# Patient Record
Sex: FEMALE | Race: BLACK OR AFRICAN AMERICAN | NOT HISPANIC OR LATINO
[De-identification: names, ages, dates, MRNs, and addresses within clinical notes are randomized per-mention and may not be internally consistent; named-entity substitution may affect disease eponyms.]

---

## 2019-11-13 PROBLEM — Z00.00 ENCOUNTER FOR PREVENTIVE HEALTH EXAMINATION: Status: ACTIVE | Noted: 2019-11-13

## 2019-12-20 ENCOUNTER — APPOINTMENT (OUTPATIENT)
Dept: VASCULAR SURGERY | Facility: CLINIC | Age: 64
End: 2019-12-20
Payer: MEDICAID

## 2019-12-20 VITALS
WEIGHT: 162 LBS | DIASTOLIC BLOOD PRESSURE: 86 MMHG | BODY MASS INDEX: 29.81 KG/M2 | SYSTOLIC BLOOD PRESSURE: 146 MMHG | HEART RATE: 67 BPM | HEIGHT: 62 IN

## 2019-12-20 DIAGNOSIS — Z21 ASYMPTOMATIC HUMAN IMMUNODEFICIENCY VIRUS [HIV] INFECTION STATUS: ICD-10-CM

## 2019-12-20 DIAGNOSIS — Z86.39 PERSONAL HISTORY OF OTHER ENDOCRINE, NUTRITIONAL AND METABOLIC DISEASE: ICD-10-CM

## 2019-12-20 DIAGNOSIS — I10 ESSENTIAL (PRIMARY) HYPERTENSION: ICD-10-CM

## 2019-12-20 PROCEDURE — 99203 OFFICE O/P NEW LOW 30 MIN: CPT

## 2019-12-20 PROCEDURE — G0365: CPT

## 2019-12-20 RX ORDER — ZIDOVUDINE 10 MG/ML
INJECTION, SOLUTION INTRAVENOUS
Refills: 0 | Status: ACTIVE | COMMUNITY

## 2019-12-20 RX ORDER — ACYCLOVIR 800 MG/1
TABLET ORAL
Refills: 0 | Status: ACTIVE | COMMUNITY

## 2019-12-20 NOTE — PHYSICAL EXAM
[Normal Breath Sounds] : Normal breath sounds [Normal Heart Sounds] : normal heart sounds [2+] : left 2+ [Oriented to Person] : oriented to person [Alert] : alert [Oriented to Place] : oriented to place [JVD] : no jugular venous distention  [Ankle Swelling (On Exam)] : not present [Varicose Veins Of Lower Extremities] : not present [] : not present [Abdomen Masses] : No abdominal masses [Skin Ulcer] : no ulcer

## 2019-12-20 NOTE — ASSESSMENT
[FreeTextEntry1] : Renal failure. Plan for left basilic vein transposition AV fistula. Needs medical clearance. Needs ID clearance.

## 2019-12-20 NOTE — CONSULT LETTER
[Dear  ___] : Dear  [unfilled], [Consult Letter:] : I had the pleasure of evaluating your patient, [unfilled]. [Please see my note below.] : Please see my note below. [Consult Closing:] : Thank you very much for allowing me to participate in the care of this patient.  If you have any questions, please do not hesitate to contact me. [Sincerely,] : Sincerely, [FreeTextEntry3] : Mello Dickens M.D., F.CONRAD.S., R.P.V.I.\par  of Vascular Surgery\par Chief, Vascular Surgery at [facility]\par Chief, Endovascular Surgery at [facility]\par Medical Director of Endovascular Program\par [facility]\par

## 2019-12-20 NOTE — HISTORY OF PRESENT ILLNESS
[FreeTextEntry1] : Patient is a 64-year-old female with past medical history significant for hypertension, hypercholesterolemia, HIV positive presenting with renal insufficiency requiring hemodialysis in the near future. No significant cardiac disease. Patient is right-handed.

## 2020-02-17 ENCOUNTER — FORM ENCOUNTER (OUTPATIENT)
Age: 65
End: 2020-02-17

## 2020-02-18 ENCOUNTER — OUTPATIENT (OUTPATIENT)
Dept: OUTPATIENT SERVICES | Facility: HOSPITAL | Age: 65
LOS: 1 days | End: 2020-02-18
Payer: MEDICAID

## 2020-02-18 VITALS
DIASTOLIC BLOOD PRESSURE: 80 MMHG | SYSTOLIC BLOOD PRESSURE: 140 MMHG | HEART RATE: 72 BPM | HEIGHT: 62 IN | WEIGHT: 164.02 LBS | OXYGEN SATURATION: 100 % | TEMPERATURE: 98 F | RESPIRATION RATE: 18 BRPM

## 2020-02-18 DIAGNOSIS — C53.9 MALIGNANT NEOPLASM OF CERVIX UTERI, UNSPECIFIED: Chronic | ICD-10-CM

## 2020-02-18 DIAGNOSIS — N18.9 CHRONIC KIDNEY DISEASE, UNSPECIFIED: ICD-10-CM

## 2020-02-18 DIAGNOSIS — I10 ESSENTIAL (PRIMARY) HYPERTENSION: ICD-10-CM

## 2020-02-18 DIAGNOSIS — Z87.59 PERSONAL HISTORY OF OTHER COMPLICATIONS OF PREGNANCY, CHILDBIRTH AND THE PUERPERIUM: Chronic | ICD-10-CM

## 2020-02-18 DIAGNOSIS — Z01.818 ENCOUNTER FOR OTHER PREPROCEDURAL EXAMINATION: ICD-10-CM

## 2020-02-18 DIAGNOSIS — Z90.710 ACQUIRED ABSENCE OF BOTH CERVIX AND UTERUS: Chronic | ICD-10-CM

## 2020-02-18 DIAGNOSIS — E11.22 TYPE 2 DIABETES MELLITUS WITH DIABETIC CHRONIC KIDNEY DISEASE: ICD-10-CM

## 2020-02-18 PROCEDURE — 71046 X-RAY EXAM CHEST 2 VIEWS: CPT

## 2020-02-18 PROCEDURE — G0463: CPT

## 2020-02-18 PROCEDURE — 71046 X-RAY EXAM CHEST 2 VIEWS: CPT | Mod: 26

## 2020-02-18 RX ORDER — LABETALOL HCL 100 MG
1 TABLET ORAL
Qty: 0 | Refills: 0 | DISCHARGE

## 2020-02-18 RX ORDER — SODIUM CHLORIDE 9 MG/ML
3 INJECTION INTRAMUSCULAR; INTRAVENOUS; SUBCUTANEOUS EVERY 8 HOURS
Refills: 0 | Status: DISCONTINUED | OUTPATIENT
Start: 2020-03-05 | End: 2020-03-13

## 2020-02-18 NOTE — H&P PST ADULT - ASSESSMENT
64-year old female with history of hypertension, chronic kidney disease, gout, hyperlipidemia, HIV, depression comes to PST prior to scheduled surgery.  on examination, patient found to have a loose tooth, lower, back left.  Patient also has left cheek swelling without pain.   Patient instructed to see dentist as soon as possible.

## 2020-02-18 NOTE — H&P PST ADULT - RS GEN PE MLT RESP DETAILS PC
airway patent/good air movement/respirations non-labored/clear to auscultation bilaterally/breath sounds equal/normal

## 2020-02-18 NOTE — H&P PST ADULT - NSICDXPROBLEM_GEN_ALL_CORE_FT
PROBLEM DIAGNOSES  Problem: Chronic kidney disease  Assessment and Plan: Patient schedueld for Left Arm Basilic Vein Transposition Arteriovenous Fistula Creation on 2/20/20.    Problem: Hypertension  Assessment and Plan: Patient instructed to take amlodipine, hydralazine, Metoprolol with sip of water morning of surgery.chronic kidney disease

## 2020-02-18 NOTE — H&P PST ADULT - NSICDXPASTMEDICALHX_GEN_ALL_CORE_FT
PAST MEDICAL HISTORY:  Cervical cancer     Chronic kidney disease     Depression     Gout     History of gastroesophageal reflux (GERD)     HIV (human immunodeficiency virus infection)     HTN (hypertension)     Hyperlipidemia PAST MEDICAL HISTORY:  Cervical cancer     Chronic kidney disease     Depression     DVT, lower extremity Left leg after hysterectomy    Gout     History of gastroesophageal reflux (GERD)     HIV (human immunodeficiency virus infection)     HTN (hypertension)     Hyperlipidemia

## 2020-02-18 NOTE — H&P PST ADULT - HISTORY OF PRESENT ILLNESS
Patient has a history of hypertension and chronic kidney disease.  Patient reports she has not started dialysis, however will need to start hemodialysis in the near future.

## 2020-02-18 NOTE — H&P PST ADULT - NSANTHOSAYNRD_GEN_A_CORE
No. SERAFIN screening performed.  STOP BANG Legend: 0-2 = LOW Risk; 3-4 = INTERMEDIATE Risk; 5-8 = HIGH Risk

## 2020-02-18 NOTE — H&P PST ADULT - NSICDXPASTSURGICALHX_GEN_ALL_CORE_FT
PAST SURGICAL HISTORY:  H/O: hysterectomy Due to cervical cancer    History of ectopic pregnancy Two

## 2020-03-04 ENCOUNTER — TRANSCRIPTION ENCOUNTER (OUTPATIENT)
Age: 65
End: 2020-03-04

## 2020-03-05 ENCOUNTER — OUTPATIENT (OUTPATIENT)
Dept: OUTPATIENT SERVICES | Facility: HOSPITAL | Age: 65
LOS: 1 days | End: 2020-03-05
Payer: MEDICAID

## 2020-03-05 ENCOUNTER — APPOINTMENT (OUTPATIENT)
Dept: VASCULAR SURGERY | Facility: HOSPITAL | Age: 65
End: 2020-03-05
Payer: MEDICAID

## 2020-03-05 VITALS
RESPIRATION RATE: 16 BRPM | TEMPERATURE: 98 F | WEIGHT: 164.02 LBS | HEART RATE: 69 BPM | OXYGEN SATURATION: 100 % | HEIGHT: 62 IN | DIASTOLIC BLOOD PRESSURE: 71 MMHG | SYSTOLIC BLOOD PRESSURE: 138 MMHG

## 2020-03-05 VITALS
SYSTOLIC BLOOD PRESSURE: 109 MMHG | RESPIRATION RATE: 18 BRPM | OXYGEN SATURATION: 98 % | TEMPERATURE: 98 F | DIASTOLIC BLOOD PRESSURE: 57 MMHG | HEART RATE: 69 BPM

## 2020-03-05 DIAGNOSIS — Z90.710 ACQUIRED ABSENCE OF BOTH CERVIX AND UTERUS: Chronic | ICD-10-CM

## 2020-03-05 DIAGNOSIS — E11.22 TYPE 2 DIABETES MELLITUS WITH DIABETIC CHRONIC KIDNEY DISEASE: ICD-10-CM

## 2020-03-05 DIAGNOSIS — Z87.59 PERSONAL HISTORY OF OTHER COMPLICATIONS OF PREGNANCY, CHILDBIRTH AND THE PUERPERIUM: Chronic | ICD-10-CM

## 2020-03-05 LAB
ANION GAP SERPL CALC-SCNC: 7 MMOL/L — SIGNIFICANT CHANGE UP (ref 5–17)
BUN SERPL-MCNC: 54 MG/DL — HIGH (ref 7–18)
CALCIUM SERPL-MCNC: 10.5 MG/DL — SIGNIFICANT CHANGE UP (ref 8.4–10.5)
CHLORIDE SERPL-SCNC: 110 MMOL/L — HIGH (ref 96–108)
CO2 SERPL-SCNC: 23 MMOL/L — SIGNIFICANT CHANGE UP (ref 22–31)
CREAT SERPL-MCNC: 7.32 MG/DL — HIGH (ref 0.5–1.3)
GLUCOSE SERPL-MCNC: 104 MG/DL — HIGH (ref 70–99)
POTASSIUM SERPL-MCNC: 5 MMOL/L — SIGNIFICANT CHANGE UP (ref 3.5–5.3)
POTASSIUM SERPL-SCNC: 5 MMOL/L — SIGNIFICANT CHANGE UP (ref 3.5–5.3)
SODIUM SERPL-SCNC: 140 MMOL/L — SIGNIFICANT CHANGE UP (ref 135–145)

## 2020-03-05 PROCEDURE — 36819 AV FUSE UPPR ARM BASILIC: CPT

## 2020-03-05 PROCEDURE — 80048 BASIC METABOLIC PNL TOTAL CA: CPT

## 2020-03-05 PROCEDURE — C1889: CPT

## 2020-03-05 PROCEDURE — 36415 COLL VENOUS BLD VENIPUNCTURE: CPT

## 2020-03-05 PROCEDURE — L8699: CPT

## 2020-03-05 RX ORDER — ONDANSETRON 8 MG/1
4 TABLET, FILM COATED ORAL ONCE
Refills: 0 | Status: DISCONTINUED | OUTPATIENT
Start: 2020-03-05 | End: 2020-03-05

## 2020-03-05 RX ORDER — CHLORHEXIDINE GLUCONATE 213 G/1000ML
1 SOLUTION TOPICAL ONCE
Refills: 0 | Status: COMPLETED | OUTPATIENT
Start: 2020-03-05 | End: 2020-03-05

## 2020-03-05 RX ORDER — OXYCODONE AND ACETAMINOPHEN 5; 325 MG/1; MG/1
1 TABLET ORAL ONCE
Refills: 0 | Status: DISCONTINUED | OUTPATIENT
Start: 2020-03-05 | End: 2020-03-05

## 2020-03-05 RX ORDER — FENTANYL CITRATE 50 UG/ML
25 INJECTION INTRAVENOUS
Refills: 0 | Status: DISCONTINUED | OUTPATIENT
Start: 2020-03-05 | End: 2020-03-05

## 2020-03-05 RX ORDER — HYDROMORPHONE HYDROCHLORIDE 2 MG/ML
0.5 INJECTION INTRAMUSCULAR; INTRAVENOUS; SUBCUTANEOUS
Refills: 0 | Status: DISCONTINUED | OUTPATIENT
Start: 2020-03-05 | End: 2020-03-05

## 2020-03-05 RX ORDER — SODIUM CHLORIDE 9 MG/ML
1000 INJECTION INTRAMUSCULAR; INTRAVENOUS; SUBCUTANEOUS
Refills: 0 | Status: DISCONTINUED | OUTPATIENT
Start: 2020-03-05 | End: 2020-03-05

## 2020-03-05 RX ADMIN — CHLORHEXIDINE GLUCONATE 1 APPLICATION(S): 213 SOLUTION TOPICAL at 09:12

## 2020-03-05 RX ADMIN — SODIUM CHLORIDE 3 MILLILITER(S): 9 INJECTION INTRAMUSCULAR; INTRAVENOUS; SUBCUTANEOUS at 09:12

## 2020-03-05 RX ADMIN — HYDROMORPHONE HYDROCHLORIDE 0.5 MILLIGRAM(S): 2 INJECTION INTRAMUSCULAR; INTRAVENOUS; SUBCUTANEOUS at 14:24

## 2020-03-05 NOTE — BRIEF OPERATIVE NOTE - NSICDXBRIEFPROCEDURE_GEN_ALL_CORE_FT
PROCEDURES:  Creation, AV fistula, brachiobasilic, using transposition technique 05-Mar-2020 13:21:47  Steve Lawrence

## 2020-03-05 NOTE — BRIEF OPERATIVE NOTE - OPERATION/FINDINGS
preop dx: ESRD  postop dx: same  procedure: left brachiobasilic fistula creation via vein transposition    dopplerable left radial and ulnar pulses after anastomosis, palpable thrill

## 2020-03-05 NOTE — ASU DISCHARGE PLAN (ADULT/PEDIATRIC) - CARE PROVIDER_API CALL
Mello Dickens)  Vascular Surgery  [address] [facility] [facility], [address]  [place] [address]  Phone: [phone]  Fax: [phone]  Follow Up Time:

## 2020-03-06 PROBLEM — E78.5 HYPERLIPIDEMIA, UNSPECIFIED: Chronic | Status: ACTIVE | Noted: 2020-02-18

## 2020-03-06 PROBLEM — F32.9 MAJOR DEPRESSIVE DISORDER, SINGLE EPISODE, UNSPECIFIED: Chronic | Status: ACTIVE | Noted: 2020-02-18

## 2020-03-06 PROBLEM — I82.409 ACUTE EMBOLISM AND THROMBOSIS OF UNSPECIFIED DEEP VEINS OF UNSPECIFIED LOWER EXTREMITY: Chronic | Status: ACTIVE | Noted: 2020-02-18

## 2020-03-06 PROBLEM — Z87.19 PERSONAL HISTORY OF OTHER DISEASES OF THE DIGESTIVE SYSTEM: Chronic | Status: ACTIVE | Noted: 2020-02-18

## 2020-03-06 PROBLEM — M10.9 GOUT, UNSPECIFIED: Chronic | Status: ACTIVE | Noted: 2020-02-18

## 2020-03-09 ENCOUNTER — APPOINTMENT (OUTPATIENT)
Dept: VASCULAR SURGERY | Facility: CLINIC | Age: 65
End: 2020-03-09
Payer: MEDICAID

## 2020-03-09 PROCEDURE — 99024 POSTOP FOLLOW-UP VISIT: CPT

## 2020-03-13 NOTE — DISCUSSION/SUMMARY
[FreeTextEntry1] : 65 yo female with history of htn, hld, hiv, ckd not on hd presents for follow up s/p left upper extremity basilic transposition \par les drain removed \par pt to follow up in 2 weeks for suture removal and duplex

## 2020-03-13 NOTE — REASON FOR VISIT
[de-identified] : left upper extremity basilic transposition [de-identified] : 3/5/20 [de-identified] : pt without any complaints had les drain in place had drained about 30 cc / 24 hours initially now over the past 2 days 30 cc of serosangenous fluid

## 2020-03-13 NOTE — PHYSICAL EXAM
[Alert] : alert [Calm] : calm [JVD] : no jugular venous distention  [de-identified] : appears well  [de-identified] : plapable thrill, steri strips in place, mild healing ecchymosis of the left upper extremity, les drain removed

## 2020-03-20 ENCOUNTER — APPOINTMENT (OUTPATIENT)
Dept: VASCULAR SURGERY | Facility: CLINIC | Age: 65
End: 2020-03-20

## 2020-03-23 ENCOUNTER — APPOINTMENT (OUTPATIENT)
Dept: VASCULAR SURGERY | Facility: CLINIC | Age: 65
End: 2020-03-23
Payer: MEDICAID

## 2020-03-23 VITALS — TEMPERATURE: 98.4 F | HEIGHT: 62 IN | WEIGHT: 163 LBS | BODY MASS INDEX: 30 KG/M2

## 2020-03-23 PROCEDURE — 99024 POSTOP FOLLOW-UP VISIT: CPT

## 2020-03-23 RX ORDER — FEXOFENADINE HYDROCHLORIDE 180 MG/1
180 TABLET, FILM COATED ORAL DAILY
Qty: 15 | Refills: 0 | Status: ACTIVE | COMMUNITY
Start: 2020-03-23 | End: 1900-01-01

## 2020-03-23 RX ORDER — ERGOCALCIFEROL 1.25 MG/1
1.25 MG CAPSULE, LIQUID FILLED ORAL
Qty: 4 | Refills: 0 | Status: ACTIVE | COMMUNITY
Start: 2019-09-26

## 2020-03-23 RX ORDER — ALLOPURINOL 100 MG/1
100 TABLET ORAL
Qty: 28 | Refills: 0 | Status: ACTIVE | COMMUNITY
Start: 2019-09-26

## 2020-03-23 RX ORDER — AMLODIPINE BESYLATE 10 MG/1
10 TABLET ORAL
Qty: 28 | Refills: 0 | Status: ACTIVE | COMMUNITY
Start: 2019-09-26

## 2020-03-23 RX ORDER — OXYCODONE AND ACETAMINOPHEN 5; 325 MG/1; MG/1
5-325 TABLET ORAL
Qty: 12 | Refills: 0 | Status: DISCONTINUED | COMMUNITY
Start: 2020-03-05 | End: 2020-03-23

## 2020-03-23 RX ORDER — AMMONIUM LACTATE 12 %
12 CREAM (GRAM) TOPICAL
Qty: 385 | Refills: 0 | Status: ACTIVE | COMMUNITY
Start: 2019-09-26

## 2020-03-23 NOTE — REASON FOR VISIT
[de-identified] : Status post left upper extremity basilic vein transposition AV fistula.  Patient had a severe allergic reaction to oxycodone.  Recommend Medrol pack and Allegra.  Follow-up next week.  Patient was advised to go to the emergency room if symptoms do not improve with the medication in 24 hours.

## 2020-03-30 ENCOUNTER — APPOINTMENT (OUTPATIENT)
Dept: VASCULAR SURGERY | Facility: CLINIC | Age: 65
End: 2020-03-30

## 2020-07-10 ENCOUNTER — APPOINTMENT (OUTPATIENT)
Dept: VASCULAR SURGERY | Facility: CLINIC | Age: 65
End: 2020-07-10
Payer: MEDICAID

## 2020-07-10 VITALS
HEART RATE: 66 BPM | DIASTOLIC BLOOD PRESSURE: 90 MMHG | WEIGHT: 164 LBS | BODY MASS INDEX: 30.18 KG/M2 | SYSTOLIC BLOOD PRESSURE: 138 MMHG | HEIGHT: 62 IN

## 2020-07-10 VITALS — TEMPERATURE: 97.3 F

## 2020-07-10 PROCEDURE — 99213 OFFICE O/P EST LOW 20 MIN: CPT

## 2020-07-10 PROCEDURE — 93990 DOPPLER FLOW TESTING: CPT

## 2020-07-10 NOTE — PHYSICAL EXAM
[Hand well perfused] : hand well perfused [Thrill] : no thrill [Normal] : normoactive bowel sounds, soft and nontender, no hepatosplenomegaly or masses appreciated

## 2020-07-10 NOTE — HISTORY OF PRESENT ILLNESS
[FreeTextEntry1] : Patient with renal failure.  Patient not on hemodialysis.  Patient had a complicated postoperative course with severe allergic reaction to oxycodone. [] : left basilic fistula

## 2020-07-10 NOTE — ASSESSMENT
[FreeTextEntry1] : Patient with renal failure with thrombosed left basilic fistula.  Plan for fistulogram and thrombectomy.

## 2020-07-21 ENCOUNTER — FORM ENCOUNTER (OUTPATIENT)
Age: 65
End: 2020-07-21

## 2020-07-22 ENCOUNTER — APPOINTMENT (OUTPATIENT)
Dept: ENDOVASCULAR SURGERY | Facility: CLINIC | Age: 65
End: 2020-07-22
Payer: MEDICAID

## 2020-07-22 VITALS
WEIGHT: 164 LBS | TEMPERATURE: 97.6 F | DIASTOLIC BLOOD PRESSURE: 85 MMHG | RESPIRATION RATE: 16 BRPM | SYSTOLIC BLOOD PRESSURE: 191 MMHG | BODY MASS INDEX: 30.18 KG/M2 | HEART RATE: 68 BPM | HEIGHT: 62 IN | OXYGEN SATURATION: 98 %

## 2020-07-22 PROCEDURE — 36905Z: CUSTOM | Mod: 53

## 2020-07-22 PROCEDURE — 76937 US GUIDE VASCULAR ACCESS: CPT

## 2020-07-22 PROCEDURE — 36216Z: CUSTOM | Mod: 59

## 2020-07-23 NOTE — PAST MEDICAL HISTORY
[Increasing age ( >40 years old)] : Increasing age ( >40 years old) [No therapy indicated for cases scheduled for less than one hour] : No therapy indicated for cases scheduled for less than one hour. [FreeTextEntry1] : Malignant Hyperthermia Screening Tool and Risk of Bleeding Assessment\par \par Ms. VERONICA GOVEA denies family history of unexpected death following Anesthesia or Exercise.\par Denies Family history of Malignant Hyperthermia, Muscle or Neuromuscular disorder and High Temperature following exercise.\par \par Ms. VERONICA GOVEA denies history of Muscle Spasm, Dark or Chocolate - Colored urine and Unanticipated fever immediately following anesthesia or serious exercise. \par Ms. GOVEA also denies bleeding tendencies/ Risks of Bleeding.\par

## 2020-07-23 NOTE — HISTORY OF PRESENT ILLNESS
[] : left brachiocephalic fistula [FreeTextEntry1] : 3/5/2020 Dr. Dickens [FreeTextEntry4] : not on dialysis [FreeTextEntry6] : Dr. Mcmahan [FreeTextEntry5] : 10pm 7/21/2020

## 2020-07-23 NOTE — PROCEDURE
[D/C IV on discharge] : D/C IV on discharge [Resume diet] : resume diet [Site check for bleeding/hematoma/thrill/bruit] : Site check for bleeding/hematoma/thrill/bruit [Vital signs on admission the q 15 mins x2] : Vital signs on admission the q 15 mins x2 [Tylenol 1000 mg po x 1 dose] : Tylenol 1000 mg po x 1 dose [Ice pack to ___ arm x 15 minutes] : Ice pack to [unfilled] arm x 15 minutes [FreeTextEntry1] : attempted left arm fistula thrombectomy

## 2020-07-27 ENCOUNTER — APPOINTMENT (OUTPATIENT)
Dept: VASCULAR SURGERY | Facility: CLINIC | Age: 65
End: 2020-07-27
Payer: MEDICAID

## 2020-07-27 PROCEDURE — 99213 OFFICE O/P EST LOW 20 MIN: CPT

## 2020-07-27 PROCEDURE — 93971 EXTREMITY STUDY: CPT

## 2020-07-27 NOTE — ASSESSMENT
[FreeTextEntry1] : Patient with renal failure.  Plan for left upper arm AV graft.  We will schedule as soon as possible.

## 2020-08-03 ENCOUNTER — APPOINTMENT (OUTPATIENT)
Dept: DISASTER EMERGENCY | Facility: CLINIC | Age: 65
End: 2020-08-03

## 2020-08-04 LAB — SARS-COV-2 N GENE NPH QL NAA+PROBE: NOT DETECTED

## 2020-08-05 ENCOUNTER — TRANSCRIPTION ENCOUNTER (OUTPATIENT)
Age: 65
End: 2020-08-05

## 2020-08-05 RX ORDER — POVIDONE-IODINE 5 %
1 AEROSOL (ML) TOPICAL ONCE
Refills: 0 | Status: COMPLETED | OUTPATIENT
Start: 2020-08-06 | End: 2020-08-06

## 2020-08-06 ENCOUNTER — APPOINTMENT (OUTPATIENT)
Dept: VASCULAR SURGERY | Facility: HOSPITAL | Age: 65
End: 2020-08-06

## 2020-08-06 ENCOUNTER — OUTPATIENT (OUTPATIENT)
Dept: OUTPATIENT SERVICES | Facility: HOSPITAL | Age: 65
LOS: 1 days | End: 2020-08-06
Payer: MEDICAID

## 2020-08-06 VITALS
RESPIRATION RATE: 16 BRPM | SYSTOLIC BLOOD PRESSURE: 142 MMHG | OXYGEN SATURATION: 99 % | DIASTOLIC BLOOD PRESSURE: 70 MMHG | HEART RATE: 77 BPM | TEMPERATURE: 98 F

## 2020-08-06 VITALS
HEIGHT: 62 IN | OXYGEN SATURATION: 99 % | DIASTOLIC BLOOD PRESSURE: 83 MMHG | HEART RATE: 69 BPM | WEIGHT: 162.04 LBS | TEMPERATURE: 98 F | RESPIRATION RATE: 20 BRPM | SYSTOLIC BLOOD PRESSURE: 187 MMHG

## 2020-08-06 DIAGNOSIS — I10 ESSENTIAL (PRIMARY) HYPERTENSION: ICD-10-CM

## 2020-08-06 DIAGNOSIS — E11.22 TYPE 2 DIABETES MELLITUS WITH DIABETIC CHRONIC KIDNEY DISEASE: ICD-10-CM

## 2020-08-06 DIAGNOSIS — E08.22 DIABETES MELLITUS DUE TO UNDERLYING CONDITION WITH DIABETIC CHRONIC KIDNEY DISEASE: ICD-10-CM

## 2020-08-06 DIAGNOSIS — Z87.59 PERSONAL HISTORY OF OTHER COMPLICATIONS OF PREGNANCY, CHILDBIRTH AND THE PUERPERIUM: Chronic | ICD-10-CM

## 2020-08-06 DIAGNOSIS — Z90.710 ACQUIRED ABSENCE OF BOTH CERVIX AND UTERUS: Chronic | ICD-10-CM

## 2020-08-06 DIAGNOSIS — Z98.890 OTHER SPECIFIED POSTPROCEDURAL STATES: Chronic | ICD-10-CM

## 2020-08-06 LAB
ANION GAP SERPL CALC-SCNC: 9 MMOL/L — SIGNIFICANT CHANGE UP (ref 5–17)
BUN SERPL-MCNC: 54 MG/DL — HIGH (ref 7–18)
CALCIUM SERPL-MCNC: 10.2 MG/DL — SIGNIFICANT CHANGE UP (ref 8.4–10.5)
CHLORIDE SERPL-SCNC: 107 MMOL/L — SIGNIFICANT CHANGE UP (ref 96–108)
CO2 SERPL-SCNC: 23 MMOL/L — SIGNIFICANT CHANGE UP (ref 22–31)
CREAT SERPL-MCNC: 8.59 MG/DL — HIGH (ref 0.5–1.3)
GLUCOSE BLDC GLUCOMTR-MCNC: 81 MG/DL — SIGNIFICANT CHANGE UP (ref 70–99)
GLUCOSE SERPL-MCNC: 87 MG/DL — SIGNIFICANT CHANGE UP (ref 70–99)
POTASSIUM SERPL-MCNC: 6 MMOL/L — HIGH (ref 3.5–5.3)
POTASSIUM SERPL-SCNC: 6 MMOL/L — HIGH (ref 3.5–5.3)
SODIUM SERPL-SCNC: 139 MMOL/L — SIGNIFICANT CHANGE UP (ref 135–145)

## 2020-08-06 PROCEDURE — 36830 ARTERY-VEIN NONAUTOGRAFT: CPT

## 2020-08-06 PROCEDURE — C1889: CPT

## 2020-08-06 PROCEDURE — 82962 GLUCOSE BLOOD TEST: CPT

## 2020-08-06 PROCEDURE — 80048 BASIC METABOLIC PNL TOTAL CA: CPT

## 2020-08-06 PROCEDURE — C1768: CPT

## 2020-08-06 PROCEDURE — 35321 RECHANNELING OF ARTERY: CPT | Mod: LT

## 2020-08-06 PROCEDURE — 35321 RECHANNELING OF ARTERY: CPT

## 2020-08-06 PROCEDURE — 36415 COLL VENOUS BLD VENIPUNCTURE: CPT

## 2020-08-06 RX ORDER — SODIUM CHLORIDE 9 MG/ML
3 INJECTION INTRAMUSCULAR; INTRAVENOUS; SUBCUTANEOUS EVERY 8 HOURS
Refills: 0 | Status: DISCONTINUED | OUTPATIENT
Start: 2020-08-06 | End: 2020-08-06

## 2020-08-06 RX ORDER — TRAMADOL HYDROCHLORIDE 50 MG/1
1 TABLET ORAL
Qty: 12 | Refills: 0
Start: 2020-08-06 | End: 2020-08-08

## 2020-08-06 RX ORDER — CHLORHEXIDINE GLUCONATE 213 G/1000ML
1 SOLUTION TOPICAL ONCE
Refills: 0 | Status: COMPLETED | OUTPATIENT
Start: 2020-08-06 | End: 2020-08-06

## 2020-08-06 RX ORDER — HYDROMORPHONE HYDROCHLORIDE 2 MG/ML
0.5 INJECTION INTRAMUSCULAR; INTRAVENOUS; SUBCUTANEOUS
Refills: 0 | Status: DISCONTINUED | OUTPATIENT
Start: 2020-08-06 | End: 2020-08-06

## 2020-08-06 RX ORDER — SODIUM CHLORIDE 9 MG/ML
1000 INJECTION, SOLUTION INTRAVENOUS
Refills: 0 | Status: DISCONTINUED | OUTPATIENT
Start: 2020-08-06 | End: 2020-08-06

## 2020-08-06 RX ORDER — ACETAMINOPHEN 500 MG
1000 TABLET ORAL ONCE
Refills: 0 | Status: DISCONTINUED | OUTPATIENT
Start: 2020-08-06 | End: 2020-08-06

## 2020-08-06 RX ORDER — DESMOPRESSIN ACETATE 0.1 MG/1
20 TABLET ORAL ONCE
Refills: 0 | Status: COMPLETED | OUTPATIENT
Start: 2020-08-06 | End: 2020-08-06

## 2020-08-06 RX ORDER — TRAMADOL HYDROCHLORIDE 50 MG/1
50 TABLET ORAL EVERY 6 HOURS
Refills: 0 | Status: DISCONTINUED | OUTPATIENT
Start: 2020-08-06 | End: 2020-08-06

## 2020-08-06 RX ADMIN — CHLORHEXIDINE GLUCONATE 1 APPLICATION(S): 213 SOLUTION TOPICAL at 13:32

## 2020-08-06 RX ADMIN — DESMOPRESSIN ACETATE 110 MICROGRAM(S): 0.1 TABLET ORAL at 16:50

## 2020-08-06 RX ADMIN — SODIUM CHLORIDE 3 MILLILITER(S): 9 INJECTION INTRAMUSCULAR; INTRAVENOUS; SUBCUTANEOUS at 13:31

## 2020-08-06 RX ADMIN — Medication 1 APPLICATION(S): at 13:31

## 2020-08-06 NOTE — ASU DISCHARGE PLAN (ADULT/PEDIATRIC) - CARE PROVIDER_API CALL
Mello Dickens  VASCULAR SURGERY  [address] [facility] [facility] [address]  [place] [address]  Phone: [phone]  Fax: [phone]  Established Patient  Follow Up Time:

## 2020-08-06 NOTE — H&P PST ADULT - NSICDXPASTMEDICALHX_GEN_ALL_CORE_FT
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

## 2020-08-06 NOTE — ASU PREOP CHECKLIST - SELECT TESTS ORDERED
BMP/BMP sent stat to the LAB as ordered by NP/Results in MD note BMP/BMP sent stat to the LAB as ordered by NP./Results in MD note

## 2020-08-06 NOTE — H&P PST ADULT - NSICDXPROBLEM_GEN_ALL_CORE_FT
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

## 2020-08-06 NOTE — ASU DISCHARGE PLAN (ADULT/PEDIATRIC) - CALL YOUR DOCTOR IF YOU HAVE ANY OF THE FOLLOWING:
Swelling that gets worse/Nausea and vomiting that does not stop/Bleeding that does not stop/Wound/Surgical Site with redness, or foul smelling discharge or pus/Pain not relieved by Medications Swelling that gets worse/Nausea and vomiting that does not stop/Bleeding that does not stop/Wound/Surgical Site with redness, or foul smelling discharge or pus/Fever greater than (need to indicate Fahrenheit or Celsius)/Pain not relieved by Medications

## 2020-08-06 NOTE — H&P PST ADULT - RS GEN PE MLT RESP DETAILS PC
good air movement/normal/breath sounds equal/clear to auscultation bilaterally/respirations non-labored/airway patent

## 2020-08-06 NOTE — ASU PATIENT PROFILE, ADULT - PMH
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

## 2020-08-06 NOTE — H&P PST ADULT - HISTORY OF PRESENT ILLNESS
65 year old female with history of HTN, HIV Cervical cancer, Gout ESRD has not started dialysis since left upper arm av fistula was created in March 2020. The upper arm fistula was not evaluated after insertion due to COVID . Pt stated that in July when she was able to go back to the surgeon the fistula was not longer viable. Pt with increasing creatinine levels is here for left arm av graft placement today.

## 2020-08-06 NOTE — ASU PATIENT PROFILE, ADULT - PSH
H/O: hysterectomy  Due to cervical cancer  History of ectopic pregnancy  Two  S/P arteriovenous (AV) fistula creation  july 10 2020

## 2020-08-06 NOTE — BRIEF OPERATIVE NOTE - OPERATION/FINDINGS
left upper extremity arteriovenous fistula creation with graft, good thrill at end of case. Patient without pain of left upper extremity, moving all fingers at end of case.

## 2020-08-06 NOTE — H&P PST ADULT - NSICDXPASTSURGICALHX_GEN_ALL_CORE_FT
PAST SURGICAL HISTORY:  H/O: hysterectomy Due to cervical cancer    History of ectopic pregnancy Two    S/P arteriovenous (AV) fistula creation july 10 2020

## 2020-08-14 ENCOUNTER — APPOINTMENT (OUTPATIENT)
Dept: VASCULAR SURGERY | Facility: CLINIC | Age: 65
End: 2020-08-14
Payer: MEDICAID

## 2020-08-14 VITALS
BODY MASS INDEX: 29.44 KG/M2 | HEIGHT: 62 IN | OXYGEN SATURATION: 99 % | WEIGHT: 160 LBS | SYSTOLIC BLOOD PRESSURE: 166 MMHG | TEMPERATURE: 97.3 F | DIASTOLIC BLOOD PRESSURE: 90 MMHG | HEART RATE: 82 BPM

## 2020-08-14 VITALS — TEMPERATURE: 97.5 F

## 2020-08-14 PROBLEM — E08.22 DIABETES MELLITUS DUE TO UNDERLYING CONDITION WITH DIABETIC CHRONIC KIDNEY DISEASE: Chronic | Status: ACTIVE | Noted: 2020-08-06

## 2020-08-14 PROCEDURE — 99024 POSTOP FOLLOW-UP VISIT: CPT

## 2020-08-14 NOTE — DISCUSSION/SUMMARY
[FreeTextEntry1] : 64 yo female with history of ckd pending initiation of hd s/p left upper extremity avg 1 week ago presents for follow up with left upper extremity tenderness\par \par palpable thrill on exam and incisions healing nicely \par likely postoperative tenderness and swelling \par will have pt follow up in 1 week for suture removal and will initate hd via left upper extremity avg on 8/24/20

## 2020-08-14 NOTE — REASON FOR VISIT
[de-identified] : left upper extremity avg [de-identified] : pt s/p left upper extremity avg pending initiation of hd \par pt with pain and swelling of the left upper extremity \par on exam palpable thrill over the avg incisions healing nicely  [de-identified] : 8/6/20

## 2020-08-21 ENCOUNTER — APPOINTMENT (OUTPATIENT)
Dept: VASCULAR SURGERY | Facility: CLINIC | Age: 65
End: 2020-08-21
Payer: MEDICAID

## 2020-08-21 VITALS — TEMPERATURE: 96.9 F

## 2020-08-21 PROCEDURE — 99024 POSTOP FOLLOW-UP VISIT: CPT

## 2020-08-21 PROCEDURE — 93990 DOPPLER FLOW TESTING: CPT

## 2020-08-21 NOTE — REASON FOR VISIT
[de-identified] : Status post left upper extremity AV graft.  Incisions are clean.  Sutures were removed.  There is a good thrill.  Left arm AV graft can be accessed for hemodialysis.

## 2020-09-03 ENCOUNTER — FORM ENCOUNTER (OUTPATIENT)
Age: 65
End: 2020-09-03

## 2020-09-03 PROBLEM — E11.22 CHRONIC KIDNEY DISEASE WITH END STAGE RENAL DISEASE ON DIALYSIS DUE TO TYPE 2 DIABETES MELLITUS: Status: ACTIVE | Noted: 2019-12-20

## 2020-09-04 ENCOUNTER — APPOINTMENT (OUTPATIENT)
Dept: ENDOVASCULAR SURGERY | Facility: CLINIC | Age: 65
End: 2020-09-04
Payer: MEDICAID

## 2020-09-04 VITALS
OXYGEN SATURATION: 99 % | HEART RATE: 95 BPM | HEIGHT: 62 IN | BODY MASS INDEX: 29.44 KG/M2 | TEMPERATURE: 98 F | DIASTOLIC BLOOD PRESSURE: 79 MMHG | SYSTOLIC BLOOD PRESSURE: 144 MMHG | WEIGHT: 160 LBS | RESPIRATION RATE: 15 BRPM

## 2020-09-04 DIAGNOSIS — E11.22 TYPE 2 DIABETES MELLITUS WITH DIABETIC CHRONIC KIDNEY DISEASE: ICD-10-CM

## 2020-09-04 DIAGNOSIS — N18.6 TYPE 2 DIABETES MELLITUS WITH DIABETIC CHRONIC KIDNEY DISEASE: ICD-10-CM

## 2020-09-04 DIAGNOSIS — Z99.2 TYPE 2 DIABETES MELLITUS WITH DIABETIC CHRONIC KIDNEY DISEASE: ICD-10-CM

## 2020-09-04 PROCEDURE — 36902Z: CUSTOM | Mod: 78

## 2020-12-12 ENCOUNTER — EMERGENCY (EMERGENCY)
Facility: HOSPITAL | Age: 65
LOS: 1 days | Discharge: HOME | End: 2020-12-12
Attending: EMERGENCY MEDICINE
Payer: MEDICAID

## 2020-12-12 VITALS
HEART RATE: 81 BPM | SYSTOLIC BLOOD PRESSURE: 138 MMHG | TEMPERATURE: 98 F | OXYGEN SATURATION: 98 % | HEIGHT: 62 IN | RESPIRATION RATE: 18 BRPM | DIASTOLIC BLOOD PRESSURE: 85 MMHG | WEIGHT: 157.19 LBS

## 2020-12-12 DIAGNOSIS — Z90.710 ACQUIRED ABSENCE OF BOTH CERVIX AND UTERUS: Chronic | ICD-10-CM

## 2020-12-12 DIAGNOSIS — Z98.890 OTHER SPECIFIED POSTPROCEDURAL STATES: Chronic | ICD-10-CM

## 2020-12-12 DIAGNOSIS — Z87.59 PERSONAL HISTORY OF OTHER COMPLICATIONS OF PREGNANCY, CHILDBIRTH AND THE PUERPERIUM: Chronic | ICD-10-CM

## 2020-12-12 PROCEDURE — 93970 EXTREMITY STUDY: CPT

## 2020-12-12 PROCEDURE — 99284 EMERGENCY DEPT VISIT MOD MDM: CPT | Mod: 25

## 2020-12-12 PROCEDURE — 99284 EMERGENCY DEPT VISIT MOD MDM: CPT

## 2020-12-12 PROCEDURE — 93970 EXTREMITY STUDY: CPT | Mod: 26

## 2020-12-12 RX ORDER — ACETAMINOPHEN 500 MG
650 TABLET ORAL ONCE
Refills: 0 | Status: COMPLETED | OUTPATIENT
Start: 2020-12-12 | End: 2020-12-12

## 2020-12-12 RX ADMIN — Medication 650 MILLIGRAM(S): at 20:38

## 2020-12-12 NOTE — ED PROVIDER NOTE - PROGRESS NOTE DETAILS
No DVT noted from US.  Patient nontoxic and medically stable for discharge. Results discussed with patient. Return precautions provided and patient understands to return to the ED for concerning or worsening signs and symptoms. Instructed to follow up with primary care physician and agreeable. Patient's questions answered.

## 2020-12-12 NOTE — ED PROVIDER NOTE - ATTENDING CONTRIBUTION TO CARE
I was physically present for the E/M service provided. I agree with above history, physical, and plan which I have reviewed and edited where appropriate. I was physically present for the key portions of the service provided.    [place]: 64 y/o female h/o dvt, GERD, HTN, HIV (states viral load undetectable), ESRD on dialysis (last dialyzed yesterday), presents for b/l calf pain radiating to the feet. no trauma, feels like cramps. no new meds or changes to meds. still makes urine.  had similar pains but not this severe. came to make sure no dvt, able to ambulate with discomfort.    *GEN:   comfortable, in no apparent distress, AOx3  *CV:   regular rate and rhythm, normal S1/S2, no murmur 2 + Dp pulses at both extremity  *RESP:   clear to auscultation bilaterally, non-labored, speaking in full sentences  *MSK:    5/5 strength, moving all extremity, no calf tenderness with squeeze test, no redness.  *SKIN:   dry, intact, no rash  *NEURO:   AOx3, no focal weakness or loss of sensation, GCS 15    a/p: calf pain possibly muscle cramps due to dialysis vs dvt- dvt study, tylenol, if work up neg can dc and f/u with outpt vascular if persist.

## 2020-12-12 NOTE — ED PROVIDER NOTE - PATIENT PORTAL LINK FT
You can access the FollowMyHealth Patient Portal offered by [facility] by registering at the following website: [email or web address]. By joining [facility]’s FollowMyHealth portal, you will also be able to view your health information using other applications (apps) compatible with our system.

## 2020-12-12 NOTE — ED PROVIDER NOTE - CLINICAL SUMMARY MEDICAL DECISION MAKING FREE TEXT BOX
64 y/o female presents for b/l calf pain. Concern for DVT. Will do US. 66 y/o female presents for b/l calf pain. Concern for DVT. Will do US.  Tylenol for pain.  No evidence of infection / cellulitis and no trauma.

## 2020-12-12 NOTE — ED ADULT NURSE NOTE - NSFALLRSKUNASSIST_ED_ALL_ED
Patient Instructions by Teresa Middleton DO at 02/06/17 11:35 AM     Author:  Teresa Middleton DO Service:  (none) Author Type:  Physician     Filed:  02/06/17 11:35 AM Encounter Date:  2/6/2017 Status:  Signed     :  Teresa Middleton DO (Physician)            PATIENT INFORMATION   We recommend:   .    Please follow up:  Breastfeeding support is available to you in the OB/GYN department at the [facility]. Our certified lactation counselors are available Monday through Friday. Please call [phone] for any questions or to schedule an appointment.    If you are experiencing postpartum depression and are in need of additional support, please contact [facility] for counselors and doctors who will assist you. [phone].    If you have any of the concerns or complications after regular business hours or on the weekend, please contact our office at [phone] and the On-call doctor will assist you.    Thank you for allowing us to serve you today!    Additional Educational Resources:  For additional resources regarding your symptoms, diagnosis, or further health information, please visit the Health Resources section on [email or web address] or the Online Health Resources section in [facility].          Revision History        User Key Date/Time User Provider Type Action    > [N/A] 02/06/17 11:35 AM Teresa Middleton DO Physician Sign            
no

## 2020-12-12 NOTE — ED ADULT TRIAGE NOTE - CHIEF COMPLAINT QUOTE
pt compliant of pain to both feet and calf on both legs denies any injury, started yesterday. Pt negative 3 months ago for coronas virus.

## 2020-12-12 NOTE — ED PROVIDER NOTE - OBJECTIVE STATEMENT
64 y/o female h/o GERD, HTN, HIV (states viral load undetectable), ESRD on dialysis (last dialyzed yesterday), presents for b/l calf pain radiating to the feet. Pt states pain started approximately noon yesterday prior to dialysis. Pt denies any injuries. Pt denies any fevers, shaking chills, or any other acute complaints.

## 2020-12-12 NOTE — ED PROVIDER NOTE - MUSCULOSKELETAL, MLM
B/l calf tenderness, no redness, no warmth. Sensory intact. Pedal pulses 2+. B/l calf tenderness, no redness, no warmth. Sensory intact. Pedal pulses 2+. 5/5 strength in b/l lower extremities.  full range of motion No warmth, redness.

## 2021-01-21 ENCOUNTER — INPATIENT (INPATIENT)
Facility: HOSPITAL | Age: 66
LOS: 6 days | Discharge: HOME | DRG: 314 | End: 2021-01-28
Attending: HOSPITALIST | Admitting: HOSPITALIST
Payer: MEDICAID

## 2021-01-21 VITALS
HEIGHT: 62 IN | WEIGHT: 139.99 LBS | SYSTOLIC BLOOD PRESSURE: 109 MMHG | OXYGEN SATURATION: 94 % | HEART RATE: 100 BPM | DIASTOLIC BLOOD PRESSURE: 71 MMHG | RESPIRATION RATE: 18 BRPM

## 2021-01-21 DIAGNOSIS — Z87.59 PERSONAL HISTORY OF OTHER COMPLICATIONS OF PREGNANCY, CHILDBIRTH AND THE PUERPERIUM: Chronic | ICD-10-CM

## 2021-01-21 DIAGNOSIS — Z98.890 OTHER SPECIFIED POSTPROCEDURAL STATES: Chronic | ICD-10-CM

## 2021-01-21 DIAGNOSIS — Z90.710 ACQUIRED ABSENCE OF BOTH CERVIX AND UTERUS: Chronic | ICD-10-CM

## 2021-01-21 RX ORDER — SODIUM CHLORIDE 9 MG/ML
1000 INJECTION INTRAMUSCULAR; INTRAVENOUS; SUBCUTANEOUS ONCE
Refills: 0 | Status: COMPLETED | OUTPATIENT
Start: 2021-01-21 | End: 2021-01-21

## 2021-01-22 DIAGNOSIS — E87.5 HYPERKALEMIA: ICD-10-CM

## 2021-01-22 DIAGNOSIS — Z29.9 ENCOUNTER FOR PROPHYLACTIC MEASURES, UNSPECIFIED: ICD-10-CM

## 2021-01-22 DIAGNOSIS — B20 HUMAN IMMUNODEFICIENCY VIRUS [HIV] DISEASE: ICD-10-CM

## 2021-01-22 DIAGNOSIS — U07.1 COVID-19: ICD-10-CM

## 2021-01-22 DIAGNOSIS — E78.5 HYPERLIPIDEMIA, UNSPECIFIED: ICD-10-CM

## 2021-01-22 DIAGNOSIS — I10 ESSENTIAL (PRIMARY) HYPERTENSION: ICD-10-CM

## 2021-01-22 DIAGNOSIS — N18.6 END STAGE RENAL DISEASE: ICD-10-CM

## 2021-01-22 LAB
24R-OH-CALCIDIOL SERPL-MCNC: 54.3 NG/ML — SIGNIFICANT CHANGE UP (ref 30–80)
4/8 RATIO: 0.59 RATIO — LOW (ref 0.9–3.6)
A1C WITH ESTIMATED AVERAGE GLUCOSE RESULT: 5.5 % — SIGNIFICANT CHANGE UP (ref 4–5.6)
ABS CD8: 715 /UL — SIGNIFICANT CHANGE UP (ref 142–740)
ALBUMIN SERPL ELPH-MCNC: 2.2 G/DL — LOW (ref 3.5–5)
ALBUMIN SERPL ELPH-MCNC: 2.4 G/DL — LOW (ref 3.5–5)
ALP SERPL-CCNC: 66 U/L — SIGNIFICANT CHANGE UP (ref 40–120)
ALP SERPL-CCNC: 74 U/L — SIGNIFICANT CHANGE UP (ref 40–120)
ALT FLD-CCNC: 16 U/L DA — SIGNIFICANT CHANGE UP (ref 10–60)
ALT FLD-CCNC: 28 U/L DA — SIGNIFICANT CHANGE UP (ref 10–60)
ANION GAP SERPL CALC-SCNC: 8 MMOL/L — SIGNIFICANT CHANGE UP (ref 5–17)
ANION GAP SERPL CALC-SCNC: 9 MMOL/L — SIGNIFICANT CHANGE UP (ref 5–17)
AST SERPL-CCNC: 30 U/L — SIGNIFICANT CHANGE UP (ref 10–40)
AST SERPL-CCNC: 82 U/L — HIGH (ref 10–40)
BASOPHILS # BLD AUTO: 0 K/UL — SIGNIFICANT CHANGE UP (ref 0–0.2)
BASOPHILS # BLD AUTO: 0.01 K/UL — SIGNIFICANT CHANGE UP (ref 0–0.2)
BASOPHILS NFR BLD AUTO: 0 % — SIGNIFICANT CHANGE UP (ref 0–2)
BASOPHILS NFR BLD AUTO: 0.1 % — SIGNIFICANT CHANGE UP (ref 0–2)
BILIRUB SERPL-MCNC: 0.4 MG/DL — SIGNIFICANT CHANGE UP (ref 0.2–1.2)
BILIRUB SERPL-MCNC: 0.9 MG/DL — SIGNIFICANT CHANGE UP (ref 0.2–1.2)
BUN SERPL-MCNC: 12 MG/DL — SIGNIFICANT CHANGE UP (ref 7–18)
BUN SERPL-MCNC: 14 MG/DL — SIGNIFICANT CHANGE UP (ref 7–18)
CALCIUM SERPL-MCNC: 8.9 MG/DL — SIGNIFICANT CHANGE UP (ref 8.4–10.5)
CALCIUM SERPL-MCNC: 9 MG/DL — SIGNIFICANT CHANGE UP (ref 8.4–10.5)
CD3 BLASTS SPEC-ACNC: 1137 /UL — SIGNIFICANT CHANGE UP (ref 672–1870)
CD3 BLASTS SPEC-ACNC: 89 % — HIGH (ref 59–83)
CD4 %: 33 % — SIGNIFICANT CHANGE UP (ref 30–62)
CD8 %: 56 % — HIGH (ref 12–36)
CHLORIDE SERPL-SCNC: 93 MMOL/L — LOW (ref 96–108)
CHLORIDE SERPL-SCNC: 99 MMOL/L — SIGNIFICANT CHANGE UP (ref 96–108)
CHOLEST SERPL-MCNC: 196 MG/DL — SIGNIFICANT CHANGE UP
CO2 SERPL-SCNC: 31 MMOL/L — SIGNIFICANT CHANGE UP (ref 22–31)
CO2 SERPL-SCNC: 32 MMOL/L — HIGH (ref 22–31)
CREAT SERPL-MCNC: 4.3 MG/DL — HIGH (ref 0.5–1.3)
CREAT SERPL-MCNC: 5.17 MG/DL — HIGH (ref 0.5–1.3)
D DIMER BLD IA.RAPID-MCNC: 667 NG/ML DDU — HIGH
EOSINOPHIL # BLD AUTO: 0 K/UL — SIGNIFICANT CHANGE UP (ref 0–0.5)
EOSINOPHIL # BLD AUTO: 0 K/UL — SIGNIFICANT CHANGE UP (ref 0–0.5)
EOSINOPHIL NFR BLD AUTO: 0 % — SIGNIFICANT CHANGE UP (ref 0–6)
EOSINOPHIL NFR BLD AUTO: 0 % — SIGNIFICANT CHANGE UP (ref 0–6)
ESTIMATED AVERAGE GLUCOSE: 111 MG/DL — SIGNIFICANT CHANGE UP (ref 68–114)
FERRITIN SERPL-MCNC: 1105 NG/ML — HIGH (ref 15–150)
FOLATE SERPL-MCNC: >20 NG/ML — SIGNIFICANT CHANGE UP
GLUCOSE SERPL-MCNC: 72 MG/DL — SIGNIFICANT CHANGE UP (ref 70–99)
GLUCOSE SERPL-MCNC: 90 MG/DL — SIGNIFICANT CHANGE UP (ref 70–99)
HAV IGM SER-ACNC: SIGNIFICANT CHANGE UP
HBV CORE IGM SER-ACNC: SIGNIFICANT CHANGE UP
HBV SURFACE AG SER-ACNC: SIGNIFICANT CHANGE UP
HCT VFR BLD CALC: 39.2 % — SIGNIFICANT CHANGE UP (ref 34.5–45)
HCT VFR BLD CALC: 39.9 % — SIGNIFICANT CHANGE UP (ref 34.5–45)
HCV AB S/CO SERPL IA: 0.08 S/CO — SIGNIFICANT CHANGE UP (ref 0–0.99)
HCV AB SERPL-IMP: SIGNIFICANT CHANGE UP
HDLC SERPL-MCNC: 41 MG/DL — LOW
HGB BLD-MCNC: 13 G/DL — SIGNIFICANT CHANGE UP (ref 11.5–15.5)
HGB BLD-MCNC: 13.2 G/DL — SIGNIFICANT CHANGE UP (ref 11.5–15.5)
IMM GRANULOCYTES NFR BLD AUTO: 0.9 % — SIGNIFICANT CHANGE UP (ref 0–1.5)
IMM GRANULOCYTES NFR BLD AUTO: 1 % — SIGNIFICANT CHANGE UP (ref 0–1.5)
INR BLD: 1.24 RATIO — HIGH (ref 0.88–1.16)
LDH SERPL L TO P-CCNC: 270 U/L — HIGH (ref 120–225)
LIPID PNL WITH DIRECT LDL SERPL: 125 MG/DL — HIGH
LYMPHOCYTES # BLD AUTO: 1.44 K/UL — SIGNIFICANT CHANGE UP (ref 1–3.3)
LYMPHOCYTES # BLD AUTO: 1.58 K/UL — SIGNIFICANT CHANGE UP (ref 1–3.3)
LYMPHOCYTES # BLD AUTO: 23.2 % — SIGNIFICANT CHANGE UP (ref 13–44)
LYMPHOCYTES # BLD AUTO: 23.6 % — SIGNIFICANT CHANGE UP (ref 13–44)
MAGNESIUM SERPL-MCNC: 2.2 MG/DL — SIGNIFICANT CHANGE UP (ref 1.6–2.6)
MCHC RBC-ENTMCNC: 31.6 PG — SIGNIFICANT CHANGE UP (ref 27–34)
MCHC RBC-ENTMCNC: 32.4 PG — SIGNIFICANT CHANGE UP (ref 27–34)
MCHC RBC-ENTMCNC: 32.6 GM/DL — SIGNIFICANT CHANGE UP (ref 32–36)
MCHC RBC-ENTMCNC: 33.7 GM/DL — SIGNIFICANT CHANGE UP (ref 32–36)
MCV RBC AUTO: 96.3 FL — SIGNIFICANT CHANGE UP (ref 80–100)
MCV RBC AUTO: 97.1 FL — SIGNIFICANT CHANGE UP (ref 80–100)
MONOCYTES # BLD AUTO: 0.52 K/UL — SIGNIFICANT CHANGE UP (ref 0–0.9)
MONOCYTES # BLD AUTO: 0.59 K/UL — SIGNIFICANT CHANGE UP (ref 0–0.9)
MONOCYTES NFR BLD AUTO: 8.4 % — SIGNIFICANT CHANGE UP (ref 2–14)
MONOCYTES NFR BLD AUTO: 8.8 % — SIGNIFICANT CHANGE UP (ref 2–14)
NEUTROPHILS # BLD AUTO: 4.18 K/UL — SIGNIFICANT CHANGE UP (ref 1.8–7.4)
NEUTROPHILS # BLD AUTO: 4.45 K/UL — SIGNIFICANT CHANGE UP (ref 1.8–7.4)
NEUTROPHILS NFR BLD AUTO: 66.6 % — SIGNIFICANT CHANGE UP (ref 43–77)
NEUTROPHILS NFR BLD AUTO: 67.4 % — SIGNIFICANT CHANGE UP (ref 43–77)
NON HDL CHOLESTEROL: 155 MG/DL — HIGH
NRBC # BLD: 0 /100 WBCS — SIGNIFICANT CHANGE UP (ref 0–0)
NRBC # BLD: 0 /100 WBCS — SIGNIFICANT CHANGE UP (ref 0–0)
PHOSPHATE SERPL-MCNC: 1.8 MG/DL — LOW (ref 2.5–4.5)
PLATELET # BLD AUTO: 148 K/UL — LOW (ref 150–400)
PLATELET # BLD AUTO: 150 K/UL — SIGNIFICANT CHANGE UP (ref 150–400)
POTASSIUM SERPL-MCNC: 3.5 MMOL/L — SIGNIFICANT CHANGE UP (ref 3.5–5.3)
POTASSIUM SERPL-MCNC: 6 MMOL/L — HIGH (ref 3.5–5.3)
POTASSIUM SERPL-SCNC: 3.5 MMOL/L — SIGNIFICANT CHANGE UP (ref 3.5–5.3)
POTASSIUM SERPL-SCNC: 6 MMOL/L — HIGH (ref 3.5–5.3)
PROCALCITONIN SERPL-MCNC: 0.24 NG/ML — HIGH (ref 0.02–0.1)
PROT SERPL-MCNC: 7.5 G/DL — SIGNIFICANT CHANGE UP (ref 6–8.3)
PROT SERPL-MCNC: 8.2 G/DL — SIGNIFICANT CHANGE UP (ref 6–8.3)
PROTHROM AB SERPL-ACNC: 14.6 SEC — HIGH (ref 10.6–13.6)
RAPID RVP RESULT: DETECTED
RBC # BLD: 4.07 M/UL — SIGNIFICANT CHANGE UP (ref 3.8–5.2)
RBC # BLD: 4.11 M/UL — SIGNIFICANT CHANGE UP (ref 3.8–5.2)
RBC # FLD: 15 % — HIGH (ref 10.3–14.5)
RBC # FLD: 15.1 % — HIGH (ref 10.3–14.5)
SARS-COV-2 IGG SERPL QL IA: NEGATIVE — SIGNIFICANT CHANGE UP
SARS-COV-2 IGM SERPL IA-ACNC: 0.25 INDEX — SIGNIFICANT CHANGE UP
SARS-COV-2 RNA SPEC QL NAA+PROBE: DETECTED
SODIUM SERPL-SCNC: 133 MMOL/L — LOW (ref 135–145)
SODIUM SERPL-SCNC: 139 MMOL/L — SIGNIFICANT CHANGE UP (ref 135–145)
T-CELL CD4 SUBSET PNL BLD: 419 /UL — LOW (ref 489–1457)
TRIGL SERPL-MCNC: 151 MG/DL — HIGH
TSH SERPL-MCNC: 0.72 UU/ML — SIGNIFICANT CHANGE UP (ref 0.34–4.82)
VIT B12 SERPL-MCNC: >2000 PG/ML — HIGH (ref 232–1245)
WBC # BLD: 6.2 K/UL — SIGNIFICANT CHANGE UP (ref 3.8–10.5)
WBC # BLD: 6.69 K/UL — SIGNIFICANT CHANGE UP (ref 3.8–10.5)
WBC # FLD AUTO: 6.2 K/UL — SIGNIFICANT CHANGE UP (ref 3.8–10.5)
WBC # FLD AUTO: 6.69 K/UL — SIGNIFICANT CHANGE UP (ref 3.8–10.5)

## 2021-01-22 PROCEDURE — 71045 X-RAY EXAM CHEST 1 VIEW: CPT | Mod: 26

## 2021-01-22 PROCEDURE — 71250 CT THORAX DX C-: CPT | Mod: 26

## 2021-01-22 PROCEDURE — 99222 1ST HOSP IP/OBS MODERATE 55: CPT

## 2021-01-22 PROCEDURE — 99285 EMERGENCY DEPT VISIT HI MDM: CPT

## 2021-01-22 RX ORDER — HYDRALAZINE HCL 50 MG
50 TABLET ORAL THREE TIMES A DAY
Refills: 0 | Status: DISCONTINUED | OUTPATIENT
Start: 2021-01-22 | End: 2021-01-22

## 2021-01-22 RX ORDER — SENNA PLUS 8.6 MG/1
2 TABLET ORAL AT BEDTIME
Refills: 0 | Status: DISCONTINUED | OUTPATIENT
Start: 2021-01-22 | End: 2021-01-28

## 2021-01-22 RX ORDER — INFLUENZA VIRUS VACCINE 15; 15; 15; 15 UG/.5ML; UG/.5ML; UG/.5ML; UG/.5ML
0.5 SUSPENSION INTRAMUSCULAR ONCE
Refills: 0 | Status: DISCONTINUED | OUTPATIENT
Start: 2021-01-22 | End: 2021-01-28

## 2021-01-22 RX ORDER — GABAPENTIN 400 MG/1
100 CAPSULE ORAL DAILY
Refills: 0 | Status: DISCONTINUED | OUTPATIENT
Start: 2021-01-22 | End: 2021-01-28

## 2021-01-22 RX ORDER — LAMIVUDINE 150 MG
100 TABLET ORAL DAILY
Refills: 0 | Status: DISCONTINUED | OUTPATIENT
Start: 2021-01-22 | End: 2021-01-28

## 2021-01-22 RX ORDER — INSULIN HUMAN 100 [IU]/ML
10 INJECTION, SOLUTION SUBCUTANEOUS ONCE
Refills: 0 | Status: COMPLETED | OUTPATIENT
Start: 2021-01-22 | End: 2021-01-22

## 2021-01-22 RX ORDER — METOPROLOL TARTRATE 50 MG
100 TABLET ORAL
Refills: 0 | Status: DISCONTINUED | OUTPATIENT
Start: 2021-01-22 | End: 2021-01-28

## 2021-01-22 RX ORDER — SODIUM ZIRCONIUM CYCLOSILICATE 10 G/10G
10 POWDER, FOR SUSPENSION ORAL ONCE
Refills: 0 | Status: COMPLETED | OUTPATIENT
Start: 2021-01-22 | End: 2021-01-22

## 2021-01-22 RX ORDER — CALCIUM ACETATE 667 MG
1334 TABLET ORAL
Refills: 0 | Status: DISCONTINUED | OUTPATIENT
Start: 2021-01-22 | End: 2021-01-22

## 2021-01-22 RX ORDER — CALCITRIOL 0.5 UG/1
0.25 CAPSULE ORAL DAILY
Refills: 0 | Status: DISCONTINUED | OUTPATIENT
Start: 2021-01-22 | End: 2021-01-28

## 2021-01-22 RX ORDER — ALLOPURINOL 300 MG
100 TABLET ORAL DAILY
Refills: 0 | Status: DISCONTINUED | OUTPATIENT
Start: 2021-01-22 | End: 2021-01-28

## 2021-01-22 RX ORDER — ACETAMINOPHEN 500 MG
650 TABLET ORAL ONCE
Refills: 0 | Status: COMPLETED | OUTPATIENT
Start: 2021-01-22 | End: 2021-01-22

## 2021-01-22 RX ORDER — HEPARIN SODIUM 5000 [USP'U]/ML
5000 INJECTION INTRAVENOUS; SUBCUTANEOUS EVERY 12 HOURS
Refills: 0 | Status: DISCONTINUED | OUTPATIENT
Start: 2021-01-22 | End: 2021-01-24

## 2021-01-22 RX ORDER — METOPROLOL TARTRATE 50 MG
50 TABLET ORAL
Refills: 0 | Status: DISCONTINUED | OUTPATIENT
Start: 2021-01-22 | End: 2021-01-22

## 2021-01-22 RX ORDER — DEXTROSE 50 % IN WATER 50 %
25 SYRINGE (ML) INTRAVENOUS ONCE
Refills: 0 | Status: COMPLETED | OUTPATIENT
Start: 2021-01-22 | End: 2021-01-22

## 2021-01-22 RX ORDER — AMLODIPINE BESYLATE 2.5 MG/1
10 TABLET ORAL DAILY
Refills: 0 | Status: DISCONTINUED | OUTPATIENT
Start: 2021-01-22 | End: 2021-01-24

## 2021-01-22 RX ORDER — ESCITALOPRAM OXALATE 10 MG/1
1 TABLET, FILM COATED ORAL
Qty: 0 | Refills: 0 | DISCHARGE

## 2021-01-22 RX ORDER — ACETAMINOPHEN 500 MG
650 TABLET ORAL EVERY 6 HOURS
Refills: 0 | Status: DISCONTINUED | OUTPATIENT
Start: 2021-01-22 | End: 2021-01-28

## 2021-01-22 RX ORDER — ASPIRIN/CALCIUM CARB/MAGNESIUM 324 MG
81 TABLET ORAL DAILY
Refills: 0 | Status: DISCONTINUED | OUTPATIENT
Start: 2021-01-22 | End: 2021-01-28

## 2021-01-22 RX ORDER — DARUNAVIR ETHANOLATE AND COBICISTAT 800; 150 MG/1; MG/1
1 TABLET, FILM COATED ORAL DAILY
Refills: 0 | Status: DISCONTINUED | OUTPATIENT
Start: 2021-01-22 | End: 2021-01-28

## 2021-01-22 RX ORDER — LANOLIN ALCOHOL/MO/W.PET/CERES
5 CREAM (GRAM) TOPICAL AT BEDTIME
Refills: 0 | Status: DISCONTINUED | OUTPATIENT
Start: 2021-01-22 | End: 2021-01-22

## 2021-01-22 RX ORDER — CALCIUM ACETATE 667 MG
667 TABLET ORAL
Refills: 0 | Status: DISCONTINUED | OUTPATIENT
Start: 2021-01-22 | End: 2021-01-22

## 2021-01-22 RX ORDER — ATORVASTATIN CALCIUM 80 MG/1
20 TABLET, FILM COATED ORAL AT BEDTIME
Refills: 0 | Status: DISCONTINUED | OUTPATIENT
Start: 2021-01-22 | End: 2021-01-28

## 2021-01-22 RX ORDER — FOLIC ACID 0.8 MG
1 TABLET ORAL
Qty: 0 | Refills: 0 | DISCHARGE

## 2021-01-22 RX ORDER — FERROUS SULFATE 325(65) MG
1 TABLET ORAL
Qty: 0 | Refills: 0 | DISCHARGE

## 2021-01-22 RX ORDER — LANOLIN ALCOHOL/MO/W.PET/CERES
5 CREAM (GRAM) TOPICAL AT BEDTIME
Refills: 0 | Status: DISCONTINUED | OUTPATIENT
Start: 2021-01-22 | End: 2021-01-28

## 2021-01-22 RX ORDER — SODIUM BICARBONATE 1 MEQ/ML
650 SYRINGE (ML) INTRAVENOUS
Refills: 0 | Status: DISCONTINUED | OUTPATIENT
Start: 2021-01-22 | End: 2021-01-22

## 2021-01-22 RX ADMIN — Medication 5 MILLIGRAM(S): at 23:58

## 2021-01-22 RX ADMIN — HEPARIN SODIUM 5000 UNIT(S): 5000 INJECTION INTRAVENOUS; SUBCUTANEOUS at 17:16

## 2021-01-22 RX ADMIN — Medication 100 MILLIGRAM(S): at 19:09

## 2021-01-22 RX ADMIN — Medication 100 MILLIGRAM(S): at 16:15

## 2021-01-22 RX ADMIN — Medication 100 MILLIGRAM(S): at 11:38

## 2021-01-22 RX ADMIN — SODIUM CHLORIDE 1000 MILLILITER(S): 9 INJECTION INTRAMUSCULAR; INTRAVENOUS; SUBCUTANEOUS at 00:14

## 2021-01-22 RX ADMIN — Medication 81 MILLIGRAM(S): at 11:38

## 2021-01-22 RX ADMIN — Medication 650 MILLIGRAM(S): at 16:15

## 2021-01-22 RX ADMIN — INSULIN HUMAN 10 UNIT(S): 100 INJECTION, SOLUTION SUBCUTANEOUS at 04:03

## 2021-01-22 RX ADMIN — ATORVASTATIN CALCIUM 20 MILLIGRAM(S): 80 TABLET, FILM COATED ORAL at 21:12

## 2021-01-22 RX ADMIN — SODIUM ZIRCONIUM CYCLOSILICATE 10 GRAM(S): 10 POWDER, FOR SUSPENSION ORAL at 06:43

## 2021-01-22 RX ADMIN — Medication 650 MILLIGRAM(S): at 06:43

## 2021-01-22 RX ADMIN — CALCITRIOL 0.25 MICROGRAM(S): 0.5 CAPSULE ORAL at 11:38

## 2021-01-22 RX ADMIN — DARUNAVIR ETHANOLATE AND COBICISTAT 1 TABLET(S): 800; 150 TABLET, FILM COATED ORAL at 11:38

## 2021-01-22 RX ADMIN — Medication 650 MILLIGRAM(S): at 23:58

## 2021-01-22 RX ADMIN — Medication 100 MILLIGRAM(S): at 11:39

## 2021-01-22 RX ADMIN — Medication 650 MILLIGRAM(S): at 06:00

## 2021-01-22 RX ADMIN — Medication 25 GRAM(S): at 04:03

## 2021-01-22 RX ADMIN — SENNA PLUS 2 TABLET(S): 8.6 TABLET ORAL at 21:12

## 2021-01-22 RX ADMIN — AMLODIPINE BESYLATE 10 MILLIGRAM(S): 2.5 TABLET ORAL at 06:42

## 2021-01-22 NOTE — H&P ADULT - NSICDXPASTMEDICALHX_GEN_ALL_CORE_FT
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

## 2021-01-22 NOTE — H&P ADULT - PROBLEM SELECTOR PLAN 3
COVID PCR + complaining of cough  No requirement of O2 at this time. PT saturating >94% on RA w/o respiratory distress  Will not start decadron at this time  Tylenol for fever  F/U Blood cultures

## 2021-01-22 NOTE — H&P ADULT - PROBLEM SELECTOR PLAN 1
Last HD 1/21 but incomplete as per pt due to L AV fistula malfunction  Pt was supposed to have surgery 1/21 but was postponed as she tested positive for covid  Consulted Vascular surgery  Consulted Nephro Dr Rutherford  NPO for now if pt will need permacath placement or if for AV fistula repair, await vascular surgery recs Last HD 1/21 but incomplete as per pt due to L AV fistula malfunction  Pt was supposed to have surgery 1/21 but was postponed as she tested positive for covid  Pt unable to recall who her nephrologist is, only that she goes to dialysis in [place]  Consulted Vascular surgery  Consulted Nephro Dr Rutherford  NPO for now if pt will need permacath placement or if for AV fistula repair, await vascular surgery recs Last HD 1/21 but incomplete as per pt due to L AV fistula malfunction  Pt was supposed to have surgery 1/21 but was postponed as she tested positive for covid  Pt unable to recall who her nephrologist is, only that she goes to dialysis in [place]  Consulted Vascular surgery  Consulted Nephro Dr Catalan  NPO for now if pt will need permacath placement or if for AV fistula repair, await vascular surgery recs

## 2021-01-22 NOTE — H&P ADULT - HISTORY OF PRESENT ILLNESS
65 F PMH HTN, DM2, HIV on ART, ESRD MWF (recently switched to [facility] since COVID +) w/ L AV Fistula (Vascular Surgeon Dr Dickens) p/w AV fistula malfunction. Patient reports that she was supposed to have surgery for repair of AV Fistula 1/21 but tested positive for COVID on tuesday. Pt then reports that she had HD on Thursday when her AV fistula malfunctioned. Pt reports cough, chills. Pt denies, fever, chest pain, nausea, dizziness, fever, vomiting.      65 F PMH HTN, DM2, HIV on ART, ESRD MWF (recently switched to [facility] since COVID +) w/ L AV Fistula (Vascular Surgeon Dr Dickens) p/w AV fistula malfunction. Patient reports that she was supposed to have surgery for repair of AV Fistula 1/21 but tested positive for COVID on tuesday. Pt then reports that she had HD on Thursday when her AV fistula malfunctioned. Pt reports cough, chills. Pt denies, fever, chest pain, nausea, dizziness, fever, vomiting.     Pt has a visiting nurse who prepares her medicatiosn Sully Hoffman (?) but patient is unable to give her number at this time as her phone was not charged.

## 2021-01-22 NOTE — H&P ADULT - ASSESSMENT
65 F from home PMH HTN, DM2, HIV on ART, ESRD MWF (recently switched to [facility] since COVID +) w/ L AV Fistula (Vascular Surgeon Dr Dickens) DARCY from dialysis center for AV fistula malfunction admitted for hemodialysis.      ***PRIMARY TEAM PLEASE CONFIRM MEDICATIONS WITH PHARMACY IN AM*****     65 F from home PMH HTN, DM2, HIV on ART, ESRD MWF (recently switched to [facility] since COVID +) w/ L AV Fistula (Vascular Surgeon Dr Dickens) BIBZANDER from dialysis center for AV fistula malfunction admitted for hemodialysis.      ***PRIMARY TEAM PLEASE CONFIRM MEDICATIONS WITH PHARMACY IN AM AND HOME NURSE IF PATIENT IS ABLE TO PROVIDE HER NUMBER*****

## 2021-01-22 NOTE — PROGRESS NOTE ADULT - ASSESSMENT
ESRD, patient completed her dialysis yesterday  Hyperkalemia possible hemolysis as repeat K in am 3.5  Reduced PO4 to follow in am , patient lab were drawn after dialysis.    Hypertension with reduced BP since admission, follow BP and hold hydralazine if need    COVID 19 with bilateral lung infiltrates with out hypoxia. Follow CT chest for further evaluation.    Increased Venous pressure during dialysis, not a candidate for angioplasty at present due to COVID positive, we can continue using the access  this time.

## 2021-01-22 NOTE — H&P ADULT - NSHPPHYSICALEXAM_GEN_ALL_CORE
General - NAD, sitting up in bed, well groomed  Eyes - PERRLA, EOM intact  ENT - Nonicteric sclerae, PERRLA, EOMI. Oropharynx clear. Moist mucous membranes. Conjunctivae appear well perfused.   Neck - No noticeable or palpable swelling, redness or rash around throat or on face  Lymph Nodes - No lymphadenopathy  Cardiovascular - RRR no m/r/g, no JVD, no carotid bruits  Lungs - Clear to auscltation, no use of acessory muscles, no crackles or wheezes.  Skin - No rashes, skin warm and dry, no erythematous areas  Abdomen - Normal bowel sounds, abdomen soft and nontender  Genito Urinary – Genital exam not performed since complaints not related.  Rectal – Rectal exam not performed since no symptoms indicated blood loss.  Extremeties - No edema, cyanosis or clubbing. (+) L AV Fistulat  Musculo Skeletal - 5/5 strength, normal range of motion, no swollen or erythematous joints.  Neurological – Alert and oriented x 3, CN 2-12 grossly intact.

## 2021-01-22 NOTE — H&P ADULT - PROBLEM SELECTOR PLAN 7
IMPROVE VTE Individual Risk Assessment    RISK                                                                Points  [X] Previous VTE                                                  3  [  ] Thrombophilia                                               2  [  ] Lower limb paralysis                                      2        (unable to hold up >15 seconds)    [  ] Current Cancer                                              2         (within 6 months)  [X] Immobilization > 24 hrs                                1  [  ] ICU/CCU stay > 24 hours                              1  [X] Age > 60                                                      1    IMPROVE VTE Score 5  Start Heparin 5000u q8 if patient will not undergo procedure  SCD boots for now IMPROVE VTE Individual Risk Assessment  RISK                                                                Points  [X] Previous VTE                                                  3  [  ] Thrombophilia                                               2  [  ] Lower limb paralysis                                      2        (unable to hold up >15 seconds)    [  ] Current Cancer                                              2         (within 6 months)  [X] Immobilization > 24 hrs                                1  [  ] ICU/CCU stay > 24 hours                              1  [X] Age > 60                                                      1    IMPROVE VTE Score 5  Start Heparin 5000u q8 if patient will not undergo procedure  SCD boots for now

## 2021-01-22 NOTE — ED PROVIDER NOTE - CLINICAL SUMMARY MEDICAL DECISION MAKING FREE TEXT BOX
Pt w/ aforementioned presentation found to be hyperkalemic in the setting of missing dialysis. Pt to be admitted to medicine.

## 2021-01-22 NOTE — ED PROVIDER NOTE - OBJECTIVE STATEMENT
65 year old female with PMHx of ESRD on hemodialysis, with left AV fistula, seen by Dr Yousif, presenting here secondary to COVID-19. Patient reports that she was supposed to have surgery for her left AV fistula today but was found to be COVID-19 positive on screening so her surgery was postponed. Endorses feeling generalized fatigue over the last week. No reported chest pain, no shortness of breath, no abdominal pain, no nausea, no vomiting, or any other acute complaints. ellen starr  daughter  [phone]    65 year old female with PMHx of ESRD on hemodialysis, MWF, with left AV fistula, followed by Dr Dickens, presenting here secondary to COVID-19. Patient reports that she was supposed to have surgery for her left AV fistula today but was found to be COVID-19 positive on screening so her surgery was postponed. Endorses feeling generalized fatigue over the last week. No reported chest pain, no shortness of breath, no abdominal pain, no nausea, no vomiting, or any other acute complaints. Last known completed dialysis was Monday.

## 2021-01-22 NOTE — CONSULT NOTE ADULT - PROBLEM SELECTOR RECOMMENDATION 2
isolation precautions  oxygen supp  monitor oxygen sat  check LDH, LFTs, D-Dimer, Ferritin, CRP and procalcitonin  vit C, D and zinc supp

## 2021-01-22 NOTE — H&P ADULT - PROBLEM SELECTOR PLAN 5
Pt takes amlodipine, metoprolol, losartan  Restarted metoprolol 50mg BID for now with parameters to avoid BB withrawal  -120s in ED and on admission  Restart home meds as needed

## 2021-01-22 NOTE — CONSULT NOTE ADULT - SUBJECTIVE AND OBJECTIVE BOX
PULMONARY CONSULT NOTE      VERONICA GOVEA  MRN-[id number]    Patient is a 65y old  Female who presents with a chief complaint of ESRD  COVID 19  FEVER (22 Jan 2021 09:33)    History of Present Illness:  Reason for Admission: AV fistula malfunction  History of Present Illness:   65 F PMH HTN, DM2, HIV on ART, ESRD MWF (recently switched to [facility] since COVID +) w/ L AV Fistula (Vascular Surgeon Dr Dickens) p/w AV fistula malfunction. Patient reports that she was supposed to have surgery for repair of AV Fistula 1/21 but tested positive for COVID on tuesday. Pt then reports that she had HD on Thursday when her AV fistula malfunctioned. Pt reports cough, chills. Pt denies, fever, chest pain, nausea, dizziness, fever, vomiting.     Pt has a visiting nurse who prepares her medicatiosn Sully Mataa (?) but patient is unable to give her number at this time as her phone was not charged.      HISTORY OF PRESENT ILLNESS: As above. Awake, alert, comfortable in bed in NAD. Remains on RA    MEDICATIONS  (STANDING):  allopurinol 100 milliGRAM(s) Oral daily  amLODIPine   Tablet 10 milliGRAM(s) Oral daily  aspirin enteric coated 81 milliGRAM(s) Oral daily  atorvastatin 20 milliGRAM(s) Oral at bedtime  calcitriol   Capsule 0.25 MICROGram(s) Oral daily  darunavir 800 mG/cobicstat 150 mG 1 Tablet(s) Oral daily  influenza   Vaccine 0.5 milliLiter(s) IntraMuscular once  lamiVUDine- milliGRAM(s) Oral daily  metoprolol tartrate 100 milliGRAM(s) Oral two times a day  senna 2 Tablet(s) Oral at bedtime      MEDICATIONS  (PRN):  acetaminophen   Tablet .. 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    DVT, lower extremity  Left leg after hysterectomy    Cervical cancer  2010    Depression    History of gastroesophageal reflux (GERD)    Gout    Hyperlipidemia    Chronic kidney disease    HTN (hypertension)    HIV (human immunodeficiency virus infection)    S/P arteriovenous (AV) fistula creation  july 10 2020    H/O: hysterectomy  Due to cervical cancer    History of ectopic pregnancy  Two        FAMILY HISTORY:  Family history of renal failure    Family hx of hypertension        SOCIAL HISTORY  Smoking History:     REVIEW OF SYSTEMS:    CONSTITUTIONAL:  No fevers, chills, sweats    HEENT:  Eyes:  No diplopia or blurred vision. ENT:  No earache, sore throat or runny nose.    CARDIOVASCULAR:  No pressure, squeezing, tightness, or heaviness about the chest; no palpitations.    RESPIRATORY:  Per HPI    GASTROINTESTINAL:  No abdominal pain, nausea, vomiting or diarrhea.    GENITOURINARY:  No dysuria, frequency or urgency.    NEUROLOGIC:  No paresthesias, fasciculations, seizures or weakness.    PSYCHIATRIC:  No disorder of thought or mood.    Vital Signs Last 24 Hrs  T(C): 37.4 (22 Jan 2021 11:56), Max: 38.9 (22 Jan 2021 04:15)  T(F): 99.4 (22 Jan 2021 11:56), Max: 102.1 (22 Jan 2021 06:15)  HR: 80 (22 Jan 2021 11:56) (70 - 100)  BP: 105/65 (22 Jan 2021 11:56) (102/55 - 125/75)  BP(mean): --  RR: 18 (22 Jan 2021 11:56) (18 - 19)  SpO2: 98% (22 Jan 2021 11:56) (94% - 98%)  I&O's Detail      PHYSICAL EXAMINATION:    GENERAL: The patient is a well-developed, well-nourished _____in no apparent distress.     HEENT: Head is normocephalic and atraumatic. Extraocular muscles are intact. Mucous membranes are moist.     NECK: Supple.     LUNGS: Clear to auscultation without wheezing, rales, or rhonchi. Respirations unlabored    HEART: Regular rate and rhythm without murmur.    ABDOMEN: Soft, nontender, and nondistended.  No hepatosplenomegaly is noted.    EXTREMITIES: Without any cyanosis, clubbing, rash, lesions or edema.    NEUROLOGIC: Grossly intact.      LABS:                        13.0   6.20  )-----------( 148      ( 22 Jan 2021 06:00 )             39.9     01-22    139  |  99  |  14  ----------------------------<  72  3.5   |  32<H>  |  5.17<H>    Ca    8.9      22 Jan 2021 06:00  Phos  1.8     01-22  Mg     2.2     01-22    TPro  7.5  /  Alb  2.4<L>  /  TBili  0.4  /  DBili  x   /  AST  30  /  ALT  16  /  AlkPhos  74  01-22    PT/INR - ( 22 Jan 2021 06:00 )   PT: 14.6 sec;   INR: 1.24 ratio       SARS-CoV-2: Detected: This Respiratory Panel uses polymerase chain reaction (PCR) to detect for   adenovirus; coronavirus (HKU1, NL63, 229E, OC43); human metapneumovirus   (hMPV); human enterovirus/rhinovirus (Entero/RV); influenza A; influenza   A/H1; influenza A/H3; influenza A/H1-2009; influenza B; parainfluenza   viruses 1, 2, 3, 4; respiratory syncytial virus; Mycoplasma pneumoniae;   Chlamydophila pneumoniae; and SARS-CoV-2. (01.22.21 @ 02:44)             D-Dimer Assay, Quantitative: 667 ng/mL DDU (01-22-21 @ 06:00)        Procalcitonin, Serum: 0.24 ng/mL (01-22-21 @ 06:17)      MICROBIOLOGY:    RADIOLOGY & ADDITIONAL STUDIES:    CXR:  < from: Xray Chest 1 View-PORTABLE IMMEDIATE (Xray Chest 1 View-PORTABLE IMMEDIATE .) (01.22.21 @ 01:41) >  Impression: Bibasilar pneumonia.    < end of copied text >  < from: Xray Chest 1 View-PORTABLE IMMEDIATE (Xray Chest 1 View-PORTABLE IMMEDIATE .) (01.22.21 @ 01:41) >  Impression: Bibasilar pneumonia.    < end of copied text >    Ct scan chest:    ekg;    echo:

## 2021-01-22 NOTE — ED PROVIDER NOTE - PHYSICAL EXAMINATION
General: pt lying in stretcher, appears stated age and is not in distress  HEENT: AT/NC, pink conjunctiva, anicteric sclerae, EOMI, PERRLA, TMs smooth, grey, intact, with normal landmarks, nasal mucosa pink, no discharge, turbinates not enlarged; moist mucus membranes, tongue well-papillated, good dentition; posterior pharynx shows no erythema or exudates;   Neck: supple, full ROM, trachea midline, no JVD, no cervical LAD, no midline ttp or stepoffs  Lungs: symmetric excursion, b/l clear vesicular breath sounds with no wheezes, crackles, or rhonchi  Heart: rrr, S1, S2 normal; no S3 or S4; no murmurs or rubs  Abd: normal bowel sounds; soft, nontender; negative McBurney's point tenderness, negative Frias's sign, no rebound, no guarding, spleen non-palpable; no hepatomegaly, no masses  Back: no midline spinal tenderness or stepoffs, no costovertebral angle tenderness  Extremities: no clubbing, cyanosis, or edema; no palpable deformities or fractures  Skin: good turgor; no rashes, petechiae, ecchymoses, or jaundice  Pulses: radial, posterior tibialis (PT), dorsalis pedis (DP) all 2+ & symmetric  Neuro: awake, alert, responsive; oriented to person, place and time; cranial nerves intact, EOMI, intact jaw movement, intact facial sensation, no facial asymmetry, hearing intact; no nystagmus, tongue midline; Motor: Normal tone in upper and lower extremities bilaterally strength 5/5; Sensory: intact to pinprick and light touch; Cerebellar: finger-to-nose intact; normal steady gait; negative Romberg’s sign; DTR: biceps, triceps, patellar, 2+, no pronator drift General: pt lying in stretcher, appears stated age and is not in distress, speaking in full clear sentences  HEENT: AT/NC, pink conjunctiva, anicteric sclerae, EOMI, PERRLA, mmm  Neck: supple, full ROM, trachea midline, no JVD, no cervical LAD, no midline ttp or stepoffs  Lungs: symmetric excursion, b/l clear vesicular breath sounds with no wheezes, crackles, or rhonchi  Heart: rrr, S1, S2 normal; no S3 or S4; no murmurs or rubs  Abd: normal bowel sounds; soft, nontender; negative McBurney's point tenderness, negative Frias's sign, no rebound, no guarding, spleen non-palpable; no hepatomegaly, no masses  Back: no midline spinal tenderness or stepoffs, no costovertebral angle tenderness  Extremities: no clubbing, cyanosis, or edema; no palpable deformities or fractures; left AVF  Skin: good turgor; no rashes, petechiae, ecchymoses, or jaundice  Pulses: radial, posterior tibialis (PT), dorsalis pedis (DP) all 2+ & symmetric  Neuro: awake, alert, responsive; oriented to person, place and time; cranial nerves intact, EOMI, intact jaw movement, intact facial sensation, no facial asymmetry, hearing intact; Motor: Normal tone in upper and lower extremities bilaterally strength 5/5; Sensory: intact

## 2021-01-22 NOTE — H&P ADULT - PROBLEM SELECTOR PLAN 4
F/U T cell subset, HIV viral load  Pt unable to recall her HIV meds, please confirm with pharmacy and restart

## 2021-01-22 NOTE — ED ADULT NURSE NOTE - OBJECTIVE STATEMENT
the patient is 65 yr old female complaining of cough , fever . had HEMO DIALYSIS today . dialysis access left arm intact dressing  dry

## 2021-01-22 NOTE — H&P ADULT - ATTENDING COMMENTS
Pt seen and examined.  Case discussed with Admitting Resident    65 year old woman with PMH including HTN, DM2, HIV on ART, ESRD on HD with hx of recreation of a left upper extremity AV fistula in 08/2020 after the initial left arm basilic vein transposition AV fistula created 03/2020 failed before use.  She was scheduled for outpatient evaluation of her malfunctioning fistula today by vascular surgery but instead was sent here on account of +ve testing for COVID 19 in addition to flu like illness.  last HD was 1/18/2021.    Vital Signs Last 24 Hrs  T(C): --  T(F): --  HR: 100 (21 Jan 2021 22:46) (100 - 100)  BP: 109/71 (21 Jan 2021 22:46) (109/71 - 109/71)  BP(mean): --  RR: 18 (21 Jan 2021 22:46) (18 - 18)  SpO2: 94% (21 Jan 2021 22:46) (94% - 94%)    Exam    Labs                        13.2   6.69  )-----------( 150      ( 22 Jan 2021 00:23 )             39.2     01-22    133<L>  |  93<L>  |  12  ----------------------------<  90  6.0<H>   |  31  |  4.30<H>    Ca    9.0      22 Jan 2021 00:23  TPro  8.2  /  Alb  2.2<L>  /  TBili  0.9  /  DBili  x   /  AST  82<H>  /  ALT  28  /  AlkPhos  66  01-22    CXR - B/L lower lobes infiltrates      Impression  - Acute viral illness from COVID 19 infection  - Hyperkalemia  - ESRD on HD with failed AV fistula     Impression  - Admit to airborne isolation   - Supportive care - for fever/pain /cough  - Serial SaO2 evaluation with prn O2 supplements  -  Pt missed HD yesterday; serum potassium noted but of note, ESRD patients tolerate potassium level better. Will treat acutely.  - Vascular surgery consult   - Nephrology consult for HD in AM

## 2021-01-22 NOTE — H&P ADULT - NSHPSOCIALHISTORY_GEN_ALL_CORE
Denies smoking tobacco (last use 16yrs ago), alcohol intake, occasional marijuana use (1 mo prior last use) From Home  Denies smoking tobacco (last use 16yrs ago), alcohol intake, occasional marijuana use (1 mo prior last use)

## 2021-01-22 NOTE — PROGRESS NOTE ADULT - SUBJECTIVE AND OBJECTIVE BOX
Chief complain/HPI  ESRD  Patient was sent to the ER fir fever 100.0 associated with chills after dialysis. She was tested positive to COVID 19 on 01/18/2020. She was tested for COVID 19 as per pretesting for AV angiogram  She c/o of weakness and fatigue under exertion for one week  No sob  c/o loss of smell and taste.  Reduced appetite in the last week.  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    DVT, lower extremity  Left leg after hysterectomy  Cervical cancer  2010  Depression  History of gastroesophageal reflux (GERD)  Gout  Hyperlipidemia  Chronic kidney disease  HTN (hypertension)  HIV (human immunodeficiency virus infection)    S/P arteriovenous (AV) fistula creation  july 10 2020    H/O: hysterectomy  Due to cervical cancer    History of ectopic pregnancy  Two        Home Medications Reviewed    Hospital Medications:   MEDICATIONS  (STANDING):  allopurinol 100 milliGRAM(s) Oral daily  amLODIPine   Tablet 10 milliGRAM(s) Oral daily  aspirin enteric coated 81 milliGRAM(s) Oral daily  atorvastatin 20 milliGRAM(s) Oral at bedtime  calcitriol   Capsule 0.25 MICROGram(s) Oral daily  calcium acetate 1334 milliGRAM(s) Oral three times a day with meals  darunavir 800 mG/cobicstat 150 mG 1 Tablet(s) Oral daily  hydrALAZINE 50 milliGRAM(s) Oral three times a day  influenza   Vaccine 0.5 milliLiter(s) IntraMuscular once  lamiVUDine- milliGRAM(s) Oral daily  metoprolol tartrate 100 milliGRAM(s) Oral two times a day  senna 2 Tablet(s) Oral at bedtime  sodium bicarbonate 650 milliGRAM(s) Oral two times a day    MEDICATIONS  (PRN):  acetaminophen   Tablet .. 650 milliGRAM(s) Oral every 6 hours PRN Temp greater or equal to 38C (100.4F)  gabapentin 100 milliGRAM(s) Oral daily PRN pain  guaiFENesin   Syrup  (Sugar-Free) 100 milliGRAM(s) Oral every 6 hours PRN Cough      Allergies    oxycodone (Rash)  penicillins (Urticaria; Rash)    Intolerances    Negative smoker  Negative ETOH                          13.0   6.20  )-----------( 148      ( 22 Jan 2021 06:00 )             39.9     01-22    139  |  99  |  14  ----------------------------<  72  3.5   |  32<H>  |  5.17<H>    Ca    8.9      22 Jan 2021 06:00  Phos  1.8     01-22  Mg     2.2     01-22    TPro  7.5  /  Alb  2.4<L>  /  TBili  0.4  /  DBili  x   /  AST  30  /  ALT  16  /  AlkPhos  74  01-22    PT/INR - ( 22 Jan 2021 06:00 )   PT: 14.6 sec;   INR: 1.24 ratio                   RADIOLOGY & ADDITIONAL STUDIES:  xr< from: Xray Chest 1 View-PORTABLE IMMEDIATE (Xray Chest 1 View-PORTABLE IMMEDIATE .) (01.22.21 @ 01:41) >  AP chest. Prior dated 2/18/2020.    No change heart mediastinum.  patchy bibasilar airspace infiltrates consistentwith pneumonia possibly Covid 19 infection. No pleural effusion.    Impression: Bibasilar pneumonia.    < end of copied text >    SOCIAL HISTORY: Denies ETOh,Smoking,     FAMILY HISTORY:  Family history of renal failure    Family hx of hypertension        REVIEW OF SYSTEMS:  TAMMY ABOVE  VITALS:  Vital Signs Last 24 Hrs  T(C): 37.9 (22 Jan 2021 08:46), Max: 38.9 (22 Jan 2021 04:15)  T(F): 100.2 (22 Jan 2021 08:46), Max: 102.1 (22 Jan 2021 06:15)  HR: 70 (22 Jan 2021 08:46) (70 - 100)  BP: 107/50 (22 Jan 2021 08:46) (102/55 - 125/75)  BP(mean): --  RR: 18 (22 Jan 2021 08:46) (18 - 19)  SpO2: 97% (22 Jan 2021 08:46) (94% - 98%)    Height (cm): 157.5 (01-21 @ 22:46)  Weight (kg): 63.458 (01-21 @ 23:35)  BMI (kg/m2): 25.6 (01-21 @ 23:35)  BSA (m2): 1.64 (01-21 @ 23:35)    PHYSICAL EXAM:  Constitutional: NAD  HEENT: anicteric sclera, oropharynx clear, MMM  Neck: No JVD  Respiratory:  air entrance B/L, no wheezes, rales or rhonchi  Cardiovascular: S1, S2, RRR, no pericardial rub, no murmur  Gastrointestinal: BS+, soft, no tenderness, no distension, no bruit  Pelvis: bladder non-distended, no CVA tenderness  Extremities: No cyanosis or clubbing. No peripheral edema  Neurological: A/O x 3, no focal deficits  Access: LEFT UPPER ARM AV - bruit and thrill

## 2021-01-23 LAB
ANION GAP SERPL CALC-SCNC: 15 MMOL/L — SIGNIFICANT CHANGE UP (ref 5–17)
BASOPHILS # BLD AUTO: 0.01 K/UL — SIGNIFICANT CHANGE UP (ref 0–0.2)
BASOPHILS NFR BLD AUTO: 0.1 % — SIGNIFICANT CHANGE UP (ref 0–2)
BUN SERPL-MCNC: 35 MG/DL — HIGH (ref 7–18)
CALCIUM SERPL-MCNC: 9.1 MG/DL — SIGNIFICANT CHANGE UP (ref 8.4–10.5)
CHLORIDE SERPL-SCNC: 98 MMOL/L — SIGNIFICANT CHANGE UP (ref 96–108)
CO2 SERPL-SCNC: 25 MMOL/L — SIGNIFICANT CHANGE UP (ref 22–31)
CREAT SERPL-MCNC: 7.34 MG/DL — HIGH (ref 0.5–1.3)
EOSINOPHIL # BLD AUTO: 0 K/UL — SIGNIFICANT CHANGE UP (ref 0–0.5)
EOSINOPHIL NFR BLD AUTO: 0 % — SIGNIFICANT CHANGE UP (ref 0–6)
GLUCOSE SERPL-MCNC: 99 MG/DL — SIGNIFICANT CHANGE UP (ref 70–99)
HCT VFR BLD CALC: 37 % — SIGNIFICANT CHANGE UP (ref 34.5–45)
HCV AB S/CO SERPL IA: 0.07 S/CO — SIGNIFICANT CHANGE UP (ref 0–0.99)
HCV AB SERPL-IMP: SIGNIFICANT CHANGE UP
HGB BLD-MCNC: 12.4 G/DL — SIGNIFICANT CHANGE UP (ref 11.5–15.5)
HIV-1 VIRAL LOAD RESULT: ABNORMAL
HIV1 RNA # SERPL NAA+PROBE: SIGNIFICANT CHANGE UP
HIV1 RNA SER-IMP: SIGNIFICANT CHANGE UP
HIV1 RNA SERPL NAA+PROBE-ACNC: ABNORMAL
HIV1 RNA SERPL NAA+PROBE-LOG#: ABNORMAL LG COP/ML
IMM GRANULOCYTES NFR BLD AUTO: 1.2 % — SIGNIFICANT CHANGE UP (ref 0–1.5)
LYMPHOCYTES # BLD AUTO: 1.21 K/UL — SIGNIFICANT CHANGE UP (ref 1–3.3)
LYMPHOCYTES # BLD AUTO: 11.8 % — LOW (ref 13–44)
MAGNESIUM SERPL-MCNC: 2.2 MG/DL — SIGNIFICANT CHANGE UP (ref 1.6–2.6)
MCHC RBC-ENTMCNC: 31.8 PG — SIGNIFICANT CHANGE UP (ref 27–34)
MCHC RBC-ENTMCNC: 33.5 GM/DL — SIGNIFICANT CHANGE UP (ref 32–36)
MCV RBC AUTO: 94.9 FL — SIGNIFICANT CHANGE UP (ref 80–100)
MONOCYTES # BLD AUTO: 0.43 K/UL — SIGNIFICANT CHANGE UP (ref 0–0.9)
MONOCYTES NFR BLD AUTO: 4.2 % — SIGNIFICANT CHANGE UP (ref 2–14)
NEUTROPHILS # BLD AUTO: 8.46 K/UL — HIGH (ref 1.8–7.4)
NEUTROPHILS NFR BLD AUTO: 82.7 % — HIGH (ref 43–77)
NRBC # BLD: 0 /100 WBCS — SIGNIFICANT CHANGE UP (ref 0–0)
PHOSPHATE SERPL-MCNC: 4.2 MG/DL — SIGNIFICANT CHANGE UP (ref 2.5–4.5)
PLATELET # BLD AUTO: 165 K/UL — SIGNIFICANT CHANGE UP (ref 150–400)
POTASSIUM SERPL-MCNC: 3.1 MMOL/L — LOW (ref 3.5–5.3)
POTASSIUM SERPL-SCNC: 3.1 MMOL/L — LOW (ref 3.5–5.3)
RBC # BLD: 3.9 M/UL — SIGNIFICANT CHANGE UP (ref 3.8–5.2)
RBC # FLD: 15 % — HIGH (ref 10.3–14.5)
SODIUM SERPL-SCNC: 138 MMOL/L — SIGNIFICANT CHANGE UP (ref 135–145)
WBC # BLD: 10.23 K/UL — SIGNIFICANT CHANGE UP (ref 3.8–10.5)
WBC # FLD AUTO: 10.23 K/UL — SIGNIFICANT CHANGE UP (ref 3.8–10.5)

## 2021-01-23 PROCEDURE — 99233 SBSQ HOSP IP/OBS HIGH 50: CPT | Mod: GC

## 2021-01-23 RX ORDER — POTASSIUM CHLORIDE 20 MEQ
40 PACKET (EA) ORAL EVERY 4 HOURS
Refills: 0 | Status: DISCONTINUED | OUTPATIENT
Start: 2021-01-23 | End: 2021-01-23

## 2021-01-23 RX ORDER — ASCORBIC ACID 60 MG
500 TABLET,CHEWABLE ORAL DAILY
Refills: 0 | Status: DISCONTINUED | OUTPATIENT
Start: 2021-01-23 | End: 2021-01-28

## 2021-01-23 RX ORDER — ZINC SULFATE TAB 220 MG (50 MG ZINC EQUIVALENT) 220 (50 ZN) MG
220 TAB ORAL DAILY
Refills: 0 | Status: COMPLETED | OUTPATIENT
Start: 2021-01-23 | End: 2021-01-27

## 2021-01-23 RX ORDER — CHOLECALCIFEROL (VITAMIN D3) 125 MCG
2000 CAPSULE ORAL DAILY
Refills: 0 | Status: DISCONTINUED | OUTPATIENT
Start: 2021-01-23 | End: 2021-01-28

## 2021-01-23 RX ORDER — POTASSIUM CHLORIDE 20 MEQ
40 PACKET (EA) ORAL ONCE
Refills: 0 | Status: COMPLETED | OUTPATIENT
Start: 2021-01-23 | End: 2021-01-23

## 2021-01-23 RX ADMIN — Medication 40 MILLIEQUIVALENT(S): at 09:32

## 2021-01-23 RX ADMIN — CALCITRIOL 0.25 MICROGRAM(S): 0.5 CAPSULE ORAL at 14:31

## 2021-01-23 RX ADMIN — ZINC SULFATE TAB 220 MG (50 MG ZINC EQUIVALENT) 220 MILLIGRAM(S): 220 (50 ZN) TAB at 14:30

## 2021-01-23 RX ADMIN — Medication 81 MILLIGRAM(S): at 14:32

## 2021-01-23 RX ADMIN — HEPARIN SODIUM 5000 UNIT(S): 5000 INJECTION INTRAVENOUS; SUBCUTANEOUS at 18:36

## 2021-01-23 RX ADMIN — Medication 650 MILLIGRAM(S): at 16:16

## 2021-01-23 RX ADMIN — AMLODIPINE BESYLATE 10 MILLIGRAM(S): 2.5 TABLET ORAL at 05:54

## 2021-01-23 RX ADMIN — SENNA PLUS 2 TABLET(S): 8.6 TABLET ORAL at 21:52

## 2021-01-23 RX ADMIN — Medication 500 MILLIGRAM(S): at 14:30

## 2021-01-23 RX ADMIN — DARUNAVIR ETHANOLATE AND COBICISTAT 1 TABLET(S): 800; 150 TABLET, FILM COATED ORAL at 14:32

## 2021-01-23 RX ADMIN — Medication 100 MILLIGRAM(S): at 14:32

## 2021-01-23 RX ADMIN — Medication 2000 UNIT(S): at 14:30

## 2021-01-23 RX ADMIN — Medication 5 MILLIGRAM(S): at 21:53

## 2021-01-23 RX ADMIN — Medication 1 TABLET(S): at 14:30

## 2021-01-23 RX ADMIN — HEPARIN SODIUM 5000 UNIT(S): 5000 INJECTION INTRAVENOUS; SUBCUTANEOUS at 05:54

## 2021-01-23 RX ADMIN — Medication 100 MILLIGRAM(S): at 14:31

## 2021-01-23 RX ADMIN — ATORVASTATIN CALCIUM 20 MILLIGRAM(S): 80 TABLET, FILM COATED ORAL at 21:53

## 2021-01-23 NOTE — PHYSICAL THERAPY INITIAL EVALUATION ADULT - GENERAL OBSERVATIONS, REHAB EVAL
Pt seen supine in bed w/telemetry, denied any c/o pain however claims she feels weak and tired. Pt was cooperative during assessment

## 2021-01-23 NOTE — PROGRESS NOTE ADULT - PROBLEM SELECTOR PLAN 4
F/U T cell subset, HIV viral load  Pt unable to recall her HIV meds, please confirm with pharmacy and restart - Abs CD4 419, HIV viral load detectable but low (<30copies/ml)   - Restarted on home meds of darunavir-cobicistat, lamivudine-HBV  - No hx of opportunistic infections, PPx   - Recheck vaccination History, ensure flu/ PNA vaccine prior to D/c

## 2021-01-23 NOTE — PHYSICAL THERAPY INITIAL EVALUATION ADULT - PERTINENT HX OF CURRENT PROBLEM, REHAB EVAL
Pt admitted with c/o generalized weakness, supposed to have surgery for AV fistula however was tested positive for COVID 19

## 2021-01-23 NOTE — PROGRESS NOTE ADULT - PROBLEM SELECTOR PLAN 7
IMPROVE VTE Individual Risk Assessment  RISK                                                                Points  [X] Previous VTE                                                  3  [  ] Thrombophilia                                               2  [  ] Lower limb paralysis                                      2        (unable to hold up >15 seconds)    [  ] Current Cancer                                              2         (within 6 months)  [X] Immobilization > 24 hrs                                1  [  ] ICU/CCU stay > 24 hours                              1  [X] Age > 60                                                      1    IMPROVE VTE Score 5  Start Heparin 5000u q8 if patient will not undergo procedure  SCD boots for now

## 2021-01-23 NOTE — PROGRESS NOTE ADULT - PROBLEM SELECTOR PLAN 1
Last HD 1/21 but incomplete as per pt due to L AV fistula malfunction  Pt was supposed to have surgery 1/21 but was postponed as she tested positive for covid  Pt unable to recall who her nephrologist is, only that she goes to dialysis in [place]  Consulted Vascular surgery  Consulted Nephro Dr Catalan  NPO for now if pt will need permacath placement or if for AV fistula repair, await vascular surgery recs - HD 1/21 complete, and L AVF patent and functioning as per Dr. Catalan (Outpatient nephro) -> HD 1/23  - Will need to stop spiking fevers prior to outpt dialysis not possible   - AVF has palpable thrill, no signs of redness/ infection/ pain  - Pt was supposed to have AVF revision? 1/21 which was postponed as Cov +  - Vascular surgery opted for no intervention as HD access present   - Consulted Nephro Dr Catalan

## 2021-01-23 NOTE — PROGRESS NOTE ADULT - PROBLEM SELECTOR PLAN 6
Takes pravastatin at home, start atorvastatin 20mg qhs for now - Takes pravastatin at home, start atorvastatin 20mg qhs for now  - Lipid panel: , HDL 41, , total chol: 196

## 2021-01-23 NOTE — PROGRESS NOTE ADULT - PROBLEM SELECTOR PLAN 5
Pt takes amlodipine, metoprolol, losartan  Restarted metoprolol 50mg BID for now with parameters to avoid BB withrawal  -120s in ED and on admission  Restart home meds as needed - Pt takes amlodipine, metoprolol, losartan  - C/w metoprolol 50mg BID  - -120s in ED and on admission  - Restart home meds as needed

## 2021-01-23 NOTE — PROGRESS NOTE ADULT - ASSESSMENT
ESRD  Mild JHypokalemia  COVID 19, bilateral infiltrates, diarrhea and fever.    Dialysis today with 3 K bath, 1 liter UF  Add zinc vitamin D and Vitamin C .  Phosphorus 4.2 , no need for supplement, no binders at present.

## 2021-01-23 NOTE — PROGRESS NOTE ADULT - SUBJECTIVE AND OBJECTIVE BOX
Patient is a 65y old  Female who presents with a chief complaint of AV fistula malfunction (23 Jan 2021 10:08)  Awake, alert, comfortable in bed in NAD. For HD today. Denies cough or sob. Doing well on RA    INTERVAL HPI/OVERNIGHT EVENTS:      VITAL SIGNS:  T(F): 99.1 (01-23-21 @ 10:15)  HR: 61 (01-23-21 @ 10:15)  BP: 103/50 (01-23-21 @ 10:15)  RR: 18 (01-23-21 @ 10:15)  SpO2: 100% (01-23-21 @ 10:15)  Wt(kg): --  I&O's Detail          REVIEW OF SYSTEMS:    CONSTITUTIONAL:  No fevers, chills, sweats    HEENT:  Eyes:  No diplopia or blurred vision. ENT:  No earache, sore throat or runny nose.    CARDIOVASCULAR:  No pressure, squeezing, tightness, or heaviness about the chest; no palpitations.    RESPIRATORY:  Per HPI    GASTROINTESTINAL:  No abdominal pain, nausea, vomiting or diarrhea.    GENITOURINARY:  No dysuria, frequency or urgency.    NEUROLOGIC:  No paresthesias, fasciculations, seizures or weakness.    PSYCHIATRIC:  No disorder of thought or mood.      PHYSICAL EXAM:    Constitutional: Well developed and nourished  Eyes:Perrla  ENMT: normal  Neck:supple  Respiratory: good air entry  Cardiovascular: S1 S2 regular  Gastrointestinal: Soft, Non tender  Extremities: No edema  Vascular:normal  Neurological:Awake, alert,Ox3  Musculoskeletal:Normal      MEDICATIONS  (STANDING):  allopurinol 100 milliGRAM(s) Oral daily  amLODIPine   Tablet 10 milliGRAM(s) Oral daily  ascorbic acid 500 milliGRAM(s) Oral daily  aspirin enteric coated 81 milliGRAM(s) Oral daily  atorvastatin 20 milliGRAM(s) Oral at bedtime  calcitriol   Capsule 0.25 MICROGram(s) Oral daily  cholecalciferol 2000 Unit(s) Oral daily  darunavir 800 mG/cobicstat 150 mG 1 Tablet(s) Oral daily  heparin   Injectable 5000 Unit(s) SubCutaneous every 12 hours  influenza   Vaccine 0.5 milliLiter(s) IntraMuscular once  lamiVUDine- milliGRAM(s) Oral daily  melatonin 5 milliGRAM(s) Oral at bedtime  metoprolol tartrate 100 milliGRAM(s) Oral two times a day  Nephro-alex 1 Tablet(s) Oral daily  senna 2 Tablet(s) Oral at bedtime  zinc sulfate 220 milliGRAM(s) Oral daily    MEDICATIONS  (PRN):  acetaminophen   Tablet .. 650 milliGRAM(s) Oral every 6 hours PRN Temp greater or equal to 38C (100.4F)  gabapentin 100 milliGRAM(s) Oral daily PRN pain  guaiFENesin   Syrup  (Sugar-Free) 100 milliGRAM(s) Oral every 6 hours PRN Cough      Allergies    oxycodone (Rash)  penicillins (Urticaria; Rash)    Intolerances        LABS:                        12.4   10.23 )-----------( 165      ( 23 Jan 2021 06:55 )             37.0     01-23    138  |  98  |  35<H>  ----------------------------<  99  3.1<L>   |  25  |  7.34<H>    Ca    9.1      23 Jan 2021 06:55  Phos  4.2     01-23  Mg     2.2     01-23    TPro  7.5  /  Alb  2.4<L>  /  TBili  0.4  /  DBili  x   /  AST  30  /  ALT  16  /  AlkPhos  74  01-22    PT/INR - ( 22 Jan 2021 06:00 )   PT: 14.6 sec;   INR: 1.24 ratio         SARS-CoV-2: Detected: This Respiratory Panel uses polymerase chain reaction (PCR) to detect for   adenovirus; coronavirus (HKU1, NL63, 229E, OC43); human metapneumovirus   (hMPV); human enterovirus/rhinovirus (Entero/RV); influenza A; influenza   A/H1; influenza A/H3; influenza A/H1-2009; influenza B; parainfluenza   viruses 1, 2, 3, 4; respiratory syncytial virus; Mycoplasma pneumoniae;   Chlamydophila pneumoniae; and SARS-CoV-2. (01.22.21 @ 02:44)           CAPILLARY BLOOD GLUCOSE        pro-bnp -- 01-22 @ 06:00     d-dimer 667  01-22 @ 06:00      RADIOLOGY & ADDITIONAL TESTS:    CXR:  < from: Xray Chest 1 View-PORTABLE IMMEDIATE (Xray Chest 1 View-PORTABLE IMMEDIATE .) (01.22.21 @ 01:41) >  Impression: Bibasilar pneumonia.    < end of copied text >    Ct scan chest:    ekg;    echo:

## 2021-01-23 NOTE — PHYSICAL THERAPY INITIAL EVALUATION ADULT - GAIT DEVIATIONS NOTED, PT EVAL
decreased maribel/increased time in double stance/decreased velocity of limb motion/decreased step length

## 2021-01-23 NOTE — PROGRESS NOTE ADULT - SUBJECTIVE AND OBJECTIVE BOX
PGY-1 Progress Note discussed with attending    PAGER #: [[phone]] TILL 5:00 PM  PLEASE CONTACT ON CALL TEAM:  - On Call Team (Please refer to Danny) FROM 5:00 PM - 8:30PM  - Nightfloat Team FROM 8:30 -7:30 AM    CHIEF COMPLAINT & BRIEF HOSPITAL COURSE:    INTERVAL HPI/OVERNIGHT EVENTS:       REVIEW OF SYSTEMS:  CONSTITUTIONAL: No fever, weight loss, or fatigue  RESPIRATORY: No cough, wheezing, chills or hemoptysis; No shortness of breath  CARDIOVASCULAR: No chest pain, palpitations, dizziness, or leg swelling  GASTROINTESTINAL: No abdominal pain. No nausea, vomiting, or hematemesis; No diarrhea or constipation. No melena or hematochezia.  GENITOURINARY: No dysuria or hematuria, urinary frequency  NEUROLOGICAL: No headaches, memory loss, loss of strength, numbness, or tremors  SKIN: No itching, burning, rashes, or lesions     MEDICATIONS  (STANDING):  allopurinol 100 milliGRAM(s) Oral daily  amLODIPine   Tablet 10 milliGRAM(s) Oral daily  ascorbic acid 500 milliGRAM(s) Oral daily  aspirin enteric coated 81 milliGRAM(s) Oral daily  atorvastatin 20 milliGRAM(s) Oral at bedtime  calcitriol   Capsule 0.25 MICROGram(s) Oral daily  cholecalciferol 2000 Unit(s) Oral daily  darunavir 800 mG/cobicstat 150 mG 1 Tablet(s) Oral daily  heparin   Injectable 5000 Unit(s) SubCutaneous every 12 hours  influenza   Vaccine 0.5 milliLiter(s) IntraMuscular once  lamiVUDine- milliGRAM(s) Oral daily  melatonin 5 milliGRAM(s) Oral at bedtime  metoprolol tartrate 100 milliGRAM(s) Oral two times a day  Nephro-alex 1 Tablet(s) Oral daily  senna 2 Tablet(s) Oral at bedtime  zinc sulfate 220 milliGRAM(s) Oral daily    MEDICATIONS  (PRN):  acetaminophen   Tablet .. 650 milliGRAM(s) Oral every 6 hours PRN Temp greater or equal to 38C (100.4F)  gabapentin 100 milliGRAM(s) Oral daily PRN pain  guaiFENesin   Syrup  (Sugar-Free) 100 milliGRAM(s) Oral every 6 hours PRN Cough      Vital Signs Last 24 Hrs  T(C): 37.3 (23 Jan 2021 10:15), Max: 38.5 (23 Jan 2021 04:49)  T(F): 99.1 (23 Jan 2021 10:15), Max: 101.3 (23 Jan 2021 04:49)  HR: 61 (23 Jan 2021 10:15) (61 - 110)  BP: 103/50 (23 Jan 2021 10:15) (103/50 - 119/75)  BP(mean): --  RR: 18 (23 Jan 2021 10:15) (18 - 19)  SpO2: 100% (23 Jan 2021 10:15) (95% - 100%)    PHYSICAL EXAMINATION:  GENERAL: NAD, well built  HEAD:  Atraumatic, Normocephalic  EYES:  conjunctiva and sclera clear  NECK: Supple, No JVD, Normal thyroid  CHEST/LUNG: Clear to auscultation. Clear to percussion bilaterally; No rales, rhonchi, wheezing, or rubs  HEART: Regular rate and rhythm; No murmurs, rubs, or gallops  ABDOMEN: Soft, Nontender, Nondistended; Bowel sounds present  NERVOUS SYSTEM:  Alert & Oriented X3,    EXTREMITIES:  2+ Peripheral Pulses, No clubbing, cyanosis, or edema  SKIN: warm dry                          12.4   10.23 )-----------( 165      ( 23 Jan 2021 06:55 )             37.0     01-23    138  |  98  |  35<H>  ----------------------------<  99  3.1<L>   |  25  |  7.34<H>    Ca    9.1      23 Jan 2021 06:55  Phos  4.2     01-23  Mg     2.2     01-23    TPro  7.5  /  Alb  2.4<L>  /  TBili  0.4  /  DBili  x   /  AST  30  /  ALT  16  /  AlkPhos  74  01-22    LIVER FUNCTIONS - ( 22 Jan 2021 06:00 )  Alb: 2.4 g/dL / Pro: 7.5 g/dL / ALK PHOS: 74 U/L / ALT: 16 U/L DA / AST: 30 U/L / GGT: x               PT/INR - ( 22 Jan 2021 06:00 )   PT: 14.6 sec;   INR: 1.24 ratio             CAPILLARY BLOOD GLUCOSE      RADIOLOGY & ADDITIONAL TESTS:                   PGY-1 Progress Note discussed with attending    PAGER #: [[phone]] TILL 5:00 PM  PLEASE CONTACT ON CALL TEAM:  - On Call Team (Please refer to Danny) FROM 5:00 PM - 8:30PM  - Nightfloat Team FROM 8:30 -7:30 AM    CHIEF COMPLAINT & BRIEF HOSPITAL COURSE:  65F, lives home, with visiting nurse, PMHx of HTN, T2DM, HIV on ART, ESRD (recently switched to TTS since COVID +) w/ L AV Fistula (Vascular Surgeon Dr Dickens) p/w fevers/ chills and weakness during her dialysis session on 1/21/21. Patient reports that she was able to complete the duration of her dialysis, but spiked a high fever with chills, and was sent directly from the dialysis center to the ED. She tested positive for COVID on 1/19, is saturating well on room air, and has had associated cough, chills, diarrhea (improving). She denied chest pain, nausea, dizziness, or  vomiting. Of note has had HIV for 16 years, and has been on 4 medications since then, but reports that her viral levels had been detectable for years; she has not had any opportunistic infections that she can remember, and is not on PPx medications. She recieved dialysis on 1/23 AM, and supportive measures for COVID infection and diarrhea. Blood cultures negative till date. She was evaluated by nephrology inpatient, L AVF found to be patent and functioning; vascular consult opted for no intervention.     INTERVAL HPI/OVERNIGHT EVENTS:   Patient was examined while she was sitting at bedside, Aox3, responding appropriately to questions, in NAD. She reports improved diarrhea after food, and denies any other acute complaints. She continues to spike fevers, BCx NTD, and tolerated dialysis well today.      REVIEW OF SYSTEMS:  CONSTITUTIONAL: Fevers +, No weight loss, or fatigue  RESPIRATORY: Cough + No wheezing, chills or hemoptysis; No shortness of breath  CARDIOVASCULAR: No chest pain, palpitations, dizziness, or leg swelling  GASTROINTESTINAL: No abdominal pain. No nausea, vomiting, or hematemesis; No diarrhea or constipation. No melena or hematochezia.  GENITOURINARY: No dysuria or hematuria, urinary frequency  NEUROLOGICAL: No headaches, memory loss, loss of strength, numbness, or tremors  SKIN: No itching, burning, rashes, or lesions     MEDICATIONS  (STANDING):  allopurinol 100 milliGRAM(s) Oral daily  amLODIPine   Tablet 10 milliGRAM(s) Oral daily  ascorbic acid 500 milliGRAM(s) Oral daily  aspirin enteric coated 81 milliGRAM(s) Oral daily  atorvastatin 20 milliGRAM(s) Oral at bedtime  calcitriol   Capsule 0.25 MICROGram(s) Oral daily  cholecalciferol 2000 Unit(s) Oral daily  darunavir 800 mG/cobicstat 150 mG 1 Tablet(s) Oral daily  heparin   Injectable 5000 Unit(s) SubCutaneous every 12 hours  influenza   Vaccine 0.5 milliLiter(s) IntraMuscular once  lamiVUDine- milliGRAM(s) Oral daily  melatonin 5 milliGRAM(s) Oral at bedtime  metoprolol tartrate 100 milliGRAM(s) Oral two times a day  Nephro-alex 1 Tablet(s) Oral daily  senna 2 Tablet(s) Oral at bedtime  zinc sulfate 220 milliGRAM(s) Oral daily    MEDICATIONS  (PRN):  acetaminophen   Tablet .. 650 milliGRAM(s) Oral every 6 hours PRN Temp greater or equal to 38C (100.4F)  gabapentin 100 milliGRAM(s) Oral daily PRN pain  guaiFENesin   Syrup  (Sugar-Free) 100 milliGRAM(s) Oral every 6 hours PRN Cough      Vital Signs Last 24 Hrs  T(C): 37.3 (23 Jan 2021 10:15), Max: 38.5 (23 Jan 2021 04:49)  T(F): 99.1 (23 Jan 2021 10:15), Max: 101.3 (23 Jan 2021 04:49)  HR: 61 (23 Jan 2021 10:15) (61 - 110)  BP: 103/50 (23 Jan 2021 10:15) (103/50 - 119/75)  BP(mean): --  RR: 18 (23 Jan 2021 10:15) (18 - 19)  SpO2: 100% (23 Jan 2021 10:15) (95% - 100%)    PHYSICAL EXAMINATION:  GENERAL: Well built AAF, appears stated age, in NAD  HEAD:  Atraumatic, Normocephalic  EYES:  conjunctiva and sclera clear  NECK: Supple, No JVD, Normal thyroid  CHEST/LUNG: Clear to auscultation. Clear to percussion bilaterally; No rales, rhonchi, wheezing, or rubs  HEART: Regular rate and rhythm; No murmurs, rubs, or gallops  ABDOMEN: Soft, Nontender, Nondistended; Bowel sounds present  NERVOUS SYSTEM:  Alert & Oriented X3, No motor or sensory deficits   EXTREMITIES:  L Brachial AVF + with palpable thrill + 2+ Peripheral Pulses, No clubbing, cyanosis, or edema  SKIN: warm dry                          12.4   10.23 )-----------( 165      ( 23 Jan 2021 06:55 )             37.0     01-23    138  |  98  |  35<H>  ----------------------------<  99  3.1<L>   |  25  |  7.34<H>    Ca    9.1      23 Jan 2021 06:55  Phos  4.2     01-23  Mg     2.2     01-23    TPro  7.5  /  Alb  2.4<L>  /  TBili  0.4  /  DBili  x   /  AST  30  /  ALT  16  /  AlkPhos  74  01-22    LIVER FUNCTIONS - ( 22 Jan 2021 06:00 )  Alb: 2.4 g/dL / Pro: 7.5 g/dL / ALK PHOS: 74 U/L / ALT: 16 U/L DA / AST: 30 U/L / GGT: x               PT/INR - ( 22 Jan 2021 06:00 )   PT: 14.6 sec;   INR: 1.24 ratio             CAPILLARY BLOOD GLUCOSE      RADIOLOGY & ADDITIONAL TESTS:

## 2021-01-23 NOTE — PROGRESS NOTE ADULT - ASSESSMENT
65 F from home PMH HTN, DM2, HIV on ART, ESRD MWF (recently switched to T TH S since COVID +) w/ L AV Fistula (Vascular Surgeon Dr Dickens) BIBZANDER from dialysis center for AV fistula malfunction admitted for hemodialysis.      ***PRIMARY TEAM PLEASE CONFIRM MEDICATIONS WITH PHARMACY IN AM AND HOME NURSE IF PATIENT IS ABLE TO PROVIDE HER NUMBER*****     65 F from home PMH HTN, DM2, HIV on ART, ESRD MWF (recently switched to T [facility] S since COVID +) w/ L AV Fistula (Vascular Surgeon Dr Dickens) BIBZANDER from dialysis center for AV fistula malfunction admitted for hemodialysis.

## 2021-01-23 NOTE — PROGRESS NOTE ADULT - PROBLEM SELECTOR PLAN 2
K 6 in ED given insulin, dextrose, lokelma  Follow ZIYAD MALDONADO Resolved   - On admission: K+ was 6 -> given insulin, dextrose, lokelma   - Follow BMP

## 2021-01-23 NOTE — PROGRESS NOTE ADULT - SUBJECTIVE AND OBJECTIVE BOX
Problem List:  ESRD  COVID 19.  CT bilateral infiltrates, pulse 02 97 in RA, MAX TEMP 101.3  Diarrhea      PAST MEDICAL & SURGICAL HISTORY:  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    DVT, lower extremity  Left leg after hysterectomy    Cervical cancer  2010    Depression    History of gastroesophageal reflux (GERD)    Gout    Hyperlipidemia    Chronic kidney disease    HTN (hypertension)    HIV (human immunodeficiency virus infection)    S/P arteriovenous (AV) fistula creation  july 10 2020    H/O: hysterectomy  Due to cervical cancer    History of ectopic pregnancy  Two        oxycodone (Rash)  penicillins (Urticaria; Rash)      MEDICATIONS  (STANDING):  allopurinol 100 milliGRAM(s) Oral daily  amLODIPine   Tablet 10 milliGRAM(s) Oral daily  aspirin enteric coated 81 milliGRAM(s) Oral daily  atorvastatin 20 milliGRAM(s) Oral at bedtime  calcitriol   Capsule 0.25 MICROGram(s) Oral daily  darunavir 800 mG/cobicstat 150 mG 1 Tablet(s) Oral daily  heparin   Injectable 5000 Unit(s) SubCutaneous every 12 hours  influenza   Vaccine 0.5 milliLiter(s) IntraMuscular once  lamiVUDine- milliGRAM(s) Oral daily  melatonin 5 milliGRAM(s) Oral at bedtime  metoprolol tartrate 100 milliGRAM(s) Oral two times a day  senna 2 Tablet(s) Oral at bedtime    MEDICATIONS  (PRN):  acetaminophen   Tablet .. 650 milliGRAM(s) Oral every 6 hours PRN Temp greater or equal to 38C (100.4F)  gabapentin 100 milliGRAM(s) Oral daily PRN pain  guaiFENesin   Syrup  (Sugar-Free) 100 milliGRAM(s) Oral every 6 hours PRN Cough                            12.4   10.23 )-----------( 165      ( 23 Jan 2021 06:55 )             37.0     01-23    138  |  98  |  35<H>  ----------------------------<  99  3.1<L>   |  25  |  7.34<H>    Ca    9.1      23 Jan 2021 06:55  Phos  4.2     01-23  Mg     2.2     01-23    TPro  7.5  /  Alb  2.4<L>  /  TBili  0.4  /  DBili  x   /  AST  30  /  ALT  16  /  AlkPhos  74  01-22    PT/INR - ( 22 Jan 2021 06:00 )   PT: 14.6 sec;   INR: 1.24 ratio                 REVIEW OF SYSTEMS:  General: chills in early am, c/o fatigue and weakness    RESPIRATORY: No cough, wheezing, hemoptysis; No shortness of breath  CARDIOVASCULAR: No chest pain or palpitations. No Edema  GASTROINTESTINAL: reduced appetite, diarrhea after ,meals  GENITOURINARY: No dysuria, frequency, foamy urine, urinary urgency, incontinence or hematuria      VITALS:  T(F): 98.3 (01-23-21 @ 08:20), Max: 101.3 (01-23-21 @ 04:49)  HR: 65 (01-23-21 @ 08:20)  BP: 105/54 (01-23-21 @ 08:20)  RR: 19 (01-23-21 @ 08:20)  SpO2: 96% (01-23-21 @ 08:20)  Wt(kg): --      PHYSICAL EXAM:  Constitutional: well developed, no diaphoresis, no distress.  Neck: No JVD, no carotid bruit, supple, no adenopathy  Respiratory: rales at the base , ai entrance OK.  Cardiovascular: S1, S2, RRR, no pericardial rub, no murmur  Abdomen: BS+, soft, no tenderness, no bruit  Pelvis: bladder nondistended  LEFT AV WITH BRUIT AND THRILL

## 2021-01-23 NOTE — PROGRESS NOTE ADULT - PROBLEM SELECTOR PLAN 3
COVID PCR + complaining of cough  No requirement of O2 at this time. PT saturating >94% on RA w/o respiratory distress  Will not start decadron at this time  Tylenol for fever  F/U Blood cultures - COVID PCR + complaining of cough  - No supplemental O2 needed; Sp02 >94% on RA w/o respiratory distress  - No role for decadron/ remdesivir at this time   - Tylenol, robitussin, Albuterol PRN   - Blood cultures NTD

## 2021-01-24 ENCOUNTER — TRANSCRIPTION ENCOUNTER (OUTPATIENT)
Age: 66
End: 2021-01-24

## 2021-01-24 LAB
ANION GAP SERPL CALC-SCNC: 11 MMOL/L — SIGNIFICANT CHANGE UP (ref 5–17)
BASOPHILS # BLD AUTO: 0.01 K/UL — SIGNIFICANT CHANGE UP (ref 0–0.2)
BASOPHILS NFR BLD AUTO: 0.1 % — SIGNIFICANT CHANGE UP (ref 0–2)
BUN SERPL-MCNC: 26 MG/DL — HIGH (ref 7–18)
CALCIUM SERPL-MCNC: 9.4 MG/DL — SIGNIFICANT CHANGE UP (ref 8.4–10.5)
CHLORIDE SERPL-SCNC: 97 MMOL/L — SIGNIFICANT CHANGE UP (ref 96–108)
CO2 SERPL-SCNC: 25 MMOL/L — SIGNIFICANT CHANGE UP (ref 22–31)
CREAT SERPL-MCNC: 6.16 MG/DL — HIGH (ref 0.5–1.3)
EOSINOPHIL # BLD AUTO: 0 K/UL — SIGNIFICANT CHANGE UP (ref 0–0.5)
EOSINOPHIL NFR BLD AUTO: 0 % — SIGNIFICANT CHANGE UP (ref 0–6)
GLUCOSE SERPL-MCNC: 89 MG/DL — SIGNIFICANT CHANGE UP (ref 70–99)
HAV IGM SER-ACNC: SIGNIFICANT CHANGE UP
HBV CORE IGM SER-ACNC: SIGNIFICANT CHANGE UP
HBV SURFACE AG SER-ACNC: SIGNIFICANT CHANGE UP
HCT VFR BLD CALC: 36.8 % — SIGNIFICANT CHANGE UP (ref 34.5–45)
HCV AB S/CO SERPL IA: 0.07 S/CO — SIGNIFICANT CHANGE UP (ref 0–0.99)
HCV AB SERPL-IMP: SIGNIFICANT CHANGE UP
HGB BLD-MCNC: 12.5 G/DL — SIGNIFICANT CHANGE UP (ref 11.5–15.5)
IMM GRANULOCYTES NFR BLD AUTO: 1.1 % — SIGNIFICANT CHANGE UP (ref 0–1.5)
LYMPHOCYTES # BLD AUTO: 1.36 K/UL — SIGNIFICANT CHANGE UP (ref 1–3.3)
LYMPHOCYTES # BLD AUTO: 14.4 % — SIGNIFICANT CHANGE UP (ref 13–44)
MAGNESIUM SERPL-MCNC: 2.1 MG/DL — SIGNIFICANT CHANGE UP (ref 1.6–2.6)
MCHC RBC-ENTMCNC: 32 PG — SIGNIFICANT CHANGE UP (ref 27–34)
MCHC RBC-ENTMCNC: 34 GM/DL — SIGNIFICANT CHANGE UP (ref 32–36)
MCV RBC AUTO: 94.1 FL — SIGNIFICANT CHANGE UP (ref 80–100)
MONOCYTES # BLD AUTO: 0.31 K/UL — SIGNIFICANT CHANGE UP (ref 0–0.9)
MONOCYTES NFR BLD AUTO: 3.3 % — SIGNIFICANT CHANGE UP (ref 2–14)
NEUTROPHILS # BLD AUTO: 7.68 K/UL — HIGH (ref 1.8–7.4)
NEUTROPHILS NFR BLD AUTO: 81.1 % — HIGH (ref 43–77)
NRBC # BLD: 0 /100 WBCS — SIGNIFICANT CHANGE UP (ref 0–0)
PHOSPHATE SERPL-MCNC: 2.2 MG/DL — LOW (ref 2.5–4.5)
PLATELET # BLD AUTO: 181 K/UL — SIGNIFICANT CHANGE UP (ref 150–400)
POTASSIUM SERPL-MCNC: 3.7 MMOL/L — SIGNIFICANT CHANGE UP (ref 3.5–5.3)
POTASSIUM SERPL-SCNC: 3.7 MMOL/L — SIGNIFICANT CHANGE UP (ref 3.5–5.3)
RBC # BLD: 3.91 M/UL — SIGNIFICANT CHANGE UP (ref 3.8–5.2)
RBC # FLD: 15 % — HIGH (ref 10.3–14.5)
SARS-COV-2 RNA SPEC QL NAA+PROBE: DETECTED
SODIUM SERPL-SCNC: 133 MMOL/L — LOW (ref 135–145)
WBC # BLD: 9.46 K/UL — SIGNIFICANT CHANGE UP (ref 3.8–10.5)
WBC # FLD AUTO: 9.46 K/UL — SIGNIFICANT CHANGE UP (ref 3.8–10.5)

## 2021-01-24 PROCEDURE — 99233 SBSQ HOSP IP/OBS HIGH 50: CPT

## 2021-01-24 RX ORDER — AMLODIPINE BESYLATE 2.5 MG/1
10 TABLET ORAL DAILY
Refills: 0 | Status: DISCONTINUED | OUTPATIENT
Start: 2021-01-24 | End: 2021-01-28

## 2021-01-24 RX ORDER — HEPARIN SODIUM 5000 [USP'U]/ML
5000 INJECTION INTRAVENOUS; SUBCUTANEOUS EVERY 8 HOURS
Refills: 0 | Status: DISCONTINUED | OUTPATIENT
Start: 2021-01-24 | End: 2021-01-28

## 2021-01-24 RX ADMIN — Medication 500 MILLIGRAM(S): at 12:18

## 2021-01-24 RX ADMIN — Medication 2000 UNIT(S): at 12:17

## 2021-01-24 RX ADMIN — DARUNAVIR ETHANOLATE AND COBICISTAT 1 TABLET(S): 800; 150 TABLET, FILM COATED ORAL at 12:17

## 2021-01-24 RX ADMIN — Medication 100 MILLIGRAM(S): at 12:20

## 2021-01-24 RX ADMIN — ATORVASTATIN CALCIUM 20 MILLIGRAM(S): 80 TABLET, FILM COATED ORAL at 20:33

## 2021-01-24 RX ADMIN — HEPARIN SODIUM 5000 UNIT(S): 5000 INJECTION INTRAVENOUS; SUBCUTANEOUS at 20:35

## 2021-01-24 RX ADMIN — CALCITRIOL 0.25 MICROGRAM(S): 0.5 CAPSULE ORAL at 15:27

## 2021-01-24 RX ADMIN — HEPARIN SODIUM 5000 UNIT(S): 5000 INJECTION INTRAVENOUS; SUBCUTANEOUS at 15:27

## 2021-01-24 RX ADMIN — HEPARIN SODIUM 5000 UNIT(S): 5000 INJECTION INTRAVENOUS; SUBCUTANEOUS at 06:03

## 2021-01-24 RX ADMIN — ZINC SULFATE TAB 220 MG (50 MG ZINC EQUIVALENT) 220 MILLIGRAM(S): 220 (50 ZN) TAB at 12:20

## 2021-01-24 RX ADMIN — AMLODIPINE BESYLATE 10 MILLIGRAM(S): 2.5 TABLET ORAL at 12:26

## 2021-01-24 RX ADMIN — Medication 5 MILLIGRAM(S): at 20:49

## 2021-01-24 RX ADMIN — Medication 1 TABLET(S): at 12:20

## 2021-01-24 RX ADMIN — Medication 100 MILLIGRAM(S): at 12:17

## 2021-01-24 RX ADMIN — SENNA PLUS 2 TABLET(S): 8.6 TABLET ORAL at 20:32

## 2021-01-24 RX ADMIN — Medication 81 MILLIGRAM(S): at 12:17

## 2021-01-24 RX ADMIN — Medication 650 MILLIGRAM(S): at 12:24

## 2021-01-24 NOTE — DISCHARGE NOTE PROVIDER - NSDCCPCAREPLAN_GEN_ALL_CORE_FT
PRINCIPAL DISCHARGE DIAGNOSIS  Diagnosis: COVID-19  Assessment and Plan of Treatment: You came to the hospital with shortness of breath. Your COVID PCR was positive, meaning an active covid infection. You were treated with remdesivir, steroid and oxygen supplementation.   Based on your current clinical status and stability, it has been determined that you no longer need hospitalization and can recover while remaining in self-quarantine at home. You should follow the prevention steps below until a healthcare provider or local or state health department says you can return to your normal activities. Please take aspirin daily to reduce your risk of blood clots.   1. You should restrict activities outside your home, except for getting medical care.   2. Do not go to work, school, or public areas.   3. Avoid using public transportation, ride-sharing, or taxis.   4. Separate yourself from other people and animals in your home as much as possible.  When you are around other people (e.g., sharing a room or vehicle) you should wear a facemask.  5. Wash your hands often with soap and water for at least 20 seconds, especially after blowing your nose, coughing, or sneezing; going to the bathroom; and before eating or preparing food.  6. Cover your mouth and nose with a tissue when you cough or sneeze. Throw used tissues in a lined trash can. Immediately wash your hands with soap and water for at least 20 seconds  7. High touch surfaces include counters, tabletops, doorknobs, bathroom fixtures, toilets, phones, keyboards, tablets, and bedside tables.  8. Avoid sharing dishes, drinking glasses, cups, eating utensils, towels, or bedding with other people or pets in your home. After using these items, they should be washed thoroughly with soap and water. You are strongly advised to seek prompt medical attention if your illness worsens or you develop new symptoms like fever or difficulty breathing. PLEASE CHECK YOUR OXYGEN LEVEL WITH PULSE OXMETRY  3 times a day ot WHEN YOU FEEL SHORT OF BREATH, PLEASE COME TO ED IF IT'S LESS THAN 93%.        SECONDARY DISCHARGE DIAGNOSES  Diagnosis: ESRD (end stage renal disease) on dialysis  Assessment and Plan of Treatment: You have a history of end stage renal disease due to hypertension, and are on dialysis via your left Arm AVF on Tues Thurs Sat. You had fevers during your last dialysis treatment and were sent to the hospital for further workup. No signs of bacterial infection/ opportunistic infections were found, and the fevers are likely from COVID. You did not require supplemental o2. You were restarted on dialysis. We held nephrotoxic medications such as NSAID pain killers, and were cautious about the use of IV contrast if such scans were needed.       Diagnosis: HIV (human immunodeficiency virus infection)  Assessment and Plan of Treatment: You have a history of HIV controlled on anti retroviral therapy. Your medications were continued, and your T cell panel showed CD4 count in 400's, and your viral load were low but detectable. You were evaluated for possible opporunistic infections but did not have any. You were continued on your home medication reigmen unchanged.    Diagnosis: HTN (hypertension)  Assessment and Plan of Treatment: You have high blood pressure, for which you have been continued on medications. It is important to continue to take your medications on time,  monitor your blood pressure at home, keep a log of your home readings and follow up with your Primary Care Physician. As per AHA/ACC guidelines, it is important to adhere to a DASH Diet of fresh fruits, vegetables, lean meats such as poultry and fish, and whole wheat carbs. 30 minutes of aerobic exercise per day 3-4 times a week, reducing salt intake <1.5g/day, and cutting down on highly processed foods are also shown to reduce BP. Uncontrolled BP may result in organ damage to your eyes, brain, heart, and kidneys by causing strokes, heart attacks, kidney failure, and bleeds in your eye. CONTINUE METOPROLOL 100MG BD, and AMLODIPINE 10mg DAILY.       Diagnosis: Hyperlipidemia  Assessment and Plan of Treatment: You have a history of high blood fats and were on two medications at home including a STATIN. You were continued on the statin during this hospital admission. Your blood tests showed well controlled lipid levels in your body. You are advised to continue taking this medication daily and seek medical attention if you have intense muscle aches, or reddish discoloration of the urine.       PRINCIPAL DISCHARGE DIAGNOSIS  Diagnosis: COVID-19  Assessment and Plan of Treatment: You came to the hospital with shortness of breath. Your COVID PCR was positive, meaning an active covid infection. You were treated with remdesivir, steroid and oxygen supplementation.   Based on your current clinical status and stability, it has been determined that you no longer need hospitalization and can recover while remaining in self-quarantine at home. You should follow the prevention steps below until a healthcare provider or local or state health department says you can return to your normal activities. Please take aspirin daily to reduce your risk of blood clots.   1. You should restrict activities outside your home, except for getting medical care.   2. Do not go to work, school, or public areas.   3. Avoid using public transportation, ride-sharing, or taxis.   4. Separate yourself from other people and animals in your home as much as possible.  When you are around other people (e.g., sharing a room or vehicle) you should wear a facemask.  5. Wash your hands often with soap and water for at least 20 seconds, especially after blowing your nose, coughing, or sneezing; going to the bathroom; and before eating or preparing food.  6. Cover your mouth and nose with a tissue when you cough or sneeze. Throw used tissues in a lined trash can. Immediately wash your hands with soap and water for at least 20 seconds  7. High touch surfaces include counters, tabletops, doorknobs, bathroom fixtures, toilets, phones, keyboards, tablets, and bedside tables.  8. Avoid sharing dishes, drinking glasses, cups, eating utensils, towels, or bedding with other people or pets in your home. After using these items, they should be washed thoroughly with soap and water. You are strongly advised to seek prompt medical attention if your illness worsens or you develop new symptoms like fever or difficulty breathing. PLEASE CHECK YOUR OXYGEN LEVEL WITH OXYGEN SENSOR 3 times a day or WHEN YOU FEEL SHORT OF BREATH, PLEASE COME TO ED IF IT'S LESS THAN 93%.        SECONDARY DISCHARGE DIAGNOSES  Diagnosis: ESRD (end stage renal disease) on dialysis  Assessment and Plan of Treatment: You have a history of end stage renal disease due to hypertension, and are on dialysis via your left Arm AVF on Tues Thurs Sat. You had fevers during your last dialysis treatment and were sent to the hospital for further workup. No signs of bacterial infection/ opportunistic infections were found, and the fevers are likely from COVID. You did not require supplemental o2. You were restarted on dialysis. We held nephrotoxic medications such as NSAID pain killers, and were cautious about the use of IV contrast if such scans were needed.       Diagnosis: HIV (human immunodeficiency virus infection)  Assessment and Plan of Treatment: You have a history of HIV controlled on anti retroviral therapy. Your medications were continued, and your T cell panel showed CD4 count in 400's, and your viral load were low but detectable. You were evaluated for possible opporunistic infections but did not have any. You were continued on your home medication reigmen unchanged.    Diagnosis: HTN (hypertension)  Assessment and Plan of Treatment: You have high blood pressure, for which you have been continued on medications. It is important to continue to take your medications on time,  monitor your blood pressure at home, keep a log of your home readings and follow up with your Primary Care Physician. As per AHA/ACC guidelines, it is important to adhere to a DASH Diet of fresh fruits, vegetables, lean meats such as poultry and fish, and whole wheat carbs. 30 minutes of aerobic exercise per day 3-4 times a week, reducing salt intake <1.5g/day, and cutting down on highly processed foods are also shown to reduce BP. Uncontrolled BP may result in organ damage to your eyes, brain, heart, and kidneys by causing strokes, heart attacks, kidney failure, and bleeds in your eye. CONTINUE METOPROLOL 100MG BD, and AMLODIPINE 10mg DAILY.       Diagnosis: Hyperlipidemia  Assessment and Plan of Treatment: You have a history of high blood fats and were on two medications at home including a STATIN. You were continued on the statin during this hospital admission. Your blood tests showed well controlled lipid levels in your body. You are advised to continue taking this medication daily and seek medical attention if you have intense muscle aches, or reddish discoloration of the urine.       PRINCIPAL DISCHARGE DIAGNOSIS  Diagnosis: COVID-19  Assessment and Plan of Treatment: You were diagnosed with COVID19 infection. CT chest showed ground glass opacities compatible with COVID19 infection. You did not required Decadron, Remdesivir or Oxygen supplementation.   Based on your current clinical status and stability, it has been determined that you no longer need hospitalization and can recover while remaining in self-quarantine at home. You should follow the prevention steps below:  1. You should restrict activities outside your home, except for getting medical care.   2. Do not go to work, school, or public areas.   3. Avoid using public transportation, ride-sharing, or taxis.   4. Separate yourself from other people and animals in your home as much as possible.  When you are around other people (e.g., sharing a room or vehicle) you should wear a facemask.  5. Wash your hands often with soap and water for at least 20 seconds, especially after blowing your nose, coughing, or sneezing; going to the bathroom; and before eating or preparing food.  6. Cover your mouth and nose with a tissue when you cough or sneeze. Throw used tissues in a lined trash can. Immediately wash your hands with soap and water for at least 20 seconds  7. High touch surfaces include counters, tabletops, doorknobs, bathroom fixtures, toilets, phones, keyboards, tablets, and bedside tables.  8. Avoid sharing dishes, drinking glasses, cups, eating utensils, towels, or bedding with other people or pets in your home. After using these items, they should be washed thoroughly with soap and water. You are strongly advised to seek prompt medical attention if your illness worsens or you develop new symptoms like fever or difficulty breathing. PLEASE CHECK YOUR OXYGEN LEVEL WITH OXYGEN SENSOR 3 times a day or WHEN YOU FEEL SHORT OF BREATH, PLEASE COME TO ED IF IT'S LESS THAN 93%.        SECONDARY DISCHARGE DIAGNOSES  Diagnosis: ESRD (end stage renal disease) on dialysis  Assessment and Plan of Treatment: You have a history of end stage renal disease due to hypertension, and are on dialysis via your left Arm AVF on Tues Thurs Sat. You had fevers during your last dialysis treatment and were sent to the hospital for further workup. No signs of bacterial infection/ opportunistic infections were found, and the fevers are likely from COVID. You did not require supplemental o2. You were restarted on dialysis. We held nephrotoxic medications such as NSAID pain killers, and were cautious about the use of IV contrast if such scans were needed.       Diagnosis: HIV (human immunodeficiency virus infection)  Assessment and Plan of Treatment: You have a history of HIV controlled on anti retroviral therapy. Your medications were continued, and your T cell panel showed CD4 count in 400's, and your viral load were low but detectable. You were evaluated for possible opporunistic infections but did not have any. You were continued on your home medication reigmen unchanged.    Diagnosis: HTN (hypertension)  Assessment and Plan of Treatment: You have high blood pressure, for which you have been continued on medications. It is important to continue to take your medications on time,  monitor your blood pressure at home, keep a log of your home readings and follow up with your Primary Care Physician. As per AHA/ACC guidelines, it is important to adhere to a DASH Diet of fresh fruits, vegetables, lean meats such as poultry and fish, and whole wheat carbs. 30 minutes of aerobic exercise per day 3-4 times a week, reducing salt intake <1.5g/day, and cutting down on highly processed foods are also shown to reduce BP. Uncontrolled BP may result in organ damage to your eyes, brain, heart, and kidneys by causing strokes, heart attacks, kidney failure, and bleeds in your eye. CONTINUE METOPROLOL 100MG BD, and AMLODIPINE 10mg DAILY.       Diagnosis: Hyperlipidemia  Assessment and Plan of Treatment: You have a history of high blood fats and were on two medications at home including a STATIN. You were continued on the statin during this hospital admission. Your blood tests showed well controlled lipid levels in your body. You are advised to continue taking this medication daily and seek medical attention if you have intense muscle aches, or reddish discoloration of the urine.       PRINCIPAL DISCHARGE DIAGNOSIS  Diagnosis: COVID-19  Assessment and Plan of Treatment: You were diagnosed with COVID19 infection. CT chest showed ground glass opacities compatible with COVID19 infection. You did not required Decadron, Remdesivir or Oxygen supplementation.   Based on your current clinical status and stability, it has been determined that you no longer need hospitalization and can recover while remaining in self-quarantine at home. You should follow the prevention steps below:  1. You should restrict activities outside your home, except for getting medical care.   2. Do not go to work, school, or public areas.   3. Avoid using public transportation, ride-sharing, or taxis.   4. Separate yourself from other people and animals in your home as much as possible.  When you are around other people (e.g., sharing a room or vehicle) you should wear a facemask.  5. Wash your hands often with soap and water for at least 20 seconds, especially after blowing your nose, coughing, or sneezing; going to the bathroom; and before eating or preparing food.  6. Cover your mouth and nose with a tissue when you cough or sneeze. Throw used tissues in a lined trash can. Immediately wash your hands with soap and water for at least 20 seconds  7. High touch surfaces include counters, tabletops, doorknobs, bathroom fixtures, toilets, phones, keyboards, tablets, and bedside tables.  8. Avoid sharing dishes, drinking glasses, cups, eating utensils, towels, or bedding with other people or pets in your home. After using these items, they should be washed thoroughly with soap and water. You are strongly advised to seek prompt medical attention if your illness worsens or you develop new symptoms like fever or difficulty breathing. PLEASE CHECK YOUR OXYGEN LEVEL USING OXYGEN SENSOR 3 times a day or WHEN YOU FEEL SHORT OF BREATH. PLEASE COME TO ED IF OXYGEN IS LESS THAN 93%.        SECONDARY DISCHARGE DIAGNOSES  Diagnosis: ESRD (end stage renal disease) on dialysis  Assessment and Plan of Treatment: You have a history of end stage renal disease due to hypertension and are on dialysis via your left Arm AVF on Tues Thurs Sat. You had fever during your last dialysis treatment and were sent to the hospital for further workup. No signs of bacterial infection/ opportunistic infections were found, and the fevers are likely secondary to COVID19 infection. You did not require supplemental oxygen. You were restarted on dialysis. We held nephrotoxic medications such as NSAID pain killers, and were cautious about the use of IV contrast if such scans were needed. Please continue with your dialysis and follow up with your nephrologist in a week from discharge.       Diagnosis: HIV (human immunodeficiency virus infection)  Assessment and Plan of Treatment: You have a history of HIV controlled with anti retroviral therapy. Your medications were continued and your T cell panel showed CD4 count in 400's and your viral load were low but detectable. You were evaluated for possible opportunistic infections but did not have any. You were continued on your home medication reigmen unchanged. Please follow up with your PCP in a week from discharge.    Diagnosis: HTN (hypertension)  Assessment and Plan of Treatment: You have history of high blood pressure, for which you have been continued on medications. It is important to continue to take your medications on time,  monitor your blood pressure at home, keep a log of your home readings and follow up with your Primary Care Physician. As per AHA/ACC guidelines, it is important to adhere to a Recommend the DASH Diet. This diet emphasizes vegetables, fruits, and fat-free or low-fat dairy products.  Includes whole grains, fish, poultry, beans, seeds, nuts, and vegetable oils. Please limit sodium, sweets, sugary beverages, and red meat Diet of fresh fruits, vegetables, lean meats such as poultry and fish, and whole wheat carbs. 30 minutes of aerobic exercise per day 3-4 times a week, reducing salt intake <1.5g/day, and cutting down on highly processed foods are also shown to reduce BP. Uncontrolled BP may result in organ damage to your eyes, brain, heart, and kidneys by causing strokes, heart attacks, kidney failure, and bleeds in your eye. CONTINUE METOPROLOL and AMLODIPINE.    Diagnosis: Hyperlipidemia  Assessment and Plan of Treatment: You have a history of high blood fats and were on two medications at home including a STATIN. You were continued on the statin during this hospital admission. Your blood tests showed well controlled lipid levels in your body. You are advised to continue taking this medication daily and seek medical attention if you have intense muscle aches, or reddish discoloration of the urine. Please follow up with your PCP in a week from discharge.        PRINCIPAL DISCHARGE DIAGNOSIS  Diagnosis: COVID-19  Assessment and Plan of Treatment: You were diagnosed with COVID19 infection. CT chest showed ground glass opacities compatible with COVID19 infection. You did not required Decadron, Remdesivir or Oxygen supplementation.   Based on your current clinical status and stability, it has been determined that you no longer need hospitalization and can recover while remaining in self-quarantine at home. You should follow the prevention steps below:  1. You should restrict activities outside your home, except for getting medical care.   2. Do not go to work, school, or public areas.   3. Avoid using public transportation, ride-sharing, or taxis.   4. Separate yourself from other people and animals in your home as much as possible.  When you are around other people (e.g., sharing a room or vehicle) you should wear a facemask.  5. Wash your hands often with soap and water for at least 20 seconds, especially after blowing your nose, coughing, or sneezing; going to the bathroom; and before eating or preparing food.  6. Cover your mouth and nose with a tissue when you cough or sneeze. Throw used tissues in a lined trash can. Immediately wash your hands with soap and water for at least 20 seconds  7. High touch surfaces include counters, tabletops, doorknobs, bathroom fixtures, toilets, phones, keyboards, tablets, and bedside tables.  8. Avoid sharing dishes, drinking glasses, cups, eating utensils, towels, or bedding with other people or pets in your home. After using these items, they should be washed thoroughly with soap and water. You are strongly advised to seek prompt medical attention if your illness worsens or you develop new symptoms like fever or difficulty breathing. PLEASE TAKE ASPIRIN 81 MG DAILY AND CHECK YOUR OXYGEN LEVEL USING OXYGEN SENSOR 3 times a day or WHEN YOU FEEL SHORT OF BREATH. PLEASE COME TO EMERGENCY ROOM IF OXYGEN IS LESS THAN 93%.        SECONDARY DISCHARGE DIAGNOSES  Diagnosis: ESRD (end stage renal disease) on dialysis  Assessment and Plan of Treatment: You have a history of end stage renal disease due to hypertension and are on dialysis via your left Arm AVF on Tues Thurs Sat. You had fever during your last dialysis treatment and were sent to the hospital for further workup. No signs of bacterial infection/ opportunistic infections were found, and the fevers are likely secondary to COVID19 infection. You did not require supplemental oxygen. You were restarted on dialysis. We held nephrotoxic medications such as NSAID pain killers, and were cautious about the use of IV contrast if such scans were needed. Please continue with your dialysis and follow up with your Nephrologist in a week from discharge.       Diagnosis: HIV (human immunodeficiency virus infection)  Assessment and Plan of Treatment: You have a history of HIV controlled with anti retroviral therapy. Your medications were continued and your T cell panel showed CD4 count in 400's and your viral load were low but detectable. You were evaluated for possible opportunistic infections but did not have any. You were continued on your home medication reigmen UNCHANGED. Please follow up with your PCP in a week from discharge.    Diagnosis: HTN (hypertension)  Assessment and Plan of Treatment: You have history of high blood pressure, for which you have been continued on medications. It is important to continue to take your medications on time, monitor your blood pressure at home, keep a log of your home readings and follow up with your Primary Care Physician. As per AHA/ACC guidelines, it is important to adhere to a Recommend the DASH Diet. This diet emphasizes vegetables, fruits, and fat-free or low-fat dairy products.  Includes whole grains, fish, poultry, beans, seeds, nuts, and vegetable oils. Please limit sodium, sweets, sugary beverages, and red meat Diet of fresh fruits, vegetables, lean meats such as poultry and fish, and whole wheat carbs. 30 minutes of aerobic exercise per day 3-4 times a week, reducing salt intake <1.5g/day, and cutting down on highly processed foods are also shown to reduce BP. Uncontrolled BP may result in organ damage to your eyes, brain, heart, and kidneys by causing strokes, heart attacks, kidney failure, and bleeds in your eye. Please CONTINUE taking METOPROLOL and AMLODIPINE ONLY, as your blood pressure has been well controlled. Follow up with your PCP and Nephrologist in a week from discharge to adjust medications as needed.    Diagnosis: Hyperlipidemia  Assessment and Plan of Treatment: You have a history of high blood fats and were on two medications at home including a OMEGA 3 and STATIN. You were continued on the statin during this hospital admission. Your blood tests showed well controlled lipid levels in your body. You are advised to continue taking your home medications and seek medical attention if you have intense muscle aches, or reddish discoloration of the urine. Please follow up with your PCP in a week from discharge.

## 2021-01-24 NOTE — DISCHARGE NOTE PROVIDER - CARE PROVIDER_API CALL
Cathy Lowry)  [facility] Obstetrics and Gynecology  [address] [place] [address], [address]  [place] [address]  Phone: [phone]  Fax: [phone]  Follow Up Time: 1 week

## 2021-01-24 NOTE — PROGRESS NOTE ADULT - SUBJECTIVE AND OBJECTIVE BOX
65 F from home PMH HTN, DM2, HIV on ART, ESRD MWF (recently switched to T [facility] since COVID +) w/ L AV Fistula (Vascular Surgeon Dr Dickens) BIBZANDER from dialysis center for AV fistula malfunction admitted for hemodialysis.

## 2021-01-24 NOTE — DISCHARGE NOTE PROVIDER - HOSPITAL COURSE
65F, lives home, with visiting nurse, PMHx of HTN, T2DM, HIV on ART, ESRD (recently switched to TTS since COVID +) w/ L AV Fistula (Vascular Surgeon Dr Dickens) p/w fevers/ chills and weakness during her dialysis session on 1/21/21. Patient reports that she was able to complete the duration of her dialysis, but spiked a high fever with chills, and was sent directly from the dialysis center to the ED. She tested positive for COVID on 1/19, is saturating well on room air, and has had associated cough, chills, diarrhea (improving). She denied chest pain, nausea, dizziness, or  vomiting. Of note has had HIV for 16 years, and has been on 4 medications since then, but reports that her viral levels had been detectable for years; she has not had any opportunistic infections that she can remember, and is not on PPx medications. She received dialysis on 1/23 AM, and supportive measures for COVID infection and diarrhea. Blood cultures negative till date. She was evaluated by nephrology inpatient, L AVF found to be patent and functioning; vascular consult opted for no intervention.     She had good insight about her health and made her own health related decisions.     Given patient's improved clinical status and current hemodynamic stability, decision was made to discharge after discussing with attending physician.   Please refer to patient's complete medical chart with documents for a full hospital course, for this is only a brief summary. 66 yo F from home with PMH HTN, DM2, HIV on ART, ESRD MWF (recently switched to T TH S since COVID19 +) w/ L AV Fistula (Vascular Surgeon Dr Andressa TAVERAS from dialysis center for AV fistula malfunction admitted for hemodialysis and COVID19 infection.  Patient on HD, nephro on board, seen by vascular no intervention required at this time.  On admission, patient with hyperkalemia, EKG NSR, s/p Lokelma, dextrose and insulin, now hypokalemia being closely monitored replaced by mouth.  Patient with COVID19 Infection no Remdesivir or Decadron indicated at this time, not requiring O2 supplementation, febrile, CT chest showed GGO, doppler of LE negative for PE, diarrhea non bloody most likely 2/2 viral infection. Patient also with HIV on LEA meds, hepatitis panel negative, blood cx negative.   Diarrhea and fever resolved.   For hypertension, patient on Metoprolol, for hyperlipidemia on Statin, lipid profile elevated TG, cholesterol and LDL.     Given patient's improved clinical status and current hemodynamic stability, decision was made to discharge after discussing with attending physician.   Please refer to patient's complete medical chart with documents for a full hospital course, for this is only a brief summary. 66 yo F from home with PMH HTN, DM2, HIV on ART, ESRD MWF (recently switched to T TH S since COVID19 +) w/ L AV Fistula (Vascular Surgeon Dr Andressa TAVERAS from dialysis center for AV fistula malfunction admitted for hemodialysis and COVID19 infection.  Patient on HD, nephro on board, seen by vascular no intervention required at this time.  On admission, patient with hyperkalemia, EKG NSR, s/p Lokelma, dextrose and insulin, electrolytes closely monitored and replaced as needed.  Patient with COVID19 Infection, COVID19 markers trending down, no Remdesivir or Decadron indicated at this time, not requiring O2 supplementation, febrile, CT chest showed GGO, doppler of LE negative for PE, diarrhea non bloody most likely 2/2 viral infection. Patient also with HIV on ART meds, hepatitis panel negative, blood cx negative.  Diarrhea and fever resolved.   For hypertension, patient on Metoprolol, for hyperlipidemia on Statin, lipid profile elevated TG, cholesterol and LDL.     Given patient's improved clinical status and current hemodynamic stability, decision was made to discharge after discussing with attending physician.   Please refer to patient's complete medical chart with documents for a full hospital course, for this is only a brief summary. 66 yo F from home with PMH HTN, DM2, HIV on ART, ESRD MWF (recently switched to T TH S since COVID19 +) w/ L AV Fistula (Vascular Surgeon Dr Andressa TAVERAS from dialysis center for AV fistula malfunction admitted for hemodialysis and COVID19 infection.  Patient on HD, nephro on board, seen by vascular no intervention required at this time.  On admission, patient with hyperkalemia, EKG NSR, s/p Lokelma, dextrose and insulin, electrolytes closely monitored and replaced as needed.  Patient with COVID19 Infection, COVID19 markers trending down, no Remdesivir or Decadron indicated at this time, not requiring O2 supplementation, febrile, CT chest showed GGO, doppler of LE negative for PE, diarrhea non bloody most likely 2/2 viral infection. Patient also with HIV on ART meds, hepatitis panel negative, blood cx negative.  Diarrhea and fever resolved.   For hypertension, patient on Metoprolol and amlodipine ONLY, for hyperlipidemia, lipid profile elevated TG, cholesterol and LDL, on Omega 3 and Statin  For HIV continued on home meds.     Given patient's improved clinical status and current hemodynamic stability, decision was made to discharge after discussing with attending physician.   Please refer to patient's complete medical chart with documents for a full hospital course, for this is only a brief summary.

## 2021-01-24 NOTE — PROGRESS NOTE ADULT - SUBJECTIVE AND OBJECTIVE BOX
Problem List:  ESRD  COVID 19   HIV on HAART therapy      PAST MEDICAL & SURGICAL HISTORY:  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    DVT, lower extremity  Left leg after hysterectomy    Cervical cancer  2010    Depression    History of gastroesophageal reflux (GERD)    Gout    Hyperlipidemia    Chronic kidney disease    HTN (hypertension)    HIV (human immunodeficiency virus infection)    S/P arteriovenous (AV) fistula creation  july 10 2020    H/O: hysterectomy  Due to cervical cancer    History of ectopic pregnancy  Two        oxycodone (Rash)  penicillins (Urticaria; Rash)      MEDICATIONS  (STANDING):  allopurinol 100 milliGRAM(s) Oral daily  amLODIPine   Tablet 10 milliGRAM(s) Oral daily  ascorbic acid 500 milliGRAM(s) Oral daily  aspirin enteric coated 81 milliGRAM(s) Oral daily  atorvastatin 20 milliGRAM(s) Oral at bedtime  calcitriol   Capsule 0.25 MICROGram(s) Oral daily  cholecalciferol 2000 Unit(s) Oral daily  darunavir 800 mG/cobicstat 150 mG 1 Tablet(s) Oral daily  heparin   Injectable 5000 Unit(s) SubCutaneous every 12 hours  influenza   Vaccine 0.5 milliLiter(s) IntraMuscular once  lamiVUDine- milliGRAM(s) Oral daily  melatonin 5 milliGRAM(s) Oral at bedtime  metoprolol tartrate 100 milliGRAM(s) Oral two times a day  Nephro-alex 1 Tablet(s) Oral daily  senna 2 Tablet(s) Oral at bedtime  zinc sulfate 220 milliGRAM(s) Oral daily    MEDICATIONS  (PRN):  acetaminophen   Tablet .. 650 milliGRAM(s) Oral every 6 hours PRN Temp greater or equal to 38C (100.4F)  gabapentin 100 milliGRAM(s) Oral daily PRN pain  guaiFENesin   Syrup  (Sugar-Free) 100 milliGRAM(s) Oral every 6 hours PRN Cough                            12.5   9.46  )-----------( 181      ( 24 Jan 2021 07:18 )             36.8     01-24    133<L>  |  97  |  26<H>  ----------------------------<  89  3.7   |  25  |  6.16<H>    Ca    9.4      24 Jan 2021 07:18  Phos  2.2     01-24  Mg     2.1     01-24              REVIEW OF SYSTEMS:  General: no fever no chills, no weight loss.  EYES/ENT: No visual changes;  No vertigo, no headache.  NECK: No pain or stiffness  RESPIRATORY: No cough, wheezing, hemoptysis; No shortness of breath  CARDIOVASCULAR: No chest pain or palpitations. No Edema  GASTROINTESTINAL: No abdominal or epigastric pain. No nausea, vomiting. No diarrhea or constipation. No melena.  GENITOURINARY: No dysuria, frequency, foamy urine, urinary urgency, incontinence or hematuria  NEUROLOGICAL: No numbness or weakness, no tremor , no dizziness.   Muscle skeletal : no joint pain and no swelling of joints and limbs.  SKIN: No itching, burning, rashes.        VITALS:  T(F): 101.1 (01-24-21 @ 11:17), Max: 102.6 (01-23-21 @ 16:13)  HR: 80 (01-24-21 @ 11:17)  BP: 119/54 (01-24-21 @ 11:17)  RR: 19 (01-24-21 @ 11:17)  SpO2: 96% (01-24-21 @ 11:17)  Wt(kg): --    01-23 @ 07:01  -  01-24 @ 07:00  --------------------------------------------------------  IN: 820 mL / OUT: 1600 mL / NET: -780 mL        PHYSICAL EXAM:  Constitutional: well developed, no diaphoresis, no distress.  Neck: No JVD, no carotid bruit, supple, no adenopathy  Respiratory: Good air entrance B/L, no wheezes, rales or rhonchi  Cardiovascular: S1, S2, RRR, no pericardial rub, no murmur  Abdomen: BS+, soft, no tenderness, no bruit  Pelvis: bladder nondistended  Extremities: No cyanosis or clubbing. No peripheral edema.   Pulses: All present  Neurological: A/O x 3, no focal deficits  Psychiatric: Normal mood, normal affect  Skin: No rashes  Vascular Access:         Problem List:  ESRD  COVID 19   HIV on HAART therapy      PAST MEDICAL & SURGICAL HISTORY:  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    DVT, lower extremity  Left leg after hysterectomy    Cervical cancer  2010    Depression    History of gastroesophageal reflux (GERD)    Gout    Hyperlipidemia    Chronic kidney disease    HTN (hypertension)    HIV (human immunodeficiency virus infection)    S/P arteriovenous (AV) fistula creation  july 10 2020    H/O: hysterectomy  Due to cervical cancer    History of ectopic pregnancy  Two        oxycodone (Rash)  penicillins (Urticaria; Rash)      MEDICATIONS  (STANDING):  allopurinol 100 milliGRAM(s) Oral daily  amLODIPine   Tablet 10 milliGRAM(s) Oral daily  ascorbic acid 500 milliGRAM(s) Oral daily  aspirin enteric coated 81 milliGRAM(s) Oral daily  atorvastatin 20 milliGRAM(s) Oral at bedtime  calcitriol   Capsule 0.25 MICROGram(s) Oral daily  cholecalciferol 2000 Unit(s) Oral daily  darunavir 800 mG/cobicstat 150 mG 1 Tablet(s) Oral daily  heparin   Injectable 5000 Unit(s) SubCutaneous every 12 hours  influenza   Vaccine 0.5 milliLiter(s) IntraMuscular once  lamiVUDine- milliGRAM(s) Oral daily  melatonin 5 milliGRAM(s) Oral at bedtime  metoprolol tartrate 100 milliGRAM(s) Oral two times a day  Nephro-alex 1 Tablet(s) Oral daily  senna 2 Tablet(s) Oral at bedtime  zinc sulfate 220 milliGRAM(s) Oral daily    MEDICATIONS  (PRN):  acetaminophen   Tablet .. 650 milliGRAM(s) Oral every 6 hours PRN Temp greater or equal to 38C (100.4F)  gabapentin 100 milliGRAM(s) Oral daily PRN pain  guaiFENesin   Syrup  (Sugar-Free) 100 milliGRAM(s) Oral every 6 hours PRN Cough                            12.5   9.46  )-----------( 181      ( 24 Jan 2021 07:18 )             36.8     01-24    133<L>  |  97  |  26<H>  ----------------------------<  89  3.7   |  25  |  6.16<H>    Ca    9.4      24 Jan 2021 07:18  Phos  2.2     01-24  Mg     2.1     01-24          REVIEW OF SYSTEMS:  General: c/o fever, chiils and weakness  Diarrhea still there but reduced  reduced appetite  Cough with mucous flame.  GASTROINTESTINAL: No abdominal or epigastric pain. No nausea, vomiting. No diarrhea or constipation. No melena.  GENITOURINARY: No dysuria, frequency, foamy urine, urinary urgency, incontinence or hematuria          VITALS:  T(F): 101.1 (01-24-21 @ 11:17), Max: 102.6 (01-23-21 @ 16:13)  HR: 80 (01-24-21 @ 11:17)  BP: 119/54 (01-24-21 @ 11:17)  RR: 19 (01-24-21 @ 11:17)  SpO2: 96% (01-24-21 @ 11:17)  Wt(kg): --    01-23 @ 07:01  -  01-24 @ 07:00  --------------------------------------------------------  IN: 820 mL / OUT: 1600 mL / NET: -780 mL        PHYSICAL EXAM:  Constitutional: well developed, no diaphoresis, no distress.  Neck: No JVD, no carotid bruit, supple, no adenopathy  Respiratory: Good air entrance B/L, no wheezes, rales or rhonchi  Cardiovascular: S1, S2, RRR, no pericardial rub, no murmur  Abdomen: BS+, soft, no tenderness, no bruit  Pelvis: bladder nondistended  Extremities: No cyanosis or clubbing. No peripheral edema.   Pulses: All present  Neurological: A/O x 3, no focal deficits    Vascular Access: left upper arm aAV with brtui and thrill

## 2021-01-24 NOTE — PROGRESS NOTE ADULT - ASSESSMENT
ESRD  COVID 19 , diarrhea and fever  Bilateral pneumonia , Pulse o2 stable.    Last dialysis Yesterday  HTN stable.

## 2021-01-24 NOTE — PROGRESS NOTE ADULT - PROBLEM SELECTOR PLAN 2
isolation precautions  cont meds  oxygen supp prn  monitor oxygen sat  monitor labs  cont meds  Repeat Covid-19 PCR

## 2021-01-24 NOTE — DISCHARGE NOTE PROVIDER - NSDCMRMEDTOKEN_GEN_ALL_CORE_FT
allopurinol 100 mg oral tablet: 1 tab(s) orally once a day  amLODIPine 10 mg oral tablet: 1 tab(s) orally once a day  Aspirin Enteric Coated 81 mg oral delayed release tablet: 1 tab(s) orally once a day  calcitriol 0.25 mcg oral capsule: 1 cap(s) orally once a day  furosemide 20 mg oral tablet: 1 tab(s) orally once a day  gabapentin 100 mg oral capsule: 1 cap(s) orally , As Needed  hydrALAZINE 50 mg oral tablet: 1 tab(s) orally 3 times a day  lamiVUDine HBV: 100 milligram(s) orally once a day  losartan 50 mg oral tablet: 1 tab(s) orally once a day (at bedtime)  Metoprolol Tartrate 100 mg oral tablet: 1 tab(s) orally 2 times a day  Omega-3 1000 mg oral capsule: 4 cap(s) orally once a day  PhosLo 667 mg oral capsule: 3 cap(s) orally 3 times a day (with meals)  pravastatin 80 mg oral tablet: 1 tab(s) orally once a day  Prezcobix 800 mg-150 mg oral tablet: 1 tab(s) orally once a day  Renal Caps oral capsule: 1 cap(s) orally once a day  sodium bicarbonate 650 mg oral tablet: 1 tab(s) orally 2 times a day  Tivicay 50 mg oral tablet: 1 tab(s) orally once a day  traMADol 50 mg oral tablet: 1 tab(s) orally every 6 hours, As Needed for severe pain, alternate with tylenol MDD:4  traZODone 50 mg oral tablet: 1 tab(s) orally once a day (at bedtime)  Vitamin B-100 oral tablet: 1 tab(s) orally once a day  Vitamin D2 50,000 intl units (1.25 mg) oral capsule: 1 cap(s) orally once a week   allopurinol 100 mg oral tablet: 1 tab(s) orally once a day  amLODIPine 10 mg oral tablet: 1 tab(s) orally once a day  ascorbic acid 500 mg oral tablet: 1 tab(s) orally once a day  Aspirin Enteric Coated 81 mg oral delayed release tablet: 1 tab(s) orally once a day  calcitriol 0.25 mcg oral capsule: 1 cap(s) orally once a day  cholecalciferol oral tablet: 1 tab(s) orally once a day   citalopram 20 mg oral tablet: 1 tab(s) orally once a day  ferrous sulfate 200 mg/5 mL oral elixir:   gabapentin 100 mg oral capsule: 1 cap(s) orally , As Needed  lamiVUDine HBV: 100 milligram(s) orally once a day  Metoprolol Tartrate 100 mg oral tablet: 1 tab(s) orally 2 times a day  Omega-3 1000 mg oral capsule: 1 cap(s) orally once a day  PhosLo 667 mg oral capsule: 3 cap(s) orally 3 times a day (with meals)  pravastatin 80 mg oral tablet: 1 tab(s) orally once a day  Prezcobix 800 mg-150 mg oral tablet: 1 tab(s) orally once a day  Renal Caps oral capsule: 1 cap(s) orally once a day  senna oral tablet: 2 tab(s) orally once a day (at bedtime)  sodium bicarbonate 650 mg oral tablet: 1 tab(s) orally 2 times a day  Tivicay 50 mg oral tablet: 1 tab(s) orally once a day  traZODone 50 mg oral tablet: 1 tab(s) orally once a day (at bedtime)  Vitamin B-100 oral tablet: 1 tab(s) orally once a day

## 2021-01-24 NOTE — PROGRESS NOTE ADULT - SUBJECTIVE AND OBJECTIVE BOX
Patient is a 65y old  Female who presents with a chief complaint of AV fistula malfunction (23 Jan 2021 11:40)  Awake, alert, comfortable in bed in NAD. No cough or sob  INTERVAL HPI/OVERNIGHT EVENTS:      VITAL SIGNS:  T(F): 101.9 (01-24-21 @ 08:28)  HR: 78 (01-24-21 @ 08:28)  BP: 105/60 (01-24-21 @ 08:28)  RR: 18 (01-24-21 @ 08:28)  SpO2: 97% (01-24-21 @ 08:28)  Wt(kg): --  I&O's Detail    23 Jan 2021 07:01  -  24 Jan 2021 07:00  --------------------------------------------------------  IN:    Oral Fluid: 220 mL    Other (mL): 600 mL  Total IN: 820 mL    OUT:    Other (mL): 1600 mL  Total OUT: 1600 mL    Total NET: -780 mL      SARS-CoV-2: Detected: This Respiratory Panel uses polymerase chain reaction (PCR) to detect for   adenovirus; coronavirus (HKU1, NL63, 229E, OC43); human metapneumovirus   (hMPV); human enterovirus/rhinovirus (Entero/RV); influenza A; influenza   A/H1; influenza A/H3; influenza A/H1-2009; influenza B; parainfluenza   viruses 1, 2, 3, 4; respiratory syncytial virus; Mycoplasma pneumoniae;   Chlamydophila pneumoniae; and SARS-CoV-2. (01.22.21 @ 02:44) COVID-19 IgG Antibody Index: 0.25: Roche ECLIA Total AB ([facility])   NOTE: This result index represents a total antibody measurement, which   includes IgG, IgA, and IgM   Measures Nucleocapsid   Negative <= 0.99 Index   Positive >= 1.00 Index Index (01.22.21 @ 10:44)         REVIEW OF SYSTEMS:    CONSTITUTIONAL:  No fevers, chills, sweats    HEENT:  Eyes:  No diplopia or blurred vision. ENT:  No earache, sore throat or runny nose.    CARDIOVASCULAR:  No pressure, squeezing, tightness, or heaviness about the chest; no palpitations.    RESPIRATORY:  Per HPI    GASTROINTESTINAL:  No abdominal pain, nausea, vomiting or diarrhea.    GENITOURINARY:  No dysuria, frequency or urgency.    NEUROLOGIC:  No paresthesias, fasciculations, seizures or weakness.    PSYCHIATRIC:  No disorder of thought or mood.      PHYSICAL EXAM:    Constitutional: Well developed and nourished  Eyes:Perrla  ENMT: normal  Neck:supple  Respiratory: good air entry  Cardiovascular: S1 S2 regular  Gastrointestinal: Soft, Non tender  Extremities: No edema  Vascular:normal  Neurological:Awake, alert,Ox3  Musculoskeletal:Normal      MEDICATIONS  (STANDING):  allopurinol 100 milliGRAM(s) Oral daily  amLODIPine   Tablet 10 milliGRAM(s) Oral daily  ascorbic acid 500 milliGRAM(s) Oral daily  aspirin enteric coated 81 milliGRAM(s) Oral daily  atorvastatin 20 milliGRAM(s) Oral at bedtime  calcitriol   Capsule 0.25 MICROGram(s) Oral daily  cholecalciferol 2000 Unit(s) Oral daily  darunavir 800 mG/cobicstat 150 mG 1 Tablet(s) Oral daily  heparin   Injectable 5000 Unit(s) SubCutaneous every 12 hours  influenza   Vaccine 0.5 milliLiter(s) IntraMuscular once  lamiVUDine- milliGRAM(s) Oral daily  melatonin 5 milliGRAM(s) Oral at bedtime  metoprolol tartrate 100 milliGRAM(s) Oral two times a day  Nephro-alex 1 Tablet(s) Oral daily  senna 2 Tablet(s) Oral at bedtime  zinc sulfate 220 milliGRAM(s) Oral daily    MEDICATIONS  (PRN):  acetaminophen   Tablet .. 650 milliGRAM(s) Oral every 6 hours PRN Temp greater or equal to 38C (100.4F)  gabapentin 100 milliGRAM(s) Oral daily PRN pain  guaiFENesin   Syrup  (Sugar-Free) 100 milliGRAM(s) Oral every 6 hours PRN Cough      Allergies    oxycodone (Rash)  penicillins (Urticaria; Rash)    Intolerances        LABS:                        12.5   9.46  )-----------( 181      ( 24 Jan 2021 07:18 )             36.8     01-24    133<L>  |  97  |  26<H>  ----------------------------<  89  3.7   |  25  |  6.16<H>    Ca    9.4      24 Jan 2021 07:18  Phos  2.2     01-24  Mg     2.1     01-24                CAPILLARY BLOOD GLUCOSE        pro-bnp -- 01-22 @ 06:00     d-dimer 667  01-22 @ 06:00      RADIOLOGY & ADDITIONAL TESTS:    CXR:    Ct scan chest:    ekg;    echo:

## 2021-01-24 NOTE — PROGRESS NOTE ADULT - ASSESSMENT
Medicine Progress Note    Patient is a 65y old  Female who presents with a chief complaint of AV fistula malfunction (24 Jan 2021 12:52)      SUBJECTIVE / OVERNIGHT EVENTS: + diarrhea and fever    ADDITIONAL REVIEW OF SYSTEMS: all other systems are reviewed and 12 point ROS is negative except as noted      MEDICATIONS  (STANDING):  allopurinol 100 milliGRAM(s) Oral daily  amLODIPine   Tablet 10 milliGRAM(s) Oral daily  ascorbic acid 500 milliGRAM(s) Oral daily  aspirin enteric coated 81 milliGRAM(s) Oral daily  atorvastatin 20 milliGRAM(s) Oral at bedtime  calcitriol   Capsule 0.25 MICROGram(s) Oral daily  cholecalciferol 2000 Unit(s) Oral daily  darunavir 800 mG/cobicstat 150 mG 1 Tablet(s) Oral daily  heparin   Injectable 5000 Unit(s) SubCutaneous every 8 hours  influenza   Vaccine 0.5 milliLiter(s) IntraMuscular once  lamiVUDine- milliGRAM(s) Oral daily  melatonin 5 milliGRAM(s) Oral at bedtime  metoprolol tartrate 100 milliGRAM(s) Oral two times a day  Nephro-alex 1 Tablet(s) Oral daily  senna 2 Tablet(s) Oral at bedtime  zinc sulfate 220 milliGRAM(s) Oral daily    MEDICATIONS  (PRN):  acetaminophen   Tablet .. 650 milliGRAM(s) Oral every 6 hours PRN Temp greater or equal to 38C (100.4F)  gabapentin 100 milliGRAM(s) Oral daily PRN pain  guaiFENesin   Syrup  (Sugar-Free) 100 milliGRAM(s) Oral every 6 hours PRN Cough    CAPILLARY BLOOD GLUCOSE        I&O's Summary    23 Jan 2021 07:01  -  24 Jan 2021 07:00  --------------------------------------------------------  IN: 820 mL / OUT: 1600 mL / NET: -780 mL        PHYSICAL EXAM:  Vital Signs Last 24 Hrs  T(C): 38.4 (24 Jan 2021 11:17), Max: 39.2 (23 Jan 2021 16:13)  T(F): 101.1 (24 Jan 2021 11:17), Max: 102.6 (23 Jan 2021 16:13)  HR: 80 (24 Jan 2021 11:17) (78 - 89)  BP: 119/54 (24 Jan 2021 11:17) (98/53 - 123/90)  BP(mean): --  RR: 19 (24 Jan 2021 11:17) (18 - 19)  SpO2: 96% (24 Jan 2021 11:17) (93% - 98%)  CONSTITUTIONAL: NAD, well-developed, well-groomed  ENMT: Moist oral mucosa, no pharyngeal injection or exudates; normal dentition  RESPIRATORY: BLAE+, mild diffuse faint crackles  CARDIOVASCULAR: Regular rate and rhythm, normal S1 and S2, no murmur/rub/gallop; No lower extremity edema; Peripheral pulses are 2+ bilaterally  ABDOMEN: Nontender to palpation, normoactive bowel sounds, no rebound/guarding; No hepatosplenomegaly  PSYCH: A+O to person, place, and time; affect appropriate  NEUROLOGY: CN 2-12 are intact and symmetric; no gross sensory deficits   SKIN: No rashes; no palpable lesions    LABS:                        12.5   9.46  )-----------( 181      ( 24 Jan 2021 07:18 )             36.8     01-24    133<L>  |  97  |  26<H>  ----------------------------<  89  3.7   |  25  |  6.16<H>    Ca    9.4      24 Jan 2021 07:18  Phos  2.2     01-24  Mg     2.1     01-24                Culture - Blood (collected 22 Jan 2021 10:25)  Source: .Blood Blood-Peripheral  Preliminary Report (23 Jan 2021 11:01):    No growth to date.    Culture - Blood (collected 22 Jan 2021 10:25)  Source: .Blood Blood-Peripheral  Preliminary Report (23 Jan 2021 11:01):    No growth to date.      SARS-CoV-2: Detected (22 Jan 2021 02:44)

## 2021-01-25 DIAGNOSIS — E87.8 OTHER DISORDERS OF ELECTROLYTE AND FLUID BALANCE, NOT ELSEWHERE CLASSIFIED: ICD-10-CM

## 2021-01-25 LAB
ALBUMIN SERPL ELPH-MCNC: 1.8 G/DL — LOW (ref 3.5–5)
ALP SERPL-CCNC: 76 U/L — SIGNIFICANT CHANGE UP (ref 40–120)
ALT FLD-CCNC: 26 U/L DA — SIGNIFICANT CHANGE UP (ref 10–60)
ANION GAP SERPL CALC-SCNC: 12 MMOL/L — SIGNIFICANT CHANGE UP (ref 5–17)
AST SERPL-CCNC: 38 U/L — SIGNIFICANT CHANGE UP (ref 10–40)
BILIRUB SERPL-MCNC: 0.4 MG/DL — SIGNIFICANT CHANGE UP (ref 0.2–1.2)
BUN SERPL-MCNC: 44 MG/DL — HIGH (ref 7–18)
CALCIUM SERPL-MCNC: 9 MG/DL — SIGNIFICANT CHANGE UP (ref 8.4–10.5)
CHLORIDE SERPL-SCNC: 93 MMOL/L — LOW (ref 96–108)
CO2 SERPL-SCNC: 29 MMOL/L — SIGNIFICANT CHANGE UP (ref 22–31)
CREAT SERPL-MCNC: 9.04 MG/DL — HIGH (ref 0.5–1.3)
GLUCOSE SERPL-MCNC: 137 MG/DL — HIGH (ref 70–99)
HCT VFR BLD CALC: 35.9 % — SIGNIFICANT CHANGE UP (ref 34.5–45)
HGB BLD-MCNC: 12.3 G/DL — SIGNIFICANT CHANGE UP (ref 11.5–15.5)
MAGNESIUM SERPL-MCNC: 2.3 MG/DL — SIGNIFICANT CHANGE UP (ref 1.6–2.6)
MCHC RBC-ENTMCNC: 32.1 PG — SIGNIFICANT CHANGE UP (ref 27–34)
MCHC RBC-ENTMCNC: 34.3 GM/DL — SIGNIFICANT CHANGE UP (ref 32–36)
MCV RBC AUTO: 93.7 FL — SIGNIFICANT CHANGE UP (ref 80–100)
NRBC # BLD: 0 /100 WBCS — SIGNIFICANT CHANGE UP (ref 0–0)
PHOSPHATE SERPL-MCNC: 2.6 MG/DL — SIGNIFICANT CHANGE UP (ref 2.5–4.5)
PLATELET # BLD AUTO: 208 K/UL — SIGNIFICANT CHANGE UP (ref 150–400)
POTASSIUM SERPL-MCNC: 3.4 MMOL/L — LOW (ref 3.5–5.3)
POTASSIUM SERPL-SCNC: 3.4 MMOL/L — LOW (ref 3.5–5.3)
PROT SERPL-MCNC: 7.2 G/DL — SIGNIFICANT CHANGE UP (ref 6–8.3)
RBC # BLD: 3.83 M/UL — SIGNIFICANT CHANGE UP (ref 3.8–5.2)
RBC # FLD: 14.9 % — HIGH (ref 10.3–14.5)
SODIUM SERPL-SCNC: 134 MMOL/L — LOW (ref 135–145)
WBC # BLD: 8.53 K/UL — SIGNIFICANT CHANGE UP (ref 3.8–10.5)
WBC # FLD AUTO: 8.53 K/UL — SIGNIFICANT CHANGE UP (ref 3.8–10.5)

## 2021-01-25 PROCEDURE — 99233 SBSQ HOSP IP/OBS HIGH 50: CPT | Mod: GC

## 2021-01-25 RX ADMIN — Medication 2000 UNIT(S): at 11:47

## 2021-01-25 RX ADMIN — Medication 100 MILLIGRAM(S): at 11:48

## 2021-01-25 RX ADMIN — Medication 81 MILLIGRAM(S): at 11:47

## 2021-01-25 RX ADMIN — HEPARIN SODIUM 5000 UNIT(S): 5000 INJECTION INTRAVENOUS; SUBCUTANEOUS at 15:58

## 2021-01-25 RX ADMIN — HEPARIN SODIUM 5000 UNIT(S): 5000 INJECTION INTRAVENOUS; SUBCUTANEOUS at 05:38

## 2021-01-25 RX ADMIN — Medication 650 MILLIGRAM(S): at 23:25

## 2021-01-25 RX ADMIN — Medication 5 MILLIGRAM(S): at 20:48

## 2021-01-25 RX ADMIN — DARUNAVIR ETHANOLATE AND COBICISTAT 1 TABLET(S): 800; 150 TABLET, FILM COATED ORAL at 11:47

## 2021-01-25 RX ADMIN — Medication 1 TABLET(S): at 11:48

## 2021-01-25 RX ADMIN — ZINC SULFATE TAB 220 MG (50 MG ZINC EQUIVALENT) 220 MILLIGRAM(S): 220 (50 ZN) TAB at 11:48

## 2021-01-25 RX ADMIN — Medication 650 MILLIGRAM(S): at 05:47

## 2021-01-25 RX ADMIN — ATORVASTATIN CALCIUM 20 MILLIGRAM(S): 80 TABLET, FILM COATED ORAL at 20:48

## 2021-01-25 RX ADMIN — HEPARIN SODIUM 5000 UNIT(S): 5000 INJECTION INTRAVENOUS; SUBCUTANEOUS at 20:48

## 2021-01-25 RX ADMIN — Medication 100 MILLIGRAM(S): at 05:47

## 2021-01-25 RX ADMIN — Medication 100 MILLIGRAM(S): at 18:12

## 2021-01-25 RX ADMIN — CALCITRIOL 0.25 MICROGRAM(S): 0.5 CAPSULE ORAL at 11:47

## 2021-01-25 RX ADMIN — Medication 500 MILLIGRAM(S): at 11:48

## 2021-01-25 NOTE — PROGRESS NOTE ADULT - ASSESSMENT
65 F from home PMH HTN, DM2, HIV on ART, ESRD MWF (recently switched to T [facility] S since COVID +) w/ L AV Fistula (Vascular Surgeon Dr Dickens) BIBZANDER from dialysis center for AV fistula malfunction admitted for hemodialysis.           65 F from home PMH HTN, DM2, HIV on ART, ESRD MWF (recently switched to T TH S since COVID +) w/ L AV Fistula (Vascular Surgeon Dr Dickens) BIBZANDER from dialysis center for AV fistula malfunction admitted for hemodialysis and COVID19 infection            64 yo F from home PMH HTN, DM2, HIV on LEA, ESRD MWF (recently switched to T TH S since COVID19 +) w/ L AV Fistula (Vascular Surgeon Dr Dickens) DARCY from dialysis center for AV fistula malfunction admitted for hemodialysis and COVID19 infection            64 yo F from home PMH HTN, DM2, HIV on ART, ESRD MWF (recently switched to T TH S since COVID19 +) w/ L AV Fistula (Vascular Surgeon Dr Dickens) DARCY from dialysis center for AV fistula malfunction admitted for hemodialysis and COVID19 infection

## 2021-01-25 NOTE — PROGRESS NOTE ADULT - PROBLEM SELECTOR PLAN 2
Resolved   - On admission: K+ was 6 -> given insulin, dextrose, lokelma   - Follow BMP - On admission: K+ was 6 -> s/p insulin, dextrose, Lokelma   - Patient now with hypokalemia, not symptomatic, will give one dose K 20 mg po  - Follow BMP and replace as needed - Patient with ESRD on HD   - Patient is spiking fevers most likely 2/2 COVID19 infection    - AVF has palpable thrill, no signs of inflammation   - Pt was supposed to have AVF revision on 1/21 which was postponed as she tested COVID19 on 1/19  - Vascular surgery opted for no intervention as HD access is patent   - Next HD planned for 1/26  - Nephro Dr Catalan

## 2021-01-25 NOTE — PROGRESS NOTE ADULT - SUBJECTIVE AND OBJECTIVE BOX
Pt is awake, alert, lying in bed in NAD. C/O fatigue and weakness. HD tomorrow.     INTERVAL HPI/OVERNIGHT EVENTS:      VITAL SIGNS:  T(F): 97.9 (01-25-21 @ 07:55)  HR: 77 (01-25-21 @ 07:55)  BP: 109/61 (01-25-21 @ 07:55)  RR: 18 (01-25-21 @ 07:55)  SpO2: 95% (01-25-21 @ 07:55)  Wt(kg): --  I&O's Detail          REVIEW OF SYSTEMS:    CONSTITUTIONAL:  No fevers, chills, sweats    HEENT:  Eyes:  No diplopia or blurred vision. ENT:  No earache, sore throat or runny nose.    CARDIOVASCULAR:  No pressure, squeezing, tightness, or heaviness about the chest; no palpitations.    RESPIRATORY:  Per HPI    GASTROINTESTINAL:  No abdominal pain, nausea, vomiting or diarrhea.    GENITOURINARY:  No dysuria, frequency or urgency.    NEUROLOGIC:  No paresthesias, fasciculations, seizures or weakness.    PSYCHIATRIC:  No disorder of thought or mood.      PHYSICAL EXAM:    Constitutional: Well developed and nourished  Eyes:Perrla  ENMT: normal  Neck:supple  Respiratory: good air entry  Cardiovascular: S1 S2 regular  Gastrointestinal: Soft, Non tender  Extremities: No edema  Vascular:normal  Neurological:Awake, alert,Ox3  Musculoskeletal:Normal      MEDICATIONS  (STANDING):  allopurinol 100 milliGRAM(s) Oral daily  amLODIPine   Tablet 10 milliGRAM(s) Oral daily  ascorbic acid 500 milliGRAM(s) Oral daily  aspirin enteric coated 81 milliGRAM(s) Oral daily  atorvastatin 20 milliGRAM(s) Oral at bedtime  calcitriol   Capsule 0.25 MICROGram(s) Oral daily  cholecalciferol 2000 Unit(s) Oral daily  darunavir 800 mG/cobicstat 150 mG 1 Tablet(s) Oral daily  heparin   Injectable 5000 Unit(s) SubCutaneous every 8 hours  influenza   Vaccine 0.5 milliLiter(s) IntraMuscular once  lamiVUDine- milliGRAM(s) Oral daily  melatonin 5 milliGRAM(s) Oral at bedtime  metoprolol tartrate 100 milliGRAM(s) Oral two times a day  Nephro-alex 1 Tablet(s) Oral daily  senna 2 Tablet(s) Oral at bedtime  zinc sulfate 220 milliGRAM(s) Oral daily    MEDICATIONS  (PRN):  acetaminophen   Tablet .. 650 milliGRAM(s) Oral every 6 hours PRN Temp greater or equal to 38C (100.4F)  gabapentin 100 milliGRAM(s) Oral daily PRN pain  guaiFENesin   Syrup  (Sugar-Free) 100 milliGRAM(s) Oral every 6 hours PRN Cough      Allergies    oxycodone (Rash)  penicillins (Urticaria; Rash)    Intolerances        LABS:                        12.3   8.53  )-----------( 208      ( 25 Jan 2021 07:29 )             35.9     01-24    133<L>  |  97  |  26<H>  ----------------------------<  89  3.7   |  25  |  6.16<H>    Ca    9.4      24 Jan 2021 07:18  Phos  2.2     01-24  Mg     2.1     01-24                CAPILLARY BLOOD GLUCOSE        pro-bnp -- 01-22 @ 06:00     d-dimer 667  01-22 @ 06:00      RADIOLOGY & ADDITIONAL TESTS:    CXR:    Ct scan chest:    ekg;    echo:

## 2021-01-25 NOTE — PROGRESS NOTE ADULT - PROBLEM SELECTOR PLAN 1
- HD 1/21 complete, and L AVF patent and functioning as per Dr. Catalan (Outpatient nephro) -> HD 1/23  - Will need to stop spiking fevers prior to outpt dialysis not possible   - AVF has palpable thrill, no signs of redness/ infection/ pain  - Pt was supposed to have AVF revision? 1/21 which was postponed as Cov +  - Vascular surgery opted for no intervention as HD access present   - Consulted Nephro Dr Catalan - Patient with ESRD on HD   - Patient is spiking fevers most likely 2/2 COVID19 infection    - AVF has palpable thrill, no signs of inflammation   - Pt was supposed to have AVF revision on 1/21 which was postponed as she tested COVID19 on 1/19  - Vascular surgery opted for no intervention as HD access is patent   - Next HD planned for 1/26  - Nephro Dr Catalan - COVID19 PCR + complaining of cough, malaise   - No supplemental O2 requirement   - No Decadron/ Remdesivir indicated at this time   - Tylenol, Robitussin, Albuterol PRN   - 1/22 Blood cultures NTD

## 2021-01-25 NOTE — PROGRESS NOTE ADULT - ASSESSMENT
ESRD  COVID 19 , diarrhea and fever  Bilateral pneumonia ,  o2 saturation 96-97%stable.      Next dialysis tomorrow.  HTN stable.

## 2021-01-25 NOTE — PROGRESS NOTE ADULT - PROBLEM SELECTOR PLAN 6
- Takes pravastatin at home, start atorvastatin 20mg qhs for now  - Lipid panel: , HDL 41, , total chol: 196 - Takes pravastatin at home, c/w atorvastatin 20mg qhs for now  - Lipid panel: , HDL 41, , total chol: 196

## 2021-01-25 NOTE — PROGRESS NOTE ADULT - PROBLEM SELECTOR PLAN 4
- Abs CD4 419, HIV viral load detectable but low (<30copies/ml)   - Restarted on home meds of darunavir-cobicistat, lamivudine-HBV  - No hx of opportunistic infections, PPx   - Recheck vaccination History, ensure flu/ PNA vaccine prior to D/c - Abs CD4 419, HIV viral load detectable but low (<30copies/ml)   - C/w home meds of darunavir-cobicistat, lamivudine-HBV  - No hx of opportunistic infections  - Recheck vaccination History

## 2021-01-25 NOTE — PROGRESS NOTE ADULT - PROBLEM SELECTOR PLAN 2
isolation precautions  cont meds  oxygen supp prn - currently on RA   monitor oxygen sat  monitor labs  cont meds  Repeat Covid-19 PCR

## 2021-01-25 NOTE — PROGRESS NOTE ADULT - PROBLEM SELECTOR PLAN 7
IMPROVE VTE Individual Risk Assessment  RISK                                                                Points  [X] Previous VTE                                                  3  [  ] Thrombophilia                                               2  [  ] Lower limb paralysis                                      2        (unable to hold up >15 seconds)    [  ] Current Cancer                                              2         (within 6 months)  [X] Immobilization > 24 hrs                                1  [  ] ICU/CCU stay > 24 hours                              1  [X] Age > 60                                                      1    IMPROVE VTE Score 5  Start Heparin 5000u q8 if patient will not undergo procedure  SCD boots for now IMPROVE VTE Individual Risk Assessment  RISK                                                                Points  [X] Previous VTE                                                  3  [  ] Thrombophilia                                               2  [  ] Lower limb paralysis                                      2        (unable to hold up >15 seconds)    [  ] Current Cancer                                              2         (within 6 months)  [X] Immobilization > 24 hrs                                1  [  ] ICU/CCU stay > 24 hours                              1  [X] Age > 60                                                      1    IMPROVE VTE Score 5  C/w heparin q 8 hrs

## 2021-01-25 NOTE — PROGRESS NOTE ADULT - PROBLEM SELECTOR PLAN 5
- Pt takes amlodipine, metoprolol, losartan  - C/w metoprolol 50mg BID  - -120s in ED and on admission  - Restart home meds as needed - Pt takes amlodipine, metoprolol, losartan  - C/w metoprolol 50mg BID  - Monitor BP and restart home meds as needed

## 2021-01-25 NOTE — PROGRESS NOTE ADULT - SUBJECTIVE AND OBJECTIVE BOX
PGY-1 Progress Note discussed with attending    PAGER #: [[phone]] TILL 5:00 PM  PLEASE CONTACT ON CALL TEAM:  - On Call Team (Please refer to Danny) FROM 5:00 PM - 8:30PM  - Nightfloat Team FROM 8:30 -7:30 AM    CHIEF COMPLAINT & BRIEF HOSPITAL COURSE:      INTERVAL HPI/OVERNIGHT EVENTS:       MEDICATIONS  (STANDING):  allopurinol 100 milliGRAM(s) Oral daily  amLODIPine   Tablet 10 milliGRAM(s) Oral daily  ascorbic acid 500 milliGRAM(s) Oral daily  aspirin enteric coated 81 milliGRAM(s) Oral daily  atorvastatin 20 milliGRAM(s) Oral at bedtime  calcitriol   Capsule 0.25 MICROGram(s) Oral daily  cholecalciferol 2000 Unit(s) Oral daily  darunavir 800 mG/cobicstat 150 mG 1 Tablet(s) Oral daily  heparin   Injectable 5000 Unit(s) SubCutaneous every 8 hours  influenza   Vaccine 0.5 milliLiter(s) IntraMuscular once  lamiVUDine- milliGRAM(s) Oral daily  melatonin 5 milliGRAM(s) Oral at bedtime  metoprolol tartrate 100 milliGRAM(s) Oral two times a day  Nephro-alex 1 Tablet(s) Oral daily  senna 2 Tablet(s) Oral at bedtime  zinc sulfate 220 milliGRAM(s) Oral daily    MEDICATIONS  (PRN):  acetaminophen   Tablet .. 650 milliGRAM(s) Oral every 6 hours PRN Temp greater or equal to 38C (100.4F)  gabapentin 100 milliGRAM(s) Oral daily PRN pain  guaiFENesin   Syrup  (Sugar-Free) 100 milliGRAM(s) Oral every 6 hours PRN Cough      Vital Signs Last 24 Hrs  T(C): 36.6 (25 Jan 2021 07:55), Max: 38.4 (24 Jan 2021 11:17)  T(F): 97.9 (25 Jan 2021 07:55), Max: 101.1 (24 Jan 2021 11:17)  HR: 77 (25 Jan 2021 07:55) (73 - 87)  BP: 109/61 (25 Jan 2021 07:55) (92/51 - 119/54)  BP(mean): --  RR: 18 (25 Jan 2021 07:55) (18 - 19)  SpO2: 95% (25 Jan 2021 07:55) (95% - 100%)    PHYSICAL EXAMINATION:  GENERAL: NAD, well built  HEAD:  Atraumatic, Normocephalic  EYES:  conjunctiva and sclera clear  NECK: Supple, No JVD, Normal thyroid  CHEST/LUNG: Clear to auscultation. Clear to percussion bilaterally; No rales, rhonchi, wheezing, or rubs  HEART: Regular rate and rhythm; No murmurs, rubs, or gallops  ABDOMEN: Soft, Nontender, Nondistended; Bowel sounds present, no pain or masses on palpation  NERVOUS SYSTEM:  Alert & Oriented X3  : voiding well  EXTREMITIES:  2+ Peripheral Pulses, No clubbing, cyanosis, or edema  SKIN: warm dry                          12.3   8.53  )-----------( 208      ( 25 Jan 2021 07:29 )             35.9     01-24    133<L>  |  97  |  26<H>  ----------------------------<  89  3.7   |  25  |  6.16<H>    Ca    9.4      24 Jan 2021 07:18  Phos  2.2     01-24  Mg     2.1     01-24          I&O's Summary        Culture - Blood (collected 22 Jan 2021 10:25)  Source: .Blood Blood-Peripheral  Preliminary Report (23 Jan 2021 11:01):    No growth to date.    Culture - Blood (collected 22 Jan 2021 10:25)  Source: .Blood Blood-Peripheral  Preliminary Report (23 Jan 2021 11:01):    No growth to date.                     PGY-1 Progress Note discussed with attending    PAGER #: [[phone]] TILL 5:00 PM  PLEASE CONTACT ON CALL TEAM:  - On Call Team (Please refer to Danny) FROM 5:00 PM - 8:30PM  - Nightfloat Team FROM 8:30 -7:30 AM    CHIEF COMPLAINT & BRIEF HOSPITAL COURSE:      INTERVAL HPI/OVERNIGHT EVENTS:       MEDICATIONS  (STANDING):  allopurinol 100 milliGRAM(s) Oral daily  amLODIPine   Tablet 10 milliGRAM(s) Oral daily  ascorbic acid 500 milliGRAM(s) Oral daily  aspirin enteric coated 81 milliGRAM(s) Oral daily  atorvastatin 20 milliGRAM(s) Oral at bedtime  calcitriol   Capsule 0.25 MICROGram(s) Oral daily  cholecalciferol 2000 Unit(s) Oral daily  darunavir 800 mG/cobicstat 150 mG 1 Tablet(s) Oral daily  heparin   Injectable 5000 Unit(s) SubCutaneous every 8 hours  influenza   Vaccine 0.5 milliLiter(s) IntraMuscular once  lamiVUDine- milliGRAM(s) Oral daily  melatonin 5 milliGRAM(s) Oral at bedtime  metoprolol tartrate 100 milliGRAM(s) Oral two times a day  Nephro-alex 1 Tablet(s) Oral daily  senna 2 Tablet(s) Oral at bedtime  zinc sulfate 220 milliGRAM(s) Oral daily    MEDICATIONS  (PRN):  acetaminophen   Tablet .. 650 milliGRAM(s) Oral every 6 hours PRN Temp greater or equal to 38C (100.4F)  gabapentin 100 milliGRAM(s) Oral daily PRN pain  guaiFENesin   Syrup  (Sugar-Free) 100 milliGRAM(s) Oral every 6 hours PRN Cough      Vital Signs Last 24 Hrs  T(C): 36.6 (25 Jan 2021 07:55), Max: 38.4 (24 Jan 2021 11:17)  T(F): 97.9 (25 Jan 2021 07:55), Max: 101.1 (24 Jan 2021 11:17)  HR: 77 (25 Jan 2021 07:55) (73 - 87)  BP: 109/61 (25 Jan 2021 07:55) (92/51 - 119/54)  BP(mean): --  RR: 18 (25 Jan 2021 07:55) (18 - 19)  SpO2: 95% (25 Jan 2021 07:55) (95% - 100%)    PHYSICAL EXAMINATION:  GENERAL: NAD, well developed   HEAD:  Atraumatic, Normocephalic  EYES:  conjunctiva and sclera clear  NECK: Supple, No JVD, Normal thyroid  CHEST/LUNG: Clear to auscultation. Clear to percussion bilaterally; No rales, rhonchi, wheezing, or rubs  HEART: Regular rate and rhythm; No murmurs, rubs, or gallops  ABDOMEN: Soft, Nontender, Nondistended; Bowel sounds present, no pain or masses on palpation, diarrhea at times non bloody no mucous   NERVOUS SYSTEM:  Alert & Oriented X3  : voiding well  EXTREMITIES:  2+ Peripheral Pulses, No clubbing, cyanosis, or edema  SKIN: warm dry                          12.3   8.53  )-----------( 208      ( 25 Jan 2021 07:29 )             35.9     01-24    133<L>  |  97  |  26<H>  ----------------------------<  89  3.7   |  25  |  6.16<H>    Ca    9.4      24 Jan 2021 07:18  Phos  2.2     01-24  Mg     2.1     01-24      Culture - Blood (collected 22 Jan 2021 10:25)  Source: .Blood Blood-Peripheral  Preliminary Report (23 Jan 2021 11:01):    No growth to date.    Culture - Blood (collected 22 Jan 2021 10:25)  Source: .Blood Blood-Peripheral  Preliminary Report (23 Jan 2021 11:01):    No growth to date.                     PGY-1 Progress Note discussed with attending    PAGER #: [[phone]] TILL 5:00 PM  PLEASE CONTACT ON CALL TEAM:  - On Call Team (Please refer to Danny) FROM 5:00 PM - 8:30PM  - Nightfloat Team FROM 8:30 -7:30 AM    CHIEF COMPLAINT & BRIEF HOSPITAL COURSE:      INTERVAL HPI/OVERNIGHT EVENTS: patient refers she feels weak, continues to have diarrhea, non bloody, however when she wipes herself find some blood, denies any other complaints      MEDICATIONS  (STANDING):  allopurinol 100 milliGRAM(s) Oral daily  amLODIPine   Tablet 10 milliGRAM(s) Oral daily  ascorbic acid 500 milliGRAM(s) Oral daily  aspirin enteric coated 81 milliGRAM(s) Oral daily  atorvastatin 20 milliGRAM(s) Oral at bedtime  calcitriol   Capsule 0.25 MICROGram(s) Oral daily  cholecalciferol 2000 Unit(s) Oral daily  darunavir 800 mG/cobicstat 150 mG 1 Tablet(s) Oral daily  heparin   Injectable 5000 Unit(s) SubCutaneous every 8 hours  influenza   Vaccine 0.5 milliLiter(s) IntraMuscular once  lamiVUDine- milliGRAM(s) Oral daily  melatonin 5 milliGRAM(s) Oral at bedtime  metoprolol tartrate 100 milliGRAM(s) Oral two times a day  Nephro-alex 1 Tablet(s) Oral daily  senna 2 Tablet(s) Oral at bedtime  zinc sulfate 220 milliGRAM(s) Oral daily    MEDICATIONS  (PRN):  acetaminophen   Tablet .. 650 milliGRAM(s) Oral every 6 hours PRN Temp greater or equal to 38C (100.4F)  gabapentin 100 milliGRAM(s) Oral daily PRN pain  guaiFENesin   Syrup  (Sugar-Free) 100 milliGRAM(s) Oral every 6 hours PRN Cough      Vital Signs Last 24 Hrs  T(C): 36.6 (25 Jan 2021 07:55), Max: 38.4 (24 Jan 2021 11:17)  T(F): 97.9 (25 Jan 2021 07:55), Max: 101.1 (24 Jan 2021 11:17)  HR: 77 (25 Jan 2021 07:55) (73 - 87)  BP: 109/61 (25 Jan 2021 07:55) (92/51 - 119/54)  BP(mean): --  RR: 18 (25 Jan 2021 07:55) (18 - 19)  SpO2: 95% (25 Jan 2021 07:55) (95% - 100%)    PHYSICAL EXAMINATION:  GENERAL: NAD, well developed   HEAD:  Atraumatic, Normocephalic  EYES:  conjunctiva and sclera clear  NECK: Supple, No JVD, Normal thyroid  CHEST/LUNG: Clear to auscultation. Clear to percussion bilaterally; No rales, rhonchi, wheezing, or rubs  HEART: Regular rate and rhythm; No murmurs, rubs, or gallops  ABDOMEN: Soft, Nontender, Nondistended; Bowel sounds present, no pain or masses on palpation, diarrhea at times non bloody no mucous   NERVOUS SYSTEM:  Alert & Oriented X3  : voiding well  EXTREMITIES:  2+ Peripheral Pulses, No clubbing, cyanosis, or edema, left AV fistula no signs of inflammation   SKIN: warm dry                          12.3   8.53  )-----------( 208      ( 25 Jan 2021 07:29 )             35.9     01-24    133<L>  |  97  |  26<H>  ----------------------------<  89  3.7   |  25  |  6.16<H>    Ca    9.4      24 Jan 2021 07:18  Phos  2.2     01-24  Mg     2.1     01-24      Culture - Blood (collected 22 Jan 2021 10:25)  Source: .Blood Blood-Peripheral  Preliminary Report (23 Jan 2021 11:01):    No growth to date.    Culture - Blood (collected 22 Jan 2021 10:25)  Source: .Blood Blood-Peripheral  Preliminary Report (23 Jan 2021 11:01):    No growth to date.                     PGY-1 Progress Note discussed with attending    PAGER #: [[phone]] TILL 5:00 PM  PLEASE CONTACT ON CALL TEAM:  - On Call Team (Please refer to Danny) FROM 5:00 PM - 8:30PM  - Nightfloat Team FROM 8:30 -7:30 AM    CHIEF COMPLAINT & BRIEF HOSPITAL COURSE:  66 yo F from home with PMH HTN, DM2, HIV on LEA, ESRD MWF (recently switched to [facility] since COVID19 +) w/ L AV Fistula (Vascular Surgeon Dr Dickens) BIBEMS from dialysis center for AV fistula malfunction admitted for hemodialysis and COVID19 infection.  Patient on HD, next planned on 1/26, nephro on board, on admission, patient with hyperkalemia, EKG NSR, s/p Lokelma, dextrose and insulin, now hypokalemia being closely monitored replaced by mouth.  Patient with COVID19 Infection no Remdesivir or Decadron indicated at this time, not requiring O2 supplementation, febrile, CT chest showed GGO, doppler of LE negative for PE, diarrhea non bloody most likely 2/2 viral infection. Patient also with HIV on LEA meds, hepatitis panel negative, blood cx negative.     For hypertension, patient on Metoprolol, for hyperlipidemia on Statin, lipid profile elevated TG, cholesterol and LDL.       INTERVAL HPI/OVERNIGHT EVENTS: patient refers she feels weak, continues to have diarrhea, non bloody, however when she wipes herself find some blood, febrile overnight, denies any other complaints      MEDICATIONS  (STANDING):  allopurinol 100 milliGRAM(s) Oral daily  amLODIPine   Tablet 10 milliGRAM(s) Oral daily  ascorbic acid 500 milliGRAM(s) Oral daily  aspirin enteric coated 81 milliGRAM(s) Oral daily  atorvastatin 20 milliGRAM(s) Oral at bedtime  calcitriol   Capsule 0.25 MICROGram(s) Oral daily  cholecalciferol 2000 Unit(s) Oral daily  darunavir 800 mG/cobicstat 150 mG 1 Tablet(s) Oral daily  heparin   Injectable 5000 Unit(s) SubCutaneous every 8 hours  influenza   Vaccine 0.5 milliLiter(s) IntraMuscular once  lamiVUDine- milliGRAM(s) Oral daily  melatonin 5 milliGRAM(s) Oral at bedtime  metoprolol tartrate 100 milliGRAM(s) Oral two times a day  Nephro-alex 1 Tablet(s) Oral daily  senna 2 Tablet(s) Oral at bedtime  zinc sulfate 220 milliGRAM(s) Oral daily    MEDICATIONS  (PRN):  acetaminophen   Tablet .. 650 milliGRAM(s) Oral every 6 hours PRN Temp greater or equal to 38C (100.4F)  gabapentin 100 milliGRAM(s) Oral daily PRN pain  guaiFENesin   Syrup  (Sugar-Free) 100 milliGRAM(s) Oral every 6 hours PRN Cough      Vital Signs Last 24 Hrs  T(C): 36.6 (25 Jan 2021 07:55), Max: 38.4 (24 Jan 2021 11:17)  T(F): 97.9 (25 Jan 2021 07:55), Max: 101.1 (24 Jan 2021 11:17)  HR: 77 (25 Jan 2021 07:55) (73 - 87)  BP: 109/61 (25 Jan 2021 07:55) (92/51 - 119/54)  BP(mean): --  RR: 18 (25 Jan 2021 07:55) (18 - 19)  SpO2: 95% (25 Jan 2021 07:55) (95% - 100%)    PHYSICAL EXAMINATION:  GENERAL: NAD, well developed   HEAD:  Atraumatic, Normocephalic  EYES:  conjunctiva and sclera clear  NECK: Supple, No JVD, Normal thyroid  CHEST/LUNG: Clear to auscultation. Clear to percussion bilaterally; No rales, rhonchi, wheezing, or rubs  HEART: Regular rate and rhythm; No murmurs, rubs, or gallops  ABDOMEN: Soft, Nontender, Nondistended; Bowel sounds present, no pain or masses on palpation, diarrhea at times non bloody no mucous   NERVOUS SYSTEM:  Alert & Oriented X3  : voiding well  EXTREMITIES:  2+ Peripheral Pulses, No clubbing, cyanosis, or edema, left AV fistula no signs of inflammation   SKIN: warm dry                          12.3   8.53  )-----------( 208      ( 25 Jan 2021 07:29 )             35.9     01-24    133<L>  |  97  |  26<H>  ----------------------------<  89  3.7   |  25  |  6.16<H>    Ca    9.4      24 Jan 2021 07:18  Phos  2.2     01-24  Mg     2.1     01-24      Culture - Blood (collected 22 Jan 2021 10:25)  Source: .Blood Blood-Peripheral  Preliminary Report (23 Jan 2021 11:01):    No growth to date.    Culture - Blood (collected 22 Jan 2021 10:25)  Source: .Blood Blood-Peripheral  Preliminary Report (23 Jan 2021 11:01):    No growth to date.                     PGY-1 Progress Note discussed with attending    PAGER #: [[phone]] TILL 5:00 PM  PLEASE CONTACT ON CALL TEAM:  - On Call Team (Please refer to Danny) FROM 5:00 PM - 8:30PM  - Nightfloat Team FROM 8:30 -7:30 AM    CHIEF COMPLAINT & BRIEF HOSPITAL COURSE:  66 yo F from home with PMH HTN, DM2, HIV on ART, ESRD MWF (recently switched to [facility] since COVID19 +) w/ L AV Fistula (Vascular Surgeon Dr Dickens) BIBEMS from dialysis center for AV fistula malfunction admitted for hemodialysis and COVID19 infection.  Patient on HD, next planned on 1/26, nephro on board, on admission, patient with hyperkalemia, EKG NSR, s/p Lokelma, dextrose and insulin, now hypokalemia being closely monitored replaced by mouth.  Patient with COVID19 Infection no Remdesivir or Decadron indicated at this time, not requiring O2 supplementation, febrile, CT chest showed GGO, doppler of LE negative for PE, diarrhea non bloody most likely 2/2 viral infection. Patient also with HIV on LEA meds, hepatitis panel negative, blood cx negative.     For hypertension, patient on Metoprolol, for hyperlipidemia on Statin, lipid profile elevated TG, cholesterol and LDL.       INTERVAL HPI/OVERNIGHT EVENTS: patient refers she feels weak, continues to have diarrhea, non bloody, however when she wipes herself find some blood, febrile overnight, denies any other complaints      MEDICATIONS  (STANDING):  allopurinol 100 milliGRAM(s) Oral daily  amLODIPine   Tablet 10 milliGRAM(s) Oral daily  ascorbic acid 500 milliGRAM(s) Oral daily  aspirin enteric coated 81 milliGRAM(s) Oral daily  atorvastatin 20 milliGRAM(s) Oral at bedtime  calcitriol   Capsule 0.25 MICROGram(s) Oral daily  cholecalciferol 2000 Unit(s) Oral daily  darunavir 800 mG/cobicstat 150 mG 1 Tablet(s) Oral daily  heparin   Injectable 5000 Unit(s) SubCutaneous every 8 hours  influenza   Vaccine 0.5 milliLiter(s) IntraMuscular once  lamiVUDine- milliGRAM(s) Oral daily  melatonin 5 milliGRAM(s) Oral at bedtime  metoprolol tartrate 100 milliGRAM(s) Oral two times a day  Nephro-alex 1 Tablet(s) Oral daily  senna 2 Tablet(s) Oral at bedtime  zinc sulfate 220 milliGRAM(s) Oral daily    MEDICATIONS  (PRN):  acetaminophen   Tablet .. 650 milliGRAM(s) Oral every 6 hours PRN Temp greater or equal to 38C (100.4F)  gabapentin 100 milliGRAM(s) Oral daily PRN pain  guaiFENesin   Syrup  (Sugar-Free) 100 milliGRAM(s) Oral every 6 hours PRN Cough      Vital Signs Last 24 Hrs  T(C): 36.6 (25 Jan 2021 07:55), Max: 38.4 (24 Jan 2021 11:17)  T(F): 97.9 (25 Jan 2021 07:55), Max: 101.1 (24 Jan 2021 11:17)  HR: 77 (25 Jan 2021 07:55) (73 - 87)  BP: 109/61 (25 Jan 2021 07:55) (92/51 - 119/54)  BP(mean): --  RR: 18 (25 Jan 2021 07:55) (18 - 19)  SpO2: 95% (25 Jan 2021 07:55) (95% - 100%)    PHYSICAL EXAMINATION:  GENERAL: NAD, well developed   HEAD:  Atraumatic, Normocephalic  EYES:  conjunctiva and sclera clear  NECK: Supple, No JVD, Normal thyroid  CHEST/LUNG: Clear to auscultation. Clear to percussion bilaterally; No rales, rhonchi, wheezing, or rubs  HEART: Regular rate and rhythm; No murmurs, rubs, or gallops  ABDOMEN: Soft, Nontender, Nondistended; Bowel sounds present, no pain or masses on palpation, diarrhea at times non bloody no mucous   NERVOUS SYSTEM:  Alert & Oriented X3  : voiding well  EXTREMITIES:  2+ Peripheral Pulses, No clubbing, cyanosis, or edema, left AV fistula no signs of inflammation   SKIN: warm dry                          12.3   8.53  )-----------( 208      ( 25 Jan 2021 07:29 )             35.9     01-24    133<L>  |  97  |  26<H>  ----------------------------<  89  3.7   |  25  |  6.16<H>    Ca    9.4      24 Jan 2021 07:18  Phos  2.2     01-24  Mg     2.1     01-24      Culture - Blood (collected 22 Jan 2021 10:25)  Source: .Blood Blood-Peripheral  Preliminary Report (23 Jan 2021 11:01):    No growth to date.    Culture - Blood (collected 22 Jan 2021 10:25)  Source: .Blood Blood-Peripheral  Preliminary Report (23 Jan 2021 11:01):    No growth to date.

## 2021-01-26 DIAGNOSIS — R50.9 FEVER, UNSPECIFIED: ICD-10-CM

## 2021-01-26 PROCEDURE — 99233 SBSQ HOSP IP/OBS HIGH 50: CPT | Mod: GC

## 2021-01-26 RX ORDER — SODIUM CHLORIDE 9 MG/ML
250 INJECTION INTRAMUSCULAR; INTRAVENOUS; SUBCUTANEOUS ONCE
Refills: 0 | Status: COMPLETED | OUTPATIENT
Start: 2021-01-26 | End: 2021-01-26

## 2021-01-26 RX ORDER — SODIUM CHLORIDE 9 MG/ML
500 INJECTION INTRAMUSCULAR; INTRAVENOUS; SUBCUTANEOUS ONCE
Refills: 0 | Status: DISCONTINUED | OUTPATIENT
Start: 2021-01-26 | End: 2021-01-26

## 2021-01-26 RX ORDER — ALBUMIN HUMAN 25 %
100 VIAL (ML) INTRAVENOUS ONCE
Refills: 0 | Status: COMPLETED | OUTPATIENT
Start: 2021-01-26 | End: 2021-01-26

## 2021-01-26 RX ADMIN — HEPARIN SODIUM 5000 UNIT(S): 5000 INJECTION INTRAVENOUS; SUBCUTANEOUS at 21:07

## 2021-01-26 RX ADMIN — ATORVASTATIN CALCIUM 20 MILLIGRAM(S): 80 TABLET, FILM COATED ORAL at 21:07

## 2021-01-26 RX ADMIN — DARUNAVIR ETHANOLATE AND COBICISTAT 1 TABLET(S): 800; 150 TABLET, FILM COATED ORAL at 14:30

## 2021-01-26 RX ADMIN — HEPARIN SODIUM 5000 UNIT(S): 5000 INJECTION INTRAVENOUS; SUBCUTANEOUS at 05:47

## 2021-01-26 RX ADMIN — CALCITRIOL 0.25 MICROGRAM(S): 0.5 CAPSULE ORAL at 14:29

## 2021-01-26 RX ADMIN — Medication 81 MILLIGRAM(S): at 14:30

## 2021-01-26 RX ADMIN — HEPARIN SODIUM 5000 UNIT(S): 5000 INJECTION INTRAVENOUS; SUBCUTANEOUS at 14:30

## 2021-01-26 RX ADMIN — Medication 50 MILLILITER(S): at 14:43

## 2021-01-26 RX ADMIN — Medication 100 MILLIGRAM(S): at 14:30

## 2021-01-26 RX ADMIN — Medication 5 MILLIGRAM(S): at 21:07

## 2021-01-26 RX ADMIN — Medication 100 MILLIGRAM(S): at 17:46

## 2021-01-26 RX ADMIN — ZINC SULFATE TAB 220 MG (50 MG ZINC EQUIVALENT) 220 MILLIGRAM(S): 220 (50 ZN) TAB at 14:30

## 2021-01-26 RX ADMIN — Medication 500 MILLIGRAM(S): at 14:30

## 2021-01-26 RX ADMIN — Medication 2000 UNIT(S): at 14:30

## 2021-01-26 RX ADMIN — Medication 1 TABLET(S): at 14:29

## 2021-01-26 NOTE — PROGRESS NOTE ADULT - SUBJECTIVE AND OBJECTIVE BOX
PGY-1 Progress Note discussed with attending    PAGER #: [[phone]] TILL 5:00 PM  PLEASE CONTACT ON CALL TEAM:  - On Call Team (Please refer to Danny) FROM 5:00 PM - 8:30PM  - Nightfloat Team FROM 8:30 -7:30 AM    CHIEF COMPLAINT & BRIEF HOSPITAL COURSE:  64 yo F from home with PMH HTN, DM2, HIV on LEA, ESRD MWF (recently switched to [facility] since COVID19 +) w/ L AV Fistula (Vascular Surgeon Dr Dickens) BIBEMS from dialysis center for AV fistula malfunction admitted for hemodialysis and COVID19 infection.  Patient on HD, next planned on 1/26, nephro on board, on admission, patient with hyperkalemia, EKG NSR, s/p Lokelma, dextrose and insulin, now hypokalemia being closely monitored replaced by mouth.  Patient with COVID19 Infection no Remdesivir or Decadron indicated at this time, not requiring O2 supplementation, febrile, CT chest showed GGO, doppler of LE negative for PE, diarrhea non bloody most likely 2/2 viral infection. Patient also with HIV on LEA meds, hepatitis panel negative, blood cx negative.     For hypertension, patient on Metoprolol, for hyperlipidemia on Statin, lipid profile elevated TG, cholesterol and LDL.       INTERVAL HPI/OVERNIGHT EVENTS: patient refers she is feeling better, no sob, no diarrhea, no pain or discomfort       MEDICATIONS  (STANDING):  albumin human 25% IVPB 100 milliLiter(s) IV Intermittent once  allopurinol 100 milliGRAM(s) Oral daily  amLODIPine   Tablet 10 milliGRAM(s) Oral daily  ascorbic acid 500 milliGRAM(s) Oral daily  aspirin enteric coated 81 milliGRAM(s) Oral daily  atorvastatin 20 milliGRAM(s) Oral at bedtime  calcitriol   Capsule 0.25 MICROGram(s) Oral daily  cholecalciferol 2000 Unit(s) Oral daily  darunavir 800 mG/cobicstat 150 mG 1 Tablet(s) Oral daily  heparin   Injectable 5000 Unit(s) SubCutaneous every 8 hours  influenza   Vaccine 0.5 milliLiter(s) IntraMuscular once  lamiVUDine- milliGRAM(s) Oral daily  melatonin 5 milliGRAM(s) Oral at bedtime  metoprolol tartrate 100 milliGRAM(s) Oral two times a day  Nephro-alex 1 Tablet(s) Oral daily  senna 2 Tablet(s) Oral at bedtime  sodium chloride 0.9% Bolus 250 milliLiter(s) IV Bolus once  zinc sulfate 220 milliGRAM(s) Oral daily    MEDICATIONS  (PRN):  acetaminophen   Tablet .. 650 milliGRAM(s) Oral every 6 hours PRN Temp greater or equal to 38C (100.4F)  gabapentin 100 milliGRAM(s) Oral daily PRN pain  guaiFENesin   Syrup  (Sugar-Free) 100 milliGRAM(s) Oral every 6 hours PRN Cough      Vital Signs Last 24 Hrs  T(C): 36.2 (26 Jan 2021 09:35), Max: 38.4 (25 Jan 2021 23:25)  T(F): 97.1 (26 Jan 2021 09:35), Max: 101.1 (25 Jan 2021 23:25)  HR: 69 (26 Jan 2021 09:35) (65 - 91)  BP: 94/56 (26 Jan 2021 09:35) (89/45 - 121/69)  BP(mean): --  RR: 18 (26 Jan 2021 09:35) (17 - 18)  SpO2: 98% (26 Jan 2021 09:35) (94% - 98%)    PHYSICAL EXAMINATION:  GENERAL: NAD, well developed   HEAD:  Atraumatic, Normocephalic  EYES:  conjunctiva and sclera clear  NECK: Supple, No JVD, Normal thyroid  CHEST/LUNG: Clear to auscultation. Clear to percussion bilaterally; No rales, rhonchi, wheezing, or rubs  HEART: Regular rate and rhythm; No murmurs, rubs, or gallops  ABDOMEN: Soft, Nontender, Nondistended; Bowel sounds present, no pain or masses on palpation, diarrhea at times non bloody no mucous   NERVOUS SYSTEM:  Alert & Oriented X3  : voiding well  EXTREMITIES:  2+ Peripheral Pulses, No clubbing, or cyanosis. Mild bilateral lower edema, left AV fistula no signs of inflammation   SKIN: warm dry                          12.3   8.53  )-----------( 208      ( 25 Jan 2021 07:29 )             35.9     01-25    134<L>  |  93<L>  |  44<H>  ----------------------------<  137<H>  3.4<L>   |  29  |  9.04<H>    Ca    9.0      25 Jan 2021 10:36  Phos  2.6     01-25  Mg     2.3     01-25    TPro  7.2  /  Alb  1.8<L>  /  TBili  0.4  /  DBili  x   /  AST  38  /  ALT  26  /  AlkPhos  76  01-25    LIVER FUNCTIONS - ( 25 Jan 2021 10:36 )  Alb: 1.8 g/dL / Pro: 7.2 g/dL / ALK PHOS: 76 U/L / ALT: 26 U/L DA / AST: 38 U/L / GGT: x                   I&O's Summary    25 Jan 2021 07:01  -  26 Jan 2021 07:00  --------------------------------------------------------  IN: 300 mL / OUT: 0 mL / NET: 300 mL                       PGY-1 Progress Note discussed with attending    PAGER #: [[phone]] TILL 5:00 PM  PLEASE CONTACT ON CALL TEAM:  - On Call Team (Please refer to Danny) FROM 5:00 PM - 8:30PM  - Nightfloat Team FROM 8:30 -7:30 AM    CHIEF COMPLAINT & BRIEF HOSPITAL COURSE:  64 yo F from home with PMH HTN, DM2, HIV on LEA, ESRD MWF (recently switched to [facility] since COVID19 +) w/ L AV Fistula (Vascular Surgeon Dr Dickens) BIBEMS from dialysis center for AV fistula malfunction admitted for hemodialysis and COVID19 infection.  Patient on HD, next planned on 1/26, nephro on board, on admission, patient with hyperkalemia, EKG NSR, s/p Lokelma, dextrose and insulin, now hypokalemia being closely monitored replaced by mouth.  Patient with COVID19 Infection no Remdesivir or Decadron indicated at this time, not requiring O2 supplementation, febrile, CT chest showed GGO, doppler of LE negative for PE, diarrhea non bloody most likely 2/2 viral infection. Patient also with HIV on LEA meds, hepatitis panel negative, blood cx negative.     For hypertension, patient on Metoprolol, for hyperlipidemia on Statin, lipid profile elevated TG, cholesterol and LDL.   Patient continues to spike fever, sepsis work up sent      INTERVAL HPI/OVERNIGHT EVENTS: patient refers she is feeling better, no sob, no diarrhea, no pain or discomfort       MEDICATIONS  (STANDING):  albumin human 25% IVPB 100 milliLiter(s) IV Intermittent once  allopurinol 100 milliGRAM(s) Oral daily  amLODIPine   Tablet 10 milliGRAM(s) Oral daily  ascorbic acid 500 milliGRAM(s) Oral daily  aspirin enteric coated 81 milliGRAM(s) Oral daily  atorvastatin 20 milliGRAM(s) Oral at bedtime  calcitriol   Capsule 0.25 MICROGram(s) Oral daily  cholecalciferol 2000 Unit(s) Oral daily  darunavir 800 mG/cobicstat 150 mG 1 Tablet(s) Oral daily  heparin   Injectable 5000 Unit(s) SubCutaneous every 8 hours  influenza   Vaccine 0.5 milliLiter(s) IntraMuscular once  lamiVUDine- milliGRAM(s) Oral daily  melatonin 5 milliGRAM(s) Oral at bedtime  metoprolol tartrate 100 milliGRAM(s) Oral two times a day  Nephro-alex 1 Tablet(s) Oral daily  senna 2 Tablet(s) Oral at bedtime  sodium chloride 0.9% Bolus 250 milliLiter(s) IV Bolus once  zinc sulfate 220 milliGRAM(s) Oral daily    MEDICATIONS  (PRN):  acetaminophen   Tablet .. 650 milliGRAM(s) Oral every 6 hours PRN Temp greater or equal to 38C (100.4F)  gabapentin 100 milliGRAM(s) Oral daily PRN pain  guaiFENesin   Syrup  (Sugar-Free) 100 milliGRAM(s) Oral every 6 hours PRN Cough      Vital Signs Last 24 Hrs  T(C): 36.2 (26 Jan 2021 09:35), Max: 38.4 (25 Jan 2021 23:25)  T(F): 97.1 (26 Jan 2021 09:35), Max: 101.1 (25 Jan 2021 23:25)  HR: 69 (26 Jan 2021 09:35) (65 - 91)  BP: 94/56 (26 Jan 2021 09:35) (89/45 - 121/69)  BP(mean): --  RR: 18 (26 Jan 2021 09:35) (17 - 18)  SpO2: 98% (26 Jan 2021 09:35) (94% - 98%)    PHYSICAL EXAMINATION:  GENERAL: NAD, well developed   HEAD:  Atraumatic, Normocephalic  EYES:  conjunctiva and sclera clear  NECK: Supple, No JVD, Normal thyroid  CHEST/LUNG: Clear to auscultation. Clear to percussion bilaterally; No rales, rhonchi, wheezing, or rubs  HEART: Regular rate and rhythm; No murmurs, rubs, or gallops  ABDOMEN: Soft, Nontender, Nondistended; Bowel sounds present, no pain or masses on palpation, diarrhea at times non bloody no mucous   NERVOUS SYSTEM:  Alert & Oriented X3  : voiding well  EXTREMITIES:  2+ Peripheral Pulses, No clubbing, or cyanosis. Mild bilateral lower edema, left AV fistula no signs of inflammation   SKIN: warm dry                          12.3   8.53  )-----------( 208      ( 25 Jan 2021 07:29 )             35.9     01-25    134<L>  |  93<L>  |  44<H>  ----------------------------<  137<H>  3.4<L>   |  29  |  9.04<H>    Ca    9.0      25 Jan 2021 10:36  Phos  2.6     01-25  Mg     2.3     01-25    TPro  7.2  /  Alb  1.8<L>  /  TBili  0.4  /  DBili  x   /  AST  38  /  ALT  26  /  AlkPhos  76  01-25    LIVER FUNCTIONS - ( 25 Jan 2021 10:36 )  Alb: 1.8 g/dL / Pro: 7.2 g/dL / ALK PHOS: 76 U/L / ALT: 26 U/L DA / AST: 38 U/L / GGT: x                   I&O's Summary    25 Jan 2021 07:01  -  26 Jan 2021 07:00  --------------------------------------------------------  IN: 300 mL / OUT: 0 mL / NET: 300 mL                       PGY-1 Progress Note discussed with attending    PAGER #: [[phone]] TILL 5:00 PM  PLEASE CONTACT ON CALL TEAM:  - On Call Team (Please refer to Danny) FROM 5:00 PM - 8:30PM  - Nightfloat Team FROM 8:30 -7:30 AM    CHIEF COMPLAINT & BRIEF HOSPITAL COURSE:  66 yo F from home with PMH HTN, DM2, HIV on LEA, ESRD MWF (recently switched to [facility] since COVID19 +) w/ L AV Fistula (Vascular Surgeon Dr Dickens) BIBEMS from dialysis center for AV fistula malfunction admitted for hemodialysis and COVID19 infection.  Patient on HD, next planned on 1/26, nephro on board, on admission, patient with hyperkalemia, EKG NSR, s/p Lokelma, dextrose and insulin, now hypokalemia being closely monitored replaced by mouth.  Patient with COVID19 Infection no Remdesivir or Decadron indicated at this time, not requiring O2 supplementation, febrile, CT chest showed GGO, doppler of LE negative for PE, diarrhea non bloody most likely 2/2 viral infection. Patient also with HIV on LEA meds, hepatitis panel negative, blood cx negative.     For hypertension, patient on Metoprolol, for hyperlipidemia on Statin, lipid profile elevated TG, cholesterol and LDL.   Patient continues to spike fever, no other complaints, will continue to monitor       INTERVAL HPI/OVERNIGHT EVENTS: patient refers she is feeling better, no sob, no diarrhea, no pain or discomfort       MEDICATIONS  (STANDING):  albumin human 25% IVPB 100 milliLiter(s) IV Intermittent once  allopurinol 100 milliGRAM(s) Oral daily  amLODIPine   Tablet 10 milliGRAM(s) Oral daily  ascorbic acid 500 milliGRAM(s) Oral daily  aspirin enteric coated 81 milliGRAM(s) Oral daily  atorvastatin 20 milliGRAM(s) Oral at bedtime  calcitriol   Capsule 0.25 MICROGram(s) Oral daily  cholecalciferol 2000 Unit(s) Oral daily  darunavir 800 mG/cobicstat 150 mG 1 Tablet(s) Oral daily  heparin   Injectable 5000 Unit(s) SubCutaneous every 8 hours  influenza   Vaccine 0.5 milliLiter(s) IntraMuscular once  lamiVUDine- milliGRAM(s) Oral daily  melatonin 5 milliGRAM(s) Oral at bedtime  metoprolol tartrate 100 milliGRAM(s) Oral two times a day  Nephro-alex 1 Tablet(s) Oral daily  senna 2 Tablet(s) Oral at bedtime  sodium chloride 0.9% Bolus 250 milliLiter(s) IV Bolus once  zinc sulfate 220 milliGRAM(s) Oral daily    MEDICATIONS  (PRN):  acetaminophen   Tablet .. 650 milliGRAM(s) Oral every 6 hours PRN Temp greater or equal to 38C (100.4F)  gabapentin 100 milliGRAM(s) Oral daily PRN pain  guaiFENesin   Syrup  (Sugar-Free) 100 milliGRAM(s) Oral every 6 hours PRN Cough      Vital Signs Last 24 Hrs  T(C): 36.2 (26 Jan 2021 09:35), Max: 38.4 (25 Jan 2021 23:25)  T(F): 97.1 (26 Jan 2021 09:35), Max: 101.1 (25 Jan 2021 23:25)  HR: 69 (26 Jan 2021 09:35) (65 - 91)  BP: 94/56 (26 Jan 2021 09:35) (89/45 - 121/69)  BP(mean): --  RR: 18 (26 Jan 2021 09:35) (17 - 18)  SpO2: 98% (26 Jan 2021 09:35) (94% - 98%)    PHYSICAL EXAMINATION:  GENERAL: NAD, well developed   HEAD:  Atraumatic, Normocephalic  EYES:  conjunctiva and sclera clear  NECK: Supple, No JVD, Normal thyroid  CHEST/LUNG: Clear to auscultation. Clear to percussion bilaterally; No rales, rhonchi, wheezing, or rubs  HEART: Regular rate and rhythm; No murmurs, rubs, or gallops  ABDOMEN: Soft, Nontender, Nondistended; Bowel sounds present, no pain or masses on palpation, diarrhea at times non bloody no mucous   NERVOUS SYSTEM:  Alert & Oriented X3  : voiding well  EXTREMITIES:  2+ Peripheral Pulses, No clubbing, or cyanosis. Mild bilateral lower edema, left AV fistula no signs of inflammation   SKIN: warm dry                          12.3   8.53  )-----------( 208      ( 25 Jan 2021 07:29 )             35.9     01-25    134<L>  |  93<L>  |  44<H>  ----------------------------<  137<H>  3.4<L>   |  29  |  9.04<H>    Ca    9.0      25 Jan 2021 10:36  Phos  2.6     01-25  Mg     2.3     01-25    TPro  7.2  /  Alb  1.8<L>  /  TBili  0.4  /  DBili  x   /  AST  38  /  ALT  26  /  AlkPhos  76  01-25    LIVER FUNCTIONS - ( 25 Jan 2021 10:36 )  Alb: 1.8 g/dL / Pro: 7.2 g/dL / ALK PHOS: 76 U/L / ALT: 26 U/L DA / AST: 38 U/L / GGT: x                   I&O's Summary    25 Jan 2021 07:01  -  26 Jan 2021 07:00  --------------------------------------------------------  IN: 300 mL / OUT: 0 mL / NET: 300 mL                       PGY-1 Progress Note discussed with attending    PAGER #: [[phone]] TILL 5:00 PM  PLEASE CONTACT ON CALL TEAM:  - On Call Team (Please refer to Danny) FROM 5:00 PM - 8:30PM  - Nightfloat Team FROM 8:30 -7:30 AM    CHIEF COMPLAINT & BRIEF HOSPITAL COURSE:  64 yo F from home with PMH HTN, DM2, HIV on LEA, ESRD MWF (recently switched to [facility] since COVID19 +) w/ L AV Fistula (Vascular Surgeon Dr Dickens) BIBEMS from dialysis center for AV fistula malfunction admitted for hemodialysis and COVID19 infection.  Patient on HD, next planned on 1/26, nephro on board, on admission, patient with hyperkalemia, EKG NSR, s/p Lokelma, dextrose and insulin, now hypokalemia being closely monitored replaced by mouth.  Patient with COVID19 Infection no Remdesivir or Decadron indicated at this time, not requiring O2 supplementation, febrile, CT chest showed GGO, doppler of LE negative for PE, diarrhea non bloody most likely 2/2 viral infection. Patient also with HIV on LEA meds, hepatitis panel negative, blood cx negative.     For hypertension, patient on Metoprolol, for hyperlipidemia on Statin, lipid profile elevated TG, cholesterol and LDL.   Patient continues to spike fever, most likely COVID19 related, no other complaints, will continue to monitor and consider sepsis work up      INTERVAL HPI/OVERNIGHT EVENTS: patient refers she is feeling better, no sob, no diarrhea, no pain or discomfort       MEDICATIONS  (STANDING):  albumin human 25% IVPB 100 milliLiter(s) IV Intermittent once  allopurinol 100 milliGRAM(s) Oral daily  amLODIPine   Tablet 10 milliGRAM(s) Oral daily  ascorbic acid 500 milliGRAM(s) Oral daily  aspirin enteric coated 81 milliGRAM(s) Oral daily  atorvastatin 20 milliGRAM(s) Oral at bedtime  calcitriol   Capsule 0.25 MICROGram(s) Oral daily  cholecalciferol 2000 Unit(s) Oral daily  darunavir 800 mG/cobicstat 150 mG 1 Tablet(s) Oral daily  heparin   Injectable 5000 Unit(s) SubCutaneous every 8 hours  influenza   Vaccine 0.5 milliLiter(s) IntraMuscular once  lamiVUDine- milliGRAM(s) Oral daily  melatonin 5 milliGRAM(s) Oral at bedtime  metoprolol tartrate 100 milliGRAM(s) Oral two times a day  Nephro-alex 1 Tablet(s) Oral daily  senna 2 Tablet(s) Oral at bedtime  sodium chloride 0.9% Bolus 250 milliLiter(s) IV Bolus once  zinc sulfate 220 milliGRAM(s) Oral daily    MEDICATIONS  (PRN):  acetaminophen   Tablet .. 650 milliGRAM(s) Oral every 6 hours PRN Temp greater or equal to 38C (100.4F)  gabapentin 100 milliGRAM(s) Oral daily PRN pain  guaiFENesin   Syrup  (Sugar-Free) 100 milliGRAM(s) Oral every 6 hours PRN Cough      Vital Signs Last 24 Hrs  T(C): 36.2 (26 Jan 2021 09:35), Max: 38.4 (25 Jan 2021 23:25)  T(F): 97.1 (26 Jan 2021 09:35), Max: 101.1 (25 Jan 2021 23:25)  HR: 69 (26 Jan 2021 09:35) (65 - 91)  BP: 94/56 (26 Jan 2021 09:35) (89/45 - 121/69)  BP(mean): --  RR: 18 (26 Jan 2021 09:35) (17 - 18)  SpO2: 98% (26 Jan 2021 09:35) (94% - 98%)    PHYSICAL EXAMINATION:  GENERAL: NAD, well developed   HEAD:  Atraumatic, Normocephalic  EYES:  conjunctiva and sclera clear  NECK: Supple, No JVD, Normal thyroid  CHEST/LUNG: Clear to auscultation. Clear to percussion bilaterally; No rales, rhonchi, wheezing, or rubs  HEART: Regular rate and rhythm; No murmurs, rubs, or gallops  ABDOMEN: Soft, Nontender, Nondistended; Bowel sounds present, no pain or masses on palpation, diarrhea at times non bloody no mucous   NERVOUS SYSTEM:  Alert & Oriented X3  : voiding well  EXTREMITIES:  2+ Peripheral Pulses, No clubbing, or cyanosis. Mild bilateral lower edema, left AV fistula no signs of inflammation   SKIN: warm dry                          12.3   8.53  )-----------( 208      ( 25 Jan 2021 07:29 )             35.9     01-25    134<L>  |  93<L>  |  44<H>  ----------------------------<  137<H>  3.4<L>   |  29  |  9.04<H>    Ca    9.0      25 Jan 2021 10:36  Phos  2.6     01-25  Mg     2.3     01-25    TPro  7.2  /  Alb  1.8<L>  /  TBili  0.4  /  DBili  x   /  AST  38  /  ALT  26  /  AlkPhos  76  01-25    LIVER FUNCTIONS - ( 25 Jan 2021 10:36 )  Alb: 1.8 g/dL / Pro: 7.2 g/dL / ALK PHOS: 76 U/L / ALT: 26 U/L DA / AST: 38 U/L / GGT: x                   I&O's Summary    25 Jan 2021 07:01  -  26 Jan 2021 07:00  --------------------------------------------------------  IN: 300 mL / OUT: 0 mL / NET: 300 mL                       PGY-1 Progress Note discussed with attending    PAGER #: [[phone]] TILL 5:00 PM  PLEASE CONTACT ON CALL TEAM:  - On Call Team (Please refer to Danny) FROM 5:00 PM - 8:30PM  - Nightfloat Team FROM 8:30 -7:30 AM    CHIEF COMPLAINT & BRIEF HOSPITAL COURSE:  66 yo F from home with PMH HTN, DM2, HIV on ART, ESRD MWF (recently switched to [facility] since COVID19 +) w/ L AV Fistula (Vascular Surgeon Dr Dickens) BIBEMS from dialysis center for AV fistula malfunction admitted for hemodialysis and COVID19 infection.  Patient on HD, next planned on 1/26, nephro on board, on admission, patient with hyperkalemia, EKG NSR, s/p Lokelma, dextrose and insulin, now hypokalemia being closely monitored replaced by mouth.  Patient with COVID19 Infection no Remdesivir or Decadron indicated at this time, not requiring O2 supplementation, febrile, CT chest showed GGO, doppler of LE negative for PE, diarrhea non bloody most likely 2/2 viral infection. Patient also with HIV on LEA meds, hepatitis panel negative, blood cx negative.     For hypertension, patient on Metoprolol, for hyperlipidemia on Statin, lipid profile elevated TG, cholesterol and LDL.   Patient continues to spike fever, most likely COVID19 related, no other complaints, will continue to monitor and consider sepsis work up      INTERVAL HPI/OVERNIGHT EVENTS: patient refers she is feeling better, no sob, no diarrhea, no pain or discomfort       MEDICATIONS  (STANDING):  albumin human 25% IVPB 100 milliLiter(s) IV Intermittent once  allopurinol 100 milliGRAM(s) Oral daily  amLODIPine   Tablet 10 milliGRAM(s) Oral daily  ascorbic acid 500 milliGRAM(s) Oral daily  aspirin enteric coated 81 milliGRAM(s) Oral daily  atorvastatin 20 milliGRAM(s) Oral at bedtime  calcitriol   Capsule 0.25 MICROGram(s) Oral daily  cholecalciferol 2000 Unit(s) Oral daily  darunavir 800 mG/cobicstat 150 mG 1 Tablet(s) Oral daily  heparin   Injectable 5000 Unit(s) SubCutaneous every 8 hours  influenza   Vaccine 0.5 milliLiter(s) IntraMuscular once  lamiVUDine- milliGRAM(s) Oral daily  melatonin 5 milliGRAM(s) Oral at bedtime  metoprolol tartrate 100 milliGRAM(s) Oral two times a day  Nephro-alex 1 Tablet(s) Oral daily  senna 2 Tablet(s) Oral at bedtime  sodium chloride 0.9% Bolus 250 milliLiter(s) IV Bolus once  zinc sulfate 220 milliGRAM(s) Oral daily    MEDICATIONS  (PRN):  acetaminophen   Tablet .. 650 milliGRAM(s) Oral every 6 hours PRN Temp greater or equal to 38C (100.4F)  gabapentin 100 milliGRAM(s) Oral daily PRN pain  guaiFENesin   Syrup  (Sugar-Free) 100 milliGRAM(s) Oral every 6 hours PRN Cough      Vital Signs Last 24 Hrs  T(C): 36.2 (26 Jan 2021 09:35), Max: 38.4 (25 Jan 2021 23:25)  T(F): 97.1 (26 Jan 2021 09:35), Max: 101.1 (25 Jan 2021 23:25)  HR: 69 (26 Jan 2021 09:35) (65 - 91)  BP: 94/56 (26 Jan 2021 09:35) (89/45 - 121/69)  BP(mean): --  RR: 18 (26 Jan 2021 09:35) (17 - 18)  SpO2: 98% (26 Jan 2021 09:35) (94% - 98%)    PHYSICAL EXAMINATION:  GENERAL: NAD, well developed   HEAD:  Atraumatic, Normocephalic  EYES:  conjunctiva and sclera clear  NECK: Supple, No JVD, Normal thyroid  CHEST/LUNG: Clear to auscultation. Clear to percussion bilaterally; No rales, rhonchi, wheezing, or rubs  HEART: Regular rate and rhythm; No murmurs, rubs, or gallops  ABDOMEN: Soft, Nontender, Nondistended; Bowel sounds present, no pain or masses on palpation, diarrhea at times non bloody no mucous   NERVOUS SYSTEM:  Alert & Oriented X3  : voiding well  EXTREMITIES:  2+ Peripheral Pulses, No clubbing, or cyanosis. Mild bilateral lower edema, left AV fistula no signs of inflammation   SKIN: warm dry                          12.3   8.53  )-----------( 208      ( 25 Jan 2021 07:29 )             35.9     01-25    134<L>  |  93<L>  |  44<H>  ----------------------------<  137<H>  3.4<L>   |  29  |  9.04<H>    Ca    9.0      25 Jan 2021 10:36  Phos  2.6     01-25  Mg     2.3     01-25    TPro  7.2  /  Alb  1.8<L>  /  TBili  0.4  /  DBili  x   /  AST  38  /  ALT  26  /  AlkPhos  76  01-25    LIVER FUNCTIONS - ( 25 Jan 2021 10:36 )  Alb: 1.8 g/dL / Pro: 7.2 g/dL / ALK PHOS: 76 U/L / ALT: 26 U/L DA / AST: 38 U/L / GGT: x                   I&O's Summary    25 Jan 2021 07:01  -  26 Jan 2021 07:00  --------------------------------------------------------  IN: 300 mL / OUT: 0 mL / NET: 300 mL

## 2021-01-26 NOTE — PROGRESS NOTE ADULT - PROBLEM SELECTOR PLAN 6
- Pt takes amlodipine, metoprolol, losartan  - C/w metoprolol 50mg BID  - Monitor BP and restart home meds as needed

## 2021-01-26 NOTE — PROGRESS NOTE ADULT - PROBLEM SELECTOR PLAN 4
- On admission: K+ was 6 -> s/p insulin, dextrose, Lokelma   - Patient now with hypokalemia, not symptomatic, will give one dose K 20 mg po  - Follow BMP and replace as needed

## 2021-01-26 NOTE — PROGRESS NOTE ADULT - PROBLEM SELECTOR PLAN 1
- Patient with multiple episodes of fever, hypotension   - COVID19 infection vs bacterial   - Sepsis work up   - F/u blood cx, procalcitonin, lactate - Patient with multiple episodes of fever, hypotension   - COVID19 infection vs bacterial   - IVF NS bolus 250 cc  - Sepsis work up   - F/u blood cx, procalcitonin, lactate - Patient with multiple episodes of fever, hypotension   - COVID19 infection  - IVF NS bolus 250 cc  - Monitor for now and consider sepsis work up - Patient with multiple episodes of fever, hypotension   - COVID19 infection  - IVF NS bolus 250 cc  - 1/22 B cx negative   - Monitor for now and consider sepsis work up

## 2021-01-26 NOTE — PROGRESS NOTE ADULT - SUBJECTIVE AND OBJECTIVE BOX
Pt is awake, alert, lying in bed in NAD. HD today.     INTERVAL HPI/OVERNIGHT EVENTS:      VITAL SIGNS:  T(F): 97.1 (01-26-21 @ 09:35)  HR: 69 (01-26-21 @ 09:35)  BP: 94/56 (01-26-21 @ 09:35)  RR: 18 (01-26-21 @ 09:35)  SpO2: 98% (01-26-21 @ 09:35)  Wt(kg): --  I&O's Detail    25 Jan 2021 07:01  -  26 Jan 2021 07:00  --------------------------------------------------------  IN:    Oral Fluid: 300 mL  Total IN: 300 mL    OUT:  Total OUT: 0 mL    Total NET: 300 mL      REVIEW OF SYSTEMS:    CONSTITUTIONAL:  No fevers, chills, sweats    HEENT:  Eyes:  No diplopia or blurred vision. ENT:  No earache, sore throat or runny nose.    CARDIOVASCULAR:  No pressure, squeezing, tightness, or heaviness about the chest; no palpitations.    RESPIRATORY:  Per HPI    GASTROINTESTINAL:  No abdominal pain, nausea, vomiting or diarrhea.    GENITOURINARY:  No dysuria, frequency or urgency.    NEUROLOGIC:  No paresthesias, fasciculations, seizures or weakness.    PSYCHIATRIC:  No disorder of thought or mood.      PHYSICAL EXAM:    Constitutional: Well developed and nourished  Eyes: Perrla  ENMT: normal  Neck: supple  Respiratory: good air entry  Cardiovascular: S1 S2 regular  Gastrointestinal: Soft, Non tender  Extremities: No edema  Vascular: normal  Neurological: Awake, alert,Ox3  Musculoskeletal: Normal      MEDICATIONS  (STANDING):  albumin human 25% IVPB 100 milliLiter(s) IV Intermittent once  allopurinol 100 milliGRAM(s) Oral daily  amLODIPine   Tablet 10 milliGRAM(s) Oral daily  ascorbic acid 500 milliGRAM(s) Oral daily  aspirin enteric coated 81 milliGRAM(s) Oral daily  atorvastatin 20 milliGRAM(s) Oral at bedtime  calcitriol   Capsule 0.25 MICROGram(s) Oral daily  cholecalciferol 2000 Unit(s) Oral daily  darunavir 800 mG/cobicstat 150 mG 1 Tablet(s) Oral daily  heparin   Injectable 5000 Unit(s) SubCutaneous every 8 hours  influenza   Vaccine 0.5 milliLiter(s) IntraMuscular once  lamiVUDine- milliGRAM(s) Oral daily  melatonin 5 milliGRAM(s) Oral at bedtime  metoprolol tartrate 100 milliGRAM(s) Oral two times a day  Nephro-alex 1 Tablet(s) Oral daily  senna 2 Tablet(s) Oral at bedtime  sodium chloride 0.9% Bolus 250 milliLiter(s) IV Bolus once  zinc sulfate 220 milliGRAM(s) Oral daily    MEDICATIONS  (PRN):  acetaminophen   Tablet .. 650 milliGRAM(s) Oral every 6 hours PRN Temp greater or equal to 38C (100.4F)  gabapentin 100 milliGRAM(s) Oral daily PRN pain  guaiFENesin   Syrup  (Sugar-Free) 100 milliGRAM(s) Oral every 6 hours PRN Cough      Allergies    oxycodone (Rash)  penicillins (Urticaria; Rash)    Intolerances        LABS:                        12.3   8.53  )-----------( 208      ( 25 Jan 2021 07:29 )             35.9     01-25    134<L>  |  93<L>  |  44<H>  ----------------------------<  137<H>  3.4<L>   |  29  |  9.04<H>    Ca    9.0      25 Jan 2021 10:36  Phos  2.6     01-25  Mg     2.3     01-25    TPro  7.2  /  Alb  1.8<L>  /  TBili  0.4  /  DBili  x   /  AST  38  /  ALT  26  /  AlkPhos  76  01-25      CAPILLARY BLOOD GLUCOSE      pro-bnp -- 01-22 @ 06:00     d-dimer 667  01-22 @ 06:00        HIV-1 RNA Quantitative, Viral Load (01.22.21 @ 12:20)   HIV-1 RNA Quantitative, Viral Load:   DET. <30   HIV-1 RNA Quantitative, Vir Load Interp: See Comment Viral loads lower than 30 copies/mL can be detected, but cannot be   measured reliably. METHOD: Transcription mediated amplification (TMA) –   [employer]. Results from HIV-1 RNA tests that use other methods   might differ.   Abbreviations:   DET. = Detected,   {not det.} = Not Detected   {n/a} = not available   DET. <30 = HIV-1 RNA detected at less than <30 copies/mL.   DET. <1.47 = HIV-1 RNA detected at less than 1.47 log(copies/mL).   .   HIV-1 RNA Quantitative, Viral Load Log: DET. <1.47 lg /mL   HIV-1 Viral Load Result: DET.     RADIOLOGY & ADDITIONAL TESTS:    CXR:    Ct scan chest:    ekg;    echo:

## 2021-01-26 NOTE — PROGRESS NOTE ADULT - PROBLEM SELECTOR PLAN 2
- COVID19 PCR + complaining of cough, malaise   - No supplemental O2 requirement   - No Decadron/ Remdesivir indicated at this time   - Tylenol, Robitussin, Albuterol PRN   - 1/22 Blood cultures NTD

## 2021-01-26 NOTE — PROGRESS NOTE ADULT - PROBLEM SELECTOR PLAN 7
- Takes pravastatin at home, c/w atorvastatin 20mg qhs for now  - Lipid panel: , HDL 41, , total chol: 196

## 2021-01-26 NOTE — PROGRESS NOTE ADULT - PROBLEM SELECTOR PLAN 5
- Abs CD4 419, HIV viral load detectable but low (<30copies/ml)   - C/w home meds of darunavir-cobicistat, lamivudine-HBV  - No hx of opportunistic infections  - Recheck vaccination History

## 2021-01-26 NOTE — PROGRESS NOTE ADULT - SUBJECTIVE AND OBJECTIVE BOX
Problem List:  ESRD  COVID 19 , still c/o diarrhea .  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    DVT, lower extremity  Left leg after hysterectomy    Cervical cancer  2010    Depression    History of gastroesophageal reflux (GERD)    Gout    Hyperlipidemia    Chronic kidney disease    HTN (hypertension)    HIV (human immunodeficiency virus infection)    S/P arteriovenous (AV) fistula creation  july 10 2020    H/O: hysterectomy  Due to cervical cancer    History of ectopic pregnancy  Two        oxycodone (Rash)  penicillins (Urticaria; Rash)      MEDICATIONS  (STANDING):  allopurinol 100 milliGRAM(s) Oral daily  amLODIPine   Tablet 10 milliGRAM(s) Oral daily  ascorbic acid 500 milliGRAM(s) Oral daily  aspirin enteric coated 81 milliGRAM(s) Oral daily  atorvastatin 20 milliGRAM(s) Oral at bedtime  calcitriol   Capsule 0.25 MICROGram(s) Oral daily  cholecalciferol 2000 Unit(s) Oral daily  darunavir 800 mG/cobicstat 150 mG 1 Tablet(s) Oral daily  heparin   Injectable 5000 Unit(s) SubCutaneous every 8 hours  influenza   Vaccine 0.5 milliLiter(s) IntraMuscular once  lamiVUDine- milliGRAM(s) Oral daily  melatonin 5 milliGRAM(s) Oral at bedtime  metoprolol tartrate 100 milliGRAM(s) Oral two times a day  Nephro-alex 1 Tablet(s) Oral daily  senna 2 Tablet(s) Oral at bedtime  zinc sulfate 220 milliGRAM(s) Oral daily    MEDICATIONS  (PRN):  acetaminophen   Tablet .. 650 milliGRAM(s) Oral every 6 hours PRN Temp greater or equal to 38C (100.4F)  gabapentin 100 milliGRAM(s) Oral daily PRN pain  guaiFENesin   Syrup  (Sugar-Free) 100 milliGRAM(s) Oral every 6 hours PRN Cough                            12.3   8.53  )-----------( 208      ( 25 Jan 2021 07:29 )             35.9     01-25    134<L>  |  93<L>  |  44<H>  ----------------------------<  137<H>  3.4<L>   |  29  |  9.04<H>    Ca    9.0      25 Jan 2021 10:36  Phos  2.6     01-25  Mg     2.3     01-25    TPro  7.2  /  Alb  1.8<L>  /  TBili  0.4  /  DBili  x   /  AST  38  /  ALT  26  /  AlkPhos  76  01-25            REVIEW OF SYSTEMS:  General: no fever no chills, no weight loss.  EYES/ENT: No visual changes;  No vertigo, no headache.  NECK: No pain or stiffness  RESPIRATORY: No cough, wheezing, hemoptysis; No shortness of breath  CARDIOVASCULAR: No chest pain or palpitations. No Edema  GASTROINTESTINAL: No abdominal or epigastric pain. No nausea, vomiting. No diarrhea or constipation. No melena.  GENITOURINARY: No dysuria, frequency, foamy urine, urinary urgency, incontinence or hematuria  NEUROLOGICAL: No numbness or weakness, no tremor , no dizziness.   Muscle skeletal : no joint pain and no swelling of joints and limbs.          VITALS:  T(F): 97.3 (01-26-21 @ 13:05), Max: 101.1 (01-25-21 @ 23:25)  HR: 67 (01-26-21 @ 13:05)  BP: 122/64 (01-26-21 @ 13:05)  RR: 18 (01-26-21 @ 13:05)  SpO2: 99% (01-26-21 @ 13:05)  Wt(kg): --    01-25 @ 07:01 - 01-26 @ 07:00  --------------------------------------------------------  IN: 300 mL / OUT: 0 mL / NET: 300 mL    01-26 @ 07:01  -  01-26 @ 17:19  --------------------------------------------------------  IN: 600 mL / OUT: 600 mL / NET: 0 mL        PHYSICAL EXAM:  Constitutional: well developed, no diaphoresis, no distress.  Neck: No JVD, no carotid bruit, supple, no adenopathy  Respiratory: Good air entrance B/L, no wheezes, rales or rhonchi  Cardiovascular: S1, S2, RRR, no pericardial rub, no murmur  Abdomen: BS+, soft, no tenderness, no bruit  Pelvis: bladder nondistended  Extremities: No cyanosis or clubbing. No peripheral edema.   Pulses: All present  Neurological: A/O x 3, no focal deficits  Psychiatric: Normal mood, normal affect  Skin: No rashes  Vascular Access:     Problem List:  ESRD  COVID 19 , still c/o diarrhea .  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    DVT, lower extremity  Left leg after hysterectomy    Cervical cancer  2010    Depression    History of gastroesophageal reflux (GERD)    Gout    Hyperlipidemia    Chronic kidney disease    HTN (hypertension)    HIV (human immunodeficiency virus infection)    S/P arteriovenous (AV) fistula creation  july 10 2020    H/O: hysterectomy  Due to cervical cancer    History of ectopic pregnancy  Two        oxycodone (Rash)  penicillins (Urticaria; Rash)      MEDICATIONS  (STANDING):  allopurinol 100 milliGRAM(s) Oral daily  amLODIPine   Tablet 10 milliGRAM(s) Oral daily  ascorbic acid 500 milliGRAM(s) Oral daily  aspirin enteric coated 81 milliGRAM(s) Oral daily  atorvastatin 20 milliGRAM(s) Oral at bedtime  calcitriol   Capsule 0.25 MICROGram(s) Oral daily  cholecalciferol 2000 Unit(s) Oral daily  darunavir 800 mG/cobicstat 150 mG 1 Tablet(s) Oral daily  heparin   Injectable 5000 Unit(s) SubCutaneous every 8 hours  influenza   Vaccine 0.5 milliLiter(s) IntraMuscular once  lamiVUDine- milliGRAM(s) Oral daily  melatonin 5 milliGRAM(s) Oral at bedtime  metoprolol tartrate 100 milliGRAM(s) Oral two times a day  Nephro-alex 1 Tablet(s) Oral daily  senna 2 Tablet(s) Oral at bedtime  zinc sulfate 220 milliGRAM(s) Oral daily    MEDICATIONS  (PRN):  acetaminophen   Tablet .. 650 milliGRAM(s) Oral every 6 hours PRN Temp greater or equal to 38C (100.4F)  gabapentin 100 milliGRAM(s) Oral daily PRN pain  guaiFENesin   Syrup  (Sugar-Free) 100 milliGRAM(s) Oral every 6 hours PRN Cough                            12.3   8.53  )-----------( 208      ( 25 Jan 2021 07:29 )             35.9     01-25    134<L>  |  93<L>  |  44<H>  ----------------------------<  137<H>  3.4<L>   |  29  |  9.04<H>    Ca    9.0      25 Jan 2021 10:36  Phos  2.6     01-25  Mg     2.3     01-25    TPro  7.2  /  Alb  1.8<L>  /  TBili  0.4  /  DBili  x   /  AST  38  /  ALT  26  /  AlkPhos  76  01-25            REVIEW OF SYSTEMS:  General: no fever no chills, no weight loss.    RESPIRATORY: No cough, wheezing, hemoptysis; No shortness of breath  CARDIOVASCULAR: No chest pain or palpitations. No Edema  GASTROINTESTINAL: No abdominal or epigastric pain. No nausea, vomiting. No diarrhea or constipation. No melena.  GENITOURINARY: No dysuria, frequency, foamy urine, urinary urgency, incontinence or hematuria  NEUROLOGICAL: No numbness or weakness, no tremor , no dizziness.   Muscle skeletal : no joint pain and no swelling of joints and limbs.          VITALS:  T(F): 97.3 (01-26-21 @ 13:05), Max: 101.1 (01-25-21 @ 23:25)  HR: 67 (01-26-21 @ 13:05)  BP: 122/64 (01-26-21 @ 13:05)  RR: 18 (01-26-21 @ 13:05)  SpO2: 99% (01-26-21 @ 13:05)  Wt(kg): --    01-25 @ 07:01 - 01-26 @ 07:00  --------------------------------------------------------  IN: 300 mL / OUT: 0 mL / NET: 300 mL    01-26 @ 07:01  -  01-26 @ 17:19  --------------------------------------------------------  IN: 600 mL / OUT: 600 mL / NET: 0 mL        PHYSICAL EXAM:  Constitutional: well developed, no diaphoresis, no distress.  Neck: No JVD, no carotid bruit, supple, no adenopathy  Respiratory: Good air entrance B/L, no wheezes, rales or rhonchi  Cardiovascular: S1, S2, RRR, no pericardial rub, no murmur  Abdomen: BS+, soft, no tenderness, no bruit  Pelvis: bladder nondistended  Extremities: No cyanosis or clubbing. No peripheral edema.     Vascular Access: left upper arm AVG

## 2021-01-26 NOTE — PROGRESS NOTE ADULT - PROBLEM SELECTOR PLAN 3
- Patient with ESRD on HD   - Patient is spiking fevers most likely 2/2 COVID19 infection    - AVF has palpable thrill, no signs of inflammation   - Pt was supposed to have AVF revision on 1/21 which was postponed as she tested COVID19 on 1/19  - Vascular surgery opted for no intervention as HD access is patent   - Next HD planned for 1/26  - Nephro Dr Catalan - Patient with ESRD on HD   - Patient is spiking fevers most likely 2/2 COVID19 infection    - AVF has palpable thrill, no signs of inflammation   - Pt was supposed to have AVF revision on 1/21 which was postponed as she tested COVID19 on 1/19  - Vascular surgery opted for no intervention as HD access is patent   - Next HD planned for 1/26, albumin 25% In 100 cc x1 intra dialysis   - Started on Nepro 1 can BID   - Nephro Dr Catalan

## 2021-01-26 NOTE — PROGRESS NOTE ADULT - ASSESSMENT
ESRD  COVID 19 , diarrhea and fever, as per the patient improved today  Appetite still poor.  Bilateral pneumonia ,  o2 saturation 96-97%stable. was 94 % earlier    Continue follow up symptoms and fever.  Hypotension improved, post iv fluids bolus and albumin 25% 100 ml one time in dialysis.

## 2021-01-26 NOTE — PROGRESS NOTE ADULT - ASSESSMENT
64 yo F from home PMH HTN, DM2, HIV on LEA, ESRD MWF (recently switched to [facility] since COVID19 +) w/ L AV Fistula (Vascular Surgeon Dr Dickens) DARCY from dialysis center for AV fistula malfunction admitted for hemodialysis and COVID19 infection            64 yo F from home PMH HTN, DM2, HIV on ART, ESRD MWF (recently switched to [facility] since COVID19 +) w/ L AV Fistula (Vascular Surgeon Dr Dickens) DARCY from dialysis center for AV fistula malfunction admitted for hemodialysis and COVID19 infection

## 2021-01-27 LAB
ALBUMIN SERPL ELPH-MCNC: 2.2 G/DL — LOW (ref 3.5–5)
ALP SERPL-CCNC: 113 U/L — SIGNIFICANT CHANGE UP (ref 40–120)
ALT FLD-CCNC: 73 U/L DA — HIGH (ref 10–60)
ANION GAP SERPL CALC-SCNC: 9 MMOL/L — SIGNIFICANT CHANGE UP (ref 5–17)
AST SERPL-CCNC: 106 U/L — HIGH (ref 10–40)
BILIRUB SERPL-MCNC: 0.5 MG/DL — SIGNIFICANT CHANGE UP (ref 0.2–1.2)
BUN SERPL-MCNC: 30 MG/DL — HIGH (ref 7–18)
CALCIUM SERPL-MCNC: 9.2 MG/DL — SIGNIFICANT CHANGE UP (ref 8.4–10.5)
CHLORIDE SERPL-SCNC: 98 MMOL/L — SIGNIFICANT CHANGE UP (ref 96–108)
CO2 SERPL-SCNC: 30 MMOL/L — SIGNIFICANT CHANGE UP (ref 22–31)
CREAT SERPL-MCNC: 7.25 MG/DL — HIGH (ref 0.5–1.3)
CRP SERPL-MCNC: 13.57 MG/DL — HIGH (ref 0–0.4)
CULTURE RESULTS: SIGNIFICANT CHANGE UP
CULTURE RESULTS: SIGNIFICANT CHANGE UP
D DIMER BLD IA.RAPID-MCNC: 452 NG/ML DDU — HIGH
FERRITIN SERPL-MCNC: 2366 NG/ML — HIGH (ref 15–150)
GLUCOSE SERPL-MCNC: 79 MG/DL — SIGNIFICANT CHANGE UP (ref 70–99)
HCT VFR BLD CALC: 36.8 % — SIGNIFICANT CHANGE UP (ref 34.5–45)
HGB BLD-MCNC: 12.4 G/DL — SIGNIFICANT CHANGE UP (ref 11.5–15.5)
LDH SERPL L TO P-CCNC: 399 U/L — HIGH (ref 120–225)
MAGNESIUM SERPL-MCNC: 2.3 MG/DL — SIGNIFICANT CHANGE UP (ref 1.6–2.6)
MCHC RBC-ENTMCNC: 31.8 PG — SIGNIFICANT CHANGE UP (ref 27–34)
MCHC RBC-ENTMCNC: 33.7 GM/DL — SIGNIFICANT CHANGE UP (ref 32–36)
MCV RBC AUTO: 94.4 FL — SIGNIFICANT CHANGE UP (ref 80–100)
NRBC # BLD: 0 /100 WBCS — SIGNIFICANT CHANGE UP (ref 0–0)
PHOSPHATE SERPL-MCNC: 2.1 MG/DL — LOW (ref 2.5–4.5)
PLATELET # BLD AUTO: 246 K/UL — SIGNIFICANT CHANGE UP (ref 150–400)
POTASSIUM SERPL-MCNC: 3.8 MMOL/L — SIGNIFICANT CHANGE UP (ref 3.5–5.3)
POTASSIUM SERPL-SCNC: 3.8 MMOL/L — SIGNIFICANT CHANGE UP (ref 3.5–5.3)
PROT SERPL-MCNC: 7.5 G/DL — SIGNIFICANT CHANGE UP (ref 6–8.3)
RBC # BLD: 3.9 M/UL — SIGNIFICANT CHANGE UP (ref 3.8–5.2)
RBC # FLD: 15 % — HIGH (ref 10.3–14.5)
SARS-COV-2 RNA SPEC QL NAA+PROBE: DETECTED
SODIUM SERPL-SCNC: 137 MMOL/L — SIGNIFICANT CHANGE UP (ref 135–145)
SPECIMEN SOURCE: SIGNIFICANT CHANGE UP
SPECIMEN SOURCE: SIGNIFICANT CHANGE UP
WBC # BLD: 7.75 K/UL — SIGNIFICANT CHANGE UP (ref 3.8–10.5)
WBC # FLD AUTO: 7.75 K/UL — SIGNIFICANT CHANGE UP (ref 3.8–10.5)

## 2021-01-27 PROCEDURE — 99233 SBSQ HOSP IP/OBS HIGH 50: CPT | Mod: GC

## 2021-01-27 RX ADMIN — ZINC SULFATE TAB 220 MG (50 MG ZINC EQUIVALENT) 220 MILLIGRAM(S): 220 (50 ZN) TAB at 11:31

## 2021-01-27 RX ADMIN — Medication 81 MILLIGRAM(S): at 11:31

## 2021-01-27 RX ADMIN — Medication 1 TABLET(S): at 11:31

## 2021-01-27 RX ADMIN — Medication 100 MILLIGRAM(S): at 11:31

## 2021-01-27 RX ADMIN — ATORVASTATIN CALCIUM 20 MILLIGRAM(S): 80 TABLET, FILM COATED ORAL at 20:33

## 2021-01-27 RX ADMIN — AMLODIPINE BESYLATE 10 MILLIGRAM(S): 2.5 TABLET ORAL at 05:56

## 2021-01-27 RX ADMIN — Medication 500 MILLIGRAM(S): at 11:31

## 2021-01-27 RX ADMIN — HEPARIN SODIUM 5000 UNIT(S): 5000 INJECTION INTRAVENOUS; SUBCUTANEOUS at 13:55

## 2021-01-27 RX ADMIN — Medication 100 MILLIGRAM(S): at 05:56

## 2021-01-27 RX ADMIN — HEPARIN SODIUM 5000 UNIT(S): 5000 INJECTION INTRAVENOUS; SUBCUTANEOUS at 20:33

## 2021-01-27 RX ADMIN — Medication 2000 UNIT(S): at 11:31

## 2021-01-27 RX ADMIN — Medication 5 MILLIGRAM(S): at 20:32

## 2021-01-27 RX ADMIN — HEPARIN SODIUM 5000 UNIT(S): 5000 INJECTION INTRAVENOUS; SUBCUTANEOUS at 05:56

## 2021-01-27 RX ADMIN — CALCITRIOL 0.25 MICROGRAM(S): 0.5 CAPSULE ORAL at 11:31

## 2021-01-27 RX ADMIN — DARUNAVIR ETHANOLATE AND COBICISTAT 1 TABLET(S): 800; 150 TABLET, FILM COATED ORAL at 11:31

## 2021-01-27 NOTE — PROGRESS NOTE ADULT - SUBJECTIVE AND OBJECTIVE BOX
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    DVT, lower extremity  Left leg after hysterectomy    Cervical cancer  2010    Depression    History of gastroesophageal reflux (GERD)    Gout    Hyperlipidemia    Chronic kidney disease    HTN (hypertension)    HIV (human immunodeficiency virus infection)    S/P arteriovenous (AV) fistula creation  july 10 2020    H/O: hysterectomy  Due to cervical cancer    History of ectopic pregnancy  Two        oxycodone (Rash)  penicillins (Urticaria; Rash)      MEDICATIONS  (STANDING):  allopurinol 100 milliGRAM(s) Oral daily  amLODIPine   Tablet 10 milliGRAM(s) Oral daily  ascorbic acid 500 milliGRAM(s) Oral daily  aspirin enteric coated 81 milliGRAM(s) Oral daily  atorvastatin 20 milliGRAM(s) Oral at bedtime  calcitriol   Capsule 0.25 MICROGram(s) Oral daily  cholecalciferol 2000 Unit(s) Oral daily  darunavir 800 mG/cobicstat 150 mG 1 Tablet(s) Oral daily  heparin   Injectable 5000 Unit(s) SubCutaneous every 8 hours  influenza   Vaccine 0.5 milliLiter(s) IntraMuscular once  lamiVUDine- milliGRAM(s) Oral daily  melatonin 5 milliGRAM(s) Oral at bedtime  metoprolol tartrate 100 milliGRAM(s) Oral two times a day  Nephro-alex 1 Tablet(s) Oral daily  senna 2 Tablet(s) Oral at bedtime    MEDICATIONS  (PRN):  acetaminophen   Tablet .. 650 milliGRAM(s) Oral every 6 hours PRN Temp greater or equal to 38C (100.4F)  gabapentin 100 milliGRAM(s) Oral daily PRN pain  guaiFENesin   Syrup  (Sugar-Free) 100 milliGRAM(s) Oral every 6 hours PRN Cough                            12.4   7.75  )-----------( 246      ( 27 Jan 2021 12:49 )             36.8     01-27    137  |  98  |  30<H>  ----------------------------<  79  3.8   |  30  |  7.25<H>    Ca    9.2      27 Jan 2021 12:49  Phos  2.1     01-27  Mg     2.3     01-27    TPro  7.5  /  Alb  2.2<L>  /  TBili  0.5  /  DBili  x   /  AST  106<H>  /  ALT  73<H>  /  AlkPhos  113  01-27            REVIEW OF SYSTEMS:  General: no fever no chills, no weight loss.    RESPIRATORY: No cough, wheezing, hemoptysis; No shortness of breath  CARDIOVASCULAR: No chest pain or palpitations. No Edema  GASTROINTESTINAL: No abdominal or epigastric pain. No nausea, vomiting. No diARRHEA. aPPETITE IMPROVED  GENITOURINARY: No dysuria, frequency, foamy urine, urinary urgency, incontinence or hematuria  NEUROLOGICAL: No numbness or weakness, no tremor , no dizziness.   Muscle skeletal : no joint pain and no swelling of joints and limbs.  SKIN: No itching, burning, rashes.        VITALS:  T(F): 97.5 (01-27-21 @ 20:31), Max: 98.7 (01-27-21 @ 08:28)  HR: 74 (01-27-21 @ 20:31)  BP: 117/74 (01-27-21 @ 20:31)  RR: 18 (01-27-21 @ 20:31)  SpO2: 97% (01-27-21 @ 20:31)  Wt(kg): --    01-26 @ 07:01  -  01-27 @ 07:00  --------------------------------------------------------  IN: 600 mL / OUT: 600 mL / NET: 0 mL        PHYSICAL EXAM:  Constitutional: well developed, no diaphoresis, no distress.  Neck: No JVD, no carotid bruit, supple, no adenopathy  Respiratory: Good air entrance B/L, no wheezes, rales or rhonchi  Cardiovascular: S1, S2, RRR, no pericardial rub, no murmur  Abdomen: BS+, soft, no tenderness, no bruit  Pelvis: bladder nondistended  Extremities: No cyanosis or clubbing. No peripheral edema.     Vascular Access: LEFT UPPER ARM avg

## 2021-01-27 NOTE — PROGRESS NOTE ADULT - PROBLEM SELECTOR PLAN 1
- Patient with multiple episodes of fever, hypotension   - Most likely due to COVID19 infection   - S/p IVF NS bolus 250 cc  - No fever over the last 24 hrs

## 2021-01-27 NOTE — PROGRESS NOTE ADULT - PROBLEM SELECTOR PLAN 3
- Patient with ESRD on HD   - Patient is spiking fevers most likely 2/2 COVID19 infection    - AVF has palpable thrill, no signs of inflammation   - Pt was supposed to have AVF revision on 1/21 which was postponed as she tested COVID19 on 1/19  - Vascular surgery opted for no intervention as HD access is patent   - S/p albumin 25% In 100 cc x1 intra dialysis on 1/26  - C/w Nepro 1 can BID   - Last HD 1/26  - Nephro Dr Catalan

## 2021-01-27 NOTE — PROGRESS NOTE ADULT - ASSESSMENT
ESRD dialysis days TTS, follow with dialysis  COVID 19, SYMPTOMS IMPROVED, AFEBRILE.  BP improved. Sodium increased.

## 2021-01-27 NOTE — PROGRESS NOTE ADULT - PROBLEM SELECTOR PLAN 4
- On admission: K+ was 6 -> s/p insulin, dextrose, Lokelma   - Patient with hypokalemia, not symptomatic, s/p po potassium   - Follow BMP and replace as needed

## 2021-01-27 NOTE — PROGRESS NOTE ADULT - SUBJECTIVE AND OBJECTIVE BOX
PGY-1 Progress Note discussed with attending    PAGER #: [[phone]] TILL 5:00 PM  PLEASE CONTACT ON CALL TEAM:  - On Call Team (Please refer to Danny) FROM 5:00 PM - 8:30PM  - Nightfloat Team FROM 8:30 -7:30 AM    CHIEF COMPLAINT & BRIEF HOSPITAL COURSE:  64 yo F from home with PMH HTN, DM2, HIV on LEA, ESRD MWF (recently switched to T [facility] S since COVID19 +) w/ L AV Fistula (Vascular Surgeon Dr Dickens) BIBEMS from dialysis center for AV fistula malfunction admitted for hemodialysis and COVID19 infection.  Patient on HD, next planned on 1/26, nephro on board, on admission, patient with hyperkalemia, EKG NSR, s/p Lokelma, dextrose and insulin, now hypokalemia being closely monitored replaced by mouth.  Patient with COVID19 Infection no Remdesivir or Decadron indicated at this time, not requiring O2 supplementation, febrile, CT chest showed GGO, doppler of LE negative for PE, diarrhea non bloody most likely 2/2 viral infection. Patient also with HIV on LEA meds, hepatitis panel negative, blood cx negative.     For hypertension, patient on Metoprolol, for hyperlipidemia on Statin, lipid profile elevated TG, cholesterol and LDL.   Patient continues to spike fever most likely related to COVID19 infection     INTERVAL HPI/OVERNIGHT EVENTS: patient refers she is feeling better, no diarrhea, no pain, no sob      MEDICATIONS  (STANDING):  allopurinol 100 milliGRAM(s) Oral daily  amLODIPine   Tablet 10 milliGRAM(s) Oral daily  ascorbic acid 500 milliGRAM(s) Oral daily  aspirin enteric coated 81 milliGRAM(s) Oral daily  atorvastatin 20 milliGRAM(s) Oral at bedtime  calcitriol   Capsule 0.25 MICROGram(s) Oral daily  cholecalciferol 2000 Unit(s) Oral daily  darunavir 800 mG/cobicstat 150 mG 1 Tablet(s) Oral daily  heparin   Injectable 5000 Unit(s) SubCutaneous every 8 hours  influenza   Vaccine 0.5 milliLiter(s) IntraMuscular once  lamiVUDine- milliGRAM(s) Oral daily  melatonin 5 milliGRAM(s) Oral at bedtime  metoprolol tartrate 100 milliGRAM(s) Oral two times a day  Nephro-alex 1 Tablet(s) Oral daily  senna 2 Tablet(s) Oral at bedtime  zinc sulfate 220 milliGRAM(s) Oral daily    MEDICATIONS  (PRN):  acetaminophen   Tablet .. 650 milliGRAM(s) Oral every 6 hours PRN Temp greater or equal to 38C (100.4F)  gabapentin 100 milliGRAM(s) Oral daily PRN pain  guaiFENesin   Syrup  (Sugar-Free) 100 milliGRAM(s) Oral every 6 hours PRN Cough      Vital Signs Last 24 Hrs  T(C): 37.1 (27 Jan 2021 08:28), Max: 37.1 (27 Jan 2021 08:28)  T(F): 98.7 (27 Jan 2021 08:28), Max: 98.7 (27 Jan 2021 08:28)  HR: 74 (27 Jan 2021 08:28) (67 - 99)  BP: 102/60 (27 Jan 2021 08:28) (102/60 - 131/80)  BP(mean): --  RR: 18 (27 Jan 2021 08:28) (18 - 18)  SpO2: 97% (27 Jan 2021 08:28) (95% - 99%)    PHYSICAL EXAMINATION:  GENERAL: NAD, well developed   HEAD:  Atraumatic, Normocephalic  EYES:  conjunctiva and sclera clear  NECK: Supple, No JVD, Normal thyroid  CHEST/LUNG: Clear to auscultation. Clear to percussion bilaterally; No rales, rhonchi, wheezing, or rubs  HEART: Regular rate and rhythm; No murmurs, rubs, or gallops  ABDOMEN: Soft, Nontender, Nondistended; Bowel sounds present, no pain or masses on palpation, diarrhea at times non bloody no mucous   NERVOUS SYSTEM:  Alert & Oriented X3  : voiding well  EXTREMITIES:  2+ Peripheral Pulses, No clubbing, or cyanosis. Mild bilateral lower edema, left AV fistula no signs of inflammation   SKIN: warm dry        I&O's Summary    26 Jan 2021 07:01  -  27 Jan 2021 07:00  --------------------------------------------------------  IN: 600 mL / OUT: 600 mL / NET: 0 mL                         PGY-1 Progress Note discussed with attending    PAGER #: [[phone]] TILL 5:00 PM  PLEASE CONTACT ON CALL TEAM:  - On Call Team (Please refer to Danny) FROM 5:00 PM - 8:30PM  - Nightfloat Team FROM 8:30 -7:30 AM    CHIEF COMPLAINT & BRIEF HOSPITAL COURSE:  64 yo F from home with PMH HTN, DM2, HIV on ART, ESRD MWF (recently switched to T [facility] since COVID19 +) w/ L AV Fistula (Vascular Surgeon Dr Dickens) BIBEMS from dialysis center for AV fistula malfunction admitted for hemodialysis and COVID19 infection.  Patient on HD, next planned on 1/26, nephro on board, on admission, patient with hyperkalemia, EKG NSR, s/p Lokelma, dextrose and insulin, now hypokalemia being closely monitored replaced by mouth.  Patient with COVID19 Infection no Remdesivir or Decadron indicated at this time, not requiring O2 supplementation, febrile, CT chest showed GGO, doppler of LE negative for PE, diarrhea non bloody most likely 2/2 viral infection. Patient also with HIV on LEA meds, hepatitis panel negative, blood cx negative.     For hypertension, patient on Metoprolol, for hyperlipidemia on Statin, lipid profile elevated TG, cholesterol and LDL.   Patient continues to spike fever most likely related to COVID19 infection     INTERVAL HPI/OVERNIGHT EVENTS: patient refers she is feeling better, no diarrhea, no pain, no sob      MEDICATIONS  (STANDING):  allopurinol 100 milliGRAM(s) Oral daily  amLODIPine   Tablet 10 milliGRAM(s) Oral daily  ascorbic acid 500 milliGRAM(s) Oral daily  aspirin enteric coated 81 milliGRAM(s) Oral daily  atorvastatin 20 milliGRAM(s) Oral at bedtime  calcitriol   Capsule 0.25 MICROGram(s) Oral daily  cholecalciferol 2000 Unit(s) Oral daily  darunavir 800 mG/cobicstat 150 mG 1 Tablet(s) Oral daily  heparin   Injectable 5000 Unit(s) SubCutaneous every 8 hours  influenza   Vaccine 0.5 milliLiter(s) IntraMuscular once  lamiVUDine- milliGRAM(s) Oral daily  melatonin 5 milliGRAM(s) Oral at bedtime  metoprolol tartrate 100 milliGRAM(s) Oral two times a day  Nephro-alex 1 Tablet(s) Oral daily  senna 2 Tablet(s) Oral at bedtime  zinc sulfate 220 milliGRAM(s) Oral daily    MEDICATIONS  (PRN):  acetaminophen   Tablet .. 650 milliGRAM(s) Oral every 6 hours PRN Temp greater or equal to 38C (100.4F)  gabapentin 100 milliGRAM(s) Oral daily PRN pain  guaiFENesin   Syrup  (Sugar-Free) 100 milliGRAM(s) Oral every 6 hours PRN Cough      Vital Signs Last 24 Hrs  T(C): 37.1 (27 Jan 2021 08:28), Max: 37.1 (27 Jan 2021 08:28)  T(F): 98.7 (27 Jan 2021 08:28), Max: 98.7 (27 Jan 2021 08:28)  HR: 74 (27 Jan 2021 08:28) (67 - 99)  BP: 102/60 (27 Jan 2021 08:28) (102/60 - 131/80)  BP(mean): --  RR: 18 (27 Jan 2021 08:28) (18 - 18)  SpO2: 97% (27 Jan 2021 08:28) (95% - 99%)    PHYSICAL EXAMINATION:  GENERAL: NAD, well developed   HEAD:  Atraumatic, Normocephalic  EYES:  conjunctiva and sclera clear  NECK: Supple, No JVD, Normal thyroid  CHEST/LUNG: Clear to auscultation. Clear to percussion bilaterally; No rales, rhonchi, wheezing, or rubs  HEART: Regular rate and rhythm; No murmurs, rubs, or gallops  ABDOMEN: Soft, Nontender, Nondistended; Bowel sounds present, no pain or masses on palpation, diarrhea at times non bloody no mucous   NERVOUS SYSTEM:  Alert & Oriented X3  : voiding well  EXTREMITIES:  2+ Peripheral Pulses, No clubbing, or cyanosis. Mild bilateral lower edema, left AV fistula no signs of inflammation   SKIN: warm dry        I&O's Summary    26 Jan 2021 07:01  -  27 Jan 2021 07:00  --------------------------------------------------------  IN: 600 mL / OUT: 600 mL / NET: 0 mL

## 2021-01-27 NOTE — PROGRESS NOTE ADULT - SUBJECTIVE AND OBJECTIVE BOX
Pt is awake, alert, lying in bed in NAD.     INTERVAL HPI/OVERNIGHT EVENTS:      VITAL SIGNS:  T(F): 98.5 (01-27-21 @ 11:29)  HR: 65 (01-27-21 @ 11:29)  BP: 99/58 (01-27-21 @ 11:29)  RR: 17 (01-27-21 @ 11:29)  SpO2: 97% (01-27-21 @ 11:29)  Wt(kg): --  I&O's Detail    26 Jan 2021 07:01  -  27 Jan 2021 07:00  --------------------------------------------------------  IN:    Other (mL): 600 mL  Total IN: 600 mL    OUT:    Other (mL): 600 mL  Total OUT: 600 mL    Total NET: 0 mL              REVIEW OF SYSTEMS:    CONSTITUTIONAL:  No fevers, chills, sweats    HEENT:  Eyes:  No diplopia or blurred vision. ENT:  No earache, sore throat or runny nose.    CARDIOVASCULAR:  No pressure, squeezing, tightness, or heaviness about the chest; no palpitations.    RESPIRATORY:  Per HPI    GASTROINTESTINAL:  No abdominal pain, nausea, vomiting or diarrhea.    GENITOURINARY:  No dysuria, frequency or urgency.    NEUROLOGIC:  No paresthesias, fasciculations, seizures or weakness.    PSYCHIATRIC:  No disorder of thought or mood.      PHYSICAL EXAM:    Constitutional: Well developed and nourished  Eyes:Perrla  ENMT: normal  Neck:supple  Respiratory: good air entry  Cardiovascular: S1 S2 regular  Gastrointestinal: Soft, Non tender  Extremities: No edema  Vascular:normal  Neurological:Awake, alert,Ox3  Musculoskeletal:Normal      MEDICATIONS  (STANDING):  allopurinol 100 milliGRAM(s) Oral daily  amLODIPine   Tablet 10 milliGRAM(s) Oral daily  ascorbic acid 500 milliGRAM(s) Oral daily  aspirin enteric coated 81 milliGRAM(s) Oral daily  atorvastatin 20 milliGRAM(s) Oral at bedtime  calcitriol   Capsule 0.25 MICROGram(s) Oral daily  cholecalciferol 2000 Unit(s) Oral daily  darunavir 800 mG/cobicstat 150 mG 1 Tablet(s) Oral daily  heparin   Injectable 5000 Unit(s) SubCutaneous every 8 hours  influenza   Vaccine 0.5 milliLiter(s) IntraMuscular once  lamiVUDine- milliGRAM(s) Oral daily  melatonin 5 milliGRAM(s) Oral at bedtime  metoprolol tartrate 100 milliGRAM(s) Oral two times a day  Nephro-alex 1 Tablet(s) Oral daily  senna 2 Tablet(s) Oral at bedtime    MEDICATIONS  (PRN):  acetaminophen   Tablet .. 650 milliGRAM(s) Oral every 6 hours PRN Temp greater or equal to 38C (100.4F)  gabapentin 100 milliGRAM(s) Oral daily PRN pain  guaiFENesin   Syrup  (Sugar-Free) 100 milliGRAM(s) Oral every 6 hours PRN Cough      Allergies    oxycodone (Rash)  penicillins (Urticaria; Rash)    Intolerances    LABS:    CAPILLARY BLOOD GLUCOSE    pro-bnp -- 01-22 @ 06:00     d-dimer 667  01-22 @ 06:00      RADIOLOGY & ADDITIONAL TESTS:    CXR:    Ct scan chest:    ekg;    echo:

## 2021-01-27 NOTE — PROGRESS NOTE ADULT - PROBLEM SELECTOR PLAN 8
IMPROVE VTE Individual Risk Assessment  RISK                                                                Points  [X] Previous VTE                                                  3  [  ] Thrombophilia                                               2  [  ] Lower limb paralysis                                      2        (unable to hold up >15 seconds)    [  ] Current Cancer                                              2         (within 6 months)  [X] Immobilization > 24 hrs                                1  [  ] ICU/CCU stay > 24 hours                              1  [X] Age > 60                                                      1    IMPROVE VTE Score 5  C/w heparin q 8 hrs
IMPROVE VTE Individual Risk Assessment  RISK                                                                Points  [X] Previous VTE                                                  3  [  ] Thrombophilia                                               2  [  ] Lower limb paralysis                                      2        (unable to hold up >15 seconds)    [  ] Current Cancer                                              2         (within 6 months)  [X] Immobilization > 24 hrs                                1  [  ] ICU/CCU stay > 24 hours                              1  [X] Age > 60                                                      1    IMPROVE VTE Score 5  C/w heparin q 8 hrs

## 2021-01-27 NOTE — PROGRESS NOTE ADULT - PROBLEM SELECTOR PLAN 2
- COVID19 PCR +, no sob, no malaise   - No supplemental O2 requirement   - No Decadron/ Remdesivir indicated at this time   - Tylenol, Robitussin, Albuterol PRN   - 1/22 Blood cultures NTD

## 2021-01-27 NOTE — PROGRESS NOTE ADULT - ATTENDING COMMENTS
65 F from home PMH HTN, DM2, HIV on ART, ESRD MWF (recently switched to T [facility] since COVID +) w/ L AV Fistula (Vascular Surgeon Dr Dickens) BIBEMS from dialysis center for AV fistula malfunction admitted for hemodialysis.               Problem/Plan - 1:  ·  Problem: ESRD (end stage renal disease) on dialysis.  Plan: - HD 1/21 complete, and L AVF patent and functioning as per Dr. Catalan (Outpatient nephro) -> HD 1/23  - Will need to stop spiking fevers prior to outpt dialysis not possible   - AVF has palpable thrill, no signs of redness/ infection/ pain  - Pt was supposed to have AVF revision? 1/21 which was postponed as Cov +  - Vascular surgery opted for no intervention as HD access present   - Consulted Nephro Dr Catalan.      Problem/Plan - 2:  ·  Problem: Hyperkalemia.  Plan: Resolved   - Follow BMP.      Problem/Plan - 3:  ·  Problem: COVID-19.  Plan: - COVID PCR + complaining of cough  - Required O2 overnight but now comfortable on room air  - No role for decadron/ remdesivir at this time   - Tylenol, robitussin, Albuterol PRN   - Blood cultures NTD.      Problem/Plan - 4:  ·  Problem: HIV (human immunodeficiency virus infection).  Plan: - Abs CD4 419, HIV viral load detectable but low (<30copies/ml)   - Restarted on home meds of darunavir-cobicistat, lamivudine-HBV  - No hx of opportunistic infections, PPx   - Recheck vaccination History, ensure flu/ PNA vaccine prior to D/c.      Problem/Plan - 5:  ·  Problem: HTN (hypertension).  Plan: - Pt takes amlodipine, metoprolol, losartan  - well controlled on norvasc 10mg   - Restart home meds as needed.      Problem/Plan - 6:  Problem: Hyperlipidemia. Plan: - Takes pravastatin at home, start atorvastatin 20mg qhs for now  - Lipid panel: , HDL 41, , total chol: 196.  - will need statin because of HIV as well     Problem/Plan - 7:  ·  Problem: Prophylactic measure.    DVT prophylaxis heparin 5000units subcut q8hrs      Dispo: Patient's diarrhea is improving but still present, her BC have been negative but she is spiking fevers, likely due to Covid19. She will need to be afebrile before discharge for outpatient dialysis setup.
Afebrile for 24hrs  HD tomorrow  if remain afebrile till tomorrow than can dc home post HD
HD today  check blood cx  still febrile  will r.o infection
HD today  had fever yesterday  fu renal recs  diarrhea resolving
still have diarrhea  does not require home oxygen  hd tomorrow  still febrile overnight

## 2021-01-27 NOTE — PROGRESS NOTE ADULT - ASSESSMENT
64 yo F from home PMH HTN, DM2, HIV on LEA, ESRD MWF (recently switched to [facility] since COVID19 +) w/ L AV Fistula (Vascular Surgeon Dr Dickens) DARCY from dialysis center for AV fistula malfunction admitted for hemodialysis and COVID19 infection            66 yo F from home PMH HTN, DM2, HIV on ART, ESRD MWF (recently switched to [facility] since COVID19 +) w/ L AV Fistula (Vascular Surgeon Dr Dickens) DARCY from dialysis center for AV fistula malfunction admitted for hemodialysis and COVID19 infection

## 2021-01-28 ENCOUNTER — TRANSCRIPTION ENCOUNTER (OUTPATIENT)
Age: 66
End: 2021-01-28

## 2021-01-28 VITALS
DIASTOLIC BLOOD PRESSURE: 52 MMHG | HEART RATE: 92 BPM | RESPIRATION RATE: 17 BRPM | SYSTOLIC BLOOD PRESSURE: 102 MMHG | OXYGEN SATURATION: 98 % | TEMPERATURE: 98 F

## 2021-01-28 PROCEDURE — 86359 T CELLS TOTAL COUNT: CPT

## 2021-01-28 PROCEDURE — 97162 PT EVAL MOD COMPLEX 30 MIN: CPT

## 2021-01-28 PROCEDURE — 84443 ASSAY THYROID STIM HORMONE: CPT

## 2021-01-28 PROCEDURE — 86360 T CELL ABSOLUTE COUNT/RATIO: CPT

## 2021-01-28 PROCEDURE — 80053 COMPREHEN METABOLIC PANEL: CPT

## 2021-01-28 PROCEDURE — 86803 HEPATITIS C AB TEST: CPT

## 2021-01-28 PROCEDURE — 82607 VITAMIN B-12: CPT

## 2021-01-28 PROCEDURE — 86140 C-REACTIVE PROTEIN: CPT

## 2021-01-28 PROCEDURE — 82746 ASSAY OF FOLIC ACID SERUM: CPT

## 2021-01-28 PROCEDURE — 85025 COMPLETE CBC W/AUTO DIFF WBC: CPT

## 2021-01-28 PROCEDURE — 87536 HIV-1 QUANT&REVRSE TRNSCRPJ: CPT

## 2021-01-28 PROCEDURE — 85027 COMPLETE CBC AUTOMATED: CPT

## 2021-01-28 PROCEDURE — 99053 MED SERV 10PM-8AM 24 HR FAC: CPT

## 2021-01-28 PROCEDURE — 82306 VITAMIN D 25 HYDROXY: CPT

## 2021-01-28 PROCEDURE — U0005: CPT

## 2021-01-28 PROCEDURE — 80061 LIPID PANEL: CPT

## 2021-01-28 PROCEDURE — 80074 ACUTE HEPATITIS PANEL: CPT

## 2021-01-28 PROCEDURE — 99261: CPT

## 2021-01-28 PROCEDURE — 93005 ELECTROCARDIOGRAM TRACING: CPT

## 2021-01-28 PROCEDURE — 0225U NFCT DS DNA&RNA 21 SARSCOV2: CPT

## 2021-01-28 PROCEDURE — P9047: CPT

## 2021-01-28 PROCEDURE — 83735 ASSAY OF MAGNESIUM: CPT

## 2021-01-28 PROCEDURE — 80048 BASIC METABOLIC PNL TOTAL CA: CPT

## 2021-01-28 PROCEDURE — 99285 EMERGENCY DEPT VISIT HI MDM: CPT

## 2021-01-28 PROCEDURE — 99239 HOSP IP/OBS DSCHRG MGMT >30: CPT | Mod: GC

## 2021-01-28 PROCEDURE — 84100 ASSAY OF PHOSPHORUS: CPT

## 2021-01-28 PROCEDURE — 85610 PROTHROMBIN TIME: CPT

## 2021-01-28 PROCEDURE — 83615 LACTATE (LD) (LDH) ENZYME: CPT

## 2021-01-28 PROCEDURE — 82728 ASSAY OF FERRITIN: CPT

## 2021-01-28 PROCEDURE — 71045 X-RAY EXAM CHEST 1 VIEW: CPT

## 2021-01-28 PROCEDURE — 71250 CT THORAX DX C-: CPT

## 2021-01-28 PROCEDURE — 83036 HEMOGLOBIN GLYCOSYLATED A1C: CPT

## 2021-01-28 PROCEDURE — 36415 COLL VENOUS BLD VENIPUNCTURE: CPT

## 2021-01-28 PROCEDURE — 85379 FIBRIN DEGRADATION QUANT: CPT

## 2021-01-28 PROCEDURE — 84145 PROCALCITONIN (PCT): CPT

## 2021-01-28 PROCEDURE — 87635 SARS-COV-2 COVID-19 AMP PRB: CPT

## 2021-01-28 PROCEDURE — 86769 SARS-COV-2 COVID-19 ANTIBODY: CPT

## 2021-01-28 PROCEDURE — 87040 BLOOD CULTURE FOR BACTERIA: CPT

## 2021-01-28 PROCEDURE — 82962 GLUCOSE BLOOD TEST: CPT

## 2021-01-28 RX ORDER — CHOLECALCIFEROL (VITAMIN D3) 125 MCG
1 CAPSULE ORAL
Qty: 30 | Refills: 0
Start: 2021-01-28 | End: 2021-02-26

## 2021-01-28 RX ORDER — TRAZODONE HCL 50 MG
1 TABLET ORAL
Qty: 0 | Refills: 0 | DISCHARGE

## 2021-01-28 RX ORDER — ERGOCALCIFEROL 1.25 MG/1
1 CAPSULE ORAL
Qty: 0 | Refills: 0 | DISCHARGE

## 2021-01-28 RX ORDER — SENNA PLUS 8.6 MG/1
2 TABLET ORAL
Qty: 20 | Refills: 0
Start: 2021-01-28 | End: 2023-02-09

## 2021-01-28 RX ORDER — SENNA PLUS 8.6 MG/1
2 TABLET ORAL
Qty: 20 | Refills: 0
Start: 2021-01-28 | End: 2021-02-06

## 2021-01-28 RX ORDER — LABETALOL HCL 100 MG
1 TABLET ORAL
Qty: 0 | Refills: 0 | DISCHARGE

## 2021-01-28 RX ORDER — LACTULOSE 10 G/15ML
15 SOLUTION ORAL
Qty: 0 | Refills: 0 | DISCHARGE

## 2021-01-28 RX ORDER — ASCORBIC ACID 60 MG
1 TABLET,CHEWABLE ORAL
Qty: 0 | Refills: 0 | DISCHARGE
Start: 2021-01-28

## 2021-01-28 RX ORDER — FUROSEMIDE 40 MG
1 TABLET ORAL
Qty: 0 | Refills: 0 | DISCHARGE

## 2021-01-28 RX ORDER — LOSARTAN POTASSIUM 100 MG/1
1 TABLET, FILM COATED ORAL
Qty: 0 | Refills: 0 | DISCHARGE

## 2021-01-28 RX ORDER — HYDRALAZINE HCL 50 MG
1 TABLET ORAL
Qty: 0 | Refills: 0 | DISCHARGE

## 2021-01-28 RX ORDER — OMEGA-3 ACID ETHYL ESTERS 1 G
4 CAPSULE ORAL
Qty: 0 | Refills: 0 | DISCHARGE

## 2021-01-28 RX ADMIN — Medication 1 TABLET(S): at 16:00

## 2021-01-28 RX ADMIN — Medication 81 MILLIGRAM(S): at 15:58

## 2021-01-28 RX ADMIN — Medication 100 MILLIGRAM(S): at 05:46

## 2021-01-28 RX ADMIN — CALCITRIOL 0.25 MICROGRAM(S): 0.5 CAPSULE ORAL at 15:58

## 2021-01-28 RX ADMIN — Medication 100 MILLIGRAM(S): at 16:01

## 2021-01-28 RX ADMIN — HEPARIN SODIUM 5000 UNIT(S): 5000 INJECTION INTRAVENOUS; SUBCUTANEOUS at 16:02

## 2021-01-28 RX ADMIN — Medication 2000 UNIT(S): at 16:00

## 2021-01-28 RX ADMIN — HEPARIN SODIUM 5000 UNIT(S): 5000 INJECTION INTRAVENOUS; SUBCUTANEOUS at 05:47

## 2021-01-28 RX ADMIN — DARUNAVIR ETHANOLATE AND COBICISTAT 1 TABLET(S): 800; 150 TABLET, FILM COATED ORAL at 15:59

## 2021-01-28 RX ADMIN — Medication 100 MILLIGRAM(S): at 15:57

## 2021-01-28 RX ADMIN — Medication 500 MILLIGRAM(S): at 15:58

## 2021-01-28 NOTE — PROGRESS NOTE ADULT - PROBLEM SELECTOR PROBLEM 5
Hyperkalemia
Hyperkalemia
HIV (human immunodeficiency virus infection)
Hyperkalemia
HTN (hypertension)
HTN (hypertension)
Hyperkalemia
HIV (human immunodeficiency virus infection)

## 2021-01-28 NOTE — PROGRESS NOTE ADULT - ASSESSMENT
ESRD  COVID 19 , afebrile for 3 days, diarrhea improved.  Dialysis today , possible discharge after dialysis.

## 2021-01-28 NOTE — PROGRESS NOTE ADULT - SUBJECTIVE AND OBJECTIVE BOX
Pt is awake, alert, lying in bed in NAD. For HD today, TTS.     INTERVAL HPI/OVERNIGHT EVENTS:      VITAL SIGNS:  T(F): 99.1 (01-28-21 @ 07:40)  HR: 77 (01-28-21 @ 07:40)  BP: 101/54 (01-28-21 @ 07:40)  RR: 18 (01-28-21 @ 07:40)  SpO2: 96% (01-28-21 @ 07:40)  Wt(kg): --  I&O's Detail      REVIEW OF SYSTEMS:    CONSTITUTIONAL:  No fevers, chills, sweats    HEENT:  Eyes:  No diplopia or blurred vision. ENT:  No earache, sore throat or runny nose.    CARDIOVASCULAR:  No pressure, squeezing, tightness, or heaviness about the chest; no palpitations.    RESPIRATORY:  Per HPI    GASTROINTESTINAL:  No abdominal pain, nausea, vomiting or diarrhea.    GENITOURINARY:  No dysuria, frequency or urgency.    NEUROLOGIC:  No paresthesias, fasciculations, seizures or weakness.    PSYCHIATRIC:  No disorder of thought or mood.      PHYSICAL EXAM:    Constitutional: Well developed and nourished  Eyes: Perrla  ENMT: normal  Neck: supple  Respiratory: good air entry  Cardiovascular: S1 S2 regular  Gastrointestinal: Soft, Non tender  Extremities: No edema  Vascular: normal  Neurological: Awake, alert,Ox3  Musculoskeletal: Normal      MEDICATIONS  (STANDING):  allopurinol 100 milliGRAM(s) Oral daily  amLODIPine   Tablet 10 milliGRAM(s) Oral daily  ascorbic acid 500 milliGRAM(s) Oral daily  aspirin enteric coated 81 milliGRAM(s) Oral daily  atorvastatin 20 milliGRAM(s) Oral at bedtime  calcitriol   Capsule 0.25 MICROGram(s) Oral daily  cholecalciferol 2000 Unit(s) Oral daily  darunavir 800 mG/cobicstat 150 mG 1 Tablet(s) Oral daily  heparin   Injectable 5000 Unit(s) SubCutaneous every 8 hours  influenza   Vaccine 0.5 milliLiter(s) IntraMuscular once  lamiVUDine- milliGRAM(s) Oral daily  melatonin 5 milliGRAM(s) Oral at bedtime  metoprolol tartrate 100 milliGRAM(s) Oral two times a day  Nephro-alex 1 Tablet(s) Oral daily  senna 2 Tablet(s) Oral at bedtime    MEDICATIONS  (PRN):  acetaminophen   Tablet .. 650 milliGRAM(s) Oral every 6 hours PRN Temp greater or equal to 38C (100.4F)  gabapentin 100 milliGRAM(s) Oral daily PRN pain  guaiFENesin   Syrup  (Sugar-Free) 100 milliGRAM(s) Oral every 6 hours PRN Cough      Allergies    oxycodone (Rash)  penicillins (Urticaria; Rash)    Intolerances        LABS:                        12.4   7.75  )-----------( 246      ( 27 Jan 2021 12:49 )             36.8     01-27    137  |  98  |  30<H>  ----------------------------<  79  3.8   |  30  |  7.25<H>    Ca    9.2      27 Jan 2021 12:49  Phos  2.1     01-27  Mg     2.3     01-27    TPro  7.5  /  Alb  2.2<L>  /  TBili  0.5  /  DBili  x   /  AST  106<H>  /  ALT  73<H>  /  AlkPhos  113  01-27        COVID-19 PCR . (01.27.21 @ 15:39)   COVID-19 PCR: Detected:       CAPILLARY BLOOD GLUCOSE        pro-bnp -- 01-27 @ 12:49     d-dimer 452  01-27 @ 12:49  pro-bnp -- 01-22 @ 06:00     d-dimer 667  01-22 @ 06:00    COVID-19 PCR . (01.27.21 @ 15:39)   COVID-19 PCR: Detected    RADIOLOGY & ADDITIONAL TESTS:    CXR:    Ct scan chest:    ekg;    echo:

## 2021-01-28 NOTE — DISCHARGE NOTE NURSING/CASE MANAGEMENT/SOCIAL WORK - PATIENT PORTAL LINK FT
You can access the FollowMyHealth Patient Portal offered by [facility] by registering at the following website: [email or web address]. By joining [employer]’s FollowMyHealth portal, you will also be able to view your health information using other applications (apps) compatible with our system.

## 2021-01-28 NOTE — PROGRESS NOTE ADULT - PROVIDER SPECIALTY LIST ADULT
Nephrology
Hospitalist
Nephrology
Pulmonology
Pulmonology
Internal Medicine
Pulmonology
Internal Medicine

## 2021-01-28 NOTE — PROGRESS NOTE ADULT - PROBLEM SELECTOR PROBLEM 4
HIV (human immunodeficiency virus infection)
Electrolyte abnormality
Electrolyte abnormality

## 2021-01-28 NOTE — PROGRESS NOTE ADULT - PROBLEM SELECTOR PLAN 2
isolation precautions  cont meds  oxygen supp prn - currently on RA   monitor oxygen sat  monitor labs  cont meds  Repeat Covid-19 PCR 1/27 positive.

## 2021-01-28 NOTE — PROGRESS NOTE ADULT - REASON FOR ADMISSION
AV fistula malfunction
ESRD  COVID 19  FEVER
AV fistula malfunction

## 2021-01-28 NOTE — PROGRESS NOTE ADULT - SUBJECTIVE AND OBJECTIVE BOX
Problem List:  ESRD  COVID 19 , no fever for the last 3 days  Appetite improved with no diarrhea    PAST MEDICAL & SURGICAL HISTORY:  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    DVT, lower extremity  Left leg after hysterectomy    Cervical cancer  2010    Depression    History of gastroesophageal reflux (GERD)    Gout    Hyperlipidemia    Chronic kidney disease    HTN (hypertension)    HIV (human immunodeficiency virus infection)    S/P arteriovenous (AV) fistula creation  july 10 2020    H/O: hysterectomy  Due to cervical cancer    History of ectopic pregnancy  Two        oxycodone (Rash)  penicillins (Urticaria; Rash)      MEDICATIONS  (STANDING):  allopurinol 100 milliGRAM(s) Oral daily  amLODIPine   Tablet 10 milliGRAM(s) Oral daily  ascorbic acid 500 milliGRAM(s) Oral daily  aspirin enteric coated 81 milliGRAM(s) Oral daily  atorvastatin 20 milliGRAM(s) Oral at bedtime  calcitriol   Capsule 0.25 MICROGram(s) Oral daily  cholecalciferol 2000 Unit(s) Oral daily  darunavir 800 mG/cobicstat 150 mG 1 Tablet(s) Oral daily  heparin   Injectable 5000 Unit(s) SubCutaneous every 8 hours  influenza   Vaccine 0.5 milliLiter(s) IntraMuscular once  lamiVUDine- milliGRAM(s) Oral daily  melatonin 5 milliGRAM(s) Oral at bedtime  metoprolol tartrate 100 milliGRAM(s) Oral two times a day  Nephro-alex 1 Tablet(s) Oral daily  senna 2 Tablet(s) Oral at bedtime    MEDICATIONS  (PRN):  acetaminophen   Tablet .. 650 milliGRAM(s) Oral every 6 hours PRN Temp greater or equal to 38C (100.4F)  gabapentin 100 milliGRAM(s) Oral daily PRN pain  guaiFENesin   Syrup  (Sugar-Free) 100 milliGRAM(s) Oral every 6 hours PRN Cough                            12.4   7.75  )-----------( 246      ( 27 Jan 2021 12:49 )             36.8     01-27    137  |  98  |  30<H>  ----------------------------<  79  3.8   |  30  |  7.25<H>    Ca    9.2      27 Jan 2021 12:49  Phos  2.1     01-27  Mg     2.3     01-27    TPro  7.5  /  Alb  2.2<L>  /  TBili  0.5  /  DBili  x   /  AST  106<H>  /  ALT  73<H>  /  AlkPhos  113  01-27            REVIEW OF SYSTEMS:  General: no fever no chills, no weight loss.    RESPIRATORY: No cough, wheezing, hemoptysis; No shortness of breath  CARDIOVASCULAR: No chest pain or palpitations. No Edema  GASTROINTESTINAL: No abdominal or epigastric pain. No nausea, vomiting. No diarrhea or constipation. No melena.  GENITOURINARY: No dysuria, frequency, foamy urine, urinary urgency, incontinence or hematuria  NEUROLOGICAL: No numbness or weakness, no tremor , no dizziness.   Muscle skeletal : no joint pain and no swelling of joints and limbs.  SKIN: No itching, burning, rashes.        VITALS:  T(F): 99.1 (01-28-21 @ 07:40), Max: 99.4 (01-28-21 @ 05:31)  HR: 77 (01-28-21 @ 07:40)  BP: 101/54 (01-28-21 @ 07:40)  RR: 18 (01-28-21 @ 07:40)  SpO2: 96% (01-28-21 @ 07:40)  Wt(kg): --      PHYSICAL EXAM:  Constitutional: well developed, no diaphoresis, no distress.  Neck: No JVD, no carotid bruit, supple, no adenopathy  Respiratory: Good air entrance B/L, no wheezes, rales or rhonchi  Cardiovascular: S1, S2, RRR, no pericardial rub, no murmur  Abdomen: BS+, soft, no tenderness, no bruit  Pelvis: bladder nondistended  No edema

## 2021-01-28 NOTE — PROGRESS NOTE ADULT - NSHPATTENDINGPLANDISCUSS_GEN_ALL_CORE
THE PATIENT AND MEDICAL TEAM
medical team
the patient and medical team
Dr. Lopez
Dr. Lopez
Dr Paniagua
Dr. Lopez

## 2021-01-30 LAB
GLUCOSE BLDC GLUCOMTR-MCNC: 142 MG/DL — HIGH (ref 70–99)
GLUCOSE BLDC GLUCOMTR-MCNC: 145 MG/DL — HIGH (ref 70–99)

## 2021-02-12 PROBLEM — C53.9 MALIGNANT NEOPLASM OF CERVIX UTERI, UNSPECIFIED: Chronic | Status: ACTIVE | Noted: 2020-02-18

## 2021-02-19 DIAGNOSIS — Z01.818 ENCOUNTER FOR OTHER PREPROCEDURAL EXAMINATION: ICD-10-CM

## 2021-02-20 ENCOUNTER — APPOINTMENT (OUTPATIENT)
Dept: DISASTER EMERGENCY | Facility: CLINIC | Age: 66
End: 2021-02-20

## 2021-02-21 LAB — SARS-COV-2 N GENE NPH QL NAA+PROBE: NOT DETECTED

## 2021-02-24 ENCOUNTER — RESULT REVIEW (OUTPATIENT)
Age: 66
End: 2021-02-24

## 2021-02-24 ENCOUNTER — APPOINTMENT (OUTPATIENT)
Dept: ENDOVASCULAR SURGERY | Facility: CLINIC | Age: 66
End: 2021-02-24
Payer: MEDICAID

## 2021-02-24 VITALS
HEIGHT: 62 IN | WEIGHT: 160 LBS | RESPIRATION RATE: 15 BRPM | OXYGEN SATURATION: 98 % | TEMPERATURE: 97.6 F | DIASTOLIC BLOOD PRESSURE: 78 MMHG | BODY MASS INDEX: 29.44 KG/M2 | SYSTOLIC BLOOD PRESSURE: 155 MMHG | HEART RATE: 77 BPM

## 2021-02-24 PROCEDURE — 99212 OFFICE O/P EST SF 10 MIN: CPT | Mod: 25

## 2021-02-24 PROCEDURE — 99072 ADDL SUPL MATRL&STAF TM PHE: CPT

## 2021-02-24 PROCEDURE — 36902Z: CUSTOM

## 2021-02-24 NOTE — HISTORY OF PRESENT ILLNESS
[] : in left upper arm [FreeTextEntry3] : 8/6/2020 Dr. Dickens [FreeTextEntry1] : 3/5/2020 Dr. Dickens (thrombosed) [FreeTextEntry4] : Monday  [FreeTextEntry5] : yesterday at 10 pm [FreeTextEntry6] : Dr. Mcmahan

## 2021-02-24 NOTE — PROCEDURE
[FreeTextEntry1] : left arm AVG fistulogram/angioplasty [Tylenol 1000 mg po x 1 dose] : Tylenol 1000 mg po x 1 dose [Resume diet] : resume diet [Site check for bleeding/hematoma/thrill/bruit] : Site check for bleeding/hematoma/thrill/bruit [Vital signs on admission the q 15 mins x2] : Vital signs on admission the q 15 mins x2

## 2021-03-01 NOTE — HISTORY OF PRESENT ILLNESS
[FreeTextEntry1] : 3/5/2020 Dr. Dickens (thrombosed) [FreeTextEntry3] : 8/6/2020 Dr. Dickens [FreeTextEntry4] : not on dialysis [FreeTextEntry5] : 10pm 7/21/2020 [FreeTextEntry6] : Dr. Mcmahan

## 2021-03-01 NOTE — PAST MEDICAL HISTORY
[FreeTextEntry1] : Malignant Hyperthermia Screening Tool and Risk of Bleeding Assessment\par \par Ms. VERONICA GOVEA denies family history of unexpected death following Anesthesia or Exercise.\par Denies Family history of Malignant Hyperthermia, Muscle or Neuromuscular disorder and High Temperature following exercise.\par \par Ms. VERONICA GOVEA denies history of Muscle Spasm, Dark or Chocolate - Colored urine and Unanticipated fever immediately following anesthesia or serious exercise. \par Ms. GOVEA also denies bleeding tendencies/ Risks of Bleeding.\par

## 2021-05-07 ENCOUNTER — APPOINTMENT (OUTPATIENT)
Dept: VASCULAR SURGERY | Facility: CLINIC | Age: 66
End: 2021-05-07

## 2021-06-24 ENCOUNTER — APPOINTMENT (OUTPATIENT)
Dept: VASCULAR SURGERY | Facility: CLINIC | Age: 66
End: 2021-06-24
Payer: MEDICAID

## 2021-06-24 PROCEDURE — 99212 OFFICE O/P EST SF 10 MIN: CPT

## 2021-06-24 PROCEDURE — 93990 DOPPLER FLOW TESTING: CPT

## 2021-06-24 NOTE — PHYSICAL EXAM
[Thrill] : thrill [Pulsatile Thrill] : no pulsatile thrill [Hand well perfused] : hand well perfused [Aneurysm] : no aneurysm [Ulcer] : no ulcer [2+] : left 2+ [Normal] : coordination grossly intact, no focal deficits [de-identified] : intact

## 2021-06-24 NOTE — DISCUSSION/SUMMARY
[FreeTextEntry1] : 65 yo female with history of esrd on hd presents for follow up of left upper extremity avf \par \par duplex shows patent avf with 50-75% stenosis of the venous anastomosis with flow rate of 1502 \par \par pt c/o b/l lower extremity pain with ambulation pt with palpable pulses (fem/dp/pt) bilaterally, pain is likely not secondary to vascular etiology\par \par will continue to monitor given no issues with hd \par pt to follow up in 3-4 months with repeat duplex\par

## 2021-06-24 NOTE — HISTORY OF PRESENT ILLNESS
[FreeTextEntry1] : 67 yo female with history of esrd on hd presents for follow up of left upper extremity avf \par pt without any difficulty while on hd \par pt reports b/l lower extremity pain that occurs when she walks.  pt states that she walks about 2 blocks then has to stop and rest \par pt denies any lower extremity wounds or ulcers

## 2021-08-19 NOTE — REASON FOR VISIT
How Severe Is Your Rash?: severe Is This A New Presentation, Or A Follow-Up?: Rash Additional History: States she is under a great deal of stress. [Consultation] : a consultation visit

## 2021-09-23 ENCOUNTER — APPOINTMENT (OUTPATIENT)
Dept: VASCULAR SURGERY | Facility: CLINIC | Age: 66
End: 2021-09-23

## 2021-10-10 NOTE — ASU PATIENT PROFILE, ADULT - TEACHING/LEARNING CULTURAL CONSIDERATIONS
Patient /95 bp 180/81 elevated bp straight catheterization attempt unsuccessful Patient /84 low bp Patient /82 bp 180/81 Patient /100 none

## 2021-11-05 ENCOUNTER — APPOINTMENT (OUTPATIENT)
Dept: ORTHOPEDIC SURGERY | Facility: CLINIC | Age: 66
End: 2021-11-05

## 2022-08-24 ENCOUNTER — APPOINTMENT (OUTPATIENT)
Dept: ENDOVASCULAR SURGERY | Facility: CLINIC | Age: 67
End: 2022-08-24

## 2022-08-24 ENCOUNTER — RESULT REVIEW (OUTPATIENT)
Age: 67
End: 2022-08-24

## 2022-08-24 VITALS
DIASTOLIC BLOOD PRESSURE: 86 MMHG | BODY MASS INDEX: 29.44 KG/M2 | RESPIRATION RATE: 16 BRPM | HEIGHT: 62 IN | TEMPERATURE: 97.2 F | OXYGEN SATURATION: 99 % | HEART RATE: 61 BPM | SYSTOLIC BLOOD PRESSURE: 206 MMHG | WEIGHT: 160 LBS

## 2022-08-24 PROCEDURE — 36902Z: CUSTOM

## 2022-08-24 RX ORDER — SODIUM BICARBONATE 650 MG/1
650 TABLET ORAL
Qty: 56 | Refills: 0 | Status: DISCONTINUED | COMMUNITY
Start: 2019-09-26 | End: 2022-08-24

## 2022-08-24 NOTE — ASSESSMENT
[Other: _____] : [unfilled] [FreeTextEntry1] : 66 yo female with history of esrd on hd via left upper arm AVG. Referred from dialysis for difficult cannulation. plan for left fistulogram and intervention

## 2022-08-24 NOTE — REASON FOR VISIT
[Other ___] : a [unfilled] visit for [Difficult Cannulation] : difficult cannulation [FreeTextEntry2] : referral from dialysis

## 2022-08-24 NOTE — HISTORY OF PRESENT ILLNESS
[] : in left upper arm [FreeTextEntry1] : 3/5/2020 Dr. Dickens (thrombosed) [FreeTextEntry3] : 8/6/2020 Dr. Dickens [FreeTextEntry4] : Monday 8/21/2022 [FreeTextEntry5] : yesterday at midnight [FreeTextEntry6] : Dr. Mcmahan

## 2022-09-26 ENCOUNTER — INPATIENT (INPATIENT)
Facility: HOSPITAL | Age: 67
LOS: 22 days | Discharge: NURSING, REHAB OR LONG-TERM CARE | DRG: 329 | End: 2022-10-19
Attending: STUDENT IN AN ORGANIZED HEALTH CARE EDUCATION/TRAINING PROGRAM | Admitting: STUDENT IN AN ORGANIZED HEALTH CARE EDUCATION/TRAINING PROGRAM
Payer: MEDICAID

## 2022-09-26 VITALS
DIASTOLIC BLOOD PRESSURE: 82 MMHG | HEIGHT: 62 IN | HEART RATE: 75 BPM | OXYGEN SATURATION: 98 % | TEMPERATURE: 98 F | RESPIRATION RATE: 18 BRPM | WEIGHT: 134.92 LBS | SYSTOLIC BLOOD PRESSURE: 179 MMHG

## 2022-09-26 DIAGNOSIS — Z87.59 PERSONAL HISTORY OF OTHER COMPLICATIONS OF PREGNANCY, CHILDBIRTH AND THE PUERPERIUM: Chronic | ICD-10-CM

## 2022-09-26 DIAGNOSIS — Z98.890 OTHER SPECIFIED POSTPROCEDURAL STATES: Chronic | ICD-10-CM

## 2022-09-26 DIAGNOSIS — Z90.710 ACQUIRED ABSENCE OF BOTH CERVIX AND UTERUS: Chronic | ICD-10-CM

## 2022-09-26 PROCEDURE — 71045 X-RAY EXAM CHEST 1 VIEW: CPT | Mod: 26

## 2022-09-26 PROCEDURE — 99285 EMERGENCY DEPT VISIT HI MDM: CPT

## 2022-09-26 NOTE — ED ADULT TRIAGE NOTE - CHIEF COMPLAINT QUOTE
Presents to ED for generalized abdominal pain, nausea, constipation for 3 days. States she has been unable to tolerate PO and has not eaten for 3 days. Patient is dialysis MWF, with left arm fistula. Last dialysis today.

## 2022-09-27 DIAGNOSIS — N18.9 CHRONIC KIDNEY DISEASE, UNSPECIFIED: ICD-10-CM

## 2022-09-27 DIAGNOSIS — I10 ESSENTIAL (PRIMARY) HYPERTENSION: ICD-10-CM

## 2022-09-27 DIAGNOSIS — L02.211 CUTANEOUS ABSCESS OF ABDOMINAL WALL: ICD-10-CM

## 2022-09-27 DIAGNOSIS — Z29.9 ENCOUNTER FOR PROPHYLACTIC MEASURES, UNSPECIFIED: ICD-10-CM

## 2022-09-27 DIAGNOSIS — E78.5 HYPERLIPIDEMIA, UNSPECIFIED: ICD-10-CM

## 2022-09-27 DIAGNOSIS — M10.9 GOUT, UNSPECIFIED: ICD-10-CM

## 2022-09-27 DIAGNOSIS — K65.1 PERITONEAL ABSCESS: ICD-10-CM

## 2022-09-27 DIAGNOSIS — Z21 ASYMPTOMATIC HUMAN IMMUNODEFICIENCY VIRUS [HIV] INFECTION STATUS: ICD-10-CM

## 2022-09-27 LAB
ALBUMIN SERPL ELPH-MCNC: 2.7 G/DL — LOW (ref 3.5–5)
ALP SERPL-CCNC: 88 U/L — SIGNIFICANT CHANGE UP (ref 40–120)
ALT FLD-CCNC: 17 U/L DA — SIGNIFICANT CHANGE UP (ref 10–60)
AMYLASE P1 CFR SERPL: 85 U/L — SIGNIFICANT CHANGE UP (ref 25–115)
ANION GAP SERPL CALC-SCNC: 11 MMOL/L — SIGNIFICANT CHANGE UP (ref 5–17)
ANION GAP SERPL CALC-SCNC: 8 MMOL/L — SIGNIFICANT CHANGE UP (ref 5–17)
APTT BLD: 25.8 SEC — LOW (ref 27.5–35.5)
AST SERPL-CCNC: 22 U/L — SIGNIFICANT CHANGE UP (ref 10–40)
BASOPHILS # BLD AUTO: 0.02 K/UL — SIGNIFICANT CHANGE UP (ref 0–0.2)
BASOPHILS # BLD AUTO: 0.02 K/UL — SIGNIFICANT CHANGE UP (ref 0–0.2)
BASOPHILS NFR BLD AUTO: 0.2 % — SIGNIFICANT CHANGE UP (ref 0–2)
BASOPHILS NFR BLD AUTO: 0.2 % — SIGNIFICANT CHANGE UP (ref 0–2)
BILIRUB SERPL-MCNC: 0.5 MG/DL — SIGNIFICANT CHANGE UP (ref 0.2–1.2)
BLD GP AB SCN SERPL QL: SIGNIFICANT CHANGE UP
BUN SERPL-MCNC: 19 MG/DL — HIGH (ref 7–18)
BUN SERPL-MCNC: 22 MG/DL — HIGH (ref 7–18)
CALCIUM SERPL-MCNC: 9.5 MG/DL — SIGNIFICANT CHANGE UP (ref 8.4–10.5)
CALCIUM SERPL-MCNC: 9.8 MG/DL — SIGNIFICANT CHANGE UP (ref 8.4–10.5)
CANCER AG125 SERPL-ACNC: 10 U/ML — SIGNIFICANT CHANGE UP
CANCER AG19-9 SERPL-ACNC: <2 U/ML — SIGNIFICANT CHANGE UP
CEA SERPL-MCNC: 3.6 NG/ML — SIGNIFICANT CHANGE UP (ref 0–3.8)
CHLORIDE SERPL-SCNC: 95 MMOL/L — LOW (ref 96–108)
CHLORIDE SERPL-SCNC: 96 MMOL/L — SIGNIFICANT CHANGE UP (ref 96–108)
CO2 SERPL-SCNC: 29 MMOL/L — SIGNIFICANT CHANGE UP (ref 22–31)
CO2 SERPL-SCNC: 30 MMOL/L — SIGNIFICANT CHANGE UP (ref 22–31)
CREAT SERPL-MCNC: 6.07 MG/DL — HIGH (ref 0.5–1.3)
CREAT SERPL-MCNC: 6.67 MG/DL — HIGH (ref 0.5–1.3)
EGFR: 6 ML/MIN/1.73M2 — LOW
EGFR: 7 ML/MIN/1.73M2 — LOW
EOSINOPHIL # BLD AUTO: 0 K/UL — SIGNIFICANT CHANGE UP (ref 0–0.5)
EOSINOPHIL # BLD AUTO: 0.01 K/UL — SIGNIFICANT CHANGE UP (ref 0–0.5)
EOSINOPHIL NFR BLD AUTO: 0 % — SIGNIFICANT CHANGE UP (ref 0–6)
EOSINOPHIL NFR BLD AUTO: 0.1 % — SIGNIFICANT CHANGE UP (ref 0–6)
GLUCOSE SERPL-MCNC: 108 MG/DL — HIGH (ref 70–99)
GLUCOSE SERPL-MCNC: 116 MG/DL — HIGH (ref 70–99)
HCT VFR BLD CALC: 28.9 % — LOW (ref 34.5–45)
HCT VFR BLD CALC: 31.9 % — LOW (ref 34.5–45)
HGB BLD-MCNC: 10 G/DL — LOW (ref 11.5–15.5)
HGB BLD-MCNC: 10.8 G/DL — LOW (ref 11.5–15.5)
IMM GRANULOCYTES NFR BLD AUTO: 0.5 % — SIGNIFICANT CHANGE UP (ref 0–0.9)
IMM GRANULOCYTES NFR BLD AUTO: 0.6 % — SIGNIFICANT CHANGE UP (ref 0–0.9)
INR BLD: 1.26 RATIO — HIGH (ref 0.88–1.16)
LIDOCAIN IGE QN: 133 U/L — SIGNIFICANT CHANGE UP (ref 73–393)
LYMPHOCYTES # BLD AUTO: 1.36 K/UL — SIGNIFICANT CHANGE UP (ref 1–3.3)
LYMPHOCYTES # BLD AUTO: 1.37 K/UL — SIGNIFICANT CHANGE UP (ref 1–3.3)
LYMPHOCYTES # BLD AUTO: 12.2 % — LOW (ref 13–44)
LYMPHOCYTES # BLD AUTO: 12.6 % — LOW (ref 13–44)
MAGNESIUM SERPL-MCNC: 2.2 MG/DL — SIGNIFICANT CHANGE UP (ref 1.6–2.6)
MCHC RBC-ENTMCNC: 32.2 PG — SIGNIFICANT CHANGE UP (ref 27–34)
MCHC RBC-ENTMCNC: 32.6 PG — SIGNIFICANT CHANGE UP (ref 27–34)
MCHC RBC-ENTMCNC: 33.9 GM/DL — SIGNIFICANT CHANGE UP (ref 32–36)
MCHC RBC-ENTMCNC: 34.6 GM/DL — SIGNIFICANT CHANGE UP (ref 32–36)
MCV RBC AUTO: 94.1 FL — SIGNIFICANT CHANGE UP (ref 80–100)
MCV RBC AUTO: 95.2 FL — SIGNIFICANT CHANGE UP (ref 80–100)
MONOCYTES # BLD AUTO: 0.77 K/UL — SIGNIFICANT CHANGE UP (ref 0–0.9)
MONOCYTES # BLD AUTO: 0.78 K/UL — SIGNIFICANT CHANGE UP (ref 0–0.9)
MONOCYTES NFR BLD AUTO: 6.9 % — SIGNIFICANT CHANGE UP (ref 2–14)
MONOCYTES NFR BLD AUTO: 7.2 % — SIGNIFICANT CHANGE UP (ref 2–14)
NEUTROPHILS # BLD AUTO: 8.61 K/UL — HIGH (ref 1.8–7.4)
NEUTROPHILS # BLD AUTO: 8.97 K/UL — HIGH (ref 1.8–7.4)
NEUTROPHILS NFR BLD AUTO: 79.3 % — HIGH (ref 43–77)
NEUTROPHILS NFR BLD AUTO: 80.2 % — HIGH (ref 43–77)
NRBC # BLD: 0 /100 WBCS — SIGNIFICANT CHANGE UP (ref 0–0)
NRBC # BLD: 0 /100 WBCS — SIGNIFICANT CHANGE UP (ref 0–0)
PHOSPHATE SERPL-MCNC: 4.8 MG/DL — HIGH (ref 2.5–4.5)
PLATELET # BLD AUTO: 264 K/UL — SIGNIFICANT CHANGE UP (ref 150–400)
PLATELET # BLD AUTO: 313 K/UL — SIGNIFICANT CHANGE UP (ref 150–400)
POTASSIUM SERPL-MCNC: 4 MMOL/L — SIGNIFICANT CHANGE UP (ref 3.5–5.3)
POTASSIUM SERPL-MCNC: 4 MMOL/L — SIGNIFICANT CHANGE UP (ref 3.5–5.3)
POTASSIUM SERPL-SCNC: 4 MMOL/L — SIGNIFICANT CHANGE UP (ref 3.5–5.3)
POTASSIUM SERPL-SCNC: 4 MMOL/L — SIGNIFICANT CHANGE UP (ref 3.5–5.3)
PROT SERPL-MCNC: 8.4 G/DL — HIGH (ref 6–8.3)
PROTHROM AB SERPL-ACNC: 15 SEC — HIGH (ref 10.5–13.4)
RBC # BLD: 3.07 M/UL — LOW (ref 3.8–5.2)
RBC # BLD: 3.35 M/UL — LOW (ref 3.8–5.2)
RBC # FLD: 15.2 % — HIGH (ref 10.3–14.5)
RBC # FLD: 15.4 % — HIGH (ref 10.3–14.5)
SARS-COV-2 RNA SPEC QL NAA+PROBE: SIGNIFICANT CHANGE UP
SODIUM SERPL-SCNC: 133 MMOL/L — LOW (ref 135–145)
SODIUM SERPL-SCNC: 136 MMOL/L — SIGNIFICANT CHANGE UP (ref 135–145)
WBC # BLD: 10.85 K/UL — HIGH (ref 3.8–10.5)
WBC # BLD: 11.18 K/UL — HIGH (ref 3.8–10.5)
WBC # FLD AUTO: 10.85 K/UL — HIGH (ref 3.8–10.5)
WBC # FLD AUTO: 11.18 K/UL — HIGH (ref 3.8–10.5)

## 2022-09-27 PROCEDURE — 99223 1ST HOSP IP/OBS HIGH 75: CPT

## 2022-09-27 PROCEDURE — 99222 1ST HOSP IP/OBS MODERATE 55: CPT

## 2022-09-27 PROCEDURE — 74178 CT ABD&PLV WO CNTR FLWD CNTR: CPT | Mod: 26

## 2022-09-27 PROCEDURE — 74177 CT ABD & PELVIS W/CONTRAST: CPT | Mod: 26,MA

## 2022-09-27 PROCEDURE — 74176 CT ABD & PELVIS W/O CONTRAST: CPT | Mod: 26,XU,MA,59

## 2022-09-27 RX ORDER — CALCITRIOL 0.5 UG/1
0.25 CAPSULE ORAL DAILY
Refills: 0 | Status: DISCONTINUED | OUTPATIENT
Start: 2022-09-27 | End: 2022-10-10

## 2022-09-27 RX ORDER — METRONIDAZOLE 500 MG
500 TABLET ORAL ONCE
Refills: 0 | Status: COMPLETED | OUTPATIENT
Start: 2022-09-27 | End: 2022-09-27

## 2022-09-27 RX ORDER — SENNA PLUS 8.6 MG/1
2 TABLET ORAL AT BEDTIME
Refills: 0 | Status: DISCONTINUED | OUTPATIENT
Start: 2022-09-27 | End: 2022-10-10

## 2022-09-27 RX ORDER — OMEGA-3 ACID ETHYL ESTERS 1 G
4 CAPSULE ORAL DAILY
Refills: 0 | Status: DISCONTINUED | OUTPATIENT
Start: 2022-09-27 | End: 2022-10-10

## 2022-09-27 RX ORDER — ISOSORBIDE MONONITRATE 60 MG/1
60 TABLET, EXTENDED RELEASE ORAL DAILY
Refills: 0 | Status: DISCONTINUED | OUTPATIENT
Start: 2022-09-27 | End: 2022-10-10

## 2022-09-27 RX ORDER — ALLOPURINOL 300 MG
100 TABLET ORAL DAILY
Refills: 0 | Status: DISCONTINUED | OUTPATIENT
Start: 2022-09-27 | End: 2022-10-10

## 2022-09-27 RX ORDER — ONDANSETRON 8 MG/1
4 TABLET, FILM COATED ORAL ONCE
Refills: 0 | Status: COMPLETED | OUTPATIENT
Start: 2022-09-27 | End: 2022-09-27

## 2022-09-27 RX ORDER — ZINC SULFATE TAB 220 MG (50 MG ZINC EQUIVALENT) 220 (50 ZN) MG
220 TAB ORAL DAILY
Refills: 0 | Status: DISCONTINUED | OUTPATIENT
Start: 2022-09-27 | End: 2022-10-10

## 2022-09-27 RX ORDER — METOPROLOL TARTRATE 50 MG
25 TABLET ORAL ONCE
Refills: 0 | Status: COMPLETED | OUTPATIENT
Start: 2022-09-27 | End: 2022-09-27

## 2022-09-27 RX ORDER — DOLUTEGRAVIR SODIUM 25 MG/1
50 TABLET, FILM COATED ORAL DAILY
Refills: 0 | Status: DISCONTINUED | OUTPATIENT
Start: 2022-09-27 | End: 2022-10-06

## 2022-09-27 RX ORDER — DIATRIZOATE MEGLUMINE 180 MG/ML
30 INJECTION, SOLUTION INTRAVESICAL ONCE
Refills: 0 | Status: COMPLETED | OUTPATIENT
Start: 2022-09-27 | End: 2022-09-27

## 2022-09-27 RX ORDER — AMLODIPINE BESYLATE 2.5 MG/1
10 TABLET ORAL DAILY
Refills: 0 | Status: DISCONTINUED | OUTPATIENT
Start: 2022-09-27 | End: 2022-10-10

## 2022-09-27 RX ORDER — SODIUM CHLORIDE 9 MG/ML
1000 INJECTION, SOLUTION INTRAVENOUS
Refills: 0 | Status: DISCONTINUED | OUTPATIENT
Start: 2022-09-27 | End: 2022-09-27

## 2022-09-27 RX ORDER — GABAPENTIN 400 MG/1
1 CAPSULE ORAL
Qty: 0 | Refills: 0 | DISCHARGE

## 2022-09-27 RX ORDER — PIPERACILLIN AND TAZOBACTAM 4; .5 G/20ML; G/20ML
3.38 INJECTION, POWDER, LYOPHILIZED, FOR SOLUTION INTRAVENOUS EVERY 12 HOURS
Refills: 0 | Status: DISCONTINUED | OUTPATIENT
Start: 2022-09-27 | End: 2022-09-27

## 2022-09-27 RX ORDER — LAMIVUDINE 150 MG
100 TABLET ORAL DAILY
Refills: 0 | Status: DISCONTINUED | OUTPATIENT
Start: 2022-09-27 | End: 2022-10-06

## 2022-09-27 RX ORDER — HYDRALAZINE HCL 50 MG
10 TABLET ORAL ONCE
Refills: 0 | Status: COMPLETED | OUTPATIENT
Start: 2022-09-27 | End: 2022-09-27

## 2022-09-27 RX ORDER — METOPROLOL TARTRATE 50 MG
100 TABLET ORAL
Refills: 0 | Status: DISCONTINUED | OUTPATIENT
Start: 2022-09-27 | End: 2022-10-10

## 2022-09-27 RX ORDER — ACETAMINOPHEN 500 MG
1000 TABLET ORAL ONCE
Refills: 0 | Status: COMPLETED | OUTPATIENT
Start: 2022-09-27 | End: 2022-09-27

## 2022-09-27 RX ORDER — CEFTRIAXONE 500 MG/1
2000 INJECTION, POWDER, FOR SOLUTION INTRAMUSCULAR; INTRAVENOUS EVERY 24 HOURS
Refills: 0 | Status: DISCONTINUED | OUTPATIENT
Start: 2022-09-27 | End: 2022-10-06

## 2022-09-27 RX ORDER — FERROUS SULFATE 325(65) MG
0 TABLET ORAL
Qty: 0 | Refills: 0 | DISCHARGE

## 2022-09-27 RX ORDER — ONDANSETRON 8 MG/1
4 TABLET, FILM COATED ORAL EVERY 8 HOURS
Refills: 0 | Status: DISCONTINUED | OUTPATIENT
Start: 2022-09-27 | End: 2022-10-10

## 2022-09-27 RX ORDER — TRAZODONE HCL 50 MG
1 TABLET ORAL
Qty: 0 | Refills: 0 | DISCHARGE

## 2022-09-27 RX ORDER — CHOLECALCIFEROL (VITAMIN D3) 125 MCG
1000 CAPSULE ORAL DAILY
Refills: 0 | Status: DISCONTINUED | OUTPATIENT
Start: 2022-09-27 | End: 2022-09-30

## 2022-09-27 RX ORDER — DARUNAVIR ETHANOLATE AND COBICISTAT 800; 150 MG/1; MG/1
1 TABLET, FILM COATED ORAL DAILY
Refills: 0 | Status: DISCONTINUED | OUTPATIENT
Start: 2022-09-27 | End: 2022-10-06

## 2022-09-27 RX ORDER — LANOLIN ALCOHOL/MO/W.PET/CERES
3 CREAM (GRAM) TOPICAL AT BEDTIME
Refills: 0 | Status: DISCONTINUED | OUTPATIENT
Start: 2022-09-27 | End: 2022-10-04

## 2022-09-27 RX ORDER — ACETAMINOPHEN 500 MG
650 TABLET ORAL EVERY 6 HOURS
Refills: 0 | Status: DISCONTINUED | OUTPATIENT
Start: 2022-09-27 | End: 2022-10-10

## 2022-09-27 RX ORDER — CALCIUM ACETATE 667 MG
667 TABLET ORAL
Refills: 0 | Status: DISCONTINUED | OUTPATIENT
Start: 2022-09-27 | End: 2022-09-30

## 2022-09-27 RX ORDER — VANCOMYCIN HCL 1 G
1000 VIAL (EA) INTRAVENOUS ONCE
Refills: 0 | Status: COMPLETED | OUTPATIENT
Start: 2022-09-27 | End: 2022-09-27

## 2022-09-27 RX ORDER — ASPIRIN/CALCIUM CARB/MAGNESIUM 324 MG
81 TABLET ORAL DAILY
Refills: 0 | Status: DISCONTINUED | OUTPATIENT
Start: 2022-09-27 | End: 2022-10-10

## 2022-09-27 RX ORDER — ATORVASTATIN CALCIUM 80 MG/1
80 TABLET, FILM COATED ORAL AT BEDTIME
Refills: 0 | Status: DISCONTINUED | OUTPATIENT
Start: 2022-09-27 | End: 2022-10-10

## 2022-09-27 RX ORDER — METRONIDAZOLE 500 MG
500 TABLET ORAL EVERY 8 HOURS
Refills: 0 | Status: DISCONTINUED | OUTPATIENT
Start: 2022-09-27 | End: 2022-10-06

## 2022-09-27 RX ORDER — CITALOPRAM 10 MG/1
20 TABLET, FILM COATED ORAL DAILY
Refills: 0 | Status: DISCONTINUED | OUTPATIENT
Start: 2022-09-27 | End: 2022-10-10

## 2022-09-27 RX ORDER — PANTOPRAZOLE SODIUM 20 MG/1
40 TABLET, DELAYED RELEASE ORAL ONCE
Refills: 0 | Status: COMPLETED | OUTPATIENT
Start: 2022-09-27 | End: 2022-09-27

## 2022-09-27 RX ORDER — SODIUM BICARBONATE 1 MEQ/ML
650 SYRINGE (ML) INTRAVENOUS
Refills: 0 | Status: DISCONTINUED | OUTPATIENT
Start: 2022-09-27 | End: 2022-10-06

## 2022-09-27 RX ORDER — HYDRALAZINE HCL 50 MG
50 TABLET ORAL THREE TIMES A DAY
Refills: 0 | Status: DISCONTINUED | OUTPATIENT
Start: 2022-09-27 | End: 2022-10-10

## 2022-09-27 RX ORDER — PIPERACILLIN AND TAZOBACTAM 4; .5 G/20ML; G/20ML
2.25 INJECTION, POWDER, LYOPHILIZED, FOR SOLUTION INTRAVENOUS EVERY 8 HOURS
Refills: 0 | Status: DISCONTINUED | OUTPATIENT
Start: 2022-09-27 | End: 2022-09-27

## 2022-09-27 RX ADMIN — SENNA PLUS 2 TABLET(S): 8.6 TABLET ORAL at 21:26

## 2022-09-27 RX ADMIN — DARUNAVIR ETHANOLATE AND COBICISTAT 1 TABLET(S): 800; 150 TABLET, FILM COATED ORAL at 12:43

## 2022-09-27 RX ADMIN — CEFTRIAXONE 100 MILLIGRAM(S): 500 INJECTION, POWDER, FOR SOLUTION INTRAMUSCULAR; INTRAVENOUS at 17:33

## 2022-09-27 RX ADMIN — DIATRIZOATE MEGLUMINE 30 MILLILITER(S): 180 INJECTION, SOLUTION INTRAVESICAL at 02:13

## 2022-09-27 RX ADMIN — Medication 50 MILLIGRAM(S): at 13:57

## 2022-09-27 RX ADMIN — Medication 400 MILLIGRAM(S): at 09:23

## 2022-09-27 RX ADMIN — Medication 1000 MILLIGRAM(S): at 10:09

## 2022-09-27 RX ADMIN — AMLODIPINE BESYLATE 10 MILLIGRAM(S): 2.5 TABLET ORAL at 13:56

## 2022-09-27 RX ADMIN — ONDANSETRON 4 MILLIGRAM(S): 8 TABLET, FILM COATED ORAL at 02:13

## 2022-09-27 RX ADMIN — Medication 250 MILLIGRAM(S): at 07:00

## 2022-09-27 RX ADMIN — ATORVASTATIN CALCIUM 80 MILLIGRAM(S): 80 TABLET, FILM COATED ORAL at 21:53

## 2022-09-27 RX ADMIN — Medication 1000 MILLIGRAM(S): at 08:54

## 2022-09-27 RX ADMIN — Medication 100 MILLIGRAM(S): at 21:27

## 2022-09-27 RX ADMIN — ISOSORBIDE MONONITRATE 60 MILLIGRAM(S): 60 TABLET, EXTENDED RELEASE ORAL at 12:43

## 2022-09-27 RX ADMIN — PANTOPRAZOLE SODIUM 40 MILLIGRAM(S): 20 TABLET, DELAYED RELEASE ORAL at 02:13

## 2022-09-27 RX ADMIN — CALCITRIOL 0.25 MICROGRAM(S): 0.5 CAPSULE ORAL at 12:44

## 2022-09-27 RX ADMIN — Medication 650 MILLIGRAM(S): at 13:48

## 2022-09-27 RX ADMIN — Medication 25 MILLIGRAM(S): at 07:41

## 2022-09-27 RX ADMIN — Medication 100 MILLIGRAM(S): at 08:53

## 2022-09-27 RX ADMIN — Medication 50 MILLIGRAM(S): at 21:26

## 2022-09-27 RX ADMIN — Medication 400 MILLIGRAM(S): at 05:03

## 2022-09-27 RX ADMIN — DOLUTEGRAVIR SODIUM 50 MILLIGRAM(S): 25 TABLET, FILM COATED ORAL at 12:44

## 2022-09-27 RX ADMIN — CITALOPRAM 20 MILLIGRAM(S): 10 TABLET, FILM COATED ORAL at 12:44

## 2022-09-27 RX ADMIN — Medication 30 MILLILITER(S): at 13:57

## 2022-09-27 RX ADMIN — SODIUM CHLORIDE 60 MILLILITER(S): 9 INJECTION, SOLUTION INTRAVENOUS at 07:41

## 2022-09-27 RX ADMIN — ONDANSETRON 4 MILLIGRAM(S): 8 TABLET, FILM COATED ORAL at 13:48

## 2022-09-27 RX ADMIN — Medication 81 MILLIGRAM(S): at 12:44

## 2022-09-27 RX ADMIN — Medication 1000 MILLIGRAM(S): at 05:20

## 2022-09-27 RX ADMIN — Medication 650 MILLIGRAM(S): at 14:18

## 2022-09-27 RX ADMIN — Medication 1000 MILLIGRAM(S): at 05:19

## 2022-09-27 RX ADMIN — Medication 10 MILLIGRAM(S): at 07:52

## 2022-09-27 NOTE — H&P ADULT - NSICDXPASTMEDICALHX_GEN_ALL_CORE_FT
PAST MEDICAL HISTORY:  Cervical cancer 2010    Chronic kidney disease     Depression     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     DVT, lower extremity Left leg after hysterectomy    ESRD on hemodialysis     Gout     History of gastroesophageal reflux (GERD)     HIV (human immunodeficiency virus infection)     HTN (hypertension)     Hyperlipidemia

## 2022-09-27 NOTE — H&P ADULT - PROBLEM SELECTOR PLAN 6
- Pt has a history of HLD.   - Will continue pts home medication X. - Pt has a history of HLD.   - Will continue pts home medication Pravastatin (Atorvastatin).

## 2022-09-27 NOTE — ED ADULT NURSE NOTE - NSICDXPASTMEDICALHX_GEN_ALL_CORE_FT
Presented to ED to be evaluated for reported dog bite to right side of face. Avulsion to right side of face with tissue exposure. Child is A&Ox4. Noted to be resting to position of comfort. Child reports pain as documented. Mother reports child was attempting to hug in-home pet(Kelsey Insera) and dog bit child in face. Mother also reports that pet has \"social aggressive behavior issues\". Dressing noted to wound and bleeding is controlled. 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

## 2022-09-27 NOTE — ED PROVIDER NOTE - PHYSICAL EXAMINATION
No respiratory distress   Left arm AV fistula, +bruits and +thrills  Diffuse abdominal discomfort to palpations.

## 2022-09-27 NOTE — H&P ADULT - PROBLEM SELECTOR PLAN 4
ESRD on HD on [facility].   HD as per nephro  c/w sevelamer  Renal diet when not NPO  Nephro consulted: Dr. Wallace

## 2022-09-27 NOTE — ED PROVIDER NOTE - PROGRESS NOTE DETAILS
CT reported The sigmoid colon is thickened and there are numerous diverticula. At the   proximal sigmoid colon there are inflammatory changes which extend to the   adjacent small bowel where there is a 5.4 x 5.4 cm mass containing air   and feculent material. Evaluation is limited without intravenous contrast   and oral contrast has not reached this level. This may represent a   contained perforation secondary to fistulization from sigmoid   diverticular disease to the small bowel. Surgical HO endorsed and will consult.

## 2022-09-27 NOTE — H&P ADULT - PROBLEM SELECTOR PLAN 3
MRI screening sheet completed and placed on chart. - Pt has a history of HIV.  - CD4 and viral load unkown.  - f/u CD4 and Viral load.    - Will continue pts home medication X. - Pt has a history of HIV.  - CD4 and viral load unkown.  - f/u CD4 and Viral load.    - Will continue pts home medication Tivicay, Prezcobix, lamivudine. - Pt has a history of HIV.  - CD4 and viral load unknown.  - f/u CD4 and Viral load.    - Will continue pts home medication Tivicay, Prezcobix, lamivudine.

## 2022-09-27 NOTE — CONSULT NOTE ADULT - SUBJECTIVE AND OBJECTIVE BOX
GENERAL SURGERY CONSULT NOTE    chief complaint of Abd pain x 1 day     HPI:  68y/o f  with PMHx of HIV, (unknown CD4, VL), HTN, left arm AV fistula and ESRD on hemodialysis (M,W,F at [facility] and her last hemodialysis was yesterday 3 hours long]) is presents to the ED with abd pain x 1 day . Pain is mostly in the LLQ  considered sharp , 8/10 on pain scale with associated nausea, >2 NBNB vomiting and decreased bowel movements. Denies fever, chills, chest pain, shortness of breath, or SOB  Patient is seen by Dr. Reese. No other complaints at this time . Pt is still able to make urine.        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  History of ectopic pregnancy  Two  H/O: hysterectomy  Due to cervical cancer  S/P arteriovenous (AV) fistula creation  july 10 2020      MEDICATIONS  (STANDING):  acetaminophen   IVPB .. 1000 milliGRAM(s) IV Intermittent Once  metroNIDAZOLE  IVPB 500 milliGRAM(s) IV Intermittent once    MEDICATIONS  (PRN):      Allergies    oxycodone (Rash)  penicillins (Urticaria; Rash)    Intolerances        Social History:      FAMILY HISTORY:  Family hx of hypertension    Family history of renal failure        Physical Exam:  Vital Signs Last 24 Hrs  T(C): 36.6 (27 Sep 2022 06:47), Max: 36.8 (26 Sep 2022 23:06)  T(F): 97.9 (27 Sep 2022 06:47), Max: 98.3 (26 Sep 2022 23:06)  HR: 74 (27 Sep 2022 06:47) (74 - 75)  BP: 208/77 (27 Sep 2022 06:47) (179/82 - 208/77)  BP(mean): --  RR: 18 (27 Sep 2022 06:47) (18 - 18)  SpO2: 98% (27 Sep 2022 06:47) (98% - 98%)    Parameters below as of 27 Sep 2022 06:47  Patient On (Oxygen Delivery Method): room air        General:  A&Ox3,Appears stated age, No acute distress,  Head: NC/AT  EENT: PERRLA. EOMI. Conjunctiva and sclera clear. Pharynx clear.  Neck: Supple. No JVD  Lungs: CTA B/l. Nonlabored Respirations  CV: +S1S2, RRR  Abdomen: Soft, Nondistended, +LLQ tendernesss, no guarding, no rebound  Extremities: Warm and well perfused. 2+ peripheral pulses b/l. Calf soft, nontender b/l. No pedal edema.      LABS:                        10.0   11.18 )-----------( 264      ( 27 Sep 2022 05:00 )             28.9     09-27    133<L>  |  95<L>  |  22<H>  ----------------------------<  108<H>  4.0   |  30  |  6.67<H>    Ca    9.5      27 Sep 2022 05:00  Phos  4.8     09-27  Mg     2.2     09-27    TPro  8.4<H>  /  Alb  2.7<L>  /  TBili  0.5  /  DBili  x   /  AST  22  /  ALT  17  /  AlkPhos  88  09-27    LIVER FUNCTIONS - ( 27 Sep 2022 00:01 )  Alb: 2.7 g/dL / Pro: 8.4 g/dL / ALK PHOS: 88 U/L / ALT: 17 U/L DA / AST: 22 U/L / GGT: x           PT/INR - ( 27 Sep 2022 05:00 )   PT: 15.0 sec;   INR: 1.26 ratio         PTT - ( 27 Sep 2022 05:00 )  PTT:25.8 sec      RADIOLOGY & ADDITIONAL STUDIES:  < from: CT Abdomen and Pelvis w/ Oral Cont (09.27.22 @ 04:29) >  FINDINGS:  LOWER CHEST: Left lower lobe linear calcification possibly postsurgical,   unchanged    LIVER: Within normal limits.  BILE DUCTS: Normal caliber.  GALLBLADDER: Within normal limits.  SPLEEN: Within normal limits.  PANCREAS: Within normal limits.  ADRENALS: Within normal limits.  KIDNEYS/URETERS: Numerous bilateral renal cysts left greater than right.   No hydronephrosis. These are not well evaluated on this noncontrast   examination.  There are subcentimeter left renal calcification. There are subcentimeter   dense cyst possibly hemorrhagic.    BLADDER: Within normal limits.  REPRODUCTIVE ORGANS: Hysterectomy.    BOWEL: Moderate sized hiatal hernia. Appendix is not visualized. No   evidence of inflammation in the pericecal region.    Oral contrast has advanced to the mid smallbowel which is mildly   prominent measuring up to 2.7 cm in diameter.  The sigmoid colon is thickened and there are numerous diverticula. At the   proximal sigmoid colon there are inflammatory changes which extend to the   adjacent small bowel where there is a 5.4 x 5.4 cm mass containing air   and feculent material. Evaluation is limited without intravenous contrast   and oral contrast has not reached this level. This may represent a   contained perforation secondary to fistulization from sigmoid   diverticular disease to the small bowel.      PERITONEUM: No ascites.  VESSELS: Atherosclerotic changes.  RETROPERITONEUM/LYMPH NODES: No lymphadenopathy.  ABDOMINAL WALL: Within normal limits.  BONES: Within normal limits.    IMPRESSION:    Oral contrast has advanced to the mid small bowel which is mildly   prominent measuring up to 2.7 cm in diameter.    The sigmoid colon is thickened and there are numerous diverticula. At the   proximal sigmoid colon there are inflammatory changes which extend to the   adjacent small bowel where there is a 5.4 x 5.4 cm mass containing air   and feculent material. Evaluation is limited without intravenous contrast   and oral contrast has not reached this level. This may represent a   contained perforation secondary to fistulization from sigmoid   diverticular disease to the small bowel.    Suspicion of polycystic kidney disease. Correlate clinically.      < end of copied text >      < from: CT Abdomen and Pelvis w/ IV Cont (09.27.22 @ 06:30) >  FINDINGS:  LOWER CHEST: Probable postsurgical changes in the left lower lung. 1.6 cm   left lower lobe thin-walled cyst.    LIVER: 1.3 cm right lobe hypodensity with suggestion of peripheral early   nodular enhancement (4:61). This may represent a hemangioma. This could   be confirmed with MRI.  BILE DUCTS: Normal caliber.  GALLBLADDER: Within normal limits.  SPLEEN: Within normal limits.  PANCREAS: 1.4 cm cyst in uncinate process or a focally dilated duct. This   would be better evaluated with dedicated MRI.  ADRENALS: Within normal limits.  KIDNEYS/URETERS: Bilateral polycystic kidneys    BLADDER: Within normal limits.  REPRODUCTIVE ORGANS: Hysterectomy.    BOWEL: Moderate sized hiatal hernia. Normal appendix.    Oral contrast is again seen within mildly prominent loops of proximal   small bowel measuring up to 2.7 cm in diameter to a mass in the left   lower quadrant. This is unchanged. The mass measures 5.8 x 5.1 cm and   contains fluid and air. This appears to be within thewall of the small   bowel. A thin column of oral contrast passes within the lumen of the   small bowel distally into collapsed loops of small bowel. There is an   apparent fistula between this mass in the adjacent markedly thickened   sigmoid colon which contains numerous diverticula. There are inflammatory   changes in the adjacent fat. This likely represents sigmoid   diverticulitis with small bowel fistulization to the raw within adjacent   loop of small bowel in a contained abscess.      PERITONEUM: No ascites.  VESSELS: Atherosclerotic changes.  RETROPERITONEUM/LYMPH NODES: No lymphadenopathy.  ABDOMINAL WALL: Small fat-containing umbilical hernia.  BONES: Within normal limits.    IMPRESSION:    Abscess within the left lower quadrant small bowel wall secondary to   fistulization from the sigmoid colon likely on the basis of chronic   diverticular disease.    1.3 cm right lobe hypodensity with suggestion of peripheral early nodular   enhancement. This may represent a hemangioma. This could be confirmed   with MRI.    1.4 cm cyst in uncinate process or a focally dilated duct. This would be   better evaluated with dedicated MRI.    Probable probably cystic kidneys. Correlate clinically for kidney disease.    < end of copied text >

## 2022-09-27 NOTE — H&P ADULT - PROBLEM SELECTOR PLAN 5
- Pt has a history of Gout.   - Will continue pts home medication X. - Pt has a history of Gout.   - Will continue pts home medication Allopurionol.

## 2022-09-27 NOTE — H&P ADULT - NSHPREVIEWOFSYSTEMS_GEN_ALL_CORE
CONSTITUTIONAL: No fever, weight loss, or fatigue  RESPIRATORY: No cough, wheezing, chills or hemoptysis; No shortness of breath  CARDIOVASCULAR: No chest pain, palpitations, dizziness, or leg swelling  GASTROINTESTINAL: Abdominal pain with constipation.   GENITOURINARY: No dysuria or hematuria, urinary frequency  NEUROLOGICAL: No headaches, memory loss, loss of strength, numbness, or tremors  ENDOCRINE: No polyuria, polydipsia, or heat/cold intolerance  MUSCULOSKELETAL: No muscle aches, joint pains  HEME: no easy bruisability, no tender or enlarged lymph nodes  SKIN: No itching, burning, rashes, or lesions .

## 2022-09-27 NOTE — H&P ADULT - ATTENDING COMMENTS
Hospitalist attending  I have personally seen and examined the patient.  I fully participated in the care of this patient.  I have made amendments to the documentation where necessary, and agree with the history, physical exam, and plan as documented by the ACP/resident.    This is a 67 year old female, coming from home, with medical history of ESRD(M-W-F), HTN, HIV coming in with abdominal pain. Pt states she has been having abdominal pain for the last 3 days. She went for dialysis yesterday and afterwards the pain became worse. The abdominal pain comes and goes, is a dull achy pain, rated 8/10. It does not radiate and is located in left lower quadrant and suprapubic region. Pt also has been constipated with last bowl movement being about 4 days prior. She also has been experiencing nausea and has had about 5 episodes of vomiting in the last 3 days, they were all watery. She denies any headaches, visual disturbances, dizziness, falls, chest pain, palpitations, lower extremity swelling, fevers, skin rash, recent travel, or sick contacts.      Vital Signs Last 24 Hrs  T(C): 37.2 (27 Sep 2022 12:22), Max: 37.2 (27 Sep 2022 12:22)  T(F): 98.9 (27 Sep 2022 12:22), Max: 98.9 (27 Sep 2022 12:22)  HR: 83 (27 Sep 2022 12:22) (74 - 83)  BP: 144/71 (27 Sep 2022 12:22) (144/71 - 208/77)  BP(mean): --  RR: 18 (27 Sep 2022 12:22) (18 - 18)  SpO2: 97% (27 Sep 2022 12:22) (97% - 99%)    Parameters below as of 27 Sep 2022 12:22  Patient On (Oxygen Delivery Method): room air      Physical exam:    CONSTITUTIONAL: NAD, well-groomed  ENMT: Moist oral mucosa, no pharyngeal injection or exudates; normal dentition  RESPIRATORY: Normal respiratory effort; lungs are clear to auscultation bilaterally  CARDIOVASCULAR: Regular rate and rhythm, normal S1 and S2, no murmur/rub/gallop; No lower extremity edema  ABDOMEN: Diffuse TTP on the left side, normoactive bowel sounds, +guarding  PSYCH: A+O x3; affect appropriate  NEUROLOGY: CN 2-12 are intact and symmetric; no gross sensory deficits;   SKIN: No rashes; no palpable lesions    This is a 67 year old female, coming from home, with medical history of ESRD(M-W-F), HTN, HIV coming in with abdominal pain, admitted with concern for bowel abscess with fistula    # Bowel abscess  -CT result noted  -c/w Zosyn  -ID following, yareli recs  -surgery called, yareli recs  -cancer markers neg  -serial abd exam  -keep NPO for now, advance diet as tolerated  -Pain regimen    # ESRD on HD  -HD per nephro  -renally dose med    # HTN - uncontrolled - component of medication non compliance   -resume home meds  -HD per nephro    # HIV  -f/u CD4 count and viral load  -c/w home meds  -ID following, yareli recs    # Anemia - likely 2/2 ESRD  -trend CBC for now    #Gout  #HLD  -c/w home meds    DVT: SCD for now, encourage ambulation Hospitalist attending  I have personally seen and examined the patient.  I fully participated in the care of this patient.  I have made amendments to the documentation where necessary, and agree with the history, physical exam, and plan as documented by the ACP/resident.    This is a 67 year old female, coming from home, with medical history of ESRD(M-W-F), HTN, HIV coming in with abdominal pain. Pt states she has been having abdominal pain for the last 3 days. She went for dialysis yesterday and afterwards the pain became worse. The abdominal pain comes and goes, is a dull achy pain, rated 8/10. It does not radiate and is located in left lower quadrant and suprapubic region. Pt also has been constipated with last bowl movement being about 4 days prior. She also has been experiencing nausea and has had about 5 episodes of vomiting in the last 3 days, they were all watery. She denies any headaches, visual disturbances, dizziness, falls, chest pain, palpitations, lower extremity swelling, fevers, skin rash, recent travel, or sick contacts.      Vital Signs Last 24 Hrs  T(C): 37.2 (27 Sep 2022 12:22), Max: 37.2 (27 Sep 2022 12:22)  T(F): 98.9 (27 Sep 2022 12:22), Max: 98.9 (27 Sep 2022 12:22)  HR: 83 (27 Sep 2022 12:22) (74 - 83)  BP: 144/71 (27 Sep 2022 12:22) (144/71 - 208/77)  BP(mean): --  RR: 18 (27 Sep 2022 12:22) (18 - 18)  SpO2: 97% (27 Sep 2022 12:22) (97% - 99%)    Parameters below as of 27 Sep 2022 12:22  Patient On (Oxygen Delivery Method): room air      Physical exam:    CONSTITUTIONAL: NAD, well-groomed  ENMT: Moist oral mucosa, no pharyngeal injection or exudates; normal dentition  RESPIRATORY: Normal respiratory effort; lungs are clear to auscultation bilaterally  CARDIOVASCULAR: Regular rate and rhythm, normal S1 and S2, no murmur/rub/gallop; No lower extremity edema  ABDOMEN: Diffuse TTP on the left side, normoactive bowel sounds, +guarding  PSYCH: A+O x3; affect appropriate  NEUROLOGY: CN 2-12 are intact and symmetric; no gross sensory deficits;   SKIN: No rashes; no palpable lesions    This is a 67 year old female, coming from home, with medical history of ESRD(M-W-F), HTN, HIV coming in with abdominal pain, admitted with concern for bowel abscess with fistula    # Bowel abscess  -CT result noted  -c/w Zosyn  -ID following, yareli recs  -surgery called, yareli recs  -cancer markers neg  -serial abd exam  -keep NPO for now, advance diet as tolerated  -Pain regimen    # ESRD on HD  -HD per nephro  -renally dose med    # HTN - uncontrolled - component of medication non compliance   -resume home meds  -HD per nephro    # HIV  -f/u CD4 count and viral load  -c/w home meds  -ID following, yareli recs    # Anemia - likely 2/2 ESRD  -trend CBC for now    #Gout  #HLD  -c/w home meds    #Liver hypodensity on CT  #Pancreas cyst  -tumor markers negative  -outpatient MRI w/ PCP    DVT: SCD for now, encourage ambulation

## 2022-09-27 NOTE — PATIENT PROFILE ADULT - FALL HARM RISK - RISK INTERVENTIONS

## 2022-09-27 NOTE — ED ADULT NURSE NOTE - OBJECTIVE STATEMENT
Pt presents to the ED with c/o generalized abdominal pain, with nausea, vomiting, and constipation x 3 days. AV fistula noted in left upper arm.

## 2022-09-27 NOTE — ED ADULT NURSE NOTE - NS ED NURSE RECORD ANOTHER HT AND WT
PATIENT INTERESTED IN SIMPLY GO MINI FOR PORTABLE O2 USE DURING THE DAY TO MAKE TRAVEL EASIER AND HAS MULTIPLE BACK ISSUES SO HAS A HARD TIME CARRYING OR WHEELING TANKS AROUND FREQUENTLY. PULSE DOSE TEST PERFORMED ON 18 IN THE PATIENT'S HOME BECAUSE PATIENT DID NOT HAVE TRANSPORTATION AVAILABLE. 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    RESULTS:  RA RESTING 89% SPO2, 91 HR  RA POST 1 MIN. WALK: 86% SPO2, 92 HR  5 LPM O2 PULSE W/ ACTIVITY:  92% SPO2, 93 HR  5 LPM O2 PULSE RESTIN% SPO2, 88 HR  3 LPM O2 PULSE W/ ACTIVITY:  91% SPO2, 94 HR  3 LPM O2 PULSE RESTING: SPO2 94% SPO2, 86 HR    PATIENT TOLERATED PULSE DOSE THERAPY VERY WELL.  RECOMMENDATION: 3-5 LPM PULSE DOSE THERAPY.    PROVIDER CLINIC: [facility]  PROVIDER NAME: SOFY ELIZALDE  FAX:  
Yes

## 2022-09-27 NOTE — H&P ADULT - ASSESSMENT
1/month(s) This is a 67 year old female, coming from home, with medical history of ESRD(M-W-F), HTN, HIV coming in with abdominal pain. week(s)/1

## 2022-09-27 NOTE — ED PROVIDER NOTE - CLINICAL SUMMARY MEDICAL DECISION MAKING FREE TEXT BOX
CT reported: Abscess within the left lower quadrant small bowel wall secondary to fistulization from the sigmoid colon likely on the basis of chronic diverticular disease.  Surgical HO consulted, pt to start on IV abx and informed to admit to medical service due to hx of ESRD.  MAR and Dr. Boone endorsed. Pt agrees with admission. I had a detailed discussion with the patient and/or guardian regarding the historical points, exam findings, and any diagnostic results supporting the admit diagnosis.

## 2022-09-27 NOTE — H&P ADULT - PROBLEM SELECTOR PLAN 2
- Pt has a history of HTN.   - Will start pts home medication of X with parameters. - Pt has a history of HTN.   - Will start pts home medication of Metoprolol, Hydralazine, Amlodipine with parameters.

## 2022-09-27 NOTE — PATIENT PROFILE ADULT - MEDICATIONS/VISITS
Assessment and Plan:     Problem List Items Addressed This Visit     None      Visit Diagnoses     Encounter for immunization    -  Primary           Preventive health issues were discussed with patient, and age appropriate screening tests were ordered as noted in patient's After Visit Summary  Personalized health advice and appropriate referrals for health education or preventive services given if needed, as noted in patient's After Visit Summary  History of Present Illness:     Patient presents for Medicare Annual Wellness visit    Patient Care Team:  Sukhwinder House MD as PCP - Olivia Mckeon MD PhD (Hematology)  Alan Mendez DO (Gastroenterology)  Tami Gaytan MD (Neurology)  Willie Espinoza MD (Cardiology)  Vasile Carrizales RN as RN Care Manager (Care Coordination)  Bridgette Reyes as  Care Manager  [facility] as Physician Assistant (Physician Assistant)     Problem List:     Patient Active Problem List   Diagnosis    Anemia    Cigarette nicotine dependence without complication    Hyperglycemia    Paroxysmal SVT (supraventricular tachycardia) ([address] )    Sinus tachycardia    Urinary incontinence    Memory difficulties    Gait disturbance    Obesity (BMI 30 0-34  9)    Recurrent major depressive disorder, in partial remission ([address] )    Pancytopenia ([address] )    History of alcohol abuse    Metabolic encephalopathy    Liver cirrhosis (HCC)    Acute on chronic diastolic CHF    Dementia     Urinary tract infection without hematuria    CESAR (acute kidney injury) (HCC)    Dysphagia    Paroxysmal atrial fibrillation (HCC)/Tachycardia    Troponin level elevated    Neutropenia (HCC)    Hyperphosphatemia    Hyperkalemia    Hyponatremia    Multiple myeloma (HCC)    Epistaxis    Azotemia    Thrombocytopenia (HCC)    Pulmonary hypertension (HCC)    Hyperuricemia    Acute renal failure with acute tubular necrosis superimposed on chronic kidney disease ([address] )  Ascites    Chronic kidney disease (CKD), active medical management without dialysis, stage 4 (severe) (HCC)    Volume overload    Diarrhea    Dementia (HCC)    Acute kidney injury superimposed on chronic kidney disease (HCC)    Hypotension    Rib fracture      Past Medical and Surgical History:     Past Medical History:   Diagnosis Date    Benign essential hypertension     last assessed - [id number]    Chest pain 11/4/2017    CHF (congestive heart failure) (HCC)     Chronic kidney disease     Cirrhosis (HCC)     Combined systolic and diastolic heart failure ([facility] ) 1/18/2021    Elevated troponin 1/31/2020    Gastroesophageal reflux disease without esophagitis 11/16/2017    Hyperbilirubinemia 5/14/2020    Hypertension     Liver disease     Multiple myeloma ([facility] )     Pneumonia 5/6/2020    Renal failure 08/29/2020     Past Surgical History:   Procedure Laterality Date    APPENDECTOMY      BONE MARROW BIOPSY      IR PARACENTESIS  10/2/2020    IR PARACENTESIS  12/28/2020    IR THORACENTESIS  12/21/2020      Family History:     Family History   Problem Relation Age of Onset    Heart attack Father         myocardial infarction    Heart disease Father       Social History:     E-Cigarette/Vaping    E-Cigarette Use Never User     Start Date 1/1/15     Quit Date 1/24/15      E-Cigarette/Vaping Substances    Nicotine No     THC No     CBD No     Flavoring No     Other No     Unknown No      Social History     Socioeconomic History    Marital status:       Spouse name: None    Number of children: None    Years of education: None    Highest education level: None   Occupational History    None   Social Needs    Financial resource strain: None    Food insecurity     Worry: None     Inability: None    Transportation needs     Medical: None     Non-medical: None   Tobacco Use    Smoking status: Light Tobacco Smoker     Packs/day: 0 25     Types: Cigarettes    Smokeless tobacco: Never Used    Tobacco comment: 2-3 a day   Substance and Sexual Activity    Alcohol use: Not Currently     Frequency: 4 or more times a week     Drinks per session: 1 or 2     Binge frequency: Never     Comment: History of significant daily alcohol intake   Sister joy no alcohol intake in 6 months    Drug use: No    Sexual activity: Not Currently   Lifestyle    Physical activity     Days per week: 0 days     Minutes per session: 0 min    Stress: Not at all   Relationships    Social connections     Talks on phone: None     Gets together: None     Attends Yarsanism service: None     Active member of club or organization: None     Attends meetings of clubs or organizations: None     Relationship status: None    Intimate partner violence     Fear of current or ex partner: None     Emotionally abused: None     Physically abused: None     Forced sexual activity: None   Other Topics Concern    None   Social History Narrative    No advance directives      Medications and Allergies:     Current Outpatient Medications   Medication Sig Dispense Refill    diltiazem (CARDIZEM CD) 120 mg 24 hr capsule Take 1 capsule (120 mg total) by mouth daily 90 capsule 0    folic acid (FOLVITE) 1 mg tablet TAKE ONE TABLET BY MOUTH EVERY DAY 90 tablet 1    furosemide (LASIX) 40 mg tablet Take 1 5 tablets (60 mg total) by mouth daily 30 tablet 0    lactulose 20 g/30 mL Take 15 mL (10 g total) by mouth 2 (two) times a day 1892 mL 0    magnesium oxide (MAG-OX) 400 mg tablet TAKE ONE TABLET BY MOUTH TWICE A  tablet 1    metoprolol succinate (TOPROL-XL) 100 mg 24 hr tablet Take 0 5 tablets (50 mg total) by mouth daily 30 tablet 0    mirtazapine (REMERON) 15 mg tablet Take 1 tablet (15 mg total) by mouth daily at bedtime 90 tablet 1    nicotine (NICODERM CQ) 14 mg/24hr TD 24 hr patch Place 1 patch on the skin daily 28 patch 0    nystatin (MYCOSTATIN) powder Apply topically 2 (two) times a day 15 g 0    pantoprazole (PROTONIX) 40 mg tablet TAKE ONE TABLET BY MOUTH EVERY DAY IN THE MORNING 30 tablet 2    sodium chloride (OCEAN) 0 65 % nasal spray 1 spray into each nostril every hour as needed for congestion 30 mL 0    Thiamine HCl (vitamin B-1) 100 MG TABS TAKE ONE TABLET BY MOUTH EVERY DAY 90 each 1     No current facility-administered medications for this visit  No Known Allergies   Immunizations:     Immunization History   Administered Date(s) Administered    Influenza Quadrivalent Preservative Free 3 years and older IM 11/06/2017    Influenza Split High Dose Preservative Free IM 1944    Influenza, high dose seasonal 0 7 mL 10/17/2020    Pneumococcal Conjugate 13-Valent 11/06/2017    Pneumococcal Polysaccharide PPV23 1944, 09/17/2020      Health Maintenance:         Topic Date Due    DXA SCAN  Never done    Colorectal Cancer Screening  05/18/2030    Hepatitis C Screening  Completed         Topic Date Due    Hepatitis A Vaccine (1 of 2 - Risk 2-dose series) Never done    COVID-19 Vaccine (1) Never done    Hepatitis B Vaccine (1 of 3 - Risk 3-dose series) Never done    DTaP,Tdap,and Td Vaccines (1 - Tdap) Never done      Medicare Health Risk Assessment:     Temp (!) 96 5 °F (35 8 °C) (Tympanic) Comment: fdfdf  Ht 5' 2" (1 575 m)   BMI 26 69 kg/m²      Jovana Willis is here for her Subsequent Wellness visit  Health Risk Assessment:   Patient rates overall health as fair  Patient feels that their physical health rating is same  Patient is satisfied with their life  Eyesight was rated as same  Hearing was rated as same  Patient feels that their emotional and mental health rating is same  Patients states they are never, rarely angry  Patient states they are sometimes unusually tired/fatigued  Pain experienced in the last 7 days has been none  Fall Risk Screening:    In the past year, patient has experienced: no history of falling in past year      Home Safety:  Patient has trouble with stairs inside or outside of their home  Patient has working smoke alarms and has working carbon monoxide detector  Nutrition:   Current diet is Regular  Medications:   Patient is currently taking over-the-counter supplements  OTC medications include: see medication list  Patient is not able to manage medications  Activities of Daily Living (ADLs)/Instrumental Activities of Daily Living (IADLs):   Walk and transfer into and out of bed and chair?: No  Dress and groom yourself?: No    Bathe or shower yourself?: No    Feed yourself? No  Do your laundry/housekeeping?: No  Manage your money, pay your bills and track your expenses?: No  Make your own meals?: No    Do your own shopping?: No    Previous Hospitalizations:   Any hospitalizations or ED visits within the last 12 months?: Yes    How many hospitalizations have you had in the last year?: more than 4    Advance Care Planning:   Living will: Yes    Advanced directive: Yes    Advanced directive counseling given: Yes    End of Life Decisions reviewed with patient: Yes      PREVENTIVE SCREENINGS      Cardiovascular Screening:    General: Screening Current      Diabetes Screening:     General: Screening Current      Colorectal Cancer Screening:     General: Screening Current      Cervical Cancer Screening:    General: Screening Not Indicated      Lung Cancer Screening:     General: Screening Not Indicated      Hepatitis C Screening:    General: Screening Current    Screening, Brief Intervention, and Referral to Treatment (SBIRT)    Screening  Typical number of drinks in a day: 0    AUDIT-C Screenin) How often did you have a drink containing alcohol in the past year? 4 or more times a week  2) How many drinks did you have on a typical day when you were drinking in the past year?  1 to 2  3) How often did you have 6 or more drinks on one occasion in the past year? never    AUDIT-C Score: 4  Interpretation: Score 3-12 (female): POSITIVE screen for alcohol misuse    AUDIT Screenin) How often during the last year have you found that you were not able to stop drinking once you had started? 0 - never  5) How often during the last year have you failed to do what was normally expected from you because of drinking? 0 - never  6) How often during the last year have you needed a first drink in the morning to get yourself going after a heavy drinking session?  0 - never  7) How often during the last year have you had a feeling of guilt or remorse after drinking? 0 - never  8) How often during the last year have you been unable to remember what happened the night before because you had been drinking? 0 - never  9) Have you or someone else been injured as a result of your drinking? 0 - no  10) Has a relative or friend or a doctor or another health worker been concerned about your drinking or suggested you cut down? 0 - no    AUDIT Score: 4  Interpretation: Low risk alcohol consumption      MEREDITH Villarreal no

## 2022-09-27 NOTE — ED PROVIDER NOTE - NSICDXPASTMEDICALHX_GEN_ALL_CORE_FT
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

## 2022-09-27 NOTE — ED PROVIDER NOTE - OBJECTIVE STATEMENT
67 year old female with PMHx of left arm AV fistula and ESRD on hemodialysis (M,W,F at [facility] [place] and her last hemodialysis was yesterday [3 hours long]) is presenting to the ED with chief complaint of diffused abdominal tenderness for the past 3 days associated with nausea, vomiting, decreased bowel movements. No chest pain, shortness of breath, or fevers.  Patient's doctor is Dr. Finney. 67 year old female with PMHx of left arm AV fistula and ESRD on hemodialysis (M,W,F at [facility] and her last hemodialysis was yesterday [3 hours long]) is presenting to the ED with chief complaint of diffused abdominal tenderness for the past 3 days associated with nausea, vomiting, decreased bowel movements. No chest pain, shortness of breath, or fevers.  Patient is seen by Dr. Reese. 67 year old female with PMHx of left arm AV fistula and ESRD on hemodialysis (M,W,F at [facility] and her last hemodialysis was yesterday 3 hours long]) is presenting to the ED with chief complaint of diffused abdominal tenderness for the past 3 days associated with nausea, vomiting, decreased bowel movements. No chest pain, shortness of breath, or fevers.  Patient is seen by Dr. Reese.

## 2022-09-27 NOTE — CONSULT NOTE ADULT - ATTENDING COMMENTS
66 yo female from [facility] with H/O ESRD on HD(MWF) via L arm AVF, HIV on ART diagnosed 16 yrs ago (Tivicay + Lamivudine + Prezcobix) as per pt she is undetectable and compliant with meds. She follows with Dr. Cathy Lowry([phone]) last CD4 on July/2022 was 794, VL <20 copies(has been undetectable)  Labs on 7/2022  HBsAB non reactive; HBsAG non reactive  RPR non reactive  Hep C non reactive  Quantiferon negative  GC/Chlamydia negative    -Left small bowel abscess/fistula from sigmoid colon 2/2 diverticular disease  -HIV on ART CD4 794, undetectable   -PCN allergy(when she was a child, she was told she had hives, she does not remember)  -ESRD on HD    Admitted for LLQ pain, N/V for the past 2-3 days, worsening, with guarding, afebrile, no diarrgea, no recent travel, no recent hospitalization, no recent admission, no recent abx use.   Clinically stable.  Last HD yesterday, L AVF with good thrill.  Last colonoscopy many yrs ago and pt is not sure about results.  No recent episode of acute diverticulitis  WBC WBC Count: 11.18 K/uL (09.27.22 @ 05:00)  CT Abdomen and Pelvis w/ IV Cont (09.27.22 @ 06:30) > CT A/P w/IV contrast showing abscess within the left lower quadrant small bowel wall secondary to fistulization from the sigmoid colon likely on the basis of chronic diverticular disease.    Please resume ART home regimen(Tivicay +Prezcovix + Lamivudine) daily  Start Ceftriaxone 2g IV q/24 hrs + Flagyl 500 mg q/8 hrs po if able to tolerate otherwise IV.  Please obtain ESR and CRP.  F/up surgery recs, apparently there are concerns for intraabdominal malignancy.  Please reach IR for evaluation regarding abscess drainage in order to obtain source control, bacterial, fungal and AFB cultures of abscess.   Discussed with medicine team.  Will follow up.     Please reach ID with any questions or concerns  Dr. Mine Garnett  Tel. [phone]  Available in Teams 68 yo female from [facility] with H/O ESRD on HD(MWF) via L arm AVF, HIV on ART diagnosed 16 yrs ago (Tivicay + Lamivudine + Prezcobix) as per pt she is undetectable and compliant with meds. She follows with Dr. Cathy Lowry([phone]) last CD4 on July/2022 was 794, VL <20 copies(has been undetectable)  Labs on 7/2022  HBsAB non reactive; HBsAG non reactive  RPR non reactive  Hep C non reactive  Quantiferon negative  GC/Chlamydia negative    -Left small bowel abscess/fistula from sigmoid colon 2/2 diverticular disease  -HIV on ART CD4 794, undetectable   -PCN allergy(when she was a child, she was told she had hives, she does not remember)  -ESRD on HD    Admitted for LLQ pain, N/V for the past 2-3 days, worsening, with guarding, afebrile, no diarrgea, no recent travel, no recent hospitalization, no recent admission, no recent abx use.   Clinically stable.  Last HD yesterday, L AVF with good thrill.  Last colonoscopy many yrs ago and pt is not sure about results.  No recent episode of acute diverticulitis  WBC WBC Count: 11.18 K/uL (09.27.22 @ 05:00)  CT Abdomen and Pelvis w/ IV Cont (09.27.22 @ 06:30) > CT A/P w/IV contrast showing abscess within the left lower quadrant small bowel wall secondary to fistulization from the sigmoid colon likely on the basis of chronic diverticular disease.    Please resume ART home regimen(Tivicay(50 mg) +Prezcovix(800/150 mg) + Lamivudine (100 mg) daily  Start Ceftriaxone 2g IV q/24 hrs + Flagyl 500 mg q/8 hrs po if able to tolerate otherwise IV.  Please obtain ESR and CRP.  F/up surgery recs, apparently there are concerns for intraabdominal malignancy.  Please reach IR for evaluation regarding abscess drainage in order to obtain source control, bacterial, fungal and AFB cultures of abscess.   Discussed with medicine team.  Will follow up.     Please reach ID with any questions or concerns  Dr. Mine Garnett  Tel. [phone]  Available in Teams

## 2022-09-27 NOTE — ED ADULT NURSE NOTE - NSIMPLEMENTINTERV_GEN_ALL_ED
Implemented All Universal Safety Interventions:  [place] to call system. Call bell, personal items and telephone within reach. Instruct patient to call for assistance. Room bathroom lighting operational. Non-slip footwear when patient is off stretcher. Physically safe environment: no spills, clutter or unnecessary equipment. Stretcher in lowest position, wheels locked, appropriate side rails in place.

## 2022-09-27 NOTE — H&P ADULT - HISTORY OF PRESENT ILLNESS
This is a 67 year old female, coming from home, with medical history of ESRD(M-W-F), HTN, HIV coming in with abdominal pain. Pt states she has been having abdominal pain for the last 3 days. She went for dialsysis yesterday and afterwards the pain became worse. The abdominal pain comes and goes, is a dull achy pain, rated 8/10. It does not radiate and is located in left lower quadrant and suprapubic region. Pt also has been constipated with last bowl movement being about 4 days prior. She also has been expereincing nause and has had about 5 episodes of vomiting in the last 3 days, they were all watery. She denies any headaches, visual disturbances, dizziness, falls, chest pain, palpitations, lower extremity swelling, fevers, skin rash, recent travel, or sick contacts.

## 2022-09-27 NOTE — CONSULT NOTE ADULT - SUBJECTIVE AND OBJECTIVE BOX
HPI:  67 year old female, from home, with medical history of ESRD (dialysis M-W-F), HTN, HIV (diagnosed 16 yrs ago, on Lamivudine, Prezcobix and Tivicay) coming in with abdominal pain. Pt states she has been having abdominal pain for the last 3 days. She went for dialysis yesterday and when she got home the pain became worse. Abdominal pain is located on the left side, is intermittent, dull achy pain, rated 8/10 and does not radiate. Pt also has been constipated, last BM 4 days ago. She has nausea and about 5 episodes of vomiting in the last 3 days, they were all watery. Last episode of vomiting was last night and has associated pain in her esophagus. She also states she produces urine and has burning sensation during urination for the past 3 weeks. She states that since starting dialysis, she has weakness in both legs and has been going to physical therapy. She denies any headaches, visual disturbances, dizziness, falls, chest pain, palpitations, lower extremity swelling, fevers, skin rash, recent travel, or sick contacts.      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 She sticks to a renal diet and states she generally takes all her medications regularly. She denies alcohol or IVDU. She states she occasionally smokes marijuana a few times a month. She states she smoked cigarettes socially as a teenager but denies current tobacco use. Her last travel was to [place], [place] in May, otherwise denies recent travel. She denies any sick contacts.     REVIEW OF SYSTEMS:  CONSTITUTIONAL:  No weakness, fevers or chills  EYES/ENT:  No visual changes;  No vertigo or throat pain   NECK:  No pain or stiffness  RESPIRATORY:  No cough, wheezing, hemoptysis; No shortness of breath  CARDIOVASCULAR:  No chest pain or palpitations  GASTROINTESTINAL: + abdominal pain, constipation, nausea and vomiting. No hematemesis; No diarrhea. No melena or hematochezia.  GENITOURINARY: + dysuria. No frequency or hematuria  NEUROLOGICAL:  + lower extremity weakness. No numbness  MSK: no back pain, no joint pain  SKIN: No itching, rashes    PHYSICAL EXAM:  Vital Signs Last 24 Hrs  T(C): 37.2 (27 Sep 2022 12:22), Max: 37.2 (27 Sep 2022 12:22)  T(F): 98.9 (27 Sep 2022 12:22), Max: 98.9 (27 Sep 2022 12:22)  HR: 83 (27 Sep 2022 12:22) (74 - 83)  BP: 144/71 (27 Sep 2022 12:22) (144/71 - 208/77)  RR: 18 (27 Sep 2022 12:22) (18 - 18)  SpO2: 97% (27 Sep 2022 12:22) (97% - 99%)    Parameters below as of 27 Sep 2022 12:22  Patient On (Oxygen Delivery Method): room air    GENERAL: well-groomed female, laying on her side appearing uncomfortable  HEAD:  Atraumatic, Normocephalic  EYES: EOMI, PERRLA, melanocytic macular lesions in both eyes  ENT: Moist mucous membranes  NECK: Supple, No JVD  CHEST/LUNG: Clear to auscultation bilaterally; No rales, rhonchi, wheezing, or rubs. Unlabored respirations  HEART: Regular rate and rhythm; No murmurs, rubs, or gallops  ABDOMEN: BSx4; Soft, mildly distended, + diffuse tenderness in LLQ and suprapubic area, + guarding  : no terry  EXTREMITIES:  + left upper arm AV fistula site, 2+ Peripheral Pulses, brisk capillary refill. No clubbing, cyanosis, or edema  NERVOUS SYSTEM:  A&Ox3. No sensory deficits, no motor deficits  SKIN: + xerosis in B/L lower extremities. No rashes or lesions, dry and warm skin  MSK: no back pain, no joint swelling    LABS/DIAGNOSTIC TESTS:                        10.0   11.18 )-----------( 264      ( 27 Sep 2022 05:00 )             28.9     WBC Count: 11.18 K/uL (09-27 @ 05:00)  WBC Count: 10.85 K/uL (09-27 @ 00:01)    09-27    133<L>  |  95<L>  |  22<H>  ----------------------------<  108<H>  4.0   |  30  |  6.67<H>    Ca    9.5      27 Sep 2022 05:00  Phos  4.8     09-27  Mg     2.2     09-27    TPro  8.4<H>  /  Alb  2.7<L>  /  TBili  0.5  /  DBili  x   /  AST  22  /  ALT  17  /  AlkPhos  88  09-27      RADIOLOGY:   < from: CT Abdomen and Pelvis w/ IV Cont (09.27.22 @ 06:30) >  ACC: [id number] EXAM:  CT ABDOMEN AND PELVIS IC                        PROCEDURE DATE:  09/27/2022    INTERPRETATION:  CLINICAL INFORMATION: Abdominal pain question abscess  COMPARISON: CT abdomen pelvis 9/27/2022 earlier the same day.    IMPRESSION:  Abscess within the left lower quadrant small bowel wall secondary to   fistulization from the sigmoid colon likely on the basis of chronic   diverticular disease.    1.3 cm right lobe hypodensity with suggestion of peripheral early nodular   enhancement. This may represent a hemangioma. This could be confirmed   with MRI.    1.4 cm cyst in uncinate process or a focally dilated duct. This would be   better evaluated with dedicated MRI.    Probable probably cystic kidneys. Correlate clinically for kidney disease.    < from: CT Abdomen and Pelvis w/ Oral Cont (09.27.22 @ 04:29) >  ACC: [id number] EXAM:  CT ABDOMEN AND PELVIS OC                        PROCEDURE DATE:  09/27/2022    INTERPRETATION:  CLINICAL INFORMATION: Pain. Evaluate for obstruction  COMPARISON: CT chest 1/22/2021.    IMPRESSION:  Oral contrast has advanced to the mid small bowel which is mildly   prominent measuring up to 2.7 cm in diameter.    The sigmoid colon is thickened and there are numerous diverticula. At the   proximal sigmoid colon there are inflammatory changes which extend to the   adjacent small bowel where there is a 5.4 x 5.4 cm mass containing air   and feculent material. Evaluation is limited without intravenous contrast   and oral contrast has not reached this level. This may represent a   contained perforation secondary to fistulization from sigmoid   diverticular disease to the small bowel.    Suspicion of polycystic kidney disease. Correlate clinically.  --- End of Report ---    ANTIBIOTICS:  darunavir 800 mG/cobicstat 150 mG 1 daily  dolutegravir 50 daily  lamiVUDine- daily  piperacillin/tazobactam IVPB.. 3.375 every 12 hours      IV LINES: + Right Peripheral IV, c/d/i    IMPRESSION:  67 year old female with medical history of ESRD (dialysis M-W-F), HTN, HIV (diagnosed 16 yrs ago, on Lamivudine, Prezcobix and Tivicay) coming in with abdominal pain. CT Abdomen with IV Contrast shows abscess within the left lower quadrant small bowel wall secondary to fistulization from the sigmoid colon likely on the basis of chronic diverticular disease.    - Abdominal Wall Abscess secondary to fistulization from sigmoid colon, likely from chronic diverticular disease.  - HIV (unknown CD4 and viral load; on Lamivudine, Prezcobix and Tivicay)      PLAN:  - Start Zosyn 3.375g q12 hours (renally dosed) for 7 days  - Continue pt's home HARRT therapy (Lamivudine, Prezcobix and Tivicay)  - F/u CD4 count and viral load               HPI:  67 year old female, born in [place] from home, with medical history of ESRD (dialysis M-W-F), HTN, HIV (diagnosed 16 yrs ago, on Lamivudine, Prezcobix and Tivicay) coming in with abdominal pain. Pt states she has been having abdominal pain for the last 3 days. She went for dialysis yesterday and when she got home the pain became worse. Abdominal pain is located on the left side, is intermittent, dull achy pain, rated 8/10 and does not radiate. Pt also has been constipated, last BM 4 days ago. She has nausea and about 5 episodes of vomiting in the last 3 days, they were all watery. Last episode of vomiting was last night and has associated pain in her esophagus. She also states she produces urine and has burning sensation during urination for the past 3 weeks. She states that since starting dialysis, she has weakness in both legs and has been going to physical therapy. She denies any headaches, visual disturbances, dizziness, falls, chest pain, palpitations, lower extremity swelling, fevers, skin rash, recent travel, or sick contacts.    She follows up with Dr. Cathy Lowry, who manages her HIV medications. Spoke with clinic ([facility] #[phone]). Her last follow up was 9/15/22. She takes Tivicay, Prezcobix and Epivir (Lamivudine). Lab work from 7/2022 showed CD4 794, CD4 percentage 41.1%, viral load 43.     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 Born in [place] and immigrated to the  about 40 years ago. She sticks to a renal diet and states she generally takes all her medications regularly. She denies alcohol or IVDU. She states she occasionally smokes marijuana a few times a month. She states she smoked cigarettes socially as a teenager but denies current tobacco use. Her last travel was to [place], [place] in May, otherwise denies recent travel. She denies any sick contacts.     REVIEW OF SYSTEMS:  CONSTITUTIONAL:  No weakness, fevers or chills  EYES/ENT:  No visual changes;  No vertigo or throat pain   NECK:  No pain or stiffness  RESPIRATORY:  No cough, wheezing, hemoptysis; No shortness of breath  CARDIOVASCULAR:  No chest pain or palpitations  GASTROINTESTINAL: + abdominal pain, constipation, nausea and vomiting. No hematemesis; No diarrhea. No melena or hematochezia.  GENITOURINARY: + dysuria. No frequency or hematuria  NEUROLOGICAL:  + lower extremity weakness. No numbness  MSK: no back pain, no joint pain  SKIN: No itching, rashes    PHYSICAL EXAM:  Vital Signs Last 24 Hrs  T(C): 37.2 (27 Sep 2022 12:22), Max: 37.2 (27 Sep 2022 12:22)  T(F): 98.9 (27 Sep 2022 12:22), Max: 98.9 (27 Sep 2022 12:22)  HR: 83 (27 Sep 2022 12:22) (74 - 83)  BP: 144/71 (27 Sep 2022 12:22) (144/71 - 208/77)  RR: 18 (27 Sep 2022 12:22) (18 - 18)  SpO2: 97% (27 Sep 2022 12:22) (97% - 99%)    Parameters below as of 27 Sep 2022 12:22  Patient On (Oxygen Delivery Method): room air    GENERAL: well-groomed female, laying on her side appearing uncomfortable  HEAD:  Atraumatic, Normocephalic  EYES: EOMI, PERRLA, melanocytic macular lesions in both eyes  ENT: Moist mucous membranes  NECK: Supple, No JVD  CHEST/LUNG: Clear to auscultation bilaterally; No rales, rhonchi, wheezing, or rubs. Unlabored respirations  HEART: Regular rate and rhythm; No murmurs, rubs, or gallops  ABDOMEN: BSx4; Soft, mildly distended, + diffuse tenderness in LLQ and suprapubic area, + guarding  : no terry  EXTREMITIES:  + left upper arm AV fistula site with strong palpable thrill, 2+ Peripheral Pulses, brisk capillary refill. No clubbing, cyanosis, or edema  NERVOUS SYSTEM:  A&Ox3. No sensory deficits, no motor deficits  SKIN: + xerosis in B/L lower extremities. No rashes or lesions, dry and warm skin  MSK: no back pain, no joint swelling    LABS/DIAGNOSTIC TESTS:                        10.0   11.18 )-----------( 264      ( 27 Sep 2022 05:00 )             28.9     WBC Count: 11.18 K/uL (09-27 @ 05:00)  WBC Count: 10.85 K/uL (09-27 @ 00:01)    09-27    133<L>  |  95<L>  |  22<H>  ----------------------------<  108<H>  4.0   |  30  |  6.67<H>    Ca    9.5      27 Sep 2022 05:00  Phos  4.8     09-27  Mg     2.2     09-27    TPro  8.4<H>  /  Alb  2.7<L>  /  TBili  0.5  /  DBili  x   /  AST  22  /  ALT  17  /  AlkPhos  88  09-27      RADIOLOGY:   < from: CT Abdomen and Pelvis w/ IV Cont (09.27.22 @ 06:30) >  ACC: [id number] EXAM:  CT ABDOMEN AND PELVIS IC                        PROCEDURE DATE:  09/27/2022    INTERPRETATION:  CLINICAL INFORMATION: Abdominal pain question abscess  COMPARISON: CT abdomen pelvis 9/27/2022 earlier the same day.    IMPRESSION:  Abscess within the left lower quadrant small bowel wall secondary to   fistulization from the sigmoid colon likely on the basis of chronic   diverticular disease.    1.3 cm right lobe hypodensity with suggestion of peripheral early nodular   enhancement. This may represent a hemangioma. This could be confirmed   with MRI.    1.4 cm cyst in uncinate process or a focally dilated duct. This would be   better evaluated with dedicated MRI.    Probable probably cystic kidneys. Correlate clinically for kidney disease.    < from: CT Abdomen and Pelvis w/ Oral Cont (09.27.22 @ 04:29) >  ACC: [id number] EXAM:  CT ABDOMEN AND PELVIS OC                        PROCEDURE DATE:  09/27/2022    INTERPRETATION:  CLINICAL INFORMATION: Pain. Evaluate for obstruction  COMPARISON: CT chest 1/22/2021.    IMPRESSION:  Oral contrast has advanced to the mid small bowel which is mildly   prominent measuring up to 2.7 cm in diameter.    The sigmoid colon is thickened and there are numerous diverticula. At the   proximal sigmoid colon there are inflammatory changes which extend to the   adjacent small bowel where there is a 5.4 x 5.4 cm mass containing air   and feculent material. Evaluation is limited without intravenous contrast   and oral contrast has not reached this level. This may represent a   contained perforation secondary to fistulization from sigmoid   diverticular disease to the small bowel.    Suspicion of polycystic kidney disease. Correlate clinically.  --- End of Report ---    ANTIBIOTICS:  darunavir 800 mG/cobicstat 150 mG 1 daily  dolutegravir 50 daily  lamiVUDine- daily  piperacillin/tazobactam IVPB.. 3.375 every 12 hours      IV LINES: + Right Peripheral IV, c/d/i    IMPRESSION:  67 year old female with medical history of ESRD (dialysis M-W-F), HTN, HIV (diagnosed 16 yrs ago, on Lamivudine, Prezcobix and Tivicay) coming in with abdominal pain, nausea and vomiting. CT Abdomen with IV Contrast shows abscess within the left lower quadrant small bowel wall secondary to fistulization from the sigmoid colon likely on the basis of chronic diverticular disease.    - Abdominal Wall Abscess secondary to fistulization from sigmoid colon, likely from chronic diverticular disease  - HIV controlled with HAART Therapy (last CD4 794, viral load 43 in 7/2022 ; on Lamivudine, Prezcobix and Tivicay)      PLAN:  - Start IV Ceftriaxone 2g daily, and IV Flagyl 500mg q 8hours  - Continue pt's home HARRT therapy (Lamivudine, Prezcobix and Tivicay)  - F/u CD4 count and viral load  - Rest of management per primary team    Assessment and plan discussed with ID Attending, Dr. Mine Thomas.  HPI:  67 year old female, born in [place], from home, with medical history of ESRD (dialysis M-W-F), HTN, HIV (diagnosed 16 yrs ago, on Lamivudine, Prezcobix and Tivicay) coming in with abdominal pain. Pt states she has been having abdominal pain for the last 3 days. She went for dialysis yesterday and when she got home the pain became worse. Abdominal pain is located on the left side, is intermittent, dull achy pain, rated 8/10 and does not radiate. Pt also has been constipated, last BM 4 days ago. She has nausea and about 5 episodes of vomiting in the last 3 days, they were all watery. Last episode of vomiting was last night and has associated pain in her esophagus. She also states she produces urine and has burning sensation during urination for the past 3 weeks. She states that since starting dialysis, she has weakness in both legs and has been going to physical therapy. She denies any headaches, visual disturbances, dizziness, falls, chest pain, palpitations, lower extremity swelling, fevers, skin rash, recent travel, or sick contacts.    She follows up with PCP Dr. Cathy Lowry, who manages her HIV therapy. Spoke with clinic ([facility] #[phone]). Her last follow up was 9/15/22. She takes Tivicay, Prezcobix and Epivir (Lamivudine). Lab work from 7/2022 showed CD4 794, CD4 percentage 41.1%, viral load 43.     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 Born in [place] and immigrated to the  about 40 years ago. She sticks to a renal diet and states she generally takes all her medications regularly. She denies alcohol or IVDU. She states she occasionally smokes marijuana a few times a month. She states she smoked cigarettes socially as a teenager but denies current tobacco use. Her last travel was to [place], [place] in May, otherwise denies recent travel. She denies any sick contacts.     REVIEW OF SYSTEMS:  CONSTITUTIONAL:  No weakness, fevers or chills  EYES/ENT:  No visual changes;  No vertigo or throat pain   NECK:  No pain or stiffness  RESPIRATORY:  No cough, wheezing, hemoptysis; No shortness of breath  CARDIOVASCULAR:  No chest pain or palpitations  GASTROINTESTINAL: + abdominal pain, constipation, nausea and vomiting. No hematemesis; No diarrhea. No melena or hematochezia.  GENITOURINARY: + dysuria. No frequency or hematuria  NEUROLOGICAL:  + lower extremity weakness. No numbness  MSK: no back pain, no joint pain  SKIN: No itching, rashes    PHYSICAL EXAM:  Vital Signs Last 24 Hrs  T(C): 37.2 (27 Sep 2022 12:22), Max: 37.2 (27 Sep 2022 12:22)  T(F): 98.9 (27 Sep 2022 12:22), Max: 98.9 (27 Sep 2022 12:22)  HR: 83 (27 Sep 2022 12:22) (74 - 83)  BP: 144/71 (27 Sep 2022 12:22) (144/71 - 208/77)  RR: 18 (27 Sep 2022 12:22) (18 - 18)  SpO2: 97% (27 Sep 2022 12:22) (97% - 99%)    Parameters below as of 27 Sep 2022 12:22  Patient On (Oxygen Delivery Method): room air    GENERAL: well-groomed female, laying on her side appearing uncomfortable  HEAD:  Atraumatic, Normocephalic  EYES: EOMI, PERRLA, melanocytic macular lesions in both eyes  ENT: Moist mucous membranes  NECK: Supple, No JVD  CHEST/LUNG: Clear to auscultation bilaterally; No rales, rhonchi, wheezing, or rubs. Unlabored respirations  HEART: Regular rate and rhythm; No murmurs, rubs, or gallops  ABDOMEN: BSx4; Soft, mildly distended, + diffuse tenderness in LLQ and suprapubic area, + guarding  : no terry  EXTREMITIES:  + left upper arm AV fistula site with strong palpable thrill, 2+ Peripheral Pulses, brisk capillary refill. No clubbing, cyanosis, or edema  NERVOUS SYSTEM:  A&Ox3. No sensory deficits, no motor deficits  SKIN: + xerosis in B/L lower extremities. No rashes or lesions, dry and warm skin  MSK: no back pain, no joint swelling    LABS/DIAGNOSTIC TESTS:                        10.0   11.18 )-----------( 264      ( 27 Sep 2022 05:00 )             28.9     WBC Count: 11.18 K/uL (09-27 @ 05:00)  WBC Count: 10.85 K/uL (09-27 @ 00:01)    09-27    133<L>  |  95<L>  |  22<H>  ----------------------------<  108<H>  4.0   |  30  |  6.67<H>    Ca    9.5      27 Sep 2022 05:00  Phos  4.8     09-27  Mg     2.2     09-27    TPro  8.4<H>  /  Alb  2.7<L>  /  TBili  0.5  /  DBili  x   /  AST  22  /  ALT  17  /  AlkPhos  88  09-27      RADIOLOGY:   < from: CT Abdomen and Pelvis w/ IV Cont (09.27.22 @ 06:30) >  ACC: [id number] EXAM:  CT ABDOMEN AND PELVIS IC                        PROCEDURE DATE:  09/27/2022    INTERPRETATION:  CLINICAL INFORMATION: Abdominal pain question abscess  COMPARISON: CT abdomen pelvis 9/27/2022 earlier the same day.    IMPRESSION:  Abscess within the left lower quadrant small bowel wall secondary to   fistulization from the sigmoid colon likely on the basis of chronic   diverticular disease.    1.3 cm right lobe hypodensity with suggestion of peripheral early nodular   enhancement. This may represent a hemangioma. This could be confirmed   with MRI.    1.4 cm cyst in uncinate process or a focally dilated duct. This would be   better evaluated with dedicated MRI.    Probable probably cystic kidneys. Correlate clinically for kidney disease.    < from: CT Abdomen and Pelvis w/ Oral Cont (09.27.22 @ 04:29) >  ACC: [id number] EXAM:  CT ABDOMEN AND PELVIS OC                        PROCEDURE DATE:  09/27/2022    INTERPRETATION:  CLINICAL INFORMATION: Pain. Evaluate for obstruction  COMPARISON: CT chest 1/22/2021.    IMPRESSION:  Oral contrast has advanced to the mid small bowel which is mildly   prominent measuring up to 2.7 cm in diameter.    The sigmoid colon is thickened and there are numerous diverticula. At the   proximal sigmoid colon there are inflammatory changes which extend to the   adjacent small bowel where there is a 5.4 x 5.4 cm mass containing air   and feculent material. Evaluation is limited without intravenous contrast   and oral contrast has not reached this level. This may represent a   contained perforation secondary to fistulization from sigmoid   diverticular disease to the small bowel.    Suspicion of polycystic kidney disease. Correlate clinically.  --- End of Report ---    ANTIBIOTICS:  darunavir 800 mG/cobicstat 150 mG 1 daily  dolutegravir 50 daily  lamiVUDine- daily  piperacillin/tazobactam IVPB.. 3.375 every 12 hours      IV LINES: + Right Peripheral IV, c/d/i    IMPRESSION:  67 year old female with medical history of ESRD (dialysis M-W-F), HTN, HIV (diagnosed 16 yrs ago, on Lamivudine, Prezcobix and Tivicay) coming in with abdominal pain, nausea and vomiting. CT Abdomen with IV Contrast shows abscess within the left lower quadrant small bowel wall secondary to fistulization from the sigmoid colon likely on the basis of chronic diverticular disease.    - Abdominal Wall Abscess secondary to fistulization from sigmoid colon, likely from chronic diverticular disease  - HIV controlled with HAART Therapy (last CD4 794, viral load 43 in 7/2022 ; on Lamivudine, Prezcobix and Tivicay)      PLAN:  - Start IV Ceftriaxone 2g daily, and IV Flagyl 500mg q 8hours  - Continue pt's home HARRT therapy (Lamivudine, Prezcobix and Tivicay)  - F/u CD4 count and viral load  - Rest of management per primary team    Assessment and plan discussed with ID Attending, Dr. Mine Thomas.  HPI:  67 year old female, born in [place], from home, with medical history of ESRD (dialysis M-W-F), HTN, HIV (diagnosed 16 yrs ago, on Lamivudine, Prezcobix and Tivicay) coming in with abdominal pain. Pt states she has been having abdominal pain for the last 3 days. She went for dialysis yesterday and when she got home the pain became worse. Abdominal pain is located on the left side, is intermittent, dull achy pain, rated 8/10 and does not radiate. Pt also has been constipated, last BM 4 days ago. She has nausea and about 5 episodes of vomiting in the last 3 days, they were all watery. Last episode of vomiting was last night and has associated pain in her esophagus. She also states she produces urine and has burning sensation during urination for the past 3 weeks. She states that since starting dialysis, she has weakness in both legs and has been going to physical therapy. She denies any headaches, visual disturbances, dizziness, falls, chest pain, palpitations, lower extremity swelling, fevers, skin rash, recent travel, or sick contacts.    She follows up with PCP Dr. Cathy Lowry, who manages her HIV therapy. Spoke with clinic ([facility] #[phone]). Her last follow up was 9/15/22. She takes Tivicay, Prezcobix and Epivir (Lamivudine). Lab work from 7/2022 showed CD4 794, CD4 percentage 41.1%, viral load 43.     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 Born in [place] and immigrated to the  about 40 years ago. She sticks to a renal diet and states she generally takes all her medications regularly. She denies alcohol or IVDU. She states she occasionally smokes marijuana a few times a month. She states she smoked cigarettes socially as a teenager but denies current tobacco use. Her last travel was to [place], [place] in May, otherwise denies recent travel. She denies any sick contacts.     REVIEW OF SYSTEMS:  CONSTITUTIONAL:  No weakness, fevers or chills  EYES/ENT:  No visual changes;  No vertigo or throat pain   NECK:  No pain or stiffness  RESPIRATORY:  No cough, wheezing, hemoptysis; No shortness of breath  CARDIOVASCULAR:  No chest pain or palpitations  GASTROINTESTINAL: + abdominal pain, constipation, nausea and vomiting. No hematemesis; No diarrhea. No melena or hematochezia.  GENITOURINARY: + dysuria. No frequency or hematuria  NEUROLOGICAL:  + lower extremity weakness. No numbness  MSK: no back pain, no joint pain  SKIN: No itching, rashes    PHYSICAL EXAM:  Vital Signs Last 24 Hrs  T(C): 37.2 (27 Sep 2022 12:22), Max: 37.2 (27 Sep 2022 12:22)  T(F): 98.9 (27 Sep 2022 12:22), Max: 98.9 (27 Sep 2022 12:22)  HR: 83 (27 Sep 2022 12:22) (74 - 83)  BP: 144/71 (27 Sep 2022 12:22) (144/71 - 208/77)  RR: 18 (27 Sep 2022 12:22) (18 - 18)  SpO2: 97% (27 Sep 2022 12:22) (97% - 99%)    Parameters below as of 27 Sep 2022 12:22 Patient On (Oxygen Delivery Method): room air    GENERAL: well-groomed female, laying on her side appearing uncomfortable  HEAD:  Atraumatic, Normocephalic  EYES: EOMI, PERRLA, melanocytic macular lesions in both eyes  ENT: Moist mucous membranes  NECK: Supple, No JVD  CHEST/LUNG: Clear to auscultation bilaterally; No rales, rhonchi, wheezing, or rubs. Unlabored respirations  HEART: Regular rate and rhythm; No murmurs, rubs, or gallops  ABDOMEN: BSx4; Soft, mildly distended, + diffuse tenderness in LLQ and suprapubic area, + guarding  : no terry  EXTREMITIES:  + left upper arm AV fistula site with strong palpable thrill, 2+ Peripheral Pulses, brisk capillary refill. No clubbing, cyanosis, or edema  NERVOUS SYSTEM:  A&Ox3. No sensory deficits, no motor deficits  SKIN: + xerosis in B/L lower extremities. No rashes or lesions, dry and warm skin  MSK: no back pain, no joint swelling    LABS/DIAGNOSTIC TESTS:                        10.0   11.18 )-----------( 264      ( 27 Sep 2022 05:00 )             28.9     WBC Count: 11.18 K/uL (09-27 @ 05:00)  WBC Count: 10.85 K/uL (09-27 @ 00:01)    09-27    133<L>  |  95<L>  |  22<H>  ----------------------------<  108<H>  4.0   |  30  |  6.67<H>    Ca    9.5      27 Sep 2022 05:00  Phos  4.8     09-27  Mg     2.2     09-27    TPro  8.4<H>  /  Alb  2.7<L>  /  TBili  0.5  /  DBili  x   /  AST  22  /  ALT  17  /  AlkPhos  88  09-27      RADIOLOGY:   < from: CT Abdomen and Pelvis w/ IV Cont (09.27.22 @ 06:30) >  ACC: [id number] EXAM:  CT ABDOMEN AND PELVIS IC                        PROCEDURE DATE:  09/27/2022    INTERPRETATION:  CLINICAL INFORMATION: Abdominal pain question abscess  COMPARISON: CT abdomen pelvis 9/27/2022 earlier the same day.    IMPRESSION:  Abscess within the left lower quadrant small bowel wall secondary to fistulization from the sigmoid colon likely on the basis of chronic diverticular disease.    1.3 cm right lobe hypodensity with suggestion of peripheral early nodular enhancement. This may represent a hemangioma. This could be confirmed with MRI.    1.4 cm cyst in uncinate process or a focally dilated duct. This would be better evaluated with dedicated MRI.    Probable probably cystic kidneys. Correlate clinically for kidney disease.    < from: CT Abdomen and Pelvis w/ Oral Cont (09.27.22 @ 04:29) >  ACC: [id number] EXAM:  CT ABDOMEN AND PELVIS OC                        PROCEDURE DATE:  09/27/2022    INTERPRETATION:  CLINICAL INFORMATION: Pain. Evaluate for obstruction  COMPARISON: CT chest 1/22/2021.    IMPRESSION:  Oral contrast has advanced to the mid small bowel which is mildly prominent measuring up to 2.7 cm in diameter.    The sigmoid colon is thickened and there are numerous diverticula. At the proximal sigmoid colon there are inflammatory changes which extend to the   adjacent small bowel where there is a 5.4 x 5.4 cm mass containing air and feculent material. Evaluation is limited without intravenous contrast   and oral contrast has not reached this level. This may represent a contained perforation secondary to fistulization from sigmoid diverticular disease to the small bowel.  Suspicion of polycystic kidney disease. Correlate clinically.    ANTIBIOTICS:  darunavir 800 mG/cobicstat 150 mG 1 daily  dolutegravir 50 daily  lamiVUDine- daily  piperacillin/tazobactam IVPB.. 3.375 every 12 hours    IV LINES: + Right Peripheral IV, c/d/i    IMPRESSION:  67 year old female with medical history of ESRD (dialysis M-W-F), HTN, HIV (diagnosed 16 yrs ago, on Lamivudine, Prezcobix and Tivicay) coming in with abdominal pain, nausea and vomiting. CT Abdomen with IV Contrast shows abscess within the left lower quadrant small bowel wall secondary to fistulization from the sigmoid colon likely on the basis of chronic diverticular disease.    - Abdominal Wall Abscess secondary to fistulization from sigmoid colon, likely from chronic diverticular disease  - HIV controlled with HAART Therapy (last CD4 794, viral load 43 in 7/2022 ; on Lamivudine, Prezcobix and Tivicay)      PLAN:  - Start IV Ceftriaxone 2g daily, and IV Flagyl 500mg q 8hours  - Continue pt's home HARRT therapy (Lamivudine, Prezcobix and Tivicay)  - F/u CD4 count and viral load  - Rest of management per primary team    Assessment and plan discussed with ID Attending, Dr. Mine Thomas.

## 2022-09-27 NOTE — PROGRESS NOTE ADULT - SUBJECTIVE AND OBJECTIVE BOX
HPI:   67 year old female, from home, with medical history of ESRD (dialysis M-W-F), HTN, HIV (diagnosed 16 yrs ago, on Lamivudine, Prezcobix and Tivicay) coming in with abdominal pain. Pt states she has been having abdominal pain for the last 3 days. She went for dialysis yesterday and when she got home the pain became worse. The abdominal pain comes and goes, is a dull achy pain, rated 8/10. It does not radiate and is located in left lower quadrant and suprapubic region. Pt also has been constipated with last bowl movement being about 4 days prior. She also has been experiencing nausea and has had about 5 episodes of vomiting in the last 3 days, they were all watery. Last episode of vomiting was last night and has associated pain in her esophagus. She states she feels hungry but has nausea. She also states she produces urine and has burning sensation during urination for the past 3 weeks. She also states that since starting dialysis, she has weakness in both legs and has been going to physical therapy. She denies any headaches, visual disturbances, dizziness, falls, chest pain, palpitations, lower extremity swelling, fevers, skin rash, recent travel, or sick contacts.      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 She sticks to a renal diet and states she generally takes all her medications regularly. She denies alcohol or IVDU. She states she occasionally smokes marijuana a few times a month. She states she smoked cigarettes socially as a teenager but denies current tobacco use. Her last travel was to [place], [place] in May, otherwise denies recent travel. She denies any sick contacts.     REVIEW OF SYSTEMS:  CONSTITUTIONAL:  No weakness, fevers or chills  EYES/ENT:  No visual changes;  No vertigo or throat pain   NECK:  No pain or stiffness  RESPIRATORY:  No cough, wheezing, hemoptysis; No shortness of breath  CARDIOVASCULAR:  No chest pain or palpitations  GASTROINTESTINAL: + abdominal pain, constipation, nausea and vomiting. No hematemesis; No diarrhea. No melena or hematochezia.  GENITOURINARY: + dysuria. No frequency or hematuria  NEUROLOGICAL:  + lower extremity weakness. No numbness  MSK: no back pain, no joint pain  SKIN: No itching, rashes    PHYSICAL EXAM:  Vital Signs Last 24 Hrs  T(C): 37.2 (27 Sep 2022 12:22), Max: 37.2 (27 Sep 2022 12:22)  T(F): 98.9 (27 Sep 2022 12:22), Max: 98.9 (27 Sep 2022 12:22)  HR: 83 (27 Sep 2022 12:22) (74 - 83)  BP: 144/71 (27 Sep 2022 12:22) (144/71 - 208/77)  RR: 18 (27 Sep 2022 12:22) (18 - 18)  SpO2: 97% (27 Sep 2022 12:22) (97% - 99%)    Parameters below as of 27 Sep 2022 12:22  Patient On (Oxygen Delivery Method): room air      GENERAL: well-groomed female, laying on her side appearing uncomfortable  HEAD:  Atraumatic, Normocephalic  EYES: EOMI, PERRLA, melanocytic macular lesions in both eyes  ENT: Moist mucous membranes  NECK: Supple, No JVD  CHEST/LUNG: Clear to auscultation bilaterally; No rales, rhonchi, wheezing, or rubs. Unlabored respirations  HEART: Regular rate and rhythm; No murmurs, rubs, or gallops  ABDOMEN: BSx4; Soft, mildly distended, + diffuse tenderness in LLQ and suprapubic area, + guarding  : no terry  EXTREMITIES:  + left upper arm AV fistula site, 2+ Peripheral Pulses, brisk capillary refill. No clubbing, cyanosis, or edema  NERVOUS SYSTEM:  A&Ox3. No sensory deficits, no motor deficits  SKIN: + xerosis in B/L lower extremities. No rashes or lesions, dry and warm skin  MSK: no back pain, no joint swelling    LABS/DIAGNOSTIC TESTS:                        10.0   11.18 )-----------( 264      ( 27 Sep 2022 05:00 )             28.9     WBC Count: 11.18 K/uL (09-27 @ 05:00)  WBC Count: 10.85 K/uL (09-27 @ 00:01)    09-27    133<L>  |  95<L>  |  22<H>  ----------------------------<  108<H>  4.0   |  30  |  6.67<H>    Ca    9.5      27 Sep 2022 05:00  Phos  4.8     09-27  Mg     2.2     09-27    TPro  8.4<H>  /  Alb  2.7<L>  /  TBili  0.5  /  DBili  x   /  AST  22  /  ALT  17  /  AlkPhos  88  09-27      RADIOLOGY:   < from: CT Abdomen and Pelvis w/ IV Cont (09.27.22 @ 06:30) >  ACC: [id number] EXAM:  CT ABDOMEN AND PELVIS IC                        PROCEDURE DATE:  09/27/2022    INTERPRETATION:  CLINICAL INFORMATION: Abdominal pain question abscess  COMPARISON: CT abdomen pelvis 9/27/2022 earlier the same day.    CONTRAST/COMPLICATIONS:  IV Contrast: Omnipaque 350 90 cc administered  90 cc administered   10 cc   discarded  Oral Contrast: None  Complications: None    PROCEDURE:  CT of the Abdomen and Pelvis was performed.  Sagittal and coronal reformats were performed.    FINDINGS:  LOWER CHEST: Probable postsurgical changes in the left lower lung. 1.6 cm   left lower lobe thin-walled cyst.    LIVER: 1.3 cm right lobe hypodensity with suggestion of peripheral early   nodular enhancement (4:61). This may represent a hemangioma. This could   be confirmed with MRI.  BILE DUCTS: Normal caliber.  GALLBLADDER: Within normal limits.  SPLEEN: Within normal limits.  PANCREAS: 1.4 cm cyst in uncinate process or a focally dilated duct. This   would be better evaluated with dedicated MRI.  ADRENALS: Within normal limits.  KIDNEYS/URETERS: Bilateral polycystic kidneys  BLADDER: Within normal limits.  REPRODUCTIVE ORGANS: Hysterectomy.  BOWEL: Moderate sized hiatal hernia. Normal appendix.    Oral contrast is again seen within mildly prominent loops of proximal   small bowel measuring up to 2.7 cm in diameter to a mass in the left   lower quadrant. This is unchanged. The mass measures 5.8 x 5.1 cm and   contains fluid and air. This appears to be within thewall of the small   bowel. A thin column of oral contrast passes within the lumen of the   small bowel distally into collapsed loops of small bowel. There is an   apparent fistula between this mass in the adjacent markedly thickened   sigmoid colon which contains numerous diverticula. There are inflammatory   changes in the adjacent fat. This likely represents sigmoid   diverticulitis with small bowel fistulization to the raw within adjacent   loop of small bowel in a contained abscess.    PERITONEUM: No ascites.  VESSELS: Atherosclerotic changes.  RETROPERITONEUM/LYMPH NODES: No lymphadenopathy.  ABDOMINAL WALL: Small fat-containing umbilical hernia.  BONES: Within normal limits.    IMPRESSION:  Abscess within the left lower quadrant small bowel wall secondary to   fistulization from the sigmoid colon likely on the basis of chronic   diverticular disease.    1.3 cm right lobe hypodensity with suggestion of peripheral early nodular   enhancement. This may represent a hemangioma. This could be confirmed   with MRI.    1.4 cm cyst in uncinate process or a focally dilated duct. This would be   better evaluated with dedicated MRI.    Probable probably cystic kidneys. Correlate clinically for kidney disease.  --- End of Report ---  ISAI HERNANDEZ MD; Attending Radiologist  This document has been electronically signed. Sep 27 2022  6:35AM    < from: CT Abdomen and Pelvis w/ Oral Cont (09.27.22 @ 04:29) >  ACC: [id number] EXAM:  CT ABDOMEN AND PELVIS OC                        PROCEDURE DATE:  09/27/2022    INTERPRETATION:  CLINICAL INFORMATION: Pain. Evaluate for obstruction  COMPARISON: CT chest 1/22/2021.    CONTRAST/COMPLICATIONS:  IV Contrast: NONE  Oral Contrast: Gastroview  Complications: None reported at time of study completion    PROCEDURE:  CT of the Abdomen and Pelvis was performed.  Sagittal and coronal reformats were performed.    FINDINGS:  LOWER CHEST: Left lower lobe linear calcification possibly postsurgical,   unchanged    LIVER: Within normal limits.  BILE DUCTS: Normal caliber.  GALLBLADDER: Within normal limits.  SPLEEN: Within normal limits.  PANCREAS: Within normal limits.  ADRENALS: Within normal limits.  KIDNEYS/URETERS: Numerous bilateral renal cysts left greater than right.   No hydronephrosis. These are not well evaluated on this noncontrast   examination.  There are subcentimeter left renal calcification. There are subcentimeter   dense cyst possibly hemorrhagic.    BLADDER: Within normal limits.  REPRODUCTIVE ORGANS: Hysterectomy.    BOWEL: Moderate sized hiatal hernia. Appendix is not visualized. No   evidence of inflammation in the pericecal region.    Oral contrast has advanced to the mid smallbowel which is mildly   prominent measuring up to 2.7 cm in diameter.  The sigmoid colon is thickened and there are numerous diverticula. At the   proximal sigmoid colon there are inflammatory changes which extend to the   adjacent small bowel where there is a 5.4 x 5.4 cm mass containing air   and feculent material. Evaluation is limited without intravenous contrast   and oral contrast has not reached this level. This may represent a   contained perforation secondary to fistulization from sigmoid   diverticular disease to the small bowel.      PERITONEUM: No ascites.  VESSELS: Atherosclerotic changes.  RETROPERITONEUM/LYMPH NODES: No lymphadenopathy.  ABDOMINAL WALL: Within normal limits.  BONES: Within normal limits.    IMPRESSION:  Oral contrast has advanced to the mid small bowel which is mildly   prominent measuring up to 2.7 cm in diameter.    The sigmoid colon is thickened and there are numerous diverticula. At the   proximal sigmoid colon there are inflammatory changes which extend to the   adjacent small bowel where there is a 5.4 x 5.4 cm mass containing air   and feculent material. Evaluation is limited without intravenous contrast   and oral contrast has not reached this level. This may represent a   contained perforation secondary to fistulization from sigmoid   diverticular disease to the small bowel.    Suspicion of polycystic kidney disease. Correlate clinically.  --- End of Report ---    ISAI HERNANDEZ MD; Attending Radiologist  This document has been electronically signed. Sep 27 2022  4:51AM      ANTIBIOTICS:  darunavir 800 mG/cobicstat 150 mG 1 daily  dolutegravir 50 daily  lamiVUDine- daily  piperacillin/tazobactam IVPB.. 3.375 every 12 hours      IV LINES: + Right Peripheral IV, c/d/i    IMPRESSION:  67 year old female with medical history of ESRD (dialysis M-W-F), HTN, HIV (diagnosed 16 yrs ago, on Lamivudine, Prezcobix and Tivicay) coming in with abdominal pain. Found to have LLQ abdominal abscess secondary to fistulization from sigmoid colon, likely from chronic diverticular disease.       AND PLAN:                   HPI:   67 year old female, from home, with medical history of ESRD (dialysis M-W-F), HTN, HIV (diagnosed 16 yrs ago, on Lamivudine, Prezcobix and Tivicay) coming in with abdominal pain. Pt states she has been having abdominal pain for the last 3 days. She went for dialysis yesterday and when she got home the pain became worse. Abdominal pain is located on the left side, is intermittent, dull achy pain, rated 8/10 and does not radiate. Pt also has been constipated with last bowl movement being about 4 days prior. She has nausea and about 5 episodes of vomiting in the last 3 days, they were all watery. Last episode of vomiting was last night and has associated pain in her esophagus. She states she feels hungry but has nausea. She also states she produces urine and has burning sensation during urination for the past 3 weeks. She also states that since starting dialysis, she has weakness in both legs and has been going to physical therapy. She denies any headaches, visual disturbances, dizziness, falls, chest pain, palpitations, lower extremity swelling, fevers, skin rash, recent travel, or sick contacts.      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 She sticks to a renal diet and states she generally takes all her medications regularly. She denies alcohol or IVDU. She states she occasionally smokes marijuana a few times a month. She states she smoked cigarettes socially as a teenager but denies current tobacco use. Her last travel was to [place], [place] in May, otherwise denies recent travel. She denies any sick contacts.     REVIEW OF SYSTEMS:  CONSTITUTIONAL:  No weakness, fevers or chills  EYES/ENT:  No visual changes;  No vertigo or throat pain   NECK:  No pain or stiffness  RESPIRATORY:  No cough, wheezing, hemoptysis; No shortness of breath  CARDIOVASCULAR:  No chest pain or palpitations  GASTROINTESTINAL: + abdominal pain, constipation, nausea and vomiting. No hematemesis; No diarrhea. No melena or hematochezia.  GENITOURINARY: + dysuria. No frequency or hematuria  NEUROLOGICAL:  + lower extremity weakness. No numbness  MSK: no back pain, no joint pain  SKIN: No itching, rashes    PHYSICAL EXAM:  Vital Signs Last 24 Hrs  T(C): 37.2 (27 Sep 2022 12:22), Max: 37.2 (27 Sep 2022 12:22)  T(F): 98.9 (27 Sep 2022 12:22), Max: 98.9 (27 Sep 2022 12:22)  HR: 83 (27 Sep 2022 12:22) (74 - 83)  BP: 144/71 (27 Sep 2022 12:22) (144/71 - 208/77)  RR: 18 (27 Sep 2022 12:22) (18 - 18)  SpO2: 97% (27 Sep 2022 12:22) (97% - 99%)    Parameters below as of 27 Sep 2022 12:22  Patient On (Oxygen Delivery Method): room air      GENERAL: well-groomed female, laying on her side appearing uncomfortable  HEAD:  Atraumatic, Normocephalic  EYES: EOMI, PERRLA, melanocytic macular lesions in both eyes  ENT: Moist mucous membranes  NECK: Supple, No JVD  CHEST/LUNG: Clear to auscultation bilaterally; No rales, rhonchi, wheezing, or rubs. Unlabored respirations  HEART: Regular rate and rhythm; No murmurs, rubs, or gallops  ABDOMEN: BSx4; Soft, mildly distended, + diffuse tenderness in LLQ and suprapubic area, + guarding  : no terry  EXTREMITIES:  + left upper arm AV fistula site, 2+ Peripheral Pulses, brisk capillary refill. No clubbing, cyanosis, or edema  NERVOUS SYSTEM:  A&Ox3. No sensory deficits, no motor deficits  SKIN: + xerosis in B/L lower extremities. No rashes or lesions, dry and warm skin  MSK: no back pain, no joint swelling    LABS/DIAGNOSTIC TESTS:                        10.0   11.18 )-----------( 264      ( 27 Sep 2022 05:00 )             28.9     WBC Count: 11.18 K/uL (09-27 @ 05:00)  WBC Count: 10.85 K/uL (09-27 @ 00:01)    09-27    133<L>  |  95<L>  |  22<H>  ----------------------------<  108<H>  4.0   |  30  |  6.67<H>    Ca    9.5      27 Sep 2022 05:00  Phos  4.8     09-27  Mg     2.2     09-27    TPro  8.4<H>  /  Alb  2.7<L>  /  TBili  0.5  /  DBili  x   /  AST  22  /  ALT  17  /  AlkPhos  88  09-27      RADIOLOGY:   < from: CT Abdomen and Pelvis w/ IV Cont (09.27.22 @ 06:30) >  ACC: [id number] EXAM:  CT ABDOMEN AND PELVIS IC                        PROCEDURE DATE:  09/27/2022    INTERPRETATION:  CLINICAL INFORMATION: Abdominal pain question abscess  COMPARISON: CT abdomen pelvis 9/27/2022 earlier the same day.    CONTRAST/COMPLICATIONS:  IV Contrast: Omnipaque 350 90 cc administered  90 cc administered   10 cc   discarded  Oral Contrast: None  Complications: None    PROCEDURE:  CT of the Abdomen and Pelvis was performed.  Sagittal and coronal reformats were performed.    FINDINGS:  LOWER CHEST: Probable postsurgical changes in the left lower lung. 1.6 cm   left lower lobe thin-walled cyst.    LIVER: 1.3 cm right lobe hypodensity with suggestion of peripheral early   nodular enhancement (4:61). This may represent a hemangioma. This could   be confirmed with MRI.  BILE DUCTS: Normal caliber.  GALLBLADDER: Within normal limits.  SPLEEN: Within normal limits.  PANCREAS: 1.4 cm cyst in uncinate process or a focally dilated duct. This   would be better evaluated with dedicated MRI.  ADRENALS: Within normal limits.  KIDNEYS/URETERS: Bilateral polycystic kidneys  BLADDER: Within normal limits.  REPRODUCTIVE ORGANS: Hysterectomy.  BOWEL: Moderate sized hiatal hernia. Normal appendix.    Oral contrast is again seen within mildly prominent loops of proximal   small bowel measuring up to 2.7 cm in diameter to a mass in the left   lower quadrant. This is unchanged. The mass measures 5.8 x 5.1 cm and   contains fluid and air. This appears to be within thewall of the small   bowel. A thin column of oral contrast passes within the lumen of the   small bowel distally into collapsed loops of small bowel. There is an   apparent fistula between this mass in the adjacent markedly thickened   sigmoid colon which contains numerous diverticula. There are inflammatory   changes in the adjacent fat. This likely represents sigmoid   diverticulitis with small bowel fistulization to the raw within adjacent   loop of small bowel in a contained abscess.    PERITONEUM: No ascites.  VESSELS: Atherosclerotic changes.  RETROPERITONEUM/LYMPH NODES: No lymphadenopathy.  ABDOMINAL WALL: Small fat-containing umbilical hernia.  BONES: Within normal limits.    IMPRESSION:  Abscess within the left lower quadrant small bowel wall secondary to   fistulization from the sigmoid colon likely on the basis of chronic   diverticular disease.    1.3 cm right lobe hypodensity with suggestion of peripheral early nodular   enhancement. This may represent a hemangioma. This could be confirmed   with MRI.    1.4 cm cyst in uncinate process or a focally dilated duct. This would be   better evaluated with dedicated MRI.    Probable probably cystic kidneys. Correlate clinically for kidney disease.  --- End of Report ---  ISAI HERNANDEZ MD; Attending Radiologist  This document has been electronically signed. Sep 27 2022  6:35AM    < from: CT Abdomen and Pelvis w/ Oral Cont (09.27.22 @ 04:29) >  ACC: [id number] EXAM:  CT ABDOMEN AND PELVIS OC                        PROCEDURE DATE:  09/27/2022    INTERPRETATION:  CLINICAL INFORMATION: Pain. Evaluate for obstruction  COMPARISON: CT chest 1/22/2021.    CONTRAST/COMPLICATIONS:  IV Contrast: NONE  Oral Contrast: Gastroview  Complications: None reported at time of study completion    PROCEDURE:  CT of the Abdomen and Pelvis was performed.  Sagittal and coronal reformats were performed.    FINDINGS:  LOWER CHEST: Left lower lobe linear calcification possibly postsurgical,   unchanged    LIVER: Within normal limits.  BILE DUCTS: Normal caliber.  GALLBLADDER: Within normal limits.  SPLEEN: Within normal limits.  PANCREAS: Within normal limits.  ADRENALS: Within normal limits.  KIDNEYS/URETERS: Numerous bilateral renal cysts left greater than right.   No hydronephrosis. These are not well evaluated on this noncontrast   examination.  There are subcentimeter left renal calcification. There are subcentimeter   dense cyst possibly hemorrhagic.    BLADDER: Within normal limits.  REPRODUCTIVE ORGANS: Hysterectomy.    BOWEL: Moderate sized hiatal hernia. Appendix is not visualized. No   evidence of inflammation in the pericecal region.    Oral contrast has advanced to the mid smallbowel which is mildly   prominent measuring up to 2.7 cm in diameter.  The sigmoid colon is thickened and there are numerous diverticula. At the   proximal sigmoid colon there are inflammatory changes which extend to the   adjacent small bowel where there is a 5.4 x 5.4 cm mass containing air   and feculent material. Evaluation is limited without intravenous contrast   and oral contrast has not reached this level. This may represent a   contained perforation secondary to fistulization from sigmoid   diverticular disease to the small bowel.      PERITONEUM: No ascites.  VESSELS: Atherosclerotic changes.  RETROPERITONEUM/LYMPH NODES: No lymphadenopathy.  ABDOMINAL WALL: Within normal limits.  BONES: Within normal limits.    IMPRESSION:  Oral contrast has advanced to the mid small bowel which is mildly   prominent measuring up to 2.7 cm in diameter.    The sigmoid colon is thickened and there are numerous diverticula. At the   proximal sigmoid colon there are inflammatory changes which extend to the   adjacent small bowel where there is a 5.4 x 5.4 cm mass containing air   and feculent material. Evaluation is limited without intravenous contrast   and oral contrast has not reached this level. This may represent a   contained perforation secondary to fistulization from sigmoid   diverticular disease to the small bowel.    Suspicion of polycystic kidney disease. Correlate clinically.  --- End of Report ---    ISAI HERNANDEZ MD; Attending Radiologist  This document has been electronically signed. Sep 27 2022  4:51AM      ANTIBIOTICS:  darunavir 800 mG/cobicstat 150 mG 1 daily  dolutegravir 50 daily  lamiVUDine- daily  piperacillin/tazobactam IVPB.. 3.375 every 12 hours      IV LINES: + Right Peripheral IV, c/d/i    IMPRESSION:  67 year old female with medical history of ESRD (dialysis M-W-F), HTN, HIV (diagnosed 16 yrs ago, on Lamivudine, Prezcobix and Tivicay) coming in with abdominal pain. Found to have LLQ abdominal abscess secondary to fistulization from sigmoid colon, likely from chronic diverticular disease.    - Abdominal Wall Abscess   - HIV (on Lamivudine, Prezcobix and Tivicay)        PLAN:  - Continue pt's home HARRT therapy (Lamivudine, Prezcobix and Tivicay)  - F/u CD4 count and viral load HPI:   67 year old female, from home, with medical history of ESRD (dialysis M-W-F), HTN, HIV (diagnosed 16 yrs ago, on Lamivudine, Prezcobix and Tivicay) coming in with abdominal pain. Pt states she has been having abdominal pain for the last 3 days. She went for dialysis yesterday and when she got home the pain became worse. Abdominal pain is located on the left side, is intermittent, dull achy pain, rated 8/10 and does not radiate. Pt also has been constipated, last BM 4 days ago. She has nausea and about 5 episodes of vomiting in the last 3 days, they were all watery. Last episode of vomiting was last night and has associated pain in her esophagus. She also states she produces urine and has burning sensation during urination for the past 3 weeks. She states that since starting dialysis, she has weakness in both legs and has been going to physical therapy. She denies any headaches, visual disturbances, dizziness, falls, chest pain, palpitations, lower extremity swelling, fevers, skin rash, recent travel, or sick contacts.      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 She sticks to a renal diet and states she generally takes all her medications regularly. She denies alcohol or IVDU. She states she occasionally smokes marijuana a few times a month. She states she smoked cigarettes socially as a teenager but denies current tobacco use. Her last travel was to [place], [place] in May, otherwise denies recent travel. She denies any sick contacts.     REVIEW OF SYSTEMS:  CONSTITUTIONAL:  No weakness, fevers or chills  EYES/ENT:  No visual changes;  No vertigo or throat pain   NECK:  No pain or stiffness  RESPIRATORY:  No cough, wheezing, hemoptysis; No shortness of breath  CARDIOVASCULAR:  No chest pain or palpitations  GASTROINTESTINAL: + abdominal pain, constipation, nausea and vomiting. No hematemesis; No diarrhea. No melena or hematochezia.  GENITOURINARY: + dysuria. No frequency or hematuria  NEUROLOGICAL:  + lower extremity weakness. No numbness  MSK: no back pain, no joint pain  SKIN: No itching, rashes    PHYSICAL EXAM:  Vital Signs Last 24 Hrs  T(C): 37.2 (27 Sep 2022 12:22), Max: 37.2 (27 Sep 2022 12:22)  T(F): 98.9 (27 Sep 2022 12:22), Max: 98.9 (27 Sep 2022 12:22)  HR: 83 (27 Sep 2022 12:22) (74 - 83)  BP: 144/71 (27 Sep 2022 12:22) (144/71 - 208/77)  RR: 18 (27 Sep 2022 12:22) (18 - 18)  SpO2: 97% (27 Sep 2022 12:22) (97% - 99%)    Parameters below as of 27 Sep 2022 12:22  Patient On (Oxygen Delivery Method): room air      GENERAL: well-groomed female, laying on her side appearing uncomfortable  HEAD:  Atraumatic, Normocephalic  EYES: EOMI, PERRLA, melanocytic macular lesions in both eyes  ENT: Moist mucous membranes  NECK: Supple, No JVD  CHEST/LUNG: Clear to auscultation bilaterally; No rales, rhonchi, wheezing, or rubs. Unlabored respirations  HEART: Regular rate and rhythm; No murmurs, rubs, or gallops  ABDOMEN: BSx4; Soft, mildly distended, + diffuse tenderness in LLQ and suprapubic area, + guarding  : no terry  EXTREMITIES:  + left upper arm AV fistula site, 2+ Peripheral Pulses, brisk capillary refill. No clubbing, cyanosis, or edema  NERVOUS SYSTEM:  A&Ox3. No sensory deficits, no motor deficits  SKIN: + xerosis in B/L lower extremities. No rashes or lesions, dry and warm skin  MSK: no back pain, no joint swelling    LABS/DIAGNOSTIC TESTS:                        10.0   11.18 )-----------( 264      ( 27 Sep 2022 05:00 )             28.9     WBC Count: 11.18 K/uL (09-27 @ 05:00)  WBC Count: 10.85 K/uL (09-27 @ 00:01)    09-27    133<L>  |  95<L>  |  22<H>  ----------------------------<  108<H>  4.0   |  30  |  6.67<H>    Ca    9.5      27 Sep 2022 05:00  Phos  4.8     09-27  Mg     2.2     09-27    TPro  8.4<H>  /  Alb  2.7<L>  /  TBili  0.5  /  DBili  x   /  AST  22  /  ALT  17  /  AlkPhos  88  09-27      RADIOLOGY:   < from: CT Abdomen and Pelvis w/ IV Cont (09.27.22 @ 06:30) >  ACC: [id number] EXAM:  CT ABDOMEN AND PELVIS IC                        PROCEDURE DATE:  09/27/2022    INTERPRETATION:  CLINICAL INFORMATION: Abdominal pain question abscess  COMPARISON: CT abdomen pelvis 9/27/2022 earlier the same day.    CONTRAST/COMPLICATIONS:  IV Contrast: Omnipaque 350 90 cc administered  90 cc administered   10 cc   discarded  Oral Contrast: None  Complications: None    PROCEDURE:  CT of the Abdomen and Pelvis was performed.  Sagittal and coronal reformats were performed.    FINDINGS:  LOWER CHEST: Probable postsurgical changes in the left lower lung. 1.6 cm   left lower lobe thin-walled cyst.    LIVER: 1.3 cm right lobe hypodensity with suggestion of peripheral early   nodular enhancement (4:61). This may represent a hemangioma. This could   be confirmed with MRI.  BILE DUCTS: Normal caliber.  GALLBLADDER: Within normal limits.  SPLEEN: Within normal limits.  PANCREAS: 1.4 cm cyst in uncinate process or a focally dilated duct. This   would be better evaluated with dedicated MRI.  ADRENALS: Within normal limits.  KIDNEYS/URETERS: Bilateral polycystic kidneys  BLADDER: Within normal limits.  REPRODUCTIVE ORGANS: Hysterectomy.  BOWEL: Moderate sized hiatal hernia. Normal appendix.    Oral contrast is again seen within mildly prominent loops of proximal   small bowel measuring up to 2.7 cm in diameter to a mass in the left   lower quadrant. This is unchanged. The mass measures 5.8 x 5.1 cm and   contains fluid and air. This appears to be within thewall of the small   bowel. A thin column of oral contrast passes within the lumen of the   small bowel distally into collapsed loops of small bowel. There is an   apparent fistula between this mass in the adjacent markedly thickened   sigmoid colon which contains numerous diverticula. There are inflammatory   changes in the adjacent fat. This likely represents sigmoid   diverticulitis with small bowel fistulization to the raw within adjacent   loop of small bowel in a contained abscess.    PERITONEUM: No ascites.  VESSELS: Atherosclerotic changes.  RETROPERITONEUM/LYMPH NODES: No lymphadenopathy.  ABDOMINAL WALL: Small fat-containing umbilical hernia.  BONES: Within normal limits.    IMPRESSION:  Abscess within the left lower quadrant small bowel wall secondary to   fistulization from the sigmoid colon likely on the basis of chronic   diverticular disease.    1.3 cm right lobe hypodensity with suggestion of peripheral early nodular   enhancement. This may represent a hemangioma. This could be confirmed   with MRI.    1.4 cm cyst in uncinate process or a focally dilated duct. This would be   better evaluated with dedicated MRI.    Probable probably cystic kidneys. Correlate clinically for kidney disease.  --- End of Report ---  ISAI HERNANDEZ MD; Attending Radiologist  This document has been electronically signed. Sep 27 2022  6:35AM    < from: CT Abdomen and Pelvis w/ Oral Cont (09.27.22 @ 04:29) >  ACC: [id number] EXAM:  CT ABDOMEN AND PELVIS OC                        PROCEDURE DATE:  09/27/2022    INTERPRETATION:  CLINICAL INFORMATION: Pain. Evaluate for obstruction  COMPARISON: CT chest 1/22/2021.    CONTRAST/COMPLICATIONS:  IV Contrast: NONE  Oral Contrast: Gastroview  Complications: None reported at time of study completion    PROCEDURE:  CT of the Abdomen and Pelvis was performed.  Sagittal and coronal reformats were performed.    FINDINGS:  LOWER CHEST: Left lower lobe linear calcification possibly postsurgical,   unchanged    LIVER: Within normal limits.  BILE DUCTS: Normal caliber.  GALLBLADDER: Within normal limits.  SPLEEN: Within normal limits.  PANCREAS: Within normal limits.  ADRENALS: Within normal limits.  KIDNEYS/URETERS: Numerous bilateral renal cysts left greater than right.   No hydronephrosis. These are not well evaluated on this noncontrast   examination.  There are subcentimeter left renal calcification. There are subcentimeter   dense cyst possibly hemorrhagic.    BLADDER: Within normal limits.  REPRODUCTIVE ORGANS: Hysterectomy.    BOWEL: Moderate sized hiatal hernia. Appendix is not visualized. No   evidence of inflammation in the pericecal region.    Oral contrast has advanced to the mid smallbowel which is mildly   prominent measuring up to 2.7 cm in diameter.  The sigmoid colon is thickened and there are numerous diverticula. At the   proximal sigmoid colon there are inflammatory changes which extend to the   adjacent small bowel where there is a 5.4 x 5.4 cm mass containing air   and feculent material. Evaluation is limited without intravenous contrast   and oral contrast has not reached this level. This may represent a   contained perforation secondary to fistulization from sigmoid   diverticular disease to the small bowel.      PERITONEUM: No ascites.  VESSELS: Atherosclerotic changes.  RETROPERITONEUM/LYMPH NODES: No lymphadenopathy.  ABDOMINAL WALL: Within normal limits.  BONES: Within normal limits.    IMPRESSION:  Oral contrast has advanced to the mid small bowel which is mildly   prominent measuring up to 2.7 cm in diameter.    The sigmoid colon is thickened and there are numerous diverticula. At the   proximal sigmoid colon there are inflammatory changes which extend to the   adjacent small bowel where there is a 5.4 x 5.4 cm mass containing air   and feculent material. Evaluation is limited without intravenous contrast   and oral contrast has not reached this level. This may represent a   contained perforation secondary to fistulization from sigmoid   diverticular disease to the small bowel.    Suspicion of polycystic kidney disease. Correlate clinically.  --- End of Report ---    ISAI HERNANDEZ MD; Attending Radiologist  This document has been electronically signed. Sep 27 2022  4:51AM      ANTIBIOTICS:  darunavir 800 mG/cobicstat 150 mG 1 daily  dolutegravir 50 daily  lamiVUDine- daily  piperacillin/tazobactam IVPB.. 3.375 every 12 hours      IV LINES: + Right Peripheral IV, c/d/i    IMPRESSION:  67 year old female with medical history of ESRD (dialysis M-W-F), HTN, HIV (diagnosed 16 yrs ago, on Lamivudine, Prezcobix and Tivicay) coming in with abdominal pain. CT Abdomen with IV Contrast shows abscess within the left lower quadrant small bowel wall secondary to fistulization from the sigmoid colon likely on the basis of chronic diverticular disease.      - Abdominal Wall Abscess secondary to fistulization from sigmoid colon, likely from chronic diverticular disease.  - HIV (unknown CD4 and viral load; on Lamivudine, Prezcobix and Tivicay)        PLAN:  - Start Zosyn 3.375g q12 hours (renally dosed) for 7 days  - Continue pt's home HARRT therapy (Lamivudine, Prezcobix and Tivicay)  - F/u CD4 count and viral load

## 2022-09-27 NOTE — H&P ADULT - NSHPPHYSICALEXAM_GEN_ALL_CORE
Vital Signs Last 24 Hrs  T(C): 36.9 (27 Sep 2022 08:47), Max: 36.9 (27 Sep 2022 08:47)  T(F): 98.4 (27 Sep 2022 08:47), Max: 98.4 (27 Sep 2022 08:47)  HR: 80 (27 Sep 2022 10:36) (74 - 82)  BP: 148/74 (27 Sep 2022 10:36) (148/74 - 208/77)  BP(mean): --  RR: 18 (27 Sep 2022 08:47) (18 - 18)  SpO2: 99% (27 Sep 2022 08:47) (98% - 99%)    Parameters below as of 27 Sep 2022 08:47  Patient On (Oxygen Delivery Method): room air    PHYSICAL EXAM:  GENERAL: NAD, speaks in full sentences, no signs of respiratory distress  HEAD:  Atraumatic, Normocephalic  EYES: EOMI, PERRLA, conjunctiva and sclera clear  NECK: Supple, No JVD  CHEST/LUNG: Clear to auscultation bilaterally; No wheeze; No crackles; No accessory muscles used  HEART: Regular rate and rhythm; No murmurs;   ABDOMEN: Abdominal tenderness to palpation.   EXTREMITIES:  2+ Peripheral Pulses, No cyanosis or edema, Left Arm AVF.   PSYCH: AAOx3  NEUROLOGY: non-focal  SKIN: No rashes or lesions

## 2022-09-27 NOTE — H&P ADULT - PROBLEM SELECTOR PLAN 1
- Pt presented with Abdominal pain.   - on physical exam - tenderness to palpation.   - CT: Abscess within the left lower quadrant small bowel wall secondary to fistulization from the sigmoid colon likely on the basis of chronic diverticular disease.  - f/u CEA, , Ca 125  - Started pt on zosyn vs cefepime and metro?? - renally dosed.   - ID following  - Surgery following. - Pt presented with Abdominal pain.   - on physical exam - tenderness to palpation.   - CT: Abscess within the left lower quadrant small bowel wall secondary to fistulization from the sigmoid colon likely on the basis of chronic diverticular disease.  - f/u CEA, , Ca 125  - Started pt on zosyn - renally dosed.   - ID following  - Surgery following.

## 2022-09-27 NOTE — CONSULT NOTE ADULT - NS ATTEND AMEND GEN_ALL_CORE FT
Pt seen and examined in ED. Agree with note as outlined above by CARMEN Belcher. Pt states pain, nausea and vomiting started last night. Reports she just had multiple bowel movements and an "accident" prior to me seeing her.  States she is passing flatus.   Abd- soft, non distended. + LLQ tenderness, mild guarding no rebound.  CT images reviewed, shows diverticulitis with contained perforation/abscess with possible fistula to small bowel, possible mass.  Will have IR eval CT if amenable to drainage.  Recommend NPO, IV abx with cefepime flagyl, renal dosing and ID consult.

## 2022-09-28 LAB
ALBUMIN SERPL ELPH-MCNC: 2.4 G/DL — LOW (ref 3.5–5)
ALP SERPL-CCNC: 94 U/L — SIGNIFICANT CHANGE UP (ref 40–120)
ALT FLD-CCNC: 14 U/L DA — SIGNIFICANT CHANGE UP (ref 10–60)
ANION GAP SERPL CALC-SCNC: 13 MMOL/L — SIGNIFICANT CHANGE UP (ref 5–17)
AST SERPL-CCNC: 16 U/L — SIGNIFICANT CHANGE UP (ref 10–40)
BILIRUB SERPL-MCNC: 0.4 MG/DL — SIGNIFICANT CHANGE UP (ref 0.2–1.2)
BUN SERPL-MCNC: 41 MG/DL — HIGH (ref 7–18)
CALCIUM SERPL-MCNC: 9.3 MG/DL — SIGNIFICANT CHANGE UP (ref 8.4–10.5)
CHLORIDE SERPL-SCNC: 95 MMOL/L — LOW (ref 96–108)
CO2 SERPL-SCNC: 26 MMOL/L — SIGNIFICANT CHANGE UP (ref 22–31)
CREAT SERPL-MCNC: 9.26 MG/DL — HIGH (ref 0.5–1.3)
EGFR: 4 ML/MIN/1.73M2 — LOW
GLUCOSE BLDC GLUCOMTR-MCNC: 102 MG/DL — HIGH (ref 70–99)
GLUCOSE BLDC GLUCOMTR-MCNC: 96 MG/DL — SIGNIFICANT CHANGE UP (ref 70–99)
GLUCOSE SERPL-MCNC: 96 MG/DL — SIGNIFICANT CHANGE UP (ref 70–99)
GRAM STN FLD: SIGNIFICANT CHANGE UP
HCT VFR BLD CALC: 26.8 % — LOW (ref 34.5–45)
HGB BLD-MCNC: 9 G/DL — LOW (ref 11.5–15.5)
MCHC RBC-ENTMCNC: 32 PG — SIGNIFICANT CHANGE UP (ref 27–34)
MCHC RBC-ENTMCNC: 33.6 GM/DL — SIGNIFICANT CHANGE UP (ref 32–36)
MCV RBC AUTO: 95.4 FL — SIGNIFICANT CHANGE UP (ref 80–100)
NRBC # BLD: 0 /100 WBCS — SIGNIFICANT CHANGE UP (ref 0–0)
PLATELET # BLD AUTO: 216 K/UL — SIGNIFICANT CHANGE UP (ref 150–400)
POTASSIUM SERPL-MCNC: 4.2 MMOL/L — SIGNIFICANT CHANGE UP (ref 3.5–5.3)
POTASSIUM SERPL-SCNC: 4.2 MMOL/L — SIGNIFICANT CHANGE UP (ref 3.5–5.3)
PROT SERPL-MCNC: 6.8 G/DL — SIGNIFICANT CHANGE UP (ref 6–8.3)
RBC # BLD: 2.81 M/UL — LOW (ref 3.8–5.2)
RBC # FLD: 16 % — HIGH (ref 10.3–14.5)
SODIUM SERPL-SCNC: 134 MMOL/L — LOW (ref 135–145)
SPECIMEN SOURCE: SIGNIFICANT CHANGE UP
WBC # BLD: 11.62 K/UL — HIGH (ref 3.8–10.5)
WBC # FLD AUTO: 11.62 K/UL — HIGH (ref 3.8–10.5)

## 2022-09-28 PROCEDURE — 10160 PNXR ASPIR ABSC HMTMA BULLA: CPT

## 2022-09-28 PROCEDURE — 99232 SBSQ HOSP IP/OBS MODERATE 35: CPT

## 2022-09-28 PROCEDURE — 77012 CT SCAN FOR NEEDLE BIOPSY: CPT | Mod: 26

## 2022-09-28 PROCEDURE — 99231 SBSQ HOSP IP/OBS SF/LOW 25: CPT

## 2022-09-28 PROCEDURE — 99233 SBSQ HOSP IP/OBS HIGH 50: CPT

## 2022-09-28 RX ORDER — ACETAMINOPHEN 500 MG
1000 TABLET ORAL ONCE
Refills: 0 | Status: COMPLETED | OUTPATIENT
Start: 2022-09-28 | End: 2022-09-29

## 2022-09-28 RX ORDER — CHLORHEXIDINE GLUCONATE 213 G/1000ML
1 SOLUTION TOPICAL DAILY
Refills: 0 | Status: DISCONTINUED | OUTPATIENT
Start: 2022-09-28 | End: 2022-10-06

## 2022-09-28 RX ORDER — FENTANYL CITRATE 50 UG/ML
25 INJECTION INTRAVENOUS
Refills: 0 | Status: DISCONTINUED | OUTPATIENT
Start: 2022-09-28 | End: 2022-09-28

## 2022-09-28 RX ORDER — HYDROMORPHONE HYDROCHLORIDE 2 MG/ML
1 INJECTION INTRAMUSCULAR; INTRAVENOUS; SUBCUTANEOUS
Refills: 0 | Status: DISCONTINUED | OUTPATIENT
Start: 2022-09-28 | End: 2022-09-28

## 2022-09-28 RX ORDER — SODIUM CHLORIDE 9 MG/ML
1000 INJECTION, SOLUTION INTRAVENOUS
Refills: 0 | Status: DISCONTINUED | OUTPATIENT
Start: 2022-09-28 | End: 2022-10-03

## 2022-09-28 RX ADMIN — Medication 4 GRAM(S): at 17:06

## 2022-09-28 RX ADMIN — Medication 3 MILLIGRAM(S): at 23:40

## 2022-09-28 RX ADMIN — DOLUTEGRAVIR SODIUM 50 MILLIGRAM(S): 25 TABLET, FILM COATED ORAL at 17:06

## 2022-09-28 RX ADMIN — Medication 667 MILLIGRAM(S): at 17:09

## 2022-09-28 RX ADMIN — Medication 100 MILLIGRAM(S): at 05:27

## 2022-09-28 RX ADMIN — Medication 100 MILLIGRAM(S): at 23:42

## 2022-09-28 RX ADMIN — Medication 50 MILLIGRAM(S): at 05:24

## 2022-09-28 RX ADMIN — Medication 100 MILLIGRAM(S): at 05:29

## 2022-09-28 RX ADMIN — Medication 50 MILLIGRAM(S): at 23:41

## 2022-09-28 RX ADMIN — CALCITRIOL 0.25 MICROGRAM(S): 0.5 CAPSULE ORAL at 17:08

## 2022-09-28 RX ADMIN — Medication 650 MILLIGRAM(S): at 05:29

## 2022-09-28 RX ADMIN — Medication 100 MILLIGRAM(S): at 13:06

## 2022-09-28 RX ADMIN — DARUNAVIR ETHANOLATE AND COBICISTAT 1 TABLET(S): 800; 150 TABLET, FILM COATED ORAL at 17:07

## 2022-09-28 RX ADMIN — ATORVASTATIN CALCIUM 80 MILLIGRAM(S): 80 TABLET, FILM COATED ORAL at 23:40

## 2022-09-28 RX ADMIN — Medication 1000 UNIT(S): at 17:08

## 2022-09-28 RX ADMIN — AMLODIPINE BESYLATE 10 MILLIGRAM(S): 2.5 TABLET ORAL at 05:24

## 2022-09-28 RX ADMIN — Medication 100 MILLIGRAM(S): at 17:08

## 2022-09-28 NOTE — PROGRESS NOTE ADULT - SUBJECTIVE AND OBJECTIVE BOX
NP Note discussed with Primary Attending    Patient is a 67y old  Female who presents with a chief complaint of Abdominal pain (27 Sep 2022 14:54)      INTERVAL HPI/OVERNIGHT EVENTS: no new complaints    MEDICATIONS  (STANDING):  allopurinol 100 milliGRAM(s) Oral daily  amLODIPine   Tablet 10 milliGRAM(s) Oral daily  aspirin enteric coated 81 milliGRAM(s) Oral daily  atorvastatin 80 milliGRAM(s) Oral at bedtime  calcitriol   Capsule 0.25 MICROGram(s) Oral daily  calcium acetate 667 milliGRAM(s) Oral three times a day with meals  cefTRIAXone   IVPB 2000 milliGRAM(s) IV Intermittent every 24 hours  cholecalciferol 1000 Unit(s) Oral daily  citalopram 20 milliGRAM(s) Oral daily  darunavir 800 mG/cobicstat 150 mG 1 Tablet(s) Oral daily  dolutegravir 50 milliGRAM(s) Oral daily  hydrALAZINE 50 milliGRAM(s) Oral three times a day  isosorbide   mononitrate ER Tablet (IMDUR) 60 milliGRAM(s) Oral daily  lamiVUDine- milliGRAM(s) Oral daily  metoprolol tartrate 100 milliGRAM(s) Oral two times a day  metroNIDAZOLE  IVPB 500 milliGRAM(s) IV Intermittent every 8 hours  omega-3-Acid Ethyl Esters 4 Gram(s) Oral daily  senna 2 Tablet(s) Oral at bedtime  sodium bicarbonate 650 milliGRAM(s) Oral two times a day  zinc sulfate 220 milliGRAM(s) Oral daily    MEDICATIONS  (PRN):  acetaminophen     Tablet .. 650 milliGRAM(s) Oral every 6 hours PRN Temp greater or equal to 38C (100.4F), Mild Pain (1 - 3)  aluminum hydroxide/magnesium hydroxide/simethicone Suspension 30 milliLiter(s) Oral every 4 hours PRN Dyspepsia  melatonin 3 milliGRAM(s) Oral at bedtime PRN Insomnia  ondansetron Injectable 4 milliGRAM(s) IV Push every 8 hours PRN Nausea and/or Vomiting      __________________________________________________  REVIEW OF SYSTEMS:    CONSTITUTIONAL: No fever,   EYES: no acute visual disturbances  NECK: No pain or stiffness  RESPIRATORY: No cough; No shortness of breath  CARDIOVASCULAR: No chest pain, no palpitations  GASTROINTESTINAL: LLQ abdominal  pain. No nausea or vomiting; No diarrhea   NEUROLOGICAL: No headache or numbness, no tremors  MUSCULOSKELETAL: No joint pain, no muscle pain  GENITOURINARY:ESRD on HD, dysuria, no frequency, no hesitancy  PSYCHIATRY: no depression , no anxiety  ALL OTHER  ROS negative        Vital Signs Last 24 Hrs  T(C): 37.1 (28 Sep 2022 07:57), Max: 37.2 (27 Sep 2022 12:22)  T(F): 98.8 (28 Sep 2022 07:57), Max: 98.9 (27 Sep 2022 12:22)  HR: 86 (28 Sep 2022 07:57) (78 - 99)  BP: 132/65 (28 Sep 2022 07:57) (118/70 - 158/68)  BP(mean): 86 (27 Sep 2022 16:45) (86 - 86)  RR: 17 (28 Sep 2022 07:57) (17 - 18)  SpO2: 98% (28 Sep 2022 07:57) (97% - 98%)    Parameters below as of 28 Sep 2022 07:57  Patient On (Oxygen Delivery Method): room air        ________________________________________________  PHYSICAL EXAM:  GENERAL: NAD  HEENT: Normocephalic;  conjunctivae and sclerae clear; moist mucous membranes;   NECK : supple  CHEST/LUNG: Clear to auscultation bilaterally with good air entry   HEART: S1 S2  regular; no murmurs, gallops or rubs  ABDOMEN: Soft, Nontender, Nondistended; Bowel sounds present  EXTREMITIES: no cyanosis; no edema; no calf tenderness  SKIN: warm and dry; no rash  NERVOUS SYSTEM:  Awake and alert; Oriented  to place, person and time ; no new deficits    _________________________________________________  LABS:                        10.0   11.18 )-----------( 264      ( 27 Sep 2022 05:00 )             28.9     09-27    133<L>  |  95<L>  |  22<H>  ----------------------------<  108<H>  4.0   |  30  |  6.67<H>    Ca    9.5      27 Sep 2022 05:00  Phos  4.8     09-27  Mg     2.2     09-27    TPro  8.4<H>  /  Alb  2.7<L>  /  TBili  0.5  /  DBili  x   /  AST  22  /  ALT  17  /  AlkPhos  88  09-27    PT/INR - ( 27 Sep 2022 05:00 )   PT: 15.0 sec;   INR: 1.26 ratio         PTT - ( 27 Sep 2022 05:00 )  PTT:25.8 sec    CAPILLARY BLOOD GLUCOSE      POCT Blood Glucose.: 96 mg/dL (28 Sep 2022 08:00)        RADIOLOGY & ADDITIONAL TESTS:    ACC: [id number] EXAM:  CT ABDOMEN AND PELVIS IC                          PROCEDURE DATE:  09/27/2022          INTERPRETATION:  CLINICAL INFORMATION: Abdominal pain question abscess    COMPARISON: CT abdomen pelvis 9/27/2022 earlier the same day.    CONTRAST/COMPLICATIONS:  IV Contrast: Omnipaque 350 90 cc administered  90 cc administered   10 cc   discarded  Oral Contrast: None  Complications: None    PROCEDURE:  CT of the Abdomen and Pelvis was performed.  Sagittal and coronal reformats were performed.    FINDINGS:  LOWER CHEST: Probable postsurgical changes in the left lower lung. 1.6 cm   left lower lobe thin-walled cyst.    LIVER: 1.3 cm right lobe hypodensity with suggestion of peripheral early   nodular enhancement (4:61). This may represent a hemangioma. This could   be confirmed with MRI.  BILE DUCTS: Normal caliber.  GALLBLADDER: Within normal limits.  SPLEEN: Within normal limits.  PANCREAS: 1.4 cm cyst in uncinate process or a focally dilated duct. This   would be better evaluated with dedicated MRI.  ADRENALS: Within normal limits.  KIDNEYS/URETERS: Bilateral polycystic kidneys    BLADDER: Within normal limits.  REPRODUCTIVE ORGANS: Hysterectomy.    BOWEL: Moderate sized hiatal hernia. Normal appendix.    Oral contrast is again seen within mildly prominent loops of proximal   small bowel measuring up to 2.7 cm in diameter to a mass in the left   lower quadrant. This is unchanged. The mass measures 5.8 x 5.1 cm and   contains fluid and air. This appears to be within thewall of the small   bowel. A thin column of oral contrast passes within the lumen of the   small bowel distally into collapsed loops of small bowel. There is an   apparent fistula between this mass in the adjacent markedly thickened   sigmoid colon which contains numerous diverticula. There are inflammatory   changes in the adjacent fat. This likely represents sigmoid   diverticulitis with small bowel fistulization to the raw within adjacent   loop of small bowel in a contained abscess.      PERITONEUM: No ascites.  VESSELS: Atherosclerotic changes.  RETROPERITONEUM/LYMPH NODES: No lymphadenopathy.  ABDOMINAL WALL: Small fat-containing umbilical hernia.  BONES: Within normal limits.    IMPRESSION:    Abscess within the left lower quadrant small bowel wall secondary to   fistulization from the sigmoid colon likely on the basis of chronic   diverticular disease.    1.3 cm right lobe hypodensity with suggestion of peripheral early nodular   enhancement. This may represent a hemangioma. This could be confirmed   with MRI.    1.4 cm cyst in uncinate process or a focally dilated duct. This would be   better evaluated with dedicated MRI.    Probable probably cystic kidneys. Correlate clinically for kidney disease.      --- End of Report ---            ISAI HERNANDEZ MD; Attending Radiologist  This document has been electronically signed. Sep 27 2022  6:35AM    ACC: [id number] EXAM:  XR CHEST PORTABLE URGENT 1V                          PROCEDURE DATE:  09/26/2022          INTERPRETATION:  AP erect chest on September 26, 2022 at 11:35 PM.   Patient has abdominal pain. Patient has renal failure on dialysis.    Heart magnified by technique.    Lungs are clear.    There are bilateral infiltrates on January 22, 2021 greater on the right.    Old left rib fractures are noted.    No free intraperitoneal air.    IMPRESSION: No acute finding at this time.    --- End of Report ---            NATHANIEL EDWARDS MD; Attending Radiologist  This document has been electronically signed. Sep 27 2022  1:57PM    Imaging  Reviewed:  YES/NO    Consultant(s) Notes Reviewed:   YES/ No      Plan of care was discussed with patient and /or primary care giver; all questions and concerns were addressed

## 2022-09-28 NOTE — PROCEDURE NOTE - PROCEDURE FINDINGS AND DETAILS
LLQ abdominal abscess again identified. 10 Fr pigtail drainage catheter placed. 45 cc of thick, purulent foul-smelling fluid aspirated. Sample of fluid sent for culture. Catheter connected to gravity drainage.

## 2022-09-28 NOTE — PROGRESS NOTE ADULT - PROBLEM SELECTOR PLAN 3
Pt has a history of HIV.  - f/u CD4 and viral load.   - c/w home medication Tivicay, Prezcobix, lamivudine.  - ID dr. Young is following

## 2022-09-28 NOTE — PRE PROCEDURE NOTE - PRE PROCEDURE EVALUATION
Interventional Radiology Pre-Procedure Note    Diagnosis/Indication: Patient is a 67y old Female who presents with a chief complaint of Abdominal pain, who was found to have an abdominal collection concerning for abscess. Percutaneous drainage of the collection was requested. Case was d/w Dr. Francisco.     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  ESRD on hemodialysis  History of ectopic pregnancy  Two  H/O: hysterectomy  Due to cervical cancer  S/P arteriovenous (AV) fistula creation  july 10 2020       Allergies: oxycodone (Rash)  penicillins (Urticaria; Rash)      LABS:  CBC Full  -  ( 27 Sep 2022 05:00 )  WBC Count : 11.18 K/uL  RBC Count : 3.07 M/uL  Hemoglobin : 10.0 g/dL  Hematocrit : 28.9 %  Platelet Count - Automated : 264 K/uL  Mean Cell Volume : 94.1 fl  Mean Cell Hemoglobin : 32.6 pg  Mean Cell Hemoglobin Concentration : 34.6 gm/dL  Auto Neutrophil # : 8.97 K/uL  Auto Lymphocyte # : 1.36 K/uL  Auto Monocyte # : 0.77 K/uL  Auto Eosinophil # : 0.00 K/uL  Auto Basophil # : 0.02 K/uL  Auto Neutrophil % : 80.2 %  Auto Lymphocyte % : 12.2 %  Auto Monocyte % : 6.9 %  Auto Eosinophil % : 0.0 %  Auto Basophil % : 0.2 %    09-27    133<L>  |  95<L>  |  22<H>  ----------------------------<  108<H>  4.0   |  30  |  6.67<H>    Ca    9.5      27 Sep 2022 05:00  Phos  4.8     09-27  Mg     2.2     09-27    TPro  8.4<H>  /  Alb  2.7<L>  /  TBili  0.5  /  DBili  x   /  AST  22  /  ALT  17  /  AlkPhos  88  09-27    PT/INR - ( 27 Sep 2022 05:00 )   PT: 15.0 sec;   INR: 1.26 ratio       PTT - ( 27 Sep 2022 05:00 )  PTT:25.8 sec    Procedure/ risks/ benefits/ alternatives were discussed with the patient, who verbalizes understanding, and witnessed informed consent was obtained.

## 2022-09-28 NOTE — PROGRESS NOTE ADULT - PROBLEM SELECTOR PLAN 4
Pt with hx of ESRD on HD on [facility].   HD as per nephro  c/w sevelamer  Renal diet when not NPO  Nephro consulted: Dr. Wallace.

## 2022-09-28 NOTE — PROGRESS NOTE ADULT - ATTENDING COMMENTS
66 yo female from [facility] with H/O ESRD on HD(MWF) via L arm AVF, HIV on ART diagnosed 16 yrs ago (Tivicay + Lamivudine + Prezcobix) undetectable and compliant with meds. She follows with Dr. Cathy Lowry([phone]) last CD4 on July/2022 was 794, VL <20 copies(has been undetectable)    -Left small bowel abscess/fistula from sigmoid colon 2/2 diverticular disease  -HIV on ART CD4 794, undetectable   -PCN allergy(when she was a child, she was told she had hives, she does not remember)  -ESRD on HD    CT Abdomen and Pelvis w/ IV Cont (09.27.22 @ 06:30) > CT A/P w/IV + abscess within the left lower quadrant small bowel wall secondary to fistulization from the sigmoid colon likely on the basis of chronic diverticular disease.    S/P IR drainage of the abscess 9/28 with 10 ml of foul smelling purulent discharge  Continue CXT 2g IV daily + Flagyl 500 mg q/8hrs  Follow up cultures  Continue ART (Tivicay(50 mg) +Prezcovix(800/150 mg) + Lamivudine (100 mg) daily  Obtain ESR and CRP  F/up surgery  Discussed with medicine attending    Please reach ID with any questions or concerns  Dr. Mine Garnett  Tel. [phone]  Available in Teams

## 2022-09-28 NOTE — PROGRESS NOTE ADULT - SUBJECTIVE AND OBJECTIVE BOX
Subjective:  Pt seen at bedside this morning. States she had multiple episodes of vomiting last night, no vomiting this morning. She states she feels weak, has persistent lower abdominal pain and feels hungry. Otherwise denies fevers, chills, SOB, chest pain, diarrhea, constipation or dysuria.     ALLERGIES oxycodone (Rash)  penicillins (Urticaria; Rash)      REVIEW OF SYSTEMS:  CONSTITUTIONAL:  + weakness. No fevers or chills  EYES/ENT:  No visual changes;  No vertigo or throat pain   NECK:  No pain or stiffness  RESPIRATORY:  No cough, wheezing, hemoptysis; No shortness of breath  CARDIOVASCULAR:  No chest pain or palpitations  GASTROINTESTINAL:  + abdominal pain, N/V. No hematemesis; No diarrhea or constipation. No melena or hematochezia.  GENITOURINARY:  No dysuria, frequency or hematuria  NEUROLOGICAL:  No numbness or tingling  MSK: no back pain, no joint pain  SKIN:  No itching, rashes    PHYSICAL EXAM:   Vital Signs Last 24 Hrs  T(C): 36.6 (28 Sep 2022 15:18), Max: 37.1 (27 Sep 2022 16:45)  T(F): 97.9 (28 Sep 2022 15:18), Max: 98.8 (28 Sep 2022 07:57)  HR: 72 (28 Sep 2022 15:18) (72 - 99)  BP: 141/57 (28 Sep 2022 15:18) (97/47 - 163/57)  BP(mean): 79 (28 Sep 2022 15:18) (52 - 89)  RR: 16 (28 Sep 2022 15:18) (15 - 22)  SpO2: 98% (28 Sep 2022 15:18) (96% - 98%)    Parameters below as of 28 Sep 2022 15:18  Patient On (Oxygen Delivery Method): room air      GENERAL: NAD, female laying comfortably   HEAD:  Atraumatic, Normocephalic  EYES: EOMI, PERRLA, melanocytic macular lesions in both eyes  ENT: Moist mucous membranes  NECK: Supple, No JVD  CHEST/LUNG: Clear to auscultation bilaterally; No rales, rhonchi, wheezing, or rubs. Unlabored respirations  HEART: Regular rate and rhythm; No murmurs, rubs, or gallops  ABDOMEN: BSx4; Soft, mildly distended, + diffuse tenderness in LLQ and suprapubic area, + guarding  : no terry  EXTREMITIES:  + left upper arm AV fistula site with strong palpable thrill, 2+ Peripheral Pulses, brisk capillary refill. No clubbing, cyanosis, or edema  NERVOUS SYSTEM:  A&Ox3. No sensory deficits, no motor deficits  SKIN: + xerosis in B/L lower extremities. No rashes or lesions, dry and warm skin  MSK: no back pain, no joint swelling    LABS/DIAGNOSTIC TESTS:                        10.0   11.18 )-----------( 264      ( 27 Sep 2022 05:00 )             28.9     WBC Count: 11.18 K/uL (09-27 @ 05:00)  WBC Count: 10.85 K/uL (09-27 @ 00:01)    09-27    133<L>  |  95<L>  |  22<H>  ----------------------------<  108<H>  4.0   |  30  |  6.67<H>    Ca    9.5      27 Sep 2022 05:00  Phos  4.8     09-27  Mg     2.2     09-27    TPro  8.4<H>  /  Alb  2.7<L>  /  TBili  0.5  /  DBili  x   /  AST  22  /  ALT  17  /  AlkPhos  88  09-27      RADIOLOGY:   < from: CT Aspiration Abscess Hematoma, Cyst (09.28.22 @ 14:00) >  ACC: [id number] EXAM:  CT ASP ABSC HEMATOMA CYST#                        PROCEDURE DATE:  09/28/2022    INTERPRETATION:  PROCEDURE: CT-guided drainage of abdominal abscess.  : VERÓNICA Payne MD    CLINICAL INDICATION: 67-year-old female with multiple comorbidities, who   presented with abdominal pain and was found to have an abdominal   collection concerning for abscess. Percutaneous drainage of the abscess   was requested.    ANESTHESIA: Intravenous sedation was provided by the attending   anesthesiologist. A total of 7 mL of 1% lidocaine was used for local   anesthesia.    ANTIBIOTICS: Patient's scheduled dose of metronidazole was administered   by the anesthesiologist prior to procedure.    TECHNIQUE: After discussing the risks, benefits and alternatives to this   procedure with the patient, informed consent was obtained. A timeout was   performed.    The patient was placed supine on the CT examination table and connected   to physiologic monitoring. Transaxial images ofthe abdomen were obtained   without the use of oral or intravenous contrast materials. The left lower   quadrant abdominal collection which had been seen on a recent CT scan was   again identified, and the overlying skin was prepped and draped in the  usual sterile fashion.    Using CT guidance, an 18-gauge trocar needle was advanced into the   collection using a left paramedian approach. Access into the collection   was confirmed with CT images and aspiration of 5 mL of purulent fluid   which was sent for microbiological analysis. The needle was then removed   over a wire and the tract was dilated. A 10 [place] pigtail drainage   catheter was then advanced into the collection over the wire. An   additional 40 mL of thick, purulent foul-smelling fluid was manually   aspirated. Repeat images demonstrated good positioning of the catheter   tip within the abscess cavity with virtual complete collapse of the   cavity around the catheter tip. The catheter was secured to the patient's   skin with a silk suture, flushed with normal saline, and then connected   to a drainage bag. A dry sterile dressing was then applied.    The patient tolerated the procedure well and was transferred to the   recovery area in stable condition. There were no immediate complications.    IMPRESSION: SUCCESSFUL CT-GUIDED PERCUTANEOUS DRAINAGE OF ABDOMINAL   ABSCESS, AS DESCRIBED ABOVE.    < end of copied text >      ANTIBIOTICS:  cefTRIAXone   IVPB 2000 every 24 hours  darunavir 800 mG/cobicstat 150 mG 1 daily  dolutegravir 50 daily  lamiVUDine- daily  metroNIDAZOLE  IVPB 500 every 8 hours      IV LINES: + Right Peripheral IV, c/d/i    IMPRESSION:  67 year old female with medical history of ESRD (dialysis M-W-F), HTN, HIV (diagnosed 16 yrs ago, on Lamivudine, Prezcobix and Tivicay) coming in with abdominal pain, nausea and vomiting. CT Abdomen with IV Contrast shows abscess within the left lower quadrant small bowel wall secondary to fistulization from the sigmoid colon likely on the basis of chronic diverticular disease. Cancer tumor markers CEA, Ca 19-9,  negative. S/p CT guided drainage, 45 cc of purulent fluid aspirated by IR, Pigtail catheter in place.     - Abdominal Wall Abscess secondary to fistulization from sigmoid colon, likely from chronic diverticular disease (s/p CT guided drainage of abdominal abscess)  - HIV controlled with HAART Therapy (last CD4 794, viral load 43 in 7/2022 ; on Lamivudine, Prezcobix and Tivicay)      PLAN:  - c/w IV Ceftriaxone 2g daily, and IV Flagyl 500mg q8 hours  - f/u abscess culture results  - Continue pt's home HARRT therapy (Lamivudine, Prezcobix and Tivicay)  - F/u CD4 count and viral load  - Rest of management per primary team    Assessment and plan discussed with ID Attending, Dr. Mine Thomas.  Subjective:  Pt seen at bedside this morning. States she had multiple episodes of vomiting last night, no vomiting this morning. She states she feels weak, has persistent lower abdominal pain and feels hungry. Otherwise denies fevers, chills, SOB, chest pain, diarrhea, constipation or dysuria.     ALLERGIES oxycodone (Rash)  penicillins (Urticaria; Rash)      REVIEW OF SYSTEMS:  CONSTITUTIONAL:  + weakness. No fevers or chills  EYES/ENT:  No visual changes;  No vertigo or throat pain   NECK:  No pain or stiffness  RESPIRATORY:  No cough, wheezing, hemoptysis; No shortness of breath  CARDIOVASCULAR:  No chest pain or palpitations  GASTROINTESTINAL:  + abdominal pain, N/V. No hematemesis; No diarrhea or constipation. No melena or hematochezia.  GENITOURINARY:  No dysuria, frequency or hematuria  NEUROLOGICAL:  No numbness or tingling  MSK: no back pain, no joint pain  SKIN:  No itching, rashes    PHYSICAL EXAM:   Vital Signs Last 24 Hrs  T(C): 36.6 (28 Sep 2022 15:18), Max: 37.1 (27 Sep 2022 16:45)  T(F): 97.9 (28 Sep 2022 15:18), Max: 98.8 (28 Sep 2022 07:57)  HR: 72 (28 Sep 2022 15:18) (72 - 99)  BP: 141/57 (28 Sep 2022 15:18) (97/47 - 163/57)  BP(mean): 79 (28 Sep 2022 15:18) (52 - 89)  RR: 16 (28 Sep 2022 15:18) (15 - 22)  SpO2: 98% (28 Sep 2022 15:18) (96% - 98%)    Parameters below as of 28 Sep 2022 15:18  Patient On (Oxygen Delivery Method): room air      GENERAL: NAD, female laying comfortably   HEAD:  Atraumatic, Normocephalic  EYES: EOMI, PERRLA, melanocytic macular lesions in both eyes  ENT: Moist mucous membranes  NECK: Supple, No JVD  CHEST/LUNG: Clear to auscultation bilaterally; No rales, rhonchi, wheezing, or rubs. Unlabored respirations  HEART: Regular rate and rhythm; No murmurs, rubs, or gallops  ABDOMEN: BSx4; Soft, mildly distended, + diffuse tenderness in LLQ and suprapubic area, + guarding  : no terry  EXTREMITIES:  + left upper arm AV fistula site with strong palpable thrill, 2+ Peripheral Pulses, brisk capillary refill. No clubbing, cyanosis, or edema  NERVOUS SYSTEM:  A&Ox3. No sensory deficits, no motor deficits  SKIN: + xerosis in B/L lower extremities. No rashes or lesions, dry and warm skin  MSK: no back pain, no joint swelling    LABS/DIAGNOSTIC TESTS:                        10.0   11.18 )-----------( 264      ( 27 Sep 2022 05:00 )             28.9     WBC Count: 11.18 K/uL (09-27 @ 05:00)  WBC Count: 10.85 K/uL (09-27 @ 00:01)    09-27    133<L>  |  95<L>  |  22<H>  ----------------------------<  108<H>  4.0   |  30  |  6.67<H>    Ca    9.5      27 Sep 2022 05:00  Phos  4.8     09-27  Mg     2.2     09-27    TPro  8.4<H>  /  Alb  2.7<L>  /  TBili  0.5  /  DBili  x   /  AST  22  /  ALT  17  /  AlkPhos  88  09-27      RADIOLOGY:   < from: CT Aspiration Abscess Hematoma, Cyst (09.28.22 @ 14:00) >  ACC: [id number] EXAM:  CT ASP ABSC HEMATOMA CYST#                        PROCEDURE DATE:  09/28/2022    INTERPRETATION:  PROCEDURE: CT-guided drainage of abdominal abscess.  : VERÓNICA Payne MD    CLINICAL INDICATION: 67-year-old female with multiple comorbidities, who   presented with abdominal pain and was found to have an abdominal   collection concerning for abscess. Percutaneous drainage of the abscess   was requested.    ANESTHESIA: Intravenous sedation was provided by the attending   anesthesiologist. A total of 7 mL of 1% lidocaine was used for local   anesthesia.    ANTIBIOTICS: Patient's scheduled dose of metronidazole was administered   by the anesthesiologist prior to procedure.    TECHNIQUE: After discussing the risks, benefits and alternatives to this   procedure with the patient, informed consent was obtained. A timeout was   performed.    The patient was placed supine on the CT examination table and connected   to physiologic monitoring. Transaxial images ofthe abdomen were obtained   without the use of oral or intravenous contrast materials. The left lower   quadrant abdominal collection which had been seen on a recent CT scan was   again identified, and the overlying skin was prepped and draped in the  usual sterile fashion.    Using CT guidance, an 18-gauge trocar needle was advanced into the   collection using a left paramedian approach. Access into the collection   was confirmed with CT images and aspiration of 5 mL of purulent fluid   which was sent for microbiological analysis. The needle was then removed   over a wire and the tract was dilated. A 10 [place] pigtail drainage   catheter was then advanced into the collection over the wire. An   additional 40 mL of thick, purulent foul-smelling fluid was manually   aspirated. Repeat images demonstrated good positioning of the catheter   tip within the abscess cavity with virtual complete collapse of the   cavity around the catheter tip. The catheter was secured to the patient's   skin with a silk suture, flushed with normal saline, and then connected   to a drainage bag. A dry sterile dressing was then applied.    The patient tolerated the procedure well and was transferred to the   recovery area in stable condition. There were no immediate complications.    IMPRESSION: SUCCESSFUL CT-GUIDED PERCUTANEOUS DRAINAGE OF ABDOMINAL   ABSCESS, AS DESCRIBED ABOVE.    ANTIBIOTICS:  cefTRIAXone   IVPB 2000 every 24 hours  darunavir 800 mG/cobicstat 150 mG 1 daily  dolutegravir 50 daily  lamiVUDine- daily  metroNIDAZOLE  IVPB 500 every 8 hours      IV LINES: + Right Peripheral IV, c/d/i    IMPRESSION:  67 year old female with medical history of ESRD (dialysis M-W-F), HTN, HIV (diagnosed 16 yrs ago, on Lamivudine, Prezcobix and Tivicay) coming in with abdominal pain, nausea and vomiting. CT Abdomen with IV Contrast shows abscess within the left lower quadrant small bowel wall secondary to fistulization from the sigmoid colon likely on the basis of chronic diverticular disease. Cancer tumor markers CEA, Ca 19-9,  negative. S/p CT guided drainage, 45 cc of purulent fluid aspirated by IR, Pigtail catheter in place.     - Abdominal Wall Abscess secondary to fistulization from sigmoid colon, likely from chronic diverticular disease (s/p CT guided drainage of abdominal abscess)  - HIV controlled with HAART Therapy (last CD4 794, viral load 43 in 7/2022 ; on Lamivudine, Prezcobix and Tivicay)      PLAN:  - c/w IV Ceftriaxone 2g daily, and IV Flagyl 500mg q8 hours  - f/u abscess culture results  - Continue pt's home HARRT therapy (Lamivudine, Prezcobix and Tivicay)  - F/u CD4 count and viral load  - Rest of management per primary team    Assessment and plan discussed with ID Attending, Dr. Mine Thomas.

## 2022-09-28 NOTE — PROGRESS NOTE ADULT - ASSESSMENT
This is a 67 year old female, coming from home, with medical history of ESRD(M-W-F), HTN, HIV coming in with abdominal pain. Patient admitted to medicine CT a/p show LLQ abscess and chronic diverticular disease. Patient was started on Ceftriaxone and Flagyl and ID was consulted for recommendations. Patient has hx of ESRD on dialysis M-W-F, nephrology consulted.  Surgery and IR consulted for abdominal abscess seen on CT, secondary to fistulization from the sigmoid colon plan for IR drainage today.

## 2022-09-28 NOTE — PROGRESS NOTE ADULT - ATTENDING COMMENTS
67 year old female, coming from home, with medical history of ESRD(M-W-F), HTN, HIV coming in with abdominal pain, admitted with concern for bowel abscess with fistula    # Bowel abscess  # ESRD on HD, MWF  # HIV  # Anemia - likely 2/2 ESRD  # HTN  #Gout  #HLD  #Liver hypodensity on CT  #Pancreas cyst  CT Abdomen with IV Contrast shows abscess within the left lower quadrant small bowel wall secondary to fistulization from the sigmoid colon likely on the basis of chronic diverticular disease. Cancer tumor markers CEA, Ca 19-9,  negative.   - IR - Abscess drainiage today  - Ceftriaxone/flagyl IV  -ID following, yareli recs  - f/u surgery recs  -Pain regimen  - HD per nephro  - f/u CD4 count and viral load  - c/w home meds  - ID following, yareli recs  - tumor markers negative  - outpatient MRI w/ PCP  - DVT ppx

## 2022-09-28 NOTE — PROCEDURE NOTE - PLAN
- PACU x 1.5 hours.   - F/u culture results.   - Monitor drainage catheter output.   - Flush catheter with 5cc NS q12 hours.   - Call IR with questions/concerns regarding procedure.     Official report to follow.

## 2022-09-28 NOTE — PROGRESS NOTE ADULT - SUBJECTIVE AND OBJECTIVE BOX
Pt seen and examined. Underwent IR drainage of diverticular abscess. Pt reports pain improved. In HD  Stable, afebrile. WBC stable at 11  Afebrile, hemodynamically stable  Abd- soft, ND + LLQ tenderness, no guarding mild rebound. IR drain with purulent thick drainage.  Continue IV abx. Would still keep NPO for now. Serial abdominal exam.

## 2022-09-28 NOTE — PROGRESS NOTE ADULT - PROBLEM SELECTOR PLAN 1
Pt admitted for abdominal pain  CT a/p show Abdominal Wall Abscess secondary to fistulization from sigmoid colon, likely from chronic diverticular disease.  Start IV Ceftriaxone 2g daily, and IV Flagyl 500mg q 8hours  Surgery dr. Francisco  ID dr. Young  IR consulted   plan for IR abscess drainage today  NPO

## 2022-09-28 NOTE — PROGRESS NOTE ADULT - PROBLEM SELECTOR PLAN 2
Pt has a history of HTN.   - c/w home medication of Metoprolol, Hydralazine, Amlodipine with parameters.  - monitor BP

## 2022-09-28 NOTE — CONSULT NOTE ADULT - SUBJECTIVE AND OBJECTIVE BOX
Chief complain/HPI  ESRD, dialysis days are MWF, for 3 years  Patient came to the ER for LLQ pain , loss of appetite and constipation with out fever and chills.    CT abdomen showed; < from: CT Abdomen and Pelvis w/ IV Cont (09.27.22 @ 06:30) >  Abscess within the left lower quadrant small bowel wall secondary to   fistulization from the sigmoid colon likely on the basis of chronic   diverticular disease.    1.3 cm right lobe hypodensity with suggestion of peripheral early nodular   enhancement. This may represent a hemangioma. This could be confirmed   with MRI.    1.4 cm cyst in uncinate process or a focally dilated duct. This would be   better evaluated with dedicated MRI.    Probable probably cystic kidneys. Correlate clinically for kidney disease.    < end of copied text >      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  ESRD on hemodialysis  History of ectopic pregnancy  Two  H/O: hysterectomy  Due to cervical cancer      S/P arteriovenous (AV) fistula creation  july 10 2020          Home Medications Reviewed    Hospital Medications:   MEDICATIONS  (STANDING):  allopurinol 100 milliGRAM(s) Oral daily  amLODIPine   Tablet 10 milliGRAM(s) Oral daily  aspirin enteric coated 81 milliGRAM(s) Oral daily  atorvastatin 80 milliGRAM(s) Oral at bedtime  calcitriol   Capsule 0.25 MICROGram(s) Oral daily  calcium acetate 667 milliGRAM(s) Oral three times a day with meals  cefTRIAXone   IVPB 2000 milliGRAM(s) IV Intermittent every 24 hours  cholecalciferol 1000 Unit(s) Oral daily  citalopram 20 milliGRAM(s) Oral daily  darunavir 800 mG/cobicstat 150 mG 1 Tablet(s) Oral daily  dolutegravir 50 milliGRAM(s) Oral daily  hydrALAZINE 50 milliGRAM(s) Oral three times a day  isosorbide   mononitrate ER Tablet (IMDUR) 60 milliGRAM(s) Oral daily  lamiVUDine- milliGRAM(s) Oral daily  metoprolol tartrate 100 milliGRAM(s) Oral two times a day  metroNIDAZOLE  IVPB 500 milliGRAM(s) IV Intermittent every 8 hours  omega-3-Acid Ethyl Esters 4 Gram(s) Oral daily  senna 2 Tablet(s) Oral at bedtime  sodium bicarbonate 650 milliGRAM(s) Oral two times a day  zinc sulfate 220 milliGRAM(s) Oral daily    MEDICATIONS  (PRN):  acetaminophen     Tablet .. 650 milliGRAM(s) Oral every 6 hours PRN Temp greater or equal to 38C (100.4F), Mild Pain (1 - 3)  aluminum hydroxide/magnesium hydroxide/simethicone Suspension 30 milliLiter(s) Oral every 4 hours PRN Dyspepsia  melatonin 3 milliGRAM(s) Oral at bedtime PRN Insomnia  ondansetron Injectable 4 milliGRAM(s) IV Push every 8 hours PRN Nausea and/or Vomiting      Allergies    oxycodone (Rash)  penicillins (Urticaria; Rash)    Intolerances                              10.0   11.18 )-----------( 264      ( 27 Sep 2022 05:00 )             28.9     09-27    133<L>  |  95<L>  |  22<H>  ----------------------------<  108<H>  4.0   |  30  |  6.67<H>    Ca    9.5      27 Sep 2022 05:00  Phos  4.8     09-27  Mg     2.2     09-27    TPro  8.4<H>  /  Alb  2.7<L>  /  TBili  0.5  /  DBili  x   /  AST  22  /  ALT  17  /  AlkPhos  88  09-27    PT/INR - ( 27 Sep 2022 05:00 )   PT: 15.0 sec;   INR: 1.26 ratio         PTT - ( 27 Sep 2022 05:00 )  PTT:25.8 sec          RADIOLOGY & ADDITIONAL STUDIES:  < from: Xray Chest 1 View- PORTABLE-Urgent (09.26.22 @ 23:48) >  INTERPRETATION:  AP erect chest on September 26, 2022 at 11:35 PM.   Patient has abdominal pain. Patient has renal failure on dialysis.    Heart magnified by technique.    Lungs are clear.    There are bilateral infiltrates on January 22, 2021 greater on the right.    Old left rib fractures are noted.    No free intraperitoneal air.    IMPRESSION: No acute finding at this time.    --- End of Report ---    < end of copied text >    SOCIAL HISTORY: Denies ETOh,Smoking,     FAMILY HISTORY:  Family hx of hypertension    Family history of renal failure    REVIEW OF SYSTEMS:  CONSTITUTIONAL: No malaise, c/o fatigue she thinks its from no food intake  since Monday  EYES/ENT: No visual changes;  No vertigo or throat pain   NECK: No pain or stiffness  RESPIRATORY: No cough, wheezing, hemoptysis; No shortness of breath  CARDIOVASCULAR: No chest pain or palpitations. No edema  GASTROINTESTINAL: at present pain on palpation in LLQ , constipation since Monday, reduced appetite.  GENITOURINARY: No dysuria, frequency, foamy urine, urinary urgency, incontinence or hematuria      VITALS:  Vital Signs Last 24 Hrs  T(C): 36.9 (28 Sep 2022 11:00), Max: 37.1 (27 Sep 2022 16:45)  T(F): 98.4 (28 Sep 2022 11:00), Max: 98.8 (28 Sep 2022 07:57)  HR: 89 (28 Sep 2022 11:00) (78 - 99)  BP: 133/66 (28 Sep 2022 11:00) (118/70 - 158/68)  BP(mean): 86 (27 Sep 2022 16:45) (86 - 86)  RR: 17 (28 Sep 2022 11:00) (17 - 18)  SpO2: 97% (28 Sep 2022 11:00) (97% - 98%)    Parameters below as of 28 Sep 2022 11:00  Patient On (Oxygen Delivery Method): room air            PHYSICAL EXAM:  Constitutional: NAD  HEENT: anicteric sclera, oropharynx clear, MMM  Neck: No JVD  Respiratory: good air entrance B/L, no wheezes, rales or rhonchi  Cardiovascular: S1, S2, RRR, no pericardial rub, no murmur  Gastrointestinal: BS+, soft, LLQ  tenderness to deep palpation, no distension, no bruit  Pelvis: bladder non-distended, no CVA tenderness  Extremities: No cyanosis or clubbing. No peripheral edema  Neurological: A/O x 3, no focal deficits  Psychiatric: Normal mood, normal affect    Access: left upper arm AVF with bruit and thrill

## 2022-09-29 DIAGNOSIS — Z02.9 ENCOUNTER FOR ADMINISTRATIVE EXAMINATIONS, UNSPECIFIED: ICD-10-CM

## 2022-09-29 LAB
4/8 RATIO: 1.98 RATIO — SIGNIFICANT CHANGE UP (ref 0.9–3.6)
A1C WITH ESTIMATED AVERAGE GLUCOSE RESULT: SIGNIFICANT CHANGE UP (ref 4–5.6)
ABS CD8: 190 /UL — SIGNIFICANT CHANGE UP (ref 142–740)
ALBUMIN SERPL ELPH-MCNC: 2.4 G/DL — LOW (ref 3.5–5)
ALP SERPL-CCNC: 91 U/L — SIGNIFICANT CHANGE UP (ref 40–120)
ALT FLD-CCNC: 17 U/L DA — SIGNIFICANT CHANGE UP (ref 10–60)
ANION GAP SERPL CALC-SCNC: 9 MMOL/L — SIGNIFICANT CHANGE UP (ref 5–17)
AST SERPL-CCNC: 20 U/L — SIGNIFICANT CHANGE UP (ref 10–40)
BILIRUB SERPL-MCNC: 0.4 MG/DL — SIGNIFICANT CHANGE UP (ref 0.2–1.2)
BUN SERPL-MCNC: 21 MG/DL — HIGH (ref 7–18)
CALCIUM SERPL-MCNC: 9.4 MG/DL — SIGNIFICANT CHANGE UP (ref 8.4–10.5)
CD3 BLASTS SPEC-ACNC: 569 /UL — LOW (ref 672–1870)
CD3 BLASTS SPEC-ACNC: 89 % — HIGH (ref 59–83)
CD4 %: 59 % — SIGNIFICANT CHANGE UP (ref 30–62)
CD8 %: 30 % — SIGNIFICANT CHANGE UP (ref 12–36)
CHLORIDE SERPL-SCNC: 96 MMOL/L — SIGNIFICANT CHANGE UP (ref 96–108)
CO2 SERPL-SCNC: 28 MMOL/L — SIGNIFICANT CHANGE UP (ref 22–31)
CREAT SERPL-MCNC: 5.34 MG/DL — HIGH (ref 0.5–1.3)
CRP SERPL-MCNC: 236 MG/L — HIGH
EGFR: 8 ML/MIN/1.73M2 — LOW
ERYTHROCYTE [SEDIMENTATION RATE] IN BLOOD: 106 MM/HR — HIGH (ref 0–20)
GLUCOSE BLDC GLUCOMTR-MCNC: 97 MG/DL — SIGNIFICANT CHANGE UP (ref 70–99)
GLUCOSE SERPL-MCNC: 89 MG/DL — SIGNIFICANT CHANGE UP (ref 70–99)
HAV IGM SER-ACNC: SIGNIFICANT CHANGE UP
HBV CORE IGM SER-ACNC: SIGNIFICANT CHANGE UP
HBV SURFACE AG SER-ACNC: SIGNIFICANT CHANGE UP
HBV SURFACE AG SER-ACNC: SIGNIFICANT CHANGE UP
HCT VFR BLD CALC: 26.8 % — LOW (ref 34.5–45)
HCV AB S/CO SERPL IA: 0.06 S/CO — SIGNIFICANT CHANGE UP (ref 0–0.99)
HCV AB SERPL-IMP: SIGNIFICANT CHANGE UP
HGB BLD-MCNC: 9.2 G/DL — LOW (ref 11.5–15.5)
MAGNESIUM SERPL-MCNC: 2 MG/DL — SIGNIFICANT CHANGE UP (ref 1.6–2.6)
MCHC RBC-ENTMCNC: 32.5 PG — SIGNIFICANT CHANGE UP (ref 27–34)
MCHC RBC-ENTMCNC: 34.3 GM/DL — SIGNIFICANT CHANGE UP (ref 32–36)
MCV RBC AUTO: 94.7 FL — SIGNIFICANT CHANGE UP (ref 80–100)
NRBC # BLD: 0 /100 WBCS — SIGNIFICANT CHANGE UP (ref 0–0)
PHOSPHATE SERPL-MCNC: 4 MG/DL — SIGNIFICANT CHANGE UP (ref 2.5–4.5)
PLATELET # BLD AUTO: 204 K/UL — SIGNIFICANT CHANGE UP (ref 150–400)
POTASSIUM SERPL-MCNC: 3.7 MMOL/L — SIGNIFICANT CHANGE UP (ref 3.5–5.3)
POTASSIUM SERPL-SCNC: 3.7 MMOL/L — SIGNIFICANT CHANGE UP (ref 3.5–5.3)
PROT SERPL-MCNC: 6.9 G/DL — SIGNIFICANT CHANGE UP (ref 6–8.3)
RBC # BLD: 2.83 M/UL — LOW (ref 3.8–5.2)
RBC # FLD: 15.5 % — HIGH (ref 10.3–14.5)
SODIUM SERPL-SCNC: 133 MMOL/L — LOW (ref 135–145)
T-CELL CD4 SUBSET PNL BLD: 377 /UL — LOW (ref 489–1457)
WBC # BLD: 8.31 K/UL — SIGNIFICANT CHANGE UP (ref 3.8–10.5)
WBC # FLD AUTO: 8.31 K/UL — SIGNIFICANT CHANGE UP (ref 3.8–10.5)

## 2022-09-29 PROCEDURE — 99232 SBSQ HOSP IP/OBS MODERATE 35: CPT

## 2022-09-29 PROCEDURE — 99233 SBSQ HOSP IP/OBS HIGH 50: CPT

## 2022-09-29 RX ADMIN — DOLUTEGRAVIR SODIUM 50 MILLIGRAM(S): 25 TABLET, FILM COATED ORAL at 12:33

## 2022-09-29 RX ADMIN — Medication 100 MILLIGRAM(S): at 17:15

## 2022-09-29 RX ADMIN — AMLODIPINE BESYLATE 10 MILLIGRAM(S): 2.5 TABLET ORAL at 06:59

## 2022-09-29 RX ADMIN — ONDANSETRON 4 MILLIGRAM(S): 8 TABLET, FILM COATED ORAL at 14:33

## 2022-09-29 RX ADMIN — Medication 650 MILLIGRAM(S): at 17:15

## 2022-09-29 RX ADMIN — Medication 81 MILLIGRAM(S): at 12:31

## 2022-09-29 RX ADMIN — Medication 50 MILLIGRAM(S): at 14:33

## 2022-09-29 RX ADMIN — ATORVASTATIN CALCIUM 80 MILLIGRAM(S): 80 TABLET, FILM COATED ORAL at 21:22

## 2022-09-29 RX ADMIN — Medication 50 MILLIGRAM(S): at 21:22

## 2022-09-29 RX ADMIN — DARUNAVIR ETHANOLATE AND COBICISTAT 1 TABLET(S): 800; 150 TABLET, FILM COATED ORAL at 12:31

## 2022-09-29 RX ADMIN — Medication 100 MILLIGRAM(S): at 21:22

## 2022-09-29 RX ADMIN — Medication 100 MILLIGRAM(S): at 12:33

## 2022-09-29 RX ADMIN — Medication 4 GRAM(S): at 12:30

## 2022-09-29 RX ADMIN — ISOSORBIDE MONONITRATE 60 MILLIGRAM(S): 60 TABLET, EXTENDED RELEASE ORAL at 12:30

## 2022-09-29 RX ADMIN — Medication 650 MILLIGRAM(S): at 06:50

## 2022-09-29 RX ADMIN — SENNA PLUS 2 TABLET(S): 8.6 TABLET ORAL at 21:22

## 2022-09-29 RX ADMIN — CITALOPRAM 20 MILLIGRAM(S): 10 TABLET, FILM COATED ORAL at 12:31

## 2022-09-29 RX ADMIN — Medication 1000 UNIT(S): at 12:33

## 2022-09-29 RX ADMIN — CALCITRIOL 0.25 MICROGRAM(S): 0.5 CAPSULE ORAL at 12:31

## 2022-09-29 RX ADMIN — ZINC SULFATE TAB 220 MG (50 MG ZINC EQUIVALENT) 220 MILLIGRAM(S): 220 (50 ZN) TAB at 12:31

## 2022-09-29 RX ADMIN — Medication 1000 MILLIGRAM(S): at 01:21

## 2022-09-29 RX ADMIN — Medication 400 MILLIGRAM(S): at 00:21

## 2022-09-29 RX ADMIN — Medication 100 MILLIGRAM(S): at 06:49

## 2022-09-29 RX ADMIN — CHLORHEXIDINE GLUCONATE 1 APPLICATION(S): 213 SOLUTION TOPICAL at 17:15

## 2022-09-29 RX ADMIN — CEFTRIAXONE 100 MILLIGRAM(S): 500 INJECTION, POWDER, FOR SOLUTION INTRAMUSCULAR; INTRAVENOUS at 17:15

## 2022-09-29 NOTE — PROGRESS NOTE ADULT - ASSESSMENT
67y.o. Female with CT findings of acute perforated diverticulitis with contained abscess possible fistulization mass   s/p IR drain 9/28  afebrile, wbc wnl 8 (11)    - NPO, IVF   - serial abdominal exams  - IV abx  - ID consulted, f/u recs   - remainder of care as per primary team   - Discussed and agreed with Dr. Francisco

## 2022-09-29 NOTE — PROGRESS NOTE ADULT - ASSESSMENT
This is a 67 year old female, coming from home, with medical history of ESRD(M-W-F), HTN, HIV coming in with abdominal pain. Patient admitted to medicine CT a/p show LLQ abscess and chronic diverticular disease. Patient was started on Ceftriaxone and Flagyl and ID was consulted for recommendations. Patient has hx of ESRD on dialysis M-W-F, nephrology consulted.  Surgery and IR consulted for abdominal abscess seen on CT, secondary to fistulization from the sigmoid colon, underwent IR CT guided drainage of abscess with 45 cc purulent drng drained.   .    This is a 67 year old female, coming from home, with medical history of ESRD(M-W-F), HTN, HIV coming in with abdominal pain. Patient admitted to medicine CT a/p show LLQ abscess and chronic diverticular disease. Patient was started on Ceftriaxone and Flagyl and ID was consulted for recommendations. Patient has hx of ESRD on dialysis M-W-F, nephrology consulted.  Surgery and IR consulted for abdominal abscess seen on CT, secondary to fistulization from the sigmoid colon, underwent IR CT guided drainage of abscess with 45 cc purulent drng drained. Pigtail to drainage bag draining brownish drng.  Pt. is followed by surgery, kept NPO for now.

## 2022-09-29 NOTE — PROGRESS NOTE ADULT - SUBJECTIVE AND OBJECTIVE BOX
Subjective: Pt is s/p CT guided drainage of abdominal abscess, yielded about 45 cc of purulent foul smelling fluid. S/p hemodialysis yesterday. Pt states that she is feeling a bit better, no nausea or vomiting today. States she still has abdominal pain, worse on the left. Pt is kept NPO for now, per surgery. She denies fever, chills, N/V, chest pain, dysuria.     ALLERGIES oxycodone (Rash)  penicillins (Urticaria; Rash)      REVIEW OF SYSTEMS:  CONSTITUTIONAL:  + weakness. No fevers or chills  EYES/ENT:  No visual changes;  No vertigo or throat pain   NECK:  No pain or stiffness  RESPIRATORY:  No cough, wheezing, hemoptysis; No shortness of breath  CARDIOVASCULAR:  No chest pain or palpitations  GASTROINTESTINAL:  + abdominal pain. No N/V, hematemesis; No diarrhea or constipation. No melena or hematochezia.  GENITOURINARY:  No dysuria, frequency or hematuria  NEUROLOGICAL:  No numbness or tingling  MSK: no back pain, no joint pain  SKIN:  No itching, rashes    PHYSICAL EXAM:   Vital Signs Last 24 Hrs  T(C): 36.9 (29 Sep 2022 13:51), Max: 37 (28 Sep 2022 17:35)  T(F): 98.5 (29 Sep 2022 13:51), Max: 98.6 (28 Sep 2022 17:35)  HR: 82 (29 Sep 2022 13:51) (71 - 95)  BP: 130/69 (29 Sep 2022 13:51) (118/58 - 164/86)  BP(mean): 82 (28 Sep 2022 22:27) (79 - 89)  RR: 17 (29 Sep 2022 13:51) (15 - 19)  SpO2: 98% (29 Sep 2022 13:51) (96% - 100%)    Parameters below as of 29 Sep 2022 13:51  Patient On (Oxygen Delivery Method): room air      GENERAL: NAD, female laying comfortably   HEAD:  Atraumatic, Normocephalic  EYES: EOMI, PERRLA, melanocytic macular lesions in both eyes  ENT: Moist mucous membranes  NECK: Supple, No JVD  CHEST/LUNG: Clear to auscultation bilaterally; No rales, rhonchi, wheezing, or rubs. Unlabored respirations  HEART: Regular rate and rhythm; No murmurs, rubs, or gallops  ABDOMEN: BSx4; Soft, nondistended, + tenderness in LLQ and suprapubic area, + guarding, + LLQ IR drain in place  : no terry  EXTREMITIES:  + left upper arm AV fistula site with strong palpable thrill, 2+ Peripheral Pulses, brisk capillary refill. No clubbing, cyanosis, or edema  NERVOUS SYSTEM:  A&Ox3. No sensory deficits, no motor deficits  SKIN: + xerosis in B/L lower extremities. No rashes or lesions, dry and warm skin  MSK: no back pain, no joint swelling    LABS/DIAGNOSTIC TESTS:                        9.2    8.31  )-----------( 204      ( 29 Sep 2022 06:30 )             26.8     WBC Count: 8.31 K/uL (09-29 @ 06:30)  WBC Count: 11.62 K/uL (09-28 @ 17:45)  WBC Count: 11.18 K/uL (09-27 @ 05:00)  WBC Count: 10.85 K/uL (09-27 @ 00:01)    09-29    133<L>  |  96  |  21<H>  ----------------------------<  89  3.7   |  28  |  5.34<H>    Ca    9.4      29 Sep 2022 06:30  Phos  4.0     09-29  Mg     2.0     09-29    TPro  6.9  /  Alb  2.4<L>  /  TBili  0.4  /  DBili  x   /  AST  20  /  ALT  17  /  AlkPhos  91  09-29      CULTURES:   Culture - Abscess with Gram Stain (collected 09-28-22 @ 14:18)  Source: .Abscess abdominal abscess drainage  Gram Stain (09-28-22 @ 21:12):    Few polymorphonuclear leukocytes per low power field    Moderate Gram positive cocci in pairs per oil power field    Moderate Gram Positive Rods per oil power field    Few Gram Negative Rods per oil power field    RADIOLOGY:   < from: CT Aspiration Abscess Hematoma, Cyst (09.28.22 @ 14:00) >  ACC: [id number] EXAM:  CT ASP ABSC HEMATOMA CYST#                        PROCEDURE DATE:  09/28/2022    INTERPRETATION:  PROCEDURE: CT-guided drainage of abdominal abscess.  : VERÓNICA Payne MD    CLINICAL INDICATION: 67-year-old female with multiple comorbidities, who   presented with abdominal pain and was found to have an abdominal   collection concerning for abscess. Percutaneous drainage of the abscess   was requested.    ANESTHESIA: Intravenous sedation was provided by the attending   anesthesiologist. A total of 7 mL of 1% lidocaine was used for local   anesthesia.    ANTIBIOTICS: Patient's scheduled dose of metronidazole was administered   by the anesthesiologist prior to procedure.    TECHNIQUE: After discussing the risks, benefits and alternatives to this   procedure with the patient, informed consent was obtained. A timeout was   performed.    The patient was placed supine on the CT examination table and connected   to physiologic monitoring. Transaxial images ofthe abdomen were obtained   without the use of oral or intravenous contrast materials. The left lower   quadrant abdominal collection which had been seen on a recent CT scan was   again identified, and the overlying skin was prepped and draped in the  usual sterile fashion.    Using CT guidance, an 18-gauge trocar needle was advanced into the   collection using a left paramedian approach. Access into the collection   was confirmed with CT images and aspiration of 5 mL of purulent fluid   which was sent for microbiological analysis. The needle was then removed   over a wire and the tract was dilated. A 10 Swedish pigtail drainage   catheter was then advanced into the collection over the wire. An   additional 40 mL of thick, purulent foul-smelling fluid was manually   aspirated. Repeat images demonstrated good positioning of the catheter   tip within the abscess cavity with virtual complete collapse of the   cavity around the catheter tip. The catheter was secured to the patient's   skin with a silk suture, flushed with normal saline, and then connected   to a drainage bag. A dry sterile dressing was then applied.    The patient tolerated the procedure well and was transferred to the   recovery area in stable condition. There were no immediate complications.    IMPRESSION: SUCCESSFUL CT-GUIDED PERCUTANEOUS DRAINAGE OF ABDOMINAL   ABSCESS, AS DESCRIBED ABOVE.    ANTIBIOTICS:  cefTRIAXone   IVPB 2000 every 24 hours  darunavir 800 mG/cobicstat 150 mG 1 daily  dolutegravir 50 daily  lamiVUDine- daily  metroNIDAZOLE  IVPB 500 every 8 hours      IV LINES: + Right Peripheral IV, c/d/i    IMPRESSION:  67 year old female with medical history of ESRD (dialysis M-W-F), HTN, HIV (diagnosed 16 yrs ago, on Lamivudine, Prezcobix and Tivicay) coming in with abdominal pain, nausea and vomiting. CT Abdomen with IV Contrast shows abscess within the left lower quadrant small bowel wall secondary to fistulization from the sigmoid colon likely on the basis of chronic diverticular disease. Cancer tumor markers CEA, Ca 19-9,  negative. Hep B and Hep C panel negative. Elevated  and . S/p CT guided drainage, 45 cc of purulent fluid aspirated by IR, Pigtail catheter in place. Abscess culture grew few PMN leukocytes, moderate Gram positive cocci in pairs, moderate Gram Positive Rods, few Gram Negative Rods.    - Abdominal Wall Abscess secondary to fistulization from sigmoid colon, likely from chronic diverticular disease  - HIV controlled with HAART Therapy (last CD4 794, viral load 43 in 7/2022 ; on Lamivudine, Prezcobix and Tivicay)  - ESRD on dialysis      PLAN:  - s/p CT guided drainage of abdominal abscess on 9/28 with 45cc of purulent discharge, LLQ IR drain in place  - c/w IV Ceftriaxone 2g daily, and IV Flagyl 500mg q8 hours  - Continue pt's home HARRT therapy (Lamivudine, Prezcobix and Tivicay)  - F/u CD4 count and viral load  - Rest of management per primary team      Assessment and plan discussed with ID Attending, Dr. Mine Thomas. Attending attestation to follow below.  Subjective: Pt is s/p CT guided drainage of abdominal abscess, yielded about 45 cc of purulent foul smelling fluid. S/p hemodialysis yesterday. Pt states that she is feeling a bit better, no nausea or vomiting today. States she still has abdominal pain, worse on the left. Pt is kept NPO for now, per surgery. She denies fever, chills, N/V, chest pain, dysuria.     ALLERGIES oxycodone (Rash)  penicillins (Urticaria; Rash)      REVIEW OF SYSTEMS:  CONSTITUTIONAL:  + weakness. No fevers or chills  EYES/ENT:  No visual changes;  No vertigo or throat pain   NECK:  No pain or stiffness  RESPIRATORY:  No cough, wheezing, hemoptysis; No shortness of breath  CARDIOVASCULAR:  No chest pain or palpitations  GASTROINTESTINAL:  + abdominal pain. No N/V, hematemesis; No diarrhea or constipation. No melena or hematochezia.  GENITOURINARY:  No dysuria, frequency or hematuria  NEUROLOGICAL:  No numbness or tingling  MSK: no back pain, no joint pain  SKIN:  No itching, rashes    PHYSICAL EXAM:   Vital Signs Last 24 Hrs  T(C): 36.9 (29 Sep 2022 13:51), Max: 37 (28 Sep 2022 17:35)  T(F): 98.5 (29 Sep 2022 13:51), Max: 98.6 (28 Sep 2022 17:35)  HR: 82 (29 Sep 2022 13:51) (71 - 95)  BP: 130/69 (29 Sep 2022 13:51) (118/58 - 164/86)  BP(mean): 82 (28 Sep 2022 22:27) (79 - 89)  RR: 17 (29 Sep 2022 13:51) (15 - 19)  SpO2: 98% (29 Sep 2022 13:51) (96% - 100%)    Parameters below as of 29 Sep 2022 13:51  Patient On (Oxygen Delivery Method): room air      GENERAL: NAD, female laying comfortably   HEAD:  Atraumatic, Normocephalic  EYES: EOMI, PERRLA, melanocytic macular lesions in both eyes  ENT: Moist mucous membranes  NECK: Supple, No JVD  CHEST/LUNG: Clear to auscultation bilaterally; No rales, rhonchi, wheezing, or rubs. Unlabored respirations  HEART: Regular rate and rhythm; No murmurs, rubs, or gallops  ABDOMEN: BSx4; Soft, nondistended, + tenderness in LLQ and suprapubic area, + guarding, + LLQ IR drain in place  : no terry  EXTREMITIES:  + left upper arm AV fistula site with strong palpable thrill, 2+ Peripheral Pulses, brisk capillary refill. No clubbing, cyanosis, or edema  NERVOUS SYSTEM:  A&Ox3. No sensory deficits, no motor deficits  SKIN: + xerosis in B/L lower extremities. No rashes or lesions, dry and warm skin  MSK: no back pain, no joint swelling    LABS/DIAGNOSTIC TESTS:                        9.2    8.31  )-----------( 204      ( 29 Sep 2022 06:30 )             26.8     WBC Count: 8.31 K/uL (09-29 @ 06:30)  WBC Count: 11.62 K/uL (09-28 @ 17:45)  WBC Count: 11.18 K/uL (09-27 @ 05:00)  WBC Count: 10.85 K/uL (09-27 @ 00:01)    09-29    133<L>  |  96  |  21<H>  ----------------------------<  89  3.7   |  28  |  5.34<H>    Ca    9.4      29 Sep 2022 06:30  Phos  4.0     09-29  Mg     2.0     09-29    TPro  6.9  /  Alb  2.4<L>  /  TBili  0.4  /  DBili  x   /  AST  20  /  ALT  17  /  AlkPhos  91  09-29      CULTURES:   Culture - Abscess with Gram Stain (collected 09-28-22 @ 14:18)  Source: .Abscess abdominal abscess drainage  Gram Stain (09-28-22 @ 21:12):    Few polymorphonuclear leukocytes per low power field    Moderate Gram positive cocci in pairs per oil power field    Moderate Gram Positive Rods per oil power field    Few Gram Negative Rods per oil power field    RADIOLOGY:   < from: CT Aspiration Abscess Hematoma, Cyst (09.28.22 @ 14:00) >  ACC: [id number] EXAM:  CT ASP ABSC HEMATOMA CYST#                        PROCEDURE DATE:  09/28/2022    INTERPRETATION:  PROCEDURE: CT-guided drainage of abdominal abscess.  : VERÓNICA Payne MD    CLINICAL INDICATION: 67-year-old female with multiple comorbidities, who   presented with abdominal pain and was found to have an abdominal   collection concerning for abscess. Percutaneous drainage of the abscess   was requested.    ANESTHESIA: Intravenous sedation was provided by the attending   anesthesiologist. A total of 7 mL of 1% lidocaine was used for local   anesthesia.    ANTIBIOTICS: Patient's scheduled dose of metronidazole was administered   by the anesthesiologist prior to procedure.    TECHNIQUE: After discussing the risks, benefits and alternatives to this   procedure with the patient, informed consent was obtained. A timeout was   performed.    The patient was placed supine on the CT examination table and connected   to physiologic monitoring. Transaxial images ofthe abdomen were obtained   without the use of oral or intravenous contrast materials. The left lower   quadrant abdominal collection which had been seen on a recent CT scan was   again identified, and the overlying skin was prepped and draped in the  usual sterile fashion.    Using CT guidance, an 18-gauge trocar needle was advanced into the   collection using a left paramedian approach. Access into the collection   was confirmed with CT images and aspiration of 5 mL of purulent fluid   which was sent for microbiological analysis. The needle was then removed   over a wire and the tract was dilated. A 10 Sudanese pigtail drainage   catheter was then advanced into the collection over the wire. An   additional 40 mL of thick, purulent foul-smelling fluid was manually   aspirated. Repeat images demonstrated good positioning of the catheter   tip within the abscess cavity with virtual complete collapse of the   cavity around the catheter tip. The catheter was secured to the patient's   skin with a silk suture, flushed with normal saline, and then connected   to a drainage bag. A dry sterile dressing was then applied.    The patient tolerated the procedure well and was transferred to the   recovery area in stable condition. There were no immediate complications.    IMPRESSION: SUCCESSFUL CT-GUIDED PERCUTANEOUS DRAINAGE OF ABDOMINAL   ABSCESS, AS DESCRIBED ABOVE.    ANTIBIOTICS:  cefTRIAXone   IVPB 2000 every 24 hours  darunavir 800 mG/cobicstat 150 mG 1 daily  dolutegravir 50 daily  lamiVUDine- daily  metroNIDAZOLE  IVPB 500 every 8 hours      IV LINES: + Right Peripheral IV, c/d/i    IMPRESSION:  67 year old female with medical history of ESRD (dialysis M-W-F), HTN, HIV (diagnosed 16 yrs ago, on Lamivudine, Prezcobix and Tivicay) coming in with abdominal pain, nausea and vomiting. CT Abdomen with IV Contrast shows abscess within the left lower quadrant small bowel wall secondary to fistulization from the sigmoid colon likely on the basis of chronic diverticular disease. Cancer tumor markers CEA, Ca 19-9,  negative. Hep B and Hep C panel negative. Elevated  and . S/p CT guided drainage, 45 cc of purulent fluid aspirated by IR, Pigtail catheter in place. Abscess culture grew few PMN leukocytes, moderate Gram positive cocci in pairs, moderate Gram Positive Rods, few Gram Negative Rods.    - Abdominal Wall Abscess secondary to fistulization from sigmoid colon, likely from chronic diverticular disease  - HIV controlled with HAART Therapy (last CD4 794, viral load 43 in 7/2022 ; on Lamivudine, Prezcobix and Tivicay)  - ESRD on dialysis      PLAN:  - s/p CT guided drainage of abdominal abscess on 9/28 with 45cc of purulent discharge, LLQ IR drain in place  - Abscess culture grew few PMN leukocytes, moderate Gram positive cocci in pairs, moderate Gram Positive Rods, few Gram Negative Rods  - f/u final culture results and sensitivities  - c/w IV Ceftriaxone 2g daily, and IV Flagyl 500mg q8 hours  - Continue pt's home HARRT therapy (Lamivudine, Prezcobix and Tivicay)  - F/u CD4 count and viral load  - Rest of management per primary team      Assessment and plan discussed with ID Attending, Dr. Mine Thomas. Attending attestation to follow below.

## 2022-09-29 NOTE — PROGRESS NOTE ADULT - PROBLEM SELECTOR PLAN 8
S/P AVR (aortic valve replacement) NPO to be advanced to regular diet per surg reccs.  OP HD to be reinstated by SW  Abx per ID  Discharge likely back to home when medically stable.

## 2022-09-29 NOTE — PROGRESS NOTE ADULT - ASSESSMENT
ESRD , dialysis days are MWF. Post CT with angiogram on 09/27, required dialysis in 24 hours.  Underwent dialysis on 09/28    Diverticulitis with abscess placed on Ceftriaxone and Metronidazole. Post IR drain , await final culture results. ID continue to follow.    Hypertension stable.    If remain NPO will restart IV fluids 50 ml per hour.  dc sodium bicarbonate.    Follow calcium po4  Follow iron studies  Follow BP

## 2022-09-29 NOTE — PROGRESS NOTE ADULT - PROBLEM SELECTOR PLAN 1
p/w abdominal pain, admitted for mngt of Abdominal Wall Abscess   -Likely from chronic diverticular disease.  -s/p IR CT guided drainage 9/28  -Cont  IV Ceftriaxone 2g daily, and IV Flagyl 500mg q 8hours  -Surgery dr. Francisco  -ID dr. Young  -Cont NPO for now per surgery p/w abdominal pain, admitted for mngt of Abdominal Wall Abscess   -Likely from chronic diverticular disease.  -s/p IR CT guided drainage 9/28  -Cont  IV Ceftriaxone 2g daily, and IV Flagyl 500mg q 8hours  -Surgery dr. Francisco  -ID dr. Young  -Cont NPO for now per surgery  -Abscess Cx grew mod GPC in pairs, mod GPR, few GNR

## 2022-09-29 NOTE — PROGRESS NOTE ADULT - NS ATTEND AMEND GEN_ALL_CORE FT
67 year old female, coming from home, with medical history of ESRD(M-W-F), HTN, HIV coming in with abdominal pain, admitted with concern for bowel abscess with fistula    # Bowel abscess  # ESRD on HD, MWF  # HIV  # Anemia - likely 2/2 ESRD  # HTN  #Gout  #HLD  #Liver hypodensity on CT  #Pancreas cyst  CT Abdomen with IV Contrast shows abscess within the left lower quadrant small bowel wall secondary to fistulization from the sigmoid colon likely on the basis of chronic diverticular disease. Cancer tumor markers CEA, Ca 19-9,  negative.   - IR - Abscess drainage (9/28) -10 ml of foul smelling purulent discharge  - Ceftriaxone/flagyl IV, f/u cultures  -ID following, yareli recs  - f/u surgery recs - NPO, serial abdominal exams  -Pain regimen  - HD per nephro  - f/u CD4 count and viral load, Continue ART  - f/u ESR, CRP  - c/w home meds  - ID following, yareli recs  - tumor markers negative  - DVT ppx

## 2022-09-29 NOTE — PROGRESS NOTE ADULT - SUBJECTIVE AND OBJECTIVE BOX
Problem List:  ESRD  Diverticulosis, admitted for abscess in LLQ within the wall of small bowel with fistula betwwen the mass and thickened sigmoid colon with diverticula  Post HD  yesterday    Culture :  Post IR drainage of the mass that drained purulent foul material  Few polymorphonuclear leukocytes per low power field   Moderate Gram positive cocci in pairs per oil power field   Moderate Gram Positive Rods per oil power field   Few Gram Negative Rods per oil power field (09.28.22 @ 14:18)       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      ESRD on hemodialysis      History of ectopic pregnancy  Two      H/O: hysterectomy  Due to cervical cancer      S/P arteriovenous (AV) fistula creation  july 10 2020          oxycodone (Rash)  penicillins (Urticaria; Rash)      MEDICATIONS  (STANDING):  allopurinol 100 milliGRAM(s) Oral daily  amLODIPine   Tablet 10 milliGRAM(s) Oral daily  aspirin enteric coated 81 milliGRAM(s) Oral daily  atorvastatin 80 milliGRAM(s) Oral at bedtime  calcitriol   Capsule 0.25 MICROGram(s) Oral daily  calcium acetate 667 milliGRAM(s) Oral three times a day with meals  cefTRIAXone   IVPB 2000 milliGRAM(s) IV Intermittent every 24 hours  chlorhexidine 2% Cloths 1 Application(s) Topical daily  cholecalciferol 1000 Unit(s) Oral daily  citalopram 20 milliGRAM(s) Oral daily  darunavir 800 mG/cobicstat 150 mG 1 Tablet(s) Oral daily  dextrose 5% + sodium chloride 0.45%. 1000 milliLiter(s) (60 mL/Hr) IV Continuous <Continuous>  dolutegravir 50 milliGRAM(s) Oral daily  hydrALAZINE 50 milliGRAM(s) Oral three times a day  isosorbide   mononitrate ER Tablet (IMDUR) 60 milliGRAM(s) Oral daily  lamiVUDine- milliGRAM(s) Oral daily  metoprolol tartrate 100 milliGRAM(s) Oral two times a day  metroNIDAZOLE  IVPB 500 milliGRAM(s) IV Intermittent every 8 hours  omega-3-Acid Ethyl Esters 4 Gram(s) Oral daily  senna 2 Tablet(s) Oral at bedtime  sodium bicarbonate 650 milliGRAM(s) Oral two times a day  zinc sulfate 220 milliGRAM(s) Oral daily    MEDICATIONS  (PRN):  acetaminophen     Tablet .. 650 milliGRAM(s) Oral every 6 hours PRN Temp greater or equal to 38C (100.4F), Mild Pain (1 - 3)  aluminum hydroxide/magnesium hydroxide/simethicone Suspension 30 milliLiter(s) Oral every 4 hours PRN Dyspepsia  melatonin 3 milliGRAM(s) Oral at bedtime PRN Insomnia  ondansetron Injectable 4 milliGRAM(s) IV Push every 8 hours PRN Nausea and/or Vomiting                            9.2    8.31  )-----------( 204      ( 29 Sep 2022 06:30 )             26.8     09-29    133<L>  |  96  |  21<H>  ----------------------------<  89  3.7   |  28  |  5.34<H>    Ca    9.4      29 Sep 2022 06:30  Phos  4.0     09-29  Mg     2.0     09-29    TPro  6.9  /  Alb  2.4<L>  /  TBili  0.4  /  DBili  x   /  AST  20  /  ALT  17  /  AlkPhos  91  09-29            REVIEW OF SYSTEMS:  General: no fever no chills, no weight loss.    RESPIRATORY: No cough, wheezing, hemoptysis; No shortness of breath  CARDIOVASCULAR: No chest pain or palpitations. No Edema  GASTROINTESTINAL pain in left lower abdomen and middle  Constipation. NPO for the last 5 days  GENITOURINARY: No dysuria, frequency, foamy urine, urinary urgency, incontinence or hematuria  NEUROLOGICAL: No numbness or weakness, no tremor , no dizziness.   Muscle skeletal : no joint pain and no swelling of joints and limbs.  SKIN: No itching, burning, rashes.        VITALS:  T(F): 98.5 (09-29-22 @ 13:51), Max: 98.6 (09-28-22 @ 17:35)  HR: 82 (09-29-22 @ 13:51)  BP: 130/69 (09-29-22 @ 13:51)  RR: 17 (09-29-22 @ 13:51)  SpO2: 98% (09-29-22 @ 13:51)  Wt(kg): --    09-28 @ 07:01 - 09-29 @ 07:00  --------------------------------------------------------  IN: 700 mL / OUT: 2020 mL / NET: -1320 mL        PHYSICAL EXAM:  Constitutional: well developed, no diaphoresis, no distress.  Neck: No JVD, no carotid bruit, supple, no adenopathy  Respiratory:  air entrance B/L, no wheezes, rales or rhonchi  Cardiovascular: S1, S2, RRR, no pericardial rub, no murmur  Abdomen: BS+, soft, no tenderness, no bruit, pig tail drain with green fluid, small amount.  Pelvis: bladder nondistended  Extremities: No cyanosis or clubbing. No peripheral edema.     Vascular Access: left upper arm AVF with bruit and thrill.

## 2022-09-29 NOTE — PROGRESS NOTE ADULT - SUBJECTIVE AND OBJECTIVE BOX
NP Note discussed with  Primary Attending    Patient is a 67y old  Female who presents with a chief complaint of Abdominal pain (28 Sep 2022 19:47)      INTERVAL HPI/OVERNIGHT EVENTS: no new complaints    MEDICATIONS  (STANDING):  allopurinol 100 milliGRAM(s) Oral daily  amLODIPine   Tablet 10 milliGRAM(s) Oral daily  aspirin enteric coated 81 milliGRAM(s) Oral daily  atorvastatin 80 milliGRAM(s) Oral at bedtime  calcitriol   Capsule 0.25 MICROGram(s) Oral daily  calcium acetate 667 milliGRAM(s) Oral three times a day with meals  cefTRIAXone   IVPB 2000 milliGRAM(s) IV Intermittent every 24 hours  chlorhexidine 2% Cloths 1 Application(s) Topical daily  cholecalciferol 1000 Unit(s) Oral daily  citalopram 20 milliGRAM(s) Oral daily  darunavir 800 mG/cobicstat 150 mG 1 Tablet(s) Oral daily  dextrose 5% + sodium chloride 0.45%. 1000 milliLiter(s) (60 mL/Hr) IV Continuous <Continuous>  dolutegravir 50 milliGRAM(s) Oral daily  hydrALAZINE 50 milliGRAM(s) Oral three times a day  isosorbide   mononitrate ER Tablet (IMDUR) 60 milliGRAM(s) Oral daily  lamiVUDine- milliGRAM(s) Oral daily  metoprolol tartrate 100 milliGRAM(s) Oral two times a day  metroNIDAZOLE  IVPB 500 milliGRAM(s) IV Intermittent every 8 hours  omega-3-Acid Ethyl Esters 4 Gram(s) Oral daily  senna 2 Tablet(s) Oral at bedtime  sodium bicarbonate 650 milliGRAM(s) Oral two times a day  zinc sulfate 220 milliGRAM(s) Oral daily    MEDICATIONS  (PRN):  acetaminophen     Tablet .. 650 milliGRAM(s) Oral every 6 hours PRN Temp greater or equal to 38C (100.4F), Mild Pain (1 - 3)  aluminum hydroxide/magnesium hydroxide/simethicone Suspension 30 milliLiter(s) Oral every 4 hours PRN Dyspepsia  melatonin 3 milliGRAM(s) Oral at bedtime PRN Insomnia  ondansetron Injectable 4 milliGRAM(s) IV Push every 8 hours PRN Nausea and/or Vomiting      __________________________________________________  REVIEW OF SYSTEMS:    CONSTITUTIONAL: No fever,   EYES: no acute visual disturbances  NECK: No pain or stiffness  RESPIRATORY: No cough; No shortness of breath  CARDIOVASCULAR: No chest pain, no palpitations  GASTROINTESTINAL: No pain. No nausea or vomiting; No diarrhea   NEUROLOGICAL: No headache or numbness, no tremors  MUSCULOSKELETAL: No joint pain, no muscle pain  GENITOURINARY: no dysuria, no frequency, no hesitancy  PSYCHIATRY: no depression , no anxiety  ALL OTHER  ROS negative        Vital Signs Last 24 Hrs  T(C): 36.9 (29 Sep 2022 05:12), Max: 37 (28 Sep 2022 17:35)  T(F): 98.5 (29 Sep 2022 05:12), Max: 98.6 (28 Sep 2022 17:35)  HR: 86 (29 Sep 2022 06:47) (72 - 95)  BP: 143/64 (29 Sep 2022 06:47) (97/47 - 164/86)  BP(mean): 82 (28 Sep 2022 22:27) (52 - 89)  RR: 17 (29 Sep 2022 05:12) (15 - 22)  SpO2: 98% (29 Sep 2022 05:12) (96% - 100%)    Parameters below as of 29 Sep 2022 05:12  Patient On (Oxygen Delivery Method): room air        ________________________________________________  PHYSICAL EXAM:  GENERAL: NAD  HEENT: Normocephalic;  conjunctivae and sclerae clear; moist mucous membranes;   NECK : supple  CHEST/LUNG: Clear to auscultation bilaterally with good air entry   HEART: S1 S2  regular; no murmurs, gallops or rubs  ABDOMEN: Soft, Nontender, Nondistended; Bowel sounds present  EXTREMITIES: no cyanosis; no edema; no calf tenderness  SKIN: warm and dry; no rash  NERVOUS SYSTEM:  Awake and alert; Oriented  to place, person and time ; no new deficits    _________________________________________________  LABS:                        9.2    8.31  )-----------( 204      ( 29 Sep 2022 06:30 )             26.8     09-29    133<L>  |  96  |  21<H>  ----------------------------<  89  3.7   |  28  |  5.34<H>    Ca    9.4      29 Sep 2022 06:30  Phos  4.0     09-29  Mg     2.0     09-29    TPro  6.9  /  Alb  2.4<L>  /  TBili  0.4  /  DBili  x   /  AST  20  /  ALT  17  /  AlkPhos  91  09-29        CAPILLARY BLOOD GLUCOSE      POCT Blood Glucose.: 97 mg/dL (29 Sep 2022 06:32)  POCT Blood Glucose.: 102 mg/dL (28 Sep 2022 14:35)    RADIOLOGY & ADDITIONAL TESTS:    < from: CT Abdomen and Pelvis w/ IV Cont (09.27.22 @ 06:30) >    ACC: [id number] EXAM:  CT ABDOMEN AND PELVIS IC                          PROCEDURE DATE:  09/27/2022          INTERPRETATION:  CLINICAL INFORMATION: Abdominal pain question abscess    COMPARISON: CT abdomen pelvis 9/27/2022 earlier the same day.    CONTRAST/COMPLICATIONS:  IV Contrast: Omnipaque 350 90 cc administered  90 cc administered   10 cc   discarded  Oral Contrast: None  Complications: None    PROCEDURE:  CT of the Abdomen and Pelvis was performed.  Sagittal and coronal reformats were performed.    FINDINGS:  LOWER CHEST: Probable postsurgical changes in the left lower lung. 1.6 cm   left lower lobe thin-walled cyst.    LIVER: 1.3 cm right lobe hypodensity with suggestion of peripheral early   nodular enhancement (4:61). This may represent a hemangioma. This could   be confirmed with MRI.  BILE DUCTS: Normal caliber.  GALLBLADDER: Within normal limits.  SPLEEN: Within normal limits.  PANCREAS: 1.4 cm cyst in uncinate process or a focally dilated duct. This   would be better evaluated with dedicated MRI.  ADRENALS: Within normal limits.  KIDNEYS/URETERS: Bilateral polycystic kidneys    BLADDER: Within normal limits.  REPRODUCTIVE ORGANS: Hysterectomy.    BOWEL: Moderate sized hiatal hernia. Normal appendix.    Oral contrast is again seen within mildly prominent loops of proximal   small bowel measuring up to 2.7 cm in diameter to a mass in the left   lower quadrant. This is unchanged. The mass measures 5.8 x 5.1 cm and   contains fluid and air. This appears to be within thewall of the small   bowel. A thin column of oral contrast passes within the lumen of the   small bowel distally into collapsed loops of small bowel. There is an   apparent fistula between this mass in the adjacent markedly thickened   sigmoid colon which contains numerous diverticula. There are inflammatory   changes in the adjacent fat. This likely represents sigmoid   diverticulitis with small bowel fistulization to the raw within adjacent   loop of small bowel in a contained abscess.      PERITONEUM: No ascites.  VESSELS: Atherosclerotic changes.  RETROPERITONEUM/LYMPH NODES: No lymphadenopathy.  ABDOMINAL WALL: Small fat-containing umbilical hernia.  BONES: Within normal limits.    IMPRESSION:    Abscess within the left lower quadrant small bowel wall secondary to   fistulization from the sigmoid colon likely on the basis of chronic   diverticular disease.    1.3 cm right lobe hypodensity with suggestion of peripheral early nodular   enhancement. This may represent a hemangioma. This could be confirmed   with MRI.    1.4 cm cyst in uncinate process or a focally dilated duct. This would be   better evaluated with dedicated MRI.    Probable probably cystic kidneys. Correlate clinically for kidney disease.      --- End of Report ---    < end of copied text >      < from: CT Aspiration Abscess Hematoma, Cyst (09.28.22 @ 14:00) >    ACC: [id number] EXAM:  CT ASP ABSC HEMATOMA CYST#                          PROCEDURE DATE:  09/28/2022          INTERPRETATION:  PROCEDURE: CT-guided drainage of abdominal abscess.    : VERÓNICA Payne MD    CLINICAL INDICATION: 67-year-old female with multiple comorbidities, who   presented with abdominal pain and was found to have an abdominal   collection concerning for abscess. Percutaneous drainage of the abscess   was requested.    ANESTHESIA: Intravenous sedation was provided by the attending   anesthesiologist. A total of 7 mL of 1% lidocaine was used for local   anesthesia.    ANTIBIOTICS: Patient's scheduled dose of metronidazole was administered   by the anesthesiologist prior to procedure.    TECHNIQUE: After discussing the risks, benefits and alternatives to this   procedure with the patient, informed consent was obtained. A timeout was   performed.    The patient was placed supine on the CT examination table and connected   to physiologic monitoring. Transaxial images ofthe abdomen were obtained   without the use of oral or intravenous contrast materials. The left lower   quadrant abdominal collection which had been seen on a recent CT scan was   again identified, and the overlying skin was prepped and draped in the  usual sterile fashion.    Using CT guidance, an 18-gauge trocar needle was advanced into the   collection using a left paramedian approach. Access into the collection   was confirmed with CT images and aspiration of 5 mL of purulent fluid   which was sent for microbiological analysis. The needle was then removed   over a wire and the tract was dilated. A 10 [place] pigtail drainage   catheter was then advanced into the collection over the wire. An   additional 40 mL of thick, purulent foul-smelling fluid was manually   aspirated. Repeat images demonstrated good positioning of the catheter   tip within the abscess cavity with virtual complete collapse of the   cavity around the catheter tip. The catheter was secured to the patient's   skin with a silk suture, flushed with normal saline, and then connected   to a drainage bag. A dry sterile dressing was then applied.    The patient tolerated the procedure well and was transferred to the   recovery area in stable condition. There were no immediate complications.    IMPRESSION: SUCCESSFUL CT-GUIDED PERCUTANEOUS DRAINAGE OF ABDOMINAL   ABSCESS, AS DESCRIBED ABOVE.    < end of copied text >      Imaging Personally Reviewed:  YES/NO    Consultant(s) Notes Reviewed:   YES/ No    Care Discussed with Consultants :     Plan of care was discussed with patient and /or primary care giver; all questions and concerns were addressed and care was aligned with patient's wishes.     NP Note discussed with  Primary Attending    Patient is a 67y old  Female who presents with a chief complaint of Abdominal pain (28 Sep 2022 19:47)      INTERVAL HPI/OVERNIGHT EVENTS: no new complaints    MEDICATIONS  (STANDING):  allopurinol 100 milliGRAM(s) Oral daily  amLODIPine   Tablet 10 milliGRAM(s) Oral daily  aspirin enteric coated 81 milliGRAM(s) Oral daily  atorvastatin 80 milliGRAM(s) Oral at bedtime  calcitriol   Capsule 0.25 MICROGram(s) Oral daily  calcium acetate 667 milliGRAM(s) Oral three times a day with meals  cefTRIAXone   IVPB 2000 milliGRAM(s) IV Intermittent every 24 hours  chlorhexidine 2% Cloths 1 Application(s) Topical daily  cholecalciferol 1000 Unit(s) Oral daily  citalopram 20 milliGRAM(s) Oral daily  darunavir 800 mG/cobicstat 150 mG 1 Tablet(s) Oral daily  dextrose 5% + sodium chloride 0.45%. 1000 milliLiter(s) (60 mL/Hr) IV Continuous <Continuous>  dolutegravir 50 milliGRAM(s) Oral daily  hydrALAZINE 50 milliGRAM(s) Oral three times a day  isosorbide   mononitrate ER Tablet (IMDUR) 60 milliGRAM(s) Oral daily  lamiVUDine- milliGRAM(s) Oral daily  metoprolol tartrate 100 milliGRAM(s) Oral two times a day  metroNIDAZOLE  IVPB 500 milliGRAM(s) IV Intermittent every 8 hours  omega-3-Acid Ethyl Esters 4 Gram(s) Oral daily  senna 2 Tablet(s) Oral at bedtime  sodium bicarbonate 650 milliGRAM(s) Oral two times a day  zinc sulfate 220 milliGRAM(s) Oral daily    MEDICATIONS  (PRN):  acetaminophen     Tablet .. 650 milliGRAM(s) Oral every 6 hours PRN Temp greater or equal to 38C (100.4F), Mild Pain (1 - 3)  aluminum hydroxide/magnesium hydroxide/simethicone Suspension 30 milliLiter(s) Oral every 4 hours PRN Dyspepsia  melatonin 3 milliGRAM(s) Oral at bedtime PRN Insomnia  ondansetron Injectable 4 milliGRAM(s) IV Push every 8 hours PRN Nausea and/or Vomiting      __________________________________________________  REVIEW OF SYSTEMS:      CONSTITUTIONAL: No fever,   EYES: no acute visual disturbances  NECK: No pain or stiffness  RESPIRATORY: No cough; No shortness of breath  CARDIOVASCULAR: No chest pain, no palpitations  GASTROINTESTINAL: No pain. No nausea or vomiting; No diarrhea   NEUROLOGICAL: No headache or numbness, no tremors  MUSCULOSKELETAL: No joint pain, no muscle pain  GENITOURINARY: no dysuria, no frequency, no hesitancy  PSYCHIATRY: no depression , no anxiety  ALL OTHER  ROS negative        Vital Signs Last 24 Hrs  T(C): 36.9 (29 Sep 2022 05:12), Max: 37 (28 Sep 2022 17:35)  T(F): 98.5 (29 Sep 2022 05:12), Max: 98.6 (28 Sep 2022 17:35)  HR: 86 (29 Sep 2022 06:47) (72 - 95)  BP: 143/64 (29 Sep 2022 06:47) (97/47 - 164/86)  BP(mean): 82 (28 Sep 2022 22:27) (52 - 89)  RR: 17 (29 Sep 2022 05:12) (15 - 22)  SpO2: 98% (29 Sep 2022 05:12) (96% - 100%)    Parameters below as of 29 Sep 2022 05:12  Patient On (Oxygen Delivery Method): room air        ________________________________________________  PHYSICAL EXAM:  comfortable, pleasant  GENERAL: NAD  HEENT: Normocephalic;  conjunctivae and sclerae clear; moist mucous membranes;   NECK : supple  CHEST/LUNG: Clear to auscultation bilaterally with good air entry   HEART: S1 S2  regular; no murmurs, gallops or rubs  ABDOMEN: Pigtail in place draining brownish drng, Soft, Nontender, Nondistended; Bowel sounds present  EXTREMITIES: no cyanosis; no edema; no calf tenderness  SKIN: warm and dry; no rash  NERVOUS SYSTEM:  Awake and alert; Oriented  to place, person and time ; no new deficits    _________________________________________________  LABS:                        9.2    8.31  )-----------( 204      ( 29 Sep 2022 06:30 )             26.8     09-29    133<L>  |  96  |  21<H>  ----------------------------<  89  3.7   |  28  |  5.34<H>    Ca    9.4      29 Sep 2022 06:30  Phos  4.0     09-29  Mg     2.0     09-29    TPro  6.9  /  Alb  2.4<L>  /  TBili  0.4  /  DBili  x   /  AST  20  /  ALT  17  /  AlkPhos  91  09-29        CAPILLARY BLOOD GLUCOSE      POCT Blood Glucose.: 97 mg/dL (29 Sep 2022 06:32)  POCT Blood Glucose.: 102 mg/dL (28 Sep 2022 14:35)    RADIOLOGY & ADDITIONAL TESTS:    < from: CT Abdomen and Pelvis w/ IV Cont (09.27.22 @ 06:30) >    ACC: [id number] EXAM:  CT ABDOMEN AND PELVIS IC                          PROCEDURE DATE:  09/27/2022          INTERPRETATION:  CLINICAL INFORMATION: Abdominal pain question abscess    COMPARISON: CT abdomen pelvis 9/27/2022 earlier the same day.    CONTRAST/COMPLICATIONS:  IV Contrast: Omnipaque 350 90 cc administered  90 cc administered   10 cc   discarded  Oral Contrast: None  Complications: None    PROCEDURE:  CT of the Abdomen and Pelvis was performed.  Sagittal and coronal reformats were performed.    FINDINGS:  LOWER CHEST: Probable postsurgical changes in the left lower lung. 1.6 cm   left lower lobe thin-walled cyst.    LIVER: 1.3 cm right lobe hypodensity with suggestion of peripheral early   nodular enhancement (4:61). This may represent a hemangioma. This could   be confirmed with MRI.  BILE DUCTS: Normal caliber.  GALLBLADDER: Within normal limits.  SPLEEN: Within normal limits.  PANCREAS: 1.4 cm cyst in uncinate process or a focally dilated duct. This   would be better evaluated with dedicated MRI.  ADRENALS: Within normal limits.  KIDNEYS/URETERS: Bilateral polycystic kidneys    BLADDER: Within normal limits.  REPRODUCTIVE ORGANS: Hysterectomy.    BOWEL: Moderate sized hiatal hernia. Normal appendix.    Oral contrast is again seen within mildly prominent loops of proximal   small bowel measuring up to 2.7 cm in diameter to a mass in the left   lower quadrant. This is unchanged. The mass measures 5.8 x 5.1 cm and   contains fluid and air. This appears to be within thewall of the small   bowel. A thin column of oral contrast passes within the lumen of the   small bowel distally into collapsed loops of small bowel. There is an   apparent fistula between this mass in the adjacent markedly thickened   sigmoid colon which contains numerous diverticula. There are inflammatory   changes in the adjacent fat. This likely represents sigmoid   diverticulitis with small bowel fistulization to the raw within adjacent   loop of small bowel in a contained abscess.      PERITONEUM: No ascites.  VESSELS: Atherosclerotic changes.  RETROPERITONEUM/LYMPH NODES: No lymphadenopathy.  ABDOMINAL WALL: Small fat-containing umbilical hernia.  BONES: Within normal limits.    IMPRESSION:    Abscess within the left lower quadrant small bowel wall secondary to   fistulization from the sigmoid colon likely on the basis of chronic   diverticular disease.    1.3 cm right lobe hypodensity with suggestion of peripheral early nodular   enhancement. This may represent a hemangioma. This could be confirmed   with MRI.    1.4 cm cyst in uncinate process or a focally dilated duct. This would be   better evaluated with dedicated MRI.    Probable probably cystic kidneys. Correlate clinically for kidney disease.      --- End of Report ---    < end of copied text >      < from: CT Aspiration Abscess Hematoma, Cyst (09.28.22 @ 14:00) >    ACC: [id number] EXAM:  CT ASP ABSC HEMATOMA CYST#                          PROCEDURE DATE:  09/28/2022          INTERPRETATION:  PROCEDURE: CT-guided drainage of abdominal abscess.    : VERÓNICA Payne MD    CLINICAL INDICATION: 67-year-old female with multiple comorbidities, who   presented with abdominal pain and was found to have an abdominal   collection concerning for abscess. Percutaneous drainage of the abscess   was requested.    ANESTHESIA: Intravenous sedation was provided by the attending   anesthesiologist. A total of 7 mL of 1% lidocaine was used for local   anesthesia.    ANTIBIOTICS: Patient's scheduled dose of metronidazole was administered   by the anesthesiologist prior to procedure.    TECHNIQUE: After discussing the risks, benefits and alternatives to this   procedure with the patient, informed consent was obtained. A timeout was   performed.    The patient was placed supine on the CT examination table and connected   to physiologic monitoring. Transaxial images ofthe abdomen were obtained   without the use of oral or intravenous contrast materials. The left lower   quadrant abdominal collection which had been seen on a recent CT scan was   again identified, and the overlying skin was prepped and draped in the  usual sterile fashion.    Using CT guidance, an 18-gauge trocar needle was advanced into the   collection using a left paramedian approach. Access into the collection   was confirmed with CT images and aspiration of 5 mL of purulent fluid   which was sent for microbiological analysis. The needle was then removed   over a wire and the tract was dilated. A 10 [place] pigtail drainage   catheter was then advanced into the collection over the wire. An   additional 40 mL of thick, purulent foul-smelling fluid was manually   aspirated. Repeat images demonstrated good positioning of the catheter   tip within the abscess cavity with virtual complete collapse of the   cavity around the catheter tip. The catheter was secured to the patient's   skin with a silk suture, flushed with normal saline, and then connected   to a drainage bag. A dry sterile dressing was then applied.    The patient tolerated the procedure well and was transferred to the   recovery area in stable condition. There were no immediate complications.    IMPRESSION: SUCCESSFUL CT-GUIDED PERCUTANEOUS DRAINAGE OF ABDOMINAL   ABSCESS, AS DESCRIBED ABOVE.    < end of copied text >      Imaging Personally Reviewed:  YES/NO    Consultant(s) Notes Reviewed:   YES/ No    Care Discussed with Consultants :     Plan of care was discussed with patient and /or primary care giver; all questions and concerns were addressed and care was aligned with patient's wishes.

## 2022-09-29 NOTE — PROGRESS NOTE ADULT - SUBJECTIVE AND OBJECTIVE BOX
INTERVAL HPI/OVERNIGHT EVENTS:  Patient seen and examined at bedside.    Pt resting comfortably. No acute complaints.  Admits to having abdominal pain in the LLQ that has improved since the IR drain was placed yesterday.   Patient is currently NPO, denies N/V.   States that she had a small liquid BM this morning.   Patient denies chest pain, shortness of breath, fever, chills or any other constitutional symptoms.     She states that she had a colonoscopy over 10 years ago which was wnl.   She denies any family history of colon pathology.       MEDICATIONS  (STANDING):  allopurinol 100 milliGRAM(s) Oral daily  amLODIPine   Tablet 10 milliGRAM(s) Oral daily  aspirin enteric coated 81 milliGRAM(s) Oral daily  atorvastatin 80 milliGRAM(s) Oral at bedtime  calcitriol   Capsule 0.25 MICROGram(s) Oral daily  calcium acetate 667 milliGRAM(s) Oral three times a day with meals  cefTRIAXone   IVPB 2000 milliGRAM(s) IV Intermittent every 24 hours  chlorhexidine 2% Cloths 1 Application(s) Topical daily  cholecalciferol 1000 Unit(s) Oral daily  citalopram 20 milliGRAM(s) Oral daily  darunavir 800 mG/cobicstat 150 mG 1 Tablet(s) Oral daily  dextrose 5% + sodium chloride 0.45%. 1000 milliLiter(s) (60 mL/Hr) IV Continuous <Continuous>  dolutegravir 50 milliGRAM(s) Oral daily  hydrALAZINE 50 milliGRAM(s) Oral three times a day  isosorbide   mononitrate ER Tablet (IMDUR) 60 milliGRAM(s) Oral daily  lamiVUDine- milliGRAM(s) Oral daily  metoprolol tartrate 100 milliGRAM(s) Oral two times a day  metroNIDAZOLE  IVPB 500 milliGRAM(s) IV Intermittent every 8 hours  omega-3-Acid Ethyl Esters 4 Gram(s) Oral daily  senna 2 Tablet(s) Oral at bedtime  sodium bicarbonate 650 milliGRAM(s) Oral two times a day  zinc sulfate 220 milliGRAM(s) Oral daily    MEDICATIONS  (PRN):  acetaminophen     Tablet .. 650 milliGRAM(s) Oral every 6 hours PRN Temp greater or equal to 38C (100.4F), Mild Pain (1 - 3)  aluminum hydroxide/magnesium hydroxide/simethicone Suspension 30 milliLiter(s) Oral every 4 hours PRN Dyspepsia  melatonin 3 milliGRAM(s) Oral at bedtime PRN Insomnia  ondansetron Injectable 4 milliGRAM(s) IV Push every 8 hours PRN Nausea and/or Vomiting      Vital Signs Last 24 Hrs  T(C): 36.9 (29 Sep 2022 05:12), Max: 37 (28 Sep 2022 17:35)  T(F): 98.5 (29 Sep 2022 05:12), Max: 98.6 (28 Sep 2022 17:35)  HR: 86 (29 Sep 2022 06:47) (72 - 95)  BP: 143/64 (29 Sep 2022 06:47) (97/47 - 164/86)  BP(mean): 82 (28 Sep 2022 22:27) (52 - 89)  RR: 17 (29 Sep 2022 05:12) (15 - 22)  SpO2: 98% (29 Sep 2022 05:12) (96% - 100%)    Parameters below as of 29 Sep 2022 05:12  Patient On (Oxygen Delivery Method): room air        Physical:  General: A&Ox3. NAD.  Respiratory: non labored  Skin: warm and dry  Abdomen: Soft, nondistended, tender to palpation in the LLQ and LUQ, no rebound tenderness, LLQ IR drain in place   I&O's Detail    28 Sep 2022 07:01  -  29 Sep 2022 07:00  --------------------------------------------------------  IN:    Other (mL): 600 mL    Sodium Chloride 0.9% Bolus: 100 mL  Total IN: 700 mL    OUT:    Drain (mL): 20 mL    Other (mL): 2000 mL  Total OUT: 2020 mL    Total NET: -1320 mL          LABS:                        9.2    8.31  )-----------( 204      ( 29 Sep 2022 06:30 )             26.8             09-29    133<L>  |  96  |  21<H>  ----------------------------<  89  3.7   |  28  |  5.34<H>    Ca    9.4      29 Sep 2022 06:30  Phos  4.0     09-29  Mg     2.0     09-29    TPro  6.9  /  Alb  2.4<L>  /  TBili  0.4  /  DBili  x   /  AST  20  /  ALT  17  /  AlkPhos  91  09-29             INTERVAL HPI/OVERNIGHT EVENTS:  Patient seen and examined at bedside.    Pt resting comfortably. No acute complaints.  Admits to having abdominal pain in the LLQ that has improved since the IR drain was placed yesterday.   Patient is currently NPO, denies N/V.   States that she had a small liquid BM this morning.   Patient denies chest pain, shortness of breath, fever, chills or any other constitutional symptoms.     She states that she had a colonoscopy over 10 years ago which was wnl.   She denies any family history of colon pathology.       MEDICATIONS  (STANDING):  allopurinol 100 milliGRAM(s) Oral daily  amLODIPine   Tablet 10 milliGRAM(s) Oral daily  aspirin enteric coated 81 milliGRAM(s) Oral daily  atorvastatin 80 milliGRAM(s) Oral at bedtime  calcitriol   Capsule 0.25 MICROGram(s) Oral daily  calcium acetate 667 milliGRAM(s) Oral three times a day with meals  cefTRIAXone   IVPB 2000 milliGRAM(s) IV Intermittent every 24 hours  chlorhexidine 2% Cloths 1 Application(s) Topical daily  cholecalciferol 1000 Unit(s) Oral daily  citalopram 20 milliGRAM(s) Oral daily  darunavir 800 mG/cobicstat 150 mG 1 Tablet(s) Oral daily  dextrose 5% + sodium chloride 0.45%. 1000 milliLiter(s) (60 mL/Hr) IV Continuous <Continuous>  dolutegravir 50 milliGRAM(s) Oral daily  hydrALAZINE 50 milliGRAM(s) Oral three times a day  isosorbide   mononitrate ER Tablet (IMDUR) 60 milliGRAM(s) Oral daily  lamiVUDine- milliGRAM(s) Oral daily  metoprolol tartrate 100 milliGRAM(s) Oral two times a day  metroNIDAZOLE  IVPB 500 milliGRAM(s) IV Intermittent every 8 hours  omega-3-Acid Ethyl Esters 4 Gram(s) Oral daily  senna 2 Tablet(s) Oral at bedtime  sodium bicarbonate 650 milliGRAM(s) Oral two times a day  zinc sulfate 220 milliGRAM(s) Oral daily    MEDICATIONS  (PRN):  acetaminophen     Tablet .. 650 milliGRAM(s) Oral every 6 hours PRN Temp greater or equal to 38C (100.4F), Mild Pain (1 - 3)  aluminum hydroxide/magnesium hydroxide/simethicone Suspension 30 milliLiter(s) Oral every 4 hours PRN Dyspepsia  melatonin 3 milliGRAM(s) Oral at bedtime PRN Insomnia  ondansetron Injectable 4 milliGRAM(s) IV Push every 8 hours PRN Nausea and/or Vomiting      Vital Signs Last 24 Hrs  T(C): 36.9 (29 Sep 2022 05:12), Max: 37 (28 Sep 2022 17:35)  T(F): 98.5 (29 Sep 2022 05:12), Max: 98.6 (28 Sep 2022 17:35)  HR: 86 (29 Sep 2022 06:47) (72 - 95)  BP: 143/64 (29 Sep 2022 06:47) (97/47 - 164/86)  BP(mean): 82 (28 Sep 2022 22:27) (52 - 89)  RR: 17 (29 Sep 2022 05:12) (15 - 22)  SpO2: 98% (29 Sep 2022 05:12) (96% - 100%)    Parameters below as of 29 Sep 2022 05:12  Patient On (Oxygen Delivery Method): room air        Physical:  General: A&Ox3. NAD.  Respiratory: non labored  Skin: warm and dry  Abdomen: Soft, nondistended, tender to palpation in the LLQ and LUQ, no rebound tenderness, LLQ IR drain in place   I&O's Detail    28 Sep 2022 07:01  -  29 Sep 2022 07:00  --------------------------------------------------------  IN:    Other (mL): 600 mL    Sodium Chloride 0.9% Bolus: 100 mL  Total IN: 700 mL    OUT:    Drain (mL): 20 mL    Other (mL): 2000 mL  Total OUT: 2020 mL    Total NET: -1320 mL          LABS:                        9.2    8.31  )-----------( 204      ( 29 Sep 2022 06:30 )             26.8             09-29    133<L>  |  96  |  21<H>  ----------------------------<  89  3.7   |  28  |  5.34<H>    Ca    9.4      29 Sep 2022 06:30  Phos  4.0     09-29  Mg     2.0     09-29    TPro  6.9  /  Alb  2.4<L>  /  TBili  0.4  /  DBili  x   /  AST  20  /  ALT  17  /  AlkPhos  91  09-29      Vital Signs Last 24 Hrs  T(C): 36.9 (29 Sep 2022 05:12), Max: 37 (28 Sep 2022 17:35)  T(F): 98.5 (29 Sep 2022 05:12), Max: 98.6 (28 Sep 2022 17:35)  HR: 86 (29 Sep 2022 06:47) (72 - 95)  BP: 143/64 (29 Sep 2022 06:47) (97/47 - 164/86)  BP(mean): 82 (28 Sep 2022 22:27) (52 - 89)  RR: 17 (29 Sep 2022 05:12) (15 - 22)  SpO2: 98% (29 Sep 2022 05:12) (96% - 100%)    Parameters below as of 29 Sep 2022 05:12  Patient On (Oxygen Delivery Method): room air          I&O's Detail    28 Sep 2022 07:01  -  29 Sep 2022 07:00  --------------------------------------------------------  IN:    Other (mL): 600 mL    Sodium Chloride 0.9% Bolus: 100 mL  Total IN: 700 mL    OUT:    Drain (mL): 20 mL    Other (mL): 2000 mL  Total OUT: 2020 mL    Total NET: -1320 mL    Cancer Antigen, GI Ca 19-9 (09.27.22 @ 05:00)    Cancer Antigen, GI Ca 19-9: <2: Test Repeated  Method: Roche Electrochemiluminescence Immuno Assay  Values obtained with different assay methods or kits cannot be  used interchangeably.  Results cannot be interpreted as absolute evidence of the presence  or absence of malignant disease. U/mL    Carcinoembryonic Antigen (09.27.22 @ 05:00)    Carcinoembryonic Antigen: 3.6: Method: Roche Electrochemiluminescence Immuno Assay  Values obtained with different assay methods or kits cannot be  used interchangeably.  Results cannot be interpreted as absolute evidence of the presence  or absence of malignant disease.   CEA Normal Ranges   _________________   Non-smoker: less than 3.9 ng/mL       Smoker: less than 5.5 ng/mL ng/mL            Cancer Antigen, 125 (09.27.22 @ 05:00)    Cancer Antigen, 125: 10: Method: Roche Electrochemiluminescence Immuno Assay  Values obtained with different assay methods or kits cannot be  used interchangeably.  Results cannot be interpreted as absolute evidence of the presence  or absence of malignant disease. U/mL

## 2022-09-29 NOTE — PROGRESS NOTE ADULT - ATTENDING COMMENTS
66 yo female from [facility] with H/O ESRD on HD(MWF) via L arm AVF, HIV on ART diagnosed 16 yrs ago (Tivicay + Lamivudine + Prezcobix) admitted for LLQ abscess.   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  S/P IR drainage of the abscess 9/28 with 10 ml of foul smelling purulent discharge, has drain in with dark, purulent drainage    Continue CXT 2g IV daily + Flagyl 500 mg q/8hrs  Continue ART (Tivicay(50 mg) +Prezcovix(800/150 mg) + Lamivudine (100 mg) daily  F/up cultures  Trend ESR and CRP  F/up surgery  Discussed with medicine attending    Please reach ID with any questions or concerns  Dr. Mine Garnett  Tel. [phone]  Available in Teams

## 2022-09-30 LAB
-  AMIKACIN: SIGNIFICANT CHANGE UP
-  AMOXICILLIN/CLAVULANIC ACID: SIGNIFICANT CHANGE UP
-  AMPICILLIN/SULBACTAM: SIGNIFICANT CHANGE UP
-  AMPICILLIN: SIGNIFICANT CHANGE UP
-  AZTREONAM: SIGNIFICANT CHANGE UP
-  CEFAZOLIN: SIGNIFICANT CHANGE UP
-  CEFEPIME: SIGNIFICANT CHANGE UP
-  CEFOXITIN: SIGNIFICANT CHANGE UP
-  CEFTRIAXONE: SIGNIFICANT CHANGE UP
-  CIPROFLOXACIN: SIGNIFICANT CHANGE UP
-  ERTAPENEM: SIGNIFICANT CHANGE UP
-  GENTAMICIN: SIGNIFICANT CHANGE UP
-  IMIPENEM: SIGNIFICANT CHANGE UP
-  LEVOFLOXACIN: SIGNIFICANT CHANGE UP
-  MEROPENEM: SIGNIFICANT CHANGE UP
-  PIPERACILLIN/TAZOBACTAM: SIGNIFICANT CHANGE UP
-  TOBRAMYCIN: SIGNIFICANT CHANGE UP
-  TRIMETHOPRIM/SULFAMETHOXAZOLE: SIGNIFICANT CHANGE UP
ALBUMIN SERPL ELPH-MCNC: 2.3 G/DL — LOW (ref 3.5–5)
ALP SERPL-CCNC: 90 U/L — SIGNIFICANT CHANGE UP (ref 40–120)
ALT FLD-CCNC: 14 U/L DA — SIGNIFICANT CHANGE UP (ref 10–60)
ANION GAP SERPL CALC-SCNC: 10 MMOL/L — SIGNIFICANT CHANGE UP (ref 5–17)
AST SERPL-CCNC: 19 U/L — SIGNIFICANT CHANGE UP (ref 10–40)
BILIRUB SERPL-MCNC: 0.3 MG/DL — SIGNIFICANT CHANGE UP (ref 0.2–1.2)
BUN SERPL-MCNC: 29 MG/DL — HIGH (ref 7–18)
CALCIUM SERPL-MCNC: 9.4 MG/DL — SIGNIFICANT CHANGE UP (ref 8.4–10.5)
CHLORIDE SERPL-SCNC: 96 MMOL/L — SIGNIFICANT CHANGE UP (ref 96–108)
CO2 SERPL-SCNC: 28 MMOL/L — SIGNIFICANT CHANGE UP (ref 22–31)
CREAT SERPL-MCNC: 7.25 MG/DL — HIGH (ref 0.5–1.3)
CRP SERPL-MCNC: 140 MG/L — HIGH
CULTURE RESULTS: SIGNIFICANT CHANGE UP
EGFR: 6 ML/MIN/1.73M2 — LOW
ERYTHROCYTE [SEDIMENTATION RATE] IN BLOOD: 85 MM/HR — HIGH (ref 0–20)
GLUCOSE SERPL-MCNC: 111 MG/DL — HIGH (ref 70–99)
HBV SURFACE AB SER-ACNC: SIGNIFICANT CHANGE UP
HBV SURFACE AB SER-ACNC: SIGNIFICANT CHANGE UP
HCT VFR BLD CALC: 25.5 % — LOW (ref 34.5–45)
HGB BLD-MCNC: 8.5 G/DL — LOW (ref 11.5–15.5)
HIV-1 VIRAL LOAD RESULT: ABNORMAL
HIV1 RNA # SERPL NAA+PROBE: ABNORMAL COPIES/ML
HIV1 RNA SER-IMP: SIGNIFICANT CHANGE UP
HIV1 RNA SERPL NAA+PROBE-ACNC: ABNORMAL
HIV1 RNA SERPL NAA+PROBE-LOG#: ABNORMAL LG COP/ML
MCHC RBC-ENTMCNC: 32 PG — SIGNIFICANT CHANGE UP (ref 27–34)
MCHC RBC-ENTMCNC: 33.3 GM/DL — SIGNIFICANT CHANGE UP (ref 32–36)
MCV RBC AUTO: 95.9 FL — SIGNIFICANT CHANGE UP (ref 80–100)
METHOD TYPE: SIGNIFICANT CHANGE UP
NRBC # BLD: 0 /100 WBCS — SIGNIFICANT CHANGE UP (ref 0–0)
ORGANISM # SPEC MICROSCOPIC CNT: SIGNIFICANT CHANGE UP
PLATELET # BLD AUTO: 196 K/UL — SIGNIFICANT CHANGE UP (ref 150–400)
POTASSIUM SERPL-MCNC: 3.7 MMOL/L — SIGNIFICANT CHANGE UP (ref 3.5–5.3)
POTASSIUM SERPL-SCNC: 3.7 MMOL/L — SIGNIFICANT CHANGE UP (ref 3.5–5.3)
PROT SERPL-MCNC: 6.5 G/DL — SIGNIFICANT CHANGE UP (ref 6–8.3)
RBC # BLD: 2.66 M/UL — LOW (ref 3.8–5.2)
RBC # FLD: 15.8 % — HIGH (ref 10.3–14.5)
SODIUM SERPL-SCNC: 134 MMOL/L — LOW (ref 135–145)
SPECIMEN SOURCE: SIGNIFICANT CHANGE UP
WBC # BLD: 7.67 K/UL — SIGNIFICANT CHANGE UP (ref 3.8–10.5)
WBC # FLD AUTO: 7.67 K/UL — SIGNIFICANT CHANGE UP (ref 3.8–10.5)

## 2022-09-30 PROCEDURE — 99233 SBSQ HOSP IP/OBS HIGH 50: CPT

## 2022-09-30 RX ORDER — ERYTHROPOIETIN 10000 [IU]/ML
4000 INJECTION, SOLUTION INTRAVENOUS; SUBCUTANEOUS
Refills: 0 | Status: DISCONTINUED | OUTPATIENT
Start: 2022-09-30 | End: 2022-10-10

## 2022-09-30 RX ADMIN — Medication 100 MILLIGRAM(S): at 11:49

## 2022-09-30 RX ADMIN — Medication 1000 UNIT(S): at 11:49

## 2022-09-30 RX ADMIN — CEFTRIAXONE 100 MILLIGRAM(S): 500 INJECTION, POWDER, FOR SOLUTION INTRAMUSCULAR; INTRAVENOUS at 18:16

## 2022-09-30 RX ADMIN — Medication 100 MILLIGRAM(S): at 05:07

## 2022-09-30 RX ADMIN — Medication 100 MILLIGRAM(S): at 21:37

## 2022-09-30 RX ADMIN — ATORVASTATIN CALCIUM 80 MILLIGRAM(S): 80 TABLET, FILM COATED ORAL at 21:36

## 2022-09-30 RX ADMIN — DARUNAVIR ETHANOLATE AND COBICISTAT 1 TABLET(S): 800; 150 TABLET, FILM COATED ORAL at 11:48

## 2022-09-30 RX ADMIN — ZINC SULFATE TAB 220 MG (50 MG ZINC EQUIVALENT) 220 MILLIGRAM(S): 220 (50 ZN) TAB at 11:48

## 2022-09-30 RX ADMIN — Medication 100 MILLIGRAM(S): at 14:19

## 2022-09-30 RX ADMIN — Medication 650 MILLIGRAM(S): at 18:16

## 2022-09-30 RX ADMIN — CALCITRIOL 0.25 MICROGRAM(S): 0.5 CAPSULE ORAL at 11:48

## 2022-09-30 RX ADMIN — Medication 50 MILLIGRAM(S): at 14:19

## 2022-09-30 RX ADMIN — DOLUTEGRAVIR SODIUM 50 MILLIGRAM(S): 25 TABLET, FILM COATED ORAL at 11:48

## 2022-09-30 RX ADMIN — ISOSORBIDE MONONITRATE 60 MILLIGRAM(S): 60 TABLET, EXTENDED RELEASE ORAL at 14:19

## 2022-09-30 RX ADMIN — AMLODIPINE BESYLATE 10 MILLIGRAM(S): 2.5 TABLET ORAL at 05:07

## 2022-09-30 RX ADMIN — Medication 50 MILLIGRAM(S): at 05:07

## 2022-09-30 RX ADMIN — Medication 100 MILLIGRAM(S): at 11:48

## 2022-09-30 RX ADMIN — Medication 650 MILLIGRAM(S): at 05:07

## 2022-09-30 RX ADMIN — Medication 4 GRAM(S): at 11:49

## 2022-09-30 RX ADMIN — Medication 81 MILLIGRAM(S): at 11:48

## 2022-09-30 RX ADMIN — ONDANSETRON 4 MILLIGRAM(S): 8 TABLET, FILM COATED ORAL at 11:53

## 2022-09-30 RX ADMIN — CITALOPRAM 20 MILLIGRAM(S): 10 TABLET, FILM COATED ORAL at 11:49

## 2022-09-30 RX ADMIN — CHLORHEXIDINE GLUCONATE 1 APPLICATION(S): 213 SOLUTION TOPICAL at 11:55

## 2022-09-30 RX ADMIN — Medication 100 MILLIGRAM(S): at 18:15

## 2022-09-30 NOTE — PROGRESS NOTE ADULT - PROBLEM SELECTOR PLAN 8
NPO to be advanced to regular diet per surg reccs.  OP HD to be reinstated by RICHARD  Abx per ID  Discharge likely back to home when medically stable.  Pt. has one  daughter who is an RN and lives in [place].  Pt. lives alone

## 2022-09-30 NOTE — PROGRESS NOTE ADULT - SUBJECTIVE AND OBJECTIVE BOX
Problem List:  ESRD  Diverticulosis, admitted for abscess in LLQ within the wall of small bowel with fistula betwwen the mass and thickened sigmoid colon with diverticula  Post HD  yesterday  Abdominal pain improved , tolerate clear liquid feeding  Positive for BM.        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      ESRD on hemodialysis      History of ectopic pregnancy  Two      H/O: hysterectomy  Due to cervical cancer      S/P arteriovenous (AV) fistula creation  july 10 2020          oxycodone (Rash)  penicillins (Urticaria; Rash)      MEDICATIONS  (STANDING):  allopurinol 100 milliGRAM(s) Oral daily  amLODIPine   Tablet 10 milliGRAM(s) Oral daily  aspirin enteric coated 81 milliGRAM(s) Oral daily  atorvastatin 80 milliGRAM(s) Oral at bedtime  calcitriol   Capsule 0.25 MICROGram(s) Oral daily  calcium acetate 667 milliGRAM(s) Oral three times a day with meals  cefTRIAXone   IVPB 2000 milliGRAM(s) IV Intermittent every 24 hours  chlorhexidine 2% Cloths 1 Application(s) Topical daily  cholecalciferol 1000 Unit(s) Oral daily  citalopram 20 milliGRAM(s) Oral daily  darunavir 800 mG/cobicstat 150 mG 1 Tablet(s) Oral daily  dextrose 5% + sodium chloride 0.45%. 1000 milliLiter(s) (60 mL/Hr) IV Continuous <Continuous>  dolutegravir 50 milliGRAM(s) Oral daily  hydrALAZINE 50 milliGRAM(s) Oral three times a day  isosorbide   mononitrate ER Tablet (IMDUR) 60 milliGRAM(s) Oral daily  lamiVUDine- milliGRAM(s) Oral daily  metoprolol tartrate 100 milliGRAM(s) Oral two times a day  metroNIDAZOLE  IVPB 500 milliGRAM(s) IV Intermittent every 8 hours  omega-3-Acid Ethyl Esters 4 Gram(s) Oral daily  senna 2 Tablet(s) Oral at bedtime  sodium bicarbonate 650 milliGRAM(s) Oral two times a day  zinc sulfate 220 milliGRAM(s) Oral daily    MEDICATIONS  (PRN):  acetaminophen     Tablet .. 650 milliGRAM(s) Oral every 6 hours PRN Temp greater or equal to 38C (100.4F), Mild Pain (1 - 3)  aluminum hydroxide/magnesium hydroxide/simethicone Suspension 30 milliLiter(s) Oral every 4 hours PRN Dyspepsia  melatonin 3 milliGRAM(s) Oral at bedtime PRN Insomnia  ondansetron Injectable 4 milliGRAM(s) IV Push every 8 hours PRN Nausea and/or Vomiting                            8.5    7.67  )-----------( 196      ( 30 Sep 2022 05:25 )             25.5     09-30    134<L>  |  96  |  29<H>  ----------------------------<  111<H>  3.7   |  28  |  7.25<H>    Ca    9.4      30 Sep 2022 05:25  Phos  4.0     09-29  Mg     2.0     09-29    TPro  6.5  /  Alb  2.3<L>  /  TBili  0.3  /  DBili  x   /  AST  19  /  ALT  14  /  AlkPhos  90  09-30            REVIEW OF SYSTEMS:  General: no fever no chills, no weight loss.    RESPIRATORY: No cough, wheezing, hemoptysis; No shortness of breath  CARDIOVASCULAR: No chest pain or palpitations. No Edema  GASTROINTESTINAL: No abdominal or epigastric pain. No nausea, vomiting. No diarrhea or constipation. No melena.  GENITOURINARY: No dysuria, frequency, foamy urine, urinary urgency, incontinence or hematuria  NEUROLOGICAL: No numbness or weakness, no tremor , no dizziness.           VITALS:  T(F): 97.8 (09-30-22 @ 13:20), Max: 99.9 (09-29-22 @ 20:22)  HR: 77 (09-30-22 @ 13:20)  BP: 121/61 (09-30-22 @ 13:20)  RR: 18 (09-30-22 @ 13:20)  SpO2: 100% (09-30-22 @ 13:20)  Wt(kg): --    09-29 @ 07:01  -  09-30 @ 07:00  --------------------------------------------------------  IN: 0 mL / OUT: 65 mL / NET: -65 mL        PHYSICAL EXAM:  Constitutional: well developed, no diaphoresis, no distress.  Neck: No JVD, no carotid bruit, supple, no adenopathy  Respiratory: Good air entrance B/L, no wheezes, rales or rhonchi  Cardiovascular: S1, S2, RRR, no pericardial rub, no murmur  Abdomen: BS+, soft, tenderness in catheter area with no leak  Pelvis: bladder nondistended  Extremities: No cyanosis or clubbing. No peripheral edema.     Vascular Access: Left AVF with bruit and thrill

## 2022-09-30 NOTE — PROGRESS NOTE ADULT - ATTENDING COMMENTS
68 yo female from [facility] with H/O ESRD on HD(MWF) via L arm AVF, HIV on ART diagnosed 16 yrs ago (Tivicay + Lamivudine + Prezcobix) admitted for LLQ abscess.    FROM 236  ESR 85  WBC 7  HBsAB, HBsAG, HBcAB IgM non reactive  Hep C negative  CD4 377  VL Det <30  BF culture w/E coli, Klebsiella, Proteus mirabilis  CEA, Cancer Ag , CA-125 negative.    -Left small bowel abscess/fistula from sigmoid colon 2/2 diverticular disease  -HIV on ART CD4 794, undetectable   -PCN allergy(when she was a child, she was told she had hives, she does not remember)  -ESRD on HD    CT Abdomen and Pelvis w/ IV Cont (09.27.22 @ 06:30) > CT A/P w/IV + abscess within the left lower quadrant small bowel wall secondary to fistulization from the sigmoid colon likely on the basis of chronic diverticular disease. S/P IR drainage of the abscess 9/28 with 10 ml of foul smelling purulent discharge, has drain in with dark, purulent drainage.    The pt is doing better, there is significant amount of drainage in the drainage bag, cultures growing E coli, Klebsiella, Proteus mirabilis, GPR.  Continue CXT 2g IV daily + Flagyl 500 mg q/8hrs.  Continue ART (Tivicay(50 mg) +Prezcovix (800/150 mg) + Lamivudine (100 mg) daily.  F/up cultures until finalized.  Trend ESR and CRP q/2-3 days.  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    Please reach ID with any questions or concerns  Dr. Mine Garnett  Tel. [phone]  Available in Teams 66 yo female from [facility] with H/O ESRD on HD(MWF) via L arm AVF, HIV on ART diagnosed 16 yrs ago (Tivicay + Lamivudine + Prezcobix) admitted for LLQ abscess.    FROM 236  ESR 85  WBC 7  HBsAB, HBsAG, HBcAB IgM non reactive  Hep C negative  CD4 377  VL Det <30  BF culture w/E coli, Klebsiella, Proteus mirabilis  CEA, Cancer Ag , CA-125 negative.    -Left small bowel abscess/fistula from sigmoid colon 2/2 diverticular disease  -HIV on ART  -PCN allergy(when she was a child, she was told she had hives, she does not remember)  -ESRD on HD    CT Abdomen and Pelvis w/ IV Cont (09.27.22 @ 06:30) > CT A/P w/IV + abscess within the left lower quadrant small bowel wall secondary to fistulization from the sigmoid colon likely on the basis of chronic diverticular disease. S/P IR drainage of the abscess 9/28 with 10 ml of foul smelling purulent discharge, has drain in with dark, purulent drainage.    The pt is doing better, there is significant amount of drainage in the drainage bag, cultures growing E coli, Klebsiella, Proteus mirabilis, GPR.  Continue CXT 2g IV daily + Flagyl 500 mg q/8hrs.  Continue ART (Tivicay(50 mg) +Prezcovix (800/150 mg) + Lamivudine (100 mg) daily. CD4 377 with VL detectable <30 copies which could be blip in the setting of acute infection.   Hep B non immune, consider Hepatitis B vaccination series (OP)  F/up cultures until finalized.  Trend ESR and CRP q/2-3 days.  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    Please reach ID with any questions or concerns  Dr. Mine Garnett  Tel. [phone]  Available in Teams 68 yo female from [place] [facility] with H/O ESRD on HD(MWF) via L arm AVF, HIV on ART diagnosed 16 yrs ago (Tivicay + Lamivudine + Prezcobix) admitted for LLQ abscess.    FROM 236  ESR 85  WBC 7  HBsAB, HBsAG, HBcAB IgM non reactive  Hep C negative  CD4 377  VL Det <30  BF culture w/E coli, Klebsiella, Proteus mirabilis  CEA, Cancer Ag , CA-125 negative.    -Left small bowel abscess/fistula from sigmoid colon 2/2 diverticular disease  -HIV on ART  -PCN allergy(when she was a child, she was told she had hives, she does not remember)  -ESRD on HD    CT Abdomen and Pelvis w/ IV Cont (09.27.22 @ 06:30) > CT A/P w/IV + abscess within the left lower quadrant small bowel wall secondary to fistulization from the sigmoid colon likely on the basis of chronic diverticular disease. S/P IR drainage of the abscess 9/28 with 10 ml of foul smelling purulent discharge, has drain in with dark, purulent drainage.    The pt is doing better, there is significant amount of drainage output in bag, cultures growing E coli, Klebsiella, Proteus mirabilis, GPR.  Continue CXT 2g IV daily + Flagyl 500 mg q/8hrs.  Continue ART (Tivicay(50 mg) +Prezcovix (800/150 mg) + Lamivudine (100 mg) daily. CD4 377 with VL detectable <30 copies which could be blip in the setting of acute infection.   Hep B non immune, consider Hepatitis B vaccination series (OP)  F/up cultures until finalized.  Trend ESR and CRP q/2-3 days.  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    Please reach ID with any questions or concerns  Dr. Mine Garnett  Tel. [phone]  Available in Teams 68 yo female from [place] [facility] with H/O ESRD on HD(MWF) via L arm AVF, HIV on ART diagnosed 16 yrs ago (Tivicay + Lamivudine + Prezcobix) admitted for LLQ abscess.    FROM 236  ESR 85  WBC 7  HBsAB, HBsAG, HBcAB IgM non reactive  Hep C negative  CD4 377  VL Det <30  BF culture w/E coli, Klebsiella, Proteus mirabilis  CEA, Cancer Ag , CA-125 negative.    -Left small bowel abscess/fistula from sigmoid colon 2/2 diverticular disease  -HIV on ART  -PCN allergy(when she was a child, she was told she had hives, she does not remember)  -ESRD on HD    CT Abdomen and Pelvis w/ IV Cont (09.27.22 @ 06:30) > CT A/P w/IV + abscess within the left lower quadrant small bowel wall secondary to fistulization from the sigmoid colon likely on the basis of chronic diverticular disease. S/P IR drainage of the abscess 9/28 with 10 ml of foul smelling purulent discharge, has drain in with dark, purulent drainage.    The pt is doing better, there is significant amount of drainage output in bag, cultures growing E coli, Klebsiella, Proteus mirabilis, GPR.  Continue CXT 2g IV daily + Flagyl 500 mg q/8hrs.  Continue ART (Tivicay(50 mg) +Prezcovix (800/150 mg) + Lamivudine (100 mg) daily. CD4 377 with VL detectable <30 copies which could be blip in the setting of acute infection.   Hep B non immune, consider Hepatitis B vaccination series (OP)  F/up cultures until finalized.  Trend ESR and CRP q/2-3 days.  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  Discussed with medicine and surgery attendings.    Please reach ID with any questions or concerns  Dr. Mine Garnett  Tel. [phone]  Available in Teams

## 2022-09-30 NOTE — PROGRESS NOTE ADULT - SUBJECTIVE AND OBJECTIVE BOX
Subjective:  Pt states she feels better today. Had 1 episode of watery vomit last night after taking medication. Otherwise states her nausea and abdominal pain is better. Pigtail catheter in place, draining brown-greenish fluid. Denies fever, chills, headache, nausea, chest pain, diarrhea or constipation.     ALLERGIES oxycodone (Rash)  penicillins (Urticaria; Rash)      REVIEW OF SYSTEMS:  CONSTITUTIONAL:  No weakness, fevers or chills  EYES/ENT:  No visual changes;  No vertigo or throat pain   NECK:  No pain or stiffness  RESPIRATORY:  No cough, wheezing, hemoptysis; No shortness of breath  CARDIOVASCULAR:  No chest pain or palpitations  GASTROINTESTINAL:  + abdominal/epigastric pain, 1 episode of vomiting. No nausea or hematemesis; No diarrhea or constipation. No melena or hematochezia.  GENITOURINARY:  No dysuria, frequency or hematuria  NEUROLOGICAL:  No numbness or weakness  MSK: no back pain, no joint pain  SKIN:  No itching, rashes    PHYSICAL EXAM:   Vital Signs Last 24 Hrs  T(C): 37.4 (30 Sep 2022 05:12), Max: 37.7 (29 Sep 2022 20:22)  T(F): 99.3 (30 Sep 2022 05:12), Max: 99.9 (29 Sep 2022 20:22)  HR: 82 (30 Sep 2022 05:12) (71 - 83)  BP: 144/67 (30 Sep 2022 05:12) (121/57 - 144/67)  RR: 18 (30 Sep 2022 05:12) (17 - 18)  SpO2: 98% (30 Sep 2022 05:12) (98% - 100%)    Parameters below as of 30 Sep 2022 05:12  Patient On (Oxygen Delivery Method): room air      GENERAL: NAD, female laying comfortably   HEAD:  Atraumatic, Normocephalic  EYES: EOMI, PERRLA, melanocytic macular lesions in both eyes  ENT: Moist mucous membranes  NECK: Supple, No JVD  CHEST/LUNG: Clear to auscultation bilaterally; No rales, rhonchi, wheezing, or rubs. Unlabored respirations  HEART: Regular rate and rhythm; No murmurs, rubs, or gallops  ABDOMEN: BSx4; Soft, nondistended, + tenderness to deep palpation in LLQ and suprapubic area, + LLQ pigtail catheter in place, draining brownish-green fluid, dressing c/d/i  : no terry  EXTREMITIES:  + left upper arm AV fistula site with strong palpable thrill, 2+ Peripheral Pulses, brisk capillary refill. No clubbing, cyanosis, or edema  NERVOUS SYSTEM:  A&Ox3. No sensory deficits, no motor deficits  SKIN: No rashes or lesions, dry and warm skin  MSK: no back pain, no joint swelling    LABS/DIAGNOSTIC TESTS:                        8.5    7.67  )-----------( 196      ( 30 Sep 2022 05:25 )             25.5     WBC Count: 7.67 K/uL (09-30 @ 05:25)  WBC Count: 8.31 K/uL (09-29 @ 06:30)  WBC Count: 11.62 K/uL (09-28 @ 17:45)    09-30    134<L>  |  96  |  29<H>  ----------------------------<  111<H>  3.7   |  28  |  7.25<H>    Ca    9.4      30 Sep 2022 05:25  Phos  4.0     09-29  Mg     2.0     09-29    TPro  6.5  /  Alb  2.3<L>  /  TBili  0.3  /  DBili  x   /  AST  19  /  ALT  14  /  AlkPhos  90  09-30        CULTURES:   Culture - Abscess with Gram Stain (collected 09-28-22 @ 14:18)  Source: .Abscess abdominal abscess drainage  Gram Stain (09-28-22 @ 21:12):    Few polymorphonuclear leukocytes per low power field    Moderate Gram positive cocci in pairs per oil power field    Moderate Gram Positive Rods per oil power field    Few Gram Negative Rods per oil power field  Preliminary Report (09-29-22 @ 20:53):    Few Escherichia coli    Rare Klebsiella pneumoniae    Rare Proteus mirabilis      HIV-1 RNA Quantitative, Viral Load Log: DET.  <1.47 lg /mL (09-29-22 @ 01:18)      ANTIBIOTICS:  cefTRIAXone   IVPB 2000 every 24 hours  darunavir 800 mG/cobicstat 150 mG 1 daily  dolutegravir 50 daily  lamiVUDine- daily  metroNIDAZOLE  IVPB 500 every 8 hours    IMPRESSION:  67 year old female with medical history of ESRD (dialysis M-W-F), HTN, HIV (diagnosed 16 yrs ago, on Lamivudine, Prezcobix and Tivicay) coming in with abdominal pain, nausea and vomiting. CT Abdomen with IV Contrast shows abscess within the left lower quadrant small bowel wall secondary to fistulization from the sigmoid colon likely on the basis of chronic diverticular disease. Cancer tumor markers CEA, Ca 19-9,  negative. Hep B and Hep C panel negative. Elevated ->140 and ->85. S/p CT guided drainage, 45 cc of purulent fluid aspirated by IR, Pigtail catheter in place. Abscess culture grew few PMN leukocytes, moderate Gram positive cocci in pairs, moderate Gram Positive Rods, few Gram Negative Rods.    - Abdominal Wall Abscess secondary to fistulization from sigmoid colon, likely from chronic diverticular disease  - HIV controlled with HAART Therapy (last CD4 794, viral load 43 in 7/2022 ; on Lamivudine, Prezcobix and Tivicay)  - ESRD on dialysis      PLAN:  - s/p CT guided drainage of abdominal abscess on 9/28 with 45cc of purulent discharge, LLQ IR drain in place  - Abscess culture grew few PMN leukocytes, mod Gram positive cocci in pairs, mod Gram Positive Rods, few Gram Negative Rods (Escherichia coli, Klebsiella pneumoniae, Proteus mirabilis)  - f/u final culture results and sensitivities  - c/w IV Ceftriaxone 2g daily, and IV Flagyl 500mg q8 hours  - Continue pt's home HARRT therapy (Lamivudine, Prezcobix and Tivicay)  - CD4 377, CD4% 59, HIV viral load <30  - HD today  - Rest of management per primary team      Assessment and plan discussed with ID Attending, Dr. Mine Thomas. Attending attestation to follow below.  Subjective:  Pt states she feels better today. Had 1 episode of watery vomit last night after taking medication. Otherwise states her nausea and abdominal pain is better. Pigtail catheter in place, draining brown-greenish fluid. Denies fever, chills, headache, nausea, chest pain, diarrhea or constipation.     ALLERGIES oxycodone (Rash)  penicillins (Urticaria; Rash)      REVIEW OF SYSTEMS:  CONSTITUTIONAL:  No weakness, fevers or chills  EYES/ENT:  No visual changes;  No vertigo or throat pain   NECK:  No pain or stiffness  RESPIRATORY:  No cough, wheezing, hemoptysis; No shortness of breath  CARDIOVASCULAR:  No chest pain or palpitations  GASTROINTESTINAL:  + abdominal/epigastric pain, 1 episode of vomiting. No nausea or hematemesis; No diarrhea or constipation. No melena or hematochezia.  GENITOURINARY:  No dysuria, frequency or hematuria  NEUROLOGICAL:  No numbness or weakness  MSK: no back pain, no joint pain  SKIN:  No itching, rashes    PHYSICAL EXAM:   Vital Signs Last 24 Hrs  T(C): 37.4 (30 Sep 2022 05:12), Max: 37.7 (29 Sep 2022 20:22)  T(F): 99.3 (30 Sep 2022 05:12), Max: 99.9 (29 Sep 2022 20:22)  HR: 82 (30 Sep 2022 05:12) (71 - 83)  BP: 144/67 (30 Sep 2022 05:12) (121/57 - 144/67)  RR: 18 (30 Sep 2022 05:12) (17 - 18)  SpO2: 98% (30 Sep 2022 05:12) (98% - 100%)    Parameters below as of 30 Sep 2022 05:12  Patient On (Oxygen Delivery Method): room air      GENERAL: NAD, female laying comfortably   HEAD:  Atraumatic, Normocephalic  EYES: EOMI, PERRLA, melanocytic macular lesions in both eyes  ENT: Moist mucous membranes  NECK: Supple, No JVD  CHEST/LUNG: Clear to auscultation bilaterally; No rales, rhonchi, wheezing, or rubs. Unlabored respirations  HEART: Regular rate and rhythm; No murmurs, rubs, or gallops  ABDOMEN: BSx4; Soft, nondistended, + tenderness to deep palpation in LLQ and suprapubic area, + LLQ pigtail catheter in place, draining brownish-green fluid, dressing c/d/i  : no terry  EXTREMITIES:  + left upper arm AV fistula site with strong palpable thrill, 2+ Peripheral Pulses, brisk capillary refill. No clubbing, cyanosis, or edema  NERVOUS SYSTEM:  A&Ox3. No sensory deficits, no motor deficits  SKIN: No rashes or lesions, dry and warm skin  MSK: no back pain, no joint swelling    LABS/DIAGNOSTIC TESTS:                        8.5    7.67  )-----------( 196      ( 30 Sep 2022 05:25 )             25.5     WBC Count: 7.67 K/uL (09-30 @ 05:25)  WBC Count: 8.31 K/uL (09-29 @ 06:30)  WBC Count: 11.62 K/uL (09-28 @ 17:45)    09-30    134<L>  |  96  |  29<H>  ----------------------------<  111<H>  3.7   |  28  |  7.25<H>    Ca    9.4      30 Sep 2022 05:25  Phos  4.0     09-29  Mg     2.0     09-29    TPro  6.5  /  Alb  2.3<L>  /  TBili  0.3  /  DBili  x   /  AST  19  /  ALT  14  /  AlkPhos  90  09-30    CULTURES:   Culture - Abscess with Gram Stain (collected 09-28-22 @ 14:18)  Source: .Abscess abdominal abscess drainage  Gram Stain (09-28-22 @ 21:12):    Few polymorphonuclear leukocytes per low power field    Moderate Gram positive cocci in pairs per oil power field    Moderate Gram Positive Rods per oil power field    Few Gram Negative Rods per oil power field  Preliminary Report (09-29-22 @ 20:53):    Few Escherichia coli    Rare Klebsiella pneumoniae    Rare Proteus mirabilis    HIV-1 RNA Quantitative, Viral Load Log: DET.  <1.47 lg /mL (09-29-22 @ 01:18)    ANTIBIOTICS:  cefTRIAXone   IVPB 2000 every 24 hours  darunavir 800 mG/cobicstat 150 mG 1 daily  dolutegravir 50 daily  lamiVUDine- daily  metroNIDAZOLE  IVPB 500 every 8 hours    IMPRESSION:  67 year old female with medical history of ESRD (dialysis M-W-F), HTN, HIV (diagnosed 16 yrs ago, on Lamivudine, Prezcobix and Tivicay) coming in with abdominal pain, nausea and vomiting. CT Abdomen with IV Contrast shows abscess within the left lower quadrant small bowel wall secondary to fistulization from the sigmoid colon likely on the basis of chronic diverticular disease. Cancer tumor markers CEA, Ca 19-9,  negative. Hep B and Hep C panel negative. Elevated ->140 and ->85. S/p CT guided drainage, 45 cc of purulent fluid aspirated by IR, Pigtail catheter in place. Abscess culture grew few PMN leukocytes, moderate Gram positive cocci in pairs, moderate Gram Positive Rods, few Gram Negative Rods.    - Abdominal Wall Abscess secondary to fistulization from sigmoid colon, likely from chronic diverticular disease  - HIV controlled with HAART Therapy (last CD4 794, viral load 43 in 7/2022 ; on Lamivudine, Prezcobix and Tivicay)  - ESRD on dialysis      PLAN:  - s/p CT guided drainage of abdominal abscess on 9/28 with 45cc of purulent discharge, LLQ IR drain in place  - Abscess culture grew few PMN leukocytes, mod Gram positive cocci in pairs, mod Gram Positive Rods, few Gram Negative Rods (Escherichia coli, Klebsiella pneumoniae, Proteus mirabilis)  - f/u final culture results and sensitivities  - c/w IV Ceftriaxone 2g daily, and IV Flagyl 500mg q8 hours  - Continue pt's home HARRT therapy (Lamivudine, Prezcobix and Tivicay)  - CD4 377, CD4% 59, HIV viral load <30  - HD today  - Rest of management per primary team      Assessment and plan discussed with ID Attending, Dr. Mine Thomas. Attending attestation to follow below.

## 2022-09-30 NOTE — PROGRESS NOTE ADULT - SUBJECTIVE AND OBJECTIVE BOX
NP Note discussed with  Primary Attending    Patient is a 67y old  Female who presents with a chief complaint of Abdominal pain (29 Sep 2022 15:21)      INTERVAL HPI/OVERNIGHT EVENTS: no new complaints    MEDICATIONS  (STANDING):  allopurinol 100 milliGRAM(s) Oral daily  amLODIPine   Tablet 10 milliGRAM(s) Oral daily  aspirin enteric coated 81 milliGRAM(s) Oral daily  atorvastatin 80 milliGRAM(s) Oral at bedtime  calcitriol   Capsule 0.25 MICROGram(s) Oral daily  calcium acetate 667 milliGRAM(s) Oral three times a day with meals  cefTRIAXone   IVPB 2000 milliGRAM(s) IV Intermittent every 24 hours  chlorhexidine 2% Cloths 1 Application(s) Topical daily  cholecalciferol 1000 Unit(s) Oral daily  citalopram 20 milliGRAM(s) Oral daily  darunavir 800 mG/cobicstat 150 mG 1 Tablet(s) Oral daily  dextrose 5% + sodium chloride 0.45%. 1000 milliLiter(s) (60 mL/Hr) IV Continuous <Continuous>  dolutegravir 50 milliGRAM(s) Oral daily  hydrALAZINE 50 milliGRAM(s) Oral three times a day  isosorbide   mononitrate ER Tablet (IMDUR) 60 milliGRAM(s) Oral daily  lamiVUDine- milliGRAM(s) Oral daily  metoprolol tartrate 100 milliGRAM(s) Oral two times a day  metroNIDAZOLE  IVPB 500 milliGRAM(s) IV Intermittent every 8 hours  omega-3-Acid Ethyl Esters 4 Gram(s) Oral daily  senna 2 Tablet(s) Oral at bedtime  sodium bicarbonate 650 milliGRAM(s) Oral two times a day  zinc sulfate 220 milliGRAM(s) Oral daily    MEDICATIONS  (PRN):  acetaminophen     Tablet .. 650 milliGRAM(s) Oral every 6 hours PRN Temp greater or equal to 38C (100.4F), Mild Pain (1 - 3)  aluminum hydroxide/magnesium hydroxide/simethicone Suspension 30 milliLiter(s) Oral every 4 hours PRN Dyspepsia  melatonin 3 milliGRAM(s) Oral at bedtime PRN Insomnia  ondansetron Injectable 4 milliGRAM(s) IV Push every 8 hours PRN Nausea and/or Vomiting      __________________________________________________  REVIEW OF SYSTEMS:    CONSTITUTIONAL: No fever,   EYES: no acute visual disturbances  NECK: No pain or stiffness  RESPIRATORY: No cough; No shortness of breath  CARDIOVASCULAR: No chest pain, no palpitations  GASTROINTESTINAL: No pain. No nausea or vomiting; No diarrhea   NEUROLOGICAL: No headache or numbness, no tremors  MUSCULOSKELETAL: No joint pain, no muscle pain  GENITOURINARY: no dysuria, no frequency, no hesitancy  PSYCHIATRY: no depression , no anxiety  ALL OTHER  ROS negative        Vital Signs Last 24 Hrs  T(C): 37.4 (30 Sep 2022 05:12), Max: 37.7 (29 Sep 2022 20:22)  T(F): 99.3 (30 Sep 2022 05:12), Max: 99.9 (29 Sep 2022 20:22)  HR: 82 (30 Sep 2022 05:12) (71 - 83)  BP: 144/67 (30 Sep 2022 05:12) (121/57 - 144/67)  BP(mean): --  RR: 18 (30 Sep 2022 05:12) (17 - 18)  SpO2: 98% (30 Sep 2022 05:12) (98% - 100%)    Parameters below as of 30 Sep 2022 05:12  Patient On (Oxygen Delivery Method): room air        ________________________________________________  PHYSICAL EXAM:  well developed, well developed  GENERAL: NAD  HEENT: Normocephalic;  conjunctivae and sclerae clear; moist mucous membranes;   NECK : supple  CHEST/LUNG: Clear to auscultation bilaterally with good air entry   HEART: S1 S2  regular; no murmurs, gallops or rubs  ABDOMEN: LLQ mild tenderness on palpation, pigtail in place draining greenish liquidy drng, Soft, Nontender, Nondistended; Bowel sounds present  EXTREMITIES: no cyanosis; no edema; no calf tenderness  SKIN: warm and dry; no rash  NERVOUS SYSTEM:  Awake and alert; Oriented  to place, person and time ; no new deficits    _________________________________________________  LABS:                        8.5    7.67  )-----------( 196      ( 30 Sep 2022 05:25 )             25.5     09-30    134<L>  |  96  |  29<H>  ----------------------------<  111<H>  3.7   |  28  |  7.25<H>    Ca    9.4      30 Sep 2022 05:25  Phos  4.0     09-29  Mg     2.0     09-29    TPro  6.5  /  Alb  2.3<L>  /  TBili  0.3  /  DBili  x   /  AST  19  /  ALT  14  /  AlkPhos  90  09-30        CAPILLARY BLOOD GLUCOSE            RADIOLOGY & ADDITIONAL TESTS:    Imaging Personally Reviewed:  YES/NO    Consultant(s) Notes Reviewed:   YES/ No    Care Discussed with Consultants :     Plan of care was discussed with patient and /or primary care giver; all questions and concerns were addressed and care was aligned with patient's wishes.     NP Note discussed with  Primary Attending    Patient is a 67y old  Female who presents with a chief complaint of Abdominal pain (29 Sep 2022 15:21)      INTERVAL HPI/OVERNIGHT EVENTS: no new complaints    MEDICATIONS  (STANDING):  allopurinol 100 milliGRAM(s) Oral daily  amLODIPine   Tablet 10 milliGRAM(s) Oral daily  aspirin enteric coated 81 milliGRAM(s) Oral daily  atorvastatin 80 milliGRAM(s) Oral at bedtime  calcitriol   Capsule 0.25 MICROGram(s) Oral daily  calcium acetate 667 milliGRAM(s) Oral three times a day with meals  cefTRIAXone   IVPB 2000 milliGRAM(s) IV Intermittent every 24 hours  chlorhexidine 2% Cloths 1 Application(s) Topical daily  cholecalciferol 1000 Unit(s) Oral daily  citalopram 20 milliGRAM(s) Oral daily  darunavir 800 mG/cobicstat 150 mG 1 Tablet(s) Oral daily  dextrose 5% + sodium chloride 0.45%. 1000 milliLiter(s) (60 mL/Hr) IV Continuous <Continuous>  dolutegravir 50 milliGRAM(s) Oral daily  hydrALAZINE 50 milliGRAM(s) Oral three times a day  isosorbide   mononitrate ER Tablet (IMDUR) 60 milliGRAM(s) Oral daily  lamiVUDine- milliGRAM(s) Oral daily  metoprolol tartrate 100 milliGRAM(s) Oral two times a day  metroNIDAZOLE  IVPB 500 milliGRAM(s) IV Intermittent every 8 hours  omega-3-Acid Ethyl Esters 4 Gram(s) Oral daily  senna 2 Tablet(s) Oral at bedtime  sodium bicarbonate 650 milliGRAM(s) Oral two times a day  zinc sulfate 220 milliGRAM(s) Oral daily    MEDICATIONS  (PRN):  acetaminophen     Tablet .. 650 milliGRAM(s) Oral every 6 hours PRN Temp greater or equal to 38C (100.4F), Mild Pain (1 - 3)  aluminum hydroxide/magnesium hydroxide/simethicone Suspension 30 milliLiter(s) Oral every 4 hours PRN Dyspepsia  melatonin 3 milliGRAM(s) Oral at bedtime PRN Insomnia  ondansetron Injectable 4 milliGRAM(s) IV Push every 8 hours PRN Nausea and/or Vomiting      __________________________________________________  REVIEW OF SYSTEMS:    CONSTITUTIONAL: No fever,   EYES: no acute visual disturbances  NECK: No pain or stiffness  RESPIRATORY: No cough; No shortness of breath  CARDIOVASCULAR: No chest pain, no palpitations  GASTROINTESTINAL: No pain. No nausea or vomiting; No diarrhea   NEUROLOGICAL: No headache or numbness, no tremors  MUSCULOSKELETAL: No joint pain, no muscle pain  GENITOURINARY: no dysuria, no frequency, no hesitancy  PSYCHIATRY: no depression , no anxiety  ALL OTHER  ROS negative        Vital Signs Last 24 Hrs  T(C): 37.4 (30 Sep 2022 05:12), Max: 37.7 (29 Sep 2022 20:22)  T(F): 99.3 (30 Sep 2022 05:12), Max: 99.9 (29 Sep 2022 20:22)  HR: 82 (30 Sep 2022 05:12) (71 - 83)  BP: 144/67 (30 Sep 2022 05:12) (121/57 - 144/67)  BP(mean): --  RR: 18 (30 Sep 2022 05:12) (17 - 18)  SpO2: 98% (30 Sep 2022 05:12) (98% - 100%)    Parameters below as of 30 Sep 2022 05:12  Patient On (Oxygen Delivery Method): room air        ________________________________________________  PHYSICAL EXAM:  well developed, well developed  GENERAL: NAD  HEENT: Normocephalic;  conjunctivae and sclerae clear; moist mucous membranes;   NECK : supple  CHEST/LUNG: Clear to auscultation bilaterally with good air entry   HEART: S1 S2  regular; no murmurs, gallops or rubs  ABDOMEN: LLQ mild tenderness on palpation, pigtail in place draining greenish liquidy drng, Soft, Nontender, Nondistended; Bowel sounds present  EXTREMITIES: no cyanosis; no edema; no calf tenderness  SKIN: warm and dry; no rash  NERVOUS SYSTEM:  Awake and alert; Oriented  to place, person and time ; no new deficits    _________________________________________________  LABS:                        8.5    7.67  )-----------( 196      ( 30 Sep 2022 05:25 )             25.5     09-30    134<L>  |  96  |  29<H>  ----------------------------<  111<H>  3.7   |  28  |  7.25<H>    Ca    9.4      30 Sep 2022 05:25  Phos  4.0     09-29  Mg     2.0     09-29    TPro  6.5  /  Alb  2.3<L>  /  TBili  0.3  /  DBili  x   /  AST  19  /  ALT  14  /  AlkPhos  90  09-30        CAPILLARY BLOOD GLUCOSE      RADIOLOGY & ADDITIONAL TESTS:  < from: CT Abdomen and Pelvis w/ IV Cont (09.27.22 @ 06:30) >    ACC: [id number] EXAM:  CT ABDOMEN AND PELVIS IC                          PROCEDURE DATE:  09/27/2022          INTERPRETATION:  CLINICAL INFORMATION: Abdominal pain question abscess    COMPARISON: CT abdomen pelvis 9/27/2022 earlier the same day.    CONTRAST/COMPLICATIONS:  IV Contrast: Omnipaque 350 90 cc administered  90 cc administered   10 cc   discarded  Oral Contrast: None  Complications: None    PROCEDURE:  CT of the Abdomen and Pelvis was performed.  Sagittal and coronal reformats were performed.    FINDINGS:  LOWER CHEST: Probable postsurgical changes in the left lower lung. 1.6 cm   left lower lobe thin-walled cyst.    LIVER: 1.3 cm right lobe hypodensity with suggestion of peripheral early   nodular enhancement (4:61). This may represent a hemangioma. This could   be confirmed with MRI.  BILE DUCTS: Normal caliber.  GALLBLADDER: Within normal limits.  SPLEEN: Within normal limits.  PANCREAS: 1.4 cm cyst in uncinate process or a focally dilated duct. This   would be better evaluated with dedicated MRI.  ADRENALS: Within normal limits.  KIDNEYS/URETERS: Bilateral polycystic kidneys    BLADDER: Within normal limits.  REPRODUCTIVE ORGANS: Hysterectomy.    BOWEL: Moderate sized hiatal hernia. Normal appendix.    Oral contrast is again seen within mildly prominent loops of proximal   small bowel measuring up to 2.7 cm in diameter to a mass in the left   lower quadrant. This is unchanged. The mass measures 5.8 x 5.1 cm and   contains fluid and air. This appears to be within thewall of the small   bowel. A thin column of oral contrast passes within the lumen of the   small bowel distally into collapsed loops of small bowel. There is an   apparent fistula between this mass in the adjacent markedly thickened   sigmoid colon which contains numerous diverticula. There are inflammatory   changes in the adjacent fat. This likely represents sigmoid   diverticulitis with small bowel fistulization to the raw within adjacent   loop of small bowel in a contained abscess.      PERITONEUM: No ascites.  VESSELS: Atherosclerotic changes.  RETROPERITONEUM/LYMPH NODES: No lymphadenopathy.  ABDOMINAL WALL: Small fat-containing umbilical hernia.  BONES: Within normal limits.    IMPRESSION:    Abscess within the left lower quadrant small bowel wall secondary to   fistulization from the sigmoid colon likely on the basis of chronic   diverticular disease.    1.3 cm right lobe hypodensity with suggestion of peripheral early nodular   enhancement. This may represent a hemangioma. This could be confirmed   with MRI.    1.4 cm cyst in uncinate process or a focally dilated duct. This would be   better evaluated with dedicated MRI.    Probable probably cystic kidneys. Correlate clinically for kidney disease.    < end of copied text >      < from: CT Aspiration Abscess Hematoma, Cyst (09.28.22 @ 14:00) >  ACC: [id number] EXAM:  CT ASP ABSC HEMATOMA CYST#                          PROCEDURE DATE:  09/28/2022          INTERPRETATION:  PROCEDURE: CT-guided drainage of abdominal abscess.    : VERÓNICA Payne MD    CLINICAL INDICATION: 67-year-old female with multiple comorbidities, who   presented with abdominal pain and was found to have an abdominal   collection concerning for abscess. Percutaneous drainage of the abscess   was requested.    ANESTHESIA: Intravenous sedation was provided by the attending   anesthesiologist. A total of 7 mL of 1% lidocaine was used for local   anesthesia.    ANTIBIOTICS: Patient's scheduled dose of metronidazole was administered   by the anesthesiologist prior to procedure.    TECHNIQUE: After discussing the risks, benefits and alternatives to this   procedure with the patient, informed consent was obtained. A timeout was   performed.    The patient was placed supine on the CT examination table and connected   to physiologic monitoring. Transaxial images ofthe abdomen were obtained   without the use of oral or intravenous contrast materials. The left lower   quadrant abdominal collection which had been seen on a recent CT scan was   again identified, and the overlying skin was prepped and draped in the  usual sterile fashion.    Using CT guidance, an 18-gauge trocar needle was advanced into the   collection using a left paramedian approach. Access into the collection   was confirmed with CT images and aspiration of 5 mL of purulent fluid   which was sent for microbiological analysis. The needle was then removed   over a wire and the tract was dilated. A 10 Italian pigtail drainage   catheter was then advanced into the collection over the wire. An   additional 40 mL of thick, purulent foul-smelling fluid was manually   aspirated. Repeat images demonstrated good positioning of the catheter   tip within the abscess cavity with virtual complete collapse of the   cavity around the catheter tip. The catheter was secured to the patient's   skin with a silk suture, flushed with normal saline, and then connected   to a drainage bag. A dry sterile dressing was then applied.    The patient tolerated the procedure well and was transferred to the   recovery area in stable condition. There were no immediate complications.    IMPRESSION: SUCCESSFUL CT-GUIDED PERCUTANEOUS DRAINAGE OF ABDOMINAL   ABSCESS, AS DESCRIBED ABOVE.    < end of copied text >        INTERPRETATION:  CLINICAL INFORMATION: Abdominal pain question abscess    COMPARISON: CT abdomen pelvis 9/27/2022 earlier the same day.                Imaging Personally Reviewed:  YES/NO    Consultant(s) Notes Reviewed:   YES/ No    Care Discussed with Consultants :     Plan of care was discussed with patient and /or primary care giver; all questions and concerns were addressed and care was aligned with patient's wishes.

## 2022-09-30 NOTE — PROGRESS NOTE ADULT - ASSESSMENT
This is a 67 year old female, coming from home, with medical history of ESRD(M-W-F), HTN, HIV coming in with abdominal pain. Patient admitted to medicine CT a/p show LLQ abscess and chronic diverticular disease. Patient was started on Ceftriaxone and Flagyl and ID was consulted for recommendations. Patient has hx of ESRD on dialysis M-W-F, nephrology consulted.  Surgery and IR consulted for abdominal abscess seen on CT, secondary to fistulization from the sigmoid colon, underwent IR CT guided drainage of abscess with 45 cc purulent drng drained. Pigtail to drainage bag draining brownish drng.  Pt. is followed by surgery, kept NPO for now.  9/30-Mild LLQ abd tenderness on palpation persists.  Pigtail continues to drain liquidy greenish drng.  Pt. remains NPO for now, will start clears when cleared by surgery.

## 2022-09-30 NOTE — PROGRESS NOTE ADULT - NS PANP COMMENT GEN_ALL_CORE FT
Pt seen and examined. Agree with note as outlined by PA above.  Pt reports pain significantly improved. Reports had a bowel movement. Is hungry.  WBC decreased. S/p IR drain placement yesterday.  HD last night.  Abd- soft, non distended, Minimal LLQ tenderness, no guarding no rebound, improved from yesterday's exam. IR drain with watery feculent output compared to yesterday.  Clinically improved. Concern for fistulization of abscess based on CT.  Drain with ~ 20 ml output today, continue to monitor. If remains stable can start trial clears tomorrow. Cont Abx per ID recommendations
Pt seen and examined. Reports overall has much less abdominal pain. Trialed some clears today. Reports IR drain with more output, thinner than yesterday.  Abd- soft, nontender, non distended, no guarding no rebound. + thin watery output from IR drain suspicious for enteric contents due to fistula.   + flatus+ BM. Reports had loose BM when started clears.  Concern for increased IR drain output with trial po intake. Overall pt clinically improved.  No increase in abdominal pain with po. Regardless recommend to keep NPO for now.

## 2022-09-30 NOTE — PROGRESS NOTE ADULT - PROBLEM SELECTOR PLAN 3
Pt has a history of HIV.  - CD4 377, viral load not detect  - c/w home medication Tivicay, Prezcobix, lamivudine.  - ID dr. Young is following

## 2022-09-30 NOTE — PROGRESS NOTE ADULT - ATTENDING COMMENTS
67 year old female, coming from home, with medical history of ESRD(M-W-F), HTN, HIV coming in with abdominal pain, admitted with concern for bowel abscess with fistula    # Bowel abscess  # ESRD on HD, MWF  # HIV  # Anemia - likely 2/2 ESRD  # HTN  #Gout  #HLD  #Liver hypodensity on CT  #Pancreas cyst  CT Abdomen with IV Contrast shows abscess within the left lower quadrant small bowel wall secondary to fistulization from the sigmoid colon likely on the basis of chronic diverticular disease. Cancer tumor markers CEA, Ca 19-9,  negative.   - IR - Abscess drainage (9/28) -10 ml of foul smelling purulent discharge, IR drain placed  - Ceftriaxone/flagyl IV, f/u cultures  -ID following, yareli recs  - f/u surgery recs - CLD, serial abdominal exams  -Pain regimen  - HD per nephro  - Continue ART  - f/u ESR, CRP  - c/w home meds  - tumor markers negative  - DVT ppx

## 2022-09-30 NOTE — PROGRESS NOTE ADULT - ASSESSMENT
67y.o. Female with CT findings of acute perforated diverticulitis with contained abscess possible fistulization mass   s/p IR drain 9/28  afebrile, wbc wnl     - clears, advance as tolerated   - serial abdominal exams  - IV abx  - ID consulted, f/u recs   - remainder of care as per primary team   - Discussed and agreed with Dr. Francisco

## 2022-09-30 NOTE — PROGRESS NOTE ADULT - ASSESSMENT
ESRD , dialysis days are MWF. Post CT with angiogram on 09/27, required dialysis in 24 hours.  Underwent dialysis on 09/28, next dialysis romorrow  Anemia restart BECKY and follow iron sat     Diverticulitis with abscess placed on Ceftriaxone and Metronidazole. Post IR drain , await final culture results. ID continue to follow.    Renal bone disease hold binders and follow daily PO4 .

## 2022-09-30 NOTE — PROGRESS NOTE ADULT - PROBLEM SELECTOR PLAN 1
p/w abdominal pain, admitted for mngt of Abdominal Wall Abscess   -Likely from chronic diverticular disease.  -s/p IR CT guided drainage 9/28  -Abscess Cx grew mod GPC in pairs, mod GPR, few GNR  -Cont  IV Ceftriaxone 2g daily, and IV Flagyl 500mg q 8hours  - f/u final culture results and sensitivities  -Surgery dr. Francisco  -ID dr. Young  -Cont NPO for now per surgery

## 2022-09-30 NOTE — PROGRESS NOTE ADULT - SUBJECTIVE AND OBJECTIVE BOX
INTERVAL HPI/OVERNIGHT EVENTS:  Patient seen and examined at bedside.    Pt resting comfortably. No acute complaints.  Admits to having abdominal pain in the LLQ that is about the same as yesterday.   Patient is currently NPO, denies N/V.   States that she had a 2 BM yesterday.    Patient denies chest pain, shortness of breath, fever, chills or any other constitutional symptoms.       MEDICATIONS  (STANDING):  allopurinol 100 milliGRAM(s) Oral daily  amLODIPine   Tablet 10 milliGRAM(s) Oral daily  aspirin enteric coated 81 milliGRAM(s) Oral daily  atorvastatin 80 milliGRAM(s) Oral at bedtime  calcitriol   Capsule 0.25 MICROGram(s) Oral daily  calcium acetate 667 milliGRAM(s) Oral three times a day with meals  cefTRIAXone   IVPB 2000 milliGRAM(s) IV Intermittent every 24 hours  chlorhexidine 2% Cloths 1 Application(s) Topical daily  cholecalciferol 1000 Unit(s) Oral daily  citalopram 20 milliGRAM(s) Oral daily  darunavir 800 mG/cobicstat 150 mG 1 Tablet(s) Oral daily  dextrose 5% + sodium chloride 0.45%. 1000 milliLiter(s) (60 mL/Hr) IV Continuous <Continuous>  dolutegravir 50 milliGRAM(s) Oral daily  hydrALAZINE 50 milliGRAM(s) Oral three times a day  isosorbide   mononitrate ER Tablet (IMDUR) 60 milliGRAM(s) Oral daily  lamiVUDine- milliGRAM(s) Oral daily  metoprolol tartrate 100 milliGRAM(s) Oral two times a day  metroNIDAZOLE  IVPB 500 milliGRAM(s) IV Intermittent every 8 hours  omega-3-Acid Ethyl Esters 4 Gram(s) Oral daily  senna 2 Tablet(s) Oral at bedtime  sodium bicarbonate 650 milliGRAM(s) Oral two times a day  zinc sulfate 220 milliGRAM(s) Oral daily    MEDICATIONS  (PRN):  acetaminophen     Tablet .. 650 milliGRAM(s) Oral every 6 hours PRN Temp greater or equal to 38C (100.4F), Mild Pain (1 - 3)  aluminum hydroxide/magnesium hydroxide/simethicone Suspension 30 milliLiter(s) Oral every 4 hours PRN Dyspepsia  melatonin 3 milliGRAM(s) Oral at bedtime PRN Insomnia  ondansetron Injectable 4 milliGRAM(s) IV Push every 8 hours PRN Nausea and/or Vomiting      Vital Signs Last 24 Hrs  T(C): 37.4 (30 Sep 2022 05:12), Max: 37.7 (29 Sep 2022 20:22)  T(F): 99.3 (30 Sep 2022 05:12), Max: 99.9 (29 Sep 2022 20:22)  HR: 82 (30 Sep 2022 05:12) (71 - 83)  BP: 144/67 (30 Sep 2022 05:12) (121/57 - 144/67)  BP(mean): --  RR: 18 (30 Sep 2022 05:12) (17 - 18)  SpO2: 98% (30 Sep 2022 05:12) (98% - 100%)    Parameters below as of 30 Sep 2022 05:12  Patient On (Oxygen Delivery Method): room air        Physical:  Physical:  General: A&Ox3. NAD.  Respiratory: non labored  Skin: warm and dry  Abdomen: Soft, nondistended, tender to palpation in the LLQ and LUQ, no rebound tenderness, LLQ IR drain in place       I&O's Detail    29 Sep 2022 07:01  -  30 Sep 2022 07:00  --------------------------------------------------------  IN:  Total IN: 0 mL    OUT:    Drain (mL): 65 mL  Total OUT: 65 mL    Total NET: -65 mL          LABS:                        8.5    7.67  )-----------( 196      ( 30 Sep 2022 05:25 )             25.5             09-30    134<L>  |  96  |  29<H>  ----------------------------<  111<H>  3.7   |  28  |  7.25<H>    Ca    9.4      30 Sep 2022 05:25  Phos  4.0     09-29  Mg     2.0     09-29    TPro  6.5  /  Alb  2.3<L>  /  TBili  0.3  /  DBili  x   /  AST  19  /  ALT  14  /  AlkPhos  90  09-30

## 2022-10-01 LAB
ANION GAP SERPL CALC-SCNC: 12 MMOL/L — SIGNIFICANT CHANGE UP (ref 5–17)
BUN SERPL-MCNC: 34 MG/DL — HIGH (ref 7–18)
CALCIUM SERPL-MCNC: 8.5 MG/DL — SIGNIFICANT CHANGE UP (ref 8.4–10.5)
CHLORIDE SERPL-SCNC: 95 MMOL/L — LOW (ref 96–108)
CO2 SERPL-SCNC: 22 MMOL/L — SIGNIFICANT CHANGE UP (ref 22–31)
CREAT SERPL-MCNC: 9.37 MG/DL — HIGH (ref 0.5–1.3)
EGFR: 4 ML/MIN/1.73M2 — LOW
GLUCOSE BLDC GLUCOMTR-MCNC: 100 MG/DL — HIGH (ref 70–99)
GLUCOSE SERPL-MCNC: 111 MG/DL — HIGH (ref 70–99)
HCT VFR BLD CALC: 25.8 % — LOW (ref 34.5–45)
HGB BLD-MCNC: 8.9 G/DL — LOW (ref 11.5–15.5)
IRON SATN MFR SERPL: 48 % — SIGNIFICANT CHANGE UP (ref 15–50)
IRON SATN MFR SERPL: 48 UG/DL — SIGNIFICANT CHANGE UP (ref 40–150)
MCHC RBC-ENTMCNC: 32.2 PG — SIGNIFICANT CHANGE UP (ref 27–34)
MCHC RBC-ENTMCNC: 34.5 GM/DL — SIGNIFICANT CHANGE UP (ref 32–36)
MCV RBC AUTO: 93.5 FL — SIGNIFICANT CHANGE UP (ref 80–100)
NRBC # BLD: 0 /100 WBCS — SIGNIFICANT CHANGE UP (ref 0–0)
PLATELET # BLD AUTO: 227 K/UL — SIGNIFICANT CHANGE UP (ref 150–400)
POTASSIUM SERPL-MCNC: 4.1 MMOL/L — SIGNIFICANT CHANGE UP (ref 3.5–5.3)
POTASSIUM SERPL-SCNC: 4.1 MMOL/L — SIGNIFICANT CHANGE UP (ref 3.5–5.3)
RBC # BLD: 2.76 M/UL — LOW (ref 3.8–5.2)
RBC # FLD: 15.5 % — HIGH (ref 10.3–14.5)
SODIUM SERPL-SCNC: 129 MMOL/L — LOW (ref 135–145)
TIBC SERPL-MCNC: 101 UG/DL — LOW (ref 250–450)
UIBC SERPL-MCNC: 53 UG/DL — LOW (ref 110–370)
WBC # BLD: 5.29 K/UL — SIGNIFICANT CHANGE UP (ref 3.8–10.5)
WBC # FLD AUTO: 5.29 K/UL — SIGNIFICANT CHANGE UP (ref 3.8–10.5)

## 2022-10-01 PROCEDURE — 99233 SBSQ HOSP IP/OBS HIGH 50: CPT

## 2022-10-01 RX ADMIN — Medication 100 MILLIGRAM(S): at 12:20

## 2022-10-01 RX ADMIN — CALCITRIOL 0.25 MICROGRAM(S): 0.5 CAPSULE ORAL at 12:20

## 2022-10-01 RX ADMIN — Medication 100 MILLIGRAM(S): at 22:19

## 2022-10-01 RX ADMIN — Medication 100 MILLIGRAM(S): at 05:06

## 2022-10-01 RX ADMIN — DOLUTEGRAVIR SODIUM 50 MILLIGRAM(S): 25 TABLET, FILM COATED ORAL at 12:21

## 2022-10-01 RX ADMIN — CITALOPRAM 20 MILLIGRAM(S): 10 TABLET, FILM COATED ORAL at 12:20

## 2022-10-01 RX ADMIN — ERYTHROPOIETIN 4000 UNIT(S): 10000 INJECTION, SOLUTION INTRAVENOUS; SUBCUTANEOUS at 19:30

## 2022-10-01 RX ADMIN — Medication 100 MILLIGRAM(S): at 13:19

## 2022-10-01 RX ADMIN — Medication 50 MILLIGRAM(S): at 22:19

## 2022-10-01 RX ADMIN — Medication 50 MILLIGRAM(S): at 13:18

## 2022-10-01 RX ADMIN — ATORVASTATIN CALCIUM 80 MILLIGRAM(S): 80 TABLET, FILM COATED ORAL at 22:19

## 2022-10-01 RX ADMIN — Medication 3 MILLIGRAM(S): at 00:33

## 2022-10-01 RX ADMIN — DARUNAVIR ETHANOLATE AND COBICISTAT 1 TABLET(S): 800; 150 TABLET, FILM COATED ORAL at 12:20

## 2022-10-01 RX ADMIN — Medication 650 MILLIGRAM(S): at 05:06

## 2022-10-01 RX ADMIN — Medication 650 MILLIGRAM(S): at 17:01

## 2022-10-01 RX ADMIN — ISOSORBIDE MONONITRATE 60 MILLIGRAM(S): 60 TABLET, EXTENDED RELEASE ORAL at 13:18

## 2022-10-01 RX ADMIN — Medication 50 MILLIGRAM(S): at 05:06

## 2022-10-01 RX ADMIN — CHLORHEXIDINE GLUCONATE 1 APPLICATION(S): 213 SOLUTION TOPICAL at 13:14

## 2022-10-01 RX ADMIN — CEFTRIAXONE 100 MILLIGRAM(S): 500 INJECTION, POWDER, FOR SOLUTION INTRAMUSCULAR; INTRAVENOUS at 22:37

## 2022-10-01 RX ADMIN — Medication 100 MILLIGRAM(S): at 12:21

## 2022-10-01 RX ADMIN — AMLODIPINE BESYLATE 10 MILLIGRAM(S): 2.5 TABLET ORAL at 05:08

## 2022-10-01 RX ADMIN — ZINC SULFATE TAB 220 MG (50 MG ZINC EQUIVALENT) 220 MILLIGRAM(S): 220 (50 ZN) TAB at 12:20

## 2022-10-01 RX ADMIN — Medication 81 MILLIGRAM(S): at 12:20

## 2022-10-01 NOTE — PROGRESS NOTE ADULT - ASSESSMENT
ESRD , dialysis days are MWF. Post CT with angiogram on 09/27, required dialysis in 24 hours.  Underwent dialysis on 09/28, next dialysis. Follow up with dialysis 10/01/22.    Anemia restart BECKY and follow iron sat .      Diverticulitis with abscess placed on Ceftriaxone and Metronidazole. Post IR drain , 4 bacteria in abscess fluid,  follow up with ID.  Follow up with surgery.

## 2022-10-01 NOTE — PROGRESS NOTE ADULT - ASSESSMENT
67 year old female, coming from home, with medical history of ESRD(M-W-F), HTN, HIV coming in with abdominal pain, admitted with concern for bowel abscess with fistula    # Bowel abscess  # ESRD on HD, MWF  # HIV  # Anemia - likely 2/2 ESRD  # HTN  #Gout  #HLD  #Liver hypodensity on CT  #Pancreas cyst  CT Abdomen with IV Contrast shows abscess within the left lower quadrant small bowel wall secondary to fistulization from the sigmoid colon likely on the basis of chronic diverticular disease. Cancer tumor markers CEA, Ca 19-9,  negative.   - IR - Abscess drainage (9/28) -10 ml of foul smelling purulent discharge, IR drain placed - Draining fluid concerning for bowel fistulization - Will likely require surgery  - Ceftriaxone/flagyl IV, f/u cultures  -ID following, yareli recs  - f/u surgery recs - CLD, serial abdominal exams  -Pain regimen  - HD per nephro  - Continue ART  - f/u ESR, CRP  - c/w home meds  - tumor markers negative  - DVT ppx .    **PATIENT DOES NOT WANT HIV STATUS KNOWN TO FAMILY**

## 2022-10-01 NOTE — PROGRESS NOTE ADULT - SUBJECTIVE AND OBJECTIVE BOX
Pt s- new complaints  ICU Vital Signs Last 24 Hrs  T(C): 36.8 (01 Oct 2022 05:06), Max: 36.8 (01 Oct 2022 05:06)  T(F): 98.2 (01 Oct 2022 05:06), Max: 98.2 (01 Oct 2022 05:06)  HR: 71 (01 Oct 2022 05:06) (71 - 77)  BP: 117/64 (01 Oct 2022 05:06) (109/52 - 129/64)  BP(mean): 71 (30 Sep 2022 20:54) (71 - 73)  ABP: --  ABP(mean): --  RR: 18 (01 Oct 2022 05:06) (18 - 18)  SpO2: 99% (01 Oct 2022 05:06) (99% - 100%)    O2 Parameters below as of 01 Oct 2022 05:06  Patient On (Oxygen Delivery Method): room air        Alert nad  ABd: soft mild tenderness lower abd, no rebound tenderness    I&O's Detail    30 Sep 2022 07:01  -  01 Oct 2022 07:00  --------------------------------------------------------  IN:  Total IN: 0 mL    OUT:    Drain (mL): 85 mL  Total OUT: 85 mL    Total NET: -85 mL                              8.5    7.67  )-----------( 196      ( 30 Sep 2022 05:25 )             25.5   09-30    134<L>  |  96  |  29<H>  ----------------------------<  111<H>  3.7   |  28  |  7.25<H>    Ca    9.4      30 Sep 2022 05:25    TPro  6.5  /  Alb  2.3<L>  /  TBili  0.3  /  DBili  x   /  AST  19  /  ALT  14  /  AlkPhos  90  09-30

## 2022-10-01 NOTE — PROGRESS NOTE ADULT - ASSESSMENT
perf diverticulitis  s/p IR drain of abcess  clinically improved. tolerating clear liquids  iv abx  observation

## 2022-10-01 NOTE — PROGRESS NOTE ADULT - SUBJECTIVE AND OBJECTIVE BOX
**PATIENT DOES NOT WANT HIV STATUS KNOWN TO FAMILY**    [facility]  Patient is a 67y old  Female who presents with a chief complaint of Abdominal pain (01 Oct 2022 11:04)      SUBJECTIVE / OVERNIGHT EVENTS:  No acute events over night. Reports improvement of abdominal pain but continues to have fluid in IR drain that was placed. Tolerating LCLDDenies any fevers/chills, headache, CP, SOB, N/V/D, constipation, or leg swelling.       MEDICATIONS  (STANDING):  allopurinol 100 milliGRAM(s) Oral daily  amLODIPine   Tablet 10 milliGRAM(s) Oral daily  aspirin enteric coated 81 milliGRAM(s) Oral daily  atorvastatin 80 milliGRAM(s) Oral at bedtime  calcitriol   Capsule 0.25 MICROGram(s) Oral daily  cefTRIAXone   IVPB 2000 milliGRAM(s) IV Intermittent every 24 hours  chlorhexidine 2% Cloths 1 Application(s) Topical daily  citalopram 20 milliGRAM(s) Oral daily  darunavir 800 mG/cobicstat 150 mG 1 Tablet(s) Oral daily  dextrose 5% + sodium chloride 0.45%. 1000 milliLiter(s) (60 mL/Hr) IV Continuous <Continuous>  dolutegravir 50 milliGRAM(s) Oral daily  epoetin neyda-epbx (RETACRIT) Injectable 4000 Unit(s) IV Push <User Schedule>  hydrALAZINE 50 milliGRAM(s) Oral three times a day  isosorbide   mononitrate ER Tablet (IMDUR) 60 milliGRAM(s) Oral daily  lamiVUDine- milliGRAM(s) Oral daily  metoprolol tartrate 100 milliGRAM(s) Oral two times a day  metroNIDAZOLE  IVPB 500 milliGRAM(s) IV Intermittent every 8 hours  omega-3-Acid Ethyl Esters 4 Gram(s) Oral daily  senna 2 Tablet(s) Oral at bedtime  sodium bicarbonate 650 milliGRAM(s) Oral two times a day  zinc sulfate 220 milliGRAM(s) Oral daily    MEDICATIONS  (PRN):  acetaminophen     Tablet .. 650 milliGRAM(s) Oral every 6 hours PRN Temp greater or equal to 38C (100.4F), Mild Pain (1 - 3)  aluminum hydroxide/magnesium hydroxide/simethicone Suspension 30 milliLiter(s) Oral every 4 hours PRN Dyspepsia  melatonin 3 milliGRAM(s) Oral at bedtime PRN Insomnia  ondansetron Injectable 4 milliGRAM(s) IV Push every 8 hours PRN Nausea and/or Vomiting          OBJECTIVE:  Vital Signs Last 24 Hrs  T(C): 36.8 (01 Oct 2022 13:16), Max: 36.8 (01 Oct 2022 05:06)  T(F): 98.3 (01 Oct 2022 13:16), Max: 98.3 (01 Oct 2022 13:16)  HR: 69 (01 Oct 2022 13:16) (69 - 76)  BP: 130/62 (01 Oct 2022 13:16) (109/52 - 130/62)  BP(mean): 71 (30 Sep 2022 20:54) (71 - 71)  RR: 19 (01 Oct 2022 13:16) (18 - 19)  SpO2: 100% (01 Oct 2022 13:16) (99% - 100%)    Parameters below as of 01 Oct 2022 13:16  Patient On (Oxygen Delivery Method): room air      PHYSICAL EXAM:  well developed, well developed  GENERAL: NAD  HEENT: Normocephalic;  conjunctivae and sclerae clear; moist mucous membranes;   NECK : supple  CHEST/LUNG: Clear to auscultation bilaterally with good air entry   HEART: S1 S2  regular; no murmurs, gallops or rubs  ABDOMEN: LLQ mild tenderness on palpation, pigtail in place draining greenish liquidy drng, Soft, Nontender, Nondistended; Bowel sounds present  EXTREMITIES: no cyanosis; no edema; no calf tenderness  SKIN: warm and dry; no rash  NERVOUS SYSTEM:  Awake and alert; Oriented  to place, person and time ; no new deficits      CAPILLARY BLOOD GLUCOSE        I&O's Summary    30 Sep 2022 07:01  -  01 Oct 2022 07:00  --------------------------------------------------------  IN: 0 mL / OUT: 85 mL / NET: -85 mL              LABS:                        8.5    7.67  )-----------( 196      ( 30 Sep 2022 05:25 )             25.5     09-30    134<L>  |  96  |  29<H>  ----------------------------<  111<H>  3.7   |  28  |  7.25<H>    Ca    9.4      30 Sep 2022 05:25    TPro  6.5  /  Alb  2.3<L>  /  TBili  0.3  /  DBili  x   /  AST  19  /  ALT  14  /  AlkPhos  90  09-30                  RADIOLOGY & ADDITIONAL TESTS:

## 2022-10-01 NOTE — PROGRESS NOTE ADULT - SUBJECTIVE AND OBJECTIVE BOX
Problem List:  ESRD  Diverticulosis, admitted for abscess in LLQ within the wall of small bowel with fistula betwwen the mass and thickened sigmoid colon with diverticula. Post IR drainage tube in abscess area.  There is a continous grees drainage in the bag.  Post HD  yesterday  Abdominal pain improved , tolerate clear liquid feeding  Positive for BM.    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      ESRD on hemodialysis      History of ectopic pregnancy  Two      H/O: hysterectomy  Due to cervical cancer      S/P arteriovenous (AV) fistula creation  july 10 2020          oxycodone (Rash)  penicillins (Urticaria; Rash)      MEDICATIONS  (STANDING):  allopurinol 100 milliGRAM(s) Oral daily  amLODIPine   Tablet 10 milliGRAM(s) Oral daily  aspirin enteric coated 81 milliGRAM(s) Oral daily  atorvastatin 80 milliGRAM(s) Oral at bedtime  calcitriol   Capsule 0.25 MICROGram(s) Oral daily  cefTRIAXone   IVPB 2000 milliGRAM(s) IV Intermittent every 24 hours  chlorhexidine 2% Cloths 1 Application(s) Topical daily  citalopram 20 milliGRAM(s) Oral daily  darunavir 800 mG/cobicstat 150 mG 1 Tablet(s) Oral daily  dextrose 5% + sodium chloride 0.45%. 1000 milliLiter(s) (60 mL/Hr) IV Continuous <Continuous>  dolutegravir 50 milliGRAM(s) Oral daily  epoetin neyda-epbx (RETACRIT) Injectable 4000 Unit(s) IV Push <User Schedule>  hydrALAZINE 50 milliGRAM(s) Oral three times a day  isosorbide   mononitrate ER Tablet (IMDUR) 60 milliGRAM(s) Oral daily  lamiVUDine- milliGRAM(s) Oral daily  metoprolol tartrate 100 milliGRAM(s) Oral two times a day  metroNIDAZOLE  IVPB 500 milliGRAM(s) IV Intermittent every 8 hours  omega-3-Acid Ethyl Esters 4 Gram(s) Oral daily  senna 2 Tablet(s) Oral at bedtime  sodium bicarbonate 650 milliGRAM(s) Oral two times a day  zinc sulfate 220 milliGRAM(s) Oral daily    MEDICATIONS  (PRN):  acetaminophen     Tablet .. 650 milliGRAM(s) Oral every 6 hours PRN Temp greater or equal to 38C (100.4F), Mild Pain (1 - 3)  aluminum hydroxide/magnesium hydroxide/simethicone Suspension 30 milliLiter(s) Oral every 4 hours PRN Dyspepsia  melatonin 3 milliGRAM(s) Oral at bedtime PRN Insomnia  ondansetron Injectable 4 milliGRAM(s) IV Push every 8 hours PRN Nausea and/or Vomiting                            8.5    7.67  )-----------( 196      ( 30 Sep 2022 05:25 )             25.5     09-30    134<L>  |  96  |  29<H>  ----------------------------<  111<H>  3.7   |  28  |  7.25<H>    Ca    9.4      30 Sep 2022 05:25    TPro  6.5  /  Alb  2.3<L>  /  TBili  0.3  /  DBili  x   /  AST  19  /  ALT  14  /  AlkPhos  90  09-30    Gram Stain:   Few polymorphonuclear leukocytes per low power field   Moderate Gram positive cocci in pairs per oil power field   Moderate Gram Positive Rods per oil power field   Few Gram Negative Rods per oil power field   Culture yields >4 types of aerobic and/or anaerobic bacteria   Call client services within 7 days if further workup is clinically   indicated. Culture includes   Few Escherichia coli   Rare Klebsiella pneumoniae   Rare Proteus mirabilis           REVIEW OF SYSTEMS:  General: no fever no chills, no weight loss.  EYES/ENT: No visual changes;  No vertigo, no headache.  NECK: No pain or stiffness  RESPIRATORY: No cough, wheezing, hemoptysis; No shortness of breath  CARDIOVASCULAR: No chest pain or palpitations. No Edema  GASTROINTESTINAL: No abdominal or epigastric pain. No nausea, vomiting. No diarrhea,   GENITOURINARY: No dysuria, frequency, foamy urine, urinary urgency, incontinence or hematuria  NEUROLOGICAL: No numbness or weakness, no tremor , no dizziness.   Muscle skeletal : no joint pain and no swelling of joints and limbs.  SKIN: No itching, burning, rashes.        VITALS:  T(F): 98.3 (10-01-22 @ 13:16), Max: 98.3 (10-01-22 @ 13:16)  HR: 69 (10-01-22 @ 13:16)  BP: 130/62 (10-01-22 @ 13:16)  RR: 19 (10-01-22 @ 13:16)  SpO2: 100% (10-01-22 @ 13:16)  Wt(kg): --    09-30 @ 07:01  -  10-01 @ 07:00  --------------------------------------------------------  IN: 0 mL / OUT: 85 mL / NET: -85 mL    10-01 @ 07:01  -  10-01 @ 17:11  --------------------------------------------------------  IN: 0 mL / OUT: 50 mL / NET: -50 mL        PHYSICAL EXAM:  Constitutional: well developed, no diaphoresis, no distress.  Neck: No JVD, no carotid bruit, supple, no adenopathy  Respiratory: Good air entrance B/L, no wheezes, rales or rhonchi  Cardiovascular: S1, S2, RRR, no pericardial rub, no murmur  Abdomen: BS+, soft, tenderness on palpation in drainage exut site, no bruit  Pelvis: bladder nondistended  Extremities: No cyanosis or clubbing. No peripheral edema.   Left AVF

## 2022-10-02 LAB
ANION GAP SERPL CALC-SCNC: 8 MMOL/L — SIGNIFICANT CHANGE UP (ref 5–17)
BUN SERPL-MCNC: 11 MG/DL — SIGNIFICANT CHANGE UP (ref 7–18)
CALCIUM SERPL-MCNC: 8.6 MG/DL — SIGNIFICANT CHANGE UP (ref 8.4–10.5)
CHLORIDE SERPL-SCNC: 99 MMOL/L — SIGNIFICANT CHANGE UP (ref 96–108)
CO2 SERPL-SCNC: 27 MMOL/L — SIGNIFICANT CHANGE UP (ref 22–31)
CREAT SERPL-MCNC: 4.99 MG/DL — HIGH (ref 0.5–1.3)
EGFR: 9 ML/MIN/1.73M2 — LOW
GLUCOSE SERPL-MCNC: 90 MG/DL — SIGNIFICANT CHANGE UP (ref 70–99)
HCT VFR BLD CALC: 26.7 % — LOW (ref 34.5–45)
HGB BLD-MCNC: 8.9 G/DL — LOW (ref 11.5–15.5)
MCHC RBC-ENTMCNC: 31.1 PG — SIGNIFICANT CHANGE UP (ref 27–34)
MCHC RBC-ENTMCNC: 33.3 GM/DL — SIGNIFICANT CHANGE UP (ref 32–36)
MCV RBC AUTO: 93.4 FL — SIGNIFICANT CHANGE UP (ref 80–100)
NRBC # BLD: 0 /100 WBCS — SIGNIFICANT CHANGE UP (ref 0–0)
PLATELET # BLD AUTO: 218 K/UL — SIGNIFICANT CHANGE UP (ref 150–400)
POTASSIUM SERPL-MCNC: 3.7 MMOL/L — SIGNIFICANT CHANGE UP (ref 3.5–5.3)
POTASSIUM SERPL-SCNC: 3.7 MMOL/L — SIGNIFICANT CHANGE UP (ref 3.5–5.3)
RBC # BLD: 2.86 M/UL — LOW (ref 3.8–5.2)
RBC # FLD: 15.4 % — HIGH (ref 10.3–14.5)
SODIUM SERPL-SCNC: 134 MMOL/L — LOW (ref 135–145)
WBC # BLD: 4.28 K/UL — SIGNIFICANT CHANGE UP (ref 3.8–10.5)
WBC # FLD AUTO: 4.28 K/UL — SIGNIFICANT CHANGE UP (ref 3.8–10.5)

## 2022-10-02 PROCEDURE — 99233 SBSQ HOSP IP/OBS HIGH 50: CPT

## 2022-10-02 RX ADMIN — Medication 650 MILLIGRAM(S): at 17:33

## 2022-10-02 RX ADMIN — Medication 100 MILLIGRAM(S): at 13:29

## 2022-10-02 RX ADMIN — AMLODIPINE BESYLATE 10 MILLIGRAM(S): 2.5 TABLET ORAL at 10:07

## 2022-10-02 RX ADMIN — Medication 100 MILLIGRAM(S): at 10:07

## 2022-10-02 RX ADMIN — Medication 100 MILLIGRAM(S): at 17:33

## 2022-10-02 RX ADMIN — Medication 3 MILLIGRAM(S): at 00:13

## 2022-10-02 RX ADMIN — CEFTRIAXONE 100 MILLIGRAM(S): 500 INJECTION, POWDER, FOR SOLUTION INTRAMUSCULAR; INTRAVENOUS at 17:34

## 2022-10-02 RX ADMIN — SODIUM CHLORIDE 60 MILLILITER(S): 9 INJECTION, SOLUTION INTRAVENOUS at 06:32

## 2022-10-02 RX ADMIN — ISOSORBIDE MONONITRATE 60 MILLIGRAM(S): 60 TABLET, EXTENDED RELEASE ORAL at 13:29

## 2022-10-02 RX ADMIN — ATORVASTATIN CALCIUM 80 MILLIGRAM(S): 80 TABLET, FILM COATED ORAL at 21:24

## 2022-10-02 RX ADMIN — CHLORHEXIDINE GLUCONATE 1 APPLICATION(S): 213 SOLUTION TOPICAL at 11:37

## 2022-10-02 RX ADMIN — Medication 50 MILLIGRAM(S): at 13:28

## 2022-10-02 RX ADMIN — Medication 100 MILLIGRAM(S): at 05:52

## 2022-10-02 RX ADMIN — Medication 650 MILLIGRAM(S): at 05:51

## 2022-10-02 RX ADMIN — Medication 100 MILLIGRAM(S): at 21:25

## 2022-10-02 NOTE — PROGRESS NOTE ADULT - SUBJECTIVE AND OBJECTIVE BOX
Problem List:  ESRD  Diverticulosis, admitted for abscess in LLQ within the wall of small bowel with fistula betwwen the mass and thickened sigmoid colon with diverticula. Post IR drainage tube in abscess area.  There is a continous grees drainage in the bag.  Post HD  yesterday  Abdominal pain improved , tolerate clear liquid feeding  Positive for BM, c/o of diarrhea reduced appetite and one time vomit today.    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      ESRD on hemodialysis      History of ectopic pregnancy  Two      H/O: hysterectomy  Due to cervical cancer      S/P arteriovenous (AV) fistula creation  july 10 2020          oxycodone (Rash)  penicillins (Urticaria; Rash)      MEDICATIONS  (STANDING):  allopurinol 100 milliGRAM(s) Oral daily  amLODIPine   Tablet 10 milliGRAM(s) Oral daily  aspirin enteric coated 81 milliGRAM(s) Oral daily  atorvastatin 80 milliGRAM(s) Oral at bedtime  calcitriol   Capsule 0.25 MICROGram(s) Oral daily  cefTRIAXone   IVPB 2000 milliGRAM(s) IV Intermittent every 24 hours  chlorhexidine 2% Cloths 1 Application(s) Topical daily  citalopram 20 milliGRAM(s) Oral daily  darunavir 800 mG/cobicstat 150 mG 1 Tablet(s) Oral daily  dextrose 5% + sodium chloride 0.45%. 1000 milliLiter(s) (60 mL/Hr) IV Continuous <Continuous>  dolutegravir 50 milliGRAM(s) Oral daily  epoetin neyda-epbx (RETACRIT) Injectable 4000 Unit(s) IV Push <User Schedule>  hydrALAZINE 50 milliGRAM(s) Oral three times a day  isosorbide   mononitrate ER Tablet (IMDUR) 60 milliGRAM(s) Oral daily  lamiVUDine- milliGRAM(s) Oral daily  metoprolol tartrate 100 milliGRAM(s) Oral two times a day  metroNIDAZOLE  IVPB 500 milliGRAM(s) IV Intermittent every 8 hours  omega-3-Acid Ethyl Esters 4 Gram(s) Oral daily  senna 2 Tablet(s) Oral at bedtime  sodium bicarbonate 650 milliGRAM(s) Oral two times a day  zinc sulfate 220 milliGRAM(s) Oral daily    MEDICATIONS  (PRN):  acetaminophen     Tablet .. 650 milliGRAM(s) Oral every 6 hours PRN Temp greater or equal to 38C (100.4F), Mild Pain (1 - 3)  aluminum hydroxide/magnesium hydroxide/simethicone Suspension 30 milliLiter(s) Oral every 4 hours PRN Dyspepsia  melatonin 3 milliGRAM(s) Oral at bedtime PRN Insomnia  ondansetron Injectable 4 milliGRAM(s) IV Push every 8 hours PRN Nausea and/or Vomiting                            8.9    4.28  )-----------( 218      ( 02 Oct 2022 05:45 )             26.7     10-02    134<L>  |  99  |  11  ----------------------------<  90  3.7   |  27  |  4.99<H>    Ca    8.6      02 Oct 2022 05:45              REVIEW OF SYSTEMS:  General: no fever no chills, no weight loss.  EYES/ENT: No visual changes;  No vertigo, no headache.  NECK: No pain or stiffness  RESPIRATORY: No cough, wheezing, hemoptysis; No shortness of breath  CARDIOVASCULAR: No chest pain or palpitations. No Edema  GASTROINTESTINAL: No abdominal or epigastric pain. No nausea, vomiting. No diarrhea or constipation. No melena.  GENITOURINARY: as above          VITALS:  T(F): 98.1 (10-02-22 @ 13:12), Max: 99.4 (10-02-22 @ 05:06)  HR: 76 (10-02-22 @ 13:12)  BP: 130/77 (10-02-22 @ 13:12)  RR: 18 (10-02-22 @ 13:12)  SpO2: 99% (10-02-22 @ 13:12)  Wt(kg): --    10-01 @ 07:01  -  10-02 @ 07:00  --------------------------------------------------------  IN: 600 mL / OUT: 2250 mL / NET: -1650 mL        PHYSICAL EXAM:  Constitutional: well developed, no diaphoresis, no distress.  Neck: No JVD, no carotid bruit, supple, no adenopathy  Respiratory: Good air entrance B/L, no wheezes, rales or rhonchi  Cardiovascular: S1, S2, RRR, no pericardial rub, no murmur  Abdomen: BS+, soft, no tenderness, no bruit, fluid drainage color green.   Pelvis: bladder nondistended  Extremities: No cyanosis or clubbing. No peripheral edema.   s  Vascular Access: left AVF with bruit and thrill

## 2022-10-02 NOTE — PROGRESS NOTE ADULT - ASSESSMENT
67 year old female, coming from home, with medical history of ESRD(M-W-F), HTN, HIV coming in with abdominal pain, admitted with concern for bowel abscess with fistula    # Bowel abscess  # ESRD on HD, MWF  # HIV  # Anemia - likely 2/2 ESRD  # HTN  #Gout  #HLD  #Liver hypodensity on CT  #Pancreas cyst  CT Abdomen with IV Contrast shows abscess within the left lower quadrant small bowel wall secondary to fistulization from the sigmoid colon likely on the basis of chronic diverticular disease. Cancer tumor markers CEA, Ca 19-9,  negative.   - IR - Abscess drainage (9/28) -10 ml of foul smelling purulent discharge, IR drain placed - Draining fluid concerning for bowel fistulization - Will likely require surgery  - Ceftriaxone/flagyl IV, f/u cultures  -ID following, yareli recs  - f/u surgery recs - CLD, serial abdominal exams  -Pain regimen  - HD per nephro  - Continue ART  - f/u ESR, CRP  - c/w home meds  - tumor markers negative  - DVT ppx    **PATIENT DOES NOT WANT HIV STATUS KNOWN TO FAMILY** 67 year old female, coming from home, with medical history of ESRD(M-W-F), HTN, HIV coming in with abdominal pain, admitted with concern for bowel abscess with fistula    # Bowel abscess  # ESRD on HD, MWF  # HIV  # Anemia - likely 2/2 ESRD  # HTN  #Gout  #HLD  #Liver hypodensity on CT  #Pancreas cyst  CT Abdomen with IV Contrast shows abscess within the left lower quadrant small bowel wall secondary to fistulization from the sigmoid colon likely on the basis of chronic diverticular disease. Cancer tumor markers CEA, Ca 19-9,  negative.   - IR - Abscess drainage (9/28) -10 ml of foul smelling purulent discharge, IR drain placed - Draining fluid concerning for bowel fistulization - Will likely require surgery  - Ceftriaxone/flagyl IV, f/u cultures  -ID following, yareli recs  - f/u surgery recs - CLD, serial abdominal exams  -Pain regimen  - HD per nephro  - Continue ART  - f/u ESR, CRP  - c/w home meds  - tumor markers negative  - DVT ppx    - Called daughter [phone] regarding patient updates  **PATIENT DOES NOT WANT HIV STATUS KNOWN TO FAMILY**

## 2022-10-02 NOTE — PROGRESS NOTE ADULT - SUBJECTIVE AND OBJECTIVE BOX
**PATIENT DOES NOT WANT HIV STATUS KNOWN TO FAMILY**    [facility]  Patient is a 67y old  Female who presents with a chief complaint of Abdominal pain (01 Oct 2022 11:04)      SUBJECTIVE / OVERNIGHT EVENTS:  No acute events over night. Reports improvement of abdominal pain but continues to have fluid in IR drain that was placed. Tolerating LCLDDenies any fevers/chills, headache, CP, SOB, N/V/D, constipation, or leg swelling.       MEDICATIONS  (STANDING):  allopurinol 100 milliGRAM(s) Oral daily  amLODIPine   Tablet 10 milliGRAM(s) Oral daily  aspirin enteric coated 81 milliGRAM(s) Oral daily  atorvastatin 80 milliGRAM(s) Oral at bedtime  calcitriol   Capsule 0.25 MICROGram(s) Oral daily  cefTRIAXone   IVPB 2000 milliGRAM(s) IV Intermittent every 24 hours  chlorhexidine 2% Cloths 1 Application(s) Topical daily  citalopram 20 milliGRAM(s) Oral daily  darunavir 800 mG/cobicstat 150 mG 1 Tablet(s) Oral daily  dextrose 5% + sodium chloride 0.45%. 1000 milliLiter(s) (60 mL/Hr) IV Continuous <Continuous>  dolutegravir 50 milliGRAM(s) Oral daily  epoetin neyda-epbx (RETACRIT) Injectable 4000 Unit(s) IV Push <User Schedule>  hydrALAZINE 50 milliGRAM(s) Oral three times a day  isosorbide   mononitrate ER Tablet (IMDUR) 60 milliGRAM(s) Oral daily  lamiVUDine- milliGRAM(s) Oral daily  metoprolol tartrate 100 milliGRAM(s) Oral two times a day  metroNIDAZOLE  IVPB 500 milliGRAM(s) IV Intermittent every 8 hours  omega-3-Acid Ethyl Esters 4 Gram(s) Oral daily  senna 2 Tablet(s) Oral at bedtime  sodium bicarbonate 650 milliGRAM(s) Oral two times a day  zinc sulfate 220 milliGRAM(s) Oral daily    MEDICATIONS  (PRN):  acetaminophen     Tablet .. 650 milliGRAM(s) Oral every 6 hours PRN Temp greater or equal to 38C (100.4F), Mild Pain (1 - 3)  aluminum hydroxide/magnesium hydroxide/simethicone Suspension 30 milliLiter(s) Oral every 4 hours PRN Dyspepsia  melatonin 3 milliGRAM(s) Oral at bedtime PRN Insomnia  ondansetron Injectable 4 milliGRAM(s) IV Push every 8 hours PRN Nausea and/or Vomiting      Vital Signs Last 24 Hrs  T(C): 36.7 (02 Oct 2022 13:12), Max: 37.4 (02 Oct 2022 05:06)  T(F): 98.1 (02 Oct 2022 13:12), Max: 99.4 (02 Oct 2022 05:06)  HR: 76 (02 Oct 2022 13:12) (69 - 94)  BP: 130/77 (02 Oct 2022 13:12) (126/61 - 167/68)  BP(mean): --  RR: 18 (02 Oct 2022 13:12) (18 - 18)  SpO2: 99% (02 Oct 2022 13:12) (98% - 100%)    Parameters below as of 02 Oct 2022 13:12  Patient On (Oxygen Delivery Method): room air      PHYSICAL EXAM:  well developed, well developed  GENERAL: NAD  HEENT: Normocephalic;  conjunctivae and sclerae clear; moist mucous membranes;   NECK : supple  CHEST/LUNG: Clear to auscultation bilaterally with good air entry   HEART: S1 S2  regular; no murmurs, gallops or rubs  ABDOMEN: LLQ mild tenderness on palpation, pigtail in place draining greenish liquidy drng, Soft, Nontender, Nondistended; Bowel sounds present  EXTREMITIES: no cyanosis; no edema; no calf tenderness  SKIN: warm and dry; no rash  NERVOUS SYSTEM:  Awake and alert; Oriented  to place, person and time ; no new deficits      CAPILLARY BLOOD GLUCOSE        I&O's Summary    30 Sep 2022 07:01  -  01 Oct 2022 07:00  --------------------------------------------------------  IN: 0 mL / OUT: 85 mL / NET: -85 mL              LABS:                        8.5    7.67  )-----------( 196      ( 30 Sep 2022 05:25 )             25.5     09-30    134<L>  |  96  |  29<H>  ----------------------------<  111<H>  3.7   |  28  |  7.25<H>    Ca    9.4      30 Sep 2022 05:25    TPro  6.5  /  Alb  2.3<L>  /  TBili  0.3  /  DBili  x   /  AST  19  /  ALT  14  /  AlkPhos  90  09-30                  RADIOLOGY & ADDITIONAL TESTS:

## 2022-10-02 NOTE — PROGRESS NOTE ADULT - ASSESSMENT
ESRD , dialysis days are MWF. Post CT with angiogram on 09/27, required dialysis in 24 hours.  Underwent dialysis on 09/28, next dialysis. Follow up with dialysis 10/01/22.    Anemia restart BECKY and follow iron sat .      Diverticulitis, fistula /  abscess placed on Ceftriaxone and Metronidazole. Post IR drain , 4 bacteria in abscess fluid,  follow up with ID.  Follow up with surgery.

## 2022-10-03 LAB
ANION GAP SERPL CALC-SCNC: 11 MMOL/L — SIGNIFICANT CHANGE UP (ref 5–17)
BUN SERPL-MCNC: 18 MG/DL — SIGNIFICANT CHANGE UP (ref 7–18)
CALCIUM SERPL-MCNC: 9.2 MG/DL — SIGNIFICANT CHANGE UP (ref 8.4–10.5)
CHLORIDE SERPL-SCNC: 98 MMOL/L — SIGNIFICANT CHANGE UP (ref 96–108)
CO2 SERPL-SCNC: 24 MMOL/L — SIGNIFICANT CHANGE UP (ref 22–31)
CREAT SERPL-MCNC: 6.97 MG/DL — HIGH (ref 0.5–1.3)
EGFR: 6 ML/MIN/1.73M2 — LOW
GLUCOSE SERPL-MCNC: 90 MG/DL — SIGNIFICANT CHANGE UP (ref 70–99)
HCT VFR BLD CALC: 28.5 % — LOW (ref 34.5–45)
HGB BLD-MCNC: 9.5 G/DL — LOW (ref 11.5–15.5)
MCHC RBC-ENTMCNC: 31.4 PG — SIGNIFICANT CHANGE UP (ref 27–34)
MCHC RBC-ENTMCNC: 33.3 GM/DL — SIGNIFICANT CHANGE UP (ref 32–36)
MCV RBC AUTO: 94.1 FL — SIGNIFICANT CHANGE UP (ref 80–100)
NRBC # BLD: 0 /100 WBCS — SIGNIFICANT CHANGE UP (ref 0–0)
PLATELET # BLD AUTO: 281 K/UL — SIGNIFICANT CHANGE UP (ref 150–400)
POTASSIUM SERPL-MCNC: 3.9 MMOL/L — SIGNIFICANT CHANGE UP (ref 3.5–5.3)
POTASSIUM SERPL-SCNC: 3.9 MMOL/L — SIGNIFICANT CHANGE UP (ref 3.5–5.3)
RBC # BLD: 3.03 M/UL — LOW (ref 3.8–5.2)
RBC # FLD: 15.8 % — HIGH (ref 10.3–14.5)
SARS-COV-2 RNA SPEC QL NAA+PROBE: SIGNIFICANT CHANGE UP
SODIUM SERPL-SCNC: 133 MMOL/L — LOW (ref 135–145)
WBC # BLD: 5.51 K/UL — SIGNIFICANT CHANGE UP (ref 3.8–10.5)
WBC # FLD AUTO: 5.51 K/UL — SIGNIFICANT CHANGE UP (ref 3.8–10.5)

## 2022-10-03 PROCEDURE — 99232 SBSQ HOSP IP/OBS MODERATE 35: CPT

## 2022-10-03 PROCEDURE — 99233 SBSQ HOSP IP/OBS HIGH 50: CPT

## 2022-10-03 RX ADMIN — SODIUM CHLORIDE 60 MILLILITER(S): 9 INJECTION, SOLUTION INTRAVENOUS at 18:17

## 2022-10-03 RX ADMIN — ISOSORBIDE MONONITRATE 60 MILLIGRAM(S): 60 TABLET, EXTENDED RELEASE ORAL at 11:41

## 2022-10-03 RX ADMIN — Medication 100 MILLIGRAM(S): at 11:44

## 2022-10-03 RX ADMIN — Medication 100 MILLIGRAM(S): at 06:02

## 2022-10-03 RX ADMIN — Medication 50 MILLIGRAM(S): at 13:59

## 2022-10-03 RX ADMIN — ZINC SULFATE TAB 220 MG (50 MG ZINC EQUIVALENT) 220 MILLIGRAM(S): 220 (50 ZN) TAB at 11:37

## 2022-10-03 RX ADMIN — CEFTRIAXONE 100 MILLIGRAM(S): 500 INJECTION, POWDER, FOR SOLUTION INTRAMUSCULAR; INTRAVENOUS at 18:03

## 2022-10-03 RX ADMIN — Medication 4 GRAM(S): at 11:36

## 2022-10-03 RX ADMIN — Medication 81 MILLIGRAM(S): at 11:35

## 2022-10-03 RX ADMIN — CITALOPRAM 20 MILLIGRAM(S): 10 TABLET, FILM COATED ORAL at 11:53

## 2022-10-03 RX ADMIN — ATORVASTATIN CALCIUM 80 MILLIGRAM(S): 80 TABLET, FILM COATED ORAL at 21:24

## 2022-10-03 RX ADMIN — Medication 3 MILLIGRAM(S): at 00:11

## 2022-10-03 RX ADMIN — CHLORHEXIDINE GLUCONATE 1 APPLICATION(S): 213 SOLUTION TOPICAL at 14:25

## 2022-10-03 RX ADMIN — CALCITRIOL 0.25 MICROGRAM(S): 0.5 CAPSULE ORAL at 11:43

## 2022-10-03 RX ADMIN — Medication 100 MILLIGRAM(S): at 18:16

## 2022-10-03 RX ADMIN — Medication 650 MILLIGRAM(S): at 18:10

## 2022-10-03 RX ADMIN — DOLUTEGRAVIR SODIUM 50 MILLIGRAM(S): 25 TABLET, FILM COATED ORAL at 11:44

## 2022-10-03 RX ADMIN — Medication 100 MILLIGRAM(S): at 11:53

## 2022-10-03 RX ADMIN — Medication 100 MILLIGRAM(S): at 18:08

## 2022-10-03 RX ADMIN — DARUNAVIR ETHANOLATE AND COBICISTAT 1 TABLET(S): 800; 150 TABLET, FILM COATED ORAL at 11:40

## 2022-10-03 NOTE — PROGRESS NOTE ADULT - NS ATTEND AMEND GEN_ALL_CORE FT
67 year old female, coming from home, with medical history of ESRD(M-W-F), HTN, HIV coming in with abdominal pain, admitted with concern for bowel abscess with fistula    # Bowel abscess  # ESRD on HD, MWF  # HIV  # Anemia - likely 2/2 ESRD  # HTN  #Gout  #HLD  #Liver hypodensity on CT  #Pancreas cyst  CT Abdomen with IV Contrast shows abscess within the left lower quadrant small bowel wall secondary to fistulization from the sigmoid colon likely on the basis of chronic diverticular disease. Cancer tumor markers CEA, Ca 19-9,  negative.   - IR - Abscess drainage (9/28) -10 ml of foul smelling purulent discharge, IR drain placed - Draining fluid concerning for bowel fistulization - Will likely require surgery - f/u Surg recs  - Ceftriaxone/flagyl IV, f/u cultures  -ID following, yareli recs  - f/u surgery recs - CLD, serial abdominal exams  -Pain regimen  - HD per nephro  - Continue ART  - f/u ESR, CRP  - c/w home meds  - tumor markers negative  - DVT ppx    - Called daughter [phone] regarding patient updates  **PATIENT DOES NOT WANT HIV STATUS KNOWN TO FAMILY**

## 2022-10-03 NOTE — PROGRESS NOTE ADULT - NS ATTEND AMEND GEN_ALL_CORE FT
Pt seen and examined. Denies abdominal pain. Reports is hungry. States has difficulty with po meds as is only taking clears  WBC normal. Abd soft NT/ND, no guarding no rebound. IR drain rita bilious output, 100 cc over last 24 hours.   Pt with small bowel fistula secondary to perforated diverticulitis. Currently with controlled low output fistula. Currently with benign exam. Advance diet, monitor drain output. If has significant increase in output, worsening signs infection, increased abdominal pain, may require surgical management this admission.

## 2022-10-03 NOTE — PROGRESS NOTE ADULT - ASSESSMENT
Subjective: NAD, afebrile, still NPO, improved abd pain, no N/V, has LLQ drain with billary appearing fluid with significant output.     REVIEW OF SYSTEMS:  CONSTITUTIONAL:  No weakness, fevers or chills  EYES/ENT:  No visual changes;  No vertigo or throat pain   NECK:  No pain or stiffness  RESPIRATORY:  No cough, wheezing, hemoptysis; No shortness of breath  CARDIOVASCULAR:  No chest pain or palpitations  GASTROINTESTINAL:  mild LLQ discomfort. No nausea, vomiting, or hematemesis; No diarrhea or constipation. No melena or hematochezia.  GENITOURINARY:  No dysuria, frequency or hematuria  NEUROLOGICAL:  No numbness or weakness  MSK: no back pain, no joint pain  SKIN:  No itching, rashes    PE:  Vital Signs Last 24 Hrs  T(C): 36.3 (03 Oct 2022 04:59), Max: 37.3 (02 Oct 2022 20:07)  T(F): 97.3 (03 Oct 2022 04:59), Max: 99.1 (02 Oct 2022 20:07)  HR: 77 (03 Oct 2022 11:48) (75 - 77)  BP: 143/69 (03 Oct 2022 11:48) (126/58 - 143/69)  RR: 18 (03 Oct 2022 04:59) (18 - 18)  SpO2: 99% (03 Oct 2022 04:59) (99% - 99%)    Parameters below as of 03 Oct 2022 04:59 Patient On (Oxygen Delivery Method): room air    Gen: AOx3, NAD, non-toxic, pleasant  HEAD:  Atraumatic, Normocephalic  EYES: EOMI, PERRLA, conjunctiva and sclera clear  ENT: Moist mucous membranes  NECK: Supple, No JVD  CV: S1+S2 normal, non tachycardic  Resp: Clear bilat, no resp distress, no crackles/wheezes  Abd: Soft, nontender on superficial palpation, mild discomfort on deep palpation on LLQ, +BS. LLQ drain with significant amount of greenish billary looking fluid.   Ext: No LE edema, no wounds  : No Farias  IV/Skin: No thrombophlebitis  Msk: No low back pain, no arthralgias, no joint swelling  Neuro: AAOx3. No sensory deficits, no motor deficits    LABS:                      9.5    5.51  )-----------( 281      ( 03 Oct 2022 05:20 )             28.5     10-03    133<L>  |  98  |  18  ----------------------------<  90  3.9   |  24  |  6.97<H>    Ca    9.2      03 Oct 2022 05:20    MICROBIOLOGY:  v  .Abscess abdominal abscess drainage  09-28-22   Culture yields >4 types of aerobic and/or anaerobic bacteria  Call client services within 7 days if further workup is clinically  indicated. Culture includes  Few Escherichia coli  Rare Klebsiella pneumoniae  Rare Proteus mirabilis  Few Escherichiacoli #2  --  Escherichia coli  Klebsiella pneumoniae  Escherichia coli  Proteus mirabilis    HIV-1 RNA Quantitative, Viral Load Log: DET.  <1.47 lg /mL (09-29-22 @ 01:18)    IMPRESSION:  66 yo female from [facility] with H/O ESRD on HD(MWF) via L arm AVF, HIV on ART diagnosed 16 yrs ago (Tivicay + Lamivudine + Prezcobix) admitted for LLQ abscess.   HBsAB, HBsAG, HBcAB IgM non reactive.  Hep C negative.  CD4 377.  VL Det <30.  BF culture w/E coli, Klebsiella, Proteus mirabilis.  CEA, Cancer Ag , CA-125 negative.    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   -PCN allergy(when she was a child, she was told she had hives, she does not remember)  -ESRD on HD    CT Abdomen and Pelvis w/ IV Cont (09.27.22 @ 06:30) > CT A/P w/IV + abscess within the left lower quadrant small bowel wall secondary to fistulization from the sigmoid colon likely on the basis of chronic diverticular disease. S/P IR drainage of the abscess 9/28 with 10 ml of foul smelling purulent discharge, has drain in with dark, purulent drainage.    PLAN:  -Continue CXT 2g IV daily + Flagyl 500 mg q/8hrs.  -Continue ART (Tivicay(50 mg) +Prezcovix (800/150 mg) + Lamivudine (100 mg) daily.   -Hep B non immune, consider Hepatitis B vaccination series (OP)  -F/up cultures until finalized.  -Trend ESR and CRP q/2-3 days.  -Surgery following for intervention since there is small bowel-sigmoid colon fistula with high output on drain bag.  -Discussed with medicine.     Please reach ID with any questions or concerns  Dr. Mine Garnett  Tel. [phone]  Available in Teams

## 2022-10-03 NOTE — PROGRESS NOTE ADULT - ASSESSMENT
ESRD , dialysis days are MWF.   Continue with dialysis today    Anemia restart BECKY , no need for Iron IV.    Diverticulitis, fistula /  abscess placed on Ceftriaxone and Metronidazole. Post IR drain , 4 bacteria in abscess fluid,  follow up with ID.  Follow up with surgery.

## 2022-10-03 NOTE — PROGRESS NOTE ADULT - ASSESSMENT
This is a 67 year old female, coming from home, with medical history of ESRD(M-W-F), HTN, HIV coming in with abdominal pain. Found to have  Abscess within the left lower quadrant small bowel wall secondary to fistulization from the sigmoid colon likely on the basis of chronic diverticular disease. started on Ceftriaxone and Flagyl. ID following. S/p IR CT guided drainage of abscess and Pigtail to drainage bag. Pt followed by surgery. Pt also with ESRD with dialysis M/W/F, followed by Nephro.

## 2022-10-03 NOTE — PROGRESS NOTE ADULT - PROBLEM SELECTOR PLAN 1
-2/2 to fistulization from the sigmoid colon likely on the basis of chronic diverticular disease.  -s/p IR CT guided drainage 9/28  -Abscess CC + for proteus mirabilis, ecoli, klebsiella pneumoniae  -Cont  IV Ceftriaxone 2g and IV Flagyl 500mg q 8hours  - tolerating clear,- per surgery to keep NPO for now - f/u further recs  -Surgery dr. Francisco  -ID dr. Young

## 2022-10-03 NOTE — PROGRESS NOTE ADULT - PROBLEM SELECTOR PLAN 8
F/u further recs from surgery          Discharge likely back to home when medically stable.  Pt. has one  daughter who is an RN and lives in [place].  Pt. lives alone

## 2022-10-03 NOTE — PROGRESS NOTE ADULT - SUBJECTIVE AND OBJECTIVE BOX
Patient is a 67y old  Female who presents with a chief complaint of Abdominal pain (03 Oct 2022 07:54)    INTERVAL HPI/OVERNIGHT EVENTS: no acute events ovnernght     REVIEW OF SYSTEMS:  CONSTITUTIONAL: No fever, chills  ENMT:  No difficulty hearing, no change in vision  NECK: No pain or stiffness  RESPIRATORY: No cough, SOB  CARDIOVASCULAR: No chest pain, palpitations  GASTROINTESTINAL: No abdominal pain. No nausea, vomiting, or diarrhea  GENITOURINARY: No dysuria  NEUROLOGICAL: No HA  SKIN: No itching, burning, rashes, or lesions   LYMPH NODES: No enlarged glands  ENDOCRINE: No heat or cold intolerance; No hair loss  MUSCULOSKELETAL: No joint pain or swelling; No muscle, back, or extremity pain  PSYCHIATRIC: No depression, anxiety  HEME/LYMPH: No easy bruising, or bleeding gums    T(C): 36.3 (10-03-22 @ 04:59), Max: 37.3 (10-02-22 @ 20:07)  HR: 75 (10-03-22 @ 04:59) (75 - 76)  BP: 141/66 (10-03-22 @ 04:59) (126/58 - 141/66)  RR: 18 (10-03-22 @ 04:59) (18 - 18)  SpO2: 99% (10-03-22 @ 04:59) (99% - 99%)  Wt(kg): --Vital Signs Last 24 Hrs  T(C): 36.3 (03 Oct 2022 04:59), Max: 37.3 (02 Oct 2022 20:07)  T(F): 97.3 (03 Oct 2022 04:59), Max: 99.1 (02 Oct 2022 20:07)  HR: 75 (03 Oct 2022 04:59) (75 - 76)  BP: 141/66 (03 Oct 2022 04:59) (126/58 - 141/66)  BP(mean): --  RR: 18 (03 Oct 2022 04:59) (18 - 18)  SpO2: 99% (03 Oct 2022 04:59) (99% - 99%)    Parameters below as of 03 Oct 2022 04:59  Patient On (Oxygen Delivery Method): room air        MEDICATIONS  (STANDING):  allopurinol 100 milliGRAM(s) Oral daily  amLODIPine   Tablet 10 milliGRAM(s) Oral daily  aspirin enteric coated 81 milliGRAM(s) Oral daily  atorvastatin 80 milliGRAM(s) Oral at bedtime  calcitriol   Capsule 0.25 MICROGram(s) Oral daily  cefTRIAXone   IVPB 2000 milliGRAM(s) IV Intermittent every 24 hours  chlorhexidine 2% Cloths 1 Application(s) Topical daily  citalopram 20 milliGRAM(s) Oral daily  darunavir 800 mG/cobicstat 150 mG 1 Tablet(s) Oral daily  dextrose 5% + sodium chloride 0.45%. 1000 milliLiter(s) (60 mL/Hr) IV Continuous <Continuous>  dolutegravir 50 milliGRAM(s) Oral daily  epoetin neyda-epbx (RETACRIT) Injectable 4000 Unit(s) IV Push <User Schedule>  hydrALAZINE 50 milliGRAM(s) Oral three times a day  isosorbide   mononitrate ER Tablet (IMDUR) 60 milliGRAM(s) Oral daily  lamiVUDine- milliGRAM(s) Oral daily  metoprolol tartrate 100 milliGRAM(s) Oral two times a day  metroNIDAZOLE  IVPB 500 milliGRAM(s) IV Intermittent every 8 hours  omega-3-Acid Ethyl Esters 4 Gram(s) Oral daily  senna 2 Tablet(s) Oral at bedtime  sodium bicarbonate 650 milliGRAM(s) Oral two times a day  zinc sulfate 220 milliGRAM(s) Oral daily    MEDICATIONS  (PRN):  acetaminophen     Tablet .. 650 milliGRAM(s) Oral every 6 hours PRN Temp greater or equal to 38C (100.4F), Mild Pain (1 - 3)  aluminum hydroxide/magnesium hydroxide/simethicone Suspension 30 milliLiter(s) Oral every 4 hours PRN Dyspepsia  melatonin 3 milliGRAM(s) Oral at bedtime PRN Insomnia  ondansetron Injectable 4 milliGRAM(s) IV Push every 8 hours PRN Nausea and/or Vomiting    PHYSICAL EXAM:  GENERAL: NAD  EYES: clear conjunctiva; EOMI  ENMT: Moist mucous membranes  NECK: Supple, No JVD, Normal thyroid  CHEST/LUNG: Clear to auscultation bilaterally; No rales, rhonchi, wheezing, or rubs  HEART: S1, S2, Regular rate and rhythm  ABDOMEN: Soft, Nontender, Nondistended; Bowel sounds present, LL abd percutaneous tube with garnish drainage  NEURO: Alert & Oriented X3  EXTREMITIES: No LE edema, no calf tenderness, LUE + thrill and + bruit  LYMPH: No lymphadenopathy noted  SKIN: No rashes or lesions    Consultant(s) Notes Reviewed:  [x ] YES  [ ] NO  Care Discussed with Consultants/Other Providers [ x] YES  [ ] NO    LABS:                        9.5    5.51  )-----------( 281      ( 03 Oct 2022 05:20 )             28.5     10-03    133<L>  |  98  |  18  ----------------------------<  90  3.9   |  24  |  6.97<H>    Ca    9.2      03 Oct 2022 05:20    CAPILLARY BLOOD GLUCOSE      RADIOLOGY & ADDITIONAL TESTS:    Imaging Personally Reviewed:  [x ] YES  [ ] NO    < from: CT Abdomen and Pelvis w/ IV Cont (09.27.22 @ 06:30) >    ACC: [id number] EXAM:  CT ABDOMEN AND PELVIS IC                          PROCEDURE DATE:  09/27/2022          INTERPRETATION:  CLINICAL INFORMATION: Abdominal pain question abscess    COMPARISON: CT abdomen pelvis 9/27/2022 earlier the same day.    CONTRAST/COMPLICATIONS:  IV Contrast: Omnipaque 350 90 cc administered  90 cc administered   10 cc   discarded  Oral Contrast: None  Complications: None    PROCEDURE:  CT of the Abdomen and Pelvis was performed.  Sagittal and coronal reformats were performed.    FINDINGS:  LOWER CHEST: Probable postsurgical changes in the left lower lung. 1.6 cm   left lower lobe thin-walled cyst.    LIVER: 1.3 cm right lobe hypodensity with suggestion of peripheral early   nodular enhancement (4:61). This may represent a hemangioma. This could   be confirmed with MRI.  BILE DUCTS: Normal caliber.  GALLBLADDER: Within normal limits.  SPLEEN: Within normal limits.  PANCREAS: 1.4 cm cyst in uncinate process or a focally dilated duct. This   would be better evaluated with dedicated MRI.  ADRENALS: Within normal limits.  KIDNEYS/URETERS: Bilateral polycystic kidneys    BLADDER: Within normal limits.  REPRODUCTIVE ORGANS: Hysterectomy.    BOWEL: Moderate sized hiatal hernia. Normal appendix.    Oral contrast is again seen within mildly prominent loops of proximal   small bowel measuring up to 2.7 cm in diameter to a mass in the left   lower quadrant. This is unchanged. The mass measures 5.8 x 5.1 cm and   contains fluid and air. This appears to be within thewall of the small   bowel. A thin column of oral contrast passes within the lumen of the   small bowel distally into collapsed loops of small bowel. There is an   apparent fistula between this mass in the adjacent markedly thickened   sigmoid colon which contains numerous diverticula. There are inflammatory   changes in the adjacent fat. This likely represents sigmoid   diverticulitis with small bowel fistulization to the raw within adjacent   loop of small bowel in a contained abscess.      PERITONEUM: No ascites.  VESSELS: Atherosclerotic changes.  RETROPERITONEUM/LYMPH NODES: No lymphadenopathy.  ABDOMINAL WALL: Small fat-containing umbilical hernia.  BONES: Within normal limits.    IMPRESSION:    Abscess within the left lower quadrant small bowel wall secondary to   fistulization from the sigmoid colon likely on the basis of chronic   diverticular disease.    1.3 cm right lobe hypodensity with suggestion of peripheral early nodular   enhancement. This may represent a hemangioma. This could be confirmed   with MRI.    1.4 cm cyst in uncinate process or a focally dilated duct. This would be   better evaluated with dedicated MRI.    Probable probably cystic kidneys. Correlate clinically for kidney disease.      --- End of Report ---            ISAI HERNANDEZ MD; Attending Radiologist  This document has been electronically signed. Sep 27 2022  6:35AM    < end of copied text >

## 2022-10-03 NOTE — PROGRESS NOTE ADULT - SUBJECTIVE AND OBJECTIVE BOX
INTERVAL HPI/OVERNIGHT EVENTS:    Pt seen and examined at bedside. Offers no acute complaints at this time.   No fever, chills, SOB or CP.     Vital Signs Last 24 Hrs  T(C): 36.3 (03 Oct 2022 04:59), Max: 37.3 (02 Oct 2022 20:07)  T(F): 97.3 (03 Oct 2022 04:59), Max: 99.1 (02 Oct 2022 20:07)  HR: 75 (03 Oct 2022 04:59) (75 - 94)  BP: 141/66 (03 Oct 2022 04:59) (126/58 - 143/67)  BP(mean): --  RR: 18 (03 Oct 2022 04:59) (18 - 18)  SpO2: 99% (03 Oct 2022 04:59) (98% - 99%)    Parameters below as of 03 Oct 2022 04:59  Patient On (Oxygen Delivery Method): room air      I&O's Detail    aluminum hydroxide/magnesium hydroxide/simethicone Suspension 30 milliLiter(s) Oral every 4 hours PRN  cefTRIAXone   IVPB 2000 milliGRAM(s) IV Intermittent every 24 hours  darunavir 800 mG/cobicstat 150 mG 1 Tablet(s) Oral daily  dolutegravir 50 milliGRAM(s) Oral daily  lamiVUDine- milliGRAM(s) Oral daily  metroNIDAZOLE  IVPB 500 milliGRAM(s) IV Intermittent every 8 hours  omega-3-Acid Ethyl Esters 4 Gram(s) Oral daily  senna 2 Tablet(s) Oral at bedtime      Physical Exam  General: AAOx3, No acute distress  Skin: No jaundice, no icterus  Abdomen: soft, nondistended, min LLQ TTP, no rebound tenderness, no guarding, no palpable masses, IR drain in place, sero purulent output  Extremities: non edematous, no calf pain bilaterally        Labs:                        9.5    5.51  )-----------( 281      ( 03 Oct 2022 05:20 )             28.5     10-03    133<L>  |  98  |  18  ----------------------------<  90  3.9   |  24  |  6.97<H>    Ca    9.2      03 Oct 2022 05:20

## 2022-10-03 NOTE — PROGRESS NOTE ADULT - SUBJECTIVE AND OBJECTIVE BOX
Problem List:  ESRD  Diverticulosis, admitted for abscess in LLQ within the wall of small bowel with fistula betwwen the mass and thickened sigmoid colon with diverticula. Post IR drainage tube in abscess area.  There is a continous grees drainage in the bag.  Post HD  yesterday  Abdominal pain improved , tolerate clear liquid feeding  Positive for BM, c/o of diarrhea reduced appetite and one time vomit today.      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      ESRD on hemodialysis      History of ectopic pregnancy  Two      H/O: hysterectomy  Due to cervical cancer      S/P arteriovenous (AV) fistula creation  july 10 2020          oxycodone (Rash)  penicillins (Urticaria; Rash)      MEDICATIONS  (STANDING):  allopurinol 100 milliGRAM(s) Oral daily  amLODIPine   Tablet 10 milliGRAM(s) Oral daily  aspirin enteric coated 81 milliGRAM(s) Oral daily  atorvastatin 80 milliGRAM(s) Oral at bedtime  calcitriol   Capsule 0.25 MICROGram(s) Oral daily  cefTRIAXone   IVPB 2000 milliGRAM(s) IV Intermittent every 24 hours  chlorhexidine 2% Cloths 1 Application(s) Topical daily  citalopram 20 milliGRAM(s) Oral daily  darunavir 800 mG/cobicstat 150 mG 1 Tablet(s) Oral daily  dextrose 5% + sodium chloride 0.45%. 1000 milliLiter(s) (60 mL/Hr) IV Continuous <Continuous>  dolutegravir 50 milliGRAM(s) Oral daily  epoetin neyda-epbx (RETACRIT) Injectable 4000 Unit(s) IV Push <User Schedule>  hydrALAZINE 50 milliGRAM(s) Oral three times a day  isosorbide   mononitrate ER Tablet (IMDUR) 60 milliGRAM(s) Oral daily  lamiVUDine- milliGRAM(s) Oral daily  metoprolol tartrate 100 milliGRAM(s) Oral two times a day  metroNIDAZOLE  IVPB 500 milliGRAM(s) IV Intermittent every 8 hours  omega-3-Acid Ethyl Esters 4 Gram(s) Oral daily  senna 2 Tablet(s) Oral at bedtime  sodium bicarbonate 650 milliGRAM(s) Oral two times a day  zinc sulfate 220 milliGRAM(s) Oral daily    MEDICATIONS  (PRN):  acetaminophen     Tablet .. 650 milliGRAM(s) Oral every 6 hours PRN Temp greater or equal to 38C (100.4F), Mild Pain (1 - 3)  aluminum hydroxide/magnesium hydroxide/simethicone Suspension 30 milliLiter(s) Oral every 4 hours PRN Dyspepsia  melatonin 3 milliGRAM(s) Oral at bedtime PRN Insomnia  ondansetron Injectable 4 milliGRAM(s) IV Push every 8 hours PRN Nausea and/or Vomiting                            9.5    5.51  )-----------( 281      ( 03 Oct 2022 05:20 )             28.5     10-03    133<L>  |  98  |  18  ----------------------------<  90  3.9   |  24  |  6.97<H>    Ca    9.2      03 Oct 2022 05:20    Phosphorus Level, Serum: 4.0 mg/dL (09.29.22 @ 01:21)           REVIEW OF SYSTEMS:  General: no fever no chills, no weight loss.  EYES/ENT: No visual changes;  No vertigo, no headache.  NECK: No pain or stiffness  RESPIRATORY: No cough, wheezing, hemoptysis; No shortness of breath  CARDIOVASCULAR: No chest pain or palpitations. No Edema  GASTROINTESTINAL: No abdominal or epigastric pain. No nausea, vomiting. No diarrhea or constipation. No melena.  GENITOURINARY: No dysuria, frequency, foamy urine, urinary urgency, incontinence or hematuria  NEUROLOGICAL: No numbness or weakness, no tremor , no dizziness.   Muscle skeletal : no joint pain and no swelling of joints and limbs.  SKIN: No itching, burning, rashes.        VITALS:  T(F): 97.3 (10-03-22 @ 04:59), Max: 99.1 (10-02-22 @ 20:07)  HR: 77 (10-03-22 @ 11:48)  BP: 143/69 (10-03-22 @ 11:48)  RR: 18 (10-03-22 @ 04:59)  SpO2: 99% (10-03-22 @ 04:59)  Wt(kg): --      PHYSICAL EXAM:  Constitutional: well developed, no diaphoresis, no distress.  Neck: No JVD, no carotid bruit, supple, no adenopathy  Respiratory: Good air entrance B/L, no wheezes, rales or rhonchi  Cardiovascular: S1, S2, RRR, no pericardial rub, no murmur  Abdomen: BS+, soft, no tenderness, no bruit  Pelvis: bladder nondistended  Extremities: No cyanosis or clubbing. No peripheral edema.

## 2022-10-03 NOTE — PROGRESS NOTE ADULT - ASSESSMENT
68 y/o Female with perforated diverticulitis  s/p IR drain of abscess     -Clear diet   -IV abx  -IR drain care   -OOB/Ambulate   -DVT ppx   -Care as per medical team   -Will follow

## 2022-10-04 ENCOUNTER — TRANSCRIPTION ENCOUNTER (OUTPATIENT)
Age: 67
End: 2022-10-04

## 2022-10-04 LAB
ALBUMIN SERPL ELPH-MCNC: 2.3 G/DL — LOW (ref 3.5–5)
ALP SERPL-CCNC: 74 U/L — SIGNIFICANT CHANGE UP (ref 40–120)
ALT FLD-CCNC: 19 U/L DA — SIGNIFICANT CHANGE UP (ref 10–60)
ANION GAP SERPL CALC-SCNC: 9 MMOL/L — SIGNIFICANT CHANGE UP (ref 5–17)
AST SERPL-CCNC: 21 U/L — SIGNIFICANT CHANGE UP (ref 10–40)
BILIRUB SERPL-MCNC: 0.4 MG/DL — SIGNIFICANT CHANGE UP (ref 0.2–1.2)
BUN SERPL-MCNC: 9 MG/DL — SIGNIFICANT CHANGE UP (ref 7–18)
CALCIUM SERPL-MCNC: 9 MG/DL — SIGNIFICANT CHANGE UP (ref 8.4–10.5)
CHLORIDE SERPL-SCNC: 98 MMOL/L — SIGNIFICANT CHANGE UP (ref 96–108)
CO2 SERPL-SCNC: 29 MMOL/L — SIGNIFICANT CHANGE UP (ref 22–31)
CREAT SERPL-MCNC: 4.55 MG/DL — HIGH (ref 0.5–1.3)
EGFR: 10 ML/MIN/1.73M2 — LOW
GLUCOSE SERPL-MCNC: 100 MG/DL — HIGH (ref 70–99)
HCT VFR BLD CALC: 26.5 % — LOW (ref 34.5–45)
HGB BLD-MCNC: 8.9 G/DL — LOW (ref 11.5–15.5)
MCHC RBC-ENTMCNC: 31.2 PG — SIGNIFICANT CHANGE UP (ref 27–34)
MCHC RBC-ENTMCNC: 33.6 GM/DL — SIGNIFICANT CHANGE UP (ref 32–36)
MCV RBC AUTO: 93 FL — SIGNIFICANT CHANGE UP (ref 80–100)
NRBC # BLD: 0 /100 WBCS — SIGNIFICANT CHANGE UP (ref 0–0)
PLATELET # BLD AUTO: 318 K/UL — SIGNIFICANT CHANGE UP (ref 150–400)
POTASSIUM SERPL-MCNC: 3.8 MMOL/L — SIGNIFICANT CHANGE UP (ref 3.5–5.3)
POTASSIUM SERPL-SCNC: 3.8 MMOL/L — SIGNIFICANT CHANGE UP (ref 3.5–5.3)
PROT SERPL-MCNC: 6.1 G/DL — SIGNIFICANT CHANGE UP (ref 6–8.3)
RBC # BLD: 2.85 M/UL — LOW (ref 3.8–5.2)
RBC # FLD: 15.6 % — HIGH (ref 10.3–14.5)
SODIUM SERPL-SCNC: 136 MMOL/L — SIGNIFICANT CHANGE UP (ref 135–145)
WBC # BLD: 5.67 K/UL — SIGNIFICANT CHANGE UP (ref 3.8–10.5)
WBC # FLD AUTO: 5.67 K/UL — SIGNIFICANT CHANGE UP (ref 3.8–10.5)

## 2022-10-04 PROCEDURE — 99232 SBSQ HOSP IP/OBS MODERATE 35: CPT

## 2022-10-04 PROCEDURE — 99233 SBSQ HOSP IP/OBS HIGH 50: CPT

## 2022-10-04 RX ORDER — LANOLIN ALCOHOL/MO/W.PET/CERES
3 CREAM (GRAM) TOPICAL AT BEDTIME
Refills: 0 | Status: DISCONTINUED | OUTPATIENT
Start: 2022-10-04 | End: 2022-10-10

## 2022-10-04 RX ADMIN — Medication 100 MILLIGRAM(S): at 11:46

## 2022-10-04 RX ADMIN — Medication 100 MILLIGRAM(S): at 11:28

## 2022-10-04 RX ADMIN — DOLUTEGRAVIR SODIUM 50 MILLIGRAM(S): 25 TABLET, FILM COATED ORAL at 11:27

## 2022-10-04 RX ADMIN — Medication 81 MILLIGRAM(S): at 11:25

## 2022-10-04 RX ADMIN — ZINC SULFATE TAB 220 MG (50 MG ZINC EQUIVALENT) 220 MILLIGRAM(S): 220 (50 ZN) TAB at 11:25

## 2022-10-04 RX ADMIN — Medication 100 MILLIGRAM(S): at 19:07

## 2022-10-04 RX ADMIN — CHLORHEXIDINE GLUCONATE 1 APPLICATION(S): 213 SOLUTION TOPICAL at 11:24

## 2022-10-04 RX ADMIN — Medication 650 MILLIGRAM(S): at 06:10

## 2022-10-04 RX ADMIN — ISOSORBIDE MONONITRATE 60 MILLIGRAM(S): 60 TABLET, EXTENDED RELEASE ORAL at 11:33

## 2022-10-04 RX ADMIN — Medication 100 MILLIGRAM(S): at 11:25

## 2022-10-04 RX ADMIN — CALCITRIOL 0.25 MICROGRAM(S): 0.5 CAPSULE ORAL at 11:26

## 2022-10-04 RX ADMIN — ATORVASTATIN CALCIUM 80 MILLIGRAM(S): 80 TABLET, FILM COATED ORAL at 21:20

## 2022-10-04 RX ADMIN — CITALOPRAM 20 MILLIGRAM(S): 10 TABLET, FILM COATED ORAL at 11:25

## 2022-10-04 RX ADMIN — DARUNAVIR ETHANOLATE AND COBICISTAT 1 TABLET(S): 800; 150 TABLET, FILM COATED ORAL at 11:29

## 2022-10-04 RX ADMIN — Medication 3 MILLIGRAM(S): at 21:20

## 2022-10-04 RX ADMIN — ONDANSETRON 4 MILLIGRAM(S): 8 TABLET, FILM COATED ORAL at 11:40

## 2022-10-04 RX ADMIN — Medication 650 MILLIGRAM(S): at 17:18

## 2022-10-04 RX ADMIN — Medication 100 MILLIGRAM(S): at 02:16

## 2022-10-04 RX ADMIN — Medication 50 MILLIGRAM(S): at 13:56

## 2022-10-04 NOTE — PROGRESS NOTE ADULT - SUBJECTIVE AND OBJECTIVE BOX
Patient is a 67y old  Female who presents with a chief complaint of Abscess of peritoneum     (04 Oct 2022 11:01)      INTERVAL HPI/OVERNIGHT EVENTS: no acute envents ovenight    REVIEW OF SYSTEMS:  CONSTITUTIONAL: No fever, chills  ENMT:  No difficulty hearing, no change in vision  NECK: No pain or stiffness  RESPIRATORY: No cough, SOB  CARDIOVASCULAR: No chest pain, palpitations  GASTROINTESTINAL: No abdominal pain. No nausea, vomiting, or diarrhea  GENITOURINARY: No dysuria  NEUROLOGICAL: No HA  SKIN: No itching, burning, rashes, or lesions   LYMPH NODES: No enlarged glands  ENDOCRINE: No heat or cold intolerance; No hair loss  MUSCULOSKELETAL: No joint pain or swelling; No muscle, back, or extremity pain  PSYCHIATRIC: No depression, anxiety  HEME/LYMPH: No easy bruising, or bleeding gums    T(C): 36.7 (10-04-22 @ 04:46), Max: 37.1 (10-03-22 @ 13:45)  HR: 78 (10-04-22 @ 11:31) (71 - 92)  BP: 158/71 (10-04-22 @ 11:31) (115/53 - 158/71)  RR: 18 (10-04-22 @ 04:46) (17 - 18)  SpO2: 99% (10-04-22 @ 04:46) (97% - 100%)  Wt(kg): --Vital Signs Last 24 Hrs  T(C): 36.7 (04 Oct 2022 04:46), Max: 37.1 (03 Oct 2022 13:45)  T(F): 98 (04 Oct 2022 04:46), Max: 98.8 (03 Oct 2022 13:45)  HR: 78 (04 Oct 2022 11:31) (71 - 92)  BP: 158/71 (04 Oct 2022 11:31) (115/53 - 158/71)  BP(mean): 76 (03 Oct 2022 13:45) (76 - 76)  RR: 18 (04 Oct 2022 04:46) (17 - 18)  SpO2: 99% (04 Oct 2022 04:46) (97% - 100%)    Parameters below as of 04 Oct 2022 04:46  Patient On (Oxygen Delivery Method): room air    MEDICATIONS  (STANDING):  allopurinol 100 milliGRAM(s) Oral daily  amLODIPine   Tablet 10 milliGRAM(s) Oral daily  aspirin enteric coated 81 milliGRAM(s) Oral daily  atorvastatin 80 milliGRAM(s) Oral at bedtime  calcitriol   Capsule 0.25 MICROGram(s) Oral daily  cefTRIAXone   IVPB 2000 milliGRAM(s) IV Intermittent every 24 hours  chlorhexidine 2% Cloths 1 Application(s) Topical daily  citalopram 20 milliGRAM(s) Oral daily  darunavir 800 mG/cobicstat 150 mG 1 Tablet(s) Oral daily  dolutegravir 50 milliGRAM(s) Oral daily  epoetin neyda-epbx (RETACRIT) Injectable 4000 Unit(s) IV Push <User Schedule>  hydrALAZINE 50 milliGRAM(s) Oral three times a day  isosorbide   mononitrate ER Tablet (IMDUR) 60 milliGRAM(s) Oral daily  lamiVUDine- milliGRAM(s) Oral daily  melatonin 3 milliGRAM(s) Oral at bedtime  metoprolol tartrate 100 milliGRAM(s) Oral two times a day  metroNIDAZOLE  IVPB 500 milliGRAM(s) IV Intermittent every 8 hours  omega-3-Acid Ethyl Esters 4 Gram(s) Oral daily  senna 2 Tablet(s) Oral at bedtime  sodium bicarbonate 650 milliGRAM(s) Oral two times a day  zinc sulfate 220 milliGRAM(s) Oral daily    MEDICATIONS  (PRN):  acetaminophen     Tablet .. 650 milliGRAM(s) Oral every 6 hours PRN Temp greater or equal to 38C (100.4F), Mild Pain (1 - 3)  aluminum hydroxide/magnesium hydroxide/simethicone Suspension 30 milliLiter(s) Oral every 4 hours PRN Dyspepsia  ondansetron Injectable 4 milliGRAM(s) IV Push every 8 hours PRN Nausea and/or Vomiting      PHYSICAL EXAM:  GENERAL: NAD  EYES: clear conjunctiva; EOMI  ENMT: Moist mucous membranes  NECK: Supple, No JVD, Normal thyroid  CHEST/LUNG: Clear to auscultation bilaterally; No rales, rhonchi, wheezing, or rubs  HEART: S1, S2, Regular rate and rhythm  ABDOMEN: Soft, Nontender, Nondistended; L abd with percutaneous drainage with greenish  drainage  NEURO: Alert & Oriented X3  EXTREMITIES: No LE edema, no calf tenderness  LYMPH: No lymphadenopathy noted  SKIN: No rashes or lesions    Consultant(s) Notes Reviewed:  [x ] YES  [ ] NO  Care Discussed with Consultants/Other Providers [ x] YES  [ ] NO    LABS:                        8.9    5.67  )-----------( 318      ( 04 Oct 2022 06:22 )             26.5     10-04    136  |  98  |  9   ----------------------------<  100<H>  3.8   |  29  |  4.55<H>    Ca    9.0      04 Oct 2022 06:22    TPro  6.1  /  Alb  2.3<L>  /  TBili  0.4  /  DBili  x   /  AST  21  /  ALT  19  /  AlkPhos  74  10-04      CAPILLARY BLOOD GLUCOSE    RADIOLOGY & ADDITIONAL TESTS:    Imaging Personally Reviewed:  [ x] YES  [ ] NO  < from: CT Aspiration Abscess Hematoma, Cyst (09.28.22 @ 14:00) >  ACC: [id number] EXAM:  CT ASP ABSC HEMATOMA CYST#                          PROCEDURE DATE:  09/28/2022          INTERPRETATION:  PROCEDURE: CT-guided drainage of abdominal abscess.    : VERÓNICA Sprague MD    CLINICAL INDICATION: 67-year-old female with multiple comorbidities, who   presented with abdominal pain and was found to have an abdominal   collection concerning for abscess. Percutaneous drainage of the abscess   was requested.    ANESTHESIA: Intravenous sedation was provided by the attending   anesthesiologist. A total of 7 mL of 1% lidocaine was used for local   anesthesia.    ANTIBIOTICS: Patient's scheduled dose of metronidazole was administered   by the anesthesiologist prior to procedure.    TECHNIQUE: After discussing the risks, benefits and alternatives to this   procedure with the patient, informed consent was obtained. A timeout was   performed.    The patient was placed supine on the CT examination table and connected   to physiologic monitoring. Transaxial images ofthe abdomen were obtained   without the use of oral or intravenous contrast materials. The left lower   quadrant abdominal collection which had been seen on a recent CT scan was   again identified, and the overlying skin was prepped and draped in the  usual sterile fashion.    Using CT guidance, an 18-gauge trocar needle was advanced into the   collection using a left paramedian approach. Access into the collection   was confirmed with CT images and aspiration of 5 mL of purulent fluid   which was sent for microbiological analysis. The needle was then removed   over a wire and the tract was dilated. A 10 English pigtail drainage   catheter was then advanced into the collection over the wire. An   additional 40 mL of thick, purulent foul-smelling fluid was manually   aspirated. Repeat images demonstrated good positioning of the catheter   tip within the abscess cavity with virtual complete collapse of the   cavity around the catheter tip. The catheter was secured to the patient's   skin with a silk suture, flushed with normal saline, and then connected   to a drainage bag. A dry sterile dressing was then applied.    The patient tolerated the procedure well and was transferred to the   recovery area in stable condition. There were no immediate complications.    IMPRESSION: SUCCESSFUL CT-GUIDED PERCUTANEOUS DRAINAGE OF ABDOMINAL   ABSCESS, AS DESCRIBED ABOVE.    --- End of Report ---            TODD SPRAGUE MD; Attending Interventional Radiologist  This document has been electronically signed. Sep 28 2022  2:54PM    < end of copied text >  < from: CT Abdomen and Pelvis w/ IV Cont (09.27.22 @ 06:30) >    ACC: [id number] EXAM:  CT ABDOMEN AND PELVIS IC                          PROCEDURE DATE:  09/27/2022          INTERPRETATION:  CLINICAL INFORMATION: Abdominal pain question abscess    COMPARISON: CT abdomen pelvis 9/27/2022 earlier the same day.    CONTRAST/COMPLICATIONS:  IV Contrast: Omnipaque 350 90 cc administered  90 cc administered   10 cc   discarded  Oral Contrast: None  Complications: None    PROCEDURE:  CT of the Abdomen and Pelvis was performed.  Sagittal and coronal reformats were performed.    FINDINGS:  LOWER CHEST: Probable postsurgical changes in the left lower lung. 1.6 cm   left lower lobe thin-walled cyst.    LIVER: 1.3 cm right lobe hypodensity with suggestion of peripheral early   nodular enhancement (4:61). This may represent a hemangioma. This could   be confirmed with MRI.  BILE DUCTS: Normal caliber.  GALLBLADDER: Within normal limits.  SPLEEN: Within normal limits.  PANCREAS: 1.4 cm cyst in uncinate process or a focally dilated duct. This   would be better evaluated with dedicated MRI.  ADRENALS: Within normal limits.  KIDNEYS/URETERS: Bilateral polycystic kidneys    BLADDER: Within normal limits.  REPRODUCTIVE ORGANS: Hysterectomy.    BOWEL: Moderate sized hiatal hernia. Normal appendix.    Oral contrast is again seen within mildly prominent loops of proximal   small bowel measuring up to 2.7 cm in diameter to a mass in the left   lower quadrant. This is unchanged. The mass measures 5.8 x 5.1 cm and   contains fluid and air. This appears to be within thewall of the small   bowel. A thin column of oral contrast passes within the lumen of the   small bowel distally into collapsed loops of small bowel. There is an   apparent fistula between this mass in the adjacent markedly thickened   sigmoid colon which contains numerous diverticula. There are inflammatory   changes in the adjacent fat. This likely represents sigmoid   diverticulitis with small bowel fistulization to the raw within adjacent   loop of small bowel in a contained abscess.      PERITONEUM: No ascites.  VESSELS: Atherosclerotic changes.  RETROPERITONEUM/LYMPH NODES: No lymphadenopathy.  ABDOMINAL WALL: Small fat-containing umbilical hernia.  BONES: Within normal limits.    IMPRESSION:    Abscess within the left lower quadrant small bowel wall secondary to   fistulization from the sigmoid colon likely on the basis of chronic   diverticular disease.    1.3 cm right lobe hypodensity with suggestion of peripheral early nodular   enhancement. This may represent a hemangioma. This could be confirmed   with MRI.    1.4 cm cyst in uncinate process or a focally dilated duct. This would be   better evaluated with dedicated MRI.    Probable probably cystic kidneys. Correlate clinically for kidney disease.      --- End of Report ---            ISAI HERNANDEZ MD; Attending Radiologist  This document has been electronically signed. Sep 27 2022  6:35AM    < end of copied text >

## 2022-10-04 NOTE — DIETITIAN INITIAL EVALUATION ADULT - PERTINENT LABORATORY DATA
10-04    136  |  98  |  9   ----------------------------<  100<H>  3.8   |  29  |  4.55<H>    Ca    9.0      04 Oct 2022 06:22    TPro  6.1  /  Alb  2.3<L>  /  TBili  0.4  /  DBili  x   /  AST  21  /  ALT  19  /  AlkPhos  74  10-04  A1C with Estimated Average Glucose Result: See Note (09-29-22 @ 06:30)

## 2022-10-04 NOTE — DIETITIAN INITIAL EVALUATION ADULT - OTHER CALCULATIONS
**Used  Lbs (+/- 10%)  Post HD wt. - 128.7 Lbs on 09/28/22  Post HD wt. - 130.9 Lbs on 10/01/22  Post HD wt. - 132.0 Lbs on 10/03/22

## 2022-10-04 NOTE — DISCHARGE NOTE PROVIDER - NSDCFUADDINST_GEN_ALL_CORE_FT
Please maintain your ostomy as instructed by your nurse. Home care referral has been made to assist you with this. Please do not engage in heavy lifting >15 lbs for at least 6 weeks. You are able to shower normally. The staples on your incisions will be removed in the office when you follow up.  Resume your dialysis as normal when you go home. Take your pain medications as prescribed. Please call for a 1 week follow up appointment with your surgeon Dr. Francisco. Please call for a 2 week follow up appointment with your vascular surgeon Dr. Dickens. Please maintain your ostomy as instructed by your nurse. Home care referral has been made to assist you with this. Please do not engage in heavy lifting >15 lbs for at least 6 weeks. You are able to shower normally. The staples on your incisions will be removed in the office when you follow up.  Resume your dialysis as normal when you go home. Take your pain medications as instructed. Please call for a 1 week follow up appointment with your surgeon Dr. Francisco. Please call for a 2 week follow up appointment with your vascular surgeon Dr. Dickens. Please maintain your ostomy as instructed by your nurse. Please do not engage in heavy lifting >15 lbs for at least 6 weeks. You are able to shower normally. The staples on your incisions will be removed in the office when you follow up.  Resume your dialysis as normal when you go to rehab. Take your pain medications as instructed. Please call for a 1 week follow up appointment with your surgeon Dr. Francisco. Please call for a 2 week follow up appointment with your vascular surgeon Dr. Dickens.

## 2022-10-04 NOTE — DIETITIAN INITIAL EVALUATION ADULT - CALCULATED TO (CAL/KG)
H&P is reviewed.  Relevant update exam conducted.  Changes significant for the planned course of treatment are none.  I have personally reviewed patient's OR holding area vital signs.     [id number]

## 2022-10-04 NOTE — PROGRESS NOTE ADULT - PROBLEM SELECTOR PLAN 1
-2/2 to fistulization from the sigmoid colon likely on the basis of chronic diverticular disease.  -s/p IR CT guided drainage 9/28  -Abscess CC + for proteus mirabilis, ecoli, klebsiella pneumoniae  -Cont  IV Ceftriaxone 2g and IV Flagyl 500mg q 8hours  - advance diet as tolerated- nutrition following   - likely will need surgery on this admission,  - cont strict output monitoring of drainage q8hr  -Surgery dr. Francisco  -ID dr. Young

## 2022-10-04 NOTE — DIETITIAN INITIAL EVALUATION ADULT - PERTINENT MEDS FT
MEDICATIONS  (STANDING):  allopurinol 100 milliGRAM(s) Oral daily  amLODIPine   Tablet 10 milliGRAM(s) Oral daily  aspirin enteric coated 81 milliGRAM(s) Oral daily  atorvastatin 80 milliGRAM(s) Oral at bedtime  calcitriol   Capsule 0.25 MICROGram(s) Oral daily  cefTRIAXone   IVPB 2000 milliGRAM(s) IV Intermittent every 24 hours  chlorhexidine 2% Cloths 1 Application(s) Topical daily  citalopram 20 milliGRAM(s) Oral daily  darunavir 800 mG/cobicstat 150 mG 1 Tablet(s) Oral daily  dolutegravir 50 milliGRAM(s) Oral daily  epoetin neyda-epbx (RETACRIT) Injectable 4000 Unit(s) IV Push <User Schedule>  hydrALAZINE 50 milliGRAM(s) Oral three times a day  isosorbide   mononitrate ER Tablet (IMDUR) 60 milliGRAM(s) Oral daily  lamiVUDine- milliGRAM(s) Oral daily  melatonin 3 milliGRAM(s) Oral at bedtime  metoprolol tartrate 100 milliGRAM(s) Oral two times a day  metroNIDAZOLE  IVPB 500 milliGRAM(s) IV Intermittent every 8 hours  omega-3-Acid Ethyl Esters 4 Gram(s) Oral daily  senna 2 Tablet(s) Oral at bedtime  sodium bicarbonate 650 milliGRAM(s) Oral two times a day  zinc sulfate 220 milliGRAM(s) Oral daily    MEDICATIONS  (PRN):  acetaminophen     Tablet .. 650 milliGRAM(s) Oral every 6 hours PRN Temp greater or equal to 38C (100.4F), Mild Pain (1 - 3)  aluminum hydroxide/magnesium hydroxide/simethicone Suspension 30 milliLiter(s) Oral every 4 hours PRN Dyspepsia  ondansetron Injectable 4 milliGRAM(s) IV Push every 8 hours PRN Nausea and/or Vomiting

## 2022-10-04 NOTE — PROGRESS NOTE ADULT - ASSESSMENT
ESRD , dialysis days are MWF.   Continue with dialysis tomorrow    Anemia restart BECKY , no need for Iron IV.    Diverticulitis, fistula /  abscess placed on Ceftriaxone and Metronidazole. Post IR drain , 4 bacteria in abscess fluid,  follow up with ID.  Follow up with surgery.

## 2022-10-04 NOTE — DISCHARGE NOTE PROVIDER - NSDCCPCAREPLAN_GEN_ALL_CORE_FT
PRINCIPAL DISCHARGE DIAGNOSIS  Diagnosis: Left lower quadrant abdominal abscess  Assessment and Plan of Treatment:       SECONDARY DISCHARGE DIAGNOSES  Diagnosis: HIV (human immunodeficiency virus infection)  Assessment and Plan of Treatment:     Diagnosis: Gout  Assessment and Plan of Treatment:     Diagnosis: HTN (hypertension)  Assessment and Plan of Treatment:      PRINCIPAL DISCHARGE DIAGNOSIS  Diagnosis: Left lower quadrant abdominal abscess  Assessment and Plan of Treatment: You presented with abd pain ldue to  fistulization from the sigmoid colon likely on the basis of chronic diverticular disease. You had  drainage placed on 9/28. Your Abscess grew  bacteria proteus mirabilis, ecoli, klebsiella pneumoniae, You were placed on Ceftriaxone  IV and Flagyl antibitics, YOu were followed by infectious disease and recommeded -------------------------------------------------------------------------------------------------------------------------------------  - Repaet  Ct scan 10/6 ------------------------------------  YOu were followed by surgery and infectious disease during your hospitalization here.         SECONDARY DISCHARGE DIAGNOSES  Diagnosis: HIV (human immunodeficiency virus infection)  Assessment and Plan of Treatment: YOur CD4 count was 377  Continue home medication Tivicay, Prezcobix, lamivudine.      Diagnosis: Gout  Assessment and Plan of Treatment: Continue taking home medication Allopurinol.      Diagnosis: HTN (hypertension)  Assessment and Plan of Treatment: Continue home medication Metoprolol, Hydralazine, Amlodipine    Diagnosis: ESRD (end stage renal disease)  Assessment and Plan of Treatment: continue your dialysis as recommeded by your Nephrologist .     PRINCIPAL DISCHARGE DIAGNOSIS  Diagnosis: Left lower quadrant abdominal abscess  Assessment and Plan of Treatment: You presented with abd pain ldue to  fistulization from the sigmoid colon likely on the basis of chronic diverticular disease. You had  drainage placed on 9/28. Your Abscess grew  bacteria proteus mirabilis, ecoli, klebsiella pneumoniae, You were placed on Ceftriaxone  IV and Flagyl antibitics, YOu were followed by infectious disease and recommeded -------------------------------------------------------------------------------------------------------------------------------------  - Repaet  Ct scan 10/6 ------------------------------------  YOu were followed by surgery and infectious disease during your hospitalization here. 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  Your hospital course was also complicated by your AV graft becoming nonfunctional. You received a dialysis catheter so that you can continue dialysis, and your graft will be evaluated by your vascular surgeon as an outpatient.        SECONDARY DISCHARGE DIAGNOSES  Diagnosis: HTN (hypertension)  Assessment and Plan of Treatment: Continue home medication Metoprolol, Hydralazine, Amlodipine    Diagnosis: HIV (human immunodeficiency virus infection)  Assessment and Plan of Treatment: YOur CD4 count was 377  Continue home medication Tivicay, Prezcobix, lamivudine.      Diagnosis: Gout  Assessment and Plan of Treatment: Continue taking home medication Allopurinol.      Diagnosis: ESRD (end stage renal disease)  Assessment and Plan of Treatment: continue your dialysis as recommeded by your Nephrologist .

## 2022-10-04 NOTE — PROGRESS NOTE ADULT - NS ATTEND AMEND GEN_ALL_CORE FT
Patient seen and examined. Case discussed with REDD Malagon. In brief, this is a 68 yo F with ESRD on HD (MWF), HIV (CD4 count of 377) on HAART, HTN who presents with severe abdominal pain found to have perforated diverticulitis of the sigmoid colon with abscess formation and fistulization from the sigmoid colon to the LLQ of the small bowel. Patient reports ongoing nausea at this time but reports resolution of her abdominal pain. Drain output is only listed at 150cc, but per discussion with team and nursing staff, this is inaccurate and does not reflect the complete 24 hours of drainage. REDD Malagon spoke with nursing and they will continue to record output diligently. Abscess cultures growing E. coli, Proteus, and Klebsiella at this time for which she is currently on ceftriaxone and metronidazole. Surgery is follow for the fistula and perforated diverticulitis.    A/P   #Perforated Sigmoid Diverticulitis with Abscess  #Sigmoid Colon-Small Bowel Fistula  #ESRD on HD (MWF)  #HIV  #HTN    -Continue ceftriaxone and metronidazole for intra-abdominal abscess  -Monitor and record drain output diligently as may play a role as to whether patient may require surgical intervention  -Follow-up surgical attending recommendations (spoke with Dr. Francisco, she will call me back after she gets out of OR)  -Follow-up additional ID recommendations, (CRP and ESR ordered for tomorrow)  -Continue Prezcobix, dolutegravir, lamivudine-HPV for HIV  -Continue HD for ESRD status as per nephrology  -Continue hydralazine, imdur, amlodipine, metoprolol for HTN  -Continue sodium bicarbonate, calcitriol for ESRD status    Remaining care as noted above.

## 2022-10-04 NOTE — DISCHARGE NOTE PROVIDER - NSDCCPTREATMENT_GEN_ALL_CORE_FT
PRINCIPAL PROCEDURE  Procedure: Alfonso procedure  Findings and Treatment: Because of the fistula between your sigmoid colon and small bowel, the involved portions were removed. Because of the inflammation, a colostomy was brought up. You have been stable and are ready to go home.  Please maintain your ostomy as instructed by your nurse. Home care referral has been made to assist you with this. Please do not engage in heavy lifting >15 lbs for at least 6 weeks. You are able to shower normally. The staples on your incisions will be removed in the office when you follow up.  Resume your dialysis as normal when you go home. Take your pain medications as prescribed. Please follow up with your surgeon in 1 week after discharge.

## 2022-10-04 NOTE — DISCHARGE NOTE PROVIDER - NSDCFUSCHEDAPPT_GEN_ALL_CORE_FT
[facility]  VASCULAR [address]  Scheduled Appointment: 11/21/2022    Mello Dickens  [facility]  VASCULAR [address]  Scheduled Appointment: 11/21/2022

## 2022-10-04 NOTE — PROGRESS NOTE ADULT - SUBJECTIVE AND OBJECTIVE BOX
INTERVAL HPI/OVERNIGHT EVENTS:  Pt seen and examined  States abdominal pain resolved. Admits to nausea  Denied vomiting  +flatus/+BM    MEDICATIONS  (STANDING):  allopurinol 100 milliGRAM(s) Oral daily  amLODIPine   Tablet 10 milliGRAM(s) Oral daily  aspirin enteric coated 81 milliGRAM(s) Oral daily  atorvastatin 80 milliGRAM(s) Oral at bedtime  calcitriol   Capsule 0.25 MICROGram(s) Oral daily  cefTRIAXone   IVPB 2000 milliGRAM(s) IV Intermittent every 24 hours  chlorhexidine 2% Cloths 1 Application(s) Topical daily  citalopram 20 milliGRAM(s) Oral daily  darunavir 800 mG/cobicstat 150 mG 1 Tablet(s) Oral daily  dolutegravir 50 milliGRAM(s) Oral daily  epoetin neyda-epbx (RETACRIT) Injectable 4000 Unit(s) IV Push <User Schedule>  hydrALAZINE 50 milliGRAM(s) Oral three times a day  isosorbide   mononitrate ER Tablet (IMDUR) 60 milliGRAM(s) Oral daily  lamiVUDine- milliGRAM(s) Oral daily  melatonin 3 milliGRAM(s) Oral at bedtime  metoprolol tartrate 100 milliGRAM(s) Oral two times a day  metroNIDAZOLE  IVPB 500 milliGRAM(s) IV Intermittent every 8 hours  omega-3-Acid Ethyl Esters 4 Gram(s) Oral daily  senna 2 Tablet(s) Oral at bedtime  sodium bicarbonate 650 milliGRAM(s) Oral two times a day  zinc sulfate 220 milliGRAM(s) Oral daily    MEDICATIONS  (PRN):  acetaminophen     Tablet .. 650 milliGRAM(s) Oral every 6 hours PRN Temp greater or equal to 38C (100.4F), Mild Pain (1 - 3)  aluminum hydroxide/magnesium hydroxide/simethicone Suspension 30 milliLiter(s) Oral every 4 hours PRN Dyspepsia  ondansetron Injectable 4 milliGRAM(s) IV Push every 8 hours PRN Nausea and/or Vomiting      Vital Signs Last 24 Hrs  T(C): 37.4 (04 Oct 2022 13:05), Max: 37.4 (04 Oct 2022 13:05)  T(F): 99.3 (04 Oct 2022 13:05), Max: 99.3 (04 Oct 2022 13:05)  HR: 90 (04 Oct 2022 13:05) (71 - 92)  BP: 133/70 (04 Oct 2022 13:05) (115/53 - 158/71)  BP(mean): 84 (04 Oct 2022 13:05) (84 - 84)  RR: 17 (04 Oct 2022 13:05) (17 - 18)  SpO2: 100% (04 Oct 2022 13:05) (99% - 100%)    Parameters below as of 04 Oct 2022 13:05  Patient On (Oxygen Delivery Method): room air        Physical:  General: A&Ox3. NAD.  Abdomen: Soft nondistended, nontender. IR drain with 150cc of bilious colored output. No guarding    I&O's Detail  03 Oct 2022 07:01  -  04 Oct 2022 07:00  --------------------------------------------------------  IN:    Other (mL): 700 mL  Total IN: 700 mL    OUT:    Drain (mL): 150 mL    Other (mL): 1200 mL  Total OUT: 1350 mL    Total NET: -650 mL      04 Oct 2022 07:01  -  04 Oct 2022 14:10  --------------------------------------------------------  IN:  Total IN: 0 mL    OUT:    Drain (mL): 50 mL  Total OUT: 50 mL    Total NET: -50 mL    LABS:                        8.9    5.67  )-----------( 318      ( 04 Oct 2022 06:22 )             26.5             10-04    136  |  98  |  9   ----------------------------<  100<H>  3.8   |  29  |  4.55<H>    Ca    9.0      04 Oct 2022 06:22    TPro  6.1  /  Alb  2.3<L>  /  TBili  0.4  /  DBili  x   /  AST  21  /  ALT  19  /  AlkPhos  74  10-04

## 2022-10-04 NOTE — PROGRESS NOTE ADULT - PROBLEM SELECTOR PLAN 8
F/u further recs from surgery, pt will likely need surgery on this admission           Discharge likely back to home when medically optimized   Pt. has one  daughter who is an RN and lives in [place].  Pt. lives alone

## 2022-10-04 NOTE — PROGRESS NOTE ADULT - ASSESSMENT
Subjective: NAD, afebrile, has significant output from LLQ drain with rita biliary content, no abd pain, diet advance to soft/pure however she does not have much appetite and becomes nauseated when eating.     REVIEW OF SYSTEMS:  CONSTITUTIONAL:  No weakness, fevers or chills  EYES/ENT:  No visual changes;  No vertigo or throat pain   NECK:  No pain or stiffness  RESPIRATORY:  No cough, wheezing, hemoptysis; No shortness of breath  CARDIOVASCULAR:  No chest pain or palpitations  GASTROINTESTINAL:  No abdominal or epigastric pain. No nausea, vomiting, or hematemesis; No diarrhea or constipation. No melena or hematochezia. Has drain LLQ green biliary content  GENITOURINARY:  No dysuria, frequency or hematuria  NEUROLOGICAL:  No numbness or weakness  MSK: no back pain, no joint pain  SKIN:  No itching, rashes    PE:  Vital Signs Last 24 Hrs  T(C): 37.4 (04 Oct 2022 13:05), Max: 37.4 (04 Oct 2022 13:05)  T(F): 99.3 (04 Oct 2022 13:05), Max: 99.3 (04 Oct 2022 13:05)  HR: 90 (04 Oct 2022 13:05) (71 - 92)  BP: 133/70 (04 Oct 2022 13:05) (115/53 - 158/71)  BP(mean): 84 (04 Oct 2022 13:05) (84 - 84)  RR: 17 (04 Oct 2022 13:05) (17 - 18)  SpO2: 100% (04 Oct 2022 13:05) (99% - 100%)    Parameters below as of 04 Oct 2022 13:05 Patient On (Oxygen Delivery Method): room air    Gen: AOx3, NAD, non-toxic, pleasant  HEAD:  Atraumatic, Normocephalic  EYES: EOMI, PERRLA, conjunctiva and sclera clear  ENT: Moist mucous membranes  NECK: Supple, No JVD  CV: S1+S2 normal, nontachycardic  Left AVF with good thrill  Resp: Clear bilat, no resp distress, no crackles/wheezes  Abd: Soft, nontender, +BS, LLQ drain with significant amount of green biliary content  Ext: No LE edema, no wounds  : No Farias  IV/Skin: No thrombophlebitis  Msk: No low back pain, no arthralgias, no joint swelling  Neuro: AAOx3. No sensory deficits, no motor deficits    LABS:                        8.9    5.67  )-----------( 318      ( 04 Oct 2022 06:22 )             26.5     10-04    136  |  98  |  9   ----------------------------<  100<H>  3.8   |  29  |  4.55<H>    Ca    9.0      04 Oct 2022 06:22    TPro  6.1  /  Alb  2.3<L>  /  TBili  0.4  /  DBili  x   /  AST  21  /  ALT  19  /  AlkPhos  74  10-04    MICROBIOLOGY:  v  .Abscess abdominal abscess drainage  09-28-22   Culture yields >4 types of aerobic and/or anaerobic bacteria  Call client services within 7 days if further workup is clinically  indicated. Culture includes  Few Escherichia coli  Rare Klebsiella pneumoniae  Rare Proteus mirabilis  Few Escherichiacoli #2  --  Escherichia coli  Klebsiella pneumoniae  Escherichia coli  Proteus mirabilis    IMPRESSION:  66 yo female from [place] [facility] with H/O ESRD on HD(MWF) via L arm AVF, HIV on ART diagnosed 16 yrs ago (Tivicay + Lamivudine + Prezcobix) admitted for LLQ abscess.   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   -Diverticular disease now complicated with sigmoid colon fistulization to small bowel  -HIV on ART w/CD4 377 with VL detectable <30 copies which could be blip in the setting of acute infection.   -PCN allergy(when she was a child, she was told she had hives, she does not remember)  -ESRD on HD    PLAN:  -Continue CXT 2g IV daily + Flagyl 500 mg q/8hrs.  -Continue ART (Tivicay(50 mg) +Prezcovix (800/150 mg) + Lamivudine (100 mg) daily.   -Hep B non immune, consider Hepatitis B vaccination series (OP)  -F/up cultures until finalized.  -Trend ESR and CRP q/2-3 days(please obtain tomorrow AM labs she is due)  -Surgery following for intervention since there is small bowel-sigmoid colon fistula with high output on drain bag.  -Discussed with medicine.     Please reach ID with any questions or concerns  Dr. Mine Garnett  Tel. [phone]  Available in Teams

## 2022-10-04 NOTE — DIETITIAN INITIAL EVALUATION ADULT - DIET TYPE
rec./DASH/TLC (sodium and cholesterol restricted diet)/consistent carbohydrate (evening snack)/renal replacement pts:no protein restr,no conc K & phos, low sodium/easy to chew

## 2022-10-04 NOTE — DISCHARGE NOTE PROVIDER - CARE PROVIDER_API CALL
Faye Francisco)  Surgery  [address] [facility] [address]  [place] [address]  Phone: [phone]  Fax: [phone]  Follow Up Time: 2 weeks   Faye Francisco)  Surgery  [address] [facility] [address]  [place] [address]  Phone: [phone]  Fax: [phone]  Follow Up Time: 1 week    Mello Dickens)  Vascular Surgery  [address] [facility] [address]  [place] [address]  Phone: [phone]  Fax: [phone]  Established Patient  Follow Up Time: 2 weeks

## 2022-10-04 NOTE — PROGRESS NOTE ADULT - SUBJECTIVE AND OBJECTIVE BOX
Problem List:  ESRD  Diverticulosis, admitted for abscess in LLQ within the wall of small bowel with fistula between the mass and thickened sigmoid colon with diverticula. Post IR drainage tube in abscess area.  There is a continuos green drainage in the bag.  Post HD  yesterday  Abdominal pain improved , tolerate clear liquid feeding  Positive for BM, c/o of diarrhea reduced appetite and one time vomit today.    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      ESRD on hemodialysis      History of ectopic pregnancy  Two      H/O: hysterectomy  Due to cervical cancer      S/P arteriovenous (AV) fistula creation  july 10 2020          oxycodone (Rash)  penicillins (Urticaria; Rash)      MEDICATIONS  (STANDING):  allopurinol 100 milliGRAM(s) Oral daily  amLODIPine   Tablet 10 milliGRAM(s) Oral daily  aspirin enteric coated 81 milliGRAM(s) Oral daily  atorvastatin 80 milliGRAM(s) Oral at bedtime  calcitriol   Capsule 0.25 MICROGram(s) Oral daily  cefTRIAXone   IVPB 2000 milliGRAM(s) IV Intermittent every 24 hours  chlorhexidine 2% Cloths 1 Application(s) Topical daily  citalopram 20 milliGRAM(s) Oral daily  darunavir 800 mG/cobicstat 150 mG 1 Tablet(s) Oral daily  dolutegravir 50 milliGRAM(s) Oral daily  epoetin neyda-epbx (RETACRIT) Injectable 4000 Unit(s) IV Push <User Schedule>  hydrALAZINE 50 milliGRAM(s) Oral three times a day  isosorbide   mononitrate ER Tablet (IMDUR) 60 milliGRAM(s) Oral daily  lamiVUDine- milliGRAM(s) Oral daily  melatonin 3 milliGRAM(s) Oral at bedtime  metoprolol tartrate 100 milliGRAM(s) Oral two times a day  metroNIDAZOLE  IVPB 500 milliGRAM(s) IV Intermittent every 8 hours  omega-3-Acid Ethyl Esters 4 Gram(s) Oral daily  senna 2 Tablet(s) Oral at bedtime  sodium bicarbonate 650 milliGRAM(s) Oral two times a day  zinc sulfate 220 milliGRAM(s) Oral daily    MEDICATIONS  (PRN):  acetaminophen     Tablet .. 650 milliGRAM(s) Oral every 6 hours PRN Temp greater or equal to 38C (100.4F), Mild Pain (1 - 3)  aluminum hydroxide/magnesium hydroxide/simethicone Suspension 30 milliLiter(s) Oral every 4 hours PRN Dyspepsia  ondansetron Injectable 4 milliGRAM(s) IV Push every 8 hours PRN Nausea and/or Vomiting                            8.9    5.67  )-----------( 318      ( 04 Oct 2022 06:22 )             26.5     10-04    136  |  98  |  9   ----------------------------<  100<H>  3.8   |  29  |  4.55<H>    Ca    9.0      04 Oct 2022 06:22    TPro  6.1  /  Alb  2.3<L>  /  TBili  0.4  /  DBili  x   /  AST  21  /  ALT  19  /  AlkPhos  74  10-04            REVIEW OF SYSTEMS:  General: no fever no chills, no weight loss.  EYES/ENT: No visual changes;  No vertigo, no headache.  NECK: No pain or stiffness  RESPIRATORY: No cough, wheezing, hemoptysis; No shortness of breath  CARDIOVASCULAR: No chest pain or palpitations. No Edema  GASTROINTESTINAL: No abdominal or epigastric pain. No nausea, vomiting. No diarrhea. Reduced appetite  GENITOURINARY: No dysuria, frequency, foamy urine, urinary urgency, incontinence or hematuria  NEUROLOGICAL: No numbness or weakness, no tremor , no dizziness.   Muscle skeletal : no joint pain and no swelling of joints and limbs.  SKIN: No itching, burning, rashes.        VITALS:  T(F): 99.3 (10-04-22 @ 13:05), Max: 99.3 (10-04-22 @ 13:05)  HR: 90 (10-04-22 @ 13:05)  BP: 133/70 (10-04-22 @ 13:05)  RR: 17 (10-04-22 @ 13:05)  SpO2: 100% (10-04-22 @ 13:05)  Wt(kg): --    10-03 @ 07:01  -  10-04 @ 07:00  --------------------------------------------------------  IN: 700 mL / OUT: 1350 mL / NET: -650 mL    10-04 @ 07:01  -  10-04 @ 14:42  --------------------------------------------------------  IN: 0 mL / OUT: 50 mL / NET: -50 mL        PHYSICAL EXAM:  Constitutional: well developed, no diaphoresis, no distress.  Neck: No JVD, no carotid bruit, supple, no adenopathy  Respiratory: Good air entrance B/L, no wheezes, rales or rhonchi  Cardiovascular: S1, S2, RRR, no pericardial rub, no murmur  Abdomen: BS+, soft, no tenderness, no bruit.  Pelvis: bladder nondistended  Extremities: No cyanosis or clubbing. No peripheral edema.     Vascular Access: left AVF upper arm

## 2022-10-04 NOTE — DISCHARGE NOTE PROVIDER - HOSPITAL COURSE
This is a 67 year old female, coming from home, with medical history of ESRD(M-W-F), HTN, HIV coming in with abdominal pain. Found to have  Abscess within the left lower quadrant small bowel wall secondary to fistulization from the sigmoid colon likely on the basis of chronic diverticular disease. started on Ceftriaxone and Flagyl. ID following. S/p IR CT guided drainage of abscess and Pigtail to drainage bag. Pt followed by surgery. Pt also with ESRD with dialysis M/W/F, followed by Nephro.     This is a 67 year old female, coming from home, with medical history of ESRD(M-W-F), HTN, HIV coming in with abdominal pain. Found to have  Abscess within the left lower quadrant small bowel wall secondary to fistulization from the sigmoid colon likely on the basis of chronic diverticular disease. started on Ceftriaxone and Flagyl. ID following. S/p IR CT guided drainage of abscess and Pigtail to drainage bag. Pt followed by surgery. Pt also with ESRD with dialysis M/W/F, followed by Nephro.    --updated 10/5-- This is a 67 year old female, coming from home, with medical history of ESRD(M-W-F), HTN, HIV coming in with abdominal pain. Found to have  Abscess within the left lower quadrant small bowel wall secondary to fistulization from the sigmoid colon likely on the basis of chronic diverticular disease. started on Ceftriaxone and Flagyl. ID following. S/p IR CT guided drainage of abscess and Pigtail to drainage bag. CT A/P with con repeated 10/6 as pigtail drainage gradually increased.  CT showed collapsed abscess around the pigtail catheter with mild inflammatory changes around the sigmoid colon and small bowel with suggestion of enterocolic fistula. Pt followed by surgery. recc conservative tx on present admission with elective colon resection upon resolution of acute phase.  Pt also with ESRD with dialysis M/W/F, followed by Nephro.    --updated 10/6-- This is a 67 year old female, coming from home, with medical history of ESRD(M-W-F), HTN, HIV coming in with abdominal pain. Found to have  Abscess within the left lower quadrant small bowel wall secondary to fistulization from the sigmoid colon likely on the basis of chronic diverticular disease. started on Ceftriaxone and Flagyl. ID following. S/p IR CT guided drainage of abscess and Pigtail to drainage bag. CT A/P with con repeated 10/6 as pigtail drainage gradually increased.  CT showed collapsed abscess around the pigtail catheter with mild inflammatory changes around the sigmoid colon and small bowel with suggestion of enterocolic fistula. Pt followed by surgery. recc conservative tx on present admission with elective colon resection upon resolution of acute phase.  Pt also with ESRD with dialysis M/W/F, followed by Nephro.    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 Taken to OR on 10/10-10/11 for laparoscopic small bowel resection and primary anastomosis and Alfonso's procedure. Extubated and returned to floor in stable condition. Pain management consulted for assistance with pain control. Post operatively, patient L arm AVG noted to be nonfunctional without thrill. Permacath by IR placed 10/14 and patient underwent dialysis afterwards. Colostomy functional and patient advanced to regular diet, which she tolerated. Patient subsequently discharged on 10/17/22. This is a 67 year old female, coming from home, with medical history of ESRD(M-W-F), HTN, HIV coming in with abdominal pain. Found to have  Abscess within the left lower quadrant small bowel wall secondary to fistulization from the sigmoid colon likely on the basis of chronic diverticular disease. started on Ceftriaxone and Flagyl. ID following. S/p IR CT guided drainage of abscess and Pigtail to drainage bag. CT A/P with con repeated 10/6 as pigtail drainage gradually increased.  CT showed collapsed abscess around the pigtail catheter with mild inflammatory changes around the sigmoid colon and small bowel with suggestion of enterocolic fistula. Pt followed by surgery. recc conservative tx on present admission with elective colon resection upon resolution of acute phase.  Pt also with ESRD with dialysis M/W/F, followed by Nephro.    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 Taken to OR on 10/10-10/11 for laparoscopic small bowel resection and primary anastomosis and Alfonso's procedure. Extubated and returned to floor in stable condition. Pain management consulted for assistance with pain control. Post operatively, patient L arm AVG noted to be nonfunctional without thrill. Permacath by IR placed 10/14 and patient underwent dialysis afterwards. Colostomy functional and patient advanced to regular diet, which she tolerated. Patient was re-examined by physical therapy for deconditioning, who recommended her for inpatient rehabilitation. Patient was subsequently discharged to rehab facility on 10/19/22.

## 2022-10-04 NOTE — DIETITIAN INITIAL EVALUATION ADULT - CALCULATED TO (G/KG)
Ventricular Rate : 60  Atrial Rate : 60  P-R Interval : 161  QRS Duration : 92  Q-T Interval : 425  QTC Calculation(Bezet) : 425  P Axis : 40  R Axis : 34  T Axis : 15  Diagnosis : Atrial-paced rhythm  ST elevation, probably due to early repolarization    Confirmed by fellow Rachna Minaya ([id number]) on 7/2/2019 10:57:22 AM  Confirmed by Patricia Cisneros ([id number]) on 7/2/2019 4:38:41 PM   79.68

## 2022-10-04 NOTE — DIETITIAN INITIAL EVALUATION ADULT - PROBLEM SELECTOR PROBLEM 3
Martin Walker)  Neurosurgery  [address] [place] [address]  Phone: [phone]  Fax: [phone]  Follow Up Time: 2 weeks   HIV (human immunodeficiency virus infection) Martin Walker)  Neurosurgery  [address] [place] [address]  Phone: [phone]  Fax: [phone]  Scheduled Appointment: 06/04/2021 11:00 AM

## 2022-10-04 NOTE — PROGRESS NOTE ADULT - ASSESSMENT
66 y/o Female with perforated diverticulitis  s/p IR drain of abscess     -Diet as tolerated  -IV abx  -IR drain care   -Remainder of care per primary team

## 2022-10-04 NOTE — DIETITIAN INITIAL EVALUATION ADULT - OTHER INFO
Patient from home lives alone admitted for abscess of peritoneum. Visited pt. alert & awake, sitting at edge of the bed, states usual po intake good & recently with abdominal pain past 3 days w/episodes of n/vomiting PTA. Per pt. ha been HD for past 3yrs, seen by RD at [facility] & receives most of her  meals from "[employer]", unsure of [facility]? & last HD session on 10/03/22. Pt. voiced "disliked minced meals" & intake <25%, observed breakfast entree, reviewed menu selections, pt. amenable to "east to chew" consistency, accepted nutrition supplements & prefers vanilla flavor, d/w NP & updated kitchen/Diet Tech. Pt. s/p IR CT guided drainage 9/28, followed by Surgery/team & pending OR?

## 2022-10-04 NOTE — DISCHARGE NOTE PROVIDER - PROVIDER TOKENS
PROVIDER:[TOKEN:[[id number]],FOLLOWUP:[2 weeks]] PROVIDER:[TOKEN:[[id number]],FOLLOWUP:[1 week]],PROVIDER:[TOKEN:[[id number]],FOLLOWUP:[2 weeks],ESTABLISHEDPATIENT:[T]]

## 2022-10-04 NOTE — DISCHARGE NOTE PROVIDER - NSDCMRMEDTOKEN_GEN_ALL_CORE_FT
allopurinol 100 mg oral tablet: 1 tab(s) orally once a day  amLODIPine 10 mg oral tablet: 1 tab(s) orally once a day  ascorbic acid 500 mg oral tablet: 1 tab(s) orally once a day  Aspirin Enteric Coated 81 mg oral delayed release tablet: 1 tab(s) orally once a day  calcitriol 0.25 mcg oral capsule: 1 cap(s) orally once a day  cholecalciferol oral tablet: 1 tab(s) orally once a day   citalopram 20 mg oral tablet: 1 tab(s) orally once a day  hydrALAZINE 50 mg oral tablet: 1 tab(s) orally 3 times a day  isosorbide mononitrate 60 mg oral tablet, extended release: 1 tab(s) orally once a day (in the morning)  lamiVUDine HBV: 100 milligram(s) orally once a day  Metoprolol Tartrate 100 mg oral tablet: 1 tab(s) orally 2 times a day  Omega-3 1000 mg oral capsule: 1 cap(s) orally once a day  PhosLo 667 mg oral capsule: 3 cap(s) orally 3 times a day (with meals)  pravastatin 80 mg oral tablet: 1 tab(s) orally once a day  Prezcobix 800 mg-150 mg oral tablet: 1 tab(s) orally once a day  Renal Caps oral capsule: 1 cap(s) orally once a day  senna oral tablet: 2 tab(s) orally once a day (at bedtime)  sodium bicarbonate 650 mg oral tablet: 1 tab(s) orally 2 times a day  Tivicay 50 mg oral tablet: 1 tab(s) orally once a day  Vitamin B-100 oral tablet: 1 tab(s) orally once a day  Zinc 140 mg (as elemental zinc 50 mg) oral tablet: 1 tab(s) orally once a day   allopurinol 100 mg oral tablet: 1 tab(s) orally once a day  amLODIPine 10 mg oral tablet: 1 tab(s) orally once a day  ascorbic acid 500 mg oral tablet: 1 tab(s) orally once a day  calcitriol 0.25 mcg oral capsule: 1 cap(s) orally once a day  cholecalciferol oral tablet: 1 tab(s) orally once a day   citalopram 20 mg oral tablet: 1 tab(s) orally once a day  hydrALAZINE 50 mg oral tablet: 1 tab(s) orally 3 times a day  isosorbide mononitrate 60 mg oral tablet, extended release: 1 tab(s) orally once a day (in the morning)  lamiVUDine HBV: 100 milligram(s) orally once a day  Metoprolol Tartrate 100 mg oral tablet: 1 tab(s) orally 2 times a day  Omega-3 1000 mg oral capsule: 1 cap(s) orally once a day  PhosLo 667 mg oral capsule: 3 cap(s) orally 3 times a day (with meals)  pravastatin 80 mg oral tablet: 1 tab(s) orally once a day  Prezcobix 800 mg-150 mg oral tablet: 1 tab(s) orally once a day  Renal Caps oral capsule: 1 cap(s) orally once a day  senna oral tablet: 2 tab(s) orally once a day (at bedtime)  sodium bicarbonate 650 mg oral tablet: 1 tab(s) orally 2 times a day  Tivicay 50 mg oral tablet: 1 tab(s) orally once a day  Vitamin B-100 oral tablet: 1 tab(s) orally once a day  Zinc 140 mg (as elemental zinc 50 mg) oral tablet: 1 tab(s) orally once a day   allopurinol 100 mg oral tablet: 1 tab(s) orally once a day  amLODIPine 10 mg oral tablet: 1 tab(s) orally once a day  ascorbic acid 500 mg oral tablet: 1 tab(s) orally once a day  calcitriol 0.25 mcg oral capsule: 1 cap(s) orally once a day  cholecalciferol oral tablet: 1 tab(s) orally once a day   Cipro 500 mg oral tablet: 1 tab(s) orally once a day MDD:1  citalopram 20 mg oral tablet: 1 tab(s) orally once a day  gabapentin 100 mg oral capsule: 1 cap(s) orally once a day (at bedtime)  hydrALAZINE 50 mg oral tablet: 1 tab(s) orally 3 times a day  HYDROmorphone 2 mg oral tablet: 1 tab(s) orally every 4 hours, As needed, Severe for pain not controlled by tylenol and gabapentin MDD:6  isosorbide mononitrate 60 mg oral tablet, extended release: 1 tab(s) orally once a day (in the morning)  lamiVUDine HBV: 100 milligram(s) orally once a day  Metoprolol Tartrate 100 mg oral tablet: 1 tab(s) orally 2 times a day  metroNIDAZOLE 500 mg oral tablet: 1 tab(s) orally 3 times a day  Omega-3 1000 mg oral capsule: 1 cap(s) orally once a day  PhosLo 667 mg oral capsule: 3 cap(s) orally 3 times a day (with meals)  pravastatin 80 mg oral tablet: 1 tab(s) orally once a day  Prezcobix 800 mg-150 mg oral tablet: 1 tab(s) orally once a day  Renal Caps oral capsule: 1 cap(s) orally once a day  senna oral tablet: 2 tab(s) orally once a day (at bedtime)  sodium bicarbonate 650 mg oral tablet: 1 tab(s) orally 2 times a day  Tivicay 50 mg oral tablet: 1 tab(s) orally once a day  Vitamin B-100 oral tablet: 1 tab(s) orally once a day  Zinc 140 mg (as elemental zinc 50 mg) oral tablet: 1 tab(s) orally once a day   allopurinol 100 mg oral tablet: 1 tab(s) orally once a day  amLODIPine 10 mg oral tablet: 1 tab(s) orally once a day  ascorbic acid 500 mg oral tablet: 1 tab(s) orally once a day  calcitriol 0.25 mcg oral capsule: 1 cap(s) orally once a day  cholecalciferol oral tablet: 1 tab(s) orally once a day   citalopram 20 mg oral tablet: 1 tab(s) orally once a day  gabapentin 100 mg oral capsule: 1 cap(s) orally once a day (at bedtime)  hydrALAZINE 25 mg oral tablet: 1 tab(s) orally 3 times a day  isosorbide mononitrate 60 mg oral tablet, extended release: 1 tab(s) orally once a day (in the morning)  lamiVUDine HBV: 100 milligram(s) orally once a day  Metoprolol Tartrate 100 mg oral tablet: 1 tab(s) orally 2 times a day  Omega-3 1000 mg oral capsule: 1 cap(s) orally once a day  PhosLo 667 mg oral capsule: 3 cap(s) orally 3 times a day (with meals)  pravastatin 80 mg oral tablet: 1 tab(s) orally once a day  Prezcobix 800 mg-150 mg oral tablet: 1 tab(s) orally once a day  Renal Caps oral capsule: 1 cap(s) orally once a day  senna oral tablet: 2 tab(s) orally once a day (at bedtime)  sodium bicarbonate 650 mg oral tablet: 1 tab(s) orally 2 times a day  Tivicay 50 mg oral tablet: 1 tab(s) orally once a day  Vitamin B-100 oral tablet: 1 tab(s) orally once a day  Zinc 140 mg (as elemental zinc 50 mg) oral tablet: 1 tab(s) orally once a day

## 2022-10-05 LAB
ALBUMIN SERPL ELPH-MCNC: 2.4 G/DL — LOW (ref 3.5–5)
ALP SERPL-CCNC: 79 U/L — SIGNIFICANT CHANGE UP (ref 40–120)
ALT FLD-CCNC: 22 U/L DA — SIGNIFICANT CHANGE UP (ref 10–60)
ANION GAP SERPL CALC-SCNC: 7 MMOL/L — SIGNIFICANT CHANGE UP (ref 5–17)
AST SERPL-CCNC: 19 U/L — SIGNIFICANT CHANGE UP (ref 10–40)
BILIRUB SERPL-MCNC: 0.4 MG/DL — SIGNIFICANT CHANGE UP (ref 0.2–1.2)
BUN SERPL-MCNC: 28 MG/DL — HIGH (ref 7–18)
CALCIUM SERPL-MCNC: 9.1 MG/DL — SIGNIFICANT CHANGE UP (ref 8.4–10.5)
CHLORIDE SERPL-SCNC: 98 MMOL/L — SIGNIFICANT CHANGE UP (ref 96–108)
CO2 SERPL-SCNC: 32 MMOL/L — HIGH (ref 22–31)
CREAT SERPL-MCNC: 7.34 MG/DL — HIGH (ref 0.5–1.3)
CRP SERPL-MCNC: 21 MG/L — HIGH
EGFR: 6 ML/MIN/1.73M2 — LOW
ERYTHROCYTE [SEDIMENTATION RATE] IN BLOOD: 62 MM/HR — HIGH (ref 0–20)
GLUCOSE SERPL-MCNC: 139 MG/DL — HIGH (ref 70–99)
HCT VFR BLD CALC: 24.7 % — LOW (ref 34.5–45)
HGB BLD-MCNC: 8.3 G/DL — LOW (ref 11.5–15.5)
MCHC RBC-ENTMCNC: 31.7 PG — SIGNIFICANT CHANGE UP (ref 27–34)
MCHC RBC-ENTMCNC: 33.6 GM/DL — SIGNIFICANT CHANGE UP (ref 32–36)
MCV RBC AUTO: 94.3 FL — SIGNIFICANT CHANGE UP (ref 80–100)
NRBC # BLD: 0 /100 WBCS — SIGNIFICANT CHANGE UP (ref 0–0)
PLATELET # BLD AUTO: 352 K/UL — SIGNIFICANT CHANGE UP (ref 150–400)
POTASSIUM SERPL-MCNC: 3.6 MMOL/L — SIGNIFICANT CHANGE UP (ref 3.5–5.3)
POTASSIUM SERPL-SCNC: 3.6 MMOL/L — SIGNIFICANT CHANGE UP (ref 3.5–5.3)
PROT SERPL-MCNC: 6.3 G/DL — SIGNIFICANT CHANGE UP (ref 6–8.3)
RBC # BLD: 2.62 M/UL — LOW (ref 3.8–5.2)
RBC # FLD: 16.6 % — HIGH (ref 10.3–14.5)
SARS-COV-2 RNA SPEC QL NAA+PROBE: SIGNIFICANT CHANGE UP
SODIUM SERPL-SCNC: 137 MMOL/L — SIGNIFICANT CHANGE UP (ref 135–145)
WBC # BLD: 8.65 K/UL — SIGNIFICANT CHANGE UP (ref 3.8–10.5)
WBC # FLD AUTO: 8.65 K/UL — SIGNIFICANT CHANGE UP (ref 3.8–10.5)

## 2022-10-05 PROCEDURE — 99232 SBSQ HOSP IP/OBS MODERATE 35: CPT

## 2022-10-05 RX ORDER — LIDOCAINE AND PRILOCAINE CREAM 25; 25 MG/G; MG/G
1 CREAM TOPICAL
Refills: 0 | Status: DISCONTINUED | OUTPATIENT
Start: 2022-10-05 | End: 2022-10-10

## 2022-10-05 RX ORDER — PANTOPRAZOLE SODIUM 20 MG/1
40 TABLET, DELAYED RELEASE ORAL
Refills: 0 | Status: DISCONTINUED | OUTPATIENT
Start: 2022-10-05 | End: 2022-10-06

## 2022-10-05 RX ADMIN — Medication 50 MILLIGRAM(S): at 21:07

## 2022-10-05 RX ADMIN — SENNA PLUS 2 TABLET(S): 8.6 TABLET ORAL at 21:08

## 2022-10-05 RX ADMIN — CHLORHEXIDINE GLUCONATE 1 APPLICATION(S): 213 SOLUTION TOPICAL at 13:22

## 2022-10-05 RX ADMIN — Medication 30 MILLILITER(S): at 14:47

## 2022-10-05 RX ADMIN — Medication 81 MILLIGRAM(S): at 12:10

## 2022-10-05 RX ADMIN — Medication 100 MILLIGRAM(S): at 09:17

## 2022-10-05 RX ADMIN — Medication 3 MILLIGRAM(S): at 21:07

## 2022-10-05 RX ADMIN — Medication 100 MILLIGRAM(S): at 21:09

## 2022-10-05 RX ADMIN — Medication 650 MILLIGRAM(S): at 22:14

## 2022-10-05 RX ADMIN — Medication 100 MILLIGRAM(S): at 01:50

## 2022-10-05 RX ADMIN — Medication 100 MILLIGRAM(S): at 12:10

## 2022-10-05 RX ADMIN — Medication 100 MILLIGRAM(S): at 21:08

## 2022-10-05 RX ADMIN — AMLODIPINE BESYLATE 10 MILLIGRAM(S): 2.5 TABLET ORAL at 09:17

## 2022-10-05 RX ADMIN — ZINC SULFATE TAB 220 MG (50 MG ZINC EQUIVALENT) 220 MILLIGRAM(S): 220 (50 ZN) TAB at 12:11

## 2022-10-05 RX ADMIN — CALCITRIOL 0.25 MICROGRAM(S): 0.5 CAPSULE ORAL at 12:10

## 2022-10-05 RX ADMIN — Medication 50 MILLIGRAM(S): at 09:17

## 2022-10-05 RX ADMIN — ISOSORBIDE MONONITRATE 60 MILLIGRAM(S): 60 TABLET, EXTENDED RELEASE ORAL at 13:22

## 2022-10-05 RX ADMIN — Medication 50 MILLIGRAM(S): at 13:23

## 2022-10-05 RX ADMIN — Medication 30 MILLILITER(S): at 04:03

## 2022-10-05 RX ADMIN — ERYTHROPOIETIN 4000 UNIT(S): 10000 INJECTION, SOLUTION INTRAVENOUS; SUBCUTANEOUS at 17:42

## 2022-10-05 RX ADMIN — DOLUTEGRAVIR SODIUM 50 MILLIGRAM(S): 25 TABLET, FILM COATED ORAL at 12:10

## 2022-10-05 RX ADMIN — ONDANSETRON 4 MILLIGRAM(S): 8 TABLET, FILM COATED ORAL at 06:42

## 2022-10-05 RX ADMIN — CITALOPRAM 20 MILLIGRAM(S): 10 TABLET, FILM COATED ORAL at 12:10

## 2022-10-05 RX ADMIN — Medication 100 MILLIGRAM(S): at 12:09

## 2022-10-05 RX ADMIN — Medication 650 MILLIGRAM(S): at 09:17

## 2022-10-05 RX ADMIN — DARUNAVIR ETHANOLATE AND COBICISTAT 1 TABLET(S): 800; 150 TABLET, FILM COATED ORAL at 12:11

## 2022-10-05 RX ADMIN — ATORVASTATIN CALCIUM 80 MILLIGRAM(S): 80 TABLET, FILM COATED ORAL at 21:09

## 2022-10-05 RX ADMIN — ONDANSETRON 4 MILLIGRAM(S): 8 TABLET, FILM COATED ORAL at 21:07

## 2022-10-05 NOTE — PROGRESS NOTE ADULT - ASSESSMENT
ESRD , dialysis days are MWF.   Continue with dialysis today 3 hours and 3 K dialystae    Anemia restart BECKY , no need for Iron IV.    Diverticulitis, fistula /  abscess placed on Ceftriaxone and Metronidazole. Post IR drain , 4 bacteria in abscess fluid,  follow up with ID.  Follow up with surgery.

## 2022-10-05 NOTE — PROGRESS NOTE ADULT - NS ATTEND AMEND GEN_ALL_CORE FT
Patient seen and examined. Case discussed with REDD Malagon. In brief, this is a 66 yo F with ESRD on HD (MWF), HIV (CD4 count of 377) on HAART, HTN who presents with severe abdominal pain found to have perforated diverticulitis of the sigmoid colon with abscess formation and fistulization from the sigmoid colon to the LLQ of the small bowel. Patient today reports GERD symptoms as well as diarrhea. Drain output is 155cc today and accurate. Abscess cultures growing E. coli, Proteus, and Klebsiella at this time for which she is currently on ceftriaxone and metronidazole. Surgery is follow for the fistula and perforated diverticulitis. At present, they recommend repeat CT A/P with IV contrast and tube check to further evaluate the fistula and resolution of the abscess.    A/P   #Perforated Sigmoid Diverticulitis with Abscess  #Sigmoid Colon-Small Bowel Fistula  #ESRD on HD (MWF)  #HIV  #HTN    -Continue ceftriaxone and metronidazole for intra-abdominal abscess, patient to be discharged home with drain likely  -Check repeat ESR and CRP per ID   -Monitor and record drain output diligently   -Spoke with surgical attending, Dr. Francisco, she recommends repeat CT A/P with IV contrast to further evaluate abscess resolution and tube check. IR contacted for tube check. Patient already received HD so will need to perform CT scan tomorrow and coordinate with HD  -Follow-up additional ID recommendations  -Continue Prezcobix, dolutegravir, lamivudine-HPV for HIV  -Continue HD for ESRD status as per nephrology  -Continue hydralazine, imdur, amlodipine, metoprolol for HTN  -Continue sodium bicarbonate, calcitriol for ESRD status    Remaining care as noted above. Patient seen and examined. Case discussed with REDD Malagon. In brief, this is a 68 yo F with ESRD on HD (MWF), HIV (CD4 count of 377) on HAART, HTN who presents with severe abdominal pain found to have perforated diverticulitis of the sigmoid colon with abscess formation and fistulization from the sigmoid colon to the LLQ of the small bowel. Patient today reports GERD symptoms as well as diarrhea. Drain output is 155cc today and accurate. Abscess cultures growing E. coli, Proteus, and Klebsiella at this time for which she is currently on ceftriaxone and metronidazole. Surgery is follow for the fistula and perforated diverticulitis. At present, they recommend repeat CT A/P with IV contrast and tube check to further evaluate the fistula and resolution of the abscess.    A/P   #Perforated Sigmoid Diverticulitis with Abscess  #Sigmoid Colon-Small Bowel Fistula  #ESRD on HD (MWF)  #HIV  #HTN  #GERD    -Continue ceftriaxone and metronidazole for intra-abdominal abscess, patient to be discharged home with drain likely  -Check repeat ESR and CRP per ID   -Monitor and record drain output diligently   -Spoke with surgical attending, Dr. Francisco, she recommends repeat CT A/P with IV contrast to further evaluate abscess resolution and tube check. IR contacted for tube check. Patient already received HD so will need to perform CT scan tomorrow and coordinate with HD  -Follow-up additional ID recommendations  -Continue Prezcobix, dolutegravir, lamivudine-HPV for HIV  -Continue HD for ESRD status as per nephrology  -Continue hydralazine, imdur, amlodipine, metoprolol for HTN  -Continue sodium bicarbonate, calcitriol for ESRD status  -Start Protonix for GERD symptoms    Remaining care as noted above.

## 2022-10-05 NOTE — PROGRESS NOTE ADULT - SUBJECTIVE AND OBJECTIVE BOX
Patient is a 67y old  Female who presents with a chief complaint of Abdominal pain     INTERVAL HPI/OVERNIGHT EVENTS:    REVIEW OF SYSTEMS:  CONSTITUTIONAL: No fever, chills  ENMT:  No difficulty hearing, no change in vision  NECK: No pain or stiffness  RESPIRATORY: No cough, SOB  CARDIOVASCULAR: No chest pain, palpitations  GASTROINTESTINAL: No abdominal pain. No nausea, vomiting, or diarrhea  GENITOURINARY: No dysuria  NEUROLOGICAL: No HA  SKIN: No itching, burning, rashes, or lesions   LYMPH NODES: No enlarged glands  ENDOCRINE: No heat or cold intolerance; No hair loss  MUSCULOSKELETAL: No joint pain or swelling; No muscle, back, or extremity pain  PSYCHIATRIC: No depression, anxiety  HEME/LYMPH: No easy bruising, or bleeding gums    T(C): 36.9 (10-05-22 @ 05:21), Max: 37.4 (10-04-22 @ 13:05)  HR: 89 (10-05-22 @ 05:21) (78 - 94)  BP: 120/65 (10-05-22 @ 05:21) (115/56 - 158/71)  RR: 17 (10-05-22 @ 05:21) (17 - 18)  SpO2: 100% (10-05-22 @ 05:21) (100% - 100%)  Wt(kg): --Vital Signs Last 24 Hrs  T(C): 36.9 (05 Oct 2022 05:21), Max: 37.4 (04 Oct 2022 13:05)  T(F): 98.5 (05 Oct 2022 05:21), Max: 99.3 (04 Oct 2022 13:05)  HR: 89 (05 Oct 2022 05:21) (78 - 94)  BP: 120/65 (05 Oct 2022 05:21) (115/56 - 158/71)  BP(mean): 68 (04 Oct 2022 17:16) (68 - 84)  RR: 17 (05 Oct 2022 05:21) (17 - 18)  SpO2: 100% (05 Oct 2022 05:21) (100% - 100%)    MEDICATIONS  (STANDING):  allopurinol 100 milliGRAM(s) Oral daily  amLODIPine   Tablet 10 milliGRAM(s) Oral daily  aspirin enteric coated 81 milliGRAM(s) Oral daily  atorvastatin 80 milliGRAM(s) Oral at bedtime  calcitriol   Capsule 0.25 MICROGram(s) Oral daily  cefTRIAXone   IVPB 2000 milliGRAM(s) IV Intermittent every 24 hours  chlorhexidine 2% Cloths 1 Application(s) Topical daily  citalopram 20 milliGRAM(s) Oral daily  darunavir 800 mG/cobicstat 150 mG 1 Tablet(s) Oral daily  dolutegravir 50 milliGRAM(s) Oral daily  epoetin neyda-epbx (RETACRIT) Injectable 4000 Unit(s) IV Push <User Schedule>  hydrALAZINE 50 milliGRAM(s) Oral three times a day  isosorbide   mononitrate ER Tablet (IMDUR) 60 milliGRAM(s) Oral daily  lamiVUDine- milliGRAM(s) Oral daily  melatonin 3 milliGRAM(s) Oral at bedtime  metoprolol tartrate 100 milliGRAM(s) Oral two times a day  metroNIDAZOLE  IVPB 500 milliGRAM(s) IV Intermittent every 8 hours  omega-3-Acid Ethyl Esters 4 Gram(s) Oral daily  senna 2 Tablet(s) Oral at bedtime  sodium bicarbonate 650 milliGRAM(s) Oral two times a day  zinc sulfate 220 milliGRAM(s) Oral daily    MEDICATIONS  (PRN):  acetaminophen     Tablet .. 650 milliGRAM(s) Oral every 6 hours PRN Temp greater or equal to 38C (100.4F), Mild Pain (1 - 3)  aluminum hydroxide/magnesium hydroxide/simethicone Suspension 30 milliLiter(s) Oral every 4 hours PRN Dyspepsia  ondansetron Injectable 4 milliGRAM(s) IV Push every 8 hours PRN Nausea and/or Vomiting    Parameters below as of 05 Oct 2022 05:21  Patient On (Oxygen Delivery Method): room air    PHYSICAL EXAM:  GENERAL: NAD  EYES: clear conjunctiva; EOMI  ENMT: Moist mucous membranes  NECK: Supple, No JVD, Normal thyroid  CHEST/LUNG: Clear to auscultation bilaterally; No rales, rhonchi, wheezing, or rubs  HEART: S1, S2, Regular rate and rhythm  ABDOMEN: Soft, Nontender, Nondistended; Bowel sounds present  NEURO: Alert & Oriented X3  EXTREMITIES: No LE edema, no calf tenderness  LYMPH: No lymphadenopathy noted  SKIN: No rashes or lesions  --: + Scrotal skin thickening   Consultant(s) Notes Reviewed:  [x ] YES  [ ] NO  Care Discussed with Consultants/Other Providers [ x] YES  [ ] NO    LABS:                        8.9    5.67  )-----------( 318      ( 04 Oct 2022 06:22 )             26.5     10-04    136  |  98  |  9   ----------------------------<  100<H>  3.8   |  29  |  4.55<H>    Ca    9.0      04 Oct 2022 06:22    TPro  6.1  /  Alb  2.3<L>  /  TBili  0.4  /  DBili  x   /  AST  21  /  ALT  19  /  AlkPhos  74  10-04      CAPILLARY BLOOD GLUCOSE      RADIOLOGY & ADDITIONAL TESTS:      Imaging Personally Reviewed:  [ ] YES  [ ] NO    < from: CT Abdomen and Pelvis w/ IV Cont (09.27.22 @ 06:30) >  ACC: [id number] EXAM:  CT ABDOMEN AND PELVIS IC                          PROCEDURE DATE:  09/27/2022        INTERPRETATION:  CLINICAL INFORMATION: Abdominal pain question abscess    COMPARISON: CT abdomen pelvis 9/27/2022 earlier the same day.    CONTRAST/COMPLICATIONS:  IV Contrast: Omnipaque 350 90 cc administered  90 cc administered   10 cc   discarded  Oral Contrast: None  Complications: None    PROCEDURE:  CT of the Abdomen and Pelvis was performed.  Sagittal and coronal reformats were performed.    FINDINGS:  LOWER CHEST: Probable postsurgical changes in the left lower lung. 1.6 cm   left lower lobe thin-walled cyst.    LIVER: 1.3 cm right lobe hypodensity with suggestion of peripheral early   nodular enhancement (4:61). This may represent a hemangioma. This could   be confirmed with MRI.  BILE DUCTS: Normal caliber.  GALLBLADDER: Within normal limits.  SPLEEN: Within normal limits.  PANCREAS: 1.4 cm cyst in uncinate process or a focally dilated duct. This   would be better evaluated with dedicated MRI.  ADRENALS: Within normal limits.  KIDNEYS/URETERS: Bilateral polycystic kidneys    BLADDER: Within normal limits.  REPRODUCTIVE ORGANS: Hysterectomy.    BOWEL: Moderate sized hiatal hernia. Normal appendix.    Oral contrast is again seen within mildly prominent loops of proximal   small bowel measuring up to 2.7 cm in diameter to a mass in the left   lower quadrant. This is unchanged. The mass measures 5.8 x 5.1 cm and   contains fluid and air. This appears to be within thewall of the small   bowel. A thin column of oral contrast passes within the lumen of the   small bowel distally into collapsed loops of small bowel. There is an   apparent fistula between this mass in the adjacent markedly thickened   sigmoid colon which contains numerous diverticula. There are inflammatory   changes in the adjacent fat. This likely represents sigmoid   diverticulitis with small bowel fistulization to the raw within adjacent   loop of small bowel in a contained abscess.      PERITONEUM: No ascites.  VESSELS: Atherosclerotic changes.  RETROPERITONEUM/LYMPH NODES: No lymphadenopathy.  ABDOMINAL WALL: Small fat-containing umbilical hernia.  BONES: Within normal limits.    IMPRESSION:    Abscess within the left lower quadrant small bowel wall secondary to   fistulization from the sigmoid colon likely on the basis of chronic   diverticular disease.    1.3 cm right lobe hypodensity with suggestion of peripheral early nodular   enhancement. This may represent a hemangioma. This could be confirmed   with MRI.    1.4 cm cyst in uncinate process or a focally dilated duct. This would be   better evaluated with dedicated MRI.    Probable probably cystic kidneys. Correlate clinically for kidney disease.      --- End of Report ---      ISAI HERNANDEZ MD; Attending Radiologist  This document has been electronically signed. Sep 27 2022  6:35AM    < end of copied text >  < from: CT Abdomen and Pelvis w/ Oral Cont (09.27.22 @ 04:29) >    ACC: [id number] EXAM:  CT ABDOMEN AND PELVIS OC                          PROCEDURE DATE:  09/27/2022        INTERPRETATION:  CLINICAL INFORMATION: Pain. Evaluate for obstruction    COMPARISON: CT chest 1/22/2021.    CONTRAST/COMPLICATIONS:  IV Contrast: NONE  Oral Contrast: Gastroview  Complications: None reported at time of study completion    PROCEDURE:  CT of the Abdomen and Pelvis was performed.  Sagittal and coronal reformats were performed.    FINDINGS:  LOWER CHEST: Left lower lobe linear calcification possibly postsurgical,   unchanged    LIVER: Within normal limits.  BILE DUCTS: Normal caliber.  GALLBLADDER: Within normal limits.  SPLEEN: Within normal limits.  PANCREAS: Within normal limits.  ADRENALS: Within normal limits.  KIDNEYS/URETERS: Numerous bilateral renal cysts left greater than right.   No hydronephrosis. These are not well evaluated on this noncontrast   examination.  There are subcentimeter left renal calcification. There are subcentimeter   dense cyst possibly hemorrhagic.    BLADDER: Within normal limits.  REPRODUCTIVE ORGANS: Hysterectomy.    BOWEL: Moderate sized hiatal hernia. Appendix is not visualized. No   evidence of inflammation in the pericecal region.    Oral contrast has advanced to the mid smallbowel which is mildly   prominent measuring up to 2.7 cm in diameter.  The sigmoid colon is thickened and there are numerous diverticula. At the   proximal sigmoid colon there are inflammatory changes which extend to the   adjacent small bowel where there is a 5.4 x 5.4 cm mass containing air   and feculent material. Evaluation is limited without intravenous contrast   and oral contrast has not reached this level. This may represent a   contained perforation secondary to fistulization from sigmoid   diverticular disease to the small bowel.      PERITONEUM: No ascites.  VESSELS: Atherosclerotic changes.  RETROPERITONEUM/LYMPH NODES: No lymphadenopathy.  ABDOMINAL WALL: Within normal limits.  BONES: Within normal limits.    IMPRESSION:    Oral contrast has advanced to the mid small bowel which is mildly   prominent measuring up to 2.7 cm in diameter.    The sigmoid colon is thickened and there are numerous diverticula. At the   proximal sigmoid colon there are inflammatory changes which extend to the   adjacent small bowel where there is a 5.4 x 5.4 cm mass containing air   and feculent material. Evaluation is limited without intravenous contrast   and oral contrast has not reached this level. This may represent a   contained perforation secondary to fistulization from sigmoid   diverticular disease to the small bowel.    Suspicion of polycystic kidney disease. Correlate clinically.    --- End of Report ---      ISAI HERNANDEZ MD; Attending Radiologist  This document has been electronically signed. Sep 27 2022  4:51AM    < end of copied text >    < from: CT Aspiration Abscess Hematoma, Cyst (09.28.22 @ 14:00) >    ACC: [id number] EXAM:  CT ASP ABSC HEMATOMA CYST#                          PROCEDURE DATE:  09/28/2022        INTERPRETATION:  PROCEDURE: CT-guided drainage of abdominal abscess.    : VERÓNICA Sprague MD    CLINICAL INDICATION: 67-year-old female with multiple comorbidities, who   presented with abdominal pain and was found to have an abdominal   collection concerning for abscess. Percutaneous drainage of the abscess   was requested.    ANESTHESIA: Intravenous sedation was provided by the attending   anesthesiologist. A total of 7 mL of 1% lidocaine was used for local   anesthesia.    ANTIBIOTICS: Patient's scheduled dose of metronidazole was administered   by the anesthesiologist prior to procedure.    TECHNIQUE: After discussing the risks, benefits and alternatives to this   procedure with the patient, informed consent was obtained. A timeout was   performed.    The patient was placed supine on the CT examination table and connected   to physiologic monitoring. Transaxial images ofthe abdomen were obtained   without the use of oral or intravenous contrast materials. The left lower   quadrant abdominal collection which had been seen on a recent CT scan was   again identified, and the overlying skin was prepped and draped in the  usual sterile fashion.    Using CT guidance, an 18-gauge trocar needle was advanced into the   collection using a left paramedian approach. Access into the collection   was confirmed with CT images and aspiration of 5 mL of purulent fluid   which was sent for microbiological analysis. The needle was then removed   over a wire and the tract was dilated. A 10 Taiwanese pigtail drainage   catheter was then advanced into the collection over the wire. An   additional 40 mL of thick, purulent foul-smelling fluid was manually   aspirated. Repeat images demonstrated good positioning of the catheter   tip within the abscess cavity with virtual complete collapse of the   cavity around the catheter tip. The catheter was secured to the patient's   skin with a silk suture, flushed with normal saline, and then connected   to a drainage bag. A dry sterile dressing was then applied.    The patient tolerated the procedure well and was transferred to the   recovery area in stable condition. There were no immediate complications.    IMPRESSION: SUCCESSFUL CT-GUIDED PERCUTANEOUS DRAINAGE OF ABDOMINAL   ABSCESS, AS DESCRIBED ABOVE.    --- End of Report ---    TODD SPRAGUE MD; Attending Interventional Radiologist  This document has been electronically signed. Sep 28 2022  2:54PM    < end of copied text >             Patient is a 67y old  Female who presents with a chief complaint of Abdominal pain     INTERVAL HPI/OVERNIGHT EVENTS:    REVIEW OF SYSTEMS:  CONSTITUTIONAL: No fever, chills  ENMT:  No difficulty hearing, no change in vision  NECK: No pain or stiffness  RESPIRATORY: No cough, SOB  CARDIOVASCULAR: No chest pain, palpitations  GASTROINTESTINAL: No abdominal pain. No nausea, vomiting, or diarrhea  GENITOURINARY: No dysuria  NEUROLOGICAL: No HA  SKIN: No itching, burning, rashes, or lesions   LYMPH NODES: No enlarged glands  ENDOCRINE: No heat or cold intolerance; No hair loss  MUSCULOSKELETAL: No joint pain or swelling; No muscle, back, or extremity pain  PSYCHIATRIC: No depression, anxiety  HEME/LYMPH: No easy bruising, or bleeding gums    T(C): 36.9 (10-05-22 @ 05:21), Max: 37.4 (10-04-22 @ 13:05)  HR: 89 (10-05-22 @ 05:21) (78 - 94)  BP: 120/65 (10-05-22 @ 05:21) (115/56 - 158/71)  RR: 17 (10-05-22 @ 05:21) (17 - 18)  SpO2: 100% (10-05-22 @ 05:21) (100% - 100%)  Wt(kg): --Vital Signs Last 24 Hrs  T(C): 36.9 (05 Oct 2022 05:21), Max: 37.4 (04 Oct 2022 13:05)  T(F): 98.5 (05 Oct 2022 05:21), Max: 99.3 (04 Oct 2022 13:05)  HR: 89 (05 Oct 2022 05:21) (78 - 94)  BP: 120/65 (05 Oct 2022 05:21) (115/56 - 158/71)  BP(mean): 68 (04 Oct 2022 17:16) (68 - 84)  RR: 17 (05 Oct 2022 05:21) (17 - 18)  SpO2: 100% (05 Oct 2022 05:21) (100% - 100%)    MEDICATIONS  (STANDING):  allopurinol 100 milliGRAM(s) Oral daily  amLODIPine   Tablet 10 milliGRAM(s) Oral daily  aspirin enteric coated 81 milliGRAM(s) Oral daily  atorvastatin 80 milliGRAM(s) Oral at bedtime  calcitriol   Capsule 0.25 MICROGram(s) Oral daily  cefTRIAXone   IVPB 2000 milliGRAM(s) IV Intermittent every 24 hours  chlorhexidine 2% Cloths 1 Application(s) Topical daily  citalopram 20 milliGRAM(s) Oral daily  darunavir 800 mG/cobicstat 150 mG 1 Tablet(s) Oral daily  dolutegravir 50 milliGRAM(s) Oral daily  epoetin neyda-epbx (RETACRIT) Injectable 4000 Unit(s) IV Push <User Schedule>  hydrALAZINE 50 milliGRAM(s) Oral three times a day  isosorbide   mononitrate ER Tablet (IMDUR) 60 milliGRAM(s) Oral daily  lamiVUDine- milliGRAM(s) Oral daily  melatonin 3 milliGRAM(s) Oral at bedtime  metoprolol tartrate 100 milliGRAM(s) Oral two times a day  metroNIDAZOLE  IVPB 500 milliGRAM(s) IV Intermittent every 8 hours  omega-3-Acid Ethyl Esters 4 Gram(s) Oral daily  senna 2 Tablet(s) Oral at bedtime  sodium bicarbonate 650 milliGRAM(s) Oral two times a day  zinc sulfate 220 milliGRAM(s) Oral daily    MEDICATIONS  (PRN):  acetaminophen     Tablet .. 650 milliGRAM(s) Oral every 6 hours PRN Temp greater or equal to 38C (100.4F), Mild Pain (1 - 3)  aluminum hydroxide/magnesium hydroxide/simethicone Suspension 30 milliLiter(s) Oral every 4 hours PRN Dyspepsia  ondansetron Injectable 4 milliGRAM(s) IV Push every 8 hours PRN Nausea and/or Vomiting    Parameters below as of 05 Oct 2022 05:21  Patient On (Oxygen Delivery Method): room air    PHYSICAL EXAM:  GENERAL: NAD  EYES: clear conjunctiva; EOMI  ENMT: Moist mucous membranes  NECK: Supple, No JVD, Normal thyroid  CHEST/LUNG: Clear to auscultation bilaterally; No rales, rhonchi, wheezing, or rubs  HEART: S1, S2, Regular rate and rhythm  ABDOMEN: Soft, Nontender, Nondistended; Bowel sounds present, LLQ pain w/ palpation, Pigtail dressing  dry & intact, 155 bile/dark green colored 155ml output last 24 hours, drain to gravity  NEURO: Alert & Oriented X3   EXTREMITIES: No LE edema, no calf tenderness  LYMPH: No lymphadenopathy noted  SKIN: No rashes or lesions  --: + Scrotal skin thickening   Consultant(s) Notes Reviewed:  [x ] YES  [ ] NO  Care Discussed with Consultants/Other Providers [ x] YES  [ ] NO    Access: HD: left AVF upper arm, + thrill & bruit    LABS:                        8.9    5.67  )-----------( 318      ( 04 Oct 2022 06:22 )             26.5     10-04    136  |  98  |  9   ----------------------------<  100<H>  3.8   |  29  |  4.55<H>    Ca    9.0      04 Oct 2022 06:22    TPro  6.1  /  Alb  2.3<L>  /  TBili  0.4  /  DBili  x   /  AST  21  /  ALT  19  /  AlkPhos  74  10-04      CAPILLARY BLOOD GLUCOSE    RADIOLOGY & ADDITIONAL TESTS:    Imaging Personally Reviewed:  [ ] YES  [ ] NO    < from: CT Abdomen and Pelvis w/ IV Cont (09.27.22 @ 06:30) >  ACC: [id number] EXAM:  CT ABDOMEN AND PELVIS IC                          PROCEDURE DATE:  09/27/2022        INTERPRETATION:  CLINICAL INFORMATION: Abdominal pain question abscess    COMPARISON: CT abdomen pelvis 9/27/2022 earlier the same day.    CONTRAST/COMPLICATIONS:  IV Contrast: Omnipaque 350 90 cc administered  90 cc administered   10 cc   discarded  Oral Contrast: None  Complications: None    PROCEDURE:  CT of the Abdomen and Pelvis was performed.  Sagittal and coronal reformats were performed.    FINDINGS:  LOWER CHEST: Probable postsurgical changes in the left lower lung. 1.6 cm   left lower lobe thin-walled cyst.    LIVER: 1.3 cm right lobe hypodensity with suggestion of peripheral early   nodular enhancement (4:61). This may represent a hemangioma. This could   be confirmed with MRI.  BILE DUCTS: Normal caliber.  GALLBLADDER: Within normal limits.  SPLEEN: Within normal limits.  PANCREAS: 1.4 cm cyst in uncinate process or a focally dilated duct. This   would be better evaluated with dedicated MRI.  ADRENALS: Within normal limits.  KIDNEYS/URETERS: Bilateral polycystic kidneys    BLADDER: Within normal limits.  REPRODUCTIVE ORGANS: Hysterectomy.    BOWEL: Moderate sized hiatal hernia. Normal appendix.    Oral contrast is again seen within mildly prominent loops of proximal   small bowel measuring up to 2.7 cm in diameter to a mass in the left   lower quadrant. This is unchanged. The mass measures 5.8 x 5.1 cm and   contains fluid and air. This appears to be within thewall of the small   bowel. A thin column of oral contrast passes within the lumen of the   small bowel distally into collapsed loops of small bowel. There is an   apparent fistula between this mass in the adjacent markedly thickened   sigmoid colon which contains numerous diverticula. There are inflammatory   changes in the adjacent fat. This likely represents sigmoid   diverticulitis with small bowel fistulization to the raw within adjacent   loop of small bowel in a contained abscess.      PERITONEUM: No ascites.  VESSELS: Atherosclerotic changes.  RETROPERITONEUM/LYMPH NODES: No lymphadenopathy.  ABDOMINAL WALL: Small fat-containing umbilical hernia.  BONES: Within normal limits.    IMPRESSION:    Abscess within the left lower quadrant small bowel wall secondary to   fistulization from the sigmoid colon likely on the basis of chronic   diverticular disease.    1.3 cm right lobe hypodensity with suggestion of peripheral early nodular   enhancement. This may represent a hemangioma. This could be confirmed   with MRI.    1.4 cm cyst in uncinate process or a focally dilated duct. This would be   better evaluated with dedicated MRI.    Probable probably cystic kidneys. Correlate clinically for kidney disease.      --- End of Report ---      ISAI HERNANDEZ MD; Attending Radiologist  This document has been electronically signed. Sep 27 2022  6:35AM    < end of copied text >  < from: CT Abdomen and Pelvis w/ Oral Cont (09.27.22 @ 04:29) >    ACC: [id number] EXAM:  CT ABDOMEN AND PELVIS OC                          PROCEDURE DATE:  09/27/2022        INTERPRETATION:  CLINICAL INFORMATION: Pain. Evaluate for obstruction    COMPARISON: CT chest 1/22/2021.    CONTRAST/COMPLICATIONS:  IV Contrast: NONE  Oral Contrast: Gastroview  Complications: None reported at time of study completion    PROCEDURE:  CT of the Abdomen and Pelvis was performed.  Sagittal and coronal reformats were performed.    FINDINGS:  LOWER CHEST: Left lower lobe linear calcification possibly postsurgical,   unchanged    LIVER: Within normal limits.  BILE DUCTS: Normal caliber.  GALLBLADDER: Within normal limits.  SPLEEN: Within normal limits.  PANCREAS: Within normal limits.  ADRENALS: Within normal limits.  KIDNEYS/URETERS: Numerous bilateral renal cysts left greater than right.   No hydronephrosis. These are not well evaluated on this noncontrast   examination.  There are subcentimeter left renal calcification. There are subcentimeter   dense cyst possibly hemorrhagic.    BLADDER: Within normal limits.  REPRODUCTIVE ORGANS: Hysterectomy.    BOWEL: Moderate sized hiatal hernia. Appendix is not visualized. No   evidence of inflammation in the pericecal region.    Oral contrast has advanced to the mid smallbowel which is mildly   prominent measuring up to 2.7 cm in diameter.  The sigmoid colon is thickened and there are numerous diverticula. At the   proximal sigmoid colon there are inflammatory changes which extend to the   adjacent small bowel where there is a 5.4 x 5.4 cm mass containing air   and feculent material. Evaluation is limited without intravenous contrast   and oral contrast has not reached this level. This may represent a   contained perforation secondary to fistulization from sigmoid   diverticular disease to the small bowel.      PERITONEUM: No ascites.  VESSELS: Atherosclerotic changes.  RETROPERITONEUM/LYMPH NODES: No lymphadenopathy.  ABDOMINAL WALL: Within normal limits.  BONES: Within normal limits.    IMPRESSION:    Oral contrast has advanced to the mid small bowel which is mildly   prominent measuring up to 2.7 cm in diameter.    The sigmoid colon is thickened and there are numerous diverticula. At the   proximal sigmoid colon there are inflammatory changes which extend to the   adjacent small bowel where there is a 5.4 x 5.4 cm mass containing air   and feculent material. Evaluation is limited without intravenous contrast   and oral contrast has not reached this level. This may represent a   contained perforation secondary to fistulization from sigmoid   diverticular disease to the small bowel.    Suspicion of polycystic kidney disease. Correlate clinically.    --- End of Report ---      ISAI HERNANDEZ MD; Attending Radiologist  This document has been electronically signed. Sep 27 2022  4:51AM    < end of copied text >    < from: CT Aspiration Abscess Hematoma, Cyst (09.28.22 @ 14:00) >    ACC: [id number] EXAM:  CT ASP ABSC HEMATOMA CYST#                          PROCEDURE DATE:  09/28/2022        INTERPRETATION:  PROCEDURE: CT-guided drainage of abdominal abscess.    : VERÓNICA Sprague MD    CLINICAL INDICATION: 67-year-old female with multiple comorbidities, who   presented with abdominal pain and was found to have an abdominal   collection concerning for abscess. Percutaneous drainage of the abscess   was requested.    ANESTHESIA: Intravenous sedation was provided by the attending   anesthesiologist. A total of 7 mL of 1% lidocaine was used for local   anesthesia.    ANTIBIOTICS: Patient's scheduled dose of metronidazole was administered   by the anesthesiologist prior to procedure.    TECHNIQUE: After discussing the risks, benefits and alternatives to this   procedure with the patient, informed consent was obtained. A timeout was   performed.    The patient was placed supine on the CT examination table and connected   to physiologic monitoring. Transaxial images ofthe abdomen were obtained   without the use of oral or intravenous contrast materials. The left lower   quadrant abdominal collection which had been seen on a recent CT scan was   again identified, and the overlying skin was prepped and draped in the  usual sterile fashion.    Using CT guidance, an 18-gauge trocar needle was advanced into the   collection using a left paramedian approach. Access into the collection   was confirmed with CT images and aspiration of 5 mL of purulent fluid   which was sent for microbiological analysis. The needle was then removed   over a wire and the tract was dilated. A 10 German pigtail drainage   catheter was then advanced into the collection over the wire. An   additional 40 mL of thick, purulent foul-smelling fluid was manually   aspirated. Repeat images demonstrated good positioning of the catheter   tip within the abscess cavity with virtual complete collapse of the   cavity around the catheter tip. The catheter was secured to the patient's   skin with a silk suture, flushed with normal saline, and then connected   to a drainage bag. A dry sterile dressing was then applied.    The patient tolerated the procedure well and was transferred to the   recovery area in stable condition. There were no immediate complications.    IMPRESSION: SUCCESSFUL CT-GUIDED PERCUTANEOUS DRAINAGE OF ABDOMINAL   ABSCESS, AS DESCRIBED ABOVE.    --- End of Report ---    TODD SPRAGUE MD; Attending Interventional Radiologist  This document has been electronically signed. Sep 28 2022  2:54PM    < end of copied text >             Patient is a 67y old  Female who presents with a chief complaint of Abdominal pain     INTERVAL HPI/OVERNIGHT EVENTS:    REVIEW OF SYSTEMS:  CONSTITUTIONAL: No fever, chills  ENMT:  No difficulty hearing, no change in vision  NECK: No pain or stiffness  RESPIRATORY: No cough, SOB  CARDIOVASCULAR: No chest pain, palpitations  GASTROINTESTINAL: No abdominal pain. No nausea, vomiting, or diarrhea  GENITOURINARY: No dysuria  NEUROLOGICAL: No HA  SKIN: No itching, burning, rashes, or lesions   LYMPH NODES: No enlarged glands  ENDOCRINE: No heat or cold intolerance; No hair loss  MUSCULOSKELETAL: No joint pain or swelling; No muscle, back, or extremity pain  PSYCHIATRIC: No depression, anxiety  HEME/LYMPH: No easy bruising, or bleeding gums    T(C): 36.9 (10-05-22 @ 05:21), Max: 37.4 (10-04-22 @ 13:05)  HR: 89 (10-05-22 @ 05:21) (78 - 94)  BP: 120/65 (10-05-22 @ 05:21) (115/56 - 158/71)  RR: 17 (10-05-22 @ 05:21) (17 - 18)  SpO2: 100% (10-05-22 @ 05:21) (100% - 100%)  Wt(kg): --Vital Signs Last 24 Hrs  T(C): 36.9 (05 Oct 2022 05:21), Max: 37.4 (04 Oct 2022 13:05)  T(F): 98.5 (05 Oct 2022 05:21), Max: 99.3 (04 Oct 2022 13:05)  HR: 89 (05 Oct 2022 05:21) (78 - 94)  BP: 120/65 (05 Oct 2022 05:21) (115/56 - 158/71)  BP(mean): 68 (04 Oct 2022 17:16) (68 - 84)  RR: 17 (05 Oct 2022 05:21) (17 - 18)  SpO2: 100% (05 Oct 2022 05:21) (100% - 100%)    MEDICATIONS  (STANDING):  allopurinol 100 milliGRAM(s) Oral daily  amLODIPine   Tablet 10 milliGRAM(s) Oral daily  aspirin enteric coated 81 milliGRAM(s) Oral daily  atorvastatin 80 milliGRAM(s) Oral at bedtime  calcitriol   Capsule 0.25 MICROGram(s) Oral daily  cefTRIAXone   IVPB 2000 milliGRAM(s) IV Intermittent every 24 hours  chlorhexidine 2% Cloths 1 Application(s) Topical daily  citalopram 20 milliGRAM(s) Oral daily  darunavir 800 mG/cobicstat 150 mG 1 Tablet(s) Oral daily  dolutegravir 50 milliGRAM(s) Oral daily  epoetin neyda-epbx (RETACRIT) Injectable 4000 Unit(s) IV Push <User Schedule>  hydrALAZINE 50 milliGRAM(s) Oral three times a day  isosorbide   mononitrate ER Tablet (IMDUR) 60 milliGRAM(s) Oral daily  lamiVUDine- milliGRAM(s) Oral daily  melatonin 3 milliGRAM(s) Oral at bedtime  metoprolol tartrate 100 milliGRAM(s) Oral two times a day  metroNIDAZOLE  IVPB 500 milliGRAM(s) IV Intermittent every 8 hours  omega-3-Acid Ethyl Esters 4 Gram(s) Oral daily  senna 2 Tablet(s) Oral at bedtime  sodium bicarbonate 650 milliGRAM(s) Oral two times a day  zinc sulfate 220 milliGRAM(s) Oral daily    MEDICATIONS  (PRN):  acetaminophen     Tablet .. 650 milliGRAM(s) Oral every 6 hours PRN Temp greater or equal to 38C (100.4F), Mild Pain (1 - 3)  aluminum hydroxide/magnesium hydroxide/simethicone Suspension 30 milliLiter(s) Oral every 4 hours PRN Dyspepsia  ondansetron Injectable 4 milliGRAM(s) IV Push every 8 hours PRN Nausea and/or Vomiting    Parameters below as of 05 Oct 2022 05:21  Patient On (Oxygen Delivery Method): room air    PHYSICAL EXAM:  GENERAL: NAD  EYES: clear conjunctiva; EOMI  ENMT: Moist mucous membranes  NECK: Supple, No JVD, Normal thyroid  CHEST/LUNG: Clear to auscultation bilaterally; No rales, rhonchi, wheezing, or rubs  HEART: S1, S2, Regular rate and rhythm  ABDOMEN: Soft, Nontender, Nondistended; Bowel sounds present, LLQ pain w/ palpation, Pigtail dressing  dry & intact, 155 bile/dark green colored 155ml output last 24 hours, drain to gravity  NEURO: Alert & Oriented X3   EXTREMITIES: No LE edema, no calf tenderness  LYMPH: No lymphadenopathy noted  SKIN: No rashes or lesions  Consultant(s) Notes Reviewed:  [x ] YES  [ ] NO  Care Discussed with Consultants/Other Providers [ x] YES  [ ] NO    Access: HD: left AVF upper arm, + thrill & bruit    LABS:                        8.9    5.67  )-----------( 318      ( 04 Oct 2022 06:22 )             26.5     10-04    136  |  98  |  9   ----------------------------<  100<H>  3.8   |  29  |  4.55<H>    Ca    9.0      04 Oct 2022 06:22    TPro  6.1  /  Alb  2.3<L>  /  TBili  0.4  /  DBili  x   /  AST  21  /  ALT  19  /  AlkPhos  74  10-04      CAPILLARY BLOOD GLUCOSE    RADIOLOGY & ADDITIONAL TESTS:    Imaging Personally Reviewed:  [ ] YES  [ ] NO    < from: CT Abdomen and Pelvis w/ IV Cont (09.27.22 @ 06:30) >  ACC: [id number] EXAM:  CT ABDOMEN AND PELVIS IC                          PROCEDURE DATE:  09/27/2022        INTERPRETATION:  CLINICAL INFORMATION: Abdominal pain question abscess    COMPARISON: CT abdomen pelvis 9/27/2022 earlier the same day.    CONTRAST/COMPLICATIONS:  IV Contrast: Omnipaque 350 90 cc administered  90 cc administered   10 cc   discarded  Oral Contrast: None  Complications: None    PROCEDURE:  CT of the Abdomen and Pelvis was performed.  Sagittal and coronal reformats were performed.    FINDINGS:  LOWER CHEST: Probable postsurgical changes in the left lower lung. 1.6 cm   left lower lobe thin-walled cyst.    LIVER: 1.3 cm right lobe hypodensity with suggestion of peripheral early   nodular enhancement (4:61). This may represent a hemangioma. This could   be confirmed with MRI.  BILE DUCTS: Normal caliber.  GALLBLADDER: Within normal limits.  SPLEEN: Within normal limits.  PANCREAS: 1.4 cm cyst in uncinate process or a focally dilated duct. This   would be better evaluated with dedicated MRI.  ADRENALS: Within normal limits.  KIDNEYS/URETERS: Bilateral polycystic kidneys    BLADDER: Within normal limits.  REPRODUCTIVE ORGANS: Hysterectomy.    BOWEL: Moderate sized hiatal hernia. Normal appendix.    Oral contrast is again seen within mildly prominent loops of proximal   small bowel measuring up to 2.7 cm in diameter to a mass in the left   lower quadrant. This is unchanged. The mass measures 5.8 x 5.1 cm and   contains fluid and air. This appears to be within thewall of the small   bowel. A thin column of oral contrast passes within the lumen of the   small bowel distally into collapsed loops of small bowel. There is an   apparent fistula between this mass in the adjacent markedly thickened   sigmoid colon which contains numerous diverticula. There are inflammatory   changes in the adjacent fat. This likely represents sigmoid   diverticulitis with small bowel fistulization to the raw within adjacent   loop of small bowel in a contained abscess.      PERITONEUM: No ascites.  VESSELS: Atherosclerotic changes.  RETROPERITONEUM/LYMPH NODES: No lymphadenopathy.  ABDOMINAL WALL: Small fat-containing umbilical hernia.  BONES: Within normal limits.    IMPRESSION:    Abscess within the left lower quadrant small bowel wall secondary to   fistulization from the sigmoid colon likely on the basis of chronic   diverticular disease.    1.3 cm right lobe hypodensity with suggestion of peripheral early nodular   enhancement. This may represent a hemangioma. This could be confirmed   with MRI.    1.4 cm cyst in uncinate process or a focally dilated duct. This would be   better evaluated with dedicated MRI.    Probable probably cystic kidneys. Correlate clinically for kidney disease.      --- End of Report ---      ISAI HERNANDEZ MD; Attending Radiologist  This document has been electronically signed. Sep 27 2022  6:35AM    < end of copied text >  < from: CT Abdomen and Pelvis w/ Oral Cont (09.27.22 @ 04:29) >    ACC: [id number] EXAM:  CT ABDOMEN AND PELVIS OC                          PROCEDURE DATE:  09/27/2022        INTERPRETATION:  CLINICAL INFORMATION: Pain. Evaluate for obstruction    COMPARISON: CT chest 1/22/2021.    CONTRAST/COMPLICATIONS:  IV Contrast: NONE  Oral Contrast: Gastroview  Complications: None reported at time of study completion    PROCEDURE:  CT of the Abdomen and Pelvis was performed.  Sagittal and coronal reformats were performed.    FINDINGS:  LOWER CHEST: Left lower lobe linear calcification possibly postsurgical,   unchanged    LIVER: Within normal limits.  BILE DUCTS: Normal caliber.  GALLBLADDER: Within normal limits.  SPLEEN: Within normal limits.  PANCREAS: Within normal limits.  ADRENALS: Within normal limits.  KIDNEYS/URETERS: Numerous bilateral renal cysts left greater than right.   No hydronephrosis. These are not well evaluated on this noncontrast   examination.  There are subcentimeter left renal calcification. There are subcentimeter   dense cyst possibly hemorrhagic.    BLADDER: Within normal limits.  REPRODUCTIVE ORGANS: Hysterectomy.    BOWEL: Moderate sized hiatal hernia. Appendix is not visualized. No   evidence of inflammation in the pericecal region.    Oral contrast has advanced to the mid smallbowel which is mildly   prominent measuring up to 2.7 cm in diameter.  The sigmoid colon is thickened and there are numerous diverticula. At the   proximal sigmoid colon there are inflammatory changes which extend to the   adjacent small bowel where there is a 5.4 x 5.4 cm mass containing air   and feculent material. Evaluation is limited without intravenous contrast   and oral contrast has not reached this level. This may represent a   contained perforation secondary to fistulization from sigmoid   diverticular disease to the small bowel.      PERITONEUM: No ascites.  VESSELS: Atherosclerotic changes.  RETROPERITONEUM/LYMPH NODES: No lymphadenopathy.  ABDOMINAL WALL: Within normal limits.  BONES: Within normal limits.    IMPRESSION:    Oral contrast has advanced to the mid small bowel which is mildly   prominent measuring up to 2.7 cm in diameter.    The sigmoid colon is thickened and there are numerous diverticula. At the   proximal sigmoid colon there are inflammatory changes which extend to the   adjacent small bowel where there is a 5.4 x 5.4 cm mass containing air   and feculent material. Evaluation is limited without intravenous contrast   and oral contrast has not reached this level. This may represent a   contained perforation secondary to fistulization from sigmoid   diverticular disease to the small bowel.    Suspicion of polycystic kidney disease. Correlate clinically.    --- End of Report ---      ISAI HERNANDEZ MD; Attending Radiologist  This document has been electronically signed. Sep 27 2022  4:51AM    < end of copied text >    < from: CT Aspiration Abscess Hematoma, Cyst (09.28.22 @ 14:00) >    ACC: [id number] EXAM:  CT ASP ABSC HEMATOMA CYST#                          PROCEDURE DATE:  09/28/2022        INTERPRETATION:  PROCEDURE: CT-guided drainage of abdominal abscess.    : VERÓNICA Sprague MD    CLINICAL INDICATION: 67-year-old female with multiple comorbidities, who   presented with abdominal pain and was found to have an abdominal   collection concerning for abscess. Percutaneous drainage of the abscess   was requested.    ANESTHESIA: Intravenous sedation was provided by the attending   anesthesiologist. A total of 7 mL of 1% lidocaine was used for local   anesthesia.    ANTIBIOTICS: Patient's scheduled dose of metronidazole was administered   by the anesthesiologist prior to procedure.    TECHNIQUE: After discussing the risks, benefits and alternatives to this   procedure with the patient, informed consent was obtained. A timeout was   performed.    The patient was placed supine on the CT examination table and connected   to physiologic monitoring. Transaxial images ofthe abdomen were obtained   without the use of oral or intravenous contrast materials. The left lower   quadrant abdominal collection which had been seen on a recent CT scan was   again identified, and the overlying skin was prepped and draped in the  usual sterile fashion.    Using CT guidance, an 18-gauge trocar needle was advanced into the   collection using a left paramedian approach. Access into the collection   was confirmed with CT images and aspiration of 5 mL of purulent fluid   which was sent for microbiological analysis. The needle was then removed   over a wire and the tract was dilated. A 10 [place] pigtail drainage   catheter was then advanced into the collection over the wire. An   additional 40 mL of thick, purulent foul-smelling fluid was manually   aspirated. Repeat images demonstrated good positioning of the catheter   tip within the abscess cavity with virtual complete collapse of the   cavity around the catheter tip. The catheter was secured to the patient's   skin with a silk suture, flushed with normal saline, and then connected   to a drainage bag. A dry sterile dressing was then applied.    The patient tolerated the procedure well and was transferred to the   recovery area in stable condition. There were no immediate complications.    IMPRESSION: SUCCESSFUL CT-GUIDED PERCUTANEOUS DRAINAGE OF ABDOMINAL   ABSCESS, AS DESCRIBED ABOVE.    --- End of Report ---    TODD SPRAGUE MD; Attending Interventional Radiologist  This document has been electronically signed. Sep 28 2022  2:54PM    < end of copied text >

## 2022-10-05 NOTE — PROGRESS NOTE ADULT - PROBLEM SELECTOR PLAN 4
ESRD, HD on [facility]   HD as per nephro  c/w sevelamer  Renal diet when not NPO  Nephro Dr. Wallace. - ESRD, HD on [facility]   - HD today as per nephro  - c/w sevelamer  - Renal diet when not NPO  - Nephro Dr. Wallace.

## 2022-10-05 NOTE — PROGRESS NOTE ADULT - SUBJECTIVE AND OBJECTIVE BOX
INTERVAL HPI/OVERNIGHT EVENTS:  Pt resting comfortably.   Tolerating diet with some nausea.   On abx.  TRESA draining 155 for past 24 hrs.    MEDICATIONS  (STANDING):  allopurinol 100 milliGRAM(s) Oral daily  amLODIPine   Tablet 10 milliGRAM(s) Oral daily  aspirin enteric coated 81 milliGRAM(s) Oral daily  atorvastatin 80 milliGRAM(s) Oral at bedtime  calcitriol   Capsule 0.25 MICROGram(s) Oral daily  cefTRIAXone   IVPB 2000 milliGRAM(s) IV Intermittent every 24 hours  chlorhexidine 2% Cloths 1 Application(s) Topical daily  citalopram 20 milliGRAM(s) Oral daily  darunavir 800 mG/cobicstat 150 mG 1 Tablet(s) Oral daily  dolutegravir 50 milliGRAM(s) Oral daily  epoetin neyda-epbx (RETACRIT) Injectable 4000 Unit(s) IV Push <User Schedule>  hydrALAZINE 50 milliGRAM(s) Oral three times a day  isosorbide   mononitrate ER Tablet (IMDUR) 60 milliGRAM(s) Oral daily  lamiVUDine- milliGRAM(s) Oral daily  melatonin 3 milliGRAM(s) Oral at bedtime  metoprolol tartrate 100 milliGRAM(s) Oral two times a day  metroNIDAZOLE  IVPB 500 milliGRAM(s) IV Intermittent every 8 hours  omega-3-Acid Ethyl Esters 4 Gram(s) Oral daily  senna 2 Tablet(s) Oral at bedtime  sodium bicarbonate 650 milliGRAM(s) Oral two times a day  zinc sulfate 220 milliGRAM(s) Oral daily    MEDICATIONS  (PRN):  acetaminophen     Tablet .. 650 milliGRAM(s) Oral every 6 hours PRN Temp greater or equal to 38C (100.4F), Mild Pain (1 - 3)  aluminum hydroxide/magnesium hydroxide/simethicone Suspension 30 milliLiter(s) Oral every 4 hours PRN Dyspepsia  ondansetron Injectable 4 milliGRAM(s) IV Push every 8 hours PRN Nausea and/or Vomiting    Vital Signs Last 24 Hrs  T(C): 36.9 (05 Oct 2022 05:21), Max: 37.4 (04 Oct 2022 13:05)  T(F): 98.5 (05 Oct 2022 05:21), Max: 99.3 (04 Oct 2022 13:05)  HR: 98 (05 Oct 2022 09:12) (89 - 98)  BP: 143/64 (05 Oct 2022 09:12) (115/56 - 143/64)  BP(mean): 68 (04 Oct 2022 17:16) (68 - 84)  RR: 17 (05 Oct 2022 05:21) (17 - 18)  SpO2: 100% (05 Oct 2022 05:21) (100% - 100%)    Physical:  General: A&Ox3. NAD.  Abdomen: Soft nondistended, nontender.    I&O's Detail    04 Oct 2022 07:01  -  05 Oct 2022 07:00  --------------------------------------------------------  IN:  Total IN: 0 mL    OUT:    Drain (mL): 155 mL seropurulent  Total OUT: 155 mL    Total NET: -155 mL    LABS:                        8.9    5.67  )-----------( 318      ( 04 Oct 2022 06:22 )             26.5             10-04    136  |  98  |  9   ----------------------------<  100<H>  3.8   |  29  |  4.55<H>    Ca    9.0      04 Oct 2022 06:22    TPro  6.1  /  Alb  2.3<L>  /  TBili  0.4  /  DBili  x   /  AST  21  /  ALT  19  /  AlkPhos  74  10-04

## 2022-10-05 NOTE — PROGRESS NOTE ADULT - PROBLEM SELECTOR PLAN 1
-2/2 to fistulization from the sigmoid colon likely on the basis of chronic diverticular disease.  -s/p IR CT guided drainage 9/28  -Abscess CC + for proteus mirabilis, ecoli, klebsiella pneumoniae  -Cont  IV Ceftriaxone 2g and IV Flagyl 500mg q 8hours  - advance diet as tolerated- nutrition following   - likely will need surgery on this admission,  - cont strict output monitoring of drainage q8hr  -Surgery dr. Francisco  -ID dr. Young -2/2 to fistulization from the sigmoid colon likely on the basis of chronic diverticular disease.  -s/p IR CT guided drainage 9/28  -Abscess CC + for proteus mirabilis, ecoli, klebsiella pneumoniae  -Cont  IV Ceftriaxone 2g and IV Flagyl 500mg q 8hours  - advance diet as tolerated- nutrition following   - likely will need surgery on this admission & following   - cont strict output monitoring of drainage q8hr   - Last 24 hrs 155mls output  -Surgery dr. Francisco  -ID dr. Young -2/2 to fistulization from the sigmoid colon likely on the basis of chronic diverticular disease.  -s/p IR CT guided drainage 9/28  -Abscess CC + for proteus mirabilis, ecoli, klebsiella pneumoniae  -Cont  IV Ceftriaxone 2g and IV Flagyl 500mg q 8hours  - advance diet as tolerated  - cont strict output monitoring of drainage q8hr - Last 24 hrs 155mls output today  - plan for IR guided tube study tomorrow  - please order Ct scan tomorrow 10/6 to look for abscess status- pt will need to be dialyzed within 24 hrs  -Surgery dr. Francisco  -ID dr. Young

## 2022-10-05 NOTE — PROGRESS NOTE ADULT - PROBLEM SELECTOR PLAN 8
F/u further recs from surgery, pt will likely need surgery on this admission           Discharge likely back to home when medically optimized   Pt. has one  daughter who is an RN and lives in [place].  Pt. lives alone F/u further from surgery, pt will likely need surgery on this admission     Discharge likely back to home when medically optimized   Pt. has one  daughter who is an RN and lives in [place].  Pt. lives alone F/u further from surgery, It pt needs surgery inpatient Vs OP    Discharge likely back to home when medically optimized   Pt. has one  daughter who is an RN and lives in [place].  Pt. lives alone Pending IR tube study  in am,  please order Ct scan tomorrow to evaluate abscess- will need dialysis  within 24hrs    - per IR when pt d/c, will need to f/u with surgery  prior to tube check with IR. drainage has to minimal 7-8 ml for altleast 24hr for 2 days         Discharge likely back to home when medically optimized   Pt. has one  daughter who is an RN and lives in [place].  Pt. lives alone

## 2022-10-05 NOTE — PROGRESS NOTE ADULT - ASSESSMENT
This is a 67 year old female, coming from home, with medical history of ESRD(M-W-F), HTN, HIV coming in with abdominal pain. Found to have  Abscess within the left lower quadrant small bowel wall secondary to fistulization from the sigmoid colon likely on the basis of chronic diverticular disease. started on Ceftriaxone and Flagyl. ID following. S/p IR CT guided drainage of abscess and Pigtail to drainage bag. Pt followed by surgery. Pt also with ESRD with dialysis M/W/F, followed by Nephro.     This is a 67 year old female, coming from home, with medical history of ESRD(M-W-F), HTN, HIV coming in with abdominal pain. Found to have  Abscess within the left lower quadrant small bowel wall secondary to fistulization from the sigmoid colon likely on the basis of chronic diverticular disease. started on Ceftriaxone and Flagyl. ID following. S/p IR CT guided drainage of abscess and Pigtail to drainage bag. 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

## 2022-10-05 NOTE — PROGRESS NOTE ADULT - SUBJECTIVE AND OBJECTIVE BOX
Problem List:  ESRD  Diverticulosis, admitted for abscess in LLQ within the wall of small bowel with fistula between the mass and thickened sigmoid colon with diverticula. Post IR drainage tube in abscess area.  There is a continuos green drainage in the bag.  Post HD  yesterday  Abdominal pain improved , tolerate clear liquid feeding  c/vomit with empty stomach in am with morning pills. She is able to tolerated the other meals.  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      ESRD on hemodialysis      History of ectopic pregnancy  Two      H/O: hysterectomy  Due to cervical cancer      S/P arteriovenous (AV) fistula creation  july 10 2020          oxycodone (Rash)  penicillins (Urticaria; Rash)      MEDICATIONS  (STANDING):  allopurinol 100 milliGRAM(s) Oral daily  amLODIPine   Tablet 10 milliGRAM(s) Oral daily  aspirin enteric coated 81 milliGRAM(s) Oral daily  atorvastatin 80 milliGRAM(s) Oral at bedtime  calcitriol   Capsule 0.25 MICROGram(s) Oral daily  cefTRIAXone   IVPB 2000 milliGRAM(s) IV Intermittent every 24 hours  chlorhexidine 2% Cloths 1 Application(s) Topical daily  citalopram 20 milliGRAM(s) Oral daily  darunavir 800 mG/cobicstat 150 mG 1 Tablet(s) Oral daily  dolutegravir 50 milliGRAM(s) Oral daily  epoetin neyda-epbx (RETACRIT) Injectable 4000 Unit(s) IV Push <User Schedule>  hydrALAZINE 50 milliGRAM(s) Oral three times a day  isosorbide   mononitrate ER Tablet (IMDUR) 60 milliGRAM(s) Oral daily  lamiVUDine- milliGRAM(s) Oral daily  melatonin 3 milliGRAM(s) Oral at bedtime  metoprolol tartrate 100 milliGRAM(s) Oral two times a day  metroNIDAZOLE  IVPB 500 milliGRAM(s) IV Intermittent every 8 hours  omega-3-Acid Ethyl Esters 4 Gram(s) Oral daily  senna 2 Tablet(s) Oral at bedtime  sodium bicarbonate 650 milliGRAM(s) Oral two times a day  zinc sulfate 220 milliGRAM(s) Oral daily    MEDICATIONS  (PRN):  acetaminophen     Tablet .. 650 milliGRAM(s) Oral every 6 hours PRN Temp greater or equal to 38C (100.4F), Mild Pain (1 - 3)  aluminum hydroxide/magnesium hydroxide/simethicone Suspension 30 milliLiter(s) Oral every 4 hours PRN Dyspepsia  ondansetron Injectable 4 milliGRAM(s) IV Push every 8 hours PRN Nausea and/or Vomiting                            8.9    5.67  )-----------( 318      ( 04 Oct 2022 06:22 )             26.5     10-04    136  |  98  |  9   ----------------------------<  100<H>  3.8   |  29  |  4.55<H>    Ca    9.0      04 Oct 2022 06:22    TPro  6.1  /  Alb  2.3<L>  /  TBili  0.4  /  DBili  x   /  AST  21  /  ALT  19  /  AlkPhos  74  10-04            REVIEW OF SYSTEMS:  General: no fever no chills, no weight loss.    RESPIRATORY: No cough, wheezing, hemoptysis; No shortness of breath  CARDIOVASCULAR: No chest pain or palpitations. No Edema  GASTROINTESTINAL: No abdominal or epigastric pain. As above  GENITOURINARY: No dysuria  NEUROLOGICAL: No numbness or weakness, no tremor , no dizziness.   Muscle skeletal : no joint pain and no swelling of joints and limbs.  SKIN: No itching, burning, rashes.        VITALS:  T(F): 98.5 (10-05-22 @ 05:21), Max: 99.3 (10-04-22 @ 13:05)  HR: 98 (10-05-22 @ 09:12)  BP: 143/64 (10-05-22 @ 09:12)  RR: 17 (10-05-22 @ 05:21)  SpO2: 100% (10-05-22 @ 05:21)  Wt(kg): --    10-04 @ 07:01  -  10-05 @ 07:00  --------------------------------------------------------  IN: 0 mL / OUT: 155 mL / NET: -155 mL        PHYSICAL EXAM:  Constitutional: well developed, no diaphoresis, no distress.  Neck: No JVD, no carotid bruit, supple, no adenopathy  Respiratory: Good air entrance B/L, no wheezes, rales or rhonchi  Cardiovascular: S1, S2, RRR, no pericardial rub, no murmur  Abdomen: BS+, soft, no tenderness, no bruit  Pelvis: bladder nondistended  Extremities: No cyanosis or clubbing. No peripheral edema.   left AVF with bruit and thrill

## 2022-10-05 NOTE — PROGRESS NOTE ADULT - ASSESSMENT
Subjective: NAD, feeling much better, no abdominal pain, no N/V or diarrhea, has LLQ drain with biliary content, afebrile, eating although her appetite is not great.     REVIEW OF SYSTEMS:  CONSTITUTIONAL:  No weakness, fevers or chills  EYES/ENT:  No visual changes;  No vertigo or throat pain   NECK:  No pain or stiffness  RESPIRATORY:  No cough, wheezing, hemoptysis; No shortness of breath  CARDIOVASCULAR:  No chest pain or palpitations  GASTROINTESTINAL: mild abd discomfort on LLQ, no pain. No nausea, vomiting, or hematemesis; No diarrhea or constipation. No melena or hematochezia.  GENITOURINARY:  No dysuria, frequency or hematuria  NEUROLOGICAL:  No numbness or weakness  MSK: no back pain, no joint pain  SKIN:  No itching, rashes    PE:  Vital Signs Last 24 Hrs  T(C): 37 (05 Oct 2022 13:57), Max: 37.2 (04 Oct 2022 20:42)  T(F): 98.6 (05 Oct 2022 13:57), Max: 98.9 (04 Oct 2022 20:42)  HR: 78 (05 Oct 2022 13:57) (78 - 98)  BP: 117/63 (05 Oct 2022 13:57) (115/56 - 143/64)  BP(mean): 68 (04 Oct 2022 17:16) (68 - 68)  RR: 17 (05 Oct 2022 13:57) (17 - 18)  SpO2: 100% (05 Oct 2022 13:57) (100% - 100%)    Parameters below as of 05 Oct 2022 13:57 Patient On (Oxygen Delivery Method): room air    Gen: AOx3, NAD, non-toxic, pleasant  HEAD:  Atraumatic, Normocephalic  EYES: EOMI, PERRLA, conjunctiva and sclera clear  ENT: Moist mucous membranes  NECK: Supple, No JVD  CV: S1+S2 normal, nontachycardic  Resp: Clear bilat, no resp distress, no crackles/wheezes  Abd: Soft, nontender, +BS drain with green biliary content  Ext: No LE edema, no wounds  : No Farias  IV/Skin: No thrombophlebitis  Msk: No low back pain, no arthralgias, no joint swelling  Neuro: AAOx3. No sensory deficits, no motor deficits    LABS:                        8.9    5.67  )-----------( 318      ( 04 Oct 2022 06:22 )             26.5     10-04    136  |  98  |  9   ----------------------------<  100<H>  3.8   |  29  |  4.55<H>    Ca    9.0      04 Oct 2022 06:22    TPro  6.1  /  Alb  2.3<L>  /  TBili  0.4  /  DBili  x   /  AST  21  /  ALT  19  /  AlkPhos  74  10-04    MICROBIOLOGY:  v  .Abscess abdominal abscess drainage  09-28-22   Culture yields >4 types of aerobic and/or anaerobic bacteria  Call client services within 7 days if further workup is clinically  indicated. Culture includes  Few Escherichia coli  Rare Klebsiella pneumoniae  Rare Proteus mirabilis  Few Escherichiacoli #2  --  Escherichia coli  Klebsiella pneumoniae  Escherichia coli  Proteus mirabilis    IMPRESSION:  66 yo female from [facility] with H/O ESRD on HD(MWF) via L arm AVF, HIV on ART diagnosed 16 yrs ago (Tivicay + Lamivudine + Prezcobix) admitted for LLQ abscess.   C-Reactive Protein, Serum: 140 mg/L (09.30.22 @ 05:25)  Sedimentation Rate, Erythrocyte: 85 mm/Hr (09.30.22 @ 05:25)    -Left small bowel abscess with fistula from sigmoid colon 2/2 diverticular disease S/P IR drainage 9/28 (10 ml of pus drained w/cultures + polymicrobial bowel bacteria)  -Diverticular disease now complicated with sigmoid colon fistulization to small bowel  -HIV on ART w/CD4 377 with VL detectable <30 copies which could be blip in the setting of acute infection.   -PCN allergy(when she was a child, she was told she had hives, she does not remember)  -ESRD on HD    PLAN:  -Continue CXT 2g IV daily + Flagyl 500 mg q/8hrs.  -Continue ART (Tivicay(50 mg) +Prezcovix (800/150 mg) + Lamivudine (100 mg) daily.   -Please obtain inflammatory markers(ESR and CRP)  -Please obtain CT A/P with IV contrast(needs coordination with HD)  -Discussed with medicine and surgery attending    Please reach ID with any questions or concerns  Dr. Mine Garnett  Tel. [phone]  Available in Teams

## 2022-10-05 NOTE — PROGRESS NOTE ADULT - ASSESSMENT
67y.o. Female with perforated acute diverticulitis s/p IR drainage    -complete abx course  -IR drain care per primary team. Tube check per IR  -Plan for conservative tx on present admission, elective colon resection upon resolution of acute phase

## 2022-10-05 NOTE — PROGRESS NOTE ADULT - NS ATTEND AMEND GEN_ALL_CORE FT
Pt seen and examined. Reports heartburn type symptoms and nausea. Denies any LLQ pain.   Abd- soft, NT, ND. No LLQ tenderness on palpation. No guarding no rebound. Reviewed TRESA output.  Pt with controlled low output fistula via drain secondary to diverticular abscess. Discussed this clearly with pt. Has benign abdominal exam. Recommend to repeat CT and if possible study IR drain simultaneously. Plan d/w Medicine team.

## 2022-10-06 LAB
ALBUMIN SERPL ELPH-MCNC: 2.7 G/DL — LOW (ref 3.5–5)
ALP SERPL-CCNC: 84 U/L — SIGNIFICANT CHANGE UP (ref 40–120)
ALT FLD-CCNC: 23 U/L DA — SIGNIFICANT CHANGE UP (ref 10–60)
ANION GAP SERPL CALC-SCNC: 8 MMOL/L — SIGNIFICANT CHANGE UP (ref 5–17)
AST SERPL-CCNC: 20 U/L — SIGNIFICANT CHANGE UP (ref 10–40)
BILIRUB SERPL-MCNC: 0.3 MG/DL — SIGNIFICANT CHANGE UP (ref 0.2–1.2)
BUN SERPL-MCNC: 9 MG/DL — SIGNIFICANT CHANGE UP (ref 7–18)
CALCIUM SERPL-MCNC: 9.6 MG/DL — SIGNIFICANT CHANGE UP (ref 8.4–10.5)
CHLORIDE SERPL-SCNC: 102 MMOL/L — SIGNIFICANT CHANGE UP (ref 96–108)
CO2 SERPL-SCNC: 29 MMOL/L — SIGNIFICANT CHANGE UP (ref 22–31)
CREAT SERPL-MCNC: 4.36 MG/DL — HIGH (ref 0.5–1.3)
CRP SERPL-MCNC: 18 MG/L — HIGH
EGFR: 11 ML/MIN/1.73M2 — LOW
ERYTHROCYTE [SEDIMENTATION RATE] IN BLOOD: 54 MM/HR — HIGH (ref 0–20)
GLUCOSE SERPL-MCNC: 126 MG/DL — HIGH (ref 70–99)
HCT VFR BLD CALC: 28.5 % — LOW (ref 34.5–45)
HGB BLD-MCNC: 9.6 G/DL — LOW (ref 11.5–15.5)
MCHC RBC-ENTMCNC: 32.1 PG — SIGNIFICANT CHANGE UP (ref 27–34)
MCHC RBC-ENTMCNC: 33.7 GM/DL — SIGNIFICANT CHANGE UP (ref 32–36)
MCV RBC AUTO: 95.3 FL — SIGNIFICANT CHANGE UP (ref 80–100)
NRBC # BLD: 0 /100 WBCS — SIGNIFICANT CHANGE UP (ref 0–0)
PLATELET # BLD AUTO: 357 K/UL — SIGNIFICANT CHANGE UP (ref 150–400)
POTASSIUM SERPL-MCNC: 3.5 MMOL/L — SIGNIFICANT CHANGE UP (ref 3.5–5.3)
POTASSIUM SERPL-SCNC: 3.5 MMOL/L — SIGNIFICANT CHANGE UP (ref 3.5–5.3)
PROT SERPL-MCNC: 6.9 G/DL — SIGNIFICANT CHANGE UP (ref 6–8.3)
RBC # BLD: 2.99 M/UL — LOW (ref 3.8–5.2)
RBC # FLD: 17 % — HIGH (ref 10.3–14.5)
SODIUM SERPL-SCNC: 139 MMOL/L — SIGNIFICANT CHANGE UP (ref 135–145)
TROPONIN I, HIGH SENSITIVITY RESULT: 22.5 NG/L — SIGNIFICANT CHANGE UP
WBC # BLD: 10.45 K/UL — SIGNIFICANT CHANGE UP (ref 3.8–10.5)
WBC # FLD AUTO: 10.45 K/UL — SIGNIFICANT CHANGE UP (ref 3.8–10.5)

## 2022-10-06 PROCEDURE — 74177 CT ABD & PELVIS W/CONTRAST: CPT | Mod: 26

## 2022-10-06 PROCEDURE — 99232 SBSQ HOSP IP/OBS MODERATE 35: CPT

## 2022-10-06 PROCEDURE — 93010 ELECTROCARDIOGRAM REPORT: CPT

## 2022-10-06 RX ORDER — DOLUTEGRAVIR SODIUM 25 MG/1
50 TABLET, FILM COATED ORAL DAILY
Refills: 0 | Status: DISCONTINUED | OUTPATIENT
Start: 2022-10-06 | End: 2022-10-10

## 2022-10-06 RX ORDER — DARUNAVIR ETHANOLATE AND COBICISTAT 800; 150 MG/1; MG/1
1 TABLET, FILM COATED ORAL DAILY
Refills: 0 | Status: DISCONTINUED | OUTPATIENT
Start: 2022-10-06 | End: 2022-10-10

## 2022-10-06 RX ORDER — PANTOPRAZOLE SODIUM 20 MG/1
40 TABLET, DELAYED RELEASE ORAL EVERY 12 HOURS
Refills: 0 | Status: DISCONTINUED | OUTPATIENT
Start: 2022-10-06 | End: 2022-10-10

## 2022-10-06 RX ORDER — METOCLOPRAMIDE HCL 10 MG
8 TABLET ORAL ONCE
Refills: 0 | Status: COMPLETED | OUTPATIENT
Start: 2022-10-06 | End: 2022-10-06

## 2022-10-06 RX ORDER — CHLORHEXIDINE GLUCONATE 213 G/1000ML
1 SOLUTION TOPICAL DAILY
Refills: 0 | Status: DISCONTINUED | OUTPATIENT
Start: 2022-10-06 | End: 2022-10-10

## 2022-10-06 RX ORDER — LAMIVUDINE 150 MG
100 TABLET ORAL DAILY
Refills: 0 | Status: DISCONTINUED | OUTPATIENT
Start: 2022-10-06 | End: 2022-10-10

## 2022-10-06 RX ORDER — DEXTROSE MONOHYDRATE, SODIUM CHLORIDE, AND POTASSIUM CHLORIDE 50; .745; 4.5 G/1000ML; G/1000ML; G/1000ML
1000 INJECTION, SOLUTION INTRAVENOUS
Refills: 0 | Status: DISCONTINUED | OUTPATIENT
Start: 2022-10-06 | End: 2022-10-11

## 2022-10-06 RX ORDER — METRONIDAZOLE 500 MG
500 TABLET ORAL EVERY 8 HOURS
Refills: 0 | Status: DISCONTINUED | OUTPATIENT
Start: 2022-10-06 | End: 2022-10-10

## 2022-10-06 RX ORDER — CEFTRIAXONE 500 MG/1
2000 INJECTION, POWDER, FOR SOLUTION INTRAMUSCULAR; INTRAVENOUS EVERY 24 HOURS
Refills: 0 | Status: DISCONTINUED | OUTPATIENT
Start: 2022-10-06 | End: 2022-10-10

## 2022-10-06 RX ADMIN — CEFTRIAXONE 100 MILLIGRAM(S): 500 INJECTION, POWDER, FOR SOLUTION INTRAMUSCULAR; INTRAVENOUS at 11:53

## 2022-10-06 RX ADMIN — Medication 100 MILLIGRAM(S): at 05:05

## 2022-10-06 RX ADMIN — Medication 650 MILLIGRAM(S): at 06:18

## 2022-10-06 RX ADMIN — ISOSORBIDE MONONITRATE 60 MILLIGRAM(S): 60 TABLET, EXTENDED RELEASE ORAL at 13:57

## 2022-10-06 RX ADMIN — ATORVASTATIN CALCIUM 80 MILLIGRAM(S): 80 TABLET, FILM COATED ORAL at 22:10

## 2022-10-06 RX ADMIN — Medication 8 MILLIGRAM(S): at 01:39

## 2022-10-06 RX ADMIN — DARUNAVIR ETHANOLATE AND COBICISTAT 1 TABLET(S): 800; 150 TABLET, FILM COATED ORAL at 13:55

## 2022-10-06 RX ADMIN — ONDANSETRON 4 MILLIGRAM(S): 8 TABLET, FILM COATED ORAL at 05:03

## 2022-10-06 RX ADMIN — AMLODIPINE BESYLATE 10 MILLIGRAM(S): 2.5 TABLET ORAL at 06:19

## 2022-10-06 RX ADMIN — Medication 50 MILLIGRAM(S): at 13:57

## 2022-10-06 RX ADMIN — Medication 100 MILLIGRAM(S): at 13:58

## 2022-10-06 RX ADMIN — PANTOPRAZOLE SODIUM 40 MILLIGRAM(S): 20 TABLET, DELAYED RELEASE ORAL at 05:03

## 2022-10-06 RX ADMIN — CALCITRIOL 0.25 MICROGRAM(S): 0.5 CAPSULE ORAL at 11:51

## 2022-10-06 RX ADMIN — CHLORHEXIDINE GLUCONATE 1 APPLICATION(S): 213 SOLUTION TOPICAL at 11:53

## 2022-10-06 RX ADMIN — Medication 50 MILLIGRAM(S): at 06:19

## 2022-10-06 RX ADMIN — Medication 100 MILLIGRAM(S): at 13:55

## 2022-10-06 RX ADMIN — Medication 3 MILLIGRAM(S): at 22:12

## 2022-10-06 RX ADMIN — ONDANSETRON 4 MILLIGRAM(S): 8 TABLET, FILM COATED ORAL at 22:12

## 2022-10-06 RX ADMIN — Medication 100 MILLIGRAM(S): at 22:11

## 2022-10-06 RX ADMIN — PANTOPRAZOLE SODIUM 40 MILLIGRAM(S): 20 TABLET, DELAYED RELEASE ORAL at 17:38

## 2022-10-06 RX ADMIN — DEXTROSE MONOHYDRATE, SODIUM CHLORIDE, AND POTASSIUM CHLORIDE 50 MILLILITER(S): 50; .745; 4.5 INJECTION, SOLUTION INTRAVENOUS at 11:53

## 2022-10-06 RX ADMIN — ONDANSETRON 4 MILLIGRAM(S): 8 TABLET, FILM COATED ORAL at 13:03

## 2022-10-06 RX ADMIN — Medication 100 MILLIGRAM(S): at 06:19

## 2022-10-06 RX ADMIN — Medication 100 MILLIGRAM(S): at 13:54

## 2022-10-06 RX ADMIN — DOLUTEGRAVIR SODIUM 50 MILLIGRAM(S): 25 TABLET, FILM COATED ORAL at 11:52

## 2022-10-06 RX ADMIN — CITALOPRAM 20 MILLIGRAM(S): 10 TABLET, FILM COATED ORAL at 13:55

## 2022-10-06 NOTE — PROGRESS NOTE ADULT - ASSESSMENT
This is a 67 year old female, coming from home, with medical history of ESRD(M-W-F), HTN, HIV coming in with abdominal pain. Found to have  Abscess within the left lower quadrant small bowel wall secondary to fistulization from the sigmoid colon likely on the basis of chronic diverticular disease. started on Ceftriaxone and Flagyl. ID following. S/p IR CT guided drainage of abscess and Pigtail to drainage bag. 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    10/6-Pt. appears comfortable, denies abdominal pain, N/V, tolerating diet well.  LLQ pigtail with large amounts green liquidy drainage.  Awaiting IR guided tube study and CT A/P w/con.  Spoke to IR, study will not be done as increased drainage via pigtail continues indicating fistula.  Attending will discuss with surgery further plan of care.  Will f/u CT scan results.

## 2022-10-06 NOTE — PROGRESS NOTE ADULT - SUBJECTIVE AND OBJECTIVE BOX
Problem List:  ESRD  Diverticulosis, admitted for abscess in LLQ within the wall of small bowel with fistula between the mass and thickened sigmoid colon with diverticula. Post IR drainage tube in abscess area.  There is a continuos green drainage in the bag.  Post HD  yesterday  Abdominal pain improved , tolerate clear liquid feeding  c/vomit with empty stomach in am with morning pills. She is able to tolerated the other meals.  Drain of abscess  reduced, light green color  No abdominal pain     Increased drainage from abdominal tube 300 ml, with 200 ml in last 2 hours, color changed to yellow with fecal material  C/O BM with equal color as in drainage  C/O reduced appetite and vomiting.    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      ESRD on hemodialysis      History of ectopic pregnancy  Two      H/O: hysterectomy  Due to cervical cancer      S/P arteriovenous (AV) fistula creation  july 10 2020          oxycodone (Rash)  penicillins (Urticaria; Rash)      MEDICATIONS  (STANDING):  allopurinol 100 milliGRAM(s) Oral daily  amLODIPine   Tablet 10 milliGRAM(s) Oral daily  aspirin enteric coated 81 milliGRAM(s) Oral daily  atorvastatin 80 milliGRAM(s) Oral at bedtime  calcitriol   Capsule 0.25 MICROGram(s) Oral daily  cefTRIAXone   IVPB 2000 milliGRAM(s) IV Intermittent every 24 hours  chlorhexidine 2% Cloths 1 Application(s) Topical daily  citalopram 20 milliGRAM(s) Oral daily  darunavir 800 mG/cobicstat 150 mG 1 Tablet(s) Oral daily  dolutegravir 50 milliGRAM(s) Oral daily  epoetin neyda-epbx (RETACRIT) Injectable 4000 Unit(s) IV Push <User Schedule>  hydrALAZINE 50 milliGRAM(s) Oral three times a day  isosorbide   mononitrate ER Tablet (IMDUR) 60 milliGRAM(s) Oral daily  lamiVUDine- milliGRAM(s) Oral daily  lidocaine/prilocaine Cream 1 Application(s) Topical <User Schedule>  melatonin 3 milliGRAM(s) Oral at bedtime  metoprolol tartrate 100 milliGRAM(s) Oral two times a day  metroNIDAZOLE  IVPB 500 milliGRAM(s) IV Intermittent every 8 hours  omega-3-Acid Ethyl Esters 4 Gram(s) Oral daily  pantoprazole    Tablet 40 milliGRAM(s) Oral before breakfast  senna 2 Tablet(s) Oral at bedtime  sodium bicarbonate 650 milliGRAM(s) Oral two times a day  zinc sulfate 220 milliGRAM(s) Oral daily    MEDICATIONS  (PRN):  acetaminophen     Tablet .. 650 milliGRAM(s) Oral every 6 hours PRN Temp greater or equal to 38C (100.4F), Mild Pain (1 - 3)  aluminum hydroxide/magnesium hydroxide/simethicone Suspension 30 milliLiter(s) Oral every 4 hours PRN Dyspepsia  ondansetron Injectable 4 milliGRAM(s) IV Push every 8 hours PRN Nausea and/or Vomiting                            9.6    10.45 )-----------( 357      ( 06 Oct 2022 06:12 )             28.5     10-06    139  |  102  |  9   ----------------------------<  126<H>  3.5   |  29  |  4.36<H>    Ca    9.6      06 Oct 2022 06:12    TPro  6.9  /  Alb  2.7<L>  /  TBili  0.3  /  DBili  x   /  AST  20  /  ALT  23  /  AlkPhos  84  10-06            REVIEW OF SYSTEMS:  General: no fever no chills, no weight loss.  c/o fatigue  RESPIRATORY: No cough, wheezing, hemoptysis; No shortness of breath  CARDIOVASCULAR: No chest pain or palpitations. No Edema  GASTROINTESTINAL: No abdominal pain , and aas above.  GENITOURINARY: No dysuria, frequency, foamy urine, urinary urgency, incontinence or hematuria  NEUROLOGICAL: No numbness or weakness, no tremor , no dizziness.       VITALS:  T(F): 98.5 (10-06-22 @ 05:21), Max: 98.6 (10-05-22 @ 13:21)  HR: 79 (10-06-22 @ 05:21)  BP: 115/67 (10-06-22 @ 05:21)  RR: 17 (10-06-22 @ 05:21)  SpO2: 100% (10-06-22 @ 05:21)  Wt(kg): --    10-05 @ 07:01  -  10-06 @ 07:00  --------------------------------------------------------  IN: 600 mL / OUT: 1840 mL / NET: -1240 mL    10-06 @ 07:01  -  10-06 @ 10:19  --------------------------------------------------------  IN: 0 mL / OUT: 300 mL / NET: -300 mL        PHYSICAL EXAM:  Constitutional: well developed, no diaphoresis, no distress.  Neck: No JVD, no carotid bruit, supple, no adenopathy  Respiratory:  air entrance B/L, no wheezes, rales or rhonchi  Cardiovascular: S1, S2, RRR, no pericardial rub, no murmur  Abdomen: BS+, soft, no tenderness, no bruit  Pelvis: bladder nondistended  Extremities: No cyanosis or clubbing. No peripheral edema.     Vascular Access: left AVF with bruit and thrill

## 2022-10-06 NOTE — PROGRESS NOTE ADULT - SUBJECTIVE AND OBJECTIVE BOX
INTERVAL HPI/OVERNIGHT EVENTS:    Pt seen and examined at bedside. Offers no acute complaints at this time. Tolerating regular diet well.     Vital Signs Last 24 Hrs  T(C): 36.9 (06 Oct 2022 05:21), Max: 37 (05 Oct 2022 13:21)  T(F): 98.5 (06 Oct 2022 05:21), Max: 98.6 (05 Oct 2022 13:21)  HR: 79 (06 Oct 2022 05:21) (78 - 88)  BP: 115/67 (06 Oct 2022 05:21) (112/52 - 156/70)  BP(mean): --  RR: 17 (06 Oct 2022 05:21) (17 - 18)  SpO2: 100% (06 Oct 2022 05:21) (100% - 100%)    Parameters below as of 06 Oct 2022 05:21  Patient On (Oxygen Delivery Method): room air      I&O's Detail    05 Oct 2022 07:01  -  06 Oct 2022 07:00  --------------------------------------------------------  IN:    Other (mL): 600 mL  Total IN: 600 mL    OUT:    Drain (mL): 240 mL    Other (mL): 1600 mL  Total OUT: 1840 mL    Total NET: -1240 mL      06 Oct 2022 07:01  -  06 Oct 2022 09:53  --------------------------------------------------------  IN:  Total IN: 0 mL    OUT:    Drain (mL): 300 mL  Total OUT: 300 mL    Total NET: -300 mL        aluminum hydroxide/magnesium hydroxide/simethicone Suspension 30 milliLiter(s) Oral every 4 hours PRN  cefTRIAXone   IVPB 2000 milliGRAM(s) IV Intermittent every 24 hours  darunavir 800 mG/cobicstat 150 mG 1 Tablet(s) Oral daily  dolutegravir 50 milliGRAM(s) Oral daily  lamiVUDine- milliGRAM(s) Oral daily  metroNIDAZOLE  IVPB 500 milliGRAM(s) IV Intermittent every 8 hours  omega-3-Acid Ethyl Esters 4 Gram(s) Oral daily  pantoprazole    Tablet 40 milliGRAM(s) Oral before breakfast  senna 2 Tablet(s) Oral at bedtime      Physical Exam  General: AAOx3, No acute distress  Skin: No jaundice, no icterus  Abdomen: soft,  nondistended, nontender, no rebound tenderness, no guarding, no palpable masses, IR drain in place, bilious output     Extremities: non edematous, no calf pain bilaterally        Labs:                        9.6    10.45 )-----------( 357      ( 06 Oct 2022 06:12 )             28.5     10-06    139  |  102  |  9   ----------------------------<  126<H>  3.5   |  29  |  4.36<H>    Ca    9.6      06 Oct 2022 06:12    TPro  6.9  /  Alb  2.7<L>  /  TBili  0.3  /  DBili  x   /  AST  20  /  ALT  23  /  AlkPhos  84  10-06

## 2022-10-06 NOTE — PROGRESS NOTE ADULT - SUBJECTIVE AND OBJECTIVE BOX
NP Note discussed with  Primary Attending    Patient is a 67y old  Female who presents with a chief complaint of Abdominal pain (05 Oct 2022 12:09)      INTERVAL HPI/OVERNIGHT EVENTS: no new complaints    MEDICATIONS  (STANDING):  allopurinol 100 milliGRAM(s) Oral daily  amLODIPine   Tablet 10 milliGRAM(s) Oral daily  aspirin enteric coated 81 milliGRAM(s) Oral daily  atorvastatin 80 milliGRAM(s) Oral at bedtime  calcitriol   Capsule 0.25 MICROGram(s) Oral daily  cefTRIAXone   IVPB 2000 milliGRAM(s) IV Intermittent every 24 hours  chlorhexidine 2% Cloths 1 Application(s) Topical daily  citalopram 20 milliGRAM(s) Oral daily  darunavir 800 mG/cobicstat 150 mG 1 Tablet(s) Oral daily  dolutegravir 50 milliGRAM(s) Oral daily  epoetin neyda-epbx (RETACRIT) Injectable 4000 Unit(s) IV Push <User Schedule>  hydrALAZINE 50 milliGRAM(s) Oral three times a day  isosorbide   mononitrate ER Tablet (IMDUR) 60 milliGRAM(s) Oral daily  lamiVUDine- milliGRAM(s) Oral daily  lidocaine/prilocaine Cream 1 Application(s) Topical <User Schedule>  melatonin 3 milliGRAM(s) Oral at bedtime  metoprolol tartrate 100 milliGRAM(s) Oral two times a day  metroNIDAZOLE  IVPB 500 milliGRAM(s) IV Intermittent every 8 hours  omega-3-Acid Ethyl Esters 4 Gram(s) Oral daily  pantoprazole    Tablet 40 milliGRAM(s) Oral before breakfast  senna 2 Tablet(s) Oral at bedtime  sodium bicarbonate 650 milliGRAM(s) Oral two times a day  zinc sulfate 220 milliGRAM(s) Oral daily    MEDICATIONS  (PRN):  acetaminophen     Tablet .. 650 milliGRAM(s) Oral every 6 hours PRN Temp greater or equal to 38C (100.4F), Mild Pain (1 - 3)  aluminum hydroxide/magnesium hydroxide/simethicone Suspension 30 milliLiter(s) Oral every 4 hours PRN Dyspepsia  ondansetron Injectable 4 milliGRAM(s) IV Push every 8 hours PRN Nausea and/or Vomiting      __________________________________________________  REVIEW OF SYSTEMS:    CONSTITUTIONAL: No fever,   EYES: no acute visual disturbances  NECK: No pain or stiffness  RESPIRATORY: No cough; No shortness of breath  CARDIOVASCULAR: No chest pain, no palpitations  GASTROINTESTINAL: No pain. No nausea or vomiting; No diarrhea   NEUROLOGICAL: No headache or numbness, no tremors  MUSCULOSKELETAL: No joint pain, no muscle pain  GENITOURINARY: no dysuria, no frequency, no hesitancy  PSYCHIATRY: no depression , no anxiety  ALL OTHER  ROS negative        Vital Signs Last 24 Hrs  T(C): 36.9 (06 Oct 2022 05:21), Max: 37 (05 Oct 2022 13:21)  T(F): 98.5 (06 Oct 2022 05:21), Max: 98.6 (05 Oct 2022 13:21)  HR: 79 (06 Oct 2022 05:21) (78 - 88)  BP: 115/67 (06 Oct 2022 05:21) (112/52 - 156/70)  BP(mean): --  RR: 17 (06 Oct 2022 05:21) (17 - 18)  SpO2: 100% (06 Oct 2022 05:21) (100% - 100%)    Parameters below as of 06 Oct 2022 05:21  Patient On (Oxygen Delivery Method): room air        ________________________________________________  PHYSICAL EXAM:  comfortable, pleasant  GENERAL: NAD  HEENT: Normocephalic;  conjunctivae and sclerae clear; moist mucous membranes;   NECK : supple  CHEST/LUNG: Clear to auscultation bilaterally with good air entry   HEART: S1 S2  regular; no murmurs, gallops or rubs  ABDOMEN: LLQ pigtail with large amounts greenish drainage, Soft, Nontender, Nondistended; Bowel sounds present  EXTREMITIES: no cyanosis; no edema; no calf tenderness  SKIN: warm and dry; no rash  NERVOUS SYSTEM:  Awake and alert; Oriented  to place, person and time ; no new deficits    _________________________________________________  LABS:                        9.6    10.45 )-----------( 357      ( 06 Oct 2022 06:12 )             28.5     10-06    139  |  102  |  9   ----------------------------<  126<H>  3.5   |  29  |  4.36<H>    Ca    9.6      06 Oct 2022 06:12    TPro  6.9  /  Alb  2.7<L>  /  TBili  0.3  /  DBili  x   /  AST  20  /  ALT  23  /  AlkPhos  84  10-06        CAPILLARY BLOOD GLUCOSE    RADIOLOGY & ADDITIONAL TESTS:    < from: CT Abdomen and Pelvis w/ Oral Cont (09.27.22 @ 04:29) >    ACC: [id number] EXAM:  CT ABDOMEN AND PELVIS OC                          PROCEDURE DATE:  09/27/2022          INTERPRETATION:  CLINICAL INFORMATION: Pain. Evaluate for obstruction    COMPARISON: CT chest 1/22/2021.    CONTRAST/COMPLICATIONS:  IV Contrast: NONE  Oral Contrast: Gastroview  Complications: None reported at time of study completion    PROCEDURE:  CT of the Abdomen and Pelvis was performed.  Sagittal and coronal reformats were performed.    FINDINGS:  LOWER CHEST: Left lower lobe linear calcification possibly postsurgical,   unchanged    LIVER: Within normal limits.  BILE DUCTS: Normal caliber.  GALLBLADDER: Within normal limits.  SPLEEN: Within normal limits.  PANCREAS: Within normal limits.  ADRENALS: Within normal limits.  KIDNEYS/URETERS: Numerous bilateral renal cysts left greater than right.   No hydronephrosis. These are not well evaluated on this noncontrast   examination.  There are subcentimeter left renal calcification. There are subcentimeter   dense cyst possibly hemorrhagic.    BLADDER: Within normal limits.  REPRODUCTIVE ORGANS: Hysterectomy.    BOWEL: Moderate sized hiatal hernia. Appendix is not visualized. No   evidence of inflammation in the pericecal region.    Oral contrast has advanced to the mid smallbowel which is mildly   prominent measuring up to 2.7 cm in diameter.  The sigmoid colon is thickened and there are numerous diverticula. At the   proximal sigmoid colon there are inflammatory changes which extend to the   adjacent small bowel where there is a 5.4 x 5.4 cm mass containing air   and feculent material. Evaluation is limited without intravenous contrast   and oral contrast has not reached this level. This may represent a   contained perforation secondary to fistulization from sigmoid   diverticular disease to the small bowel.      PERITONEUM: No ascites.  VESSELS: Atherosclerotic changes.  RETROPERITONEUM/LYMPH NODES: No lymphadenopathy.  ABDOMINAL WALL: Within normal limits.  BONES: Within normal limits.    IMPRESSION:    Oral contrast has advanced to the mid small bowel which is mildly   prominent measuring up to 2.7 cm in diameter.    The sigmoid colon is thickened and there are numerous diverticula. At the   proximal sigmoid colon there are inflammatory changes which extend to the   adjacent small bowel where there is a 5.4 x 5.4 cm mass containing air   and feculent material. Evaluation is limited without intravenous contrast   and oral contrast has not reached this level. This may represent a   contained perforation secondary to fistulization from sigmoid   diverticular disease to the small bowel.    Suspicion of polycystic kidney disease. Correlate clinically.        --- End of Report ---    < end of copied text >      < from: CT Abdomen and Pelvis w/ IV Cont (09.27.22 @ 06:30) >  ACC: [id number] EXAM:  CT ABDOMEN AND PELVIS IC                          PROCEDURE DATE:  09/27/2022          INTERPRETATION:  CLINICAL INFORMATION: Abdominal pain question abscess    COMPARISON: CT abdomen pelvis 9/27/2022 earlier the same day.    CONTRAST/COMPLICATIONS:  IV Contrast: Omnipaque 350 90 cc administered  90 cc administered   10 cc   discarded  Oral Contrast: None  Complications: None    PROCEDURE:  CT of the Abdomen and Pelvis was performed.  Sagittal and coronal reformats were performed.    FINDINGS:  LOWER CHEST: Probable postsurgical changes in the left lower lung. 1.6 cm   left lower lobe thin-walled cyst.    LIVER: 1.3 cm right lobe hypodensity with suggestion of peripheral early   nodular enhancement (4:61). This may represent a hemangioma. This could   be confirmed with MRI.  BILE DUCTS: Normal caliber.  GALLBLADDER: Within normal limits.  SPLEEN: Within normal limits.  PANCREAS: 1.4 cm cyst in uncinate process or a focally dilated duct. This   would be better evaluated with dedicated MRI.  ADRENALS: Within normal limits.  KIDNEYS/URETERS: Bilateral polycystic kidneys    BLADDER: Within normal limits.  REPRODUCTIVE ORGANS: Hysterectomy.    BOWEL: Moderate sized hiatal hernia. Normal appendix.    Oral contrast is again seen within mildly prominent loops of proximal   small bowel measuring up to 2.7 cm in diameter to a mass in the left   lower quadrant. This is unchanged. The mass measures 5.8 x 5.1 cm and   contains fluid and air. This appears to be within thewall of the small   bowel. A thin column of oral contrast passes within the lumen of the   small bowel distally into collapsed loops of small bowel. There is an   apparent fistula between this mass in the adjacent markedly thickened   sigmoid colon which contains numerous diverticula. There are inflammatory   changes in the adjacent fat. This likely represents sigmoid   diverticulitis with small bowel fistulization to the raw within adjacent   loop of small bowel in a contained abscess.      PERITONEUM: No ascites.  VESSELS: Atherosclerotic changes.  RETROPERITONEUM/LYMPH NODES: No lymphadenopathy.  ABDOMINAL WALL: Small fat-containing umbilical hernia.  BONES: Within normal limits.    IMPRESSION:    Abscess within the left lower quadrant small bowel wall secondary to   fistulization from the sigmoid colon likely on the basis of chronic   diverticular disease.    1.3 cm right lobe hypodensity with suggestion of peripheral early nodular   enhancement. This may represent a hemangioma. This could be confirmed   with MRI.    1.4 cm cyst in uncinate process or a focally dilated duct. This would be   better evaluated with dedicated MRI.    Probable probably cystic kidneys. Correlate clinically for kidney disease.    < end of copied text >    Imaging Personally Reviewed:  YES/NO    Consultant(s) Notes Reviewed:   YES/ No    Care Discussed with Consultants :     Plan of care was discussed with patient and /or primary care giver; all questions and concerns were addressed and care was aligned with patient's wishes.

## 2022-10-06 NOTE — PROGRESS NOTE ADULT - ASSESSMENT
67y.o. Female with perforated acute diverticulitis s/p IR drainage    -C/w abx   -Rec rpt CT abd/pelvis   -Tube check per IR  -IR drain care per primary team  -Plan for conservative tx on present admission, elective colon resection upon resolution of acute phase negative - no chest pain

## 2022-10-06 NOTE — PROGRESS NOTE ADULT - NS ATTEND AMEND GEN_ALL_CORE FT
Pt seen and examined.  Having nausea. CT repeated today, abscess resolved. Report pending at time I saw pt.  Abd- soft, non tender, non distended. No LLQ on palpation, no guarding no rebound. Having significant increased output from IR drain.  Labs reviewed, wbc nml.  If continues to have significantly increased output, may require resection during hospitalization.

## 2022-10-06 NOTE — PROGRESS NOTE ADULT - NS ATTEND AMEND GEN_ALL_CORE FT
Patient seen and examined on 10/6/22. This is a late entry. Case discussed with REDD Lew. In brief, this is a 66 yo F with ESRD on HD (MWF), HIV (CD4 count of 377) on HAART, HTN who presents with severe abdominal pain found to have perforated diverticulitis of the sigmoid colon with abscess formation and fistulization from the sigmoid colon to the LLQ of the small bowel. Patient reports worsening nausea, GERD symptoms and diarrhea today. Drain output has increased to ~400cc thus far today and appears feculent in nature. Abscess cultures growing E. coli, Proteus, and Klebsiella at this time for which she is currently on ceftriaxone and metronidazole. Surgery is follow for the fistula and perforated diverticulitis. Repeat CT scan performed per surgical recommendations, which reports resolution of the abscess but concerns for ongoing fistula, now with significant output. Case d/w Dr. Francisco of surgery who recommends CLD, IV Protonix, and possible OR Monday if fistula output remains high.    A/P   #Perforated Sigmoid Diverticulitis with Abscess  #Sigmoid Colon-Small Bowel Fistula  #ESRD on HD (MWF)  #HIV  #HTN  #GERD    -Continue ceftriaxone and metronidazole for intra-abdominal abscess, CT A/P shows resolution of abscess but patient with worsening fistula output  -Monitor and record drain output diligently   -Spoke with surgical attending, Dr. Francisco, she recommends CLD, IV Protonix and possible OR on Monday if the fistula output continues to remain significantly high  -Per IR, given significant drainage now in the tube, no indication for tube study at this time  -Follow-up additional ID recommendations  -Continue Prezcobix, dolutegravir, lamivudine-HPV for HIV  -Continue HD for ESRD status as per nephrology, plan for HD in next 24 hours as patient received IV contrast for CT scan  -Continue hydralazine, imdur, amlodipine, metoprolol for HTN  -Continue sodium bicarbonate, calcitriol for ESRD status  -Change Protonix to 40mg IV BID per surgical recommendations given severe GERD symptoms  -CLD per surgical recommendations    Remaining care as noted above.

## 2022-10-06 NOTE — PROGRESS NOTE ADULT - PROBLEM SELECTOR PLAN 4
- ESRD, HD on M W F.   - Last HD 10/5 evening  - c/w sevelamer  - Renal diet when not NPO  - Nephro Dr. Wallace.

## 2022-10-06 NOTE — PROGRESS NOTE ADULT - ASSESSMENT
ESRD , dialysis days are MWF.   Continue with dialysis today 3 hours and 3 K dialystae.    Anemia restart BECKY , no need for Iron IV.    Diverticulitis, fistula /  abscess placed on Ceftriaxone and Metronidazole. Post IR drain , 4 bacteria in abscess fluid,  follow up with ID.  Increased drainage, at present with fecal material similar to her BM.  Patient was seen by surgery team , asked for CT with contrast. Agree with CT and will abhay dialysis within 24 hours of the test.  Increased WBC.    Suggest to maintain IV fluids with 20 meq KCL , rate 50 ml per hour.

## 2022-10-06 NOTE — PROGRESS NOTE ADULT - PROBLEM SELECTOR PLAN 1
-2/2 to fistulization from the sigmoid colon likely on the basis of chronic diverticular disease.  -s/p IR CT guided drainage 9/28  -Abscess CC + for proteus mirabilis, ecoli, klebsiella pneumoniae  -Cont  IV Ceftriaxone 2g and IV Flagyl 500mg q 8hours  - advance diet as tolerated  - cont strict output monitoring of drainage q8hr - Last 24 hrs 155mls output today  - plan for IR guided tube study tomorrow  - Awaiting Ct scan w/con to look for abscess status- pt will need to be dialyzed within 24 hrs  - Pt. was last dialyzed 10/5 evening.   -Surgery dr. Francisco following  -ID dr. Young

## 2022-10-06 NOTE — PROGRESS NOTE ADULT - PROBLEM SELECTOR PLAN 8
Pending Ct scan  to evaluate abscess- will need dialysis  within 24hrs  Discharge likely back to home when medically optimized   Pt. has one  daughter who is an RN and lives in [place].  Pt. lives alone

## 2022-10-07 LAB
ANION GAP SERPL CALC-SCNC: 8 MMOL/L — SIGNIFICANT CHANGE UP (ref 5–17)
BUN SERPL-MCNC: 16 MG/DL — SIGNIFICANT CHANGE UP (ref 7–18)
CALCIUM SERPL-MCNC: 9.1 MG/DL — SIGNIFICANT CHANGE UP (ref 8.4–10.5)
CHLORIDE SERPL-SCNC: 100 MMOL/L — SIGNIFICANT CHANGE UP (ref 96–108)
CO2 SERPL-SCNC: 28 MMOL/L — SIGNIFICANT CHANGE UP (ref 22–31)
CREAT SERPL-MCNC: 6.77 MG/DL — HIGH (ref 0.5–1.3)
EGFR: 6 ML/MIN/1.73M2 — LOW
GLUCOSE SERPL-MCNC: 124 MG/DL — HIGH (ref 70–99)
HCT VFR BLD CALC: 25.4 % — LOW (ref 34.5–45)
HGB BLD-MCNC: 8.5 G/DL — LOW (ref 11.5–15.5)
MAGNESIUM SERPL-MCNC: 2.5 MG/DL — SIGNIFICANT CHANGE UP (ref 1.6–2.6)
MCHC RBC-ENTMCNC: 32.2 PG — SIGNIFICANT CHANGE UP (ref 27–34)
MCHC RBC-ENTMCNC: 33.5 GM/DL — SIGNIFICANT CHANGE UP (ref 32–36)
MCV RBC AUTO: 96.2 FL — SIGNIFICANT CHANGE UP (ref 80–100)
NRBC # BLD: 0 /100 WBCS — SIGNIFICANT CHANGE UP (ref 0–0)
PHOSPHATE SERPL-MCNC: 1.7 MG/DL — LOW (ref 2.5–4.5)
PLATELET # BLD AUTO: 361 K/UL — SIGNIFICANT CHANGE UP (ref 150–400)
POTASSIUM SERPL-MCNC: 3.9 MMOL/L — SIGNIFICANT CHANGE UP (ref 3.5–5.3)
POTASSIUM SERPL-SCNC: 3.9 MMOL/L — SIGNIFICANT CHANGE UP (ref 3.5–5.3)
RBC # BLD: 2.64 M/UL — LOW (ref 3.8–5.2)
RBC # FLD: 17.6 % — HIGH (ref 10.3–14.5)
SODIUM SERPL-SCNC: 136 MMOL/L — SIGNIFICANT CHANGE UP (ref 135–145)
WBC # BLD: 9.66 K/UL — SIGNIFICANT CHANGE UP (ref 3.8–10.5)
WBC # FLD AUTO: 9.66 K/UL — SIGNIFICANT CHANGE UP (ref 3.8–10.5)

## 2022-10-07 PROCEDURE — 99232 SBSQ HOSP IP/OBS MODERATE 35: CPT

## 2022-10-07 RX ADMIN — CITALOPRAM 20 MILLIGRAM(S): 10 TABLET, FILM COATED ORAL at 13:17

## 2022-10-07 RX ADMIN — Medication 100 MILLIGRAM(S): at 13:18

## 2022-10-07 RX ADMIN — Medication 4 GRAM(S): at 13:17

## 2022-10-07 RX ADMIN — LIDOCAINE AND PRILOCAINE CREAM 1 APPLICATION(S): 25; 25 CREAM TOPICAL at 08:30

## 2022-10-07 RX ADMIN — ZINC SULFATE TAB 220 MG (50 MG ZINC EQUIVALENT) 220 MILLIGRAM(S): 220 (50 ZN) TAB at 13:19

## 2022-10-07 RX ADMIN — CALCITRIOL 0.25 MICROGRAM(S): 0.5 CAPSULE ORAL at 13:18

## 2022-10-07 RX ADMIN — Medication 100 MILLIGRAM(S): at 13:21

## 2022-10-07 RX ADMIN — Medication 30 MILLILITER(S): at 13:19

## 2022-10-07 RX ADMIN — Medication 100 MILLIGRAM(S): at 23:46

## 2022-10-07 RX ADMIN — CHLORHEXIDINE GLUCONATE 1 APPLICATION(S): 213 SOLUTION TOPICAL at 13:22

## 2022-10-07 RX ADMIN — Medication 50 MILLIGRAM(S): at 13:17

## 2022-10-07 RX ADMIN — PANTOPRAZOLE SODIUM 40 MILLIGRAM(S): 20 TABLET, DELAYED RELEASE ORAL at 05:42

## 2022-10-07 RX ADMIN — DOLUTEGRAVIR SODIUM 50 MILLIGRAM(S): 25 TABLET, FILM COATED ORAL at 13:20

## 2022-10-07 RX ADMIN — ISOSORBIDE MONONITRATE 60 MILLIGRAM(S): 60 TABLET, EXTENDED RELEASE ORAL at 13:19

## 2022-10-07 RX ADMIN — Medication 81 MILLIGRAM(S): at 13:16

## 2022-10-07 RX ADMIN — Medication 3 MILLIGRAM(S): at 23:46

## 2022-10-07 RX ADMIN — Medication 100 MILLIGRAM(S): at 17:37

## 2022-10-07 RX ADMIN — CEFTRIAXONE 100 MILLIGRAM(S): 500 INJECTION, POWDER, FOR SOLUTION INTRAMUSCULAR; INTRAVENOUS at 13:23

## 2022-10-07 RX ADMIN — DARUNAVIR ETHANOLATE AND COBICISTAT 1 TABLET(S): 800; 150 TABLET, FILM COATED ORAL at 13:16

## 2022-10-07 RX ADMIN — ATORVASTATIN CALCIUM 80 MILLIGRAM(S): 80 TABLET, FILM COATED ORAL at 23:46

## 2022-10-07 RX ADMIN — Medication 100 MILLIGRAM(S): at 05:41

## 2022-10-07 RX ADMIN — Medication 100 MILLIGRAM(S): at 13:41

## 2022-10-07 RX ADMIN — PANTOPRAZOLE SODIUM 40 MILLIGRAM(S): 20 TABLET, DELAYED RELEASE ORAL at 17:35

## 2022-10-07 NOTE — PROGRESS NOTE ADULT - NS ATTEND AMEND GEN_ALL_CORE FT
Patient seen and examined. Case discussed with REDD Hogan. In brief, this is a 68 yo F with ESRD on HD (MWF), HIV (CD4 count of 377) on HAART, HTN who presents with severe abdominal pain found to have perforated diverticulitis of the sigmoid colon with abscess formation and fistulization from the sigmoid colon to the LLQ of the small bowel. Drain has put out 635cc in the past 24 hours, significantly more than prior but patient reports she is feeling much improved today. Nausea and GERD symptoms have resolved at present though she continues to endorse diarrhea. Abscess cultures growing E. coli, Proteus, and Klebsiella at this time for which she is currently on ceftriaxone and metronidazole. Surgery is follow for the fistula and perforated diverticulitis. Repeat CT scan performed per surgical recommendations, which reports resolution of the abscess but concerns for ongoing fistula, now with significant output. Case d/w Dr. Francisco of surgery who recommends CLD, IV Protonix, and possible OR Monday if fistula output continues to remains high.    A/P   #Perforated Sigmoid Diverticulitis with Abscess  #Sigmoid Colon-Small Bowel Fistula  #ESRD on HD (MWF)  #HIV  #HTN  #GERD    -Continue ceftriaxone and metronidazole for intra-abdominal abscess, CT A/P shows resolution of abscess but patient with worsening fistula output  -Monitor and record drain output diligently   -Spoke with surgical attending, Dr. Francisco on 10/6, she recommends CLD, IV Protonix and possible OR on Monday if the fistula output continues to remain significantly high  -Per IR, given significant drainage now in the tube, no indication for tube study at this time  -Follow-up additional ID recommendations  -Continue Prezcobix, dolutegravir, lamivudine-HPV for HIV  -Continue HD for ESRD status as per nephrology, plan for HD in next 24 hours as patient received IV contrast for CT scan  -Continue hydralazine, imdur, amlodipine, metoprolol for HTN  -Continue sodium bicarbonate, calcitriol for ESRD status  -Change Protonix to 40mg IV BID per surgical recommendations given severe GERD symptoms  -CLD per surgical recommendations    Remaining care as noted above.

## 2022-10-07 NOTE — PROGRESS NOTE ADULT - ASSESSMENT
Subjective:    OTHER MEDS:  acetaminophen     Tablet .. 650 milliGRAM(s) Oral every 6 hours PRN  allopurinol 100 milliGRAM(s) Oral daily  aluminum hydroxide/magnesium hydroxide/simethicone Suspension 30 milliLiter(s) Oral every 4 hours PRN  amLODIPine   Tablet 10 milliGRAM(s) Oral daily  aspirin enteric coated 81 milliGRAM(s) Oral daily  atorvastatin 80 milliGRAM(s) Oral at bedtime  calcitriol   Capsule 0.25 MICROGram(s) Oral daily  chlorhexidine 2% Cloths 1 Application(s) Topical daily  citalopram 20 milliGRAM(s) Oral daily  dextrose 5% + sodium chloride 0.45% with potassium chloride 20 mEq/L 1000 milliLiter(s) IV Continuous <Continuous>  epoetin neyda-epbx (RETACRIT) Injectable 4000 Unit(s) IV Push <User Schedule>  hydrALAZINE 50 milliGRAM(s) Oral three times a day  isosorbide   mononitrate ER Tablet (IMDUR) 60 milliGRAM(s) Oral daily  lidocaine/prilocaine Cream 1 Application(s) Topical <User Schedule>  melatonin 3 milliGRAM(s) Oral at bedtime  metoprolol tartrate 100 milliGRAM(s) Oral two times a day  omega-3-Acid Ethyl Esters 4 Gram(s) Oral daily  ondansetron Injectable 4 milliGRAM(s) IV Push every 8 hours PRN  pantoprazole  Injectable 40 milliGRAM(s) IV Push every 12 hours  senna 2 Tablet(s) Oral at bedtime  sodium phosphate IVPB 15 milliMole(s) IV Intermittent once  zinc sulfate 220 milliGRAM(s) Oral daily    REVIEW OF SYSTEMS:  CONSTITUTIONAL:  No weakness, fevers or chills  EYES/ENT:  No visual changes;  No vertigo or throat pain   NECK:  No pain or stiffness  RESPIRATORY:  No cough, wheezing, hemoptysis; No shortness of breath  CARDIOVASCULAR:  No chest pain or palpitations  GASTROINTESTINAL:  No abdominal or epigastric pain. No nausea, vomiting, or hematemesis; No diarrhea or constipation. No melena or hematochezia.  GENITOURINARY:  No dysuria, frequency or hematuria  NEUROLOGICAL:  No numbness or weakness  MSK: no back pain, no joint pain  SKIN:  No itching, rashes    PE:  Vital Signs Last 24 Hrs  T(C): 37.2 (07 Oct 2022 05:03), Max: 37.2 (07 Oct 2022 05:03)  T(F): 98.9 (07 Oct 2022 05:03), Max: 98.9 (07 Oct 2022 05:03)  HR: 83 (07 Oct 2022 05:03) (83 - 87)  BP: 107/52 (07 Oct 2022 05:03) (102/51 - 119/59)  BP(mean): --  RR: 18 (07 Oct 2022 05:03) (18 - 18)  SpO2: 99% (07 Oct 2022 05:03) (99% - 100%)    Parameters below as of 07 Oct 2022 05:03  Patient On (Oxygen Delivery Method): room air      Gen: AOx3, NAD, non-toxic, pleasant  HEAD:  Atraumatic, Normocephalic  EYES: EOMI, PERRLA, conjunctiva and sclera clear  ENT: Moist mucous membranes  NECK: Supple, No JVD  CV: S1+S2 normal, nontachycardic  Resp: Clear bilat, no resp distress, no crackles/wheezes  Abd: Soft, nontender, +BS  Ext: No LE edema, no wounds  : No Farias  IV/Skin: No thrombophlebitis  Msk: No low back pain, no arthralgias, no joint swelling  Neuro: AAOx3. No sensory deficits, no motor deficits    LABS:                        8.5    9.66  )-----------( 361      ( 07 Oct 2022 09:08 )             25.4     10-07    136  |  100  |  16  ----------------------------<  124<H>  3.9   |  28  |  6.77<H>    Ca    9.1      07 Oct 2022 09:08  Phos  1.7     10-07  Mg     2.5     10-07    TPro  6.9  /  Alb  2.7<L>  /  TBili  0.3  /  DBili  x   /  AST  20  /  ALT  23  /  AlkPhos  84  10-06        MICROBIOLOGY:  v  .Abscess abdominal abscess drainage  09-28-22   Culture yields >4 types of aerobic and/or anaerobic bacteria  Call client services within 7 days if further workup is clinically  indicated. Culture includes  Few Escherichia coli  Rare Klebsiella pneumoniae  Rare Proteus mirabilis  Few Escherichiacoli #2  --  Escherichia coli  Klebsiella pneumoniae  Escherichia coli  Proteus mirabilis        HIV-1 RNA Quantitative, Viral Load Log: DET.  <1.47 lg /mL (09-29-22 @ 01:18)          RADIOLOGY:    IMPRESSION:    PLAN:    Please reach ID with any questions or concerns  Dr. Mine Garnett  Tel. [phone]  Available in Teams     Subjective: Pt is seen during HD treatment, she is clinically stable, afebrile, feeling well, no N/V or diarrhea, no abd pain, her abd drain output remains high    REVIEW OF SYSTEMS:  CONSTITUTIONAL:  No weakness, fevers or chills  EYES/ENT:  No visual changes;  No vertigo or throat pain   NECK:  No pain or stiffness  RESPIRATORY:  No cough, wheezing, hemoptysis; No shortness of breath  CARDIOVASCULAR:  No chest pain or palpitations  GASTROINTESTINAL:  No abdominal or epigastric pain. No nausea, vomiting, or hematemesis; No diarrhea or constipation. No melena or hematochezia. Has LLQ drain with biliary content  GENITOURINARY:  No dysuria, frequency or hematuria  NEUROLOGICAL:  No numbness or weakness  MSK: no back pain, no joint pain  SKIN:  No itching, rashes    PE:  Vital Signs Last 24 Hrs  T(C): 37.2 (07 Oct 2022 05:03), Max: 37.2 (07 Oct 2022 05:03)  T(F): 98.9 (07 Oct 2022 05:03), Max: 98.9 (07 Oct 2022 05:03)  HR: 83 (07 Oct 2022 05:03) (83 - 87)  BP: 107/52 (07 Oct 2022 05:03) (102/51 - 119/59)  RR: 18 (07 Oct 2022 05:03) (18 - 18)  SpO2: 99% (07 Oct 2022 05:03) (99% - 100%)    Parameters below as of 07 Oct 2022 05:03 Patient On (Oxygen Delivery Method): room air  Gen: AOx3, NAD, non-toxic, pleasant  HEAD:  Atraumatic, Normocephalic  EYES: EOMI, PERRLA, conjunctiva and sclera clear  ENT: Moist mucous membranes  NECK: Supple, No JVD  CV: S1+S2 normal, nontachycardic, left arm AVF with good thrill  Resp: Clear bilat, no resp distress, no crackles/wheezes  Abd: Soft, nontender, +BS, LLQ drain with significant amount of biliary content  Ext: No LE edema, no wounds  : No Farias  IV/Skin: No thrombophlebitis  Msk: No low back pain, no arthralgias, no joint swelling  Neuro: AAOx3. No sensory deficits, no motor deficits    LABS:                        8.5    9.66  )-----------( 361      ( 07 Oct 2022 09:08 )             25.4     10-07    136  |  100  |  16  ----------------------------<  124<H>  3.9   |  28  |  6.77<H>    Ca    9.1      07 Oct 2022 09:08  Phos  1.7     10-07  Mg     2.5     10-07    TPro  6.9  /  Alb  2.7<L>  /  TBili  0.3  /  DBili  x   /  AST  20  /  ALT  23  /  AlkPhos  84  10-06    MICROBIOLOGY:  v  .Abscess abdominal abscess drainage  09-28-22   Culture yields >4 types of aerobic and/or anaerobic bacteria  Call client services within 7 days if further workup is clinically  indicated. Culture includes  Few Escherichia coli  Rare Klebsiella pneumoniae  Rare Proteus mirabilis  Few Escherichiacoli #2  --  Escherichia coli  Klebsiella pneumoniae  Escherichia coli  Proteus mirabilis    RADIOLOGY:< from: CT Abdomen and Pelvis w/ IV Cont (10.06.22 @ 13:26) >  IMPRESSION:  Previously seen diverticular abscess is collapsed around the pigtail catheter. Mild residual inflammatory changes around the sigmoid colon and small bowel with suggestion of enterocolic fistula.  Moderate sliding hiatus hernia with thickened distal esophagus. Correlate with endoscopy to exclude esophagitis.  1.2 cm right lobe hepatic lesion likely hemangioma as above. Center confirmation with MRI.  1.3 cm pancreatic head cyst is also be characterized with MRI.    IMPRESSION:  66 yo female with H/O ESRD on HD(MWF) via L arm AVF, HIV on ART diagnosed 16 yrs ago (Tivicay + Lamivudine + Prezcobix) admitted for LLQ abscess.     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   -PCN allergy(when she was a child, she was told she had hives, she does not remember)  -ESRD on HD    PLAN:  There is significant improvement on repeat CT A/P with abscess drained, the pt has significantly improved, the CRP almost normal from very elevated levels on presentation and septic picture which is now resolved, she however has a fistula with high output which will likely need surgical intervention while inpatient.    -Continue Ceftriaxone 2g IV daily + Flagyl 500 mg q8h  -Continue ART(Trivicay+Prezcovix+Lamivudine) daily  -Discussed with medicine attending    Please reach ID with any questions or concerns  Dr. Mine Garnett  Tel. [phone]  Available in Teams

## 2022-10-07 NOTE — PROGRESS NOTE ADULT - PROBLEM SELECTOR PLAN 1
-2/2 to fistulization from the sigmoid colon likely on the basis of chronic diverticular disease.  -s/p IR CT guided drainage 9/28  -Abscess CC + for proteus mirabilis, ecoli, klebsiella pneumoniae  -Cont  IV Ceftriaxone 2g and IV Flagyl 500mg q 8hours  - advance diet as tolerated  - cont strict output monitoring of drainage q8hr - Last 24 hrs 155mls output today  - plan for IR guided tube study tomorrow  - Awaiting Ct scan w/con to look for abscess status- pt will need to be dialyzed within 24 hrs  - Pt. was last dialyzed 10/5 evening.   -Surgery dr. Francisco following  -ID dr. Young -2/2 to fistulization from the sigmoid colon likely in the setting of chronic diverticular disease.  - s/p IR CT guided drainage 9/28  - Abscess CC + for proteus mirabilis, ecoli, klebsiella pneumoniae  - Cont  IV Ceftriaxone 2g and IV Flagyl 500mg q 8hours  - tolerating clear liquid diet  - cont strict output monitoring of drainage q8hr  - tube study not indicated 2/2 high output per IR  - repeat CT abd/pelv shows  diverticular abscess is collapsed around the pigtail catheter. Mild residual inflammatory changes around the sigmoid colon and small bowel with suggestion of enterocolic fistula.  -Surgery dr. Francisco following  -ID Dr. Young following

## 2022-10-07 NOTE — PROGRESS NOTE ADULT - PROBLEM SELECTOR PLAN 4
- ESRD, HD on M W F.   - Last HD 10/5 evening  - c/w sevelamer  - Renal diet when not NPO  - Nephro Dr. Wallace. - ESRD, HD on M W F.   - Last HD 10/7 AM  - c/w sevelamer  - Renal diet when not NPO  - Nephro Dr. Catalan following

## 2022-10-07 NOTE — PROGRESS NOTE ADULT - ASSESSMENT
ESRD , dialysis days are MWF.   Continue with dialysis today 3 hours and 3 K dialystae.    Anemia restart BECKY , no need for Iron IV.    Hypophosphatemia , supplement PO4 and follow lab in am    Diverticulitis, fistula /  abscess placed on Ceftriaxone and Metronidazole. Post IR drain , 4 bacteria in abscess fluid,  follow up with ID.  Increased drainage, at present with fecal material similar to her BM.  Patient was seen by surgery team , asked for CT with contrast. Agree with CT and will abhay dialysis within 24 hours of the test.  Increased WBC.    Follow up Echocardiogram before surgery.

## 2022-10-07 NOTE — PROGRESS NOTE ADULT - PROBLEM SELECTOR PLAN 8
Pending Ct scan  to evaluate abscess- will need dialysis  within 24hrs  Discharge likely back to home when medically optimized   Pt. has one  daughter who is an RN and lives in [place].  Pt. lives alone - possible OR Monday for resection  - repeat CT abd suggests enterocolic fistula  - Pt. has one  daughter who is an RN and lives in [place].  - patient from HOME

## 2022-10-07 NOTE — PROGRESS NOTE ADULT - ASSESSMENT
This is a 67 year old female, coming from home, with past medical history of ESRD(M-W-F), HTN, HIV coming in with abdominal pain. Found to have  Abscess within the left lower quadrant small bowel wall secondary to fistulization from the sigmoid colon likely in the setting of chronic diverticular disease, started on Ceftriaxone and Flagyl. ID following. S/p IR CT guided drainage of abscess and Pigtail to drainage bag. 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    10/6-Pt. appears comfortable, denies abdominal pain, N/V, tolerating diet well.  LLQ pigtail with large amounts green liquidy drainage.  Awaiting IR guided tube study and CT A/P w/con.  Spoke to IR, study will not be done as increased drainage via pigtail continues indicating fistula.  Attending will discuss with surgery further plan of care.  Will f/u CT scan results.                       This is a 67 year old female, coming from home, with past medical history of ESRD(M-W-F), HTN, HIV coming in with abdominal pain. Found to have Abscess within the left lower quadrant small bowel wall secondary to fistulization from the sigmoid colon likely in the setting of chronic diverticular disease, started on Ceftriaxone and Flagyl. ID following Surgery consulted. s/p IR CT guided drainage of abscess and Pigtail to drainage bag. Nephro following for ESRD Hospital course c/b increased high output from pigtail drain. Possible resection on Monday   10/6-Pt. appears comfortable, denies abdominal pain, N/V, tolerating diet well.  LLQ pigtail with large amounts green liquidy drainage.  Awaiting IR guided tube study and CT A/P w/con.  Spoke to IR, study will not be done as increased drainage via pigtail continues indicating fistula.  Attending will discuss with surgery further plan of care.  Will f/u CT scan results.                       This is a 67 year old female, coming from home, with past medical history of ESRD(M-W-F), HTN, HIV coming in with abdominal pain. Found to have Abscess within the left lower quadrant small bowel wall secondary to fistulization from the sigmoid colon likely in the setting of chronic diverticular disease, started on Ceftriaxone and Flagyl. ID following Surgery consulted. s/p IR CT guided drainage of abscess and Pigtail to drainage bag. Nephro following for ESRD Hospital course c/b increased high output from pigtail drain. Per IR tube study study will not be done as increased drainage via pigtail continues indicating fistula. 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 Possible OR on monday for resection. Patient is tolerating Clear liquid diet.

## 2022-10-07 NOTE — PROGRESS NOTE ADULT - ASSESSMENT
67y.o. Female with perforated acute diverticulitis s/p IR drainage  afebrile, wbc wnl     - Abx as per primary team   - Tube check per IR  - IR drain care per primary team, please monitor and record output  - Remainder of care as per primary team   - Discussed and agreed with Dr. Francisco

## 2022-10-07 NOTE — PROGRESS NOTE ADULT - PROBLEM SELECTOR PLAN 3
Pt has a history of HIV.  - CD4 377, viral load not detect  - c/w home medication Tivicay, Prezcobix, lamivudine.  - ID dr. Young is following - CD4 377, viral load not detect  - c/w home medication Tivicay, Prezcobix, lamivudine.  - ID dr. Young is following

## 2022-10-07 NOTE — PROGRESS NOTE ADULT - PROBLEM SELECTOR PLAN 6
Pt has a history of HLD.   -c/w home medication Pravastatin (Atorvastatin). - continue with home Atorvastatin

## 2022-10-07 NOTE — PROGRESS NOTE ADULT - SUBJECTIVE AND OBJECTIVE BOX
INTERVAL HPI/OVERNIGHT EVENTS:  Patient seen and examined at bedside   Pt resting comfortably. No acute complaints.   Tolerating clear liquid diet. Denies any abdominal pain and states that the LLQ pain has resolved. Admits to flatus/BM.   She admits to episode of NB vomiting yesterday after dialysis.   Patient denies chest pain, shortness of breath, fever, chills or any other constitutional symptoms       MEDICATIONS  (STANDING):  allopurinol 100 milliGRAM(s) Oral daily  amLODIPine   Tablet 10 milliGRAM(s) Oral daily  aspirin enteric coated 81 milliGRAM(s) Oral daily  atorvastatin 80 milliGRAM(s) Oral at bedtime  calcitriol   Capsule 0.25 MICROGram(s) Oral daily  cefTRIAXone   IVPB 2000 milliGRAM(s) IV Intermittent every 24 hours  chlorhexidine 2% Cloths 1 Application(s) Topical daily  citalopram 20 milliGRAM(s) Oral daily  darunavir 800 mG/cobicstat 150 mG 1 Tablet(s) Oral daily  dextrose 5% + sodium chloride 0.45% with potassium chloride 20 mEq/L 1000 milliLiter(s) (50 mL/Hr) IV Continuous <Continuous>  dolutegravir 50 milliGRAM(s) Oral daily  epoetin neyda-epbx (RETACRIT) Injectable 4000 Unit(s) IV Push <User Schedule>  hydrALAZINE 50 milliGRAM(s) Oral three times a day  isosorbide   mononitrate ER Tablet (IMDUR) 60 milliGRAM(s) Oral daily  lamiVUDine- milliGRAM(s) Oral daily  lidocaine/prilocaine Cream 1 Application(s) Topical <User Schedule>  melatonin 3 milliGRAM(s) Oral at bedtime  metoprolol tartrate 100 milliGRAM(s) Oral two times a day  metroNIDAZOLE  IVPB 500 milliGRAM(s) IV Intermittent every 8 hours  omega-3-Acid Ethyl Esters 4 Gram(s) Oral daily  pantoprazole  Injectable 40 milliGRAM(s) IV Push every 12 hours  senna 2 Tablet(s) Oral at bedtime  zinc sulfate 220 milliGRAM(s) Oral daily    MEDICATIONS  (PRN):  acetaminophen     Tablet .. 650 milliGRAM(s) Oral every 6 hours PRN Temp greater or equal to 38C (100.4F), Mild Pain (1 - 3)  aluminum hydroxide/magnesium hydroxide/simethicone Suspension 30 milliLiter(s) Oral every 4 hours PRN Dyspepsia  ondansetron Injectable 4 milliGRAM(s) IV Push every 8 hours PRN Nausea and/or Vomiting      Vital Signs Last 24 Hrs  T(C): 37.2 (07 Oct 2022 05:03), Max: 37.2 (07 Oct 2022 05:03)  T(F): 98.9 (07 Oct 2022 05:03), Max: 98.9 (07 Oct 2022 05:03)  HR: 83 (07 Oct 2022 05:03) (83 - 87)  BP: 107/52 (07 Oct 2022 05:03) (102/51 - 119/59)  BP(mean): --  RR: 18 (07 Oct 2022 05:03) (18 - 18)  SpO2: 99% (07 Oct 2022 05:03) (99% - 100%)    Parameters below as of 07 Oct 2022 05:03  Patient On (Oxygen Delivery Method): room air        Physical:  General: A&Ox3. NAD.  Respiratory: non labored  Skin: warm and dry  Abdomen: Soft, nondistended, nontender, no rebound tenderness, no guarding  LLQ IR drain intact; draining 635 ml/24 hours of light brown fluid   : no terry in place, voiding     I&O's Detail    06 Oct 2022 07:01  -  07 Oct 2022 07:00  --------------------------------------------------------  IN:  Total IN: 0 mL    OUT:    Drain (mL): 635 mL  Total OUT: 635 mL    Total NET: -635 mL          LABS:                        9.6    10.45 )-----------( 357      ( 06 Oct 2022 06:12 )             28.5             10-06    139  |  102  |  9   ----------------------------<  126<H>  3.5   |  29  |  4.36<H>    Ca    9.6      06 Oct 2022 06:12    TPro  6.9  /  Alb  2.7<L>  /  TBili  0.3  /  DBili  x   /  AST  20  /  ALT  23  /  AlkPhos  84  10-06        < from: CT Abdomen and Pelvis w/ IV Cont (10.06.22 @ 13:26) >  PROCEDURE:  CT of the Abdomen and Pelvis was performed.  Sagittal and coronal reformats were performed.    FINDINGS:  LOWER CHEST: Left lower lobe postoperative changes. 1.6 cm left lower   lobe thin-walled cyst with septations.    LIVER: Steatosis. 1.2 cm right lobe enhancing lesion (2:39), previously   with peripheral enhancementon earlier phase, likely hemangioma..  BILE DUCTS: Normal caliber.  GALLBLADDER: Contracted.  SPLEEN: Within normal limits.  PANCREAS: 1.3 cm pancreatic head cyst.  ADRENALS: Within normal limits.  KIDNEYS/URETERS: Redemonstrated innumerable bilateral renal cysts and   hypodensities too small to characterize, greater on the right. Right   renal atrophy again noted. No hydroureteronephrosis or calculi.    BLADDER: Completely decompressed.  REPRODUCTIVE ORGANS: Hysterectomy.    BOWEL: No bowel obstruction. Appendix is not visualized. No evidence of   inflammation in the pericecal region.. Moderate sliding hiatus hernia   with distal esophageal wall thickening. Sigmoid diverticulosis.   Previously seen diverticular abscess is collapsed around the pigtail   catheter. Mild residual mesenteric fat stranding adjacent to the sigmoid   colon and small bowel with small droplet of extraluminal air (2:95) which   may represent fistulization.  PERITONEUM: No ascites.  VESSELS: Atherosclerotic changes.  RETROPERITONEUM/LYMPH NODES: No lymphadenopathy.  ABDOMINAL WALL: Left lower quadrant pigtail drainage catheter.  BONES: Within normal limits.    IMPRESSION:    Previously seen diverticular abscess is collapsed around the pigtail   catheter. Mild residual inflammatory changes around the sigmoid colon and   small bowel with suggestion of enterocolic fistula.    Moderate sliding hiatus hernia with thickened distal esophagus. Correlate   with endoscopy to exclude esophagitis.    1.2 cm right lobe hepatic lesion likely hemangioma as above. Center   confirmation with MRI.    1.3 cm pancreatic head cyst is also be characterized with MRI.    < end of copied text >

## 2022-10-07 NOTE — PROGRESS NOTE ADULT - SUBJECTIVE AND OBJECTIVE BOX
NP Note discussed with  Primary Attending    Patient is a 67y old  Female who presents with a chief complaint of Abdominal pain (07 Oct 2022 15:00)      INTERVAL HPI/OVERNIGHT EVENTS: abd pain resolved    MEDICATIONS  (STANDING):  allopurinol 100 milliGRAM(s) Oral daily  amLODIPine   Tablet 10 milliGRAM(s) Oral daily  aspirin enteric coated 81 milliGRAM(s) Oral daily  atorvastatin 80 milliGRAM(s) Oral at bedtime  calcitriol   Capsule 0.25 MICROGram(s) Oral daily  cefTRIAXone   IVPB 2000 milliGRAM(s) IV Intermittent every 24 hours  chlorhexidine 2% Cloths 1 Application(s) Topical daily  citalopram 20 milliGRAM(s) Oral daily  darunavir 800 mG/cobicstat 150 mG 1 Tablet(s) Oral daily  dextrose 5% + sodium chloride 0.45% with potassium chloride 20 mEq/L 1000 milliLiter(s) (50 mL/Hr) IV Continuous <Continuous>  dolutegravir 50 milliGRAM(s) Oral daily  epoetin neyda-epbx (RETACRIT) Injectable 4000 Unit(s) IV Push <User Schedule>  hydrALAZINE 50 milliGRAM(s) Oral three times a day  isosorbide   mononitrate ER Tablet (IMDUR) 60 milliGRAM(s) Oral daily  lamiVUDine- milliGRAM(s) Oral daily  lidocaine/prilocaine Cream 1 Application(s) Topical <User Schedule>  melatonin 3 milliGRAM(s) Oral at bedtime  metoprolol tartrate 100 milliGRAM(s) Oral two times a day  metroNIDAZOLE  IVPB 500 milliGRAM(s) IV Intermittent every 8 hours  omega-3-Acid Ethyl Esters 4 Gram(s) Oral daily  pantoprazole  Injectable 40 milliGRAM(s) IV Push every 12 hours  senna 2 Tablet(s) Oral at bedtime  sodium phosphate IVPB 15 milliMole(s) IV Intermittent once  zinc sulfate 220 milliGRAM(s) Oral daily    MEDICATIONS  (PRN):  acetaminophen     Tablet .. 650 milliGRAM(s) Oral every 6 hours PRN Temp greater or equal to 38C (100.4F), Mild Pain (1 - 3)  aluminum hydroxide/magnesium hydroxide/simethicone Suspension 30 milliLiter(s) Oral every 4 hours PRN Dyspepsia  ondansetron Injectable 4 milliGRAM(s) IV Push every 8 hours PRN Nausea and/or Vomiting      __________________________________________________  REVIEW OF SYSTEMS:    CONSTITUTIONAL: No fever,   EYES: no acute visual disturbances  NECK: No pain or stiffness  RESPIRATORY: No cough; No shortness of breath  CARDIOVASCULAR: No chest pain, no palpitations  GASTROINTESTINAL: No pain. No nausea or vomiting; No diarrhea   NEUROLOGICAL: No headache or numbness, no tremors  MUSCULOSKELETAL: No joint pain, no muscle pain  GENITOURINARY: no dysuria, no frequency, no hesitancy  PSYCHIATRY: no depression , no anxiety  ALL OTHER  ROS negative        Vital Signs Last 24 Hrs  T(C): 37.4 (07 Oct 2022 13:33), Max: 37.4 (07 Oct 2022 13:33)  T(F): 99.4 (07 Oct 2022 13:33), Max: 99.4 (07 Oct 2022 13:33)  HR: 63 (07 Oct 2022 13:33) (63 - 89)  BP: 150/73 (07 Oct 2022 13:33) (102/51 - 150/73)  BP(mean): --  RR: 18 (07 Oct 2022 13:33) (18 - 19)  SpO2: 95% (07 Oct 2022 13:33) (95% - 100%)    Parameters below as of 07 Oct 2022 13:33  Patient On (Oxygen Delivery Method): room air        ________________________________________________  PHYSICAL EXAM:  GENERAL: NAD  HEENT: Normocephalic;  conjunctivae and sclerae clear  NECK : supple  CHEST/LUNG: Clear to auscultation bilaterally  HEART: S1 S2  RRR  ABDOMEN: Soft, Nontender, Nondistended; Bowel sounds present' + Drain  EXTREMITIES: no cyanosis; no edema; no calf tenderness  SKIN: warm and dry; no rash  NERVOUS SYSTEM:  Awake and alert; Oriented  to place, person and time     _________________________________________________  LABS:                        8.5    9.66  )-----------( 361      ( 07 Oct 2022 09:08 )             25.4     10-07    136  |  100  |  16  ----------------------------<  124<H>  3.9   |  28  |  6.77<H>    Ca    9.1      07 Oct 2022 09:08  Phos  1.7     10-07  Mg     2.5     10-07    TPro  6.9  /  Alb  2.7<L>  /  TBili  0.3  /  DBili  x   /  AST  20  /  ALT  23  /  AlkPhos  84  10-06        CAPILLARY BLOOD GLUCOSE            RADIOLOGY & ADDITIONAL TESTS:  < from: CT Abdomen and Pelvis w/ Oral Cont (09.27.22 @ 04:29) >  IMPRESSION:    Oral contrast has advanced to the mid small bowel which is mildly   prominent measuring up to 2.7 cm in diameter.    The sigmoid colon is thickened and there are numerous diverticula. At the   proximal sigmoid colon there are inflammatory changes which extend to the   adjacent small bowel where there is a 5.4 x 5.4 cm mass containing air   and feculent material. Evaluation is limited without intravenous contrast   and oral contrast has not reached this level. This may represent a   contained perforation secondary to fistulization from sigmoid   diverticular disease to the small bowel.    Suspicion of polycystic kidney disease. Correlate clinically.        < end of copied text >      < from: CT Aspiration Abscess Hematoma, Cyst (09.28.22 @ 14:00) >  IMPRESSION: SUCCESSFUL CT-GUIDED PERCUTANEOUS DRAINAGE OF ABDOMINAL   ABSCESS, AS DESCRIBED ABOVE.    < end of copied text >      < from: CT Abdomen and Pelvis w/ IV Cont (10.06.22 @ 13:26) >    IMPRESSION:    Previously seen diverticular abscess is collapsed around the pigtail   catheter. Mild residual inflammatory changes around the sigmoid colon and   small bowel with suggestion of enterocolic fistula.    Moderate sliding hiatus hernia with thickened distal esophagus. Correlate   with endoscopy to exclude esophagitis.    1.2 cm right lobe hepatic lesion likely hemangioma as above. Center   confirmation with MRI.    1.3 cm pancreatic head cyst is also be characterized with MRI.    < end of copied text >    Imaging Personally Reviewed:  YES    Consultant(s) Notes Reviewed:   YES      Plan of care was discussed with patient and /or primary care giver; all questions and concerns were addressed and care was aligned with patient's wishes.

## 2022-10-07 NOTE — PROGRESS NOTE ADULT - PROBLEM SELECTOR PLAN 2
Controlled   - c/w home medication of Metoprolol, Hydralazine, Amlodipine with parameters. - SBP controlled  - c/w home medication of Metoprolol, Hydralazine, Amlodipine with parameters  - monitor BP

## 2022-10-07 NOTE — PROGRESS NOTE ADULT - SUBJECTIVE AND OBJECTIVE BOX
Problem List:  ESRD    Diverticulosis, admitted for abscess in LLQ within the wall of small bowel with fistula between the mass and thickened sigmoid colon with diverticula. Post IR drainage tube in abscess area.  There is a continuos green drainage in the bag.  Post HD  yesterday  Abdominal pain improved , tolerate clear liquid feeding  c/vomit with empty stomach in am with morning pills. She is able to tolerated the other meals.  Drain of abscess  reduced, light green color  No abdominal pain     Increased drainage from abdominal tube 300 ml, with 200 ml in last 2 hours, color changed to yellow with fecal material  C/O BM with equal color as in drainage  C/O reduced appetite and vomiting.    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      ESRD on hemodialysis      History of ectopic pregnancy  Two      H/O: hysterectomy  Due to cervical cancer      S/P arteriovenous (AV) fistula creation  july 10 2020          oxycodone (Rash)  penicillins (Urticaria; Rash)      MEDICATIONS  (STANDING):  allopurinol 100 milliGRAM(s) Oral daily  amLODIPine   Tablet 10 milliGRAM(s) Oral daily  aspirin enteric coated 81 milliGRAM(s) Oral daily  atorvastatin 80 milliGRAM(s) Oral at bedtime  calcitriol   Capsule 0.25 MICROGram(s) Oral daily  cefTRIAXone   IVPB 2000 milliGRAM(s) IV Intermittent every 24 hours  chlorhexidine 2% Cloths 1 Application(s) Topical daily  citalopram 20 milliGRAM(s) Oral daily  darunavir 800 mG/cobicstat 150 mG 1 Tablet(s) Oral daily  dextrose 5% + sodium chloride 0.45% with potassium chloride 20 mEq/L 1000 milliLiter(s) (50 mL/Hr) IV Continuous <Continuous>  dolutegravir 50 milliGRAM(s) Oral daily  epoetin neyda-epbx (RETACRIT) Injectable 4000 Unit(s) IV Push <User Schedule>  hydrALAZINE 50 milliGRAM(s) Oral three times a day  isosorbide   mononitrate ER Tablet (IMDUR) 60 milliGRAM(s) Oral daily  lamiVUDine- milliGRAM(s) Oral daily  lidocaine/prilocaine Cream 1 Application(s) Topical <User Schedule>  melatonin 3 milliGRAM(s) Oral at bedtime  metoprolol tartrate 100 milliGRAM(s) Oral two times a day  metroNIDAZOLE  IVPB 500 milliGRAM(s) IV Intermittent every 8 hours  omega-3-Acid Ethyl Esters 4 Gram(s) Oral daily  pantoprazole  Injectable 40 milliGRAM(s) IV Push every 12 hours  senna 2 Tablet(s) Oral at bedtime  sodium phosphate IVPB 15 milliMole(s) IV Intermittent once  zinc sulfate 220 milliGRAM(s) Oral daily    MEDICATIONS  (PRN):  acetaminophen     Tablet .. 650 milliGRAM(s) Oral every 6 hours PRN Temp greater or equal to 38C (100.4F), Mild Pain (1 - 3)  aluminum hydroxide/magnesium hydroxide/simethicone Suspension 30 milliLiter(s) Oral every 4 hours PRN Dyspepsia  ondansetron Injectable 4 milliGRAM(s) IV Push every 8 hours PRN Nausea and/or Vomiting                            8.5    9.66  )-----------( 361      ( 07 Oct 2022 09:08 )             25.4     10-07    136  |  100  |  16  ----------------------------<  124<H>  3.9   |  28  |  6.77<H>    Ca    9.1      07 Oct 2022 09:08  Phos  1.7     10-07  Mg     2.5     10-07    TPro  6.9  /  Alb  2.7<L>  /  TBili  0.3  /  DBili  x   /  AST  20  /  ALT  23  /  AlkPhos  84  10-06            REVIEW OF SYSTEMS:  General: no fever no chills, no weight loss.  EYES/ENT: No visual changes;  No vertigo, no headache.  NECK: No pain or stiffness  RESPIRATORY: No cough, wheezing, hemoptysis; No shortness of breath  CARDIOVASCULAR: No chest pain or palpitations. No Edema  GASTROINTESTINAL: REDUCED APPetite  GENITOURINARY: No dysuria, frequency, foamy urine, urinary urgency, incontinence or hematuria  NEUROLOGICAL: No numbness or weakness, no tremor , no dizziness.           VITALS:  T(F): 99.4 (10-07-22 @ 13:33), Max: 99.4 (10-07-22 @ 13:33)  HR: 63 (10-07-22 @ 13:33)  BP: 150/73 (10-07-22 @ 13:33)  RR: 18 (10-07-22 @ 13:33)  SpO2: 95% (10-07-22 @ 13:33)  Wt(kg): --    10-06 @ 07:01  -  10-07 @ 07:00  --------------------------------------------------------  IN: 0 mL / OUT: 635 mL / NET: -635 mL    10-07 @ 07:01  -  10-07 @ 15:00  --------------------------------------------------------  IN: 0 mL / OUT: 50 mL / NET: -50 mL        PHYSICAL EXAM:  Constitutional: well developed, no diaphoresis, no distress.  Neck: No JVD, no carotid bruit, supple, no adenopathy  Respiratory: Good air entrance B/L, no wheezes, rales or rhonchi  Cardiovascular: S1, S2, RRR, no pericardial rub, murmur 2/6 at base  Abdomen: BS+, soft, no tenderness, no bruit  Pelvis: bladder nondistended  Extremities: No cyanosis or clubbing. No peripheral edema.   Pulses: All present  Neurological: A/O x 3, no focal deficits  Psychiatric: Normal mood, normal affect  Skin: No rashes  Vascular Access: left AVF with bruit and thrill

## 2022-10-07 NOTE — PROGRESS NOTE ADULT - NS ATTEND AMEND GEN_ALL_CORE FT
Pt seen and examined. Reports feels better today. IR drain with significantly increased output. CT shows resolution of abscess with evidence of fistula sigmoid to small bowel.   Abd- soft, non distended, non tender. No guarding no rebound. IR drain bilious output.  Plan for surgery Monday. Will prep Sunday to hopefully avoid Alfonso's procedure.

## 2022-10-08 LAB
ANION GAP SERPL CALC-SCNC: 10 MMOL/L — SIGNIFICANT CHANGE UP (ref 5–17)
BUN SERPL-MCNC: 7 MG/DL — SIGNIFICANT CHANGE UP (ref 7–18)
CALCIUM SERPL-MCNC: 9.2 MG/DL — SIGNIFICANT CHANGE UP (ref 8.4–10.5)
CHLORIDE SERPL-SCNC: 103 MMOL/L — SIGNIFICANT CHANGE UP (ref 96–108)
CO2 SERPL-SCNC: 26 MMOL/L — SIGNIFICANT CHANGE UP (ref 22–31)
CREAT SERPL-MCNC: 4.74 MG/DL — HIGH (ref 0.5–1.3)
EGFR: 10 ML/MIN/1.73M2 — LOW
GLUCOSE SERPL-MCNC: 99 MG/DL — SIGNIFICANT CHANGE UP (ref 70–99)
HCT VFR BLD CALC: 24 % — LOW (ref 34.5–45)
HGB BLD-MCNC: 7.7 G/DL — LOW (ref 11.5–15.5)
MCHC RBC-ENTMCNC: 31.6 PG — SIGNIFICANT CHANGE UP (ref 27–34)
MCHC RBC-ENTMCNC: 32.1 GM/DL — SIGNIFICANT CHANGE UP (ref 32–36)
MCV RBC AUTO: 98.4 FL — SIGNIFICANT CHANGE UP (ref 80–100)
NRBC # BLD: 0 /100 WBCS — SIGNIFICANT CHANGE UP (ref 0–0)
PHOSPHATE SERPL-MCNC: 2 MG/DL — LOW (ref 2.5–4.5)
PLATELET # BLD AUTO: 301 K/UL — SIGNIFICANT CHANGE UP (ref 150–400)
POTASSIUM SERPL-MCNC: 3.9 MMOL/L — SIGNIFICANT CHANGE UP (ref 3.5–5.3)
POTASSIUM SERPL-SCNC: 3.9 MMOL/L — SIGNIFICANT CHANGE UP (ref 3.5–5.3)
RBC # BLD: 2.44 M/UL — LOW (ref 3.8–5.2)
RBC # FLD: 18 % — HIGH (ref 10.3–14.5)
SODIUM SERPL-SCNC: 139 MMOL/L — SIGNIFICANT CHANGE UP (ref 135–145)
WBC # BLD: 7.57 K/UL — SIGNIFICANT CHANGE UP (ref 3.8–10.5)
WBC # FLD AUTO: 7.57 K/UL — SIGNIFICANT CHANGE UP (ref 3.8–10.5)

## 2022-10-08 PROCEDURE — 99232 SBSQ HOSP IP/OBS MODERATE 35: CPT

## 2022-10-08 RX ADMIN — Medication 100 MILLIGRAM(S): at 04:59

## 2022-10-08 RX ADMIN — Medication 81 MILLIGRAM(S): at 13:31

## 2022-10-08 RX ADMIN — DARUNAVIR ETHANOLATE AND COBICISTAT 1 TABLET(S): 800; 150 TABLET, FILM COATED ORAL at 12:04

## 2022-10-08 RX ADMIN — DOLUTEGRAVIR SODIUM 50 MILLIGRAM(S): 25 TABLET, FILM COATED ORAL at 12:04

## 2022-10-08 RX ADMIN — CALCITRIOL 0.25 MICROGRAM(S): 0.5 CAPSULE ORAL at 12:05

## 2022-10-08 RX ADMIN — PANTOPRAZOLE SODIUM 40 MILLIGRAM(S): 20 TABLET, DELAYED RELEASE ORAL at 05:06

## 2022-10-08 RX ADMIN — Medication 100 MILLIGRAM(S): at 21:05

## 2022-10-08 RX ADMIN — Medication 4 GRAM(S): at 12:05

## 2022-10-08 RX ADMIN — ISOSORBIDE MONONITRATE 60 MILLIGRAM(S): 60 TABLET, EXTENDED RELEASE ORAL at 12:04

## 2022-10-08 RX ADMIN — ZINC SULFATE TAB 220 MG (50 MG ZINC EQUIVALENT) 220 MILLIGRAM(S): 220 (50 ZN) TAB at 12:03

## 2022-10-08 RX ADMIN — CEFTRIAXONE 100 MILLIGRAM(S): 500 INJECTION, POWDER, FOR SOLUTION INTRAMUSCULAR; INTRAVENOUS at 13:27

## 2022-10-08 RX ADMIN — PANTOPRAZOLE SODIUM 40 MILLIGRAM(S): 20 TABLET, DELAYED RELEASE ORAL at 17:32

## 2022-10-08 RX ADMIN — Medication 100 MILLIGRAM(S): at 12:05

## 2022-10-08 RX ADMIN — Medication 100 MILLIGRAM(S): at 05:06

## 2022-10-08 RX ADMIN — Medication 100 MILLIGRAM(S): at 13:28

## 2022-10-08 RX ADMIN — ATORVASTATIN CALCIUM 80 MILLIGRAM(S): 80 TABLET, FILM COATED ORAL at 21:05

## 2022-10-08 RX ADMIN — Medication 100 MILLIGRAM(S): at 12:04

## 2022-10-08 RX ADMIN — Medication 3 MILLIGRAM(S): at 21:05

## 2022-10-08 RX ADMIN — CHLORHEXIDINE GLUCONATE 1 APPLICATION(S): 213 SOLUTION TOPICAL at 12:06

## 2022-10-08 RX ADMIN — CITALOPRAM 20 MILLIGRAM(S): 10 TABLET, FILM COATED ORAL at 12:07

## 2022-10-08 NOTE — PROGRESS NOTE ADULT - SUBJECTIVE AND OBJECTIVE BOX
[facility] Medicine    Patient is a 67y old  Female who presents with a chief complaint of Abdominal pain (08 Oct 2022 11:46)      SUBJECTIVE / OVERNIGHT EVENTS: No acute events overnight. Reports she is feeling much improved. Denies nausea, vomiting, abdominal pain, diarrhea. Drain output is also much decreased and green in color again.    MEDICATIONS  (STANDING):  allopurinol 100 milliGRAM(s) Oral daily  amLODIPine   Tablet 10 milliGRAM(s) Oral daily  aspirin enteric coated 81 milliGRAM(s) Oral daily  atorvastatin 80 milliGRAM(s) Oral at bedtime  calcitriol   Capsule 0.25 MICROGram(s) Oral daily  cefTRIAXone   IVPB 2000 milliGRAM(s) IV Intermittent every 24 hours  chlorhexidine 2% Cloths 1 Application(s) Topical daily  citalopram 20 milliGRAM(s) Oral daily  darunavir 800 mG/cobicstat 150 mG 1 Tablet(s) Oral daily  dextrose 5% + sodium chloride 0.45% with potassium chloride 20 mEq/L 1000 milliLiter(s) (50 mL/Hr) IV Continuous <Continuous>  dolutegravir 50 milliGRAM(s) Oral daily  epoetin neyda-epbx (RETACRIT) Injectable 4000 Unit(s) IV Push <User Schedule>  hydrALAZINE 50 milliGRAM(s) Oral three times a day  isosorbide   mononitrate ER Tablet (IMDUR) 60 milliGRAM(s) Oral daily  lamiVUDine- milliGRAM(s) Oral daily  lidocaine/prilocaine Cream 1 Application(s) Topical <User Schedule>  melatonin 3 milliGRAM(s) Oral at bedtime  metoprolol tartrate 100 milliGRAM(s) Oral two times a day  metroNIDAZOLE  IVPB 500 milliGRAM(s) IV Intermittent every 8 hours  omega-3-Acid Ethyl Esters 4 Gram(s) Oral daily  pantoprazole  Injectable 40 milliGRAM(s) IV Push every 12 hours  senna 2 Tablet(s) Oral at bedtime  sodium phosphate IVPB 15 milliMole(s) IV Intermittent once  zinc sulfate 220 milliGRAM(s) Oral daily    MEDICATIONS  (PRN):  acetaminophen     Tablet .. 650 milliGRAM(s) Oral every 6 hours PRN Temp greater or equal to 38C (100.4F), Mild Pain (1 - 3)  aluminum hydroxide/magnesium hydroxide/simethicone Suspension 30 milliLiter(s) Oral every 4 hours PRN Dyspepsia  ondansetron Injectable 4 milliGRAM(s) IV Push every 8 hours PRN Nausea and/or Vomiting      Vital Signs Last 24 Hrs  T(C): 36.7 (10-08-22 @ 20:27)  T(F): 98.1 (10-08-22 @ 20:27), Max: 98.3 (10-08-22 @ 13:20)  HR: 77 (10-08-22 @ 20:27) (73 - 86)  BP: 106/55 (10-08-22 @ 20:27)  BP(mean): 65 (10-08-22 @ 13:20) (65 - 71)  RR: 17 (10-08-22 @ 20:27) (16 - 17)  SpO2: 100% (10-08-22 @ 20:27) (100% - 100%)  Wt(kg): --    10-07 @ 07:01  -  10-08 @ 07:00  --------------------------------------------------------  IN: 0 mL / OUT: 50 mL / NET: -50 mL      CAPILLARY BLOOD GLUCOSE        I&O's Summary    07 Oct 2022 07:01  -  08 Oct 2022 07:00  --------------------------------------------------------  IN: 0 mL / OUT: 50 mL / NET: -50 mL        PHYSICAL EXAM:  GENERAL: NAD, well-developed, lying in bed  HEAD:  Atraumatic, Normocephalic  EYES:  conjunctiva and sclera clear  NECK: Supple, No JVD  CHEST/LUNG: Clear to auscultation bilaterally; No wheeze, rhonchi, rales  HEART: Regular rate and rhythm; No murmurs, rubs, or gallops  ABDOMEN: Soft, Nontender, Nondistended; Bowel sounds present, +drain with scant bilious drainage (much less than prior days_  EXTREMITIES:  2+ Peripheral Pulses, No clubbing, cyanosis, or edema  PSYCH: AAOx3, pleasant, cooperative  NEUROLOGY: non-focal  SKIN: No rashes or lesions    LABS:                        7.7    7.57  )-----------( 301      ( 08 Oct 2022 06:43 )             24.0     10-08    139  |  103  |  7   ----------------------------<  99  3.9   |  26  |  4.74<H>    Ca    9.2      08 Oct 2022 06:43  Phos  2.0     10-08  Mg     2.5     10-07                RADIOLOGY & ADDITIONAL TESTS:    Imaging Personally Reviewed:  < from: Transthoracic Echocardiogram (10.07.22 @ 10:42) >  CONCLUSIONS:  1. Mitral annular calcification. Trace mitral  regurgitation.  2.  Trace aortic regurgitation.  3. Mild left atrial enlargement.  4. Normal left ventricular internal dimensions and wall  thicknesses.  5. Normal left ventricular systolic function.  6. Normal right ventricular size and function.    < end of copied text >      Consultant(s) Notes Reviewed:  nephrology, surgery    Care Discussed with Consultants/Other Providers:    Assessment and Plan:

## 2022-10-08 NOTE — PROGRESS NOTE ADULT - PROBLEM SELECTOR PLAN 4
- ESRD, HD on M W F.   - Last HD 10/7 AM  - c/w sevelamer  - Renal diet when not on clears  - Nephro Dr. Catalan following

## 2022-10-08 NOTE — PROGRESS NOTE ADULT - SUBJECTIVE AND OBJECTIVE BOX
Problem List:  ESRD  Diverticulosis, admitted for abscess in LLQ within the wall of small bowel with fistula between the mass and thickened sigmoid colon with diverticula. Post IR drainage tube in abscess area.  There is a continuos green drainage in the bag.  Post HD  yesterday  Abdominal pain improved , tolerate clear liquid feeding  c/vomit with empty stomach in am with morning pills. She is able to tolerated the other meals.  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      ESRD on hemodialysis      History of ectopic pregnancy  Two      H/O: hysterectomy  Due to cervical cancer      S/P arteriovenous (AV) fistula creation  july 10 2020          oxycodone (Rash)  penicillins (Urticaria; Rash)      MEDICATIONS  (STANDING):  allopurinol 100 milliGRAM(s) Oral daily  amLODIPine   Tablet 10 milliGRAM(s) Oral daily  aspirin enteric coated 81 milliGRAM(s) Oral daily  atorvastatin 80 milliGRAM(s) Oral at bedtime  calcitriol   Capsule 0.25 MICROGram(s) Oral daily  cefTRIAXone   IVPB 2000 milliGRAM(s) IV Intermittent every 24 hours  chlorhexidine 2% Cloths 1 Application(s) Topical daily  citalopram 20 milliGRAM(s) Oral daily  darunavir 800 mG/cobicstat 150 mG 1 Tablet(s) Oral daily  dextrose 5% + sodium chloride 0.45% with potassium chloride 20 mEq/L 1000 milliLiter(s) (50 mL/Hr) IV Continuous <Continuous>  dolutegravir 50 milliGRAM(s) Oral daily  epoetin neyda-epbx (RETACRIT) Injectable 4000 Unit(s) IV Push <User Schedule>  hydrALAZINE 50 milliGRAM(s) Oral three times a day  isosorbide   mononitrate ER Tablet (IMDUR) 60 milliGRAM(s) Oral daily  lamiVUDine- milliGRAM(s) Oral daily  lidocaine/prilocaine Cream 1 Application(s) Topical <User Schedule>  melatonin 3 milliGRAM(s) Oral at bedtime  metoprolol tartrate 100 milliGRAM(s) Oral two times a day  metroNIDAZOLE  IVPB 500 milliGRAM(s) IV Intermittent every 8 hours  omega-3-Acid Ethyl Esters 4 Gram(s) Oral daily  pantoprazole  Injectable 40 milliGRAM(s) IV Push every 12 hours  senna 2 Tablet(s) Oral at bedtime  sodium phosphate IVPB 15 milliMole(s) IV Intermittent once  zinc sulfate 220 milliGRAM(s) Oral daily    MEDICATIONS  (PRN):  acetaminophen     Tablet .. 650 milliGRAM(s) Oral every 6 hours PRN Temp greater or equal to 38C (100.4F), Mild Pain (1 - 3)  aluminum hydroxide/magnesium hydroxide/simethicone Suspension 30 milliLiter(s) Oral every 4 hours PRN Dyspepsia  ondansetron Injectable 4 milliGRAM(s) IV Push every 8 hours PRN Nausea and/or Vomiting                            7.7    7.57  )-----------( 301      ( 08 Oct 2022 06:43 )             24.0     10-08    139  |  103  |  7   ----------------------------<  99  3.9   |  26  |  4.74<H>    Ca    9.2      08 Oct 2022 06:43  Phos  2.0     10-08  Mg     2.5     10-07              REVIEW OF SYSTEMS:  General: no fever no chills, no weight loss.  EYES/ENT: No visual changes;  No vertigo, no headache.  NECK: No pain or stiffness  RESPIRATORY: No cough, wheezing, hemoptysis; No shortness of breath  CARDIOVASCULAR: No chest pain or palpitations. No Edema  GASTROINTESTINAL: No abdominal or epigastric pain. No nausea, vomiting. No diarrhea or constipation. No melena.  GENITOURINARY: No dysuria, frequency, foamy urine, urinary urgency, incontinence or hematuria        VITALS:  T(F): 97.8 (10-08-22 @ 05:12), Max: 99.4 (10-07-22 @ 13:33)  HR: 86 (10-08-22 @ 05:12)  BP: 124/52 (10-08-22 @ 05:12)  RR: 17 (10-08-22 @ 05:12)  SpO2: 100% (10-08-22 @ 05:12)  Wt(kg): --    10-07 @ 07:01  -  10-08 @ 07:00  --------------------------------------------------------  IN: 0 mL / OUT: 50 mL / NET: -50 mL        PHYSICAL EXAM:  Constitutional: well developed, no diaphoresis, no distress.  Neck: No JVD, no carotid bruit, supple, no adenopathy  Respiratory:  air entrance B/L, no wheezes, rales or rhonchi  Cardiovascular: S1, S2, RRR, no pericardial rub, systolic m 2/6 murmur  Abdomen: BS+, soft, no tenderness, no bruit, drainage catheter, no fluid in collection bag at present  Pelvis: bladder nondistended  no edema  Skin: No rashes  Vascular Access: left AVF

## 2022-10-08 NOTE — PROGRESS NOTE ADULT - ASSESSMENT
This is a 67 year old female, coming from home, with past medical history of ESRD(M-W-F), HTN, HIV coming in with abdominal pain. Found to have Abscess within the left lower quadrant small bowel wall secondary to fistulization from the sigmoid colon likely in the setting of chronic diverticular disease, started on Ceftriaxone and Flagyl. ID following Surgery consulted. s/p IR CT guided drainage of abscess and Pigtail to drainage bag. Nephro following for ESRD Hospital course c/b increased high output from pigtail drain. Per IR tube study study will not be done as increased drainage via pigtail continues indicating fistula. 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 Possible OR on monday for resection. Patient is tolerating Clear liquid diet.

## 2022-10-08 NOTE — PROGRESS NOTE ADULT - PROBLEM SELECTOR PLAN 2
- SBP controlled  - c/w home medication of Metoprolol, Hydralazine, Amlodipine with parameters  - monitor BP

## 2022-10-08 NOTE — PROGRESS NOTE ADULT - ASSESSMENT
67y.o. Female with perforated acute diverticulitis s/p IR drainage  afebrile, wbc wnl     - Plan for surgery Monday  - Abx as per primary team   - Tube check per IR  - IR drain care, please monitor and record output  - Remainder of care as per primary team

## 2022-10-08 NOTE — PROGRESS NOTE ADULT - ASSESSMENT
ESRD , dialysis days are MWF.   Continue with dialysis today 3 hours and 3 K dialystae.    Anemia restart BECKY , no need for Iron IV. Follow need for PRBC BEFORE SURGERY    Hypophosphatemia , supplement PO4 and follow lab in am    Diverticulitis, fistula /  abscess placed on Ceftriaxone and Metronidazole. Post IR drain , 4 bacteria in abscess fluid,  follow up with ID.  Increased drainage, at present with fecal material similar to her BM.  Patient was seen by surgery team , asked for CT with contrast. Agree with CT and will abhay dialysis within 24 hours of the test.  Increased WBC.    Follow up Echocardiogram - EF 55%

## 2022-10-08 NOTE — PROGRESS NOTE ADULT - PROBLEM SELECTOR PLAN 1
-2/2 to fistulization from the sigmoid colon likely in the setting of chronic diverticular disease.  - s/p IR CT guided drainage 9/28  - Abscess CC + for proteus mirabilis, ecoli, klebsiella pneumoniae  - Cont  IV Ceftriaxone 2g and IV Flagyl 500mg q 8hours  - tolerating clear liquid diet  - cont strict output monitoring of drainage q8hr  - tube study not indicated 2/2 high output per IR  - repeat CT abd/pelv shows  diverticular abscess is collapsed around the pigtail catheter. Mild residual inflammatory changes around the sigmoid colon and small bowel with suggestion of enterocolic fistula.  -Surgery dr. Francisco following, plan for OR on Monday  -ID Dr. Young following

## 2022-10-08 NOTE — PROGRESS NOTE ADULT - SUBJECTIVE AND OBJECTIVE BOX
INTERVAL HPI/OVERNIGHT EVENTS:    Pt seen and examined at bedside. Offers no acute complaints at this time. States she was nauseous with regular diet, currently on clear liquids.   Denies fever, chills, SOB or CP.     Vital Signs Last 24 Hrs  T(C): 36.6 (08 Oct 2022 05:12), Max: 37.4 (07 Oct 2022 13:33)  T(F): 97.8 (08 Oct 2022 05:12), Max: 99.4 (07 Oct 2022 13:33)  HR: 86 (08 Oct 2022 05:12) (63 - 89)  BP: 124/52 (08 Oct 2022 05:12) (110/59 - 150/73)  BP(mean): 71 (08 Oct 2022 05:12) (71 - 71)  RR: 17 (08 Oct 2022 05:12) (17 - 19)  SpO2: 100% (08 Oct 2022 05:12) (95% - 100%)    Parameters below as of 08 Oct 2022 05:12  Patient On (Oxygen Delivery Method): room air      I&O's Detail    07 Oct 2022 07:01  -  08 Oct 2022 07:00  --------------------------------------------------------  IN:  Total IN: 0 mL    OUT:    Drain (mL): 50 mL  Total OUT: 50 mL    Total NET: -50 mL        aluminum hydroxide/magnesium hydroxide/simethicone Suspension 30 milliLiter(s) Oral every 4 hours PRN  cefTRIAXone   IVPB 2000 milliGRAM(s) IV Intermittent every 24 hours  darunavir 800 mG/cobicstat 150 mG 1 Tablet(s) Oral daily  dolutegravir 50 milliGRAM(s) Oral daily  lamiVUDine- milliGRAM(s) Oral daily  metroNIDAZOLE  IVPB 500 milliGRAM(s) IV Intermittent every 8 hours  omega-3-Acid Ethyl Esters 4 Gram(s) Oral daily  pantoprazole  Injectable 40 milliGRAM(s) IV Push every 12 hours  senna 2 Tablet(s) Oral at bedtime      Physical Exam  General: AAOx3, No acute distress  Skin: No jaundice, no icterus  Abdomen: soft,  nondistended, nontender, no rebound tenderness, no guarding, no palpable masses, IR drain in place, bilious output   Extremities: non edematous, no calf pain bilaterally        Labs:                        7.7    7.57  )-----------( 301      ( 08 Oct 2022 06:43 )             24.0     10-08    139  |  103  |  7   ----------------------------<  99  3.9   |  26  |  4.74<H>    Ca    9.2      08 Oct 2022 06:43  Phos  1.7     10-07  Mg     2.5     10-07

## 2022-10-08 NOTE — PROGRESS NOTE ADULT - PROBLEM SELECTOR PLAN 3
- CD4 377, viral load not detect  - c/w home medication Tivicay, Prezcobix, lamivudine.  - ID dr. Young is following

## 2022-10-08 NOTE — PROGRESS NOTE ADULT - PROBLEM SELECTOR PLAN 8
- possible OR Monday for resection  - repeat CT abd suggests enterocolic fistula  - Pt. has one  daughter who is an RN and lives in [place].  - patient from HOME

## 2022-10-09 ENCOUNTER — TRANSCRIPTION ENCOUNTER (OUTPATIENT)
Age: 67
End: 2022-10-09

## 2022-10-09 DIAGNOSIS — Z01.818 ENCOUNTER FOR OTHER PREPROCEDURAL EXAMINATION: ICD-10-CM

## 2022-10-09 LAB
ANION GAP SERPL CALC-SCNC: 10 MMOL/L — SIGNIFICANT CHANGE UP (ref 5–17)
BLD GP AB SCN SERPL QL: SIGNIFICANT CHANGE UP
BUN SERPL-MCNC: 14 MG/DL — SIGNIFICANT CHANGE UP (ref 7–18)
CALCIUM SERPL-MCNC: 9.3 MG/DL — SIGNIFICANT CHANGE UP (ref 8.4–10.5)
CHLORIDE SERPL-SCNC: 100 MMOL/L — SIGNIFICANT CHANGE UP (ref 96–108)
CO2 SERPL-SCNC: 26 MMOL/L — SIGNIFICANT CHANGE UP (ref 22–31)
CREAT SERPL-MCNC: 6.67 MG/DL — HIGH (ref 0.5–1.3)
EGFR: 6 ML/MIN/1.73M2 — LOW
GLUCOSE SERPL-MCNC: 98 MG/DL — SIGNIFICANT CHANGE UP (ref 70–99)
HCT VFR BLD CALC: 23.7 % — LOW (ref 34.5–45)
HGB BLD-MCNC: 7.9 G/DL — LOW (ref 11.5–15.5)
MCHC RBC-ENTMCNC: 32.1 PG — SIGNIFICANT CHANGE UP (ref 27–34)
MCHC RBC-ENTMCNC: 33.3 GM/DL — SIGNIFICANT CHANGE UP (ref 32–36)
MCV RBC AUTO: 96.3 FL — SIGNIFICANT CHANGE UP (ref 80–100)
NRBC # BLD: 0 /100 WBCS — SIGNIFICANT CHANGE UP (ref 0–0)
PHOSPHATE SERPL-MCNC: 3 MG/DL — SIGNIFICANT CHANGE UP (ref 2.5–4.5)
PLATELET # BLD AUTO: 256 K/UL — SIGNIFICANT CHANGE UP (ref 150–400)
POTASSIUM SERPL-MCNC: 3.9 MMOL/L — SIGNIFICANT CHANGE UP (ref 3.5–5.3)
POTASSIUM SERPL-SCNC: 3.9 MMOL/L — SIGNIFICANT CHANGE UP (ref 3.5–5.3)
RBC # BLD: 2.46 M/UL — LOW (ref 3.8–5.2)
RBC # FLD: 17.9 % — HIGH (ref 10.3–14.5)
SODIUM SERPL-SCNC: 136 MMOL/L — SIGNIFICANT CHANGE UP (ref 135–145)
WBC # BLD: 5.92 K/UL — SIGNIFICANT CHANGE UP (ref 3.8–10.5)
WBC # FLD AUTO: 5.92 K/UL — SIGNIFICANT CHANGE UP (ref 3.8–10.5)

## 2022-10-09 PROCEDURE — 99232 SBSQ HOSP IP/OBS MODERATE 35: CPT

## 2022-10-09 RX ORDER — MULTIVIT WITH MIN/MFOLATE/K2 340-15/3 G
1 POWDER (GRAM) ORAL ONCE
Refills: 0 | Status: DISCONTINUED | OUTPATIENT
Start: 2022-10-09 | End: 2022-10-09

## 2022-10-09 RX ORDER — POLYETHYLENE GLYCOL 3350 17 G/17G
17 POWDER, FOR SOLUTION ORAL
Refills: 0 | Status: COMPLETED | OUTPATIENT
Start: 2022-10-09 | End: 2022-10-10

## 2022-10-09 RX ORDER — CIPROFLOXACIN LACTATE 400MG/40ML
250 VIAL (ML) INTRAVENOUS
Refills: 0 | Status: COMPLETED | OUTPATIENT
Start: 2022-10-09 | End: 2022-10-10

## 2022-10-09 RX ORDER — METRONIDAZOLE 500 MG
250 TABLET ORAL
Refills: 0 | Status: COMPLETED | OUTPATIENT
Start: 2022-10-09 | End: 2022-10-10

## 2022-10-09 RX ORDER — MULTIVIT WITH MIN/MFOLATE/K2 340-15/3 G
1 POWDER (GRAM) ORAL ONCE
Refills: 0 | Status: COMPLETED | OUTPATIENT
Start: 2022-10-10 | End: 2022-10-10

## 2022-10-09 RX ADMIN — POLYETHYLENE GLYCOL 3350 17 GRAM(S): 17 POWDER, FOR SOLUTION ORAL at 17:57

## 2022-10-09 RX ADMIN — Medication 50 MILLIGRAM(S): at 22:14

## 2022-10-09 RX ADMIN — Medication 100 MILLIGRAM(S): at 22:12

## 2022-10-09 RX ADMIN — PANTOPRAZOLE SODIUM 40 MILLIGRAM(S): 20 TABLET, DELAYED RELEASE ORAL at 17:56

## 2022-10-09 RX ADMIN — DARUNAVIR ETHANOLATE AND COBICISTAT 1 TABLET(S): 800; 150 TABLET, FILM COATED ORAL at 12:14

## 2022-10-09 RX ADMIN — POLYETHYLENE GLYCOL 3350 17 GRAM(S): 17 POWDER, FOR SOLUTION ORAL at 18:58

## 2022-10-09 RX ADMIN — POLYETHYLENE GLYCOL 3350 17 GRAM(S): 17 POWDER, FOR SOLUTION ORAL at 22:12

## 2022-10-09 RX ADMIN — Medication 100 MILLIGRAM(S): at 12:15

## 2022-10-09 RX ADMIN — Medication 100 MILLIGRAM(S): at 05:32

## 2022-10-09 RX ADMIN — PANTOPRAZOLE SODIUM 40 MILLIGRAM(S): 20 TABLET, DELAYED RELEASE ORAL at 05:32

## 2022-10-09 RX ADMIN — POLYETHYLENE GLYCOL 3350 17 GRAM(S): 17 POWDER, FOR SOLUTION ORAL at 14:24

## 2022-10-09 RX ADMIN — Medication 3 MILLIGRAM(S): at 22:40

## 2022-10-09 RX ADMIN — POLYETHYLENE GLYCOL 3350 17 GRAM(S): 17 POWDER, FOR SOLUTION ORAL at 17:56

## 2022-10-09 RX ADMIN — ISOSORBIDE MONONITRATE 60 MILLIGRAM(S): 60 TABLET, EXTENDED RELEASE ORAL at 12:14

## 2022-10-09 RX ADMIN — CALCITRIOL 0.25 MICROGRAM(S): 0.5 CAPSULE ORAL at 12:14

## 2022-10-09 RX ADMIN — Medication 4 GRAM(S): at 12:13

## 2022-10-09 RX ADMIN — Medication 250 MILLIGRAM(S): at 22:12

## 2022-10-09 RX ADMIN — Medication 250 MILLIGRAM(S): at 14:20

## 2022-10-09 RX ADMIN — POLYETHYLENE GLYCOL 3350 17 GRAM(S): 17 POWDER, FOR SOLUTION ORAL at 20:06

## 2022-10-09 RX ADMIN — CITALOPRAM 20 MILLIGRAM(S): 10 TABLET, FILM COATED ORAL at 12:15

## 2022-10-09 RX ADMIN — Medication 100 MILLIGRAM(S): at 12:13

## 2022-10-09 RX ADMIN — POLYETHYLENE GLYCOL 3350 17 GRAM(S): 17 POWDER, FOR SOLUTION ORAL at 23:11

## 2022-10-09 RX ADMIN — POLYETHYLENE GLYCOL 3350 17 GRAM(S): 17 POWDER, FOR SOLUTION ORAL at 23:57

## 2022-10-09 RX ADMIN — SENNA PLUS 2 TABLET(S): 8.6 TABLET ORAL at 22:12

## 2022-10-09 RX ADMIN — ATORVASTATIN CALCIUM 80 MILLIGRAM(S): 80 TABLET, FILM COATED ORAL at 22:13

## 2022-10-09 RX ADMIN — ZINC SULFATE TAB 220 MG (50 MG ZINC EQUIVALENT) 220 MILLIGRAM(S): 220 (50 ZN) TAB at 12:14

## 2022-10-09 RX ADMIN — Medication 250 MILLIGRAM(S): at 22:13

## 2022-10-09 RX ADMIN — DOLUTEGRAVIR SODIUM 50 MILLIGRAM(S): 25 TABLET, FILM COATED ORAL at 12:14

## 2022-10-09 RX ADMIN — ONDANSETRON 4 MILLIGRAM(S): 8 TABLET, FILM COATED ORAL at 14:55

## 2022-10-09 RX ADMIN — CEFTRIAXONE 100 MILLIGRAM(S): 500 INJECTION, POWDER, FOR SOLUTION INTRAMUSCULAR; INTRAVENOUS at 12:19

## 2022-10-09 RX ADMIN — CHLORHEXIDINE GLUCONATE 1 APPLICATION(S): 213 SOLUTION TOPICAL at 12:16

## 2022-10-09 RX ADMIN — Medication 250 MILLIGRAM(S): at 14:21

## 2022-10-09 RX ADMIN — Medication 81 MILLIGRAM(S): at 12:15

## 2022-10-09 NOTE — PROGRESS NOTE ADULT - SUBJECTIVE AND OBJECTIVE BOX
[facility] Medicine    Patient is a 67y old  Female who presents with a chief complaint of Abdominal pain (09 Oct 2022 10:05)      SUBJECTIVE / OVERNIGHT EVENTS: No acute events overnight. Patient reports she feels very tired today because she has not slept the past two nights on account of her roommate yelling. She denies GERD symptoms, nausea, abdominal pain, or diarrhea. She is aware of plan for OR tomorrow. Prior to admission to hospital, she could climb a flight of steps without CP, SOB, palpitations, dizziness. Has had surgeries in the past and denies any issues with anesthesia.    MEDICATIONS  (STANDING):  allopurinol 100 milliGRAM(s) Oral daily  amLODIPine   Tablet 10 milliGRAM(s) Oral daily  aspirin enteric coated 81 milliGRAM(s) Oral daily  atorvastatin 80 milliGRAM(s) Oral at bedtime  calcitriol   Capsule 0.25 MICROGram(s) Oral daily  cefTRIAXone   IVPB 2000 milliGRAM(s) IV Intermittent every 24 hours  chlorhexidine 2% Cloths 1 Application(s) Topical daily  ciprofloxacin     Tablet 250 milliGRAM(s) Oral <User Schedule>  citalopram 20 milliGRAM(s) Oral daily  darunavir 800 mG/cobicstat 150 mG 1 Tablet(s) Oral daily  dextrose 5% + sodium chloride 0.45% with potassium chloride 20 mEq/L 1000 milliLiter(s) (50 mL/Hr) IV Continuous <Continuous>  dolutegravir 50 milliGRAM(s) Oral daily  epoetin neyda-epbx (RETACRIT) Injectable 4000 Unit(s) IV Push <User Schedule>  hydrALAZINE 50 milliGRAM(s) Oral three times a day  isosorbide   mononitrate ER Tablet (IMDUR) 60 milliGRAM(s) Oral daily  lamiVUDine- milliGRAM(s) Oral daily  lidocaine/prilocaine Cream 1 Application(s) Topical <User Schedule>  melatonin 3 milliGRAM(s) Oral at bedtime  metoprolol tartrate 100 milliGRAM(s) Oral two times a day  metroNIDAZOLE    Tablet 250 milliGRAM(s) Oral <User Schedule>  metroNIDAZOLE  IVPB 500 milliGRAM(s) IV Intermittent every 8 hours  omega-3-Acid Ethyl Esters 4 Gram(s) Oral daily  pantoprazole  Injectable 40 milliGRAM(s) IV Push every 12 hours  polyethylene glycol 3350 17 Gram(s) Oral every 1 hour  senna 2 Tablet(s) Oral at bedtime  sodium phosphate IVPB 15 milliMole(s) IV Intermittent once  zinc sulfate 220 milliGRAM(s) Oral daily    MEDICATIONS  (PRN):  acetaminophen     Tablet .. 650 milliGRAM(s) Oral every 6 hours PRN Temp greater or equal to 38C (100.4F), Mild Pain (1 - 3)  aluminum hydroxide/magnesium hydroxide/simethicone Suspension 30 milliLiter(s) Oral every 4 hours PRN Dyspepsia  ondansetron Injectable 4 milliGRAM(s) IV Push every 8 hours PRN Nausea and/or Vomiting      Vital Signs Last 24 Hrs  T(C): 36.8 (10-09-22 @ 14:25)  T(F): 98.3 (10-09-22 @ 14:25), Max: 98.7 (10-09-22 @ 05:22)  HR: 83 (10-09-22 @ 14:25) (77 - 83)  BP: 123/60 (10-09-22 @ 14:25)  BP(mean): 75 (10-09-22 @ 14:25) (75 - 75)  RR: 16 (10-09-22 @ 14:25) (16 - 17)  SpO2: 99% (10-09-22 @ 14:25) (99% - 100%)  Wt(kg): --    10-08 @ 07:01  -  10-09 @ 07:00  --------------------------------------------------------  IN: 0 mL / OUT: 0 mL / NET: 0 mL      CAPILLARY BLOOD GLUCOSE        I&O's Summary    08 Oct 2022 07:01  -  09 Oct 2022 07:00  --------------------------------------------------------  IN: 0 mL / OUT: 0 mL / NET: 0 mL        PHYSICAL EXAM:  GENERAL: NAD, well-developed, thin  HEAD:  Atraumatic, Normocephalic  EYES:  conjunctiva and sclera clear  NECK: Supple, No JVD  CHEST/LUNG: Clear to auscultation bilaterally; No wheeze, rhonchi, rales  HEART: Regular rate and rhythm; No murmurs, rubs, or gallops  ABDOMEN: Soft, Nontender, Nondistended; Bowel sounds present, +drain with minimal bilious drainage  EXTREMITIES:  2+ Peripheral Pulses, No clubbing, cyanosis, or edema  PSYCH: AAOx3, pleasant, cooperative  NEUROLOGY: non-focal  SKIN: No rashes or lesions, no jaundice    LABS:                        7.9    5.92  )-----------( 256      ( 09 Oct 2022 07:17 )             23.7     10-09    136  |  100  |  14  ----------------------------<  98  3.9   |  26  |  6.67<H>    Ca    9.3      09 Oct 2022 07:17  Phos  3.0     10-09        RADIOLOGY & ADDITIONAL TESTS:    Imaging Personally Reviewed:    Consultant(s) Notes Reviewed:  Surgery, Nephrology    Care Discussed with Consultants/Other Providers: Surgery (Dr. Francisco) re: Mg Citrate, Renal (Dr. Catalan): plan for HD in the AM    Assessment and Plan:

## 2022-10-09 NOTE — PROGRESS NOTE ADULT - PROBLEM SELECTOR PLAN 1
-2/2 to fistulization from the sigmoid colon likely in the setting of chronic diverticular disease.  - s/p IR CT guided drainage 9/28  - Abscess CC + for proteus mirabilis, ecoli, klebsiella pneumoniae  - Cont  IV Ceftriaxone 2g and IV Flagyl 500mg q 8hours  - tolerating clear liquid diet  - cont strict output monitoring of drainage q8hr  - tube study not indicated 2/2 high output per IR  - repeat CT abd/pelv shows  diverticular abscess is collapsed around the pigtail catheter. Mild residual inflammatory changes around the sigmoid colon and small bowel with suggestion of enterocolic fistula.  -Surgery dr. Francisco following, plan for OR on Monday  -Bowel prep for OR as per surgery. Surgery would very much like to use Magnesium citrate for bowel prep. As patient is planned for HD prior to procedure, should be alright  -ID Dr. Young following

## 2022-10-09 NOTE — PROGRESS NOTE ADULT - ASSESSMENT
This is a 67 year old female, coming from home, with past medical history of ESRD(M-W-F), HTN, HIV coming in with abdominal pain. Found to have Abscess within the left lower quadrant small bowel wall secondary to fistulization from the sigmoid colon likely in the setting of chronic diverticular disease, started on Ceftriaxone and Flagyl. ID following Surgery consulted. s/p IR CT guided drainage of abscess and Pigtail to drainage bag. Nephro following for ESRD Hospital course c/b increased high output from pigtail drain. Per IR tube study study will not be done as increased drainage via pigtail continues indicating fistula. 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 Plan for OR on monday for resection. Patient is tolerating Clear liquid diet.

## 2022-10-09 NOTE — PROGRESS NOTE ADULT - PROBLEM SELECTOR PLAN 9
Patient with METS>4, No active CP, SOB, palpitations, dizziness. RCRI score is 2 indicating a 10.1% chance of 30 day cardiac arrest, MI, or death. Jason score is 1.6% risk of myocardial infarction or cardiac arrest intraoperatively or upt ot 30 days post-op. Patient is at intermediate risk for surgical complications. She is medically optimized and can proceed to the OR tomorrow  -Patient to get HD tomorrow prior to surgery to optimize volume status, spoke with Dr. Catalan  -Will transfuse 2U PRBC with HD tomorrow pre-operatively as well

## 2022-10-09 NOTE — PROGRESS NOTE ADULT - SUBJECTIVE AND OBJECTIVE BOX
Problem List:  ESRD  Diverticulosis, admitted for abscess in LLQ within the wall of small bowel with fistula between the mass and thickened sigmoid colon with diverticula. Post IR drainage tube in abscess area.  There is a continuos green drainage in the bag.  Post HD  yesterday  Abdominal pain improved , tolerate clear liquid feeding  c/vomit with empty stomach in am with morning pills. She is able to tolerated the other meals.  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      ESRD on hemodialysis      History of ectopic pregnancy  Two      H/O: hysterectomy  Due to cervical cancer      S/P arteriovenous (AV) fistula creation  july 10 2020          oxycodone (Rash)  penicillins (Urticaria; Rash)      MEDICATIONS  (STANDING):  allopurinol 100 milliGRAM(s) Oral daily  amLODIPine   Tablet 10 milliGRAM(s) Oral daily  aspirin enteric coated 81 milliGRAM(s) Oral daily  atorvastatin 80 milliGRAM(s) Oral at bedtime  calcitriol   Capsule 0.25 MICROGram(s) Oral daily  cefTRIAXone   IVPB 2000 milliGRAM(s) IV Intermittent every 24 hours  chlorhexidine 2% Cloths 1 Application(s) Topical daily  citalopram 20 milliGRAM(s) Oral daily  darunavir 800 mG/cobicstat 150 mG 1 Tablet(s) Oral daily  dextrose 5% + sodium chloride 0.45% with potassium chloride 20 mEq/L 1000 milliLiter(s) (50 mL/Hr) IV Continuous <Continuous>  dolutegravir 50 milliGRAM(s) Oral daily  epoetin neyda-epbx (RETACRIT) Injectable 4000 Unit(s) IV Push <User Schedule>  hydrALAZINE 50 milliGRAM(s) Oral three times a day  isosorbide   mononitrate ER Tablet (IMDUR) 60 milliGRAM(s) Oral daily  lamiVUDine- milliGRAM(s) Oral daily  lidocaine/prilocaine Cream 1 Application(s) Topical <User Schedule>  melatonin 3 milliGRAM(s) Oral at bedtime  metoprolol tartrate 100 milliGRAM(s) Oral two times a day  metroNIDAZOLE  IVPB 500 milliGRAM(s) IV Intermittent every 8 hours  omega-3-Acid Ethyl Esters 4 Gram(s) Oral daily  pantoprazole  Injectable 40 milliGRAM(s) IV Push every 12 hours  senna 2 Tablet(s) Oral at bedtime  sodium phosphate IVPB 15 milliMole(s) IV Intermittent once  zinc sulfate 220 milliGRAM(s) Oral daily    MEDICATIONS  (PRN):  acetaminophen     Tablet .. 650 milliGRAM(s) Oral every 6 hours PRN Temp greater or equal to 38C (100.4F), Mild Pain (1 - 3)  aluminum hydroxide/magnesium hydroxide/simethicone Suspension 30 milliLiter(s) Oral every 4 hours PRN Dyspepsia  ondansetron Injectable 4 milliGRAM(s) IV Push every 8 hours PRN Nausea and/or Vomiting                            7.9    5.92  )-----------( 256      ( 09 Oct 2022 07:17 )             23.7     10-09    136  |  100  |  14  ----------------------------<  98  3.9   |  26  |  6.67<H>    Ca    9.3      09 Oct 2022 07:17  Phos  3.0     10-09              REVIEW OF SYSTEMS:  General: no fever no chills, no weight loss.    RESPIRATORY: No cough, wheezing, hemoptysis; No shortness of breath  CARDIOVASCULAR: No chest pain or palpitations. No Edema  GASTROINTESTINAL: No abdominal or epigastric pain. No nausea, vomiting. No diarrhea or constipation. No melena.  GENITOURINARY: No dysuria, frequency, foamy urine, urinary urgency, incontinence or hematuria  NEUROLOGICAL: No numbness or weakness, no tremor , no dizziness.         VITALS:  T(F): 98.7 (10-09-22 @ 05:22), Max: 98.7 (10-09-22 @ 05:22)  HR: 80 (10-09-22 @ 05:22)  BP: 110/58 (10-09-22 @ 05:22)  RR: 17 (10-09-22 @ 05:22)  SpO2: 99% (10-09-22 @ 05:22)  Wt(kg): --    10-08 @ 07:01  -  10-09 @ 07:00  --------------------------------------------------------  IN: 0 mL / OUT: 0 mL / NET: 0 mL        PHYSICAL EXAM:  Constitutional: well developed, no diaphoresis, no distress.  Neck: No JVD, no carotid bruit, supple, no adenopathy  Respiratory:  air entrance B/L, no wheezes, rales or rhonchi  Cardiovascular: S1, S2, RRR, no pericardial rub, no murmur  Abdomen: BS+, soft, no tenderness, no bruit  Pelvis: bladder nondistended  Extremities: No edema    Vascular Access: left AVF with bruit and thrill

## 2022-10-09 NOTE — PROGRESS NOTE ADULT - SUBJECTIVE AND OBJECTIVE BOX
INTERVAL HPI/OVERNIGHT EVENTS:    Pt seen and examined at bedside. Offers no acute complaints at this time. Tolerating clear diet well.      Vital Signs Last 24 Hrs  T(C): 37.1 (09 Oct 2022 05:22), Max: 37.1 (09 Oct 2022 05:22)  T(F): 98.7 (09 Oct 2022 05:22), Max: 98.7 (09 Oct 2022 05:22)  HR: 80 (09 Oct 2022 05:22) (73 - 80)  BP: 110/58 (09 Oct 2022 05:22) (106/55 - 110/58)  BP(mean): 65 (08 Oct 2022 13:20) (65 - 65)  RR: 17 (09 Oct 2022 05:22) (16 - 17)  SpO2: 99% (09 Oct 2022 05:22) (99% - 100%)    Parameters below as of 09 Oct 2022 05:22  Patient On (Oxygen Delivery Method): room air      I&O's Detail    08 Oct 2022 07:01  -  09 Oct 2022 07:00  --------------------------------------------------------  IN:  Total IN: 0 mL    OUT:    Drain (mL): 0 mL  Total OUT: 0 mL    Total NET: 0 mL        aluminum hydroxide/magnesium hydroxide/simethicone Suspension 30 milliLiter(s) Oral every 4 hours PRN  cefTRIAXone   IVPB 2000 milliGRAM(s) IV Intermittent every 24 hours  darunavir 800 mG/cobicstat 150 mG 1 Tablet(s) Oral daily  dolutegravir 50 milliGRAM(s) Oral daily  lamiVUDine- milliGRAM(s) Oral daily  metroNIDAZOLE  IVPB 500 milliGRAM(s) IV Intermittent every 8 hours  omega-3-Acid Ethyl Esters 4 Gram(s) Oral daily  pantoprazole  Injectable 40 milliGRAM(s) IV Push every 12 hours  senna 2 Tablet(s) Oral at bedtime      Physical Exam  General: AAOx3, No acute distress  Skin: No jaundice, no icterus  Abdomen: soft,  nondistended, nontender, no rebound tenderness, no guarding, no palpable masses, IR drain in place, bilious output   Extremities: non edematous, no calf pain bilaterally      Labs:                        7.9    5.92  )-----------( 256      ( 09 Oct 2022 07:17 )             23.7     10-09    136  |  100  |  14  ----------------------------<  98  3.9   |  26  |  6.67<H>    Ca    9.3      09 Oct 2022 07:17  Phos  3.0     10-09

## 2022-10-09 NOTE — PROGRESS NOTE ADULT - ASSESSMENT
67y.o. Female with perforated acute diverticulitis s/p IR drainage  afebrile, wbc wnl     -Plan for surgery tomorrow, Diagnostic laparoscopy, possible exploratory laparotomy, Small bowel and sig colon resection, possible Burdick's procedure    -Pre op labs in AM   -NPO after midnight   -Bowel prep today   -Medical clearance needed, please note in chart   -Abx   -IR drain care, please monitor and record output

## 2022-10-09 NOTE — PROGRESS NOTE ADULT - ASSESSMENT
ESRD , dialysis days are MWF.   Continue with dialysis today 3 hours and 3 K dialystae.    Anemia restart BECKY , no need for Iron IV. Follow need for PRBC BEFORE SURGERY    Hypophosphatemia , supplement PO4 and follow lab in am    Diverticulitis, fistula /  abscess placed on Ceftriaxone and Metronidazole. Post IR drain , 4 bacteria in abscess fluid,  follow up with ID.  Increased drainage, at present with fecal material similar to her BM.   Patient on clear liquid diet , secretion from abscess drainage reduced.  Surgery to follow     Follow up Echocardiogram - EF 55%

## 2022-10-09 NOTE — CHART NOTE - NSCHARTNOTEFT_GEN_A_CORE
Assessment:     Factors impacting intake: [ ] none [ ] nausea  [ ] vomiting [ ] diarrhea [ ] constipation  [ ]chewing problems [ ] swallowing issues  [ ] other:     Diet Presciption: Diet, NPO after Midnight:      NPO Start Date: 09-Oct-2022,   NPO Start Time: 23:59  Except Medications (10-09-22 @ 09:06)  Diet, Clear Liquid (10-06-22 @ 14:03)    Intake:     Daily Weight in k (09 Oct 2022 05:22)  Weight in k.6 (07 Oct 2022 12:20)  Weight in k.9 (07 Oct 2022 09:00)  Weight in k.2 (05 Oct 2022 19:03)  Weight in k.5 (05 Oct 2022 16:03)  Weight in k (05 Oct 2022 05:21)  Weight in k (03 Oct 2022 17:43)  Weight in k.5 (03 Oct 2022 14:30)    % Weight Change    Pertinent Medications: MEDICATIONS  (STANDING):  allopurinol 100 milliGRAM(s) Oral daily  amLODIPine   Tablet 10 milliGRAM(s) Oral daily  aspirin enteric coated 81 milliGRAM(s) Oral daily  atorvastatin 80 milliGRAM(s) Oral at bedtime  calcitriol   Capsule 0.25 MICROGram(s) Oral daily  cefTRIAXone   IVPB 2000 milliGRAM(s) IV Intermittent every 24 hours  chlorhexidine 2% Cloths 1 Application(s) Topical daily  citalopram 20 milliGRAM(s) Oral daily  darunavir 800 mG/cobicstat 150 mG 1 Tablet(s) Oral daily  dextrose 5% + sodium chloride 0.45% with potassium chloride 20 mEq/L 1000 milliLiter(s) (50 mL/Hr) IV Continuous <Continuous>  dolutegravir 50 milliGRAM(s) Oral daily  epoetin neyda-epbx (RETACRIT) Injectable 4000 Unit(s) IV Push <User Schedule>  hydrALAZINE 50 milliGRAM(s) Oral three times a day  isosorbide   mononitrate ER Tablet (IMDUR) 60 milliGRAM(s) Oral daily  lamiVUDine- milliGRAM(s) Oral daily  lidocaine/prilocaine Cream 1 Application(s) Topical <User Schedule>  melatonin 3 milliGRAM(s) Oral at bedtime  metoprolol tartrate 100 milliGRAM(s) Oral two times a day  metroNIDAZOLE  IVPB 500 milliGRAM(s) IV Intermittent every 8 hours  omega-3-Acid Ethyl Esters 4 Gram(s) Oral daily  pantoprazole  Injectable 40 milliGRAM(s) IV Push every 12 hours  senna 2 Tablet(s) Oral at bedtime  sodium phosphate IVPB 15 milliMole(s) IV Intermittent once  zinc sulfate 220 milliGRAM(s) Oral daily    MEDICATIONS  (PRN):  acetaminophen     Tablet .. 650 milliGRAM(s) Oral every 6 hours PRN Temp greater or equal to 38C (100.4F), Mild Pain (1 - 3)  aluminum hydroxide/magnesium hydroxide/simethicone Suspension 30 milliLiter(s) Oral every 4 hours PRN Dyspepsia  ondansetron Injectable 4 milliGRAM(s) IV Push every 8 hours PRN Nausea and/or Vomiting    Pertinent Labs: 10-09 Na136 mmol/L Glu 98 mg/dL K+ 3.9 mmol/L Cr  6.67 mg/dL<H> BUN 14 mg/dL 10-08 Phos 2.0 mg/dL<L> 10-06 Alb 2.7 g/dL<L>     CAPILLARY BLOOD GLUCOSE          Skin:     Estimated Needs:   [ ] no change since previous assessment  [ ] recalculated:     Previous Nutrition Diagnosis:   [ ] Inadequate Energy Intake [ ]Inadequate Oral Intake [ ] Excessive Energy Intake   [ ] Underweight [ ] Increased Nutrient Needs [ ] Overweight/Obesity  [ ] Swallowing Difficult   [ ] Altered GI Function [ ] Unintended Weight Loss [ ] Food & Nutrition Related Knowledge Deficit [ ] Malnutrition   [ ] Not Ready for Diet/Life Style Changes     Nutrition Diagnosis is [ ] ongoing  [ ] Improving   [ ] resolved [ ] not applicable     New Nutrition Diagnosis: [ ] not applicable       Interventions:   Recommend  [ ] Change Diet To:  [ ] Nutrition Supplement  [ ] Nutrition Support  [ ] Other:     Monitoring and Evaluation:   [ ] PO intake [ x ] Tolerance to diet prescription [ x ] weights [ x ] labs[ x ] follow up per protocol  [ ] other: Assessment:       Nutrition reassessment for follow-up clear liquid diet. Chart reviewed, pt visited, limited information updated from pt as not feeling well, unable to sleep last night ( RN aware), breakfast untouched yet; NPO/Clear Liquid diet x 3-4d, , perforated acute diverticulitis s/p IR drainage  pending surgery 10/10/22 for "Diagnostic laparoscopy, possible exploratory laparotomy, Small bowel and sig colon resection, possible Burdick's procedure" per MD     Factors impacting intake: [ X ] other:  altered GI function, h/o HIV+, ESRD on HD      Diet Presciption: Diet, NPO after Midnight:      NPO Start Date: 09-Oct-2022,   NPO Start Time: 23:59  Except Medications (10-09-22 @ 09:06)  Diet, Clear Liquid (10-06-22 @ 14:03)    Intake:  see above     Daily Weight in k (09 Oct 2022 05:22)  Weight in k.6 (07 Oct 2022 12:20)  Weight in k.9 (07 Oct 2022 09:00)  Weight in k.2 (05 Oct 2022 19:03)  Weight in k.5 (05 Oct 2022 16:03)  Weight in k (05 Oct 2022 05:21)  Weight in k (03 Oct 2022 17:43)  Weight in k.5 (03 Oct 2022 14:30)    % Weight Change: a bit fluctuated, may due to fluid shift from HD and/or scale variance     Pertinent Medications: MEDICATIONS  (STANDING):  allopurinol 100 milliGRAM(s) Oral daily  amLODIPine   Tablet 10 milliGRAM(s) Oral daily  aspirin enteric coated 81 milliGRAM(s) Oral daily  atorvastatin 80 milliGRAM(s) Oral at bedtime  calcitriol   Capsule 0.25 MICROGram(s) Oral daily  cefTRIAXone   IVPB 2000 milliGRAM(s) IV Intermittent every 24 hours  chlorhexidine 2% Cloths 1 Application(s) Topical daily  citalopram 20 milliGRAM(s) Oral daily  darunavir 800 mG/cobicstat 150 mG 1 Tablet(s) Oral daily  dextrose 5% + sodium chloride 0.45% with potassium chloride 20 mEq/L 1000 milliLiter(s) (50 mL/Hr) IV Continuous <Continuous>  dolutegravir 50 milliGRAM(s) Oral daily  epoetin neyda-epbx (RETACRIT) Injectable 4000 Unit(s) IV Push <User Schedule>  hydrALAZINE 50 milliGRAM(s) Oral three times a day  isosorbide   mononitrate ER Tablet (IMDUR) 60 milliGRAM(s) Oral daily  lamiVUDine- milliGRAM(s) Oral daily  lidocaine/prilocaine Cream 1 Application(s) Topical <User Schedule>  melatonin 3 milliGRAM(s) Oral at bedtime  metoprolol tartrate 100 milliGRAM(s) Oral two times a day  metroNIDAZOLE  IVPB 500 milliGRAM(s) IV Intermittent every 8 hours  omega-3-Acid Ethyl Esters 4 Gram(s) Oral daily  pantoprazole  Injectable 40 milliGRAM(s) IV Push every 12 hours  senna 2 Tablet(s) Oral at bedtime  sodium phosphate IVPB 15 milliMole(s) IV Intermittent once  zinc sulfate 220 milliGRAM(s) Oral daily    MEDICATIONS  (PRN):  acetaminophen     Tablet .. 650 milliGRAM(s) Oral every 6 hours PRN Temp greater or equal to 38C (100.4F), Mild Pain (1 - 3)  aluminum hydroxide/magnesium hydroxide/simethicone Suspension 30 milliLiter(s) Oral every 4 hours PRN Dyspepsia  ondansetron Injectable 4 milliGRAM(s) IV Push every 8 hours PRN Nausea and/or Vomiting    Pertinent Labs: 10-09 Na136 mmol/L Glu 98 mg/dL K+ 3.9 mmol/L Cr  6.67 mg/dL<H> BUN 14 mg/dL 10-08 Phos 2.0 mg/dL<L> 10-06 Alb 2.7 g/dL<L>     CAPILLARY BLOOD GLUCOSE    Skin: surgical incision     Estimated Needs:   [ X ] no change since previous assessment  [ ] recalculated:     Previous Nutrition Diagnosis:   [ X ]Inadequate Oral Intake     Nutrition Diagnosis is [ X ] ongoing  [ ] Improving   [ ] resolved [ ] not applicable     New Nutrition Diagnosis: [ X ] not applicable     Interventions:   Recommend  [ X ] Change Diet To: Advance diet or consider nutritional supplement if Clear liquid diet prolonged as medically feasible   [ ] Nutrition Supplement  [ ] Nutrition Support  [ X ] Other: Discussed with RN, pending surgery per team                    Provide food choices within diet Rx as available/updated when diet advanced     Monitoring and Evaluation:   [ X ] PO intake [ x ] Tolerance to diet prescription [ x ] weights [ x ] labs[ x ] follow up per protocol  [ ] other: Assessment:       Nutrition reassessment for follow-up clear liquid diet. Chart reviewed, pt visited, limited information updated from pt as not feeling well, unable to sleep last night ( RN aware), breakfast untouched yet, 26-50% intake at time per flwowsheet; NPO/Clear Liquid diet x 3-4d; Perforated acute diverticulitis s/p IR drainage, pending surgery 10/10/22 for "Diagnostic laparoscopy, possible exploratory laparotomy, Small bowel and sig colon resection, possible Burdick's procedure" per MD     Factors impacting intake: [ X ] other:  altered GI function, h/o HIV+, ESRD on HD      Diet Prescription:   Diet, NPO after Midnight:      NPO Start Date: 09-Oct-2022,   NPO Start Time: 23:59  Except Medications (10-09-22 @ 09:06)  Diet, Clear Liquid (10-06-22 @ 14:03)    Intake:  see above     Daily Weight in k (09 Oct 2022 05:22)  Weight in k.6 (07 Oct 2022 12:20)  Weight in k.9 (07 Oct 2022 09:00)  Weight in k.2 (05 Oct 2022 19:03)  Weight in k.5 (05 Oct 2022 16:03)  Weight in k (05 Oct 2022 05:21)  Weight in k (03 Oct 2022 17:43)  Weight in k.5 (03 Oct 2022 14:30)    % Weight Change: a bit fluctuated, may due to fluid shift from HD and/or scale variance     Pertinent Medications: MEDICATIONS  (STANDING):  allopurinol 100 milliGRAM(s) Oral daily  amLODIPine   Tablet 10 milliGRAM(s) Oral daily  aspirin enteric coated 81 milliGRAM(s) Oral daily  atorvastatin 80 milliGRAM(s) Oral at bedtime  calcitriol   Capsule 0.25 MICROGram(s) Oral daily  cefTRIAXone   IVPB 2000 milliGRAM(s) IV Intermittent every 24 hours  chlorhexidine 2% Cloths 1 Application(s) Topical daily  citalopram 20 milliGRAM(s) Oral daily  darunavir 800 mG/cobicstat 150 mG 1 Tablet(s) Oral daily  dextrose 5% + sodium chloride 0.45% with potassium chloride 20 mEq/L 1000 milliLiter(s) (50 mL/Hr) IV Continuous <Continuous>  dolutegravir 50 milliGRAM(s) Oral daily  epoetin neyda-epbx (RETACRIT) Injectable 4000 Unit(s) IV Push <User Schedule>  hydrALAZINE 50 milliGRAM(s) Oral three times a day  isosorbide   mononitrate ER Tablet (IMDUR) 60 milliGRAM(s) Oral daily  lamiVUDine- milliGRAM(s) Oral daily  lidocaine/prilocaine Cream 1 Application(s) Topical <User Schedule>  melatonin 3 milliGRAM(s) Oral at bedtime  metoprolol tartrate 100 milliGRAM(s) Oral two times a day  metroNIDAZOLE  IVPB 500 milliGRAM(s) IV Intermittent every 8 hours  omega-3-Acid Ethyl Esters 4 Gram(s) Oral daily  pantoprazole  Injectable 40 milliGRAM(s) IV Push every 12 hours  senna 2 Tablet(s) Oral at bedtime  sodium phosphate IVPB 15 milliMole(s) IV Intermittent once  zinc sulfate 220 milliGRAM(s) Oral daily    MEDICATIONS  (PRN):  acetaminophen     Tablet .. 650 milliGRAM(s) Oral every 6 hours PRN Temp greater or equal to 38C (100.4F), Mild Pain (1 - 3)  aluminum hydroxide/magnesium hydroxide/simethicone Suspension 30 milliLiter(s) Oral every 4 hours PRN Dyspepsia  ondansetron Injectable 4 milliGRAM(s) IV Push every 8 hours PRN Nausea and/or Vomiting    Pertinent Labs: 10-09 Na136 mmol/L Glu 98 mg/dL K+ 3.9 mmol/L Cr  6.67 mg/dL<H> BUN 14 mg/dL 10-08 Phos 2.0 mg/dL<L> 10-06 Alb 2.7 g/dL<L>     CAPILLARY BLOOD GLUCOSE    Skin: surgical incision     Estimated Needs:   [ X ] no change since previous assessment  [ ] recalculated:     Previous Nutrition Diagnosis:   [ X ] Inadequate Oral Intake     Nutrition Diagnosis is [ X ] ongoing  [ ] Improving   [ ] resolved [ ] not applicable     New Nutrition Diagnosis: [ X ] not applicable     Interventions:   Recommend  [ X ] Change Diet To: Advance diet or consider nutritional supplement/alternate nutrition support if Clear liquid/NPO diet prolonged after surgery as medically feasible   [ ] Nutrition Supplement  [   ] Nutrition Support:    [ X ] Other: Discussed with RN, pending surgery per team                    Provide food choices within diet Rx as available/updated when diet advanced     Monitoring and Evaluation:   [ X ] PO intake [ x ] Tolerance to diet prescription [ x ] weights [ x ] labs[ x ] follow up per protocol  [ ] other:

## 2022-10-10 ENCOUNTER — RESULT REVIEW (OUTPATIENT)
Age: 67
End: 2022-10-10

## 2022-10-10 ENCOUNTER — TRANSCRIPTION ENCOUNTER (OUTPATIENT)
Age: 67
End: 2022-10-10

## 2022-10-10 LAB
ANION GAP SERPL CALC-SCNC: 10 MMOL/L — SIGNIFICANT CHANGE UP (ref 5–17)
APTT BLD: 31.8 SEC — SIGNIFICANT CHANGE UP (ref 27.5–35.5)
BUN SERPL-MCNC: 18 MG/DL — SIGNIFICANT CHANGE UP (ref 7–18)
CALCIUM SERPL-MCNC: 8.7 MG/DL — SIGNIFICANT CHANGE UP (ref 8.4–10.5)
CHLORIDE SERPL-SCNC: 100 MMOL/L — SIGNIFICANT CHANGE UP (ref 96–108)
CO2 SERPL-SCNC: 24 MMOL/L — SIGNIFICANT CHANGE UP (ref 22–31)
CREAT SERPL-MCNC: 8.26 MG/DL — HIGH (ref 0.5–1.3)
EGFR: 5 ML/MIN/1.73M2 — LOW
GLUCOSE SERPL-MCNC: 106 MG/DL — HIGH (ref 70–99)
HCT VFR BLD CALC: 23.2 % — LOW (ref 34.5–45)
HGB BLD-MCNC: 7.8 G/DL — LOW (ref 11.5–15.5)
INR BLD: 1.36 RATIO — HIGH (ref 0.88–1.16)
MAGNESIUM SERPL-MCNC: 2.1 MG/DL — SIGNIFICANT CHANGE UP (ref 1.6–2.6)
MCHC RBC-ENTMCNC: 32 PG — SIGNIFICANT CHANGE UP (ref 27–34)
MCHC RBC-ENTMCNC: 33.6 GM/DL — SIGNIFICANT CHANGE UP (ref 32–36)
MCV RBC AUTO: 95.1 FL — SIGNIFICANT CHANGE UP (ref 80–100)
NRBC # BLD: 0 /100 WBCS — SIGNIFICANT CHANGE UP (ref 0–0)
PHOSPHATE SERPL-MCNC: 3.4 MG/DL — SIGNIFICANT CHANGE UP (ref 2.5–4.5)
PLATELET # BLD AUTO: 262 K/UL — SIGNIFICANT CHANGE UP (ref 150–400)
POTASSIUM SERPL-MCNC: 4.1 MMOL/L — SIGNIFICANT CHANGE UP (ref 3.5–5.3)
POTASSIUM SERPL-SCNC: 4.1 MMOL/L — SIGNIFICANT CHANGE UP (ref 3.5–5.3)
PROTHROM AB SERPL-ACNC: 16.2 SEC — HIGH (ref 10.5–13.4)
RBC # BLD: 2.44 M/UL — LOW (ref 3.8–5.2)
RBC # FLD: 18.2 % — HIGH (ref 10.3–14.5)
SARS-COV-2 RNA SPEC QL NAA+PROBE: SIGNIFICANT CHANGE UP
SODIUM SERPL-SCNC: 134 MMOL/L — LOW (ref 135–145)
WBC # BLD: 5.31 K/UL — SIGNIFICANT CHANGE UP (ref 3.8–10.5)
WBC # FLD AUTO: 5.31 K/UL — SIGNIFICANT CHANGE UP (ref 3.8–10.5)

## 2022-10-10 PROCEDURE — 88307 TISSUE EXAM BY PATHOLOGIST: CPT | Mod: 26

## 2022-10-10 PROCEDURE — 99232 SBSQ HOSP IP/OBS MODERATE 35: CPT

## 2022-10-10 PROCEDURE — 44202 LAP ENTERECTOMY: CPT | Mod: 59

## 2022-10-10 PROCEDURE — 44650 REPAIR BOWEL FISTULA: CPT

## 2022-10-10 PROCEDURE — 99233 SBSQ HOSP IP/OBS HIGH 50: CPT

## 2022-10-10 PROCEDURE — 44206 LAP PART COLECTOMY W/STOMA: CPT

## 2022-10-10 DEVICE — STAPLER COVIDIEN TRI-STAPLE 60MM PURPLE RELOAD: Type: IMPLANTABLE DEVICE | Status: FUNCTIONAL

## 2022-10-10 DEVICE — STAPLER CONTOUR CURVED WITH BLUE CART: Type: IMPLANTABLE DEVICE | Status: FUNCTIONAL

## 2022-10-10 RX ORDER — ONDANSETRON 8 MG/1
4 TABLET, FILM COATED ORAL ONCE
Refills: 0 | Status: COMPLETED | OUTPATIENT
Start: 2022-10-11 | End: 2022-10-12

## 2022-10-10 RX ADMIN — Medication 100 MILLIGRAM(S): at 14:42

## 2022-10-10 RX ADMIN — Medication 250 MILLIGRAM(S): at 06:01

## 2022-10-10 RX ADMIN — CHLORHEXIDINE GLUCONATE 1 APPLICATION(S): 213 SOLUTION TOPICAL at 14:31

## 2022-10-10 RX ADMIN — POLYETHYLENE GLYCOL 3350 17 GRAM(S): 17 POWDER, FOR SOLUTION ORAL at 01:35

## 2022-10-10 RX ADMIN — Medication 100 MILLIGRAM(S): at 05:57

## 2022-10-10 RX ADMIN — POLYETHYLENE GLYCOL 3350 17 GRAM(S): 17 POWDER, FOR SOLUTION ORAL at 02:24

## 2022-10-10 RX ADMIN — POLYETHYLENE GLYCOL 3350 17 GRAM(S): 17 POWDER, FOR SOLUTION ORAL at 00:44

## 2022-10-10 RX ADMIN — CEFTRIAXONE 100 MILLIGRAM(S): 500 INJECTION, POWDER, FOR SOLUTION INTRAMUSCULAR; INTRAVENOUS at 14:30

## 2022-10-10 RX ADMIN — POLYETHYLENE GLYCOL 3350 17 GRAM(S): 17 POWDER, FOR SOLUTION ORAL at 03:16

## 2022-10-10 RX ADMIN — POLYETHYLENE GLYCOL 3350 17 GRAM(S): 17 POWDER, FOR SOLUTION ORAL at 04:46

## 2022-10-10 RX ADMIN — Medication 50 MILLIGRAM(S): at 05:57

## 2022-10-10 RX ADMIN — AMLODIPINE BESYLATE 10 MILLIGRAM(S): 2.5 TABLET ORAL at 05:57

## 2022-10-10 RX ADMIN — POLYETHYLENE GLYCOL 3350 17 GRAM(S): 17 POWDER, FOR SOLUTION ORAL at 05:48

## 2022-10-10 RX ADMIN — PANTOPRAZOLE SODIUM 40 MILLIGRAM(S): 20 TABLET, DELAYED RELEASE ORAL at 05:56

## 2022-10-10 NOTE — PROGRESS NOTE ADULT - NS ATTEND AMEND GEN_ALL_CORE FT
Patient seen and examined. Case discussed with REDD Malagon. In brief, this is a 68 yo F with ESRD on HD (MWF), HIV (CD4 count of 377) on HAART, HTN who presents with severe abdominal pain found to have perforated diverticulitis of the sigmoid colon with abscess formation and fistulization from the sigmoid colon to the LLQ of the small bowel. Drain is with minimal output today and patient reports she is feeling well with the exception of feeling anxious about the surgery. Abscess cultures growing E. coli, Proteus, and Klebsiella for which she is currently on ceftriaxone and metronidazole. Surgery is following for the fistula and perforated diverticulitis. Plan for OR today for small bowel resection at site of the fistula.  A/P   #Perforated Sigmoid Diverticulitis with Abscess  #Sigmoid Colon-Small Bowel Fistula  #ESRD on HD (MWF)  #HIV  #HTN  #GERD  #Preop evaluation    -Continue ceftriaxone and metronidazole for intra-abdominal abscess, CT A/P shows resolution of abscess but continued fistula  -Monitor and record drain output diligently   -Plan for OR today with surgery for small bowel resection  -Per IR, given significant drainage now in the tube, no indication for tube study at this time  -Follow-up additional ID recommendations  -Continue Prezcobix, dolutegravir, lamivudine-HPV for HIV  -Continue HD for ESRD status as per nephrology, plan for HD in next 24 hours as patient received IV contrast for CT scan  -Continue hydralazine, imdur, amlodipine, metoprolol for HTN  -Continue sodium bicarbonate, calcitriol for ESRD status  -Continue Protonix to 40mg IV BID per surgical recommendations as it has helped her GERD symptoms  - Patient with METS>4, No active CP, SOB, palpitations, dizziness. RCRI score is 2 indicating a 10.1% chance of 30 day cardiac arrest, MI, or death. Jason score is 1.6% risk of myocardial infarction or cardiac arrest intraoperatively or upt ot 30 days post-op. Patient is at intermediate risk for surgical complications. She is medically optimized and can proceed to the OR  -s/p HD today with 2U PRBC given preoperatively    Remaining care as noted above.

## 2022-10-10 NOTE — PROGRESS NOTE ADULT - ASSESSMENT
Subjective: The pt is evaluated during HD, she is clinically stable, she is doing well, afebrile, no N/V or diarrhea, no abd pain, has LLQ drain with bilious green content    REVIEW OF SYSTEMS:  CONSTITUTIONAL:  No weakness, fevers or chills  EYES/ENT:  No visual changes;  No vertigo or throat pain   NECK:  No pain or stiffness  RESPIRATORY:  No cough, wheezing, hemoptysis; No shortness of breath  CARDIOVASCULAR:  No chest pain or palpitations  GASTROINTESTINAL:  No abdominal or epigastric pain. No nausea, vomiting, or hematemesis; No diarrhea or constipation. No melena or hematochezia.  GENITOURINARY:  No dysuria, frequency or hematuria  NEUROLOGICAL:  No numbness or weakness  MSK: no back pain, no joint pain  SKIN:  No itching, rashes    PE:  Vital Signs Last 24 Hrs  T(C): 36.6 (10 Oct 2022 13:10), Max: 37.1 (09 Oct 2022 20:31)  T(F): 97.8 (10 Oct 2022 13:10), Max: 98.7 (09 Oct 2022 20:31)  HR: 72 (10 Oct 2022 13:10) (69 - 85)  BP: 111/58 (10 Oct 2022 13:10) (109/69 - 156/64)  BP(mean): 70 (10 Oct 2022 13:10) (70 - 75)  RR: 17 (10 Oct 2022 13:10) (16 - 18)  SpO2: 100% (10 Oct 2022 13:10) (99% - 100%)    Parameters below as of 10 Oct 2022 13:10 Patient On (Oxygen Delivery Method): room air    Gen: AOx3, NAD, non-toxic, pleasant  HEAD:  Atraumatic, Normocephalic  EYES: EOMI, PERRLA, conjunctiva and sclera clear  ENT: Moist mucous membranes  NECK: Supple, No JVD  CV: S1+S2 normal, nontachycardic  L AVF w/good thrill  Resp: Clear bilat, no resp distress, no crackles/wheezes  Abd: Soft, nontender, +BS, LLQ drain with biliary content  Ext: No LE edema, no wounds  : No Farias  IV/Skin: No thrombophlebitis  Msk: No low back pain, no arthralgias, no joint swelling  Neuro: AAOx3. No sensory deficits, no motor deficits    LABS:                        7.8    5.31  )-----------( 262      ( 10 Oct 2022 09:20 )             23.2     10-10    134<L>  |  100  |  18  ----------------------------<  106<H>  4.1   |  24  |  8.26<H>    Ca    8.7      10 Oct 2022 09:20  Phos  3.4     10-10  Mg     2.1     10-10    MICROBIOLOGY:  v  .Abscess abdominal abscess drainage  09-28-22   Culture yields >4 types of aerobic and/or anaerobic bacteria  Call client services within 7 days if further workup is clinically  indicated. Culture includes  Few Escherichia coli  Rare Klebsiella pneumoniae  Rare Proteus mirabilis  Few Escherichiacoli #2  --  Escherichia coli  Klebsiella pneumoniae  Escherichia coli  Proteus mirabilis    IMPRESSION:  66 yo female with H/O ESRD on HD(MWF) via L arm AVF, HIV on ART diagnosed 16 yrs ago (Tivicay + Lamivudine + Prezcobix) admitted for LLQ abscess.     -Left small bowel abscess with fistula from sigmoid colon 2/2 diverticular disease S/P IR drainage 9/28 (10 ml of pus drained w/cultures + polymicrobial bowel bacteria) w/repeat CT A/P on 10/06 showing collapsed previously seen diverticular abscess around pigtail cath with residual inflammatory changes(sigmoid colon) and small bowel enterocolic fistula.  -Diverticular disease now complicated with sigmoid colon fistulization to small bowel  -HIV on ART w/CD4 377 with VL detectable <30 copies which could be blip in the setting of acute infection.   -PCN allergy(when she was a child, she was told she had hives, she does not remember)  -ESRD on HD    PLAN:    -Continue Ceftriaxone 2g IV daily + Flagyl 500 mg q8h  -Continue ART(Trivicay+Prezcovix+Lamivudine) daily  -Will follow up after surgery for final duration of abx therapy, pt scheduled for OR today  -Discussed with medicine attending    Please reach ID with any questions or concerns  Dr. Mine Garnett  Tel. [phone]  Available in Teams

## 2022-10-10 NOTE — PROGRESS NOTE ADULT - PROBLEM SELECTOR PLAN 8
10/10, plan fr  Diagnostic laparoscopy, possible exploratory laparotomy, Small bowel and sig colon        - Pt. has one  daughter who is an RN and lives in [place].  - patient from HOME

## 2022-10-10 NOTE — PRE-ANESTHESIA EVALUATION ADULT - NSANTHPMHFT_GEN_ALL_CORE
68 yo female with H/O ESRD on HD(MWF) via L arm AVF, HIV on ART diagnosed 16 yrs ago (Tivicay + Lamivudine + Prezcobix) admitted for LLQ abscess.     Hb -  7.8    K-  4.1    TTE -  CONCLUSIONS:  1. Mitral annular calcification. Trace mitral  regurgitation.  2.  Trace aortic regurgitation.  3. Mild left atrial enlargement.  4. Normal left ventricular internal dimensions and wall  thicknesses.  5. Normal left ventricular systolic function.  6. Normal right ventricular size and function.

## 2022-10-10 NOTE — PROGRESS NOTE ADULT - ASSESSMENT
ESRD , dialysis days are MWF.   Continue with dialysis today 3 hours and 3 K dialysate   2 units of PRBC transfused today.     Anemia restart BECKY , no need for Iron IV.     Hypophosphatemia , supplement PO4 and follow lab in am    Diverticulitis, fistula /  abscess placed on Ceftriaxone and Metronidazole. Post IR drain , 4 bacteria in abscess fluid,  follow up with ID.  Possible surgery today.    Follow up Echocardiogram - EF 55%

## 2022-10-10 NOTE — PROGRESS NOTE ADULT - NS ATTEND OPT1 GEN_ALL_CORE
I independently performed the documented:
I attest my time as attending is greater than 50% of the total combined time spent on qualifying patient care activities by the PA/NP and attending.

## 2022-10-10 NOTE — PROGRESS NOTE ADULT - SUBJECTIVE AND OBJECTIVE BOX
Patient is a 67y old  Female who presents with a chief complaint of Abdominal pain (10 Oct 2022 12:18)      INTERVAL HPI/OVERNIGHT EVENTS:        REVIEW OF SYSTEMS:  CONSTITUTIONAL: No fever, chills  ENMT:  No difficulty hearing, no change in vision  NECK: No pain or stiffness  RESPIRATORY: No cough, SOB  CARDIOVASCULAR: No chest pain, palpitations  GASTROINTESTINAL: No abdominal pain. No nausea, vomiting, or diarrhea  GENITOURINARY: No dysuria  NEUROLOGICAL: No HA  SKIN: No itching, burning, rashes, or lesions   LYMPH NODES: No enlarged glands  ENDOCRINE: No heat or cold intolerance; No hair loss  MUSCULOSKELETAL: No joint pain or swelling; No muscle, back, or extremity pain  PSYCHIATRIC: No depression, anxiety  HEME/LYMPH: No easy bruising, or bleeding gums    T(C): 36.6 (10-10-22 @ 11:42), Max: 37.1 (10-09-22 @ 20:31)  HR: 78 (10-10-22 @ 11:42) (69 - 85)  BP: 130/53 (10-10-22 @ 11:42) (109/69 - 156/64)  RR: 18 (10-10-22 @ 11:42) (16 - 18)  SpO2: 100% (10-10-22 @ 11:42) (99% - 100%)  Wt(kg): --Vital Signs Last 24 Hrs  T(C): 36.6 (10 Oct 2022 11:42), Max: 37.1 (09 Oct 2022 20:31)  T(F): 97.9 (10 Oct 2022 11:42), Max: 98.7 (09 Oct 2022 20:31)  HR: 78 (10 Oct 2022 11:42) (69 - 85)  BP: 130/53 (10 Oct 2022 11:42) (109/69 - 156/64)  BP(mean): 75 (09 Oct 2022 14:25) (75 - 75)  RR: 18 (10 Oct 2022 11:42) (16 - 18)  SpO2: 100% (10 Oct 2022 11:42) (99% - 100%)    Parameters below as of 10 Oct 2022 11:42  Patient On (Oxygen Delivery Method): room air        PHYSICAL EXAM:  GENERAL: NAD  EYES: clear conjunctiva; EOMI  ENMT: Moist mucous membranes  NECK: Supple, No JVD, Normal thyroid  CHEST/LUNG: Clear to auscultation bilaterally; No rales, rhonchi, wheezing, or rubs  HEART: S1, S2, Regular rate and rhythm  ABDOMEN: Soft, Nontender, Nondistended; Bowel sounds present  NEURO: Alert & Oriented X3  EXTREMITIES: No LE edema, no calf tenderness  LYMPH: No lymphadenopathy noted  SKIN: No rashes or lesions    Consultant(s) Notes Reviewed:  [x ] YES  [ ] NO  Care Discussed with Consultants/Other Providers [ x] YES  [ ] NO    LABS:                        7.8    5.31  )-----------( 262      ( 10 Oct 2022 09:20 )             23.2     10-10    134<L>  |  100  |  18  ----------------------------<  106<H>  4.1   |  24  |  8.26<H>    Ca    8.7      10 Oct 2022 09:20  Phos  3.4     10-10  Mg     2.1     10-10      PT/INR - ( 10 Oct 2022 09:20 )   PT: 16.2 sec;   INR: 1.36 ratio         PTT - ( 10 Oct 2022 09:20 )  PTT:31.8 sec  CAPILLARY BLOOD GLUCOSE                RADIOLOGY & ADDITIONAL TESTS:    Imaging Personally Reviewed:  [ ] YES  [ ] NO   Patient is a 67y old  Female who presents with a chief complaint of Abdominal pain (10 Oct 2022 12:18)    INTERVAL HPI/OVERNIGHT EVENTS: No  acute events overnight    REVIEW OF SYSTEMS:  CONSTITUTIONAL: No fever, chills  ENMT:  No difficulty hearing, no change in vision  NECK: No pain or stiffness  RESPIRATORY: No cough, SOB  CARDIOVASCULAR: No chest pain, palpitations  GASTROINTESTINAL: No abdominal pain. No nausea, vomiting, or diarrhea  GENITOURINARY: No dysuria  NEUROLOGICAL: No HA  SKIN: No itching, burning, rashes, or lesions   LYMPH NODES: No enlarged glands  ENDOCRINE: No heat or cold intolerance; No hair loss  MUSCULOSKELETAL: No joint pain or swelling; No muscle, back, or extremity pain  PSYCHIATRIC: No depression, anxiety  HEME/LYMPH: No easy bruising, or bleeding gums    T(C): 36.6 (10-10-22 @ 11:42), Max: 37.1 (10-09-22 @ 20:31)  HR: 78 (10-10-22 @ 11:42) (69 - 85)  BP: 130/53 (10-10-22 @ 11:42) (109/69 - 156/64)  RR: 18 (10-10-22 @ 11:42) (16 - 18)  SpO2: 100% (10-10-22 @ 11:42) (99% - 100%)  Wt(kg): --Vital Signs Last 24 Hrs  T(C): 36.6 (10 Oct 2022 11:42), Max: 37.1 (09 Oct 2022 20:31)  T(F): 97.9 (10 Oct 2022 11:42), Max: 98.7 (09 Oct 2022 20:31)  HR: 78 (10 Oct 2022 11:42) (69 - 85)  BP: 130/53 (10 Oct 2022 11:42) (109/69 - 156/64)  BP(mean): 75 (09 Oct 2022 14:25) (75 - 75)  RR: 18 (10 Oct 2022 11:42) (16 - 18)  SpO2: 100% (10 Oct 2022 11:42) (99% - 100%)    Parameters below as of 10 Oct 2022 11:42  Patient On (Oxygen Delivery Method): room air    MEDICATIONS  (STANDING):  allopurinol 100 milliGRAM(s) Oral daily  amLODIPine   Tablet 10 milliGRAM(s) Oral daily  aspirin enteric coated 81 milliGRAM(s) Oral daily  atorvastatin 80 milliGRAM(s) Oral at bedtime  calcitriol   Capsule 0.25 MICROGram(s) Oral daily  cefTRIAXone   IVPB 2000 milliGRAM(s) IV Intermittent every 24 hours  chlorhexidine 2% Cloths 1 Application(s) Topical daily  citalopram 20 milliGRAM(s) Oral daily  darunavir 800 mG/cobicstat 150 mG 1 Tablet(s) Oral daily  dextrose 5% + sodium chloride 0.45% with potassium chloride 20 mEq/L 1000 milliLiter(s) (50 mL/Hr) IV Continuous <Continuous>  dolutegravir 50 milliGRAM(s) Oral daily  epoetin neyda-epbx (RETACRIT) Injectable 4000 Unit(s) IV Push <User Schedule>  hydrALAZINE 50 milliGRAM(s) Oral three times a day  isosorbide   mononitrate ER Tablet (IMDUR) 60 milliGRAM(s) Oral daily  lamiVUDine- milliGRAM(s) Oral daily  lidocaine/prilocaine Cream 1 Application(s) Topical <User Schedule>  melatonin 3 milliGRAM(s) Oral at bedtime  metoprolol tartrate 100 milliGRAM(s) Oral two times a day  metroNIDAZOLE  IVPB 500 milliGRAM(s) IV Intermittent every 8 hours  omega-3-Acid Ethyl Esters 4 Gram(s) Oral daily  pantoprazole  Injectable 40 milliGRAM(s) IV Push every 12 hours  senna 2 Tablet(s) Oral at bedtime  zinc sulfate 220 milliGRAM(s) Oral daily    MEDICATIONS  (PRN):  acetaminophen     Tablet .. 650 milliGRAM(s) Oral every 6 hours PRN Temp greater or equal to 38C (100.4F), Mild Pain (1 - 3)  aluminum hydroxide/magnesium hydroxide/simethicone Suspension 30 milliLiter(s) Oral every 4 hours PRN Dyspepsia  ondansetron Injectable 4 milliGRAM(s) IV Push every 8 hours PRN Nausea and/or Vomiting      PHYSICAL EXAM:  GENERAL: NAD  EYES: clear conjunctiva; EOMI  ENMT: Moist mucous membranes  NECK: Supple, No JVD, Normal thyroid  CHEST/LUNG: Clear to auscultation bilaterally; No rales, rhonchi, wheezing, or rubs  HEART: S1, S2, Regular rate and rhythm  ABDOMEN: Soft, Nontender, Nondistended; Bowel sounds present, L side drain with minimal drainage  NEURO: Alert & Oriented X3  EXTREMITIES: No LE edema, no calf tenderness, LUE av graft + thrill, + buit  LYMPH: No lymphadenopathy noted  SKIN: No rashes or lesions    Consultant(s) Notes Reviewed:  [x ] YES  [ ] NO  Care Discussed with Consultants/Other Providers [ x] YES  [ ] NO    LABS:                        7.8    5.31  )-----------( 262      ( 10 Oct 2022 09:20 )             23.2     10-10    134<L>  |  100  |  18  ----------------------------<  106<H>  4.1   |  24  |  8.26<H>    Ca    8.7      10 Oct 2022 09:20  Phos  3.4     10-10  Mg     2.1     10-10      PT/INR - ( 10 Oct 2022 09:20 )   PT: 16.2 sec;   INR: 1.36 ratio         PTT - ( 10 Oct 2022 09:20 )  PTT:31.8 sec  CAPILLARY BLOOD GLUCOSE    RADIOLOGY & ADDITIONAL TESTS:    Imaging Personally Reviewed:  [x ] YES  [ ] NO    < from: CT Abdomen and Pelvis w/ IV Cont (10.06.22 @ 13:26) >    ACC: [id number] EXAM:  CT ABDOMEN AND PELVIS IC                          PROCEDURE DATE:  10/06/2022          INTERPRETATION:  CLINICAL INFORMATION: 67 years  Female with evaluate   progression of diverticular abscess.    COMPARISON: 9/27/2022    CONTRAST/COMPLICATIONS:  IV Contrast: Omnipaque 350  90 cc administered   10 cc discarded  Oral Contrast: NONE  Complications: None reported at time of study completion    PROCEDURE:  CT of the Abdomen and Pelvis was performed.  Sagittal and coronal reformats were performed.    FINDINGS:  LOWER CHEST: Left lower lobe postoperative changes. 1.6 cm left lower   lobe thin-walled cyst with septations.    LIVER: Steatosis. 1.2 cm right lobe enhancing lesion (2:39), previously   with peripheral enhancementon earlier phase, likely hemangioma..  BILE DUCTS: Normal caliber.  GALLBLADDER: Contracted.  SPLEEN: Within normal limits.  PANCREAS: 1.3 cm pancreatic head cyst.  ADRENALS: Within normal limits.  KIDNEYS/URETERS: Redemonstrated innumerable bilateral renal cysts and   hypodensities too small to characterize, greater on the right. Right   renal atrophy again noted. No hydroureteronephrosis or calculi.    BLADDER: Completely decompressed.  REPRODUCTIVE ORGANS: Hysterectomy.    BOWEL: No bowel obstruction. Appendix is not visualized. No evidence of   inflammation in the pericecal region.. Moderate sliding hiatus hernia   with distal esophageal wall thickening. Sigmoid diverticulosis.   Previously seen diverticular abscess is collapsed around the pigtail   catheter. Mild residual mesenteric fat stranding adjacent to the sigmoid   colon and small bowel with small droplet of extraluminal air (2:95) which   may represent fistulization.  PERITONEUM: No ascites.  VESSELS: Atherosclerotic changes.  RETROPERITONEUM/LYMPH NODES: No lymphadenopathy.  ABDOMINAL WALL: Left lower quadrant pigtail drainage catheter.  BONES: Within normal limits.    IMPRESSION:    Previously seen diverticular abscess is collapsed around the pigtail   catheter. Mild residual inflammatory changes around the sigmoid colon and   small bowel with suggestion of enterocolic fistula.    Moderate sliding hiatus hernia with thickened distal esophagus. Correlate   with endoscopy to exclude esophagitis.    1.2 cm right lobe hepatic lesion likely hemangioma as above. Center   confirmation with MRI.    1.3 cm pancreatic head cyst is also be characterized with MRI.        --- End of Report ---            TAI JURADO MD; Attending Radiologist  This document has been electronically signed. Oct  6 2022  3:05PM    < end of copied text >  < from: CT Aspiration Abscess Hematoma, Cyst (09.28.22 @ 14:00) >  ACC: [id number] EXAM:  CT ASP ABSC HEMATOMA CYST#                          PROCEDURE DATE:  09/28/2022          INTERPRETATION:  PROCEDURE: CT-guided drainage of abdominal abscess.    : VERÓNICA Sprague MD    CLINICAL INDICATION: 67-year-old female with multiple comorbidities, who   presented with abdominal pain and was found to have an abdominal   collection concerning for abscess. Percutaneous drainage of the abscess   was requested.    ANESTHESIA: Intravenous sedation was provided by the attending   anesthesiologist. A total of 7 mL of 1% lidocaine was used for local   anesthesia.    ANTIBIOTICS: Patient's scheduled dose of metronidazole was administered   by the anesthesiologist prior to procedure.    TECHNIQUE: After discussing the risks, benefits and alternatives to this   procedure with the patient, informed consent was obtained. A timeout was   performed.    The patient was placed supine on the CT examination table and connected   to physiologic monitoring. Transaxial images ofthe abdomen were obtained   without the use of oral or intravenous contrast materials. The left lower   quadrant abdominal collection which had been seen on a recent CT scan was   again identified, and the overlying skin was prepped and draped in the  usual sterile fashion.    Using CT guidance, an 18-gauge trocar needle was advanced into the   collection using a left paramedian approach. Access into the collection   was confirmed with CT images and aspiration of 5 mL of purulent fluid   which was sent for microbiological analysis. The needle was then removed   over a wire and the tract was dilated. A 10 Swazi pigtail drainage   catheter was then advanced into the collection over the wire. An   additional 40 mL of thick, purulent foul-smelling fluid was manually   aspirated. Repeat images demonstrated good positioning of the catheter   tip within the abscess cavity with virtual complete collapse of the   cavity around the catheter tip. The catheter was secured to the patient's   skin with a silk suture, flushed with normal saline, and then connected   to a drainage bag. A dry sterile dressing was then applied.    The patient tolerated the procedure well and was transferred to the   recovery area in stable condition. There were no immediate complications.    IMPRESSION: SUCCESSFUL CT-GUIDED PERCUTANEOUS DRAINAGE OF ABDOMINAL   ABSCESS, AS DESCRIBED ABOVE.    --- End of Report ---            TODD SPRAGUE MD; Attending Interventional Radiologist  This document has been electronically signed. Sep 28 2022  2:54PM    < end of copied text >

## 2022-10-10 NOTE — PROGRESS NOTE ADULT - SUBJECTIVE AND OBJECTIVE BOX
Problem List:  ESRD  Diverticulosis, admitted for abscess in LLQ within the wall of small bowel with fistula between the mass and thickened sigmoid colon with diverticula. Post IR drainage tube in abscess area.  There is a continuos green drainage in the bag.  Post HD  yesterday  Abdominal pain improved , tolerate clear liquid feeding  c/vomit with empty stomach in am with morning pills. She is able to tolerated the other meals.  Follow with surgery    No c/o diarrhea and no vomit    Plan surgery today. Dialysis 2.5 hours, 3 K dialysate . 2 PRBC were given during dialysis  Patient report diarrhea during the night after bowel prep before surgery  She has no more diarrhea  No vomit, no nausea and no abdominal pain.      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      ESRD on hemodialysis      History of ectopic pregnancy  Two      H/O: hysterectomy  Due to cervical cancer      S/P arteriovenous (AV) fistula creation  july 10 2020          oxycodone (Rash)  penicillins (Urticaria; Rash)      MEDICATIONS  (STANDING):  allopurinol 100 milliGRAM(s) Oral daily  amLODIPine   Tablet 10 milliGRAM(s) Oral daily  aspirin enteric coated 81 milliGRAM(s) Oral daily  atorvastatin 80 milliGRAM(s) Oral at bedtime  calcitriol   Capsule 0.25 MICROGram(s) Oral daily  cefTRIAXone   IVPB 2000 milliGRAM(s) IV Intermittent every 24 hours  chlorhexidine 2% Cloths 1 Application(s) Topical daily  citalopram 20 milliGRAM(s) Oral daily  darunavir 800 mG/cobicstat 150 mG 1 Tablet(s) Oral daily  dextrose 5% + sodium chloride 0.45% with potassium chloride 20 mEq/L 1000 milliLiter(s) (50 mL/Hr) IV Continuous <Continuous>  dolutegravir 50 milliGRAM(s) Oral daily  epoetin neyda-epbx (RETACRIT) Injectable 4000 Unit(s) IV Push <User Schedule>  hydrALAZINE 50 milliGRAM(s) Oral three times a day  isosorbide   mononitrate ER Tablet (IMDUR) 60 milliGRAM(s) Oral daily  lamiVUDine- milliGRAM(s) Oral daily  lidocaine/prilocaine Cream 1 Application(s) Topical <User Schedule>  melatonin 3 milliGRAM(s) Oral at bedtime  metoprolol tartrate 100 milliGRAM(s) Oral two times a day  metroNIDAZOLE  IVPB 500 milliGRAM(s) IV Intermittent every 8 hours  omega-3-Acid Ethyl Esters 4 Gram(s) Oral daily  pantoprazole  Injectable 40 milliGRAM(s) IV Push every 12 hours  senna 2 Tablet(s) Oral at bedtime  zinc sulfate 220 milliGRAM(s) Oral daily    MEDICATIONS  (PRN):  acetaminophen     Tablet .. 650 milliGRAM(s) Oral every 6 hours PRN Temp greater or equal to 38C (100.4F), Mild Pain (1 - 3)  aluminum hydroxide/magnesium hydroxide/simethicone Suspension 30 milliLiter(s) Oral every 4 hours PRN Dyspepsia  ondansetron Injectable 4 milliGRAM(s) IV Push every 8 hours PRN Nausea and/or Vomiting                            7.8    5.31  )-----------( 262      ( 10 Oct 2022 09:20 )             23.2     10-10    134<L>  |  100  |  18  ----------------------------<  106<H>  4.1   |  24  |  8.26<H>    Ca    8.7      10 Oct 2022 09:20  Phos  3.4     10-10  Mg     2.1     10-10      PT/INR - ( 10 Oct 2022 09:20 )   PT: 16.2 sec;   INR: 1.36 ratio         PTT - ( 10 Oct 2022 09:20 )  PTT:31.8 sec        REVIEW OF SYSTEMS:  General: no fever no chills, no weight loss.  EYES/ENT: No visual changes;  No vertigo, no headache.  NECK: No pain or stiffness  RESPIRATORY: No cough, wheezing, hemoptysis; No SOB  CARDIOVASCULAR: No chest pain or palpitations. No Edema  GASTROINTESTINAL: as above  GENITOURINARY: No dysuria, frequency, foamy urine, urinary urgency, incontinence or hematuria  NEUROLOGICAL: No numbness or weakness, no tremor , no dizziness.   Muscle skeletal : no joint pain and no swelling of joints and limbs.  SKIN: No itching, burning, rashes.        VITALS:  T(F): 97.9 (10-10-22 @ 11:42), Max: 98.7 (10-09-22 @ 20:31)  HR: 78 (10-10-22 @ 11:42)  BP: 130/53 (10-10-22 @ 11:42)  RR: 18 (10-10-22 @ 11:42)  SpO2: 100% (10-10-22 @ 11:42)  Wt(kg): --    10-09 @ 07:01  -  10-10 @ 07:00  --------------------------------------------------------  IN: 0 mL / OUT: 0 mL / NET: 0 mL    10-10 @ 07:01  -  10-10 @ 12:18  --------------------------------------------------------  IN: 1200 mL / OUT: 1100 mL / NET: 100 mL        PHYSICAL EXAM:  Constitutional: well developed, no diaphoresis, no distress.  Neck: No JVD, no carotid bruit, supple, no adenopathy  Respiratory: air entrance B/L, no wheezes, rales or rhonchi  Cardiovascular: S1, S2, RRR, no pericardial rub, 2/6 systolic  murmur in LSB.  Abdomen: BS+, soft, no tenderness, no bruit  Pelvis: bladder nondistended  Extremities: No cyanosis or clubbing. No peripheral edema.   Pulses: All present    Vascular Access: left AVF with bruit and thrill

## 2022-10-10 NOTE — PROGRESS NOTE ADULT - NS ATTEND BILL GEN_ALL_CORE
Attending to bill
PA/NP to bill
Attending to bill
Attending to bill
PA/NP to bill
Attending to bill

## 2022-10-10 NOTE — PROGRESS NOTE ADULT - PROBLEM SELECTOR PLAN 1
-2/2 to fistulization from the sigmoid colon likely in the setting of chronic diverticular disease.  - s/p IR CT guided drainage 9/28  - Abscess CC + for proteus mirabilis, ecoli, klebsiella pneumoniae  - Cont  IV Ceftriaxone 2g and IV Flagyl 500mg q 8hours  - tolerating clear liquid diet  - cont strict output monitoring of drainage q8hr  - tube study not indicated 2/2 high output per IR  - repeat CT abd/pelv shows  diverticular abscess is collapsed around the pigtail catheter. Mild residual inflammatory changes around the sigmoid colon and small bowel with suggestion of enterocolic fistula.  - Plan for Diagnostic laparoscopy, possible exploratory laparotomy, Small bowel and sig colon 10/10  -Surgery dr. Francisco following  -ID Dr. Young following

## 2022-10-10 NOTE — PROGRESS NOTE ADULT - PROBLEM SELECTOR PLAN 4
- ESRD, HD on [facility].   - Last HD 10/7 AM  - c/w sevelamer  - plan 2 units Prbcs prior  to surgery today  - Renal diet when not NPO  - Nephro Dr. Catalan following

## 2022-10-10 NOTE — PROGRESS NOTE ADULT - ASSESSMENT
This is a 67 year old female, coming from home, with past medical history of ESRD(M-W-F), HTN, HIV coming in with abdominal pain. Found to have Abscess within the left lower quadrant small bowel wall secondary to fistulization from the sigmoid colon likely in the setting of chronic diverticular disease, started on Ceftriaxone and Flagyl. ID following Surgery consulted. s/p IR CT guided drainage of abscess and Pigtail to drainage bag. Nephro following for ESRD. Hospital course c/b increased high output from pigtail drain. Per IR, tube study will not be done as increased drainage via pigtail continues indicating fistula. 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 Plan Diagnostic laparoscopy, possible exploratory laparotomy, Small bowel and sig colon on Monday 10/10.

## 2022-10-11 DIAGNOSIS — Z90.49 ACQUIRED ABSENCE OF OTHER SPECIFIED PARTS OF DIGESTIVE TRACT: ICD-10-CM

## 2022-10-11 DIAGNOSIS — G89.18 OTHER ACUTE POSTPROCEDURAL PAIN: ICD-10-CM

## 2022-10-11 LAB
ANION GAP SERPL CALC-SCNC: 13 MMOL/L — SIGNIFICANT CHANGE UP (ref 5–17)
BUN SERPL-MCNC: 10 MG/DL — SIGNIFICANT CHANGE UP (ref 7–18)
CALCIUM SERPL-MCNC: 8.4 MG/DL — SIGNIFICANT CHANGE UP (ref 8.4–10.5)
CHLORIDE SERPL-SCNC: 99 MMOL/L — SIGNIFICANT CHANGE UP (ref 96–108)
CO2 SERPL-SCNC: 25 MMOL/L — SIGNIFICANT CHANGE UP (ref 22–31)
CREAT SERPL-MCNC: 5.17 MG/DL — HIGH (ref 0.5–1.3)
EGFR: 9 ML/MIN/1.73M2 — LOW
GLUCOSE BLDC GLUCOMTR-MCNC: 115 MG/DL — HIGH (ref 70–99)
GLUCOSE BLDC GLUCOMTR-MCNC: 117 MG/DL — HIGH (ref 70–99)
GLUCOSE BLDC GLUCOMTR-MCNC: 126 MG/DL — HIGH (ref 70–99)
GLUCOSE BLDC GLUCOMTR-MCNC: 95 MG/DL — SIGNIFICANT CHANGE UP (ref 70–99)
GLUCOSE SERPL-MCNC: 127 MG/DL — HIGH (ref 70–99)
HCT VFR BLD CALC: 31.4 % — LOW (ref 34.5–45)
HGB BLD-MCNC: 10.7 G/DL — LOW (ref 11.5–15.5)
MCHC RBC-ENTMCNC: 30.5 PG — SIGNIFICANT CHANGE UP (ref 27–34)
MCHC RBC-ENTMCNC: 34.1 GM/DL — SIGNIFICANT CHANGE UP (ref 32–36)
MCV RBC AUTO: 89.5 FL — SIGNIFICANT CHANGE UP (ref 80–100)
NRBC # BLD: 0 /100 WBCS — SIGNIFICANT CHANGE UP (ref 0–0)
PLATELET # BLD AUTO: 226 K/UL — SIGNIFICANT CHANGE UP (ref 150–400)
POTASSIUM SERPL-MCNC: 4.3 MMOL/L — SIGNIFICANT CHANGE UP (ref 3.5–5.3)
POTASSIUM SERPL-SCNC: 4.3 MMOL/L — SIGNIFICANT CHANGE UP (ref 3.5–5.3)
RBC # BLD: 3.51 M/UL — LOW (ref 3.8–5.2)
RBC # FLD: 19.3 % — HIGH (ref 10.3–14.5)
SODIUM SERPL-SCNC: 137 MMOL/L — SIGNIFICANT CHANGE UP (ref 135–145)
WBC # BLD: 12.64 K/UL — HIGH (ref 3.8–10.5)
WBC # FLD AUTO: 12.64 K/UL — HIGH (ref 3.8–10.5)

## 2022-10-11 PROCEDURE — 44143 PARTIAL REMOVAL OF COLON: CPT | Mod: 80

## 2022-10-11 PROCEDURE — 99222 1ST HOSP IP/OBS MODERATE 55: CPT

## 2022-10-11 PROCEDURE — 99233 SBSQ HOSP IP/OBS HIGH 50: CPT

## 2022-10-11 PROCEDURE — 44650 REPAIR BOWEL FISTULA: CPT | Mod: 80

## 2022-10-11 PROCEDURE — 44120 REMOVAL OF SMALL INTESTINE: CPT | Mod: 80,59

## 2022-10-11 RX ORDER — CITALOPRAM 10 MG/1
20 TABLET, FILM COATED ORAL DAILY
Refills: 0 | Status: DISCONTINUED | OUTPATIENT
Start: 2022-10-11 | End: 2022-10-19

## 2022-10-11 RX ORDER — TRAMADOL HYDROCHLORIDE 50 MG/1
50 TABLET ORAL EVERY 6 HOURS
Refills: 0 | Status: DISCONTINUED | OUTPATIENT
Start: 2022-10-11 | End: 2022-10-13

## 2022-10-11 RX ORDER — SENNA PLUS 8.6 MG/1
2 TABLET ORAL AT BEDTIME
Refills: 0 | Status: DISCONTINUED | OUTPATIENT
Start: 2022-10-11 | End: 2022-10-19

## 2022-10-11 RX ORDER — ERYTHROPOIETIN 10000 [IU]/ML
4000 INJECTION, SOLUTION INTRAVENOUS; SUBCUTANEOUS
Refills: 0 | Status: DISCONTINUED | OUTPATIENT
Start: 2022-10-11 | End: 2022-10-16

## 2022-10-11 RX ORDER — ACETAMINOPHEN 500 MG
1000 TABLET ORAL ONCE
Refills: 0 | Status: COMPLETED | OUTPATIENT
Start: 2022-10-11 | End: 2022-10-11

## 2022-10-11 RX ORDER — OMEGA-3 ACID ETHYL ESTERS 1 G
1 CAPSULE ORAL DAILY
Refills: 0 | Status: DISCONTINUED | OUTPATIENT
Start: 2022-10-11 | End: 2022-10-19

## 2022-10-11 RX ORDER — ALLOPURINOL 300 MG
100 TABLET ORAL DAILY
Refills: 0 | Status: DISCONTINUED | OUTPATIENT
Start: 2022-10-11 | End: 2022-10-19

## 2022-10-11 RX ORDER — FENTANYL CITRATE 50 UG/ML
25 INJECTION INTRAVENOUS
Refills: 0 | Status: DISCONTINUED | OUTPATIENT
Start: 2022-10-11 | End: 2022-10-11

## 2022-10-11 RX ORDER — HYDROMORPHONE HYDROCHLORIDE 2 MG/ML
2 INJECTION INTRAMUSCULAR; INTRAVENOUS; SUBCUTANEOUS EVERY 4 HOURS
Refills: 0 | Status: DISCONTINUED | OUTPATIENT
Start: 2022-10-11 | End: 2022-10-17

## 2022-10-11 RX ORDER — ISOSORBIDE MONONITRATE 60 MG/1
60 TABLET, EXTENDED RELEASE ORAL DAILY
Refills: 0 | Status: DISCONTINUED | OUTPATIENT
Start: 2022-10-11 | End: 2022-10-19

## 2022-10-11 RX ORDER — ACETAMINOPHEN 500 MG
1000 TABLET ORAL ONCE
Refills: 0 | Status: COMPLETED | OUTPATIENT
Start: 2022-10-12 | End: 2022-10-12

## 2022-10-11 RX ORDER — LANOLIN ALCOHOL/MO/W.PET/CERES
3 CREAM (GRAM) TOPICAL AT BEDTIME
Refills: 0 | Status: DISCONTINUED | OUTPATIENT
Start: 2022-10-11 | End: 2022-10-19

## 2022-10-11 RX ORDER — HYDROMORPHONE HYDROCHLORIDE 2 MG/ML
0.5 INJECTION INTRAMUSCULAR; INTRAVENOUS; SUBCUTANEOUS EVERY 4 HOURS
Refills: 0 | Status: DISCONTINUED | OUTPATIENT
Start: 2022-10-11 | End: 2022-10-12

## 2022-10-11 RX ORDER — CEFTRIAXONE 500 MG/1
1000 INJECTION, POWDER, FOR SOLUTION INTRAMUSCULAR; INTRAVENOUS EVERY 24 HOURS
Refills: 0 | Status: COMPLETED | OUTPATIENT
Start: 2022-10-11 | End: 2022-10-17

## 2022-10-11 RX ORDER — GABAPENTIN 400 MG/1
100 CAPSULE ORAL AT BEDTIME
Refills: 0 | Status: DISCONTINUED | OUTPATIENT
Start: 2022-10-11 | End: 2022-10-19

## 2022-10-11 RX ORDER — METRONIDAZOLE 500 MG
500 TABLET ORAL EVERY 8 HOURS
Refills: 0 | Status: DISCONTINUED | OUTPATIENT
Start: 2022-10-11 | End: 2022-10-19

## 2022-10-11 RX ORDER — DOLUTEGRAVIR SODIUM 25 MG/1
50 TABLET, FILM COATED ORAL DAILY
Refills: 0 | Status: DISCONTINUED | OUTPATIENT
Start: 2022-10-11 | End: 2022-10-19

## 2022-10-11 RX ORDER — HYDRALAZINE HCL 50 MG
50 TABLET ORAL THREE TIMES A DAY
Refills: 0 | Status: DISCONTINUED | OUTPATIENT
Start: 2022-10-11 | End: 2022-10-19

## 2022-10-11 RX ORDER — CALCITRIOL 0.5 UG/1
0.25 CAPSULE ORAL DAILY
Refills: 0 | Status: DISCONTINUED | OUTPATIENT
Start: 2022-10-11 | End: 2022-10-19

## 2022-10-11 RX ORDER — HYDROMORPHONE HYDROCHLORIDE 2 MG/ML
0.5 INJECTION INTRAMUSCULAR; INTRAVENOUS; SUBCUTANEOUS ONCE
Refills: 0 | Status: DISCONTINUED | OUTPATIENT
Start: 2022-10-11 | End: 2022-10-11

## 2022-10-11 RX ORDER — ASPIRIN/CALCIUM CARB/MAGNESIUM 324 MG
81 TABLET ORAL DAILY
Refills: 0 | Status: DISCONTINUED | OUTPATIENT
Start: 2022-10-11 | End: 2022-10-13

## 2022-10-11 RX ORDER — AMLODIPINE BESYLATE 2.5 MG/1
10 TABLET ORAL DAILY
Refills: 0 | Status: DISCONTINUED | OUTPATIENT
Start: 2022-10-11 | End: 2022-10-19

## 2022-10-11 RX ORDER — ATORVASTATIN CALCIUM 80 MG/1
80 TABLET, FILM COATED ORAL AT BEDTIME
Refills: 0 | Status: DISCONTINUED | OUTPATIENT
Start: 2022-10-11 | End: 2022-10-19

## 2022-10-11 RX ORDER — LAMIVUDINE 150 MG
100 TABLET ORAL DAILY
Refills: 0 | Status: DISCONTINUED | OUTPATIENT
Start: 2022-10-11 | End: 2022-10-19

## 2022-10-11 RX ORDER — METOPROLOL TARTRATE 50 MG
100 TABLET ORAL
Refills: 0 | Status: DISCONTINUED | OUTPATIENT
Start: 2022-10-11 | End: 2022-10-19

## 2022-10-11 RX ORDER — ZINC SULFATE TAB 220 MG (50 MG ZINC EQUIVALENT) 220 (50 ZN) MG
220 TAB ORAL DAILY
Refills: 0 | Status: DISCONTINUED | OUTPATIENT
Start: 2022-10-11 | End: 2022-10-19

## 2022-10-11 RX ORDER — DARUNAVIR ETHANOLATE AND COBICISTAT 800; 150 MG/1; MG/1
1 TABLET, FILM COATED ORAL DAILY
Refills: 0 | Status: DISCONTINUED | OUTPATIENT
Start: 2022-10-11 | End: 2022-10-19

## 2022-10-11 RX ORDER — SODIUM CHLORIDE 9 MG/ML
1000 INJECTION, SOLUTION INTRAVENOUS
Refills: 0 | Status: DISCONTINUED | OUTPATIENT
Start: 2022-10-11 | End: 2022-10-12

## 2022-10-11 RX ORDER — PANTOPRAZOLE SODIUM 20 MG/1
40 TABLET, DELAYED RELEASE ORAL EVERY 12 HOURS
Refills: 0 | Status: DISCONTINUED | OUTPATIENT
Start: 2022-10-11 | End: 2022-10-19

## 2022-10-11 RX ORDER — DEXTROSE MONOHYDRATE, SODIUM CHLORIDE, AND POTASSIUM CHLORIDE 50; .745; 4.5 G/1000ML; G/1000ML; G/1000ML
1000 INJECTION, SOLUTION INTRAVENOUS
Refills: 0 | Status: DISCONTINUED | OUTPATIENT
Start: 2022-10-11 | End: 2022-10-11

## 2022-10-11 RX ORDER — ONDANSETRON 8 MG/1
4 TABLET, FILM COATED ORAL EVERY 8 HOURS
Refills: 0 | Status: DISCONTINUED | OUTPATIENT
Start: 2022-10-11 | End: 2022-10-19

## 2022-10-11 RX ORDER — LIDOCAINE AND PRILOCAINE CREAM 25; 25 MG/G; MG/G
1 CREAM TOPICAL
Refills: 0 | Status: DISCONTINUED | OUTPATIENT
Start: 2022-10-11 | End: 2022-10-19

## 2022-10-11 RX ADMIN — Medication 50 MILLIGRAM(S): at 15:01

## 2022-10-11 RX ADMIN — TRAMADOL HYDROCHLORIDE 50 MILLIGRAM(S): 50 TABLET ORAL at 08:49

## 2022-10-11 RX ADMIN — DOLUTEGRAVIR SODIUM 50 MILLIGRAM(S): 25 TABLET, FILM COATED ORAL at 12:07

## 2022-10-11 RX ADMIN — Medication 100 MILLIGRAM(S): at 06:14

## 2022-10-11 RX ADMIN — FENTANYL CITRATE 25 MICROGRAM(S): 50 INJECTION INTRAVENOUS at 01:01

## 2022-10-11 RX ADMIN — TRAMADOL HYDROCHLORIDE 50 MILLIGRAM(S): 50 TABLET ORAL at 03:50

## 2022-10-11 RX ADMIN — PANTOPRAZOLE SODIUM 40 MILLIGRAM(S): 20 TABLET, DELAYED RELEASE ORAL at 06:15

## 2022-10-11 RX ADMIN — FENTANYL CITRATE 25 MICROGRAM(S): 50 INJECTION INTRAVENOUS at 01:16

## 2022-10-11 RX ADMIN — HYDROMORPHONE HYDROCHLORIDE 2 MILLIGRAM(S): 2 INJECTION INTRAMUSCULAR; INTRAVENOUS; SUBCUTANEOUS at 21:20

## 2022-10-11 RX ADMIN — TRAMADOL HYDROCHLORIDE 50 MILLIGRAM(S): 50 TABLET ORAL at 02:51

## 2022-10-11 RX ADMIN — ONDANSETRON 4 MILLIGRAM(S): 8 TABLET, FILM COATED ORAL at 08:07

## 2022-10-11 RX ADMIN — Medication 1000 MILLIGRAM(S): at 13:06

## 2022-10-11 RX ADMIN — HYDROMORPHONE HYDROCHLORIDE 0.5 MILLIGRAM(S): 2 INJECTION INTRAMUSCULAR; INTRAVENOUS; SUBCUTANEOUS at 11:59

## 2022-10-11 RX ADMIN — HYDROMORPHONE HYDROCHLORIDE 0.5 MILLIGRAM(S): 2 INJECTION INTRAMUSCULAR; INTRAVENOUS; SUBCUTANEOUS at 10:59

## 2022-10-11 RX ADMIN — Medication 1000 MILLIGRAM(S): at 01:30

## 2022-10-11 RX ADMIN — Medication 400 MILLIGRAM(S): at 12:06

## 2022-10-11 RX ADMIN — DEXTROSE MONOHYDRATE, SODIUM CHLORIDE, AND POTASSIUM CHLORIDE 50 MILLILITER(S): 50; .745; 4.5 INJECTION, SOLUTION INTRAVENOUS at 02:54

## 2022-10-11 RX ADMIN — Medication 100 MILLIGRAM(S): at 12:08

## 2022-10-11 RX ADMIN — Medication 4 GRAM(S): at 12:07

## 2022-10-11 RX ADMIN — Medication 100 MILLIGRAM(S): at 18:34

## 2022-10-11 RX ADMIN — Medication 100 MILLIGRAM(S): at 15:01

## 2022-10-11 RX ADMIN — ZINC SULFATE TAB 220 MG (50 MG ZINC EQUIVALENT) 220 MILLIGRAM(S): 220 (50 ZN) TAB at 12:08

## 2022-10-11 RX ADMIN — TRAMADOL HYDROCHLORIDE 50 MILLIGRAM(S): 50 TABLET ORAL at 09:49

## 2022-10-11 RX ADMIN — DARUNAVIR ETHANOLATE AND COBICISTAT 1 TABLET(S): 800; 150 TABLET, FILM COATED ORAL at 12:09

## 2022-10-11 RX ADMIN — PANTOPRAZOLE SODIUM 40 MILLIGRAM(S): 20 TABLET, DELAYED RELEASE ORAL at 17:49

## 2022-10-11 RX ADMIN — CITALOPRAM 20 MILLIGRAM(S): 10 TABLET, FILM COATED ORAL at 12:08

## 2022-10-11 RX ADMIN — ISOSORBIDE MONONITRATE 60 MILLIGRAM(S): 60 TABLET, EXTENDED RELEASE ORAL at 12:10

## 2022-10-11 RX ADMIN — Medication 100 MILLIGRAM(S): at 21:48

## 2022-10-11 RX ADMIN — Medication 400 MILLIGRAM(S): at 17:42

## 2022-10-11 RX ADMIN — Medication 81 MILLIGRAM(S): at 12:08

## 2022-10-11 RX ADMIN — Medication 100 MILLIGRAM(S): at 12:07

## 2022-10-11 RX ADMIN — Medication 400 MILLIGRAM(S): at 00:41

## 2022-10-11 RX ADMIN — CALCITRIOL 0.25 MICROGRAM(S): 0.5 CAPSULE ORAL at 12:08

## 2022-10-11 RX ADMIN — HYDROMORPHONE HYDROCHLORIDE 2 MILLIGRAM(S): 2 INJECTION INTRAMUSCULAR; INTRAVENOUS; SUBCUTANEOUS at 20:03

## 2022-10-11 RX ADMIN — Medication 400 MILLIGRAM(S): at 06:13

## 2022-10-11 RX ADMIN — Medication 100 MILLIGRAM(S): at 05:03

## 2022-10-11 NOTE — PROGRESS NOTE ADULT - ATTENDING COMMENTS
Pt seen and examined. C/o significant pain at incision site. Abd- minimally distended, soft. + incisional tenderness. Ostomy, viable, + gas, some sweat.  Labs reviewed. Will d/c terry once pain better controlled. Has been evaluated by pain management and their recommendations ordered.

## 2022-10-11 NOTE — PROGRESS NOTE ADULT - SUBJECTIVE AND OBJECTIVE BOX
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 Patient complaining of abdominal pain this morning, but no nausea/vomiting.    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 1000 milliGRAM(s) IV Intermittent once  allopurinol 100 milliGRAM(s) Oral daily  amLODIPine   Tablet 10 milliGRAM(s) Oral daily  aspirin enteric coated 81 milliGRAM(s) Oral daily  atorvastatin 80 milliGRAM(s) Oral at bedtime  calcitriol   Capsule 0.25 MICROGram(s) Oral daily  cefTRIAXone   IVPB 1000 milliGRAM(s) IV Intermittent every 24 hours  citalopram 20 milliGRAM(s) Oral daily  darunavir 800 mG/cobicstat 150 mG 1 Tablet(s) Oral daily  dextrose 5% + sodium chloride 0.45% with potassium chloride 20 mEq/L 1000 milliLiter(s) (50 mL/Hr) IV Continuous <Continuous>  dextrose 5% + sodium chloride 0.45% with potassium chloride 20 mEq/L 1000 milliLiter(s) (50 mL/Hr) IV Continuous <Continuous>  dolutegravir 50 milliGRAM(s) Oral daily  epoetin neyda-epbx (RETACRIT) Injectable 4000 Unit(s) IV Push <User Schedule>  gabapentin 100 milliGRAM(s) Oral at bedtime  hydrALAZINE 50 milliGRAM(s) Oral three times a day  isosorbide   mononitrate ER Tablet (IMDUR) 60 milliGRAM(s) Oral daily  lamiVUDine- milliGRAM(s) Oral daily  lidocaine/prilocaine Cream 1 Application(s) Topical <User Schedule>  melatonin 3 milliGRAM(s) Oral at bedtime  metoprolol tartrate 100 milliGRAM(s) Oral two times a day  metroNIDAZOLE  IVPB 500 milliGRAM(s) IV Intermittent every 8 hours  omega-3-Acid Ethyl Esters 4 Gram(s) Oral daily  pantoprazole  Injectable 40 milliGRAM(s) IV Push every 12 hours  senna 2 Tablet(s) Oral at bedtime  zinc sulfate 220 milliGRAM(s) Oral daily    MEDICATIONS  (PRN):  aluminum hydroxide/magnesium hydroxide/simethicone Suspension 30 milliLiter(s) Oral every 4 hours PRN Dyspepsia  HYDROmorphone   Tablet 2 milliGRAM(s) Oral every 4 hours PRN Severe Pain (7 - 10)  HYDROmorphone  Injectable 0.5 milliGRAM(s) IV Push every 4 hours PRN Severe Pain (7 - 10)  ondansetron Injectable 4 milliGRAM(s) IV Push once PRN Nausea and/or Vomiting  ondansetron Injectable 4 milliGRAM(s) IV Push every 8 hours PRN Nausea and/or Vomiting  traMADol 50 milliGRAM(s) Oral every 6 hours PRN Moderate Pain (4 - 6)                            10.7   12.64 )-----------( 226      ( 11 Oct 2022 06:56 )             31.4     10-11    137  |  99  |  10  ----------------------------<  127<H>  4.3   |  25  |  5.17<H>    Ca    8.4      11 Oct 2022 06:40  Phos  3.4     10-10  Mg     2.1     10-10      PT/INR - ( 10 Oct 2022 09:20 )   PT: 16.2 sec;   INR: 1.36 ratio         PTT - ( 10 Oct 2022 09:20 )  PTT:31.8 sec        REVIEW OF SYSTEMS:  General: no fever no chills, no weight loss.  EYES/ENT: No visual changes;  No vertigo, no headache.  NECK: No pain or stiffness  RESPIRATORY: No cough, wheezing, hemoptysis; No shortness of breath  CARDIOVASCULAR: No chest pain or palpitations. No Edema  GASTROINTESTINAL: c/o abdominal pain in surgery area.   GENITOURINARY: No dysuria, frequency, foamy urine, urinary urgency, incontinence or hematuria  NEUROLOGICAL: No numbness or weakness, no tremor , no dizziness.           VITALS:  T(F): 98.3 (10-11-22 @ 12:35), Max: 98.3 (10-11-22 @ 12:35)  HR: 74 (10-11-22 @ 12:35)  BP: 137/72 (10-11-22 @ 12:35)  RR: 18 (10-11-22 @ 12:35)  SpO2: 100% (10-11-22 @ 12:35)  Wt(kg): --    10-10 @ 07:01  -  10-11 @ 07:00  --------------------------------------------------------  IN: 2600 mL / OUT: 1350 mL / NET: 1250 mL      Height (cm): 157.5 (10-10 @ 16:04)  Weight (kg): 61.2 (10-10 @ 16:04)  BMI (kg/m2): 24.7 (10-10 @ 16:04)  BSA (m2): 1.62 (10-10 @ 16:04)  PHYSICAL EXAM:  Constitutional:  no diaphoresis, no distress.  Neck: No JVD, no carotid bruit, supple, no adenopathy  Respiratory: air entrance B/L, no wheezes, rales or rhonchi  Cardiovascular: S1, S2, RRR, no pericardial rub, 2/6 systolic m LSB murmur  Abdomen: BS NONE , TENDERNESS, , soft.  Pelvis: bladder nondistended  Extremities: No cyanosis or clubbing. No peripheral edema.   Left AVF with brtui and thrill

## 2022-10-11 NOTE — BRIEF OPERATIVE NOTE - OPERATION/FINDINGS
Subjective:      Ji Diamond is a 34 y.o. male is here for follow up s/p AVRT ablation. He has occasional palpitations. The patient denies chest pain/ shortness of breath, orthopnea, PND, LE edema, syncope, presyncope or fatigue. Patient Active Problem List    Diagnosis Date Noted    Irregular heart beats 01/16/2018    SVT [facility]supraventricular tachycardia) ([facility]) 09/11/2017    SOB (shortness of breath) 08/31/2017    Paroxysmal supraventricular tachycardia ([address] 07/02/2012    Other dyspnea and respiratory abnormality 07/02/2012    WPW (Hunter-Parkinson-White syndrome) 05/31/2012    Angina at rest [facility]) 05/31/2012      None  Past Medical History:   Diagnosis Date    Angina at rest [facility])     WPW (Hunter-Parkinson-White syndrome)       Past Surgical History:   Procedure Laterality Date    CARDIAC SURG PROCEDURE UNLIST  2014    Cardiac ablation (pm WPW)     No Known Allergies   Family History   Problem Relation Age of Onset    Arrhythmia Father     negative for cardiac disease  Social History     Social History    Marital status: SINGLE     Spouse name: N/A    Number of children: N/A    Years of education: N/A     Social History Main Topics    Smoking status: Never Smoker    Smokeless tobacco: Never Used    Alcohol use Yes      Comment: occasional    Drug use: Yes     Special: Marijuana      Comment: stated he smokes everyday, twice a day    Sexual activity: Yes     Partners: Female     Birth control/ protection: Condom     Other Topics Concern    None     Social History Narrative     Current Outpatient Prescriptions   Medication Sig    flecainide (TAMBOCOR) 50 mg tablet TK 1 T PO Q 12 H     No current facility-administered medications for this visit.        Vitals:    01/16/18 0908   BP: 110/78   Pulse: 68   Resp: 18   SpO2: 99%   Weight: 229 lb 6.4 oz (104.1 kg)   Height: 6' 1\" (1.854 m)       I have reviewed the nurses notes, vitals, problem list, allergy list, medical history, family, social history and medications. Review of Symptoms:    General: Pt denies excessive weight gain or loss. Pt is able to conduct ADL's  HEENT: Denies blurred vision, headaches, epistaxis and difficulty swallowing. Respiratory: Denies shortness of breath, CHRISTIANSON, wheezing or stridor. Cardiovascular: +palpitationsDenies precordial pain,  edema or PND  Gastrointestinal: Denies poor appetite, indigestion, abdominal pain or blood in stool  Urinary: Denies dysuria, pyuria  Musculoskeletal: Denies pain or swelling from muscles or joints  Neurologic: Denies tremor, paresthesias, or sensory motor disturbance  Skin: Denies rash, itching or texture change. Psych: Denies depression      Physical Exam:      General: Well developed, in no acute distress. HEENT: Eyes - PERRL, no jvd  Heart:  Normal S1/S2 negative S3 or S4. Regular, no murmur, gallop or rub.   Respiratory: Clear bilaterally x 4, no wheezing or rales  Abdomen:   Soft, non-tender, bowel sounds are active.   Extremities:  No edema, normal cap refill, no cyanosis. Musculoskeletal: No clubbing  Neuro: A&Ox3, speech clear, gait stable. Skin: Skin color is normal. No rashes or lesions.  Non diaphoretic  Vascular: 2+ pulses symmetric in all extremities    Cardiographics    Ekg: sinus rhythm w LBBB    Results for orders placed or performed during the hospital encounter of 09/13/17   EKG, 12 LEAD, INITIAL   Result Value Ref Range    Ventricular Rate 104 BPM    Atrial Rate 104 BPM    P-R Interval 424 ms    QRS Duration 84 ms    Q-T Interval 344 ms    QTC Calculation (Bezet) 452 ms    Calculated P Axis 26 degrees    Calculated R Axis 73 degrees    Calculated T Axis 21 degrees    Diagnosis       Sinus tachycardia with 1st degree AV block  Confirmed by Afshin Parsons ([id number]) on 9/13/2017 4:02:29 PM           Lab Results   Component Value Date/Time    WBC 10.3 09/13/2017 01:32 PM    HGB 13.9 09/13/2017 01:32 PM    HCT 39.9 09/13/2017 01:32 PM    PLATELET 764 78/44/4960 01:32 PM    MCV 82.1 09/13/2017 01:32 PM      Lab Results   Component Value Date/Time    Sodium 140 09/13/2017 01:32 PM    Potassium 3.7 09/13/2017 01:32 PM    Chloride 107 09/13/2017 01:32 PM    CO2 25 09/13/2017 01:32 PM    Anion gap 8 09/13/2017 01:32 PM    Glucose 90 09/13/2017 01:32 PM    BUN 6 09/13/2017 01:32 PM    Creatinine 1.04 09/13/2017 01:32 PM    BUN/Creatinine ratio 6 09/13/2017 01:32 PM    GFR est AA >60 09/13/2017 01:32 PM    GFR est non-AA >60 09/13/2017 01:32 PM    Calcium 8.6 09/13/2017 01:32 PM    Bilirubin, total 1.4 09/13/2017 01:32 PM    AST (SGOT) 32 09/13/2017 01:32 PM    Alk. phosphatase 68 09/13/2017 01:32 PM    Protein, total 7.7 09/13/2017 01:32 PM    Albumin 3.8 09/13/2017 01:32 PM    Globulin 3.9 09/13/2017 01:32 PM    A-G Ratio 1.0 09/13/2017 01:32 PM    ALT (SGPT) 34 09/13/2017 01:32 PM         Assessment:     Assessment:        ICD-10-CM ICD-9-CM    1. Irregular heart beats I49.9 427.9 AMB POC EKG ROUTINE W/ 12 LEADS, INTER & REP   2. WPW (Hunter-Parkinson-White syndrome) I45.6 426.7    3. Paroxysmal supraventricular tachycardia (HCC) I47.1 427.0      Orders Placed This Encounter    AMB POC EKG ROUTINE W/ 12 LEADS, INTER & REP     Order Specific Question:   Reason for Exam:     Answer:   routine        Plan:   Mr. Robin Manzanares is here for follow up s/p AVRT ablation. He reports occasional palpitations for which he takes PRN Flecainide with resolution of symptoms. EKG today shows sinus rhythm with LBBB. Instructed him to take low dose toprol at the same time as his flecainide prn and consider referral to [facility] for recurrence. Follow up in 6 months. Continue medical management for SVT. Thank you for allowing me to participate in Son Yang 's care.     JAYLYN Blancas MD, True Escoto enterocolic fistula with resection and primary anastomosis of involved small bowel and Alfonso's procedure enterocolic fistula with resection and primary anastomosis of involved small bowel and Alfonso's procedure (laparoscopic hand-assisted)

## 2022-10-11 NOTE — CHART NOTE - NSCHARTNOTEFT_GEN_A_CORE
Reassessed pt at bedside after dilaudid 0.5mg IV administered. Pt reports relief with IV dilaudid to a 6/10 and tolerable, however pain increased 1 hour after administration. Denies rash, itchiness, SOB, n/v. Currently rating abdominal pain 8/10. Pt is due for IV Tylenol, RN to administer. Will add dilaudid 2mg PO q4h PRN severe pain. Will continue to monitor.

## 2022-10-11 NOTE — PROGRESS NOTE ADULT - ASSESSMENT
Subjective: She is POD #1, complaining of abdominal pain, has some nausea, no vomiting, afebrile.    REVIEW OF SYSTEMS:  CONSTITUTIONAL:  No weakness, fevers or chills  EYES/ENT:  No visual changes;  No vertigo or throat pain   NECK:  No pain or stiffness  RESPIRATORY:  No cough, wheezing, hemoptysis; No shortness of breath  CARDIOVASCULAR:  No chest pain or palpitations  GASTROINTESTINAL:  abd pain, post OP. + nausea, no vomiting, or hematemesis; No diarrhea or constipation. No melena or hematochezia.  GENITOURINARY:  No dysuria, frequency or hematuria  NEUROLOGICAL:  No numbness or weakness  MSK: no back pain, no joint pain  SKIN:  No itching, rashes    PE:  Vital Signs Last 24 Hrs  T(C): 36.8 (11 Oct 2022 12:35), Max: 36.8 (11 Oct 2022 00:31)  T(F): 98.3 (11 Oct 2022 12:35), Max: 98.3 (11 Oct 2022 12:35)  HR: 74 (11 Oct 2022 12:35) (65 - 84)  BP: 137/72 (11 Oct 2022 12:35) (83/25 - 137/72)  BP(mean): 69 (11 Oct 2022 01:40) (40 - 79)  RR: 18 (11 Oct 2022 12:35) (13 - 24)  SpO2: 100% (11 Oct 2022 12:35) (98% - 100%)    Parameters below as of 11 Oct 2022 12:35 Patient On (Oxygen Delivery Method): room air    Gen: AOx3, NAD, non-toxic, pleasant  HEAD:  Atraumatic, Normocephalic  EYES: EOMI, PERRLA, conjunctiva and sclera clear  ENT: Moist mucous membranes  NECK: Supple, No JVD  CV: S1+S2 normal, nontachycardic  Resp: Clear bilat, no resp distress, no crackles/wheezes  Abd: Soft, nontender, +BS  Ext: No LE edema, no wounds  : No Farias  IV/Skin: No thrombophlebitis  Msk: No low back pain, no arthralgias, no joint swelling  Neuro: AAOx3. No sensory deficits, no motor deficits    LABS:                        10.7   12.64 )-----------( 226      ( 11 Oct 2022 06:56 )             31.4     10-11    137  |  99  |  10  ----------------------------<  127<H>  4.3   |  25  |  5.17<H>    Ca    8.4      11 Oct 2022 06:40  Phos  3.4     10-10  Mg     2.1     10-10      MICROBIOLOGY:  v  .Abscess abdominal abscess drainage  09-28-22   Culture yields >4 types of aerobic and/or anaerobic bacteria  Call client services within 7 days if further workup is clinically  indicated. Culture includes  Few Escherichia coli  Rare Klebsiella pneumoniae  Rare Proteus mirabilis  Few Escherichiacoli #2  --  Escherichia coli  Klebsiella pneumoniae  Escherichia coli  Proteus mirabilis    IMPRESSION:  68 yo female with H/O ESRD on HD(MWF) via L arm AVF, HIV on ART diagnosed 16 yrs ago (Tivicay + Lamivudine + Prezcobix) admitted for LLQ abscess.     -Left small bowel abscess with enterocolic fistula from sigmoid colon 2/2 diverticular disease S/P IR drainage 9/28 (10 ml of pus drained w/cultures + polymicrobial bowel bacteria) w/repeat CT A/P on 10/06 showing collapsed previously seen diverticular abscess around pigtail cath with residual inflammatory changes(sigmoid colon) and small bowel enterocolic fistula.   -S/P enterocolic fistula resection and primary anastomosis and Alfonso's  -Diverticular disease now complicated with sigmoid colon fistulization to small bowel  -HIV on ART w/CD4 377 with VL detectable <30 copies which could be blip in the setting of acute infection.   -PCN allergy(when she was a child, she was told she had hives, she does not remember)  -ESRD on HD    PLAN:  -Continue Ceftriaxone 2g IV daily + Flagyl 500 mg q8h (9/27- ) for 7 more days  -Continue ART(Trivicay+Prezcovix+Lamivudine) daily  -Trend inflammatory markers q/2-3 days  -Ostomy care, pain management  -HD as per renal  -Discussed with medicine    Please reach ID with any questions or concerns  Dr. Mine Garnett  Tel. [phone]  Available in Teams

## 2022-10-11 NOTE — BRIEF OPERATIVE NOTE - NSICDXBRIEFPROCEDURE_GEN_ALL_CORE_FT
PROCEDURES:  Small bowel resection 11-Oct-2022 00:47:28  Dioni Toney  Alfonso procedure 11-Oct-2022 00:47:38  Dioni Toney

## 2022-10-11 NOTE — PROGRESS NOTE ADULT - SUBJECTIVE AND OBJECTIVE BOX
Patient seen and examined at bedside. 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 Patient complaining of abdominal pain this morning, but no nausea/vomiting.    Vital Signs Last 24 Hrs  T(C): 36.8 (11 Oct 2022 05:57), Max: 36.8 (11 Oct 2022 00:31)  T(F): 98.2 (11 Oct 2022 05:57), Max: 98.2 (11 Oct 2022 00:31)  HR: 81 (11 Oct 2022 05:57) (65 - 84)  BP: 115/70 (11 Oct 2022 05:57) (83/25 - 130/53)  BP(mean): 69 (11 Oct 2022 01:40) (40 - 79)  RR: 18 (11 Oct 2022 05:57) (13 - 24)  SpO2: 98% (11 Oct 2022 05:57) (98% - 100%)    Parameters below as of 11 Oct 2022 05:57  Patient On (Oxygen Delivery Method): room air    I&O's Detail    10 Oct 2022 07:01  -  11 Oct 2022 07:00  --------------------------------------------------------  IN:    dextrose 5% + sodium chloride 0.45% w/ Additives: 100 mL    Lactated Ringers Bolus: 1300 mL    Other (mL): 1200 mL  Total IN: 2600 mL    OUT:    Blood Loss (mL): 100 mL    Drain (mL): 0 mL    Indwelling Catheter - Urethral (mL): 150 mL    Other (mL): 1100 mL  Total OUT: 1350 mL    Total NET: 1250 mL    General: mild distress secondary to pain  Cardio: RRR  Resp: normal resp effort  Abd: soft, appropriately tender to palpation, nondistended, dressings c/d/i, ostomy nonproductive and appears pink and viable  : Farias in place

## 2022-10-11 NOTE — CONSULT NOTE ADULT - SUBJECTIVE AND OBJECTIVE BOX
Source of information: VERONICA GOVEA, Chart review  Patient language: English  : n/a    HPI:  This is a 67 year old female, coming from home, with medical history of ESRD(M-W-F), HTN, HIV coming in with abdominal pain. Pt states she has been having abdominal pain for the last 3 days. She went for dialsysis yesterday and afterwards the pain became worse. The abdominal pain comes and goes, is a dull achy pain, rated 8/10. It does not radiate and is located in left lower quadrant and suprapubic region. Pt also has been constipated with last bowl movement being about 4 days prior. She also has been expereincing nause and has had about 5 episodes of vomiting in the last 3 days, they were all watery. She denies any headaches, visual disturbances, dizziness, falls, chest pain, palpitations, lower extremity swelling, fevers, skin rash, recent travel, or sick contacts.   (27 Sep 2022 10:38)    Pain consulted for postoperative abdominal pain. Pt with enterocolic fistula s/p Small bowel resection and Alfonso procedure on 10/11 POD #0. Pt reports hives rash with oxycodone in the past. Has been on morphine in the past with no reaction. Pt seen and examined at bedside. Reports generalized abdominal pain score 8/10 and not tolerable SCALE USED: (1-10 VNRS). Pt describes pain as aching, sharp, radiating throughout abdomen, not alleviated by current pain medication, exacerbated by palpation. Pt is NPO except meds. Denies lethargy, chest pain, SOB, nausea, vomiting, constipation. + colostomy. Patient stated goal for pain control: to be able to take deep breaths, get out of bed to chair and ambulate with tolerable pain control. Pt denies taking medications for pain at home.     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 (27 Sep 2022 10:38)   [ ] Denies ETOH use, illicit drug use and smoking    Allergies    oxycodone (Rash)  penicillins (Urticaria; Rash)    MEDICATIONS  (STANDING):  acetaminophen   IVPB .. 1000 milliGRAM(s) IV Intermittent once  acetaminophen   IVPB .. 1000 milliGRAM(s) IV Intermittent once  allopurinol 100 milliGRAM(s) Oral daily  amLODIPine   Tablet 10 milliGRAM(s) Oral daily  aspirin enteric coated 81 milliGRAM(s) Oral daily  atorvastatin 80 milliGRAM(s) Oral at bedtime  calcitriol   Capsule 0.25 MICROGram(s) Oral daily  cefTRIAXone   IVPB 1000 milliGRAM(s) IV Intermittent every 24 hours  citalopram 20 milliGRAM(s) Oral daily  darunavir 800 mG/cobicstat 150 mG 1 Tablet(s) Oral daily  dextrose 5% + sodium chloride 0.45% with potassium chloride 20 mEq/L 1000 milliLiter(s) (50 mL/Hr) IV Continuous <Continuous>  dextrose 5% + sodium chloride 0.45% with potassium chloride 20 mEq/L 1000 milliLiter(s) (50 mL/Hr) IV Continuous <Continuous>  dolutegravir 50 milliGRAM(s) Oral daily  epoetin neyda-epbx (RETACRIT) Injectable 4000 Unit(s) IV Push <User Schedule>  hydrALAZINE 50 milliGRAM(s) Oral three times a day  isosorbide   mononitrate ER Tablet (IMDUR) 60 milliGRAM(s) Oral daily  lamiVUDine- milliGRAM(s) Oral daily  lidocaine/prilocaine Cream 1 Application(s) Topical <User Schedule>  melatonin 3 milliGRAM(s) Oral at bedtime  metoprolol tartrate 100 milliGRAM(s) Oral two times a day  metroNIDAZOLE  IVPB 500 milliGRAM(s) IV Intermittent every 8 hours  omega-3-Acid Ethyl Esters 4 Gram(s) Oral daily  pantoprazole  Injectable 40 milliGRAM(s) IV Push every 12 hours  senna 2 Tablet(s) Oral at bedtime  zinc sulfate 220 milliGRAM(s) Oral daily    MEDICATIONS  (PRN):  aluminum hydroxide/magnesium hydroxide/simethicone Suspension 30 milliLiter(s) Oral every 4 hours PRN Dyspepsia  ondansetron Injectable 4 milliGRAM(s) IV Push once PRN Nausea and/or Vomiting  ondansetron Injectable 4 milliGRAM(s) IV Push every 8 hours PRN Nausea and/or Vomiting  traMADol 50 milliGRAM(s) Oral every 6 hours PRN Moderate Pain (4 - 6)      Vital Signs Last 24 Hrs  T(C): 36.8 (11 Oct 2022 05:57), Max: 36.8 (11 Oct 2022 00:31)  T(F): 98.2 (11 Oct 2022 05:57), Max: 98.2 (11 Oct 2022 00:31)  HR: 81 (11 Oct 2022 05:57) (65 - 84)  BP: 115/70 (11 Oct 2022 05:57) (83/25 - 130/53)  BP(mean): 69 (11 Oct 2022 01:40) (40 - 79)  RR: 18 (11 Oct 2022 05:57) (13 - 24)  SpO2: 98% (11 Oct 2022 05:57) (98% - 100%)    Parameters below as of 11 Oct 2022 05:57  Patient On (Oxygen Delivery Method): room air        LABS: Reviewed.                          10.7   12.64 )-----------( 226      ( 11 Oct 2022 06:56 )             31.4     10-11    137  |  99  |  10  ----------------------------<  127<H>  4.3   |  25  |  5.17<H>    Ca    8.4      11 Oct 2022 06:40  Phos  3.4     10-10  Mg     2.1     10-10      PT/INR - ( 10 Oct 2022 09:20 )   PT: 16.2 sec;   INR: 1.36 ratio         PTT - ( 10 Oct 2022 09:20 )  PTT:31.8 sec      CAPILLARY BLOOD GLUCOSE      POCT Blood Glucose.: 115 mg/dL (11 Oct 2022 07:52)  POCT Blood Glucose.: 117 mg/dL (11 Oct 2022 02:18)    COVID-19 PCR: NotDetec (10 Oct 2022 06:48)  COVID-19 PCR: NotDetec (05 Oct 2022 11:00)  COVID-19 PCR: NotDetec (03 Oct 2022 06:34)  COVID-19 PCR: NotDetec (27 Sep 2022 00:01)    ORT Score -   Family Hx of substance abuse	Female	      Male  Alcohol 	                                           1                     3  Illegal drugs	                                   2                     3  Rx drugs                                           4 	                  4  Personal Hx of substance abuse		  Alcohol 	                                          3	                  3  Illegal drugs                                     4	                  4  Rx drugs                                            5 	                  5  Age between 16- 45 years	           1                     1  hx preadolescent sexual abuse	   3 	                  0  Psychological disease		  ADD, OCD, bipolar, schizophrenia   2	          2  Depression                                           1 	          1  Total: 0    a score of 3 or lower indicates low risk for opioid abuse		  a score of 4-7 indicates moderate risk for opioid abuse		  a score of 8 or higher indicates high risk for opioid abuse    REVIEW OF SYSTEMS:  CONSTITUTIONAL: No fever or fatigue  HEENT:  No difficulty hearing, no change in vision  NECK: No pain or stiffness  RESPIRATORY: No cough, wheezing, chills or hemoptysis; No shortness of breath  CARDIOVASCULAR: No chest pain, palpitations, dizziness, or leg swelling  GASTROINTESTINAL: + decreased PO intake. + abdominal pain. No nausea, vomiting; No diarrhea or constipation.   GENITOURINARY: No dysuria, frequency, hematuria, retention or incontinence  MUSCULOSKELETAL: No joint pain or swelling; No muscle, back, or extremity pain, no upper or lower motor strength weakness, no saddle anesthesia, bowel/bladder incontinence, no falls   NEURO: No headaches, No numbness/tingling b/l LE, No weakness  PSYCHIATRIC: + depression    PHYSICAL EXAM:  GENERAL:  Alert & Oriented X4, cooperative, NAD, Good concentration. Speech is clear.   RESPIRATORY: Respirations even and unlabored. Clear to auscultation bilaterally; No rales, rhonchi, wheezing, or rubs  CARDIOVASCULAR: Normal S1/S2, regular rate and rhythm; No murmurs, rubs, or gallops. No JVD.   GASTROINTESTINAL:  Soft, + generalized tenderness, Nondistended; No bowel sounds present + right colostomy with mild serosanguinous output noted; + surgical sites gauze dressing c/d/i   GENITOURINARY: Urinary catheter draining clear yellow urine  PERIPHERAL VASCULAR: + left upper arm AV fistula + bruit/ thrill; Extremities warm without edema. 2+ Peripheral Pulses, No cyanosis, No calf tenderness  MUSCULOSKELETAL: Motor Strength 5/5 B/L upper and lower extremities; + decreased ROM b/l LE d/t pain; No tenderness on palpation of all joints.   SKIN: Warm, dry, intact. No rashes, lesions, scars or wounds.     Risk factors associated with adverse outcomes related to opioid treatment  [ ]  Concurrent benzodiazepine use  [ ]  History/ Active substance use or alcohol use disorder  [X ] Psychiatric co-morbidity  [ ] Sleep apnea  [ ] COPD  [ ] BMI> 35  [ ] Liver dysfunction  [ ] Renal dysfunction  [ ] CHF  [ ] Smoker  [X ]  Age > 60 years    [X ]  [place]  Reviewed and Copied to Chart. See below.    Plan of care and goal oriented pain management treatment options were discussed with patient and /or primary care giver; all questions and concerns were addressed and care was aligned with patient's wishes.    Educated patient on goal oriented pain management treatment options

## 2022-10-11 NOTE — PROGRESS NOTE ADULT - ASSESSMENT
ESRD , dialysis days are MWF.   Continue with dialysis MWF.   Post surgery NPO , agree with IV fluid 50 ml per hour. Change IV fluids, DC KCL- am  K was 4.3.    Anemia restart BECKY , no need for Iron IV.   Hypophosphatemia , supplement PO4 as need  and follow lab in am    Diverticulitis, fistula /  abscess placed on Ceftriaxone and Metronidazole. Post IR drain , 4 bacteria in abscess fluid.   Post surgery resection resection small intestine in area onf fistula between it and colon, post Ransom's procedure and left colostomy      Follow up Echocardiogram - EF 55%

## 2022-10-12 LAB
ANION GAP SERPL CALC-SCNC: 10 MMOL/L — SIGNIFICANT CHANGE UP (ref 5–17)
BUN SERPL-MCNC: 19 MG/DL — HIGH (ref 7–18)
CALCIUM SERPL-MCNC: 8.6 MG/DL — SIGNIFICANT CHANGE UP (ref 8.4–10.5)
CHLORIDE SERPL-SCNC: 99 MMOL/L — SIGNIFICANT CHANGE UP (ref 96–108)
CO2 SERPL-SCNC: 23 MMOL/L — SIGNIFICANT CHANGE UP (ref 22–31)
CREAT SERPL-MCNC: 6.59 MG/DL — HIGH (ref 0.5–1.3)
EGFR: 7 ML/MIN/1.73M2 — LOW
GLUCOSE SERPL-MCNC: 109 MG/DL — HIGH (ref 70–99)
HCT VFR BLD CALC: 29.7 % — LOW (ref 34.5–45)
HGB BLD-MCNC: 9.9 G/DL — LOW (ref 11.5–15.5)
MAGNESIUM SERPL-MCNC: 1.7 MG/DL — SIGNIFICANT CHANGE UP (ref 1.6–2.6)
MCHC RBC-ENTMCNC: 30.3 PG — SIGNIFICANT CHANGE UP (ref 27–34)
MCHC RBC-ENTMCNC: 33.3 GM/DL — SIGNIFICANT CHANGE UP (ref 32–36)
MCV RBC AUTO: 90.8 FL — SIGNIFICANT CHANGE UP (ref 80–100)
NRBC # BLD: 0 /100 WBCS — SIGNIFICANT CHANGE UP (ref 0–0)
PHOSPHATE SERPL-MCNC: 4.8 MG/DL — HIGH (ref 2.5–4.5)
PLATELET # BLD AUTO: 198 K/UL — SIGNIFICANT CHANGE UP (ref 150–400)
POTASSIUM SERPL-MCNC: 4.4 MMOL/L — SIGNIFICANT CHANGE UP (ref 3.5–5.3)
POTASSIUM SERPL-SCNC: 4.4 MMOL/L — SIGNIFICANT CHANGE UP (ref 3.5–5.3)
RBC # BLD: 3.27 M/UL — LOW (ref 3.8–5.2)
RBC # FLD: 19.2 % — HIGH (ref 10.3–14.5)
SARS-COV-2 RNA SPEC QL NAA+PROBE: SIGNIFICANT CHANGE UP
SODIUM SERPL-SCNC: 132 MMOL/L — LOW (ref 135–145)
WBC # BLD: 9.72 K/UL — SIGNIFICANT CHANGE UP (ref 3.8–10.5)
WBC # FLD AUTO: 9.72 K/UL — SIGNIFICANT CHANGE UP (ref 3.8–10.5)

## 2022-10-12 PROCEDURE — 99232 SBSQ HOSP IP/OBS MODERATE 35: CPT

## 2022-10-12 RX ORDER — ACETAMINOPHEN 500 MG
1000 TABLET ORAL EVERY 8 HOURS
Refills: 0 | Status: DISCONTINUED | OUTPATIENT
Start: 2022-10-12 | End: 2022-10-13

## 2022-10-12 RX ADMIN — PANTOPRAZOLE SODIUM 40 MILLIGRAM(S): 20 TABLET, DELAYED RELEASE ORAL at 18:00

## 2022-10-12 RX ADMIN — Medication 400 MILLIGRAM(S): at 05:49

## 2022-10-12 RX ADMIN — Medication 1000 MILLIGRAM(S): at 21:50

## 2022-10-12 RX ADMIN — DARUNAVIR ETHANOLATE AND COBICISTAT 1 TABLET(S): 800; 150 TABLET, FILM COATED ORAL at 13:03

## 2022-10-12 RX ADMIN — Medication 400 MILLIGRAM(S): at 00:53

## 2022-10-12 RX ADMIN — Medication 4 GRAM(S): at 13:45

## 2022-10-12 RX ADMIN — Medication 50 MILLIGRAM(S): at 05:49

## 2022-10-12 RX ADMIN — Medication 3 MILLIGRAM(S): at 21:21

## 2022-10-12 RX ADMIN — PANTOPRAZOLE SODIUM 40 MILLIGRAM(S): 20 TABLET, DELAYED RELEASE ORAL at 05:49

## 2022-10-12 RX ADMIN — Medication 400 MILLIGRAM(S): at 21:20

## 2022-10-12 RX ADMIN — Medication 100 MILLIGRAM(S): at 18:40

## 2022-10-12 RX ADMIN — Medication 50 MILLIGRAM(S): at 13:30

## 2022-10-12 RX ADMIN — Medication 81 MILLIGRAM(S): at 12:55

## 2022-10-12 RX ADMIN — CALCITRIOL 0.25 MICROGRAM(S): 0.5 CAPSULE ORAL at 15:03

## 2022-10-12 RX ADMIN — CITALOPRAM 20 MILLIGRAM(S): 10 TABLET, FILM COATED ORAL at 15:00

## 2022-10-12 RX ADMIN — Medication 1000 MILLIGRAM(S): at 01:15

## 2022-10-12 RX ADMIN — AMLODIPINE BESYLATE 10 MILLIGRAM(S): 2.5 TABLET ORAL at 05:50

## 2022-10-12 RX ADMIN — ATORVASTATIN CALCIUM 80 MILLIGRAM(S): 80 TABLET, FILM COATED ORAL at 21:21

## 2022-10-12 RX ADMIN — Medication 100 MILLIGRAM(S): at 05:50

## 2022-10-12 RX ADMIN — CEFTRIAXONE 100 MILLIGRAM(S): 500 INJECTION, POWDER, FOR SOLUTION INTRAMUSCULAR; INTRAVENOUS at 18:40

## 2022-10-12 RX ADMIN — Medication 100 MILLIGRAM(S): at 15:24

## 2022-10-12 RX ADMIN — Medication 1000 MILLIGRAM(S): at 07:02

## 2022-10-12 RX ADMIN — Medication 100 MILLIGRAM(S): at 15:03

## 2022-10-12 RX ADMIN — ONDANSETRON 4 MILLIGRAM(S): 8 TABLET, FILM COATED ORAL at 05:49

## 2022-10-12 RX ADMIN — Medication 100 MILLIGRAM(S): at 21:20

## 2022-10-12 RX ADMIN — GABAPENTIN 100 MILLIGRAM(S): 400 CAPSULE ORAL at 21:25

## 2022-10-12 RX ADMIN — Medication 50 MILLIGRAM(S): at 21:25

## 2022-10-12 RX ADMIN — DOLUTEGRAVIR SODIUM 50 MILLIGRAM(S): 25 TABLET, FILM COATED ORAL at 14:03

## 2022-10-12 RX ADMIN — Medication 100 MILLIGRAM(S): at 15:25

## 2022-10-12 RX ADMIN — ISOSORBIDE MONONITRATE 60 MILLIGRAM(S): 60 TABLET, EXTENDED RELEASE ORAL at 13:55

## 2022-10-12 RX ADMIN — Medication 100 MILLIGRAM(S): at 06:38

## 2022-10-12 RX ADMIN — ZINC SULFATE TAB 220 MG (50 MG ZINC EQUIVALENT) 220 MILLIGRAM(S): 220 (50 ZN) TAB at 16:37

## 2022-10-12 NOTE — PHYSICAL THERAPY INITIAL EVALUATION ADULT - GAIT DEVIATIONS NOTED, PT EVAL
decreased maribel/increased time in double stance/decreased step length
increased time in double stance

## 2022-10-12 NOTE — PROGRESS NOTE ADULT - SUBJECTIVE AND OBJECTIVE BOX
Source of information: VERONICA GOVEA, Chart review  Patient language: English  : n/a    HPI:  This is a 67 year old female, coming from home, with medical history of ESRD(M-W-F), HTN, HIV coming in with abdominal pain. Pt states she has been having abdominal pain for the last 3 days. She went for dialsysis yesterday and afterwards the pain became worse. The abdominal pain comes and goes, is a dull achy pain, rated 8/10. It does not radiate and is located in left lower quadrant and suprapubic region. Pt also has been constipated with last bowl movement being about 4 days prior. She also has been expereincing nause and has had about 5 episodes of vomiting in the last 3 days, they were all watery. She denies any headaches, visual disturbances, dizziness, falls, chest pain, palpitations, lower extremity swelling, fevers, skin rash, recent travel, or sick contacts.   (27 Sep 2022 10:38)    Pain consulted for postoperative abdominal pain. Pt with enterocolic fistula s/p small bowel resection and Alfonso procedure on 10/11 POD #1. Pt reports hives rash with oxycodone in the past. Has been on morphine in the past with no reaction. Pt seen and examined at bedside. Reports generalized abdominal pain score 710 and tolerable SCALE USED: (1-10 VNRS). Reports pain has slightly improved compared to yesterday. Pt describes pain as aching, sharp, radiating throughout abdomen, alleviated by current pain medication, exacerbated by palpation. Pt tolerating clears. Denies lethargy, chest pain, SOB, nausea, vomiting, constipation. + colostomy. Patient stated goal for pain control: to be able to take deep breaths, get out of bed to chair and ambulate with tolerable pain control. Reports she wants to ambulate today. Pt denies taking medications for pain at home.     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 (27 Sep 2022 10:38)   [ ] Denies ETOH use, illicit drug use and smoking    Allergies    oxycodone (Rash)  penicillins (Urticaria; Rash)    MEDICATIONS  (STANDING):  acetaminophen   IVPB .. 1000 milliGRAM(s) IV Intermittent once  allopurinol 100 milliGRAM(s) Oral daily  amLODIPine   Tablet 10 milliGRAM(s) Oral daily  aspirin enteric coated 81 milliGRAM(s) Oral daily  atorvastatin 80 milliGRAM(s) Oral at bedtime  calcitriol   Capsule 0.25 MICROGram(s) Oral daily  cefTRIAXone   IVPB 1000 milliGRAM(s) IV Intermittent every 24 hours  citalopram 20 milliGRAM(s) Oral daily  darunavir 800 mG/cobicstat 150 mG 1 Tablet(s) Oral daily  dolutegravir 50 milliGRAM(s) Oral daily  epoetin neyda-epbx (RETACRIT) Injectable 4000 Unit(s) IV Push <User Schedule>  gabapentin 100 milliGRAM(s) Oral at bedtime  hydrALAZINE 50 milliGRAM(s) Oral three times a day  isosorbide   mononitrate ER Tablet (IMDUR) 60 milliGRAM(s) Oral daily  lamiVUDine- milliGRAM(s) Oral daily  lidocaine/prilocaine Cream 1 Application(s) Topical <User Schedule>  melatonin 3 milliGRAM(s) Oral at bedtime  metoprolol tartrate 100 milliGRAM(s) Oral two times a day  metroNIDAZOLE  IVPB 500 milliGRAM(s) IV Intermittent every 8 hours  omega-3-Acid Ethyl Esters 4 Gram(s) Oral daily  pantoprazole  Injectable 40 milliGRAM(s) IV Push every 12 hours  senna 2 Tablet(s) Oral at bedtime  zinc sulfate 220 milliGRAM(s) Oral daily    MEDICATIONS  (PRN):  aluminum hydroxide/magnesium hydroxide/simethicone Suspension 30 milliLiter(s) Oral every 4 hours PRN Dyspepsia  HYDROmorphone   Tablet 2 milliGRAM(s) Oral every 4 hours PRN Severe Pain (7 - 10)  ondansetron Injectable 4 milliGRAM(s) IV Push every 8 hours PRN Nausea and/or Vomiting  traMADol 50 milliGRAM(s) Oral every 6 hours PRN Moderate Pain (4 - 6)      Vital Signs Last 24 Hrs  T(C): 36.9 (12 Oct 2022 06:00), Max: 37.1 (11 Oct 2022 18:05)  T(F): 98.4 (12 Oct 2022 06:00), Max: 98.7 (11 Oct 2022 18:05)  HR: 81 (12 Oct 2022 06:00) (81 - 81)  BP: 136/68 (12 Oct 2022 06:00) (124/69 - 138/71)  BP(mean): --  RR: 16 (12 Oct 2022 06:00) (16 - 18)  SpO2: 99% (12 Oct 2022 06:00) (97% - 99%)    Parameters below as of 12 Oct 2022 06:00  Patient On (Oxygen Delivery Method): room air      COVID-19 PCR: NotDetec (10 Oct 2022 06:48)  COVID-19 PCR: NotDetec (05 Oct 2022 11:00)  COVID-19 PCR: NotDetec (03 Oct 2022 06:34)  COVID-19 PCR: NotDetec (27 Sep 2022 00:01)    LABS: Reviewed                          9.9    9.72  )-----------( 198      ( 12 Oct 2022 03:35 )             29.7     10-12    132<L>  |  99  |  19<H>  ----------------------------<  109<H>  4.4   |  23  |  6.59<H>    Ca    8.6      12 Oct 2022 03:35  Phos  4.8     10-12  Mg     1.7     10-12            CAPILLARY BLOOD GLUCOSE      POCT Blood Glucose.: 95 mg/dL (11 Oct 2022 21:48)    COVID-19 PCR: NotDetec (10 Oct 2022 06:48)  COVID-19 PCR: NotDetec (05 Oct 2022 11:00)  COVID-19 PCR: NotDetec (03 Oct 2022 06:34)  COVID-19 PCR: NotDetec (27 Sep 2022 00:01)    ORT Score -   Family Hx of substance abuse	Female	      Male  Alcohol 	                                           1                     3  Illegal drugs	                                   2                     3  Rx drugs                                           4 	                  4  Personal Hx of substance abuse		  Alcohol 	                                          3	                  3  Illegal drugs                                     4	                  4  Rx drugs                                            5 	                  5  Age between 16- 45 years	           1                     1  hx preadolescent sexual abuse	   3 	                  0  Psychological disease		  ADD, OCD, bipolar, schizophrenia   2	          2  Depression                                           1 	          1  Total: 0    a score of 3 or lower indicates low risk for opioid abuse		  a score of 4-7 indicates moderate risk for opioid abuse		  a score of 8 or higher indicates high risk for opioid abuse    REVIEW OF SYSTEMS:  CONSTITUTIONAL: No fever or fatigue  HEENT:  No difficulty hearing, no change in vision  NECK: No pain or stiffness  RESPIRATORY: No cough, wheezing, chills or hemoptysis; No shortness of breath  CARDIOVASCULAR: No chest pain, palpitations, dizziness, or leg swelling  GASTROINTESTINAL: + decreased PO intake. + abdominal pain. No nausea, vomiting; No diarrhea or constipation.   GENITOURINARY: No dysuria, frequency, hematuria, retention or incontinence  MUSCULOSKELETAL: No joint pain or swelling; No muscle, back, or extremity pain, no upper or lower motor strength weakness, no saddle anesthesia, bowel/bladder incontinence, no falls   NEURO: No headaches, No numbness/tingling b/l LE, No weakness  PSYCHIATRIC: + depression    PHYSICAL EXAM:  GENERAL:  Alert & Oriented X4, cooperative, NAD, Good concentration. Speech is clear.   RESPIRATORY: Respirations even and unlabored. Clear to auscultation bilaterally; No rales, rhonchi, wheezing, or rubs  CARDIOVASCULAR: Normal S1/S2, regular rate and rhythm; No murmurs, rubs, or gallops. No JVD.   GASTROINTESTINAL:  Soft, + generalized tenderness, Nondistended; No bowel sounds present + right colostomy with mild serosanguinous output noted; + surgical sites gauze dressing c/d/i, right sided staples c/d/i   GENITOURINARY: Urinary catheter draining small amount of clear yellow urine  PERIPHERAL VASCULAR: + left upper arm AV fistula dressing c/d/i + bruit/ thrill; Extremities warm without edema. 2+ Peripheral Pulses, No cyanosis, No calf tenderness  MUSCULOSKELETAL: Motor Strength 5/5 B/L upper and lower extremities; + decreased ROM b/l LE d/t pain; No tenderness on palpation of all joints.   SKIN: Warm, dry, intact. No rashes, lesions, scars or wounds.     Risk factors associated with adverse outcomes related to opioid treatment  [ ]  Concurrent benzodiazepine use  [ ]  History/ Active substance use or alcohol use disorder  [X ] Psychiatric co-morbidity  [ ] Sleep apnea  [ ] COPD  [ ] BMI> 35  [ ] Liver dysfunction  [ ] Renal dysfunction  [ ] CHF  [ ] Smoker  [X ]  Age > 60 years    [X ]  [place]  Reviewed and Copied to Chart. See below.    Plan of care and goal oriented pain management treatment options were discussed with patient and /or primary care giver; all questions and concerns were addressed and care was aligned with patient's wishes.    Educated patient on goal oriented pain management treatment options     10-12-22 @ 13:16

## 2022-10-12 NOTE — PROGRESS NOTE ADULT - ASSESSMENT
ESRD , dialysis days are MWF.    Clotted AVF , surgery is aware and will contact Vascular surgery, in need for temporary catheter.   Postpone dialysis for tomorrow. DC IV fluids     Anemia restart BECKY , no need for Iron IV.   Hypophosphatemia , supplement PO4 as need  and follow lab in am  Continue to monitor Magnesium level.    Diverticulitis, fistula /  abscess placed on Ceftriaxone and Metronidazole. Post IR drain , 4 bacteria in abscess fluid.   Post surgery resection resection small intestine in area onf fistula between it and colon, post Jet's procedure and left colostomy      Follow up Echocardiogram - EF 55%

## 2022-10-12 NOTE — PROGRESS NOTE ADULT - SUBJECTIVE AND OBJECTIVE BOX
Patient seen and examined at bedside. POD2 s/p small bowel resection and primary anastomosis and Alfonso's for entercolic fistula secondary to diverticulitis. Patient complaining of abdominal pain this morning however it is improved. Some gas noted in ostomy bag yesterday but still only bowel sweat, some nausea, on zofran. Farias in place clear yellow urine. ROS otherwise negative.    Vital Signs Last 24 Hrs  T(C): 36.9 (12 Oct 2022 06:00), Max: 37.1 (11 Oct 2022 18:05)  T(F): 98.4 (12 Oct 2022 06:00), Max: 98.7 (11 Oct 2022 18:05)  HR: 81 (12 Oct 2022 06:00) (74 - 81)  BP: 136/68 (12 Oct 2022 06:00) (124/69 - 138/71)  BP(mean): --  RR: 16 (12 Oct 2022 06:00) (16 - 18)  SpO2: 99% (12 Oct 2022 06:00) (97% - 100%)    Parameters below as of 12 Oct 2022 06:00  Patient On (Oxygen Delivery Method): room air      10-11-22 @ 07:01  -  10-12-22 @ 07:00  --------------------------------------------------------  IN: 840 mL / OUT: 250 mL / NET: 590 mL                            9.9    9.72  )-----------( 198      ( 12 Oct 2022 03:35 )             29.7     10-12    132<L>  |  99  |  19<H>  ----------------------------<  109<H>  4.4   |  23  |  6.59<H>    Ca    8.6      12 Oct 2022 03:35  Phos  4.8     10-12  Mg     1.7     10-12        General: mild distress secondary to pain  Cardio: RRR  Resp: normal resp effort  Abd: soft, appropriately tender to palpation, nondistended, dressings c/d/i, ostomy nonproductive and appears pink and viable  : Farias in place

## 2022-10-12 NOTE — PHYSICAL THERAPY INITIAL EVALUATION ADULT - GENERAL OBSERVATIONS, REHAB EVAL
Pt seen supine in bed with IV, (R) side drain, denied any c/o pain/discomfort, was cooperative during assessment
Patient received in bed. Sister is present

## 2022-10-12 NOTE — PROGRESS NOTE ADULT - ATTENDING COMMENTS
Pt seen and examined. Pain control much improved.  Tolerating clear liquids. Unable to palpate thrill in LUE AV fistula. Last HD was Monday prior to surgery. Abd- soft, non distended, minimal midline incisional tenderness, scant seepage on dressing. Ostomy viable, + gas.  No guarding no rebound.   LUE AV fistula- Slight pulse palpable, no thrill palpable or audible with auscultation  Labs reviewed- WBC improved, hgb stable  Pt for HD today. Vascular eval of AV fistula. If unable to utilize AV- fistula will require HD cath placement.  D/c terry, physical therapy consult to help with mobilization

## 2022-10-12 NOTE — PHYSICAL THERAPY INITIAL EVALUATION ADULT - PERTINENT HX OF CURRENT PROBLEM, REHAB EVAL
This is a 67 year old female, coming from home, with medical history of ESRD(M-W-F), HTN, HIV coming in with abdominal pain. s/p CT guided drainage of abdominal abscess on 9/28 with 45cc of purulent discharge. Small bowel resection & Alfonso procedure on 10/11/22
Pt admitted with c/o abdominal pain, nausea and vomiting - s/p CT guided drainage of abdominal abscess on 9/28 with 45cc of purulent discharge, LLQ IR drain in place

## 2022-10-12 NOTE — CHART NOTE - NSCHARTNOTEFT_GEN_A_CORE
Assessment:   Patient is a 67y old  Female who presents with a chief complaint of Abdominal pain (12 Oct 2022 07:36). 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 ESRD/ HD.  Pt seen, at lunch, taking little, c/o nausea. Pt clear liquids/ NPO x ~6 days.      Factors impacting intake: [ ] none [ ] nausea  [ ] vomiting [ ] diarrhea [ ] constipation  [ ]chewing problems [ ] swallowing issues  [x ] other: altered GI fx/ structure, ESRD/ HD.  Diet Prescription: Diet, Clear Liquid (10-11-22 @ 19:41)      Daily     Daily Weight in k.9 (10 Oct 2022 11:42)  Weight in k.1 (10 Oct 2022 09:10)  Weight in k (09 Oct 2022 05:22)  Weight in k.6 (07 Oct 2022 12:20)  Weight in k.9 (07 Oct 2022 09:00)  Weight in k.2 (05 Oct 2022 19:03)  Weight in k.5 (05 Oct 2022 16:03)  Weight in k (05 Oct 2022 05:21)  Weight in k (03 Oct 2022 17:43)  Weight in k.5 (03 Oct 2022 14:30)  Weight in k.5 (01 Oct 2022 20:30)  Weight in k.1 (01 Oct 2022 17:28)  Weight in k.5 (28 Sep 2022 21:23)  Weight in k.3 (28 Sep 2022 17:35)    % Weight Change: +sx, +HD pt    Pertinent Medications: MEDICATIONS  (STANDING):  acetaminophen   IVPB .. 1000 milliGRAM(s) IV Intermittent once  allopurinol 100 milliGRAM(s) Oral daily  amLODIPine   Tablet 10 milliGRAM(s) Oral daily  aspirin enteric coated 81 milliGRAM(s) Oral daily  atorvastatin 80 milliGRAM(s) Oral at bedtime  calcitriol   Capsule 0.25 MICROGram(s) Oral daily  cefTRIAXone   IVPB 1000 milliGRAM(s) IV Intermittent every 24 hours  citalopram 20 milliGRAM(s) Oral daily  darunavir 800 mG/cobicstat 150 mG 1 Tablet(s) Oral daily  dextrose 5% + sodium chloride 0.9%. 1000 milliLiter(s) (40 mL/Hr) IV Continuous <Continuous>  dolutegravir 50 milliGRAM(s) Oral daily  epoetin neyda-epbx (RETACRIT) Injectable 4000 Unit(s) IV Push <User Schedule>  gabapentin 100 milliGRAM(s) Oral at bedtime  hydrALAZINE 50 milliGRAM(s) Oral three times a day  isosorbide   mononitrate ER Tablet (IMDUR) 60 milliGRAM(s) Oral daily  lamiVUDine- milliGRAM(s) Oral daily  lidocaine/prilocaine Cream 1 Application(s) Topical <User Schedule>  melatonin 3 milliGRAM(s) Oral at bedtime  metoprolol tartrate 100 milliGRAM(s) Oral two times a day  metroNIDAZOLE  IVPB 500 milliGRAM(s) IV Intermittent every 8 hours  omega-3-Acid Ethyl Esters 4 Gram(s) Oral daily  pantoprazole  Injectable 40 milliGRAM(s) IV Push every 12 hours  senna 2 Tablet(s) Oral at bedtime  zinc sulfate 220 milliGRAM(s) Oral daily    MEDICATIONS  (PRN):  aluminum hydroxide/magnesium hydroxide/simethicone Suspension 30 milliLiter(s) Oral every 4 hours PRN Dyspepsia  HYDROmorphone   Tablet 2 milliGRAM(s) Oral every 4 hours PRN Severe Pain (7 - 10)  ondansetron Injectable 4 milliGRAM(s) IV Push every 8 hours PRN Nausea and/or Vomiting  traMADol 50 milliGRAM(s) Oral every 6 hours PRN Moderate Pain (4 - 6)    Pertinent Labs: 10-12 Na132 mmol/L<L> Glu 109 mg/dL<H> K+ 4.4 mmol/L Cr  6.59 mg/dL<H> BUN 19 mg/dL<H> 10-12 Phos 4.8 mg/dL<H> 10-06 Alb 2.7 g/dL<L>     CAPILLARY BLOOD GLUCOSE      POCT Blood Glucose.: 95 mg/dL (11 Oct 2022 21:48)  POCT Blood Glucose.: 126 mg/dL (11 Oct 2022 12:32)        Previous Nutrition Diagnosis:   [ ] Altered GI function  [x ]Inadequate Oral Intake [ ] Swallowing Difficulty   [ ] Altered nutrition related labs [ ] Increased Nutrient Needs [ ] Overweight/Obesity   [ ] Unintended Weight Loss [ ] Food & Nutrition Related Knowledge Deficit [ ] Malnutrition   [ ] Other:     Nutrition Diagnosis is [x ] ongoing  [ ] resolved [ ] not applicable     Interventions:   Recommend  [ ] Change Diet To:  [X ] Nutrition Supplement: Consider adding Ensure clears TID to clears  [ ] Nutrition Support  [x ] Other: Diet advancementper Sx    Monitoring and Evaluation:   [x ] PO intake [ x ] Tolerance to diet prescription [ x ] weights [ x ] labs[ x ] follow up per protocol  [ ] other:

## 2022-10-12 NOTE — PROGRESS NOTE ADULT - ASSESSMENT
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

## 2022-10-12 NOTE — PHYSICAL THERAPY INITIAL EVALUATION ADULT - LEVEL OF INDEPENDENCE: SIT/STAND, REHAB EVAL
Called pt to see who I was faxing the information to. Pt states that she has an appt on 8/22 @ 9:30 over @ [facility]. She doesn't remember who she is seeing. She would like me to fax the LOV note and the labs. I will fax to the # provided. Info faxed as requested.
Patient called stated she need her annual medical records faxed over to [facility].     Fax # [phone]    Best call back # [phone]
minimum assist (75% patients effort)
independent

## 2022-10-12 NOTE — PROGRESS NOTE ADULT - ASSESSMENT
Confidential Drug Utilization Report  Search Terms: Mary Montoya, 1955Search Date: 10/11/2022 08:47:56 AM  The Drug Utilization Report below displays all of the controlled substance prescriptions, if any, that your patient has filled in the last twelve months. The information displayed on this report is compiled from pharmacy submissions to the Department, and accurately reflects the information as submitted by the pharmacies.    This report was requested by: Dayna Watkins | Reference #: [id number]    There are no results for the search terms that you entered.

## 2022-10-12 NOTE — PROGRESS NOTE ADULT - SUBJECTIVE AND OBJECTIVE BOX
Problem List:  ESRD    POD2 s/p small bowel resection and primary anastomosis and Alfonso's for entercolic fistula secondary to diverticulitis. Patient complaining of abdominal pain this morning, but no nausea/vomiting.  Patient tolerates well po clear liquid intake.  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 1000 milliGRAM(s) IV Intermittent once  allopurinol 100 milliGRAM(s) Oral daily  amLODIPine   Tablet 10 milliGRAM(s) Oral daily  aspirin enteric coated 81 milliGRAM(s) Oral daily  atorvastatin 80 milliGRAM(s) Oral at bedtime  calcitriol   Capsule 0.25 MICROGram(s) Oral daily  cefTRIAXone   IVPB 1000 milliGRAM(s) IV Intermittent every 24 hours  citalopram 20 milliGRAM(s) Oral daily  darunavir 800 mG/cobicstat 150 mG 1 Tablet(s) Oral daily  dextrose 5% + sodium chloride 0.9%. 1000 milliLiter(s) (40 mL/Hr) IV Continuous <Continuous>  dolutegravir 50 milliGRAM(s) Oral daily  epoetin neyda-epbx (RETACRIT) Injectable 4000 Unit(s) IV Push <User Schedule>  gabapentin 100 milliGRAM(s) Oral at bedtime  hydrALAZINE 50 milliGRAM(s) Oral three times a day  isosorbide   mononitrate ER Tablet (IMDUR) 60 milliGRAM(s) Oral daily  lamiVUDine- milliGRAM(s) Oral daily  lidocaine/prilocaine Cream 1 Application(s) Topical <User Schedule>  melatonin 3 milliGRAM(s) Oral at bedtime  metoprolol tartrate 100 milliGRAM(s) Oral two times a day  metroNIDAZOLE  IVPB 500 milliGRAM(s) IV Intermittent every 8 hours  omega-3-Acid Ethyl Esters 4 Gram(s) Oral daily  pantoprazole  Injectable 40 milliGRAM(s) IV Push every 12 hours  senna 2 Tablet(s) Oral at bedtime  zinc sulfate 220 milliGRAM(s) Oral daily    MEDICATIONS  (PRN):  aluminum hydroxide/magnesium hydroxide/simethicone Suspension 30 milliLiter(s) Oral every 4 hours PRN Dyspepsia  HYDROmorphone   Tablet 2 milliGRAM(s) Oral every 4 hours PRN Severe Pain (7 - 10)  ondansetron Injectable 4 milliGRAM(s) IV Push every 8 hours PRN Nausea and/or Vomiting  traMADol 50 milliGRAM(s) Oral every 6 hours PRN Moderate Pain (4 - 6)                            9.9    9.72  )-----------( 198      ( 12 Oct 2022 03:35 )             29.7     10-12    132<L>  |  99  |  19<H>  ----------------------------<  109<H>  4.4   |  23  |  6.59<H>    Ca    8.6      12 Oct 2022 03:35  Phos  4.8     10-12  Mg     1.7     10-12              REVIEW OF SYSTEMS:  General: no fever no chills, no weight loss.    RESPIRATORY: No cough, wheezing, hemoptysis; No shortness of breath  CARDIOVASCULAR: No chest pain or palpitations. No Edema  GASTROINTESTINAL: c/o abdominal pain in surgery area  GENITOURINARY: No dysuria, frequency, foamy urine, urinary urgency, incontinence or hematuria  NEUROLOGICAL: No numbness or weakness, no tremor , no dizziness.   Muscle skeletal : no joint pain and no swelling of joints and limbs.  SKIN: No itching, burning, rashes.        VITALS:  T(F): 98.4 (10-12-22 @ 06:00), Max: 98.7 (10-11-22 @ 18:05)  HR: 81 (10-12-22 @ 06:00)  BP: 136/68 (10-12-22 @ 06:00)  RR: 16 (10-12-22 @ 06:00)  SpO2: 99% (10-12-22 @ 06:00)  Wt(kg): --    10-11 @ 07:01  -  10-12 @ 07:00  --------------------------------------------------------  IN: 840 mL / OUT: 250 mL / NET: 590 mL        PHYSICAL EXAM:  Constitutional: well developed, no diaphoresis, no distress.  Neck: No JVD, no carotid bruit, supple, no adenopathy  Respiratory: Good air entrance B/L, no wheezes, rales or rhonchi  Cardiovascular: S1, S2, RRR, no pericardial rub, no murmur  Abdomen: BS+, soft, no tenderness, no bruit, left colostomy bag with small amount of blood  Pelvis: bladder nondistended  Extremities: No edema  Vascular Access: left upper denise AVF clotted no bruit and no thrill

## 2022-10-12 NOTE — PROGRESS NOTE ADULT - PROBLEM SELECTOR PLAN 1
Pt with acute postoperative abdominal pain which is somatic and neuropathic in nature due to enterocolic fistula s/p Small bowel resection and Alfonso procedure on 10/11 POD #1.   Opioid pain recommendations   - Continue Tramadol 50mg PO q 6 hours PRN moderate pain. Monitor for sedation/ respiratory depression.   - Continue Dilaudid 2mg PO q4h PRN severe pain. Monitor for sedation/ respiratory depression.   - Pt reports hives rash with oxycodone.  - Pt reports tolerating morphine in the past with no reaction.   Non-opioid pain recommendations   - Avoid NSAIDs due to ESRD  - Continue Acetaminophen 1 gram IV q 6 hours for 24 hours only completed, now PO q8h x 2 days. Monitor LFTs  - Gabapentin 100mg po at bedtime. Monitor renal function.   Bowel Regimen  - Continue Miralax 17G PO daily  - Continue Senna 2 tablets at bedtime for constipation  Mild pain   - Non-pharmacological pain treatment recommendations  - Warm/ Cool packs PRN   - Repositioning, imagery, relaxation, distraction.  - Physical therapy OOB if no contraindications   Recommendations discussed with primary team and RN. Pt with acute postoperative abdominal pain which is somatic and neuropathic in nature due to enterocolic fistula s/p Small bowel resection and Alfonso procedure on 10/11 POD #1.   Opioid pain recommendations   - Continue Tramadol 50mg PO q 6 hours PRN moderate pain. Monitor for sedation/ respiratory depression.   - Continue Dilaudid 2mg PO q4h PRN severe pain. Monitor for sedation/ respiratory depression.   - Pt reports hives rash with oxycodone.  - Pt reports tolerating morphine in the past with no reaction.   Non-opioid pain recommendations   - Avoid NSAIDs due to ESRD  - Continue Acetaminophen 1 gram IV q 6 hours for 24 hours only completed, now PO q8h x 2 days. Monitor LFTs  - Continue Gabapentin 100mg po at bedtime. Monitor renal function.   Bowel Regimen  - Continue Miralax 17G PO daily  - Continue Senna 2 tablets at bedtime for constipation  Mild pain   - Non-pharmacological pain treatment recommendations  - Warm/ Cool packs PRN   - Repositioning, imagery, relaxation, distraction.  - Physical therapy OOB if no contraindications   Recommendations discussed with primary team and RN.

## 2022-10-13 LAB
ANION GAP SERPL CALC-SCNC: 13 MMOL/L — SIGNIFICANT CHANGE UP (ref 5–17)
BUN SERPL-MCNC: 28 MG/DL — HIGH (ref 7–18)
CALCIUM SERPL-MCNC: 9.1 MG/DL — SIGNIFICANT CHANGE UP (ref 8.4–10.5)
CHLORIDE SERPL-SCNC: 99 MMOL/L — SIGNIFICANT CHANGE UP (ref 96–108)
CO2 SERPL-SCNC: 20 MMOL/L — LOW (ref 22–31)
CREAT SERPL-MCNC: 7.74 MG/DL — HIGH (ref 0.5–1.3)
EGFR: 5 ML/MIN/1.73M2 — LOW
GLUCOSE BLDC GLUCOMTR-MCNC: 70 MG/DL — SIGNIFICANT CHANGE UP (ref 70–99)
GLUCOSE BLDC GLUCOMTR-MCNC: 79 MG/DL — SIGNIFICANT CHANGE UP (ref 70–99)
GLUCOSE BLDC GLUCOMTR-MCNC: 80 MG/DL — SIGNIFICANT CHANGE UP (ref 70–99)
GLUCOSE BLDC GLUCOMTR-MCNC: 80 MG/DL — SIGNIFICANT CHANGE UP (ref 70–99)
GLUCOSE SERPL-MCNC: 81 MG/DL — SIGNIFICANT CHANGE UP (ref 70–99)
HCT VFR BLD CALC: 28.3 % — LOW (ref 34.5–45)
HGB BLD-MCNC: 9.5 G/DL — LOW (ref 11.5–15.5)
MAGNESIUM SERPL-MCNC: 1.9 MG/DL — SIGNIFICANT CHANGE UP (ref 1.6–2.6)
MCHC RBC-ENTMCNC: 30.6 PG — SIGNIFICANT CHANGE UP (ref 27–34)
MCHC RBC-ENTMCNC: 33.6 GM/DL — SIGNIFICANT CHANGE UP (ref 32–36)
MCV RBC AUTO: 91.3 FL — SIGNIFICANT CHANGE UP (ref 80–100)
NRBC # BLD: 0 /100 WBCS — SIGNIFICANT CHANGE UP (ref 0–0)
PHOSPHATE SERPL-MCNC: 4.2 MG/DL — SIGNIFICANT CHANGE UP (ref 2.5–4.5)
PLATELET # BLD AUTO: 208 K/UL — SIGNIFICANT CHANGE UP (ref 150–400)
POTASSIUM SERPL-MCNC: 4.5 MMOL/L — SIGNIFICANT CHANGE UP (ref 3.5–5.3)
POTASSIUM SERPL-SCNC: 4.5 MMOL/L — SIGNIFICANT CHANGE UP (ref 3.5–5.3)
RBC # BLD: 3.1 M/UL — LOW (ref 3.8–5.2)
RBC # FLD: 18.9 % — HIGH (ref 10.3–14.5)
SODIUM SERPL-SCNC: 132 MMOL/L — LOW (ref 135–145)
WBC # BLD: 7.96 K/UL — SIGNIFICANT CHANGE UP (ref 3.8–10.5)
WBC # FLD AUTO: 7.96 K/UL — SIGNIFICANT CHANGE UP (ref 3.8–10.5)

## 2022-10-13 PROCEDURE — 99232 SBSQ HOSP IP/OBS MODERATE 35: CPT

## 2022-10-13 PROCEDURE — 99223 1ST HOSP IP/OBS HIGH 75: CPT

## 2022-10-13 RX ORDER — FUROSEMIDE 40 MG
60 TABLET ORAL ONCE
Refills: 0 | Status: COMPLETED | OUTPATIENT
Start: 2022-10-13 | End: 2022-10-13

## 2022-10-13 RX ORDER — HEPARIN SODIUM 5000 [USP'U]/ML
5000 INJECTION INTRAVENOUS; SUBCUTANEOUS EVERY 8 HOURS
Refills: 0 | Status: COMPLETED | OUTPATIENT
Start: 2022-10-13 | End: 2022-10-13

## 2022-10-13 RX ORDER — ACETAMINOPHEN 500 MG
1000 TABLET ORAL EVERY 8 HOURS
Refills: 0 | Status: DISCONTINUED | OUTPATIENT
Start: 2022-10-13 | End: 2022-10-19

## 2022-10-13 RX ADMIN — Medication 100 MILLIGRAM(S): at 05:39

## 2022-10-13 RX ADMIN — Medication 50 MILLIGRAM(S): at 05:33

## 2022-10-13 RX ADMIN — Medication 100 MILLIGRAM(S): at 12:53

## 2022-10-13 RX ADMIN — CITALOPRAM 20 MILLIGRAM(S): 10 TABLET, FILM COATED ORAL at 12:58

## 2022-10-13 RX ADMIN — DARUNAVIR ETHANOLATE AND COBICISTAT 1 TABLET(S): 800; 150 TABLET, FILM COATED ORAL at 12:55

## 2022-10-13 RX ADMIN — ZINC SULFATE TAB 220 MG (50 MG ZINC EQUIVALENT) 220 MILLIGRAM(S): 220 (50 ZN) TAB at 12:56

## 2022-10-13 RX ADMIN — DOLUTEGRAVIR SODIUM 50 MILLIGRAM(S): 25 TABLET, FILM COATED ORAL at 12:54

## 2022-10-13 RX ADMIN — Medication 100 MILLIGRAM(S): at 17:50

## 2022-10-13 RX ADMIN — Medication 100 MILLIGRAM(S): at 05:38

## 2022-10-13 RX ADMIN — CALCITRIOL 0.25 MICROGRAM(S): 0.5 CAPSULE ORAL at 12:53

## 2022-10-13 RX ADMIN — CEFTRIAXONE 100 MILLIGRAM(S): 500 INJECTION, POWDER, FOR SOLUTION INTRAMUSCULAR; INTRAVENOUS at 17:50

## 2022-10-13 RX ADMIN — HEPARIN SODIUM 5000 UNIT(S): 5000 INJECTION INTRAVENOUS; SUBCUTANEOUS at 22:16

## 2022-10-13 RX ADMIN — HEPARIN SODIUM 5000 UNIT(S): 5000 INJECTION INTRAVENOUS; SUBCUTANEOUS at 14:35

## 2022-10-13 RX ADMIN — Medication 100 MILLIGRAM(S): at 22:18

## 2022-10-13 RX ADMIN — ISOSORBIDE MONONITRATE 60 MILLIGRAM(S): 60 TABLET, EXTENDED RELEASE ORAL at 12:55

## 2022-10-13 RX ADMIN — GABAPENTIN 100 MILLIGRAM(S): 400 CAPSULE ORAL at 22:17

## 2022-10-13 RX ADMIN — SENNA PLUS 2 TABLET(S): 8.6 TABLET ORAL at 22:17

## 2022-10-13 RX ADMIN — Medication 50 MILLIGRAM(S): at 14:35

## 2022-10-13 RX ADMIN — AMLODIPINE BESYLATE 10 MILLIGRAM(S): 2.5 TABLET ORAL at 05:33

## 2022-10-13 RX ADMIN — Medication 1 GRAM(S): at 12:56

## 2022-10-13 RX ADMIN — PANTOPRAZOLE SODIUM 40 MILLIGRAM(S): 20 TABLET, DELAYED RELEASE ORAL at 17:52

## 2022-10-13 RX ADMIN — Medication 100 MILLIGRAM(S): at 13:04

## 2022-10-13 RX ADMIN — PANTOPRAZOLE SODIUM 40 MILLIGRAM(S): 20 TABLET, DELAYED RELEASE ORAL at 05:38

## 2022-10-13 RX ADMIN — ATORVASTATIN CALCIUM 80 MILLIGRAM(S): 80 TABLET, FILM COATED ORAL at 22:17

## 2022-10-13 RX ADMIN — Medication 50 MILLIGRAM(S): at 22:17

## 2022-10-13 RX ADMIN — Medication 100 MILLIGRAM(S): at 12:56

## 2022-10-13 RX ADMIN — Medication 3 MILLIGRAM(S): at 22:20

## 2022-10-13 RX ADMIN — Medication 60 MILLIGRAM(S): at 13:02

## 2022-10-13 NOTE — PROGRESS NOTE ADULT - SUBJECTIVE AND OBJECTIVE BOX
Source of information: VERONICA GOVEA, Chart review  Patient language: English  : n/a    HPI:  This is a 67 year old female, coming from home, with medical history of ESRD(M-W-F), HTN, HIV coming in with abdominal pain. Pt states she has been having abdominal pain for the last 3 days. She went for dialsysis yesterday and afterwards the pain became worse. The abdominal pain comes and goes, is a dull achy pain, rated 8/10. It does not radiate and is located in left lower quadrant and suprapubic region. Pt also has been constipated with last bowl movement being about 4 days prior. She also has been expereincing nause and has had about 5 episodes of vomiting in the last 3 days, they were all watery. She denies any headaches, visual disturbances, dizziness, falls, chest pain, palpitations, lower extremity swelling, fevers, skin rash, recent travel, or sick contacts.   (27 Sep 2022 10:38)    Pain consulted for postoperative abdominal pain. Pt with enterocolic fistula s/p small bowel resection and Alfonso procedure on 10/11 POD #2. Pt reports hives rash with oxycodone in the past. Has been on morphine in the past with no reaction. Pt seen and examined at bedside. Denies abdominal pain while laying still, reports generalized abdominal pain increases to score 7/10 with movement SCALE USED: (1-10 VNRS). Reports pain is slightly improving since surgery. Pt describes pain as aching, sharp, radiating throughout abdomen, alleviated by current pain medication, exacerbated by palpation. Pt tolerating clears. Denies lethargy, chest pain, SOB, nausea, vomiting, constipation. + colostomy. + voiding post urinary catheter removal. Patient stated goal for pain control: to be able to take deep breaths, get out of bed to chair and ambulate with tolerable pain control. Reports ambulating with PT a few steps. Pt denies taking medications for pain at home. Patient for placement of a tunneled dialysis catheter in IR tomorrow, no bruit thirll over left arm fistula.      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 (27 Sep 2022 10:38)   [ ] Denies ETOH use, illicit drug use and smoking    Allergies    oxycodone (Rash)  penicillins (Urticaria; Rash)    MEDICATIONS  (STANDING):  allopurinol 100 milliGRAM(s) Oral daily  amLODIPine   Tablet 10 milliGRAM(s) Oral daily  atorvastatin 80 milliGRAM(s) Oral at bedtime  calcitriol   Capsule 0.25 MICROGram(s) Oral daily  cefTRIAXone   IVPB 1000 milliGRAM(s) IV Intermittent every 24 hours  citalopram 20 milliGRAM(s) Oral daily  darunavir 800 mG/cobicstat 150 mG 1 Tablet(s) Oral daily  dolutegravir 50 milliGRAM(s) Oral daily  epoetin neyda-epbx (RETACRIT) Injectable 4000 Unit(s) IV Push <User Schedule>  gabapentin 100 milliGRAM(s) Oral at bedtime  heparin   Injectable 5000 Unit(s) SubCutaneous every 8 hours  hydrALAZINE 50 milliGRAM(s) Oral three times a day  isosorbide   mononitrate ER Tablet (IMDUR) 60 milliGRAM(s) Oral daily  lamiVUDine- milliGRAM(s) Oral daily  lidocaine/prilocaine Cream 1 Application(s) Topical <User Schedule>  melatonin 3 milliGRAM(s) Oral at bedtime  metoprolol tartrate 100 milliGRAM(s) Oral two times a day  metroNIDAZOLE  IVPB 500 milliGRAM(s) IV Intermittent every 8 hours  omega-3-Acid Ethyl Esters 1 Gram(s) Oral daily  pantoprazole  Injectable 40 milliGRAM(s) IV Push every 12 hours  senna 2 Tablet(s) Oral at bedtime  zinc sulfate 220 milliGRAM(s) Oral daily    MEDICATIONS  (PRN):  acetaminophen     Tablet .. 1000 milliGRAM(s) Oral every 8 hours PRN Moderate Pain (4 - 6)  aluminum hydroxide/magnesium hydroxide/simethicone Suspension 30 milliLiter(s) Oral every 4 hours PRN Dyspepsia  HYDROmorphone   Tablet 2 milliGRAM(s) Oral every 4 hours PRN Severe Pain (7 - 10)  ondansetron Injectable 4 milliGRAM(s) IV Push every 8 hours PRN Nausea and/or Vomiting      Vital Signs Last 24 Hrs  T(C): 37.4 (13 Oct 2022 13:45), Max: 37.4 (13 Oct 2022 13:45)  T(F): 99.4 (13 Oct 2022 13:45), Max: 99.4 (13 Oct 2022 13:45)  HR: 72 (13 Oct 2022 13:45) (72 - 79)  BP: 135/64 (13 Oct 2022 13:45) (135/64 - 152/81)  BP(mean): 93 (12 Oct 2022 21:11) (93 - 105)  RR: 16 (13 Oct 2022 13:45) (16 - 18)  SpO2: 100% (13 Oct 2022 13:45) (97% - 100%)    Parameters below as of 13 Oct 2022 13:45  Patient On (Oxygen Delivery Method): room air      COVID-19 PCR: NotDetec (12 Oct 2022 19:50)  COVID-19 PCR: NotDetec (10 Oct 2022 06:48)  COVID-19 PCR: NotDetec (05 Oct 2022 11:00)  COVID-19 PCR: NotDetec (03 Oct 2022 06:34)  COVID-19 PCR: NotDetec (27 Sep 2022 00:01)    LABS: Reviewed                          9.5    7.96  )-----------( 208      ( 13 Oct 2022 05:35 )             28.3     10-13    132<L>  |  99  |  28<H>  ----------------------------<  81  4.5   |  20<L>  |  7.74<H>    Ca    9.1      13 Oct 2022 05:35  Phos  4.2     10-13  Mg     1.9     10-13    ORT Score -   Family Hx of substance abuse	Female	      Male  Alcohol 	                                           1                     3  Illegal drugs	                                   2                     3  Rx drugs                                           4 	                  4  Personal Hx of substance abuse		  Alcohol 	                                          3	                  3  Illegal drugs                                     4	                  4  Rx drugs                                            5 	                  5  Age between 16- 45 years	           1                     1  hx preadolescent sexual abuse	   3 	                  0  Psychological disease		  ADD, OCD, bipolar, schizophrenia   2	          2  Depression                                           1 	          1  Total: 0    a score of 3 or lower indicates low risk for opioid abuse		  a score of 4-7 indicates moderate risk for opioid abuse		  a score of 8 or higher indicates high risk for opioid abuse    REVIEW OF SYSTEMS:  CONSTITUTIONAL: No fever or fatigue  HEENT:  No difficulty hearing, no change in vision  NECK: No pain or stiffness  RESPIRATORY: No cough, wheezing, chills or hemoptysis; No shortness of breath  CARDIOVASCULAR: No chest pain, palpitations, dizziness, or leg swelling  GASTROINTESTINAL: + decreased PO intake. + abdominal pain. No nausea, vomiting; No diarrhea or constipation.   GENITOURINARY: No dysuria, frequency, hematuria, retention or incontinence  MUSCULOSKELETAL: No joint pain or swelling; No muscle, back, or extremity pain, no upper or lower motor strength weakness, no saddle anesthesia, bowel/bladder incontinence, no falls   NEURO: No headaches, No numbness/tingling b/l LE, No weakness  PSYCHIATRIC: + depression    PHYSICAL EXAM:  GENERAL:  Alert & Oriented X4, cooperative, NAD, Good concentration. Speech is clear.   RESPIRATORY: Respirations even and unlabored. Clear to auscultation bilaterally; No rales, rhonchi, wheezing, or rubs  CARDIOVASCULAR: Normal S1/S2, regular rate and rhythm; No murmurs, rubs, or gallops. No JVD.   GASTROINTESTINAL:  Soft, + generalized tenderness, Nondistended; No bowel sounds present + right colostomy with mild serosanguinous output noted; + surgical sites gauze dressing c/d/i, right sided staples c/d/i   PERIPHERAL VASCULAR: + left upper arm AV fistula dressing c/d/i No bruit/ thrill (primary team aware); Extremities warm without edema. 2+ Peripheral Pulses, No cyanosis, No calf tenderness  MUSCULOSKELETAL: Motor Strength 5/5 B/L upper and lower extremities; + decreased ROM b/l LE d/t pain; No tenderness on palpation of all joints.   SKIN: Warm, dry, intact. No rashes, lesions, scars or wounds.     Risk factors associated with adverse outcomes related to opioid treatment  [ ]  Concurrent benzodiazepine use  [ ]  History/ Active substance use or alcohol use disorder  [X ] Psychiatric co-morbidity  [ ] Sleep apnea  [ ] COPD  [ ] BMI> 35  [ ] Liver dysfunction  [ ] Renal dysfunction  [ ] CHF  [ ] Smoker  [X ]  Age > 60 years    [X ]  [place]  Reviewed and Copied to Chart. See below.    Plan of care and goal oriented pain management treatment options were discussed with patient and /or primary care giver; all questions and concerns were addressed and care was aligned with patient's wishes.    Educated patient on goal oriented pain management treatment options     10-13-22 @ 14:57

## 2022-10-13 NOTE — PROGRESS NOTE ADULT - ASSESSMENT
ESRD , dialysis days are MWF.    Clotted AVG , surgery is aware, suggest placement of PC and follow up with thrombectomy after discharge.  IR aware of need for PC , plan placement tomorrow.  Lab are ok and patient is stable at this time, follow with dialysis tomorrow after PC placement.    Hold IV fluids     Anemia restart BECKY , no need for Iron IV.   Hypophosphatemia , supplement PO4 as need  and follow lab in am  Continue to monitor Magnesium level.    Diverticulitis, fistula /  abscess placed on Ceftriaxone and Metronidazole. Post IR drain , 4 bacteria in abscess fluid.   Post surgery resection resection small intestine in area of  fistula between it and colon, post Millington's procedure and left colostomy      Follow up Echocardiogram - EF 55%

## 2022-10-13 NOTE — PROGRESS NOTE ADULT - SUBJECTIVE AND OBJECTIVE BOX
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 Patient complaining of abdominal pain this morning, but no nausea/vomiting.  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      ESRD on hemodialysis      History of ectopic pregnancy  Two      H/O: hysterectomy  Due to cervical cancer      S/P arteriovenous (AV) fistula creation  july 10 2020          oxycodone (Rash)  penicillins (Urticaria; Rash)      MEDICATIONS  (STANDING):  allopurinol 100 milliGRAM(s) Oral daily  amLODIPine   Tablet 10 milliGRAM(s) Oral daily  atorvastatin 80 milliGRAM(s) Oral at bedtime  calcitriol   Capsule 0.25 MICROGram(s) Oral daily  cefTRIAXone   IVPB 1000 milliGRAM(s) IV Intermittent every 24 hours  citalopram 20 milliGRAM(s) Oral daily  darunavir 800 mG/cobicstat 150 mG 1 Tablet(s) Oral daily  dolutegravir 50 milliGRAM(s) Oral daily  epoetin neyda-epbx (RETACRIT) Injectable 4000 Unit(s) IV Push <User Schedule>  furosemide   Injectable 60 milliGRAM(s) IV Push once  gabapentin 100 milliGRAM(s) Oral at bedtime  heparin   Injectable 5000 Unit(s) SubCutaneous every 8 hours  hydrALAZINE 50 milliGRAM(s) Oral three times a day  isosorbide   mononitrate ER Tablet (IMDUR) 60 milliGRAM(s) Oral daily  lamiVUDine- milliGRAM(s) Oral daily  lidocaine/prilocaine Cream 1 Application(s) Topical <User Schedule>  melatonin 3 milliGRAM(s) Oral at bedtime  metoprolol tartrate 100 milliGRAM(s) Oral two times a day  metroNIDAZOLE  IVPB 500 milliGRAM(s) IV Intermittent every 8 hours  omega-3-Acid Ethyl Esters 1 Gram(s) Oral daily  pantoprazole  Injectable 40 milliGRAM(s) IV Push every 12 hours  senna 2 Tablet(s) Oral at bedtime  zinc sulfate 220 milliGRAM(s) Oral daily    MEDICATIONS  (PRN):  acetaminophen     Tablet .. 1000 milliGRAM(s) Oral every 8 hours PRN Moderate Pain (4 - 6)  aluminum hydroxide/magnesium hydroxide/simethicone Suspension 30 milliLiter(s) Oral every 4 hours PRN Dyspepsia  HYDROmorphone   Tablet 2 milliGRAM(s) Oral every 4 hours PRN Severe Pain (7 - 10)  ondansetron Injectable 4 milliGRAM(s) IV Push every 8 hours PRN Nausea and/or Vomiting                            9.5    7.96  )-----------( 208      ( 13 Oct 2022 05:35 )             28.3     10-13    132<L>  |  99  |  28<H>  ----------------------------<  81  4.5   |  20<L>  |  7.74<H>    Ca    9.1      13 Oct 2022 05:35  Phos  4.2     10-13  Mg     1.9     10-13              REVIEW OF SYSTEMS:  General: no fever no chills, no weight loss.  EYES/ENT: No visual changes;  No vertigo, no headache.  NECK: No pain or stiffness  RESPIRATORY: No cough, wheezing, hemoptysis; No shortness of breath  CARDIOVASCULAR: No chest pain or palpitations. No Edema  GASTROINTESTINAL: pain in surgery area only with palpation  GENITOURINARY: No dysuria, frequency, foamy urine, urinary urgency, incontinence or hematuria  NEUROLOGICAL: No numbness or weakness, no tremor , no dizziness.           VITALS:  T(F): 98 (10-13-22 @ 05:58), Max: 98.5 (10-12-22 @ 17:10)  HR: 79 (10-13-22 @ 05:58)  BP: 136/64 (10-13-22 @ 05:58)  RR: 18 (10-13-22 @ 05:58)  SpO2: 99% (10-13-22 @ 05:58)  Wt(kg): --      PHYSICAL EXAM:  Constitutional: well developed, no diaphoresis, no distress.  Neck: No JVD, no carotid bruit, supple, no adenopathy  Respiratory: Good air entrance B/L, no wheezes, rales or rhonchi  Cardiovascular: S1, S2, RRR, no pericardial rub, no murmur  Abdomen: BS+, soft, no tenderness, no bruit, left colostomy bag with no fecal matreial.    Pelvis: bladder nondistended  Extremities: No cyanosis or clubbing. No peripheral edema.   Left AVG with no thrill and no bruit   Clotted.

## 2022-10-13 NOTE — PROGRESS NOTE ADULT - ATTENDING COMMENTS
Agree with note as outlined by Dr. Toney above. Pt seen and examined. Pain much improved at incision site, now c/o pain mostly with coughing.   AV fistula without thrill. Case d/w Dr. Dickens, recommends HD cath to be placed by IR for now. Pt ok for HD tomorrow per Nehrology after HD catheter placed by IR.  LAbs reviewed, hgb stable, WBC nml.   Abd- soft, minimal incisional tenderness, no guarding, no rebound. Increased gas in stoma compared to yesterday, appears viable. + ostomy sweat.    Advance diet to full liquids

## 2022-10-13 NOTE — PROGRESS NOTE ADULT - SUBJECTIVE AND OBJECTIVE BOX
Patient seen and examined at bedside. Patient complaining of some pain this morning. LUE AVG pulsatile and without thrill, unable to receive dialysis yesterday, vascular eval pending. Denies nausea/vomiting, tolerating clears.    Vital Signs Last 24 Hrs  T(C): 36.7 (13 Oct 2022 05:58), Max: 36.9 (12 Oct 2022 17:10)  T(F): 98 (13 Oct 2022 05:58), Max: 98.5 (12 Oct 2022 17:10)  HR: 79 (13 Oct 2022 05:58) (73 - 79)  BP: 136/64 (13 Oct 2022 05:58) (136/64 - 153/83)  BP(mean): 93 (12 Oct 2022 21:11) (93 - 105)  RR: 18 (13 Oct 2022 05:58) (16 - 18)  SpO2: 99% (13 Oct 2022 05:58) (97% - 99%)    Parameters below as of 13 Oct 2022 05:58  Patient On (Oxygen Delivery Method): room air    General: mild distress secondary to pain  Cardio: RRR  Resp: normal resp effort  Abd: soft, nondistended, appropriately tender, incisions with staples and c/d/i, colostomy appears pink and viable with gas, but no stool  Vascular: LUE AVG with palpable pulse, but no thrill                          9.5    7.96  )-----------( 208      ( 13 Oct 2022 05:35 )             28.3   10-13    132<L>  |  99  |  28<H>  ----------------------------<  81  4.5   |  20<L>  |  7.74<H>    Ca    9.1      13 Oct 2022 05:35  Phos  4.2     10-13  Mg     1.9     10-13

## 2022-10-13 NOTE — PROGRESS NOTE ADULT - SUBJECTIVE AND OBJECTIVE BOX
Patient for placement of a tunneled dialysis catheter in IR tomorrow.  Please keep patient NPO, send early AM labs including CBC, BMP, and PT, INR / PTT.  Hold all anticoagulation, NSAIDS, and antiplatelet medication.

## 2022-10-13 NOTE — CONSULT NOTE ADULT - SUBJECTIVE AND OBJECTIVE BOX
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 Left arm AV graft, which was placed August 2020, noted to be nonfunctioning yesterday with no palpable thrill. Evaluated by dialysis team and will be unable to be used at this time. Vascular consulted to evaluate.    PMHx: as above  PSHx: hysterectomy, L arm AV fistula, L arm AV graft, POD2 from Alfonso's procedure  Allergies: oxycodone, PCN      Vital Signs Last 24 Hrs  T(C): 36.7 (13 Oct 2022 05:58), Max: 36.9 (12 Oct 2022 17:10)  T(F): 98 (13 Oct 2022 05:58), Max: 98.5 (12 Oct 2022 17:10)  HR: 79 (13 Oct 2022 05:58) (73 - 79)  BP: 136/64 (13 Oct 2022 05:58) (136/64 - 153/83)  BP(mean): 93 (12 Oct 2022 21:11) (93 - 105)  RR: 18 (13 Oct 2022 05:58) (16 - 18)  SpO2: 99% (13 Oct 2022 05:58) (97% - 99%)    Parameters below as of 13 Oct 2022 05:58  Patient On (Oxygen Delivery Method): room air    General: NAD  Cardio: RRR  Resp: normal resp effort  Vascular: palpable left radial pulse, palpable pulse in L arm AV graft but no thrill                          9.5    7.96  )-----------( 208      ( 13 Oct 2022 05:35 )             28.3   10-13    132<L>  |  99  |  28<H>  ----------------------------<  81  4.5   |  20<L>  |  7.74<H>    Ca    9.1      13 Oct 2022 05:35  Phos  4.2     10-13  Mg     1.9     10-13

## 2022-10-13 NOTE — PROGRESS NOTE ADULT - ASSESSMENT
Confidential Drug Utilization Report  Search Terms: Mary Montoya, 1955Search Date: 10/11/2022 08:47:56 AM  The Drug Utilization Report below displays all of the controlled substance prescriptions, if any, that your patient has filled in the last twelve months. The information displayed on this report is compiled from pharmacy submissions to the Department, and accurately reflects the information as submitted by the pharmacies.    This report was requested by: Dayna Watkins | Reference #: [id number]    There are no results for the search terms that you entered.

## 2022-10-13 NOTE — PROGRESS NOTE ADULT - PROBLEM SELECTOR PLAN 1
Pt with acute postoperative abdominal pain which is somatic and neuropathic in nature due to enterocolic fistula s/p Small bowel resection and Alfonso procedure on 10/11 POD #2. Patient for placement of a tunneled dialysis catheter in IR tomorrow, no bruit thirll over left arm fistula.    Opioid pain recommendations   - Discontinue Tramadol 50mg PO q 6 hours PRN moderate pain. Monitor for sedation/ respiratory depression.   - Continue Dilaudid 2mg PO q4h PRN severe pain. Monitor for sedation/ respiratory depression.   - Pt reports hives rash with oxycodone.  - Pt reports tolerating morphine in the past with no reaction.   Non-opioid pain recommendations   - Avoid NSAIDs due to ESRD  - Acetaminophen 1 gram PO q8h PRN moderate pain. Monitor LFTs  - Continue Gabapentin 100mg po at bedtime. Monitor renal function.   Bowel Regimen  - Continue Miralax 17G PO daily  - Continue Senna 2 tablets at bedtime for constipation  Mild pain   - Non-pharmacological pain treatment recommendations  - Warm/ Cool packs PRN   - Repositioning, imagery, relaxation, distraction.  - Physical therapy OOB if no contraindications   Recommendations discussed with primary team and RN.

## 2022-10-13 NOTE — PROGRESS NOTE ADULT - ASSESSMENT
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

## 2022-10-14 LAB
ANION GAP SERPL CALC-SCNC: 11 MMOL/L — SIGNIFICANT CHANGE UP (ref 5–17)
APTT BLD: 32.8 SEC — SIGNIFICANT CHANGE UP (ref 27.5–35.5)
BLD GP AB SCN SERPL QL: SIGNIFICANT CHANGE UP
BUN SERPL-MCNC: 40 MG/DL — HIGH (ref 7–18)
CALCIUM SERPL-MCNC: 8.6 MG/DL — SIGNIFICANT CHANGE UP (ref 8.4–10.5)
CHLORIDE SERPL-SCNC: 99 MMOL/L — SIGNIFICANT CHANGE UP (ref 96–108)
CO2 SERPL-SCNC: 21 MMOL/L — LOW (ref 22–31)
CREAT SERPL-MCNC: 9.1 MG/DL — HIGH (ref 0.5–1.3)
EGFR: 4 ML/MIN/1.73M2 — LOW
GLUCOSE BLDC GLUCOMTR-MCNC: 140 MG/DL — HIGH (ref 70–99)
GLUCOSE BLDC GLUCOMTR-MCNC: 78 MG/DL — SIGNIFICANT CHANGE UP (ref 70–99)
GLUCOSE BLDC GLUCOMTR-MCNC: 84 MG/DL — SIGNIFICANT CHANGE UP (ref 70–99)
GLUCOSE SERPL-MCNC: 81 MG/DL — SIGNIFICANT CHANGE UP (ref 70–99)
HCT VFR BLD CALC: 27.9 % — LOW (ref 34.5–45)
HGB BLD-MCNC: 9.4 G/DL — LOW (ref 11.5–15.5)
INR BLD: 1.22 RATIO — HIGH (ref 0.88–1.16)
MAGNESIUM SERPL-MCNC: 1.8 MG/DL — SIGNIFICANT CHANGE UP (ref 1.6–2.6)
MCHC RBC-ENTMCNC: 30.7 PG — SIGNIFICANT CHANGE UP (ref 27–34)
MCHC RBC-ENTMCNC: 33.7 GM/DL — SIGNIFICANT CHANGE UP (ref 32–36)
MCV RBC AUTO: 91.2 FL — SIGNIFICANT CHANGE UP (ref 80–100)
NRBC # BLD: 0 /100 WBCS — SIGNIFICANT CHANGE UP (ref 0–0)
PHOSPHATE SERPL-MCNC: 4.4 MG/DL — SIGNIFICANT CHANGE UP (ref 2.5–4.5)
PLATELET # BLD AUTO: 227 K/UL — SIGNIFICANT CHANGE UP (ref 150–400)
POTASSIUM SERPL-MCNC: 4.5 MMOL/L — SIGNIFICANT CHANGE UP (ref 3.5–5.3)
POTASSIUM SERPL-SCNC: 4.5 MMOL/L — SIGNIFICANT CHANGE UP (ref 3.5–5.3)
PROTHROM AB SERPL-ACNC: 14.6 SEC — HIGH (ref 10.5–13.4)
RBC # BLD: 3.06 M/UL — LOW (ref 3.8–5.2)
RBC # FLD: 18.6 % — HIGH (ref 10.3–14.5)
SODIUM SERPL-SCNC: 131 MMOL/L — LOW (ref 135–145)
WBC # BLD: 6.49 K/UL — SIGNIFICANT CHANGE UP (ref 3.8–10.5)
WBC # FLD AUTO: 6.49 K/UL — SIGNIFICANT CHANGE UP (ref 3.8–10.5)

## 2022-10-14 PROCEDURE — 77001 FLUOROGUIDE FOR VEIN DEVICE: CPT | Mod: 26

## 2022-10-14 PROCEDURE — 76937 US GUIDE VASCULAR ACCESS: CPT | Mod: 26

## 2022-10-14 PROCEDURE — 36558 INSERT TUNNELED CV CATH: CPT

## 2022-10-14 RX ORDER — ASPIRIN/CALCIUM CARB/MAGNESIUM 324 MG
81 TABLET ORAL DAILY
Refills: 0 | Status: DISCONTINUED | OUTPATIENT
Start: 2022-10-15 | End: 2022-10-15

## 2022-10-14 RX ORDER — CHLORHEXIDINE GLUCONATE 213 G/1000ML
1 SOLUTION TOPICAL
Refills: 0 | Status: DISCONTINUED | OUTPATIENT
Start: 2022-10-14 | End: 2022-10-19

## 2022-10-14 RX ORDER — HEPARIN SODIUM 5000 [USP'U]/ML
5000 INJECTION INTRAVENOUS; SUBCUTANEOUS EVERY 8 HOURS
Refills: 0 | Status: DISCONTINUED | OUTPATIENT
Start: 2022-10-14 | End: 2022-10-19

## 2022-10-14 RX ORDER — CHLORHEXIDINE GLUCONATE 213 G/1000ML
1 SOLUTION TOPICAL DAILY
Refills: 0 | Status: DISCONTINUED | OUTPATIENT
Start: 2022-10-15 | End: 2022-10-19

## 2022-10-14 RX ORDER — SODIUM CHLORIDE 9 MG/ML
10 INJECTION INTRAMUSCULAR; INTRAVENOUS; SUBCUTANEOUS
Refills: 0 | Status: DISCONTINUED | OUTPATIENT
Start: 2022-10-14 | End: 2022-10-19

## 2022-10-14 RX ADMIN — Medication 100 MILLIGRAM(S): at 05:09

## 2022-10-14 RX ADMIN — Medication 100 MILLIGRAM(S): at 21:07

## 2022-10-14 RX ADMIN — Medication 50 MILLIGRAM(S): at 13:07

## 2022-10-14 RX ADMIN — ZINC SULFATE TAB 220 MG (50 MG ZINC EQUIVALENT) 220 MILLIGRAM(S): 220 (50 ZN) TAB at 13:08

## 2022-10-14 RX ADMIN — CITALOPRAM 20 MILLIGRAM(S): 10 TABLET, FILM COATED ORAL at 13:07

## 2022-10-14 RX ADMIN — DOLUTEGRAVIR SODIUM 50 MILLIGRAM(S): 25 TABLET, FILM COATED ORAL at 13:07

## 2022-10-14 RX ADMIN — CALCITRIOL 0.25 MICROGRAM(S): 0.5 CAPSULE ORAL at 13:11

## 2022-10-14 RX ADMIN — Medication 1 GRAM(S): at 13:07

## 2022-10-14 RX ADMIN — Medication 100 MILLIGRAM(S): at 13:10

## 2022-10-14 RX ADMIN — Medication 100 MILLIGRAM(S): at 21:04

## 2022-10-14 RX ADMIN — ATORVASTATIN CALCIUM 80 MILLIGRAM(S): 80 TABLET, FILM COATED ORAL at 21:04

## 2022-10-14 RX ADMIN — ERYTHROPOIETIN 4000 UNIT(S): 10000 INJECTION, SOLUTION INTRAVENOUS; SUBCUTANEOUS at 17:19

## 2022-10-14 RX ADMIN — Medication 100 MILLIGRAM(S): at 13:08

## 2022-10-14 RX ADMIN — Medication 100 MILLIGRAM(S): at 13:09

## 2022-10-14 RX ADMIN — GABAPENTIN 100 MILLIGRAM(S): 400 CAPSULE ORAL at 21:04

## 2022-10-14 RX ADMIN — Medication 50 MILLIGRAM(S): at 05:09

## 2022-10-14 RX ADMIN — AMLODIPINE BESYLATE 10 MILLIGRAM(S): 2.5 TABLET ORAL at 05:10

## 2022-10-14 RX ADMIN — DARUNAVIR ETHANOLATE AND COBICISTAT 1 TABLET(S): 800; 150 TABLET, FILM COATED ORAL at 13:07

## 2022-10-14 RX ADMIN — ISOSORBIDE MONONITRATE 60 MILLIGRAM(S): 60 TABLET, EXTENDED RELEASE ORAL at 13:08

## 2022-10-14 RX ADMIN — CEFTRIAXONE 100 MILLIGRAM(S): 500 INJECTION, POWDER, FOR SOLUTION INTRAMUSCULAR; INTRAVENOUS at 22:46

## 2022-10-14 RX ADMIN — Medication 3 MILLIGRAM(S): at 21:04

## 2022-10-14 RX ADMIN — CHLORHEXIDINE GLUCONATE 1 APPLICATION(S): 213 SOLUTION TOPICAL at 16:49

## 2022-10-14 RX ADMIN — PANTOPRAZOLE SODIUM 40 MILLIGRAM(S): 20 TABLET, DELAYED RELEASE ORAL at 05:12

## 2022-10-14 RX ADMIN — HEPARIN SODIUM 5000 UNIT(S): 5000 INJECTION INTRAVENOUS; SUBCUTANEOUS at 21:03

## 2022-10-14 RX ADMIN — PANTOPRAZOLE SODIUM 40 MILLIGRAM(S): 20 TABLET, DELAYED RELEASE ORAL at 21:05

## 2022-10-14 NOTE — PRE PROCEDURE NOTE - PRE PROCEDURE EVALUATION
Interventional Radiology Pre-Procedure Note    Diagnosis/Indication: Patient is a 67y old Female with multiple comorbidities, including ESRD on HD. Tunneled HD catheter placement was requested.     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  ESRD on hemodialysis  History of ectopic pregnancy  Two  H/O: hysterectomy  Due to cervical cancer  S/P arteriovenous (AV) fistula creation  july 10 2020       Allergies: oxycodone (Rash)  penicillins (Urticaria; Rash)      LABS:  CBC Full  -  ( 14 Oct 2022 06:25 )  WBC Count : 6.49 K/uL  RBC Count : 3.06 M/uL  Hemoglobin : 9.4 g/dL  Hematocrit : 27.9 %  Platelet Count - Automated : 227 K/uL  Mean Cell Volume : 91.2 fl  Mean Cell Hemoglobin : 30.7 pg  Mean Cell Hemoglobin Concentration : 33.7 gm/dL    10-14    131<L>  |  99  |  40<H>  ----------------------------<  81  4.5   |  21<L>  |  9.10<H>    Ca    8.6      14 Oct 2022 06:25  Phos  4.4     10-14  Mg     1.8     10-14    PT/INR - ( 14 Oct 2022 06:25 )   PT: 14.6 sec;   INR: 1.22 ratio       PTT - ( 14 Oct 2022 06:25 )  PTT:32.8 sec    Procedure/ risks/ benefits/ alternatives were discussed with the patient, who verbalizes understanding, and witnessed informed consent was obtained.

## 2022-10-14 NOTE — PROGRESS NOTE ADULT - SUBJECTIVE AND OBJECTIVE BOX
Patient seen and examined at bedside. Pain controlled, tolerating diet. NPO currently in preparation for IR permacath placement. Scheduled for dialysis after placement. Colostomy productive with stool present in bag.    Vital Signs Last 24 Hrs  T(C): 36.9 (14 Oct 2022 06:09), Max: 37.4 (13 Oct 2022 13:45)  T(F): 98.4 (14 Oct 2022 06:09), Max: 99.4 (13 Oct 2022 13:45)  HR: 81 (14 Oct 2022 06:09) (69 - 81)  BP: 132/64 (14 Oct 2022 06:09) (118/55 - 135/64)  BP(mean): --  RR: 16 (14 Oct 2022 06:09) (16 - 17)  SpO2: 98% (14 Oct 2022 06:09) (98% - 100%)    Parameters below as of 14 Oct 2022 06:09  Patient On (Oxygen Delivery Method): room air    General: NAD  Cardio: RRR  Resp: normal resp effort  Abd: soft, appropriately tender, productive colostomy with stool and gas in bag, incisions with staples c/d/i  Vascular: LUE AVG without palpable thrill                          9.4    6.49  )-----------( 227      ( 14 Oct 2022 06:25 )             27.9     10-14    131<L>  |  99  |  40<H>  ----------------------------<  81  4.5   |  21<L>  |  9.10<H>    Ca    8.6      14 Oct 2022 06:25  Phos  4.4     10-14  Mg     1.8     10-14

## 2022-10-14 NOTE — PROGRESS NOTE ADULT - ATTENDING COMMENTS
Pt seen and examined. Just returned from HD cath placemen by IR. Resting comfortably. Denies any abdominal pain.  Abd- Soft, non distended. Minimal incisional discomfort. No guarding no rebound. + stool output from colostomy. Labs reviewed. WBC nml, hgb stable.  Plan HD today per nephrology. OK to advance to soft regular diet

## 2022-10-14 NOTE — PROGRESS NOTE ADULT - ASSESSMENT
67F POD3 s/p small bowel resection and Alfonso's, currently with nonfunctioning L arm AVG  -pain control  -IR permacath placement today  -dialysis after permacath  -ambulation, OOBTC  -dispo planning

## 2022-10-14 NOTE — PROGRESS NOTE ADULT - NSPROGADDITIONALINFOA_GEN_ALL_CORE
clinically stable  Drainage less from the tube  No acute issues
Pt seen and examined. Denies abdominal pain. Reports is hungry. States has difficulty with po meds as is only taking clears  WBC normal. Abd soft NT/ND, no guarding no rebound. IR drain rita bilious output, 100 cc over last 24 hours.   Pt with small bowel fistula secondary to perforated diverticulitis. Currently with controlled low output fistula. Currently with benign exam. Advance diet, monitor drain output. If has significant increase in output, worsening signs infection, increased abdominal pain, may require surgical management this admission.
Pt seen and examined.  Having nausea. CT repeated today, abscess resolved. Report pending at time I saw pt.  Abd- soft, non tender, non distended. No LLQ on palpation, no guarding no rebound. Having significant increased output from IR drain.  Labs reviewed, wbc nml.  If continues to have significantly increased output, may require resection during hospitalization.
Pt seen and examined. Just returned from HD cath placemen by IR. Resting comfortably. Denies any abdominal pain.  Abd- Soft, non distended. Minimal incisional discomfort. No guarding no rebound. + stool output from colostomy. Labs reviewed. WBC nml, hgb stable.   Plan  HD today. OK to advance to soft regular diet
Pt seen and examined. Reports feels better today. IR drain with significantly increased output. CT shows resolution of abscess with evidence of fistula sigmoid to small bowel.   Abd- soft, non distended, non tender. No guarding no rebound. IR drain bilious output.  Plan for surgery Monday. Will prep Sunday to hopefully avoid Alfonso's procedure.
Pt seen and examined. Reports overall has much less abdominal pain. Trialed some clears today. Reports IR drain with more output, thinner than yesterday.  Abd- soft, nontender, non distended, no guarding no rebound. + thin watery output from IR drain suspicious for enteric contents due to fistula.   + flatus+ BM. Reports had loose BM when started clears.  Concern for increased IR drain output with trial po intake. Overall pt clinically improved.  No increase in abdominal pain with po. Regardless recommend to keep NPO for now.
Pt seen and examined. Agree with note as outlined by PA above.  Pt reports pain significantly improved. Reports had a bowel movement. Is hungry.  WBC decreased. S/p IR drain placement yesterday.  HD last night.  Abd- soft, non distended, Minimal LLQ tenderness, no guarding no rebound, improved from yesterday's exam. IR drain with watery feculent output compared to yesterday.  Clinically improved. Concern for fistulization of abscess based on CT.  Drain with ~ 20 ml output today, continue to monitor. If remains stable can start trial clears tomorrow. Cont Abx per ID recommendations

## 2022-10-14 NOTE — PRE PROCEDURE NOTE - GENERAL PROCEDURE NAME
[address] [address]    PATIENT NAME:  EDER SOLIS  YOB: 1936  MRN:  [id number]  ATTENDING PHYSICIAN:    ROOM:    DICTATING PHYSICIAN:  Lucina Mcadams M.D.  UNIT#/ACCOUNT#:  [id number]  DATE:        CHIEF COMPLAINT:  Followup on right leg postoperative wound.    INTERIM HISTORY:  82-year-old is here for right anterior leg wound which was  the site of basal cell carcinoma excision. In the last week, he has  experienced more clear drainage.  No pain, redness, fever, etc.     PHYSICAL EXAMINATION:  Reveals that the area of redness has shrunk, but there  is clear drainage, and there seems to be a pocket of fluid underneath the  partial-thickness wound.  No evidence of cellulitis.  Circulation remains  intact.     ASSESSMENT AND PLAN:  Postoperative wound of the right anterior leg with  seems to have some fluid collection underneath. We would recommend that he  come back for surgical debridement early next week with Dr. Aviles.  In  the meantime, we will continue using topical antibiotic and compression.       Lucina Mcadams M.D.    Author ID:  [id number]  Med[employer]Y / REGANN: [id number] / Job#: [id number]  D: 04/26/2019 09:45:55  T: 04/27/2019 04:04:36  
Abdominal abscess drainage
Tunneled HD catheter placement

## 2022-10-14 NOTE — PROGRESS NOTE ADULT - ASSESSMENT
ESRD , dialysis days are MWF.  Follow with dialysis after PC placement today  Clotted AVG , surgery is aware,  placement of PC and follow up with thrombectomy after discharge.     Lab are ok and patient is stable at this time, follow with dialysis tomorrow after PC placement.    Hold IV fluids     Anemia restart BECKY , no need for Iron IV.   Hypophosphatemia , supplement PO4 as need  and follow lab in am  Continue to monitor Magnesium level.    Diverticulitis, fistula /  abscess placed on Ceftriaxone and Metronidazole. Post IR drain , 4 bacteria in abscess fluid.   Post surgery resection resection small intestine in area of  fistula between it and colon, post Jet's procedure and left colostomy      Follow up Echocardiogram - EF 55%

## 2022-10-14 NOTE — PROGRESS NOTE ADULT - SUBJECTIVE AND OBJECTIVE BOX
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 Patient complaining of abdominal pain this morning, but no nausea/vomiting.  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      ESRD on hemodialysis      History of ectopic pregnancy  Two      H/O: hysterectomy  Due to cervical cancer      S/P arteriovenous (AV) fistula creation  july 10 2020          oxycodone (Rash)  penicillins (Urticaria; Rash)      MEDICATIONS  (STANDING):  allopurinol 100 milliGRAM(s) Oral daily  amLODIPine   Tablet 10 milliGRAM(s) Oral daily  atorvastatin 80 milliGRAM(s) Oral at bedtime  calcitriol   Capsule 0.25 MICROGram(s) Oral daily  cefTRIAXone   IVPB 1000 milliGRAM(s) IV Intermittent every 24 hours  chlorhexidine 2% Cloths 1 Application(s) Topical <User Schedule>  citalopram 20 milliGRAM(s) Oral daily  darunavir 800 mG/cobicstat 150 mG 1 Tablet(s) Oral daily  dolutegravir 50 milliGRAM(s) Oral daily  epoetin neyda-epbx (RETACRIT) Injectable 4000 Unit(s) IV Push <User Schedule>  gabapentin 100 milliGRAM(s) Oral at bedtime  hydrALAZINE 50 milliGRAM(s) Oral three times a day  isosorbide   mononitrate ER Tablet (IMDUR) 60 milliGRAM(s) Oral daily  lamiVUDine- milliGRAM(s) Oral daily  lidocaine/prilocaine Cream 1 Application(s) Topical <User Schedule>  melatonin 3 milliGRAM(s) Oral at bedtime  metoprolol tartrate 100 milliGRAM(s) Oral two times a day  metroNIDAZOLE  IVPB 500 milliGRAM(s) IV Intermittent every 8 hours  omega-3-Acid Ethyl Esters 1 Gram(s) Oral daily  pantoprazole  Injectable 40 milliGRAM(s) IV Push every 12 hours  senna 2 Tablet(s) Oral at bedtime  zinc sulfate 220 milliGRAM(s) Oral daily    MEDICATIONS  (PRN):  acetaminophen     Tablet .. 1000 milliGRAM(s) Oral every 8 hours PRN Moderate Pain (4 - 6)  aluminum hydroxide/magnesium hydroxide/simethicone Suspension 30 milliLiter(s) Oral every 4 hours PRN Dyspepsia  HYDROmorphone   Tablet 2 milliGRAM(s) Oral every 4 hours PRN Severe Pain (7 - 10)  ondansetron Injectable 4 milliGRAM(s) IV Push every 8 hours PRN Nausea and/or Vomiting  sodium chloride 0.9% lock flush 10 milliLiter(s) IV Push every 1 hour PRN Pre/post blood products, medications, blood draw, and to maintain line patency                            9.4    6.49  )-----------( 227      ( 14 Oct 2022 06:25 )             27.9     10-14    131<L>  |  99  |  40<H>  ----------------------------<  81  4.5   |  21<L>  |  9.10<H>    Ca    8.6      14 Oct 2022 06:25  Phos  4.4     10-14  Mg     1.8     10-14      PT/INR - ( 14 Oct 2022 06:25 )   PT: 14.6 sec;   INR: 1.22 ratio         PTT - ( 14 Oct 2022 06:25 )  PTT:32.8 sec        REVIEW OF SYSTEMS:  General: no fever no chills, no weight loss.  EYES/ENT: No visual changes;  No vertigo, no headache.  NECK: No pain or stiffness  RESPIRATORY: No cough, wheezing, hemoptysis; No shortness of breath  CARDIOVASCULAR: No chest pain or palpitations. No Edema  GASTROINTESTINAL: No abdominal or epigastric pain. No nausea, vomiting. No diarrhea or constipation. No melena.  GENITOURINARY: No dysuria, frequency, foamy urine, urinary urgency, incontinence or hematuria  NEUROLOGICAL: No numbness or weakness, no tremor , no dizziness.   Muscle skeletal : no joint pain and no swelling of joints and limbs.  SKIN: No itching, burning, rashes.        VITALS:  T(F): 97.8 (10-14-22 @ 13:34), Max: 98.4 (10-14-22 @ 06:09)  HR: 74 (10-14-22 @ 13:34)  BP: 132/73 (10-14-22 @ 13:34)  RR: 18 (10-14-22 @ 13:34)  SpO2: 98% (10-14-22 @ 13:34)  Wt(kg): --      PHYSICAL EXAM:  Constitutional: well developed, no diaphoresis, no distress.  Neck: No JVD, no carotid bruit, supple, no adenopathy  Respiratory: Good air entrance B/L, no wheezes, rales or rhonchi  Cardiovascular: S1, S2, RRR, no pericardial rub, no murmur  Abdomen: BS+, soft, no tenderness, no bruit  Pelvis: bladder nondistended  Extremities: No cyanosis or clubbing. No peripheral edema.     Vascular Access: clotted AVG

## 2022-10-15 ENCOUNTER — TRANSCRIPTION ENCOUNTER (OUTPATIENT)
Age: 67
End: 2022-10-15

## 2022-10-15 LAB
ANION GAP SERPL CALC-SCNC: 9 MMOL/L — SIGNIFICANT CHANGE UP (ref 5–17)
BUN SERPL-MCNC: 24 MG/DL — HIGH (ref 7–18)
CALCIUM SERPL-MCNC: 8.1 MG/DL — LOW (ref 8.4–10.5)
CHLORIDE SERPL-SCNC: 102 MMOL/L — SIGNIFICANT CHANGE UP (ref 96–108)
CO2 SERPL-SCNC: 25 MMOL/L — SIGNIFICANT CHANGE UP (ref 22–31)
CREAT SERPL-MCNC: 6.16 MG/DL — HIGH (ref 0.5–1.3)
EGFR: 7 ML/MIN/1.73M2 — LOW
GLUCOSE BLDC GLUCOMTR-MCNC: 204 MG/DL — HIGH (ref 70–99)
GLUCOSE SERPL-MCNC: 93 MG/DL — SIGNIFICANT CHANGE UP (ref 70–99)
HCT VFR BLD CALC: 25.4 % — LOW (ref 34.5–45)
HGB BLD-MCNC: 8.7 G/DL — LOW (ref 11.5–15.5)
MAGNESIUM SERPL-MCNC: 1.8 MG/DL — SIGNIFICANT CHANGE UP (ref 1.6–2.6)
MCHC RBC-ENTMCNC: 30.9 PG — SIGNIFICANT CHANGE UP (ref 27–34)
MCHC RBC-ENTMCNC: 34.3 GM/DL — SIGNIFICANT CHANGE UP (ref 32–36)
MCV RBC AUTO: 90.1 FL — SIGNIFICANT CHANGE UP (ref 80–100)
NRBC # BLD: 0 /100 WBCS — SIGNIFICANT CHANGE UP (ref 0–0)
PHOSPHATE SERPL-MCNC: 3.1 MG/DL — SIGNIFICANT CHANGE UP (ref 2.5–4.5)
PLATELET # BLD AUTO: 222 K/UL — SIGNIFICANT CHANGE UP (ref 150–400)
POTASSIUM SERPL-MCNC: 4 MMOL/L — SIGNIFICANT CHANGE UP (ref 3.5–5.3)
POTASSIUM SERPL-SCNC: 4 MMOL/L — SIGNIFICANT CHANGE UP (ref 3.5–5.3)
RBC # BLD: 2.82 M/UL — LOW (ref 3.8–5.2)
RBC # FLD: 17.9 % — HIGH (ref 10.3–14.5)
SODIUM SERPL-SCNC: 136 MMOL/L — SIGNIFICANT CHANGE UP (ref 135–145)
WBC # BLD: 5.69 K/UL — SIGNIFICANT CHANGE UP (ref 3.8–10.5)
WBC # FLD AUTO: 5.69 K/UL — SIGNIFICANT CHANGE UP (ref 3.8–10.5)

## 2022-10-15 PROCEDURE — 99233 SBSQ HOSP IP/OBS HIGH 50: CPT

## 2022-10-15 RX ADMIN — DARUNAVIR ETHANOLATE AND COBICISTAT 1 TABLET(S): 800; 150 TABLET, FILM COATED ORAL at 12:43

## 2022-10-15 RX ADMIN — CALCITRIOL 0.25 MICROGRAM(S): 0.5 CAPSULE ORAL at 12:43

## 2022-10-15 RX ADMIN — Medication 100 MILLIGRAM(S): at 12:43

## 2022-10-15 RX ADMIN — HEPARIN SODIUM 5000 UNIT(S): 5000 INJECTION INTRAVENOUS; SUBCUTANEOUS at 21:06

## 2022-10-15 RX ADMIN — PANTOPRAZOLE SODIUM 40 MILLIGRAM(S): 20 TABLET, DELAYED RELEASE ORAL at 06:03

## 2022-10-15 RX ADMIN — Medication 50 MILLIGRAM(S): at 21:07

## 2022-10-15 RX ADMIN — Medication 100 MILLIGRAM(S): at 21:06

## 2022-10-15 RX ADMIN — Medication 100 MILLIGRAM(S): at 14:40

## 2022-10-15 RX ADMIN — GABAPENTIN 100 MILLIGRAM(S): 400 CAPSULE ORAL at 21:07

## 2022-10-15 RX ADMIN — Medication 1000 MILLIGRAM(S): at 14:41

## 2022-10-15 RX ADMIN — SENNA PLUS 2 TABLET(S): 8.6 TABLET ORAL at 21:07

## 2022-10-15 RX ADMIN — Medication 50 MILLIGRAM(S): at 14:41

## 2022-10-15 RX ADMIN — Medication 81 MILLIGRAM(S): at 12:43

## 2022-10-15 RX ADMIN — CEFTRIAXONE 100 MILLIGRAM(S): 500 INJECTION, POWDER, FOR SOLUTION INTRAMUSCULAR; INTRAVENOUS at 18:37

## 2022-10-15 RX ADMIN — ISOSORBIDE MONONITRATE 60 MILLIGRAM(S): 60 TABLET, EXTENDED RELEASE ORAL at 12:44

## 2022-10-15 RX ADMIN — HEPARIN SODIUM 5000 UNIT(S): 5000 INJECTION INTRAVENOUS; SUBCUTANEOUS at 06:03

## 2022-10-15 RX ADMIN — HEPARIN SODIUM 5000 UNIT(S): 5000 INJECTION INTRAVENOUS; SUBCUTANEOUS at 14:41

## 2022-10-15 RX ADMIN — AMLODIPINE BESYLATE 10 MILLIGRAM(S): 2.5 TABLET ORAL at 09:42

## 2022-10-15 RX ADMIN — PANTOPRAZOLE SODIUM 40 MILLIGRAM(S): 20 TABLET, DELAYED RELEASE ORAL at 18:37

## 2022-10-15 RX ADMIN — Medication 1000 MILLIGRAM(S): at 19:52

## 2022-10-15 RX ADMIN — Medication 100 MILLIGRAM(S): at 06:03

## 2022-10-15 RX ADMIN — DOLUTEGRAVIR SODIUM 50 MILLIGRAM(S): 25 TABLET, FILM COATED ORAL at 12:42

## 2022-10-15 RX ADMIN — CITALOPRAM 20 MILLIGRAM(S): 10 TABLET, FILM COATED ORAL at 12:44

## 2022-10-15 RX ADMIN — ATORVASTATIN CALCIUM 80 MILLIGRAM(S): 80 TABLET, FILM COATED ORAL at 21:07

## 2022-10-15 RX ADMIN — Medication 1 GRAM(S): at 12:43

## 2022-10-15 RX ADMIN — ZINC SULFATE TAB 220 MG (50 MG ZINC EQUIVALENT) 220 MILLIGRAM(S): 220 (50 ZN) TAB at 12:44

## 2022-10-15 RX ADMIN — Medication 1000 MILLIGRAM(S): at 15:30

## 2022-10-15 RX ADMIN — CHLORHEXIDINE GLUCONATE 1 APPLICATION(S): 213 SOLUTION TOPICAL at 14:47

## 2022-10-15 RX ADMIN — Medication 100 MILLIGRAM(S): at 18:37

## 2022-10-15 RX ADMIN — Medication 3 MILLIGRAM(S): at 21:07

## 2022-10-15 NOTE — CONSULT NOTE ADULT - PROBLEM SELECTOR RECOMMENDATION 2
BP well controlled  c/w home medication of Metoprolol, Hydralazine, Amlodipine with parameters  monitor BP.

## 2022-10-15 NOTE — CONSULT NOTE ADULT - PROBLEM SELECTOR RECOMMENDATION 3
c/w home medication Tivicay, Prezcobix, lamivudine.   ID dr. Young on board   As per pt wishes she does not want family to know about her +  HIV status

## 2022-10-15 NOTE — PROGRESS NOTE ADULT - ATTENDING COMMENTS
Pt seen and examined. S/p HD yesterday through IR placed catheter. Pt reports she feels ok. Has minimal abdominal incisional pain. Tolerating solid food. Ostomy with gas and stool. Abd- soft, ND, slight incisional tenderness, no guarding no rebound. Ostomy- + stool and gas . WBC nml.  Continue working with PT, disposition planning per their recommendations. Continue dialysis per Nephrology. Cont abx per ID recommendations

## 2022-10-15 NOTE — PROGRESS NOTE ADULT - ASSESSMENT
67F s/p small bowel resection and Alfonso's  -pain control  - continue regular diet  - f/u CM/PT for placement  - ostomy training  -dialysis per nephro schedule  -ambulation, OOBTC  -dispo planning

## 2022-10-15 NOTE — CONSULT NOTE ADULT - ATTENDING COMMENTS
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 S/p small bowel resection and Alfnoso’s.     #Perf Diverticulitis with abscess s/p bowel resection and Alfonso’s  #ESRD  #HIV  #HTN  #GERD  #anemia, normocytic, stable  #gout    -f/u DAVID Young recs: CTX/flagyl, ART  -f/u pain management  -s/p RIJ for HD - f/u nephro for HD - c/w sodium bicarbonate, calcitriol  -BP well controlled on current regimen  -not complaining of GERD sx - c/w ppi  -trend cbc  -SCDs  -DC asa if no indication

## 2022-10-15 NOTE — CONSULT NOTE ADULT - PROBLEM SELECTOR RECOMMENDATION 9
-2/2 to fistulization from the sigmoid colon likely in the setting of chronic diverticular disease.  -s/p IR CT guided drainage 9/28  - Abscess CC + for proteus mirabilis, ecoli, klebsiella pneumoniae  - Cont  IV Ceftriaxone 2g and IV Flagyl 500mg q 8hours as per ID recs  - repeat CT abd/pelv shows  diverticular abscess is collapsed around the pigtail catheter. Mild residual inflammatory changes around the sigmoid colon and small bowel with suggestion of enterocolic fistula.  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 Young on board
Pt with acute postoperative abdominal pain which is somatic and neuropathic in nature due to enterocolic fistula s/p Small bowel resection and Alfonso procedure on 10/11 POD #0.   Opioid pain recommendations   - Continue Tramadol 50mg PO q 6 hours PRN moderate pain. Monitor for sedation/ respiratory depression.   - Dilaudid 0.5mg IV q4h PRN severe pain. Monitor for sedation/ respiratory depression.   - Pt reports hives rash with oxycodone.  - Pt reports tolerating morphine in the past with no reaction.   Non-opioid pain recommendations   - Avoid NSAIDs due to ESRD  - Continue Acetaminophen 1 gram PO q 6 hours for 24 hours only. Monitor LFTs  - Gabapentin 100mg po at bedtime. Monitor renal function.   Bowel Regimen  - Continue Miralax 17G PO daily  - Continue Senna 2 tablets at bedtime for constipation  Mild pain   - Non-pharmacological pain treatment recommendations  - Warm/ Cool packs PRN   - Repositioning, imagery, relaxation, distraction.  - Physical therapy OOB if no contraindications   Recommendations discussed with primary team and RN

## 2022-10-15 NOTE — PROGRESS NOTE ADULT - ASSESSMENT
ESRD , dialysis days are MWF.    Clotted AVG , post   placement of PC,  follow up with thrombectomy after discharge.     Anemia restart BECKY , no need for Iron IV.   Hypophosphatemia po4 3.1 .       Diverticulitis, fistula /  abscess placed on Ceftriaxone and Metronidazole. Post IR drain , 4 bacteria in abscess fluid.   Post surgery resection resection small intestine in area of  fistula between it and colon, post McAlpin's procedure and left colostomy      Follow up Echocardiogram - EF 55%

## 2022-10-15 NOTE — CONSULT NOTE ADULT - ASSESSMENT
67F POD2 s/p Alfonso's and small bowel resection for enterocolic fistula secondary to diverticulitis, currently with nonfunctioning L arm AV graft, dialysis on hold  -recommend tunneled dialysis catheter for HD  -f/u nephrology, if urgent dialysis required and tunneled dialysis catheter unable to be placed today, recommend Shiley catheter placement  -no acute vascular intervention at this time  -re-consult as needed
Confidential Drug Utilization Report  Search Terms: Mary Montoya, 1955Search Date: 10/11/2022 08:47:56 AM  The Drug Utilization Report below displays all of the controlled substance prescriptions, if any, that your patient has filled in the last twelve months. The information displayed on this report is compiled from pharmacy submissions to the Department, and accurately reflects the information as submitted by the pharmacies.    This report was requested by: Dayna Watkins | Reference #: [id number]    There are no results for the search terms that you entered.
68y/o f  with PMHx of HIV, (unknown CD4, VL), HTN, left arm AV fistula and ESRD on hemodialysis (M,W,F at [facility] and her last hemodialysis was yesterday 3 hours long]) is presents to the ED with abd pain x 1 day . Pain is mostly in the LLQ  considered sharp , 8/10 on pain scale with associated nausea, >2 NBNB vomiting and decreased bowel movements. . Pt is still able to make urine. mild leucocytosis. CT shows Acute perf. diverticulitis with contained abscess possible fistulization/ mass.     Keep NPO, with very minimal IVF  IV ABx   Pain/nausea control prn (allergic to oxycodone )  Consider Renal consult-may require HD earlier than neccesary   Consider ID consult- unknown CD4 and VR  Serial Abd Exams   f/up Tumor markers- CEA, CA 19-9, CA-125  Other care/mgmt per primary team  Surgery will follow /update     
ESRD , dialysis days are MWF. Post CT with angiogram on 09/27, required dialysis in 24 hours.  Diverticulitis with abscess placed on Ceftriaxone and Metronidazole. Going for in IR drainage of abscess.    If remain NPO will restart IV fluids 50 ml per hour.    Follow calcium po4  Follow iron studies  Follow BP          
Patient is a 67y old  Female w/ pmhx of HIV, HTN, ESRD on HD M/W/F , admitted for bowel abscess with fistula formation 2/2 advanced diverticular disease, s/p enterocolic fistula w/ resection and anastomosis of involved small bowel + Burdick procedure.

## 2022-10-15 NOTE — DISCHARGE NOTE NURSING/CASE MANAGEMENT/SOCIAL WORK - PATIENT PORTAL LINK FT
You can access the FollowMyHealth Patient Portal offered by [facility] by registering at the following website: [email or web address]. By joining [facility]’s FollowMyHealth portal, you will also be able to view your health information using other applications (apps) compatible with our system.

## 2022-10-15 NOTE — PROGRESS NOTE ADULT - SUBJECTIVE AND OBJECTIVE BOX
Problem List:   POD5 s/p small bowel resection and primary anastomosis and Alfonso's for enterocolic fistula secondary to diverticulitis. Patient complaining of abdominal pain this morning, but no nausea/vomiting.  LUE AVG clotted. 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      ESRD on hemodialysis      History of ectopic pregnancy  Two      H/O: hysterectomy  Due to cervical cancer      S/P arteriovenous (AV) fistula creation  july 10 2020          oxycodone (Rash)  penicillins (Urticaria; Rash)      MEDICATIONS  (STANDING):  allopurinol 100 milliGRAM(s) Oral daily  amLODIPine   Tablet 10 milliGRAM(s) Oral daily  aspirin  chewable 81 milliGRAM(s) Oral daily  atorvastatin 80 milliGRAM(s) Oral at bedtime  calcitriol   Capsule 0.25 MICROGram(s) Oral daily  cefTRIAXone   IVPB 1000 milliGRAM(s) IV Intermittent every 24 hours  chlorhexidine 2% Cloths 1 Application(s) Topical <User Schedule>  chlorhexidine 2% Cloths 1 Application(s) Topical daily  citalopram 20 milliGRAM(s) Oral daily  darunavir 800 mG/cobicstat 150 mG 1 Tablet(s) Oral daily  dolutegravir 50 milliGRAM(s) Oral daily  epoetin neyda-epbx (RETACRIT) Injectable 4000 Unit(s) IV Push <User Schedule>  gabapentin 100 milliGRAM(s) Oral at bedtime  heparin   Injectable 5000 Unit(s) SubCutaneous every 8 hours  hydrALAZINE 50 milliGRAM(s) Oral three times a day  isosorbide   mononitrate ER Tablet (IMDUR) 60 milliGRAM(s) Oral daily  lamiVUDine- milliGRAM(s) Oral daily  lidocaine/prilocaine Cream 1 Application(s) Topical <User Schedule>  melatonin 3 milliGRAM(s) Oral at bedtime  metoprolol tartrate 100 milliGRAM(s) Oral two times a day  metroNIDAZOLE  IVPB 500 milliGRAM(s) IV Intermittent every 8 hours  omega-3-Acid Ethyl Esters 1 Gram(s) Oral daily  pantoprazole  Injectable 40 milliGRAM(s) IV Push every 12 hours  senna 2 Tablet(s) Oral at bedtime  zinc sulfate 220 milliGRAM(s) Oral daily    MEDICATIONS  (PRN):  acetaminophen     Tablet .. 1000 milliGRAM(s) Oral every 8 hours PRN Moderate Pain (4 - 6)  aluminum hydroxide/magnesium hydroxide/simethicone Suspension 30 milliLiter(s) Oral every 4 hours PRN Dyspepsia  HYDROmorphone   Tablet 2 milliGRAM(s) Oral every 4 hours PRN Severe Pain (7 - 10)  ondansetron Injectable 4 milliGRAM(s) IV Push every 8 hours PRN Nausea and/or Vomiting  sodium chloride 0.9% lock flush 10 milliLiter(s) IV Push every 1 hour PRN Pre/post blood products, medications, blood draw, and to maintain line patency                            8.7    5.69  )-----------( 222      ( 15 Oct 2022 05:47 )             25.4     10-15    136  |  102  |  24<H>  ----------------------------<  93  4.0   |  25  |  6.16<H>    Ca    8.1<L>      15 Oct 2022 05:47  Phos  3.1     10-15  Mg     1.8     10-15      PT/INR - ( 14 Oct 2022 06:25 )   PT: 14.6 sec;   INR: 1.22 ratio         PTT - ( 14 Oct 2022 06:25 )  PTT:32.8 sec        REVIEW OF SYSTEMS:  General: no fever no chills, no weight loss.  EYES/ENT: No visual changes;  No vertigo, no headache.  NECK: No pain or stiffness  RESPIRATORY: No cough, wheezing, hemoptysis; No shortness of breath  CARDIOVASCULAR: No chest pain or palpitations. No Edema  GASTROINTESTINAL: No abdominal or epigastric pain. No nausea, vomiting. No diarrhea or constipation. No melena.  GENITOURINARY: No dysuria, frequency, foamy urine, urinary urgency, incontinence or hematuria  NEUROLOGICAL: No numbness or weakness, no tremor , no dizziness.   Muscle skeletal : no joint pain and no swelling of joints and limbs.  SKIN: No itching, burning, rashes.        VITALS:  T(F): 98.6 (10-15-22 @ 05:58), Max: 98.6 (10-15-22 @ 05:58)  HR: 88 (10-15-22 @ 05:58)  BP: 114/68 (10-15-22 @ 05:58)  RR: 17 (10-15-22 @ 05:58)  SpO2: 99% (10-15-22 @ 05:58)  Wt(kg): --      PHYSICAL EXAM:  Constitutional: well developed, no diaphoresis, no distress.  Neck: No JVD, no carotid bruit, supple, no adenopathy  Respiratory: Good air entrance B/L, no wheezes, rales or rhonchi  Cardiovascular: S1, S2, RRR, no pericardial rub, no murmur  Abdomen: BS+, soft, no tenderness, no bruit, left colostomy   Pelvis: bladder nondistended  Extremities: No edema  Vascular Access: clotted Left AVG no erythema  Right IG with no discharge and no erythema

## 2022-10-15 NOTE — PROGRESS NOTE ADULT - SUBJECTIVE AND OBJECTIVE BOX
Permacath placed yesterday and successfully dialyzed through this. Ostomy continues productive and patient tolerating a regular diet. WC 3.7, HD stable, abd exam unchanged.    Vital Signs Last 24 Hrs  T(C): 37 (15 Oct 2022 05:58), Max: 37 (15 Oct 2022 05:58)  T(F): 98.6 (15 Oct 2022 05:58), Max: 98.6 (15 Oct 2022 05:58)  HR: 88 (15 Oct 2022 05:58) (69 - 88)  BP: 114/68 (15 Oct 2022 05:58) (114/68 - 136/78)  BP(mean): 80 (14 Oct 2022 12:11) (80 - 82)  RR: 17 (15 Oct 2022 05:58) (13 - 18)  SpO2: 99% (15 Oct 2022 05:58) (95% - 99%)    Parameters below as of 15 Oct 2022 05:58  Patient On (Oxygen Delivery Method): room air      General: NAD  Cardio: RRR  Resp: normal resp effort  Abd: soft, appropriately tender, productive colostomy with stool and gas in bag, incisions with staples c/d/i  Vascular: LUE AVG without palpable thrill                          8.7    5.69  )-----------( 222      ( 15 Oct 2022 05:47 )             25.4     10-15    136  |  102  |  24<H>  ----------------------------<  93  4.0   |  25  |  6.16<H>    Ca    8.1<L>      15 Oct 2022 05:47  Phos  3.1     10-15  Mg     1.8     10-15

## 2022-10-15 NOTE — CONSULT NOTE ADULT - SUBJECTIVE AND OBJECTIVE BOX
VIPIN GOVEAETTE  67y  Female      Patient is a 67y old  Female w/ pmhx of HIV, HTN, ESRD on HD M/W/F who presented with 3 days hx of Abdominal pain  associated w/ N/V on 9/27/22. Admitted for  bowel abscess with fistula formation 2/2 advanced diverticular disease. Pt was started on ceftriaxone 2 gr daily + metronidazole 500 q 8 hrs as per ID recs. Pt underwent IR guided drainage of 9/28,in which 45cc of purulent fluid were obtained. Given concern for possible underlying malignancy, she was tested for CEA, Ca19-9 and  which were all negative.  Pt went to OR on 10/11 for enterocolic fistula w/ resection and anastomosis of involved small bowel + Burdick procedure. Pt currently w/ colostomy bag. Hospital course complicated by non-functioning AV fistula, RIJ Shiley catheter placed by IR.    At the time of my assessment pt laying in bed comfortably, c/o mild on Left flank were colostomy bag is located and some weakness. States that she does not take ASA at home.         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      ESRD on hemodialysis      History of ectopic pregnancy  Two      H/O: hysterectomy  Due to cervical cancer      S/P arteriovenous (AV) fistula creation  july 10 2020          REVIEW OF SYSTEMS:  CONSTITUTIONAL: No fever, weight loss, or fatigue  EYES: No eye pain, visual disturbances, or discharge  ENMT:  No difficulty hearing, tinnitus, vertigo; No sinus or throat pain  NECK: No pain or stiffness  BREASTS: No pain, masses, or nipple discharge  RESPIRATORY: No cough, wheezing, chills or hemoptysis; No shortness of breath  CARDIOVASCULAR: No chest pain, palpitations, dizziness, or leg swelling  GASTROINTESTINAL: + Abdominal discomfort on Left flank. No nausea, vomiting, or hematemesis; No diarrhea or constipation. No melena or hematochezia.  GENITOURINARY: No dysuria, frequency, hematuria, or incontinence  NEUROLOGICAL: No headaches, memory loss, loss of strength, numbness, or tremors  SKIN: No itching, burning, rashes, or lesions   ENDOCRINE: No heat or cold intolerance; No hair loss  MUSCULOSKELETAL: No joint pain or swelling; No muscle, back, or extremity pain  PSYCHIATRIC: No depression, anxiety, mood swings, or difficulty sleeping  HEME/LYMPH: No easy bruising, or bleeding gums  ALLERY AND IMMUNOLOGIC: No hives or eczema    T(C): 36.7 (10-15-22 @ 13:43), Max: 37 (10-15-22 @ 05:58)  HR: 86 (10-15-22 @ 13:43) (72 - 88)  BP: 146/66 (10-15-22 @ 13:43) (114/68 - 146/66)  RR: 17 (10-15-22 @ 13:43) (16 - 18)  SpO2: 97% (10-15-22 @ 13:43) (97% - 99%)  Wt(kg): --Vital Signs Last 24 Hrs  T(C): 36.7 (15 Oct 2022 13:43), Max: 37 (15 Oct 2022 05:58)  T(F): 98 (15 Oct 2022 13:43), Max: 98.6 (15 Oct 2022 05:58)  HR: 86 (15 Oct 2022 13:43) (72 - 88)  BP: 146/66 (15 Oct 2022 13:43) (114/68 - 146/66)  BP(mean): --  RR: 17 (15 Oct 2022 13:43) (16 - 18)  SpO2: 97% (15 Oct 2022 13:43) (97% - 99%)    Parameters below as of 15 Oct 2022 13:43  Patient On (Oxygen Delivery Method): room air        PHYSICAL EXAM:  GENERAL: NAD, thin  HEAD:  Atraumatic, Normocephalic  EYES: EOMI, PERRLA, conjunctiva and sclera clear  ENMT: No tonsillar erythema, exudates, or enlargement; Moist mucous membranes, Good dentition, No lesions  NECK: Supple, No JVD, RIJ Shiley catheter w/o surrounding erythema  NERVOUS SYSTEM:  Alert & Oriented X3, Good concentration; Motor Strength 5/5 B/L upper and lower extremities; DTRs 2+ intact and symmetric  CHEST/LUNG: Clear to percussion bilaterally; No rales, rhonchi, wheezing, or rubs  HEART: Regular rate and rhythm; No murmurs, rubs, or gallops  ABDOMEN: Soft, Nontender, Nondistended; Bowel sounds present  EXTREMITIES:  2+ Peripheral Pulses, No clubbing, cyanosis, or edema    Consultant(s) Notes Reviewed:  [x ] YES  [ ] NO  Care Discussed with Consultants/Other Providers [ x] YES  [ ] NO    LABS:  CBC   10-15-22 @ 05:47  Hematcorit 25.4  Hemoglobin 8.7  Mean Cell Hemoglobin 30.9  Platelet Count-Automated 222  RBC Count 2.82  Red Cell Distrib Width 17.9  Wbc Count 5.69      BMP  10-15-22 @ 05:47  Anion Gap. Serum 9  Blood Urea Nitrogen,Serm 24  Calcium, Total Serum 8.1  Carbon Dioxide, Serum 25  Chloride, Serum 102  Creatinine, Serum 6.16  eGFR in  --  eGFR in Non Afican American --  Gloucose, serum 93  Potassium, Serum 4.0  Sodium, Serum 136              10-14-22 @ 06:25  Anion Gap. Serum 11  Blood Urea Nitrogen,Serm 40  Calcium, Total Serum 8.6  Carbon Dioxide, Serum 21  Chloride, Serum 99  Creatinine, Serum 9.10  eGFR in  --  eGFR in Non Afican American --  Gloucose, serum 81  Potassium, Serum 4.5  Sodium, Serum 131              10-13-22 @ 05:35  Anion Gap. Serum 13  Blood Urea Nitrogen,Serm 28  Calcium, Total Serum 9.1  Carbon Dioxide, Serum 20  Chloride, Serum 99  Creatinine, Serum 7.74  eGFR in  --  eGFR in Non Afican American --  Gloucose, serum 81  Potassium, Serum 4.5  Sodium, Serum 132                  CMP  10-15-22 @ 05:47  Jelena Aminotransferase(ALT/SGPT)--  Albumin, Serum --  Alkaline Phosphatase, Serum --  Anion Gap, Serum 9  Aspartate Aminotransferase (AST/SGOT)--  Bilirubin Total, Serum --  Blood Urea Nitrogen, Serum 24  Calcium,Total Serum 8.1  Carbon Dioxide, Serum 25  Chloride, Serum 102  Creatinine, Serum 6.16  eGFR if  --  eGFR if Non African American --  Glucose, Serum 93  Potassium, Serum 4.0  Protein Total, Serum --  Sodium, Serum 136                      10-14-22 @ 06:25  Jelena Aminotransferase(ALT/SGPT)--  Albumin, Serum --  Alkaline Phosphatase, Serum --  Anion Gap, Serum 11  Aspartate Aminotransferase (AST/SGOT)--  Bilirubin Total, Serum --  Blood Urea Nitrogen, Serum 40  Calcium,Total Serum 8.6  Carbon Dioxide, Serum 21  Chloride, Serum 99  Creatinine, Serum 9.10  eGFR if  --  eGFR if Non African American --  Glucose, Serum 81  Potassium, Serum 4.5  Protein Total, Serum --  Sodium, Serum 131                      10-13-22 @ 05:35  Jelena Aminotransferase(ALT/SGPT)--  Albumin, Serum --  Alkaline Phosphatase, Serum --  Anion Gap, Serum 13  Aspartate Aminotransferase (AST/SGOT)--  Bilirubin Total, Serum --  Blood Urea Nitrogen, Serum 28  Calcium,Total Serum 9.1  Carbon Dioxide, Serum 20  Chloride, Serum 99  Creatinine, Serum 7.74  eGFR if  --  eGFR if Non African American --  Glucose, Serum 81  Potassium, Serum 4.5  Protein Total, Serum --  Sodium, Serum 132                          PT/INR  PT/INR  10-14-22 @ 06:25  INR 1.22  Prothrombin Time Comment --  Prothrobin Time, [id number]      Amylase/Lipase            RADIOLOGY & ADDITIONAL TESTS:    Imaging Personally Reviewed:  [ ] YES  [ ] NO

## 2022-10-15 NOTE — DISCHARGE NOTE NURSING/CASE MANAGEMENT/SOCIAL WORK - NSDCPEFALRISK_GEN_ALL_CORE
For information on Fall & Injury Prevention, visit: [email or web address] OR  [email or web address] OR  https://www.cdc.gov/steadi/patient.html

## 2022-10-15 NOTE — CONSULT NOTE ADULT - CONSULT REQUESTED DATE/TIME
27-Sep-2022 07:09
11-Oct-2022 08:45
27-Sep-2022 14:55
28-Sep-2022
12-Oct-2022 14:00
15-Oct-2022 15:50

## 2022-10-16 LAB
ANION GAP SERPL CALC-SCNC: 8 MMOL/L — SIGNIFICANT CHANGE UP (ref 5–17)
BLD GP AB SCN SERPL QL: SIGNIFICANT CHANGE UP
BUN SERPL-MCNC: 35 MG/DL — HIGH (ref 7–18)
CALCIUM SERPL-MCNC: 8.4 MG/DL — SIGNIFICANT CHANGE UP (ref 8.4–10.5)
CHLORIDE SERPL-SCNC: 103 MMOL/L — SIGNIFICANT CHANGE UP (ref 96–108)
CO2 SERPL-SCNC: 24 MMOL/L — SIGNIFICANT CHANGE UP (ref 22–31)
CREAT SERPL-MCNC: 7.6 MG/DL — HIGH (ref 0.5–1.3)
EGFR: 5 ML/MIN/1.73M2 — LOW
GLUCOSE SERPL-MCNC: 107 MG/DL — HIGH (ref 70–99)
HCT VFR BLD CALC: 22.1 % — LOW (ref 34.5–45)
HCT VFR BLD CALC: 32.9 % — LOW (ref 34.5–45)
HGB BLD-MCNC: 11.2 G/DL — LOW (ref 11.5–15.5)
HGB BLD-MCNC: 7.3 G/DL — LOW (ref 11.5–15.5)
MAGNESIUM SERPL-MCNC: 1.9 MG/DL — SIGNIFICANT CHANGE UP (ref 1.6–2.6)
MCHC RBC-ENTMCNC: 30.3 PG — SIGNIFICANT CHANGE UP (ref 27–34)
MCHC RBC-ENTMCNC: 30.9 PG — SIGNIFICANT CHANGE UP (ref 27–34)
MCHC RBC-ENTMCNC: 33 GM/DL — SIGNIFICANT CHANGE UP (ref 32–36)
MCHC RBC-ENTMCNC: 34 GM/DL — SIGNIFICANT CHANGE UP (ref 32–36)
MCV RBC AUTO: 90.9 FL — SIGNIFICANT CHANGE UP (ref 80–100)
MCV RBC AUTO: 91.7 FL — SIGNIFICANT CHANGE UP (ref 80–100)
NRBC # BLD: 0 /100 WBCS — SIGNIFICANT CHANGE UP (ref 0–0)
NRBC # BLD: 0 /100 WBCS — SIGNIFICANT CHANGE UP (ref 0–0)
PHOSPHATE SERPL-MCNC: 3.2 MG/DL — SIGNIFICANT CHANGE UP (ref 2.5–4.5)
PLATELET # BLD AUTO: 201 K/UL — SIGNIFICANT CHANGE UP (ref 150–400)
PLATELET # BLD AUTO: 224 K/UL — SIGNIFICANT CHANGE UP (ref 150–400)
POTASSIUM SERPL-MCNC: 3.9 MMOL/L — SIGNIFICANT CHANGE UP (ref 3.5–5.3)
POTASSIUM SERPL-SCNC: 3.9 MMOL/L — SIGNIFICANT CHANGE UP (ref 3.5–5.3)
RBC # BLD: 2.41 M/UL — LOW (ref 3.8–5.2)
RBC # BLD: 3.62 M/UL — LOW (ref 3.8–5.2)
RBC # FLD: 17 % — HIGH (ref 10.3–14.5)
RBC # FLD: 18.6 % — HIGH (ref 10.3–14.5)
SODIUM SERPL-SCNC: 135 MMOL/L — SIGNIFICANT CHANGE UP (ref 135–145)
WBC # BLD: 5.76 K/UL — SIGNIFICANT CHANGE UP (ref 3.8–10.5)
WBC # BLD: 8.24 K/UL — SIGNIFICANT CHANGE UP (ref 3.8–10.5)
WBC # FLD AUTO: 5.76 K/UL — SIGNIFICANT CHANGE UP (ref 3.8–10.5)
WBC # FLD AUTO: 8.24 K/UL — SIGNIFICANT CHANGE UP (ref 3.8–10.5)

## 2022-10-16 PROCEDURE — 99233 SBSQ HOSP IP/OBS HIGH 50: CPT

## 2022-10-16 RX ORDER — ERYTHROPOIETIN 10000 [IU]/ML
8000 INJECTION, SOLUTION INTRAVENOUS; SUBCUTANEOUS
Refills: 0 | Status: DISCONTINUED | OUTPATIENT
Start: 2022-10-16 | End: 2022-10-19

## 2022-10-16 RX ADMIN — Medication 100 MILLIGRAM(S): at 11:04

## 2022-10-16 RX ADMIN — ATORVASTATIN CALCIUM 80 MILLIGRAM(S): 80 TABLET, FILM COATED ORAL at 21:29

## 2022-10-16 RX ADMIN — Medication 100 MILLIGRAM(S): at 21:28

## 2022-10-16 RX ADMIN — Medication 50 MILLIGRAM(S): at 17:01

## 2022-10-16 RX ADMIN — PANTOPRAZOLE SODIUM 40 MILLIGRAM(S): 20 TABLET, DELAYED RELEASE ORAL at 05:19

## 2022-10-16 RX ADMIN — HEPARIN SODIUM 5000 UNIT(S): 5000 INJECTION INTRAVENOUS; SUBCUTANEOUS at 05:17

## 2022-10-16 RX ADMIN — Medication 1 TABLET(S): at 11:22

## 2022-10-16 RX ADMIN — Medication 100 MILLIGRAM(S): at 05:17

## 2022-10-16 RX ADMIN — Medication 100 MILLIGRAM(S): at 17:01

## 2022-10-16 RX ADMIN — Medication 100 MILLIGRAM(S): at 17:00

## 2022-10-16 RX ADMIN — PANTOPRAZOLE SODIUM 40 MILLIGRAM(S): 20 TABLET, DELAYED RELEASE ORAL at 17:03

## 2022-10-16 RX ADMIN — CITALOPRAM 20 MILLIGRAM(S): 10 TABLET, FILM COATED ORAL at 11:04

## 2022-10-16 RX ADMIN — CHLORHEXIDINE GLUCONATE 1 APPLICATION(S): 213 SOLUTION TOPICAL at 05:18

## 2022-10-16 RX ADMIN — AMLODIPINE BESYLATE 10 MILLIGRAM(S): 2.5 TABLET ORAL at 05:17

## 2022-10-16 RX ADMIN — Medication 3 MILLIGRAM(S): at 21:29

## 2022-10-16 RX ADMIN — CEFTRIAXONE 100 MILLIGRAM(S): 500 INJECTION, POWDER, FOR SOLUTION INTRAMUSCULAR; INTRAVENOUS at 18:32

## 2022-10-16 RX ADMIN — GABAPENTIN 100 MILLIGRAM(S): 400 CAPSULE ORAL at 21:28

## 2022-10-16 RX ADMIN — ZINC SULFATE TAB 220 MG (50 MG ZINC EQUIVALENT) 220 MILLIGRAM(S): 220 (50 ZN) TAB at 17:01

## 2022-10-16 RX ADMIN — Medication 1 GRAM(S): at 11:04

## 2022-10-16 RX ADMIN — HEPARIN SODIUM 5000 UNIT(S): 5000 INJECTION INTRAVENOUS; SUBCUTANEOUS at 21:28

## 2022-10-16 RX ADMIN — DARUNAVIR ETHANOLATE AND COBICISTAT 1 TABLET(S): 800; 150 TABLET, FILM COATED ORAL at 11:05

## 2022-10-16 RX ADMIN — CALCITRIOL 0.25 MICROGRAM(S): 0.5 CAPSULE ORAL at 11:04

## 2022-10-16 RX ADMIN — ISOSORBIDE MONONITRATE 60 MILLIGRAM(S): 60 TABLET, EXTENDED RELEASE ORAL at 11:04

## 2022-10-16 RX ADMIN — CHLORHEXIDINE GLUCONATE 1 APPLICATION(S): 213 SOLUTION TOPICAL at 11:05

## 2022-10-16 RX ADMIN — Medication 50 MILLIGRAM(S): at 21:28

## 2022-10-16 RX ADMIN — DOLUTEGRAVIR SODIUM 50 MILLIGRAM(S): 25 TABLET, FILM COATED ORAL at 11:05

## 2022-10-16 RX ADMIN — Medication 50 MILLIGRAM(S): at 05:18

## 2022-10-16 RX ADMIN — Medication 100 MILLIGRAM(S): at 17:02

## 2022-10-16 RX ADMIN — HEPARIN SODIUM 5000 UNIT(S): 5000 INJECTION INTRAVENOUS; SUBCUTANEOUS at 17:01

## 2022-10-16 NOTE — PROGRESS NOTE ADULT - ASSESSMENT
ESRD , dialysis days are MWF.    Clotted AVG , post   placement of PC,  follow up with thrombectomy after discharge.         Anemia - INCREASE  BECKY , no need for Iron IV as per 10/02 results , repeat iron sat in am.  Hypophosphatemia po4 3.1 .       Diverticulitis, fistula /  abscess placed on Ceftriaxone and Metronidazole. Post IR drain , 4 bacteria in abscess fluid.   Post surgery resection resection small intestine in area of  fistula between it and colon, post Sutton's procedure and left colostomy      Follow up Echocardiogram - EF 55%

## 2022-10-16 NOTE — PROGRESS NOTE ADULT - ASSESSMENT
67F s/p small bowel resection and Alfonso's  -pain control  - continue regular diet  - f/u CM/PT for placement  - ostomy training  -dialysis per nephro schedule  -ambulation, OOBTC  -dispo planning  - transfuse 1uPRBC for anemia

## 2022-10-16 NOTE — PROGRESS NOTE ADULT - ATTENDING COMMENTS
PT seen and examined. Agree with above. Reports tolerating diet. Has mild incisional pain. Pt got OOB to chair today with assistance of RN while I was present. OK during PRBC transfusion, for HD tomorrow. Ensure pt receiving Epo during HD. Continue dispo planning.

## 2022-10-16 NOTE — PROGRESS NOTE ADULT - SUBJECTIVE AND OBJECTIVE BOX
No acute clinical change. Ostomy continues productive and patient tolerating a regular diet. Afebrile, WC wnl, HD stable, abd exam unchanged.    Vital Signs Last 24 Hrs  T(C): 37.1 (16 Oct 2022 05:22), Max: 37.1 (15 Oct 2022 21:33)  T(F): 98.7 (16 Oct 2022 05:22), Max: 98.8 (15 Oct 2022 21:33)  HR: 98 (16 Oct 2022 05:22) (86 - 98)  BP: 134/63 (16 Oct 2022 05:22) (112/65 - 146/66)  BP(mean): 74 (15 Oct 2022 21:33) (74 - 74)  RR: 17 (16 Oct 2022 05:22) (16 - 18)  SpO2: 100% (16 Oct 2022 05:22) (97% - 100%)    Parameters below as of 16 Oct 2022 05:22  Patient On (Oxygen Delivery Method): room air        General: NAD  Cardio: RRR  Resp: normal resp effort  Abd: soft, appropriately tender, productive colostomy with stool and gas in bag, incisions with staples c/d/i  Vascular: LUE AVG without palpable thrill, permacath with dressing in place                            7.3    5.76  )-----------( 201      ( 16 Oct 2022 05:53 )             22.1     10-16    135  |  103  |  35<H>  ----------------------------<  107<H>  3.9   |  24  |  7.60<H>    Ca    8.4      16 Oct 2022 05:53  Phos  3.2     10-16  Mg     1.9     10-16

## 2022-10-16 NOTE — PROGRESS NOTE ADULT - SUBJECTIVE AND OBJECTIVE BOX
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 Patient complaining of abdominal pain this morning, but no nausea/vomiting.  LUE AVG clotted. 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      ESRD on hemodialysis      History of ectopic pregnancy  Two      H/O: hysterectomy  Due to cervical cancer      S/P arteriovenous (AV) fistula creation  july 10 2020          oxycodone (Rash)  penicillins (Urticaria; Rash)      MEDICATIONS  (STANDING):  allopurinol 100 milliGRAM(s) Oral daily  amLODIPine   Tablet 10 milliGRAM(s) Oral daily  atorvastatin 80 milliGRAM(s) Oral at bedtime  calcitriol   Capsule 0.25 MICROGram(s) Oral daily  cefTRIAXone   IVPB 1000 milliGRAM(s) IV Intermittent every 24 hours  chlorhexidine 2% Cloths 1 Application(s) Topical daily  chlorhexidine 2% Cloths 1 Application(s) Topical <User Schedule>  citalopram 20 milliGRAM(s) Oral daily  darunavir 800 mG/cobicstat 150 mG 1 Tablet(s) Oral daily  dolutegravir 50 milliGRAM(s) Oral daily  epoetin neyda-epbx (RETACRIT) Injectable 4000 Unit(s) IV Push <User Schedule>  gabapentin 100 milliGRAM(s) Oral at bedtime  heparin   Injectable 5000 Unit(s) SubCutaneous every 8 hours  hydrALAZINE 50 milliGRAM(s) Oral three times a day  isosorbide   mononitrate ER Tablet (IMDUR) 60 milliGRAM(s) Oral daily  lamiVUDine- milliGRAM(s) Oral daily  lidocaine/prilocaine Cream 1 Application(s) Topical <User Schedule>  melatonin 3 milliGRAM(s) Oral at bedtime  metoprolol tartrate 100 milliGRAM(s) Oral two times a day  metroNIDAZOLE  IVPB 500 milliGRAM(s) IV Intermittent every 8 hours  omega-3-Acid Ethyl Esters 1 Gram(s) Oral daily  pantoprazole  Injectable 40 milliGRAM(s) IV Push every 12 hours  senna 2 Tablet(s) Oral at bedtime  zinc sulfate 220 milliGRAM(s) Oral daily    MEDICATIONS  (PRN):  acetaminophen     Tablet .. 1000 milliGRAM(s) Oral every 8 hours PRN Moderate Pain (4 - 6)  aluminum hydroxide/magnesium hydroxide/simethicone Suspension 30 milliLiter(s) Oral every 4 hours PRN Dyspepsia  HYDROmorphone   Tablet 2 milliGRAM(s) Oral every 4 hours PRN Severe Pain (7 - 10)  ondansetron Injectable 4 milliGRAM(s) IV Push every 8 hours PRN Nausea and/or Vomiting  sodium chloride 0.9% lock flush 10 milliLiter(s) IV Push every 1 hour PRN Pre/post blood products, medications, blood draw, and to maintain line patency                            7.3    5.76  )-----------( 201      ( 16 Oct 2022 05:53 )             22.1     10-16    135  |  103  |  35<H>  ----------------------------<  107<H>  3.9   |  24  |  7.60<H>    Ca    8.4      16 Oct 2022 05:53  Phos  3.2     10-16  Mg     1.9     10-16              REVIEW OF SYSTEMS:  General: no fever no chills, no weight loss.    RESPIRATORY: No cough, wheezing, hemoptysis; No shortness of breath  CARDIOVASCULAR: No chest pain or palpitations. No Edema  GASTROINTESTINAL: No abdominal or epigastric pain. No nausea, vomiting. Reduced appetite , gastric fullness.  GENITOURINARY: No dysuria, frequency, foamy urine, urinary urgency, incontinence or hematuria  NEUROLOGICAL: No numbness or weakness, no tremor , no dizziness.   Muscle skeletal : no joint pain and no swelling of joints and limbs.  SKIN: No itching, burning, rashes.        VITALS:  T(F): 98.7 (10-16-22 @ 05:22), Max: 98.8 (10-15-22 @ 21:33)  HR: 98 (10-16-22 @ 05:22)  BP: 134/63 (10-16-22 @ 05:22)  RR: 17 (10-16-22 @ 05:22)  SpO2: 100% (10-16-22 @ 05:22)  Wt(kg): --      PHYSICAL EXAM:  Constitutional: well developed, no diaphoresis, no distress.  Neck: No JVD, no carotid bruit, supple, no adenopathy  Respiratory:  air entrance B/L, no wheezes, rales or rhonchi  Cardiovascular: S1, S2, RRR, no pericardial rub, no murmur  Abdomen: BS+, soft, no tenderness, no bruit, left colostomy stool sodt and melena.  Pelvis: bladder nondistended  Extremities: No cyanosis or clubbing. No peripheral edema.     Neurological: A/O x 3, no focal deficits    Vascular Access: right PC , no discharge and no erythema  Left AVG clotted

## 2022-10-16 NOTE — PROGRESS NOTE ADULT - SUBJECTIVE AND OBJECTIVE BOX
Interval of present illness: No acute events overnight. Pt seen at bedside. Still pain at surgical site but improved from yday.    REVIEW OF SYSTEMS:    CONSTITUTIONAL: No fever  EYES: No acute visual disturbances  NECK: No pain or stiffness  RESPIRATORY: No cough; No shortness of breath  CARDIOVASCULAR: No chest pain, no palpitations  GASTROINTESTINAL: No pain. No nausea or vomiting.  No diarrhea   NEUROLOGICAL: No headache or numbness, no tremors  MUSCULOSKELETAL: no muscle pain  GENITOURINARY: No dysuria, no frequency, no hesitancy  PSYCHIATRY: No depression , no anxiety  ALL OTHER  ROS negative     O:  Vital Signs Last 24 Hrs  T(C): 36.8 (16 Oct 2022 10:50), Max: 37.1 (15 Oct 2022 21:33)  T(F): 98.2 (16 Oct 2022 10:50), Max: 98.8 (15 Oct 2022 21:33)  HR: 82 (16 Oct 2022 10:50) (82 - 98)  BP: 127/68 (16 Oct 2022 10:50) (112/65 - 134/63)  BP(mean): 74 (15 Oct 2022 21:33) (74 - 74)  RR: 16 (16 Oct 2022 10:50) (16 - 18)  SpO2: 100% (16 Oct 2022 10:50) (100% - 100%)    Parameters below as of 16 Oct 2022 10:50  Patient On (Oxygen Delivery Method): room air        Gen: mild emotional distress regarding colostomy bag  Neuro: alert, answering qs appropriately, moves all extremities  HEENT: anicteric, moist oral mucosa  Neck: supple  Cards: RRR  Pulm: good inspiratory effort, CTAB  Abd: soft, NT/ND, BS+, colostomy bag with black stool, tables at surgical site with no redness or purulence  Ext: no edema  Skin: warm, dry      acetaminophen     Tablet .. 1000 milliGRAM(s) Oral every 8 hours PRN  allopurinol 100 milliGRAM(s) Oral daily  aluminum hydroxide/magnesium hydroxide/simethicone Suspension 30 milliLiter(s) Oral every 4 hours PRN  amLODIPine   Tablet 10 milliGRAM(s) Oral daily  atorvastatin 80 milliGRAM(s) Oral at bedtime  calcitriol   Capsule 0.25 MICROGram(s) Oral daily  cefTRIAXone   IVPB 1000 milliGRAM(s) IV Intermittent every 24 hours  chlorhexidine 2% Cloths 1 Application(s) Topical <User Schedule>  chlorhexidine 2% Cloths 1 Application(s) Topical daily  citalopram 20 milliGRAM(s) Oral daily  darunavir 800 mG/cobicstat 150 mG 1 Tablet(s) Oral daily  dolutegravir 50 milliGRAM(s) Oral daily  epoetin neyda-epbx (RETACRIT) Injectable 8000 Unit(s) IV Push <User Schedule>  gabapentin 100 milliGRAM(s) Oral at bedtime  heparin   Injectable 5000 Unit(s) SubCutaneous every 8 hours  hydrALAZINE 50 milliGRAM(s) Oral three times a day  HYDROmorphone   Tablet 2 milliGRAM(s) Oral every 4 hours PRN  isosorbide   mononitrate ER Tablet (IMDUR) 60 milliGRAM(s) Oral daily  lamiVUDine- milliGRAM(s) Oral daily  lidocaine/prilocaine Cream 1 Application(s) Topical <User Schedule>  melatonin 3 milliGRAM(s) Oral at bedtime  metoprolol tartrate 100 milliGRAM(s) Oral two times a day  metroNIDAZOLE  IVPB 500 milliGRAM(s) IV Intermittent every 8 hours  Nephro-alex 1 Tablet(s) Oral daily  omega-3-Acid Ethyl Esters 1 Gram(s) Oral daily  ondansetron Injectable 4 milliGRAM(s) IV Push every 8 hours PRN  pantoprazole  Injectable 40 milliGRAM(s) IV Push every 12 hours  senna 2 Tablet(s) Oral at bedtime  sodium chloride 0.9% lock flush 10 milliLiter(s) IV Push every 1 hour PRN  zinc sulfate 220 milliGRAM(s) Oral daily                            7.3    5.76  )-----------( 201      ( 16 Oct 2022 05:53 )             22.1       10-16    135  |  103  |  35<H>  ----------------------------<  107<H>  3.9   |  24  |  7.60<H>    Ca    8.4      16 Oct 2022 05:53  Phos  3.2     10-16  Mg     1.9     10-16

## 2022-10-16 NOTE — PROGRESS NOTE ADULT - ASSESSMENT
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 S/p small bowel resection and Alfonso’s.     #Perf Diverticulitis with abscess s/p bowel resection and Alfonso’s  #ESRD  #HIV  #HTN  #GERD  #anemia, normocytic, stable  #gout    -f/u DAVID Young recs: c/w CTX/flagyl, ART  -f/u pain management: pain well controlled currently  -s/p RIJ for HD - f/u nephro for HD - c/w sodium bicarbonate, calcitriol  -BP well controlled on current regimen  -not complaining of GERD sx - c/w ppi  -normocytic anemia - Hb downtrending, s/p 1u prbc today, trend CBC  -SCDs  -indication for ASA is unclear and pt doesnt report asa at home - would hold for now

## 2022-10-17 LAB
ANION GAP SERPL CALC-SCNC: 9 MMOL/L — SIGNIFICANT CHANGE UP (ref 5–17)
BUN SERPL-MCNC: 50 MG/DL — HIGH (ref 7–18)
CALCIUM SERPL-MCNC: 9.2 MG/DL — SIGNIFICANT CHANGE UP (ref 8.4–10.5)
CHLORIDE SERPL-SCNC: 102 MMOL/L — SIGNIFICANT CHANGE UP (ref 96–108)
CO2 SERPL-SCNC: 23 MMOL/L — SIGNIFICANT CHANGE UP (ref 22–31)
CREAT SERPL-MCNC: 8.74 MG/DL — HIGH (ref 0.5–1.3)
EGFR: 5 ML/MIN/1.73M2 — LOW
GLUCOSE SERPL-MCNC: 96 MG/DL — SIGNIFICANT CHANGE UP (ref 70–99)
HCT VFR BLD CALC: 30.7 % — LOW (ref 34.5–45)
HGB BLD-MCNC: 10.2 G/DL — LOW (ref 11.5–15.5)
IRON SATN MFR SERPL: 41 UG/DL — SIGNIFICANT CHANGE UP (ref 40–150)
IRON SATN MFR SERPL: 42 % — SIGNIFICANT CHANGE UP (ref 15–50)
MAGNESIUM SERPL-MCNC: 1.9 MG/DL — SIGNIFICANT CHANGE UP (ref 1.6–2.6)
MCHC RBC-ENTMCNC: 30.2 PG — SIGNIFICANT CHANGE UP (ref 27–34)
MCHC RBC-ENTMCNC: 33.2 GM/DL — SIGNIFICANT CHANGE UP (ref 32–36)
MCV RBC AUTO: 90.8 FL — SIGNIFICANT CHANGE UP (ref 80–100)
NRBC # BLD: 0 /100 WBCS — SIGNIFICANT CHANGE UP (ref 0–0)
PHOSPHATE SERPL-MCNC: 3.2 MG/DL — SIGNIFICANT CHANGE UP (ref 2.5–4.5)
PLATELET # BLD AUTO: 242 K/UL — SIGNIFICANT CHANGE UP (ref 150–400)
POTASSIUM SERPL-MCNC: 4.3 MMOL/L — SIGNIFICANT CHANGE UP (ref 3.5–5.3)
POTASSIUM SERPL-SCNC: 4.3 MMOL/L — SIGNIFICANT CHANGE UP (ref 3.5–5.3)
RBC # BLD: 3.38 M/UL — LOW (ref 3.8–5.2)
RBC # FLD: 17.6 % — HIGH (ref 10.3–14.5)
SARS-COV-2 RNA SPEC QL NAA+PROBE: SIGNIFICANT CHANGE UP
SODIUM SERPL-SCNC: 134 MMOL/L — LOW (ref 135–145)
TIBC SERPL-MCNC: 98 UG/DL — LOW (ref 250–450)
UIBC SERPL-MCNC: 57 UG/DL — LOW (ref 110–370)
WBC # BLD: 8.24 K/UL — SIGNIFICANT CHANGE UP (ref 3.8–10.5)
WBC # FLD AUTO: 8.24 K/UL — SIGNIFICANT CHANGE UP (ref 3.8–10.5)

## 2022-10-17 PROCEDURE — 99232 SBSQ HOSP IP/OBS MODERATE 35: CPT

## 2022-10-17 PROCEDURE — 99233 SBSQ HOSP IP/OBS HIGH 50: CPT

## 2022-10-17 RX ORDER — HYDROMORPHONE HYDROCHLORIDE 2 MG/ML
1 INJECTION INTRAMUSCULAR; INTRAVENOUS; SUBCUTANEOUS
Qty: 30 | Refills: 0
Start: 2022-10-17 | End: 2022-10-21

## 2022-10-17 RX ORDER — GABAPENTIN 400 MG/1
1 CAPSULE ORAL
Qty: 7 | Refills: 0
Start: 2022-10-17 | End: 2022-10-23

## 2022-10-17 RX ORDER — MOXIFLOXACIN HYDROCHLORIDE TABLETS, 400 MG 400 MG/1
1 TABLET, FILM COATED ORAL
Qty: 2 | Refills: 0
Start: 2022-10-17 | End: 2022-10-18

## 2022-10-17 RX ORDER — ASPIRIN/CALCIUM CARB/MAGNESIUM 324 MG
1 TABLET ORAL
Qty: 0 | Refills: 0 | DISCHARGE

## 2022-10-17 RX ORDER — METRONIDAZOLE 500 MG
1 TABLET ORAL
Qty: 6 | Refills: 0
Start: 2022-10-17 | End: 2022-10-18

## 2022-10-17 RX ORDER — GABAPENTIN 400 MG/1
1 CAPSULE ORAL
Qty: 7 | Refills: 0
Start: 2022-10-17 | End: 2023-02-06

## 2022-10-17 RX ADMIN — Medication 3 MILLIGRAM(S): at 22:11

## 2022-10-17 RX ADMIN — Medication 50 MILLIGRAM(S): at 22:21

## 2022-10-17 RX ADMIN — CALCITRIOL 0.25 MICROGRAM(S): 0.5 CAPSULE ORAL at 11:17

## 2022-10-17 RX ADMIN — SENNA PLUS 2 TABLET(S): 8.6 TABLET ORAL at 22:11

## 2022-10-17 RX ADMIN — Medication 100 MILLIGRAM(S): at 05:22

## 2022-10-17 RX ADMIN — Medication 1 TABLET(S): at 11:19

## 2022-10-17 RX ADMIN — ONDANSETRON 4 MILLIGRAM(S): 8 TABLET, FILM COATED ORAL at 11:42

## 2022-10-17 RX ADMIN — ISOSORBIDE MONONITRATE 60 MILLIGRAM(S): 60 TABLET, EXTENDED RELEASE ORAL at 11:17

## 2022-10-17 RX ADMIN — ATORVASTATIN CALCIUM 80 MILLIGRAM(S): 80 TABLET, FILM COATED ORAL at 22:11

## 2022-10-17 RX ADMIN — Medication 100 MILLIGRAM(S): at 17:57

## 2022-10-17 RX ADMIN — Medication 100 MILLIGRAM(S): at 11:16

## 2022-10-17 RX ADMIN — CHLORHEXIDINE GLUCONATE 1 APPLICATION(S): 213 SOLUTION TOPICAL at 13:32

## 2022-10-17 RX ADMIN — Medication 100 MILLIGRAM(S): at 17:54

## 2022-10-17 RX ADMIN — HYDROMORPHONE HYDROCHLORIDE 2 MILLIGRAM(S): 2 INJECTION INTRAMUSCULAR; INTRAVENOUS; SUBCUTANEOUS at 05:21

## 2022-10-17 RX ADMIN — Medication 100 MILLIGRAM(S): at 05:21

## 2022-10-17 RX ADMIN — Medication 1 GRAM(S): at 11:20

## 2022-10-17 RX ADMIN — HEPARIN SODIUM 5000 UNIT(S): 5000 INJECTION INTRAVENOUS; SUBCUTANEOUS at 05:21

## 2022-10-17 RX ADMIN — AMLODIPINE BESYLATE 10 MILLIGRAM(S): 2.5 TABLET ORAL at 05:22

## 2022-10-17 RX ADMIN — CHLORHEXIDINE GLUCONATE 1 APPLICATION(S): 213 SOLUTION TOPICAL at 05:22

## 2022-10-17 RX ADMIN — PANTOPRAZOLE SODIUM 40 MILLIGRAM(S): 20 TABLET, DELAYED RELEASE ORAL at 05:21

## 2022-10-17 RX ADMIN — ZINC SULFATE TAB 220 MG (50 MG ZINC EQUIVALENT) 220 MILLIGRAM(S): 220 (50 ZN) TAB at 11:15

## 2022-10-17 RX ADMIN — DOLUTEGRAVIR SODIUM 50 MILLIGRAM(S): 25 TABLET, FILM COATED ORAL at 11:16

## 2022-10-17 RX ADMIN — CEFTRIAXONE 100 MILLIGRAM(S): 500 INJECTION, POWDER, FOR SOLUTION INTRAMUSCULAR; INTRAVENOUS at 17:49

## 2022-10-17 RX ADMIN — PANTOPRAZOLE SODIUM 40 MILLIGRAM(S): 20 TABLET, DELAYED RELEASE ORAL at 17:54

## 2022-10-17 RX ADMIN — DARUNAVIR ETHANOLATE AND COBICISTAT 1 TABLET(S): 800; 150 TABLET, FILM COATED ORAL at 11:15

## 2022-10-17 RX ADMIN — CITALOPRAM 20 MILLIGRAM(S): 10 TABLET, FILM COATED ORAL at 11:16

## 2022-10-17 RX ADMIN — GABAPENTIN 100 MILLIGRAM(S): 400 CAPSULE ORAL at 22:11

## 2022-10-17 RX ADMIN — HYDROMORPHONE HYDROCHLORIDE 2 MILLIGRAM(S): 2 INJECTION INTRAMUSCULAR; INTRAVENOUS; SUBCUTANEOUS at 06:07

## 2022-10-17 RX ADMIN — Medication 100 MILLIGRAM(S): at 11:21

## 2022-10-17 RX ADMIN — Medication 50 MILLIGRAM(S): at 05:22

## 2022-10-17 RX ADMIN — HEPARIN SODIUM 5000 UNIT(S): 5000 INJECTION INTRAVENOUS; SUBCUTANEOUS at 17:53

## 2022-10-17 NOTE — PROGRESS NOTE ADULT - SUBJECTIVE AND OBJECTIVE BOX
Patient seen and examined at bedside. Received 1u PRBC yesterday for drop in Hgb to 7.3. Repeat Hgb 11.2. Remains hemodynamically stable. Ostomy functioning, patient tolerating diet without nausea/vomiting. Ambulating.    Vital Signs Last 24 Hrs  T(C): 37 (17 Oct 2022 05:56), Max: 37 (16 Oct 2022 14:19)  T(F): 98.6 (17 Oct 2022 05:56), Max: 98.6 (16 Oct 2022 14:19)  HR: 82 (17 Oct 2022 05:56) (73 - 86)  BP: 130/62 (17 Oct 2022 05:56) (108/60 - 130/62)  BP(mean): 79 (17 Oct 2022 05:56) (74 - 79)  RR: 16 (17 Oct 2022 05:56) (16 - 18)  SpO2: 95% (17 Oct 2022 05:56) (95% - 100%)    Parameters below as of 17 Oct 2022 05:56  Patient On (Oxygen Delivery Method): room air    General: NAD  Cardio: RRR  Resp: normal resp effort  Abd: surgical sites c/d/i with staples in place, ostomy healthy and functioning, abd soft, minimally tender, nondistended                          11.2   8.24  )-----------( 224      ( 16 Oct 2022 14:28 )             32.9   10-16    135  |  103  |  35<H>  ----------------------------<  107<H>  3.9   |  24  |  7.60<H>    Ca    8.4      16 Oct 2022 05:53  Phos  3.2     10-16  Mg     1.9     10-16

## 2022-10-17 NOTE — PROGRESS NOTE ADULT - PROBLEM SELECTOR PROBLEM 8
Hyperlipidemia
Discharge planning issues
Hyperlipidemia
Discharge planning issues
Hyperlipidemia
Discharge planning issues

## 2022-10-17 NOTE — PROGRESS NOTE ADULT - ASSESSMENT
ESRD , dialysis days are MWF.    Clotted AVG , post   placement of PC,  follow up with thrombectomy after discharge.     Anemia restart BECKY , no need for Iron IV, sat 42%.  Hypophosphatemia po4 3.2.        Diverticulitis, fistula /  abscess placed on Ceftriaxone and Metronidazole. Post IR drain , 4 bacteria in abscess fluid.   Post surgery resection resection small intestine in area of  fistula between it and colon, post Jet's procedure and left colostomy      Follow up Echocardiogram - EF 55%

## 2022-10-17 NOTE — PROGRESS NOTE ADULT - ASSESSMENT
Confidential Drug Utilization Report  Search Terms: Mary Montoya, 1955Search Date: 10/11/2022 08:47:56 AM  The Drug Utilization Report below displays all of the controlled substance prescriptions, if any, that your patient has filled in the last twelve months. The information displayed on this report is compiled from pharmacy submissions to the Department, and accurately reflects the information as submitted by the pharmacies.    This report was requested by: Dayna Watkins | Reference #: [id number]    There are no results for the search terms that you entered.

## 2022-10-17 NOTE — PROGRESS NOTE ADULT - PROBLEM SELECTOR PLAN 1
Pt with acute postoperative abdominal pain which is somatic and neuropathic in nature due to enterocolic fistula s/p Small bowel resection and Alfonso procedure on 10/11 POD #6.   Opioid pain recommendations   - Continue Dilaudid 2mg PO q4h PRN severe pain. Monitor for sedation/ respiratory depression.   - Pt reports hives rash with oxycodone.  - Pt reports tolerating morphine in the past with no reaction.   Non-opioid pain recommendations   - Avoid NSAIDs due to ESRD  - Continue Acetaminophen 1 gram PO q8h PRN moderate pain. Monitor LFTs  - Continue Gabapentin 100mg po at bedtime. Monitor renal function.   Bowel Regimen  - Continue Miralax 17G PO daily  - Continue Senna 2 tablets at bedtime for constipation  Mild pain   - Non-pharmacological pain treatment recommendations  - Warm/ Cool packs PRN   - Repositioning, imagery, relaxation, distraction.  - Physical therapy OOB if no contraindications   Recommendations discussed with primary team and RN.

## 2022-10-17 NOTE — PROGRESS NOTE ADULT - PROBLEM SELECTOR PROBLEM 4
Chronic kidney disease
HTN (hypertension)
Chronic kidney disease
HTN (hypertension)
Chronic kidney disease
HTN (hypertension)
Chronic kidney disease

## 2022-10-17 NOTE — PROGRESS NOTE ADULT - SUBJECTIVE AND OBJECTIVE BOX
Source of information: VERONICA GOVEA, Chart review  Patient language: English  : n/a    HPI:  This is a 67 year old female, coming from home, with medical history of ESRD(M-W-F), HTN, HIV coming in with abdominal pain. Pt states she has been having abdominal pain for the last 3 days. She went for dialsysis yesterday and afterwards the pain became worse. The abdominal pain comes and goes, is a dull achy pain, rated 8/10. It does not radiate and is located in left lower quadrant and suprapubic region. Pt also has been constipated with last bowl movement being about 4 days prior. She also has been expereincing nause and has had about 5 episodes of vomiting in the last 3 days, they were all watery. She denies any headaches, visual disturbances, dizziness, falls, chest pain, palpitations, lower extremity swelling, fevers, skin rash, recent travel, or sick contacts.   (27 Sep 2022 10:38)    Pain consulted for postoperative abdominal pain. Pt with enterocolic fistula s/p small bowel resection and Alfonso procedure on 10/11 POD #6. Pt reports hives rash with oxycodone in the past. Has been on morphine in the past with no reaction. Pt seen and examined at bedside. Denies abdominal pain while laying still, reports generalized abdominal pain increases to score 8/10 with movement SCALE USED: (1-10 VNRS). Pt using PRN pain meds with good effect.  Pt describes pain as aching, sharp, radiating throughout abdomen, alleviated by current pain medication, exacerbated by palpation. Pt tolerating clears. Denies lethargy, chest pain, SOB, nausea, vomiting, constipation. + colostomy with stool. Patient stated goal for pain control: to be able to take deep breaths, get out of bed to chair and ambulate with tolerable pain control. Reports ambulating with PT. Pt denies taking medications for pain at home.     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 (27 Sep 2022 10:38)    Allergies    oxycodone (Rash)  penicillins (Urticaria; Rash)    MEDICATIONS  (STANDING):  allopurinol 100 milliGRAM(s) Oral daily  amLODIPine   Tablet 10 milliGRAM(s) Oral daily  atorvastatin 80 milliGRAM(s) Oral at bedtime  calcitriol   Capsule 0.25 MICROGram(s) Oral daily  cefTRIAXone   IVPB 1000 milliGRAM(s) IV Intermittent every 24 hours  chlorhexidine 2% Cloths 1 Application(s) Topical daily  chlorhexidine 2% Cloths 1 Application(s) Topical <User Schedule>  citalopram 20 milliGRAM(s) Oral daily  darunavir 800 mG/cobicstat 150 mG 1 Tablet(s) Oral daily  dolutegravir 50 milliGRAM(s) Oral daily  epoetin neyda-epbx (RETACRIT) Injectable 8000 Unit(s) IV Push <User Schedule>  gabapentin 100 milliGRAM(s) Oral at bedtime  heparin   Injectable 5000 Unit(s) SubCutaneous every 8 hours  hydrALAZINE 50 milliGRAM(s) Oral three times a day  isosorbide   mononitrate ER Tablet (IMDUR) 60 milliGRAM(s) Oral daily  lamiVUDine- milliGRAM(s) Oral daily  lidocaine/prilocaine Cream 1 Application(s) Topical <User Schedule>  melatonin 3 milliGRAM(s) Oral at bedtime  metoprolol tartrate 100 milliGRAM(s) Oral two times a day  metroNIDAZOLE  IVPB 500 milliGRAM(s) IV Intermittent every 8 hours  Nephro-alex 1 Tablet(s) Oral daily  omega-3-Acid Ethyl Esters 1 Gram(s) Oral daily  pantoprazole  Injectable 40 milliGRAM(s) IV Push every 12 hours  senna 2 Tablet(s) Oral at bedtime  zinc sulfate 220 milliGRAM(s) Oral daily    MEDICATIONS  (PRN):  acetaminophen     Tablet .. 1000 milliGRAM(s) Oral every 8 hours PRN Moderate Pain (4 - 6)  aluminum hydroxide/magnesium hydroxide/simethicone Suspension 30 milliLiter(s) Oral every 4 hours PRN Dyspepsia  HYDROmorphone   Tablet 2 milliGRAM(s) Oral every 4 hours PRN Severe Pain (7 - 10)  ondansetron Injectable 4 milliGRAM(s) IV Push every 8 hours PRN Nausea and/or Vomiting  sodium chloride 0.9% lock flush 10 milliLiter(s) IV Push every 1 hour PRN Pre/post blood products, medications, blood draw, and to maintain line patency      Vital Signs Last 24 Hrs  T(C): 36.8 (17 Oct 2022 09:00), Max: 37 (16 Oct 2022 14:19)  T(F): 98.2 (17 Oct 2022 09:00), Max: 98.6 (16 Oct 2022 14:19)  HR: 72 (17 Oct 2022 09:34) (72 - 86)  BP: 145/96 (17 Oct 2022 09:34) (108/60 - 145/96)  BP(mean): 79 (17 Oct 2022 05:56) (74 - 79)  RR: 16 (17 Oct 2022 09:00) (16 - 18)  SpO2: 99% (17 Oct 2022 09:34) (95% - 100%)    Parameters below as of 17 Oct 2022 09:34  Patient On (Oxygen Delivery Method): room air      COVID-19 PCR: NotDetec (12 Oct 2022 19:50)  COVID-19 PCR: NotDetec (10 Oct 2022 06:48)  COVID-19 PCR: NotDetec (05 Oct 2022 11:00)  COVID-19 PCR: NotDetec (03 Oct 2022 06:34)  COVID-19 PCR: NotDetec (27 Sep 2022 00:01)    LABS: Reviewed                          10.2   8.24  )-----------( 242      ( 17 Oct 2022 10:25 )             30.7     10-17    134<L>  |  102  |  50<H>  ----------------------------<  96  4.3   |  23  |  8.74<H>    Ca    9.2      17 Oct 2022 10:25  Phos  3.2     10-17  Mg     1.9     10-17    ORT Score -   Family Hx of substance abuse	Female	      Male  Alcohol 	                                           1                     3  Illegal drugs	                                   2                     3  Rx drugs                                           4 	                  4  Personal Hx of substance abuse		  Alcohol 	                                          3	                  3  Illegal drugs                                     4	                  4  Rx drugs                                            5 	                  5  Age between 16- 45 years	           1                     1  hx preadolescent sexual abuse	   3 	                  0  Psychological disease		  ADD, OCD, bipolar, schizophrenia   2	          2  Depression                                           1 	          1  Total: 0    a score of 3 or lower indicates low risk for opioid abuse		  a score of 4-7 indicates moderate risk for opioid abuse		  a score of 8 or higher indicates high risk for opioid abuse    REVIEW OF SYSTEMS:  CONSTITUTIONAL: No fever or fatigue  HEENT:  No difficulty hearing, no change in vision  NECK: No pain or stiffness  RESPIRATORY: No cough, wheezing, chills or hemoptysis; No shortness of breath  CARDIOVASCULAR: No chest pain, palpitations, dizziness, or leg swelling  GASTROINTESTINAL: + decreased PO intake. + abdominal pain. No nausea, vomiting; No diarrhea or constipation.   GENITOURINARY: No dysuria, frequency, hematuria, retention or incontinence  MUSCULOSKELETAL: No joint pain or swelling; No muscle, back, or extremity pain, no upper or lower motor strength weakness, no saddle anesthesia, bowel/bladder incontinence, no falls   NEURO: No headaches, No numbness/tingling b/l LE, No weakness  PSYCHIATRIC: + depression    PHYSICAL EXAM:  GENERAL:  Alert & Oriented X4, cooperative, NAD, Good concentration. Speech is clear.   RESPIRATORY: Respirations even and unlabored. Clear to auscultation bilaterally; No rales, rhonchi, wheezing, or rubs  CARDIOVASCULAR: Normal S1/S2, regular rate and rhythm; No murmurs, rubs, or gallops. No JVD.   GASTROINTESTINAL:  Soft, + generalized tenderness, Nondistended; + bowel sounds present + right colostomy with small amount brown stool noted; +staples c/d/i   PERIPHERAL VASCULAR: + right IJ permacath dressing c/d/i;  + left upper arm AV fistula dressing c/d/i No bruit/ thrill (primary team aware); Extremities warm without edema. 2+ Peripheral Pulses, No cyanosis, No calf tenderness  MUSCULOSKELETAL: Motor Strength 5/5 B/L upper and lower extremities; + full ROM. No tenderness on palpation of all joints.   SKIN: Warm, dry, intact. No rashes, lesions, scars or wounds.     Risk factors associated with adverse outcomes related to opioid treatment  [ ]  Concurrent benzodiazepine use  [ ]  History/ Active substance use or alcohol use disorder  [X ] Psychiatric co-morbidity  [ ] Sleep apnea  [ ] COPD  [ ] BMI> 35  [ ] Liver dysfunction  [ ] Renal dysfunction  [ ] CHF  [ ] Smoker  [X ]  Age > 60 years    [X ]  [place]  Reviewed and Copied to Chart. See below.    Plan of care and goal oriented pain management treatment options were discussed with patient and /or primary care giver; all questions and concerns were addressed and care was aligned with patient's wishes.    Educated patient on goal oriented pain management treatment options     10-17-22 @ 12:29

## 2022-10-17 NOTE — PROGRESS NOTE ADULT - ASSESSMENT
67F POD6 s/p small bowel resection and Alfonso's for diverticulitis with enterocolic fistula  -dialysis today  -f/u labs  -nursing and case management for ostomy training and supplies, as well as establishing outpatient dialysis  -dispo planning

## 2022-10-17 NOTE — PROGRESS NOTE ADULT - PROBLEM SELECTOR PROBLEM 3
Abdominal wall abscess
HIV (human immunodeficiency virus infection)
Abdominal wall abscess
HIV (human immunodeficiency virus infection)
Abdominal wall abscess
HIV (human immunodeficiency virus infection)

## 2022-10-17 NOTE — PROGRESS NOTE ADULT - PROBLEM SELECTOR PROBLEM 6
Chronic kidney disease
Hyperlipidemia
Hyperlipidemia
Chronic kidney disease
Hyperlipidemia
Chronic kidney disease
Hyperlipidemia

## 2022-10-17 NOTE — PROGRESS NOTE ADULT - PROBLEM SELECTOR PROBLEM 7
Prophylactic measure
Prophylactic measure
Gout
Prophylactic measure
Prophylactic measure
Gout
Prophylactic measure
Gout
Prophylactic measure

## 2022-10-17 NOTE — PROGRESS NOTE ADULT - SUBJECTIVE AND OBJECTIVE BOX
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 S/p small bowel resection and Alfonso’s. Hospital course also complicated by AVF malfunction . Tunneled HD catheter 10/14 right side. Pt examined at bedside complaint of slight pain but otherwise doing fine. Noted to have brown stool in colostomy bag. Incision appear clean.         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      ESRD on hemodialysis      History of ectopic pregnancy  Two      H/O: hysterectomy  Due to cervical cancer      S/P arteriovenous (AV) fistula creation  july 10 2020          REVIEW OF SYSTEMS:  CONSTITUTIONAL: No fever, weight loss, or fatigue  EYES: No eye pain, visual disturbances, or discharge  ENMT:  No difficulty hearing, tinnitus, vertigo; No sinus or throat pain  NECK: No pain or stiffness  BREASTS: No pain, masses, or nipple discharge  RESPIRATORY: No cough, wheezing, chills or hemoptysis; No shortness of breath  CARDIOVASCULAR: No chest pain, palpitations, dizziness, or leg swelling  GASTROINTESTINAL: slight abdominal on incision site. No nausea, vomiting, or hematemesis; No diarrhea or constipation. No melena or hematochezia.  GENITOURINARY: No dysuria, frequency, hematuria, or incontinence  NEUROLOGICAL: No headaches, memory loss, loss of strength, numbness, or tremors  SKIN: No itching, burning, rashes, or lesions       T(C): 36.8 (10-17-22 @ 09:00), Max: 37 (10-16-22 @ 14:19)  HR: 76 (10-17-22 @ 09:00) (73 - 86)  BP: 145/72 (10-17-22 @ 09:00) (108/60 - 145/72)  RR: 16 (10-17-22 @ 09:00) (16 - 18)  SpO2: 100% (10-17-22 @ 09:00) (95% - 100%)  Wt(kg): --Vital Signs Last 24 Hrs  T(C): 36.8 (17 Oct 2022 09:00), Max: 37 (16 Oct 2022 14:19)  T(F): 98.2 (17 Oct 2022 09:00), Max: 98.6 (16 Oct 2022 14:19)  HR: 76 (17 Oct 2022 09:00) (73 - 86)  BP: 145/72 (17 Oct 2022 09:00) (108/60 - 145/72)  BP(mean): 79 (17 Oct 2022 05:56) (74 - 79)  RR: 16 (17 Oct 2022 09:00) (16 - 18)  SpO2: 100% (17 Oct 2022 09:00) (95% - 100%)    Parameters below as of 17 Oct 2022 09:00  Patient On (Oxygen Delivery Method): room air        PHYSICAL EXAM:  GENERAL: NAD  HEAD:  Atraumatic, Normocephalic  EYES: EOMI, PERRLA, conjunctiva and sclera clear  NECK: Supple, No JVD, Normal thyroid, R Permcath  NERVOUS SYSTEM:  Alert & Oriented , no focal neurologic deficit   CHEST/LUNG: Clear to percussion bilaterally; No rales, rhonchi, wheezing, or rubs  HEART: Regular rate and rhythm; No murmurs, rubs, or gallops  ABDOMEN: Soft, mild tender on staples site, Nondistended; Bowel sounds present  EXTREMITIES:  2+ Peripheral Pulses, No clubbing, cyanosis, or edema    Consultant(s) Notes Reviewed:  [x ] YES  [ ] NO  Care Discussed with Consultants/Other Providers [ x] YES  [ ] NO    LABS:  CBC   10-16-22 @ 14:28  Hematcorit 32.9  Hemoglobin 11.2  Mean Cell Hemoglobin 30.9  Platelet Count-Automated 224  RBC Count 3.62  Red Cell Distrib Width 17.0  Wbc Count 8.24      BMP  10-16-22 @ 05:53  Anion Gap. Serum 8  Blood Urea Nitrogen,Serm 35  Calcium, Total Serum 8.4  Carbon Dioxide, Serum 24  Chloride, Serum 103  Creatinine, Serum 7.60  eGFR in  --  eGFR in Non Afican American --  Gloucose, serum 107  Potassium, Serum 3.9  Sodium, Serum 135              10-15-22 @ 05:47  Anion Gap. Serum 9  Blood Urea Nitrogen,Serm 24  Calcium, Total Serum 8.1  Carbon Dioxide, Serum 25  Chloride, Serum 102  Creatinine, Serum 6.16  eGFR in  --  eGFR in Non Afican American --  Gloucose, serum 93  Potassium, Serum 4.0  Sodium, Serum 136                  CMP  10-16-22 @ 05:53  Jelena Aminotransferase(ALT/SGPT)--  Albumin, Serum --  Alkaline Phosphatase, Serum --  Anion Gap, Serum 8  Aspartate Aminotransferase (AST/SGOT)--  Bilirubin Total, Serum --  Blood Urea Nitrogen, Serum 35  Calcium,Total Serum 8.4  Carbon Dioxide, Serum 24  Chloride, Serum 103  Creatinine, Serum 7.60  eGFR if  --  eGFR if Non African American --  Glucose, Serum 107  Potassium, Serum 3.9  Protein Total, Serum --  Sodium, Serum 135                      10-15-22 @ 05:47  Jelena Aminotransferase(ALT/SGPT)--  Albumin, Serum --  Alkaline Phosphatase, Serum --  Anion Gap, Serum 9  Aspartate Aminotransferase (AST/SGOT)--  Bilirubin Total, Serum --  Blood Urea Nitrogen, Serum 24  Calcium,Total Serum 8.1  Carbon Dioxide, Serum 25  Chloride, Serum 102  Creatinine, Serum 6.16  eGFR if  --  eGFR if Non African American --  Glucose, Serum 93  Potassium, Serum 4.0  Protein Total, Serum --  Sodium, Serum 136                          PT/INR      Amylase/Lipase            RADIOLOGY & ADDITIONAL TESTS:    Imaging Personally Reviewed:  [ ] YES  [ ] NO

## 2022-10-17 NOTE — PROGRESS NOTE ADULT - ASSESSMENT
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 S/p small bowel resection and Alfonso’s.     Assessment:   #Perf Diverticulitis with abscess s/p bowel resection and Alfonso’s  #ESRD  #HIV  #HTN  #GERD  #anemia, normocytic, stable  #gout      Plan:  #Perf Diverticulitis with abscess s/p bowel resection and Alfonso’s  -2/2 to fistulization from the sigmoid colon likely in the setting of chronic diverticular disease.  -s/p IR CT guided drainage 9/28  - Abscess CC + for proteus mirabilis, ecoli, klebsiella pneumoniae  - Cont  IV Ceftriaxone 2g and IV Flagyl 500mg q 8hours as per ID recs  - repeat CT abd/pelv shows  diverticular abscess is collapsed around the pigtail catheter. Mild residual inflammatory changes around the sigmoid colon and small bowel with suggestion of enterocolic fistula.  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 Young on board.    #ESRD  Hospital course complicated by malfunction AVF  R Permacath   HD today   Continue with sodium bicarbonate, calcitriol    #HIV  Continue with home meds     #HTN  Continue with home meds  On amlodipine , Imdur , metoprolol and hydralazine   Monitor BP       #GERD  Continue with home med     #anemia, normocytic, stable  On epo on HD days     #gout  On allopurinol  Continue with home med     #HLD  Continue with home med

## 2022-10-17 NOTE — PROGRESS NOTE ADULT - PROBLEM SELECTOR PROBLEM 1
Abdominal wall abscess
Acute postoperative abdominal pain
Abdominal wall abscess
Abdominal wall abscess
Acute postoperative abdominal pain
Abdominal wall abscess
Acute postoperative abdominal pain
Abdominal wall abscess

## 2022-10-17 NOTE — PROGRESS NOTE ADULT - PROBLEM SELECTOR PROBLEM 2
HTN (hypertension)
S/P small bowel resection
S/P small bowel resection
HTN (hypertension)
S/P small bowel resection
HTN (hypertension)

## 2022-10-18 LAB
ANION GAP SERPL CALC-SCNC: 8 MMOL/L — SIGNIFICANT CHANGE UP (ref 5–17)
BUN SERPL-MCNC: 27 MG/DL — HIGH (ref 7–18)
CALCIUM SERPL-MCNC: 9.2 MG/DL — SIGNIFICANT CHANGE UP (ref 8.4–10.5)
CHLORIDE SERPL-SCNC: 104 MMOL/L — SIGNIFICANT CHANGE UP (ref 96–108)
CO2 SERPL-SCNC: 27 MMOL/L — SIGNIFICANT CHANGE UP (ref 22–31)
CREAT SERPL-MCNC: 5.85 MG/DL — HIGH (ref 0.5–1.3)
EGFR: 8 ML/MIN/1.73M2 — LOW
GLUCOSE BLDC GLUCOMTR-MCNC: 108 MG/DL — HIGH (ref 70–99)
GLUCOSE SERPL-MCNC: 100 MG/DL — HIGH (ref 70–99)
HAV IGM SER-ACNC: SIGNIFICANT CHANGE UP
HBV CORE IGM SER-ACNC: SIGNIFICANT CHANGE UP
HBV SURFACE AG SER-ACNC: SIGNIFICANT CHANGE UP
HCT VFR BLD CALC: 25.2 % — LOW (ref 34.5–45)
HCV AB S/CO SERPL IA: 0.07 S/CO — SIGNIFICANT CHANGE UP (ref 0–0.99)
HCV AB SERPL-IMP: SIGNIFICANT CHANGE UP
HGB BLD-MCNC: 8.7 G/DL — LOW (ref 11.5–15.5)
MAGNESIUM SERPL-MCNC: 1.9 MG/DL — SIGNIFICANT CHANGE UP (ref 1.6–2.6)
MCHC RBC-ENTMCNC: 31 PG — SIGNIFICANT CHANGE UP (ref 27–34)
MCHC RBC-ENTMCNC: 34.5 GM/DL — SIGNIFICANT CHANGE UP (ref 32–36)
MCV RBC AUTO: 89.7 FL — SIGNIFICANT CHANGE UP (ref 80–100)
NRBC # BLD: 0 /100 WBCS — SIGNIFICANT CHANGE UP (ref 0–0)
PHOSPHATE SERPL-MCNC: 3.2 MG/DL — SIGNIFICANT CHANGE UP (ref 2.5–4.5)
PLATELET # BLD AUTO: 228 K/UL — SIGNIFICANT CHANGE UP (ref 150–400)
POTASSIUM SERPL-MCNC: 3.8 MMOL/L — SIGNIFICANT CHANGE UP (ref 3.5–5.3)
POTASSIUM SERPL-SCNC: 3.8 MMOL/L — SIGNIFICANT CHANGE UP (ref 3.5–5.3)
RBC # BLD: 2.81 M/UL — LOW (ref 3.8–5.2)
RBC # FLD: 17.2 % — HIGH (ref 10.3–14.5)
SODIUM SERPL-SCNC: 139 MMOL/L — SIGNIFICANT CHANGE UP (ref 135–145)
SURGICAL PATHOLOGY STUDY: SIGNIFICANT CHANGE UP
WBC # BLD: 7.22 K/UL — SIGNIFICANT CHANGE UP (ref 3.8–10.5)
WBC # FLD AUTO: 7.22 K/UL — SIGNIFICANT CHANGE UP (ref 3.8–10.5)

## 2022-10-18 PROCEDURE — 99232 SBSQ HOSP IP/OBS MODERATE 35: CPT | Mod: GC

## 2022-10-18 RX ORDER — CEFTRIAXONE 500 MG/1
2000 INJECTION, POWDER, FOR SOLUTION INTRAMUSCULAR; INTRAVENOUS ONCE
Refills: 0 | Status: COMPLETED | OUTPATIENT
Start: 2022-10-18 | End: 2022-10-18

## 2022-10-18 RX ORDER — CEFTRIAXONE 500 MG/1
1000 INJECTION, POWDER, FOR SOLUTION INTRAMUSCULAR; INTRAVENOUS EVERY 24 HOURS
Refills: 0 | Status: DISCONTINUED | OUTPATIENT
Start: 2022-10-18 | End: 2022-10-18

## 2022-10-18 RX ADMIN — CEFTRIAXONE 100 MILLIGRAM(S): 500 INJECTION, POWDER, FOR SOLUTION INTRAMUSCULAR; INTRAVENOUS at 18:28

## 2022-10-18 RX ADMIN — SENNA PLUS 2 TABLET(S): 8.6 TABLET ORAL at 21:31

## 2022-10-18 RX ADMIN — HEPARIN SODIUM 5000 UNIT(S): 5000 INJECTION INTRAVENOUS; SUBCUTANEOUS at 01:32

## 2022-10-18 RX ADMIN — Medication 100 MILLIGRAM(S): at 12:20

## 2022-10-18 RX ADMIN — HEPARIN SODIUM 5000 UNIT(S): 5000 INJECTION INTRAVENOUS; SUBCUTANEOUS at 09:42

## 2022-10-18 RX ADMIN — DARUNAVIR ETHANOLATE AND COBICISTAT 1 TABLET(S): 800; 150 TABLET, FILM COATED ORAL at 12:19

## 2022-10-18 RX ADMIN — CHLORHEXIDINE GLUCONATE 1 APPLICATION(S): 213 SOLUTION TOPICAL at 06:30

## 2022-10-18 RX ADMIN — HEPARIN SODIUM 5000 UNIT(S): 5000 INJECTION INTRAVENOUS; SUBCUTANEOUS at 17:11

## 2022-10-18 RX ADMIN — CITALOPRAM 20 MILLIGRAM(S): 10 TABLET, FILM COATED ORAL at 12:19

## 2022-10-18 RX ADMIN — GABAPENTIN 100 MILLIGRAM(S): 400 CAPSULE ORAL at 21:31

## 2022-10-18 RX ADMIN — Medication 100 MILLIGRAM(S): at 17:11

## 2022-10-18 RX ADMIN — CALCITRIOL 0.25 MICROGRAM(S): 0.5 CAPSULE ORAL at 12:20

## 2022-10-18 RX ADMIN — DOLUTEGRAVIR SODIUM 50 MILLIGRAM(S): 25 TABLET, FILM COATED ORAL at 12:19

## 2022-10-18 RX ADMIN — ISOSORBIDE MONONITRATE 60 MILLIGRAM(S): 60 TABLET, EXTENDED RELEASE ORAL at 12:20

## 2022-10-18 RX ADMIN — CHLORHEXIDINE GLUCONATE 1 APPLICATION(S): 213 SOLUTION TOPICAL at 12:36

## 2022-10-18 RX ADMIN — ATORVASTATIN CALCIUM 80 MILLIGRAM(S): 80 TABLET, FILM COATED ORAL at 21:31

## 2022-10-18 RX ADMIN — Medication 1 TABLET(S): at 12:20

## 2022-10-18 RX ADMIN — Medication 100 MILLIGRAM(S): at 09:42

## 2022-10-18 RX ADMIN — Medication 3 MILLIGRAM(S): at 21:31

## 2022-10-18 RX ADMIN — PANTOPRAZOLE SODIUM 40 MILLIGRAM(S): 20 TABLET, DELAYED RELEASE ORAL at 06:37

## 2022-10-18 RX ADMIN — PANTOPRAZOLE SODIUM 40 MILLIGRAM(S): 20 TABLET, DELAYED RELEASE ORAL at 17:11

## 2022-10-18 RX ADMIN — Medication 50 MILLIGRAM(S): at 14:26

## 2022-10-18 RX ADMIN — Medication 100 MILLIGRAM(S): at 01:33

## 2022-10-18 RX ADMIN — Medication 1 GRAM(S): at 12:19

## 2022-10-18 RX ADMIN — Medication 100 MILLIGRAM(S): at 12:19

## 2022-10-18 RX ADMIN — ZINC SULFATE TAB 220 MG (50 MG ZINC EQUIVALENT) 220 MILLIGRAM(S): 220 (50 ZN) TAB at 12:20

## 2022-10-18 NOTE — PROGRESS NOTE ADULT - ATTENDING COMMENTS
Patient seen and examined on 10/18/22. This is a late entry. Patient endorses feeling slightly light-headed but BP taken and is 124/58. Patient reports her appetite has not been great and she has not been eating or drinking much. Patient encouraged to increase her po intake as tolerated. H/H is dropping, but patient denies any S/S of bleeding. Will continue to monitor H/H. Patient to complete course of ceftriaxone and metronidazole today as per ID recommendations. Remaining medicine recommendations as noted above.

## 2022-10-18 NOTE — PROGRESS NOTE ADULT - SUBJECTIVE AND OBJECTIVE BOX
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 Patient complaining of abdominal pain this morning, but no nausea/vomiting.  LUE AVG clotted. Post right IJ PC placement 10/14/22  Feels well today , was able to eat all her breakfast meal  Was able to walk to the bathroom and sit in a chair yesterday.      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      ESRD on hemodialysis      History of ectopic pregnancy  Two      H/O: hysterectomy  Due to cervical cancer      S/P arteriovenous (AV) fistula creation  july 10 2020          oxycodone (Rash)  penicillins (Urticaria; Rash)      MEDICATIONS  (STANDING):  allopurinol 100 milliGRAM(s) Oral daily  amLODIPine   Tablet 10 milliGRAM(s) Oral daily  atorvastatin 80 milliGRAM(s) Oral at bedtime  calcitriol   Capsule 0.25 MICROGram(s) Oral daily  chlorhexidine 2% Cloths 1 Application(s) Topical <User Schedule>  chlorhexidine 2% Cloths 1 Application(s) Topical daily  citalopram 20 milliGRAM(s) Oral daily  darunavir 800 mG/cobicstat 150 mG 1 Tablet(s) Oral daily  dolutegravir 50 milliGRAM(s) Oral daily  epoetin neyda-epbx (RETACRIT) Injectable 8000 Unit(s) IV Push <User Schedule>  gabapentin 100 milliGRAM(s) Oral at bedtime  heparin   Injectable 5000 Unit(s) SubCutaneous every 8 hours  hydrALAZINE 50 milliGRAM(s) Oral three times a day  isosorbide   mononitrate ER Tablet (IMDUR) 60 milliGRAM(s) Oral daily  lamiVUDine- milliGRAM(s) Oral daily  lidocaine/prilocaine Cream 1 Application(s) Topical <User Schedule>  melatonin 3 milliGRAM(s) Oral at bedtime  metoprolol tartrate 100 milliGRAM(s) Oral two times a day  metroNIDAZOLE  IVPB 500 milliGRAM(s) IV Intermittent every 8 hours  Nephro-alex 1 Tablet(s) Oral daily  omega-3-Acid Ethyl Esters 1 Gram(s) Oral daily  pantoprazole  Injectable 40 milliGRAM(s) IV Push every 12 hours  senna 2 Tablet(s) Oral at bedtime  zinc sulfate 220 milliGRAM(s) Oral daily    MEDICATIONS  (PRN):  acetaminophen     Tablet .. 1000 milliGRAM(s) Oral every 8 hours PRN Moderate Pain (4 - 6)  aluminum hydroxide/magnesium hydroxide/simethicone Suspension 30 milliLiter(s) Oral every 4 hours PRN Dyspepsia  HYDROmorphone   Tablet 2 milliGRAM(s) Oral every 4 hours PRN Severe Pain (7 - 10)  ondansetron Injectable 4 milliGRAM(s) IV Push every 8 hours PRN Nausea and/or Vomiting  sodium chloride 0.9% lock flush 10 milliLiter(s) IV Push every 1 hour PRN Pre/post blood products, medications, blood draw, and to maintain line patency                            8.7    7.22  )-----------( 228      ( 18 Oct 2022 07:13 )             25.2     10-18    139  |  104  |  27<H>  ----------------------------<  100<H>  3.8   |  27  |  5.85<H>    Ca    9.2      18 Oct 2022 07:13  Phos  3.2     10-18  Mg     1.9     10-18              REVIEW OF SYSTEMS:  General: no fever no chills, no weight loss.    RESPIRATORY: No cough, wheezing, hemoptysis; No shortness of breath  CARDIOVASCULAR: No chest pain or palpitations. No Edema  GASTROINTESTINAL: No abdominal or epigastric pain. No nausea, vomiting. No diarrhea or constipation. No melena.  GENITOURINARY: No dysuria, frequency, foamy urine, urinary urgency, incontinence or hematuria  NEUROLOGICAL: No numbness or weakness, no tremor , no dizziness.   Muscle skeletal : no joint pain and no swelling of joints and limbs.  SKIN: No itching, burning, rashes.        VITALS:  T(F): 99 (10-18-22 @ 05:46), Max: 99 (10-18-22 @ 05:46)  HR: 85 (10-18-22 @ 08:50)  BP: 118/60 (10-18-22 @ 08:50)  RR: 16 (10-18-22 @ 08:50)  SpO2: 98% (10-18-22 @ 08:50)  Wt(kg): --    10-17 @ 07:01  -  10-18 @ 07:00  --------------------------------------------------------  IN: 500 mL / OUT: 2600 mL / NET: -2100 mL        PHYSICAL EXAM:  Constitutional: well developed, no diaphoresis, no distress.  Neck: No JVD, no carotid bruit, supple, no adenopathy  Respiratory:  air entrance B/L, no wheezes, rales or rhonchi  Cardiovascular: S1, S2, RRR, no pericardial rub, no murmur  Abdomen: BS+, soft, no tenderness, no bruit  Pelvis: bladder nondistended  Extremities: No cyanosis or clubbing. No peripheral edema.     Vascular Access: right IJ with no erythema and no discharge

## 2022-10-18 NOTE — PROGRESS NOTE ADULT - SUBJECTIVE AND OBJECTIVE BOX
Patient seen and examined at bedside. No acute clinical change. Received HD yesterday. PT reevaluated for rehab. Vitals stable, labs pending.    Vital Signs Last 24 Hrs  T(C): 37.2 (18 Oct 2022 05:46), Max: 37.2 (17 Oct 2022 17:51)  T(F): 99 (18 Oct 2022 05:46), Max: 99 (18 Oct 2022 05:46)  HR: 63 (18 Oct 2022 05:46) (63 - 92)  BP: 107/63 (18 Oct 2022 05:46) (107/63 - 145/96)  BP(mean): --  RR: 16 (18 Oct 2022 05:46) (16 - 18)  SpO2: 99% (18 Oct 2022 05:46) (99% - 100%)    Parameters below as of 18 Oct 2022 05:46  Patient On (Oxygen Delivery Method): room air    General: NAD  Cardio: RRR  Resp: normal resp effort  Abd: surgical sites c/d/i with staples in place, ostomy healthy and functioning, abd soft, minimally tender, nondistended                          8.7    7.22  )-----------( 228      ( 18 Oct 2022 07:13 )             25.2     10-17    134<L>  |  102  |  50<H>  ----------------------------<  96  4.3   |  23  |  8.74<H>    Ca    9.2      17 Oct 2022 10:25  Phos  3.2     10-17  Mg     1.9     10-17

## 2022-10-18 NOTE — PROGRESS NOTE ADULT - SUBJECTIVE AND OBJECTIVE BOX
PGY-1 Progress Note discussed with attending    PAGER #: [[phone]] TILL 5:00 PM  PLEASE CONTACT ON CALL TEAM:  - On Call Team (Please refer to Danny) FROM 5:00 PM - 8:30PM  - Nightfloat Team FROM 8:30 -7:30 AM    CHIEF COMPLAINT & BRIEF HOSPITAL COURSE:    INTERVAL HPI/OVERNIGHT EVENTS:       REVIEW OF SYSTEMS:  CONSTITUTIONAL: No fever, weight loss, or fatigue  RESPIRATORY: No cough, wheezing, chills or hemoptysis; No shortness of breath  CARDIOVASCULAR: No chest pain, palpitations, dizziness, or leg swelling  GASTROINTESTINAL: No abdominal pain. No nausea, vomiting, or hematemesis; No diarrhea or constipation. No melena or hematochezia.  GENITOURINARY: No dysuria or hematuria, urinary frequency  NEUROLOGICAL: No headaches, memory loss, loss of strength, numbness, or tremors  SKIN: No itching, burning, rashes, or lesions     Vital Signs Last 24 Hrs  T(C): 37 (18 Oct 2022 13:49), Max: 37.2 (17 Oct 2022 17:51)  T(F): 98.6 (18 Oct 2022 13:49), Max: 99 (18 Oct 2022 05:46)  HR: 85 (18 Oct 2022 13:49) (63 - 92)  BP: 122/60 (18 Oct 2022 13:49) (107/63 - 142/77)  BP(mean): --  RR: 16 (18 Oct 2022 13:49) (16 - 18)  SpO2: 99% (18 Oct 2022 13:49) (98% - 100%)    Parameters below as of 18 Oct 2022 13:49  Patient On (Oxygen Delivery Method): room air        PHYSICAL EXAMINATION:  GENERAL: NAD, well built  HEAD:  Atraumatic, Normocephalic  EYES:  conjunctiva and sclera clear  NECK: Supple, No JVD, Normal thyroid  CHEST/LUNG: Clear to auscultation. Clear to percussion bilaterally; No rales, rhonchi, wheezing, or rubs  HEART: Regular rate and rhythm; No murmurs, rubs, or gallops  ABDOMEN: Soft, Nontender, Nondistended; Bowel sounds present  NERVOUS SYSTEM:  Alert & Oriented X3,    EXTREMITIES:  2+ Peripheral Pulses, No clubbing, cyanosis, or edema  SKIN: warm dry                          8.7    7.22  )-----------( 228      ( 18 Oct 2022 07:13 )             25.2     10-18    139  |  104  |  27<H>  ----------------------------<  100<H>  3.8   |  27  |  5.85<H>    Ca    9.2      18 Oct 2022 07:13  Phos  3.2     10-18  Mg     1.9     10-18                CAPILLARY BLOOD GLUCOSE      RADIOLOGY & ADDITIONAL TESTS:                   VERONICA GOVEA   67F ESRD on HD, HIV on HAART, HTN originally p/w severe abdominal pain, found to have perfed diverticulitis with abscess and 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 S/p small bowel resection and Alfonso’s. Hospital course also complicated by AVF malfunction . Tunneled HD catheter 10/14 right side. Pt examined at bedside stated that she is doing much better today. Pain from surgery site has been improved alot.           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      ESRD on hemodialysis      History of ectopic pregnancy  Two      H/O: hysterectomy  Due to cervical cancer      S/P arteriovenous (AV) fistula creation  july 10 2020          REVIEW OF SYSTEMS:  CONSTITUTIONAL: No fever, weight loss, or fatigue  EYES: No eye pain, visual disturbances, or discharge  ENMT:  No difficulty hearing, tinnitus, vertigo; No sinus or throat pain  NECK: No pain or stiffness  BREASTS: No pain, masses, or nipple discharge  RESPIRATORY: No cough, wheezing, chills or hemoptysis; No shortness of breath  CARDIOVASCULAR: No chest pain, palpitations, dizziness, or leg swelling  GASTROINTESTINAL: No abdominal or epigastric pain. No nausea, vomiting, or hematemesis; No diarrhea or constipation. No melena or hematochezia.  GENITOURINARY: No dysuria, frequency, hematuria, or incontinence  NEUROLOGICAL: No headaches, memory loss, loss of strength, numbness, or tremors  SKIN: No itching, burning, rashes, or lesions   LYMPH NODES: No enlarged glands  ENDOCRINE: No heat or cold intolerance; No hair loss  MUSCULOSKELETAL: No joint pain or swelling; No muscle, back, or extremity pain  PSYCHIATRIC: No depression, anxiety, mood swings, or difficulty sleeping  HEME/LYMPH: No easy bruising, or bleeding gums  ALLERY AND IMMUNOLOGIC: No hives or eczema    T(C): 37 (10-18-22 @ 13:49), Max: 37.2 (10-17-22 @ 17:51)  HR: 85 (10-18-22 @ 13:49) (63 - 92)  BP: 122/60 (10-18-22 @ 13:49) (107/63 - 142/77)  RR: 16 (10-18-22 @ 13:49) (16 - 18)  SpO2: 99% (10-18-22 @ 13:49) (98% - 100%)  Wt(kg): --Vital Signs Last 24 Hrs  T(C): 37 (18 Oct 2022 13:49), Max: 37.2 (17 Oct 2022 17:51)  T(F): 98.6 (18 Oct 2022 13:49), Max: 99 (18 Oct 2022 05:46)  HR: 85 (18 Oct 2022 13:49) (63 - 92)  BP: 122/60 (18 Oct 2022 13:49) (107/63 - 142/77)  BP(mean): --  RR: 16 (18 Oct 2022 13:49) (16 - 18)  SpO2: 99% (18 Oct 2022 13:49) (98% - 100%)    Parameters below as of 18 Oct 2022 13:49  Patient On (Oxygen Delivery Method): room air        PHYSICAL EXAM:  GENERAL: NAD  HEAD:  Atraumatic, Normocephalic  EYES: EOMI, PERRLA, conjunctiva and sclera clear  NECK: Supple, No JVD, Normal thyroid, R Permcath  NERVOUS SYSTEM:  Alert & Oriented , no focal neurologic deficit   CHEST/LUNG: Clear to percussion bilaterally; No rales, rhonchi, wheezing, or rubs  HEART: Regular rate and rhythm; No murmurs, rubs, or gallops  ABDOMEN: Soft, mild tender on staples site, Nondistended; Bowel sounds present  EXTREMITIES:  2+ Peripheral Pulses, No clubbing, cyanosis, or edema   SKIN: No rashes or lesions    Consultant(s) Notes Reviewed:  [x ] YES  [ ] NO  Care Discussed with Consultants/Other Providers [ x] YES  [ ] NO    LABS:  CBC   10-18-22 @ 07:13  Hematcorit 25.2  Hemoglobin 8.7  Mean Cell Hemoglobin 31.0  Platelet Count-Automated 228  RBC Count 2.81  Red Cell Distrib Width 17.2  Wbc Count 7.22      BMP  10-18-22 @ 07:13  Anion Gap. Serum 8  Blood Urea Nitrogen,Serm 27  Calcium, Total Serum 9.2  Carbon Dioxide, Serum 27  Chloride, Serum 104  Creatinine, Serum 5.85  eGFR in  --  eGFR in Non Afican American --  Gloucose, serum 100  Potassium, Serum 3.8  Sodium, Serum 139              10-17-22 @ 10:25  Anion Gap. Serum 9  Blood Urea Nitrogen,Serm 50  Calcium, Total Serum 9.2  Carbon Dioxide, Serum 23  Chloride, Serum 102  Creatinine, Serum 8.74  eGFR in  --  eGFR in Non Afican American --  Gloucose, serum 96  Potassium, Serum 4.3  Sodium, Serum 134              10-16-22 @ 05:53  Anion Gap. Serum 8  Blood Urea Nitrogen,Serm 35  Calcium, Total Serum 8.4  Carbon Dioxide, Serum 24  Chloride, Serum 103  Creatinine, Serum 7.60  eGFR in  --  eGFR in Non Afican American --  Gloucose, serum 107  Potassium, Serum 3.9  Sodium, Serum 135                  CMP  10-18-22 @ 07:13  Jleena Aminotransferase(ALT/SGPT)--  Albumin, Serum --  Alkaline Phosphatase, Serum --  Anion Gap, Serum 8  Aspartate Aminotransferase (AST/SGOT)--  Bilirubin Total, Serum --  Blood Urea Nitrogen, Serum 27  Calcium,Total Serum 9.2  Carbon Dioxide, Serum 27  Chloride, Serum 104  Creatinine, Serum 5.85  eGFR if  --  eGFR if Non African American --  Glucose, Serum 100  Potassium, Serum 3.8  Protein Total, Serum --  Sodium, Serum 139                      10-17-22 @ 10:25  Jelena Aminotransferase(ALT/SGPT)--  Albumin, Serum --  Alkaline Phosphatase, Serum --  Anion Gap, Serum 9  Aspartate Aminotransferase (AST/SGOT)--  Bilirubin Total, Serum --  Blood Urea Nitrogen, Serum 50  Calcium,Total Serum 9.2  Carbon Dioxide, Serum 23  Chloride, Serum 102  Creatinine, Serum 8.74  eGFR if  --  eGFR if Non African American --  Glucose, Serum 96  Potassium, Serum 4.3  Protein Total, Serum --  Sodium, Serum 134                      10-16-22 @ 05:53  Jelena Aminotransferase(ALT/SGPT)--  Albumin, Serum --  Alkaline Phosphatase, Serum --  Anion Gap, Serum 8  Aspartate Aminotransferase (AST/SGOT)--  Bilirubin Total, Serum --  Blood Urea Nitrogen, Serum 35  Calcium,Total Serum 8.4  Carbon Dioxide, Serum 24  Chloride, Serum 103  Creatinine, Serum 7.60  eGFR if  --  eGFR if Non African American --  Glucose, Serum 107  Potassium, Serum 3.9  Protein Total, Serum --  Sodium, Serum 135                          PT/INR      Amylase/Lipase            RADIOLOGY & ADDITIONAL TESTS:    Imaging Personally Reviewed:  [ ] YES  [ ] NO

## 2022-10-18 NOTE — PROGRESS NOTE ADULT - ASSESSMENT
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 S/p small bowel resection and Alfonso’s.     Assessment:   #Perf Diverticulitis with abscess s/p bowel resection and Alfonso’s  #ESRD  #HIV  #HTN  #GERD  #anemia, normocytic, stable  #gout      Plan:  #Perf Diverticulitis with abscess s/p bowel resection and Alfonso’s  -2/2 to fistulization from the sigmoid colon likely in the setting of chronic diverticular disease.  -s/p IR CT guided drainage 9/28  - Abscess CC + for proteus mirabilis, ecoli, klebsiella pneumoniae  - last dose of ceftriaxone 2gm today and last dose of flagyl 10/19   - repeat CT abd/ pelvis  shows  diverticular abscess is collapsed around the pigtail catheter. Mild residual inflammatory changes around the sigmoid colon and small bowel with suggestion of enterocolic fistula.  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 Young on board.    #ESRD  Hospital course complicated by malfunction AVF  R Permacath   HD today   Continue with sodium bicarbonate, calcitriol    #HIV  Continue with home meds     #HTN  Continue with home meds  On amlodipine , Imdur , metoprolol and hydralazine   Monitor BP       #GERD  Continue with home med     #anemia, normocytic  Hgb dropped from 10 to 8.7, no s/s of active bleeding   Likely due to CKD  Trend daily   On epo on HD days     #Gout  On allopurinol  Continue with home med     #HLD  Continue with home med

## 2022-10-18 NOTE — PROGRESS NOTE ADULT - ASSESSMENT
67F POD7 s/p small bowel resection and Alfonso's for diverticulitis with enterocolic fistula  -f/u labs  -nursing and case management for ostomy training and supplies, as well as establishing outpatient dialysis  -dispo planning - family to discuss rehab options will touch base with DIANA

## 2022-10-19 VITALS — HEART RATE: 106 BPM | DIASTOLIC BLOOD PRESSURE: 63 MMHG | SYSTOLIC BLOOD PRESSURE: 107 MMHG

## 2022-10-19 LAB
ANION GAP SERPL CALC-SCNC: 11 MMOL/L — SIGNIFICANT CHANGE UP (ref 5–17)
BUN SERPL-MCNC: 35 MG/DL — HIGH (ref 7–18)
CALCIUM SERPL-MCNC: 9.5 MG/DL — SIGNIFICANT CHANGE UP (ref 8.4–10.5)
CHLORIDE SERPL-SCNC: 102 MMOL/L — SIGNIFICANT CHANGE UP (ref 96–108)
CO2 SERPL-SCNC: 24 MMOL/L — SIGNIFICANT CHANGE UP (ref 22–31)
CREAT SERPL-MCNC: 7.33 MG/DL — HIGH (ref 0.5–1.3)
EGFR: 6 ML/MIN/1.73M2 — LOW
GLUCOSE SERPL-MCNC: 105 MG/DL — HIGH (ref 70–99)
HCT VFR BLD CALC: 22.6 % — LOW (ref 34.5–45)
HGB BLD-MCNC: 7.7 G/DL — LOW (ref 11.5–15.5)
MAGNESIUM SERPL-MCNC: 2 MG/DL — SIGNIFICANT CHANGE UP (ref 1.6–2.6)
MCHC RBC-ENTMCNC: 31 PG — SIGNIFICANT CHANGE UP (ref 27–34)
MCHC RBC-ENTMCNC: 34.1 GM/DL — SIGNIFICANT CHANGE UP (ref 32–36)
MCV RBC AUTO: 91.1 FL — SIGNIFICANT CHANGE UP (ref 80–100)
NRBC # BLD: 0 /100 WBCS — SIGNIFICANT CHANGE UP (ref 0–0)
PHOSPHATE SERPL-MCNC: 3.7 MG/DL — SIGNIFICANT CHANGE UP (ref 2.5–4.5)
PLATELET # BLD AUTO: 234 K/UL — SIGNIFICANT CHANGE UP (ref 150–400)
POTASSIUM SERPL-MCNC: 3.6 MMOL/L — SIGNIFICANT CHANGE UP (ref 3.5–5.3)
POTASSIUM SERPL-SCNC: 3.6 MMOL/L — SIGNIFICANT CHANGE UP (ref 3.5–5.3)
RBC # BLD: 2.48 M/UL — LOW (ref 3.8–5.2)
RBC # FLD: 17.6 % — HIGH (ref 10.3–14.5)
SODIUM SERPL-SCNC: 137 MMOL/L — SIGNIFICANT CHANGE UP (ref 135–145)
WBC # BLD: 6.79 K/UL — SIGNIFICANT CHANGE UP (ref 3.8–10.5)
WBC # FLD AUTO: 6.79 K/UL — SIGNIFICANT CHANGE UP (ref 3.8–10.5)

## 2022-10-19 PROCEDURE — 87205 SMEAR GRAM STAIN: CPT

## 2022-10-19 PROCEDURE — 77012 CT SCAN FOR NEEDLE BIOPSY: CPT

## 2022-10-19 PROCEDURE — 74177 CT ABD & PELVIS W/CONTRAST: CPT

## 2022-10-19 PROCEDURE — 74176 CT ABD & PELVIS W/O CONTRAST: CPT | Mod: MA

## 2022-10-19 PROCEDURE — C1889: CPT

## 2022-10-19 PROCEDURE — 96375 TX/PRO/DX INJ NEW DRUG ADDON: CPT

## 2022-10-19 PROCEDURE — 80074 ACUTE HEPATITIS PANEL: CPT

## 2022-10-19 PROCEDURE — 97162 PT EVAL MOD COMPLEX 30 MIN: CPT

## 2022-10-19 PROCEDURE — 87186 SC STD MICRODIL/AGAR DIL: CPT

## 2022-10-19 PROCEDURE — 99232 SBSQ HOSP IP/OBS MODERATE 35: CPT | Mod: GC

## 2022-10-19 PROCEDURE — 87340 HEPATITIS B SURFACE AG IA: CPT

## 2022-10-19 PROCEDURE — 87077 CULTURE AEROBIC IDENTIFY: CPT

## 2022-10-19 PROCEDURE — 99261: CPT

## 2022-10-19 PROCEDURE — 86850 RBC ANTIBODY SCREEN: CPT

## 2022-10-19 PROCEDURE — 87536 HIV-1 QUANT&REVRSE TRNSCRPJ: CPT

## 2022-10-19 PROCEDURE — 83036 HEMOGLOBIN GLYCOSYLATED A1C: CPT

## 2022-10-19 PROCEDURE — 83735 ASSAY OF MAGNESIUM: CPT

## 2022-10-19 PROCEDURE — 82378 CARCINOEMBRYONIC ANTIGEN: CPT

## 2022-10-19 PROCEDURE — 36558 INSERT TUNNELED CV CATH: CPT

## 2022-10-19 PROCEDURE — 71045 X-RAY EXAM CHEST 1 VIEW: CPT

## 2022-10-19 PROCEDURE — 83690 ASSAY OF LIPASE: CPT

## 2022-10-19 PROCEDURE — P9040: CPT

## 2022-10-19 PROCEDURE — 36430 TRANSFUSION BLD/BLD COMPNT: CPT

## 2022-10-19 PROCEDURE — 77001 FLUOROGUIDE FOR VEIN DEVICE: CPT

## 2022-10-19 PROCEDURE — 86359 T CELLS TOTAL COUNT: CPT

## 2022-10-19 PROCEDURE — 84100 ASSAY OF PHOSPHORUS: CPT

## 2022-10-19 PROCEDURE — 96366 THER/PROPH/DIAG IV INF ADDON: CPT

## 2022-10-19 PROCEDURE — 96365 THER/PROPH/DIAG IV INF INIT: CPT

## 2022-10-19 PROCEDURE — 82962 GLUCOSE BLOOD TEST: CPT

## 2022-10-19 PROCEDURE — 87040 BLOOD CULTURE FOR BACTERIA: CPT

## 2022-10-19 PROCEDURE — 85730 THROMBOPLASTIN TIME PARTIAL: CPT

## 2022-10-19 PROCEDURE — C1750: CPT

## 2022-10-19 PROCEDURE — 76937 US GUIDE VASCULAR ACCESS: CPT

## 2022-10-19 PROCEDURE — 87635 SARS-COV-2 COVID-19 AMP PRB: CPT

## 2022-10-19 PROCEDURE — 99285 EMERGENCY DEPT VISIT HI MDM: CPT

## 2022-10-19 PROCEDURE — 86923 COMPATIBILITY TEST ELECTRIC: CPT

## 2022-10-19 PROCEDURE — 10160 PNXR ASPIR ABSC HMTMA BULLA: CPT

## 2022-10-19 PROCEDURE — U0003: CPT

## 2022-10-19 PROCEDURE — 85027 COMPLETE CBC AUTOMATED: CPT

## 2022-10-19 PROCEDURE — 87070 CULTURE OTHR SPECIMN AEROBIC: CPT

## 2022-10-19 PROCEDURE — 84484 ASSAY OF TROPONIN QUANT: CPT

## 2022-10-19 PROCEDURE — 96367 TX/PROPH/DG ADDL SEQ IV INF: CPT

## 2022-10-19 PROCEDURE — 86360 T CELL ABSOLUTE COUNT/RATIO: CPT

## 2022-10-19 PROCEDURE — 86901 BLOOD TYPING SEROLOGIC RH(D): CPT

## 2022-10-19 PROCEDURE — 80053 COMPREHEN METABOLIC PANEL: CPT

## 2022-10-19 PROCEDURE — 85025 COMPLETE CBC W/AUTO DIFF WBC: CPT

## 2022-10-19 PROCEDURE — 83540 ASSAY OF IRON: CPT

## 2022-10-19 PROCEDURE — 85652 RBC SED RATE AUTOMATED: CPT

## 2022-10-19 PROCEDURE — 36415 COLL VENOUS BLD VENIPUNCTURE: CPT

## 2022-10-19 PROCEDURE — 93005 ELECTROCARDIOGRAM TRACING: CPT

## 2022-10-19 PROCEDURE — 93306 TTE W/DOPPLER COMPLETE: CPT

## 2022-10-19 PROCEDURE — 86304 IMMUNOASSAY TUMOR CA 125: CPT

## 2022-10-19 PROCEDURE — 86706 HEP B SURFACE ANTIBODY: CPT

## 2022-10-19 PROCEDURE — 86301 IMMUNOASSAY TUMOR CA 19-9: CPT

## 2022-10-19 PROCEDURE — C1769: CPT

## 2022-10-19 PROCEDURE — 86140 C-REACTIVE PROTEIN: CPT

## 2022-10-19 PROCEDURE — 85610 PROTHROMBIN TIME: CPT

## 2022-10-19 PROCEDURE — 83550 IRON BINDING TEST: CPT

## 2022-10-19 PROCEDURE — 82150 ASSAY OF AMYLASE: CPT

## 2022-10-19 PROCEDURE — 80048 BASIC METABOLIC PNL TOTAL CA: CPT

## 2022-10-19 PROCEDURE — 86704 HEP B CORE ANTIBODY TOTAL: CPT

## 2022-10-19 PROCEDURE — U0005: CPT

## 2022-10-19 PROCEDURE — 88307 TISSUE EXAM BY PATHOLOGIST: CPT

## 2022-10-19 PROCEDURE — 86900 BLOOD TYPING SEROLOGIC ABO: CPT

## 2022-10-19 RX ORDER — HYDRALAZINE HCL 50 MG
1 TABLET ORAL
Qty: 90 | Refills: 0
Start: 2022-10-19 | End: 2022-11-17

## 2022-10-19 RX ORDER — HYDRALAZINE HCL 50 MG
25 TABLET ORAL THREE TIMES A DAY
Refills: 0 | Status: DISCONTINUED | OUTPATIENT
Start: 2022-10-19 | End: 2022-10-19

## 2022-10-19 RX ORDER — HYDRALAZINE HCL 50 MG
1 TABLET ORAL
Qty: 0 | Refills: 0 | DISCHARGE

## 2022-10-19 RX ADMIN — AMLODIPINE BESYLATE 10 MILLIGRAM(S): 2.5 TABLET ORAL at 05:34

## 2022-10-19 RX ADMIN — Medication 1 GRAM(S): at 14:55

## 2022-10-19 RX ADMIN — DARUNAVIR ETHANOLATE AND COBICISTAT 1 TABLET(S): 800; 150 TABLET, FILM COATED ORAL at 15:36

## 2022-10-19 RX ADMIN — PANTOPRAZOLE SODIUM 40 MILLIGRAM(S): 20 TABLET, DELAYED RELEASE ORAL at 05:39

## 2022-10-19 RX ADMIN — Medication 100 MILLIGRAM(S): at 09:14

## 2022-10-19 RX ADMIN — Medication 100 MILLIGRAM(S): at 01:53

## 2022-10-19 RX ADMIN — CHLORHEXIDINE GLUCONATE 1 APPLICATION(S): 213 SOLUTION TOPICAL at 14:57

## 2022-10-19 RX ADMIN — ZINC SULFATE TAB 220 MG (50 MG ZINC EQUIVALENT) 220 MILLIGRAM(S): 220 (50 ZN) TAB at 14:55

## 2022-10-19 RX ADMIN — PANTOPRAZOLE SODIUM 40 MILLIGRAM(S): 20 TABLET, DELAYED RELEASE ORAL at 18:22

## 2022-10-19 RX ADMIN — Medication 50 MILLIGRAM(S): at 05:34

## 2022-10-19 RX ADMIN — ERYTHROPOIETIN 8000 UNIT(S): 10000 INJECTION, SOLUTION INTRAVENOUS; SUBCUTANEOUS at 12:38

## 2022-10-19 RX ADMIN — CITALOPRAM 20 MILLIGRAM(S): 10 TABLET, FILM COATED ORAL at 14:55

## 2022-10-19 RX ADMIN — Medication 100 MILLIGRAM(S): at 14:54

## 2022-10-19 RX ADMIN — Medication 100 MILLIGRAM(S): at 14:55

## 2022-10-19 RX ADMIN — Medication 100 MILLIGRAM(S): at 17:57

## 2022-10-19 RX ADMIN — HEPARIN SODIUM 5000 UNIT(S): 5000 INJECTION INTRAVENOUS; SUBCUTANEOUS at 17:57

## 2022-10-19 RX ADMIN — CALCITRIOL 0.25 MICROGRAM(S): 0.5 CAPSULE ORAL at 14:55

## 2022-10-19 RX ADMIN — Medication 1 TABLET(S): at 14:56

## 2022-10-19 RX ADMIN — CHLORHEXIDINE GLUCONATE 1 APPLICATION(S): 213 SOLUTION TOPICAL at 05:38

## 2022-10-19 RX ADMIN — Medication 100 MILLIGRAM(S): at 05:34

## 2022-10-19 RX ADMIN — ISOSORBIDE MONONITRATE 60 MILLIGRAM(S): 60 TABLET, EXTENDED RELEASE ORAL at 14:55

## 2022-10-19 RX ADMIN — DOLUTEGRAVIR SODIUM 50 MILLIGRAM(S): 25 TABLET, FILM COATED ORAL at 14:54

## 2022-10-19 RX ADMIN — HEPARIN SODIUM 5000 UNIT(S): 5000 INJECTION INTRAVENOUS; SUBCUTANEOUS at 01:53

## 2022-10-19 RX ADMIN — HEPARIN SODIUM 5000 UNIT(S): 5000 INJECTION INTRAVENOUS; SUBCUTANEOUS at 09:15

## 2022-10-19 NOTE — PROGRESS NOTE ADULT - SUBJECTIVE AND OBJECTIVE BOX
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 Patient complaining of abdominal pain this morning, but no nausea/vomiting.  LUE AVG clotted. Post right IJ PC placement 10/14/22  Feels well today , was able to eat all her breakfast meal  Was able to walk to the bathroom and sit in a chair yesterday.  Patient c/o chiils in am and in dialysis room, afebrile.        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      ESRD on hemodialysis      History of ectopic pregnancy  Two      H/O: hysterectomy  Due to cervical cancer      S/P arteriovenous (AV) fistula creation  july 10 2020          oxycodone (Rash)  penicillins (Urticaria; Rash)      MEDICATIONS  (STANDING):  allopurinol 100 milliGRAM(s) Oral daily  amLODIPine   Tablet 10 milliGRAM(s) Oral daily  atorvastatin 80 milliGRAM(s) Oral at bedtime  calcitriol   Capsule 0.25 MICROGram(s) Oral daily  chlorhexidine 2% Cloths 1 Application(s) Topical <User Schedule>  chlorhexidine 2% Cloths 1 Application(s) Topical daily  citalopram 20 milliGRAM(s) Oral daily  darunavir 800 mG/cobicstat 150 mG 1 Tablet(s) Oral daily  dolutegravir 50 milliGRAM(s) Oral daily  epoetin neyda-epbx (RETACRIT) Injectable 8000 Unit(s) IV Push <User Schedule>  gabapentin 100 milliGRAM(s) Oral at bedtime  heparin   Injectable 5000 Unit(s) SubCutaneous every 8 hours  hydrALAZINE 50 milliGRAM(s) Oral three times a day  isosorbide   mononitrate ER Tablet (IMDUR) 60 milliGRAM(s) Oral daily  lamiVUDine- milliGRAM(s) Oral daily  lidocaine/prilocaine Cream 1 Application(s) Topical <User Schedule>  melatonin 3 milliGRAM(s) Oral at bedtime  metoprolol tartrate 100 milliGRAM(s) Oral two times a day  Nephro-alex 1 Tablet(s) Oral daily  omega-3-Acid Ethyl Esters 1 Gram(s) Oral daily  pantoprazole  Injectable 40 milliGRAM(s) IV Push every 12 hours  senna 2 Tablet(s) Oral at bedtime  zinc sulfate 220 milliGRAM(s) Oral daily    MEDICATIONS  (PRN):  acetaminophen     Tablet .. 1000 milliGRAM(s) Oral every 8 hours PRN Moderate Pain (4 - 6)  aluminum hydroxide/magnesium hydroxide/simethicone Suspension 30 milliLiter(s) Oral every 4 hours PRN Dyspepsia  ondansetron Injectable 4 milliGRAM(s) IV Push every 8 hours PRN Nausea and/or Vomiting  sodium chloride 0.9% lock flush 10 milliLiter(s) IV Push every 1 hour PRN Pre/post blood products, medications, blood draw, and to maintain line patency                            7.7    6.79  )-----------( 234      ( 19 Oct 2022 06:23 )             22.6     10-19    137  |  102  |  35<H>  ----------------------------<  105<H>  3.6   |  24  |  7.33<H>    Ca    9.5      19 Oct 2022 06:23  Phos  3.7     10-19  Mg     2.0     10-19              REVIEW OF SYSTEMS:  General: no fever no chills, no weight loss.  EYES/ENT: No visual changes;  No vertigo, no headache.  NECK: No pain or stiffness  RESPIRATORY: No cough, wheezing, hemoptysis; No shortness of breath  CARDIOVASCULAR: No chest pain or palpitations. No Edema  GASTROINTESTINAL: No abdominal or epigastric pain. No nausea, vomiting. No diarrhea or constipation. No melena.  GENITOURINARY: No dysuria, frequency, foamy urine, urinary urgency, incontinence or hematuria  NEUROLOGICAL: No numbness or weakness, no tremor , no dizziness.   c/o chills        VITALS:  T(F): 97.9 (10-19-22 @ 10:56), Max: 98.7 (10-18-22 @ 20:38)  HR: 85 (10-19-22 @ 10:56)  BP: 112/65 (10-19-22 @ 10:56)  RR: 16 (10-19-22 @ 10:56)  SpO2: 100% (10-19-22 @ 05:38)  Wt(kg): --    10-18 @ 07:01  -  10-19 @ 07:00  --------------------------------------------------------  IN: 16 mL / OUT: 0 mL / NET: 16 mL        PHYSICAL EXAM:  Constitutional: well developed, no diaphoresis, no distress.  Neck: No JVD, no carotid bruit, supple, no adenopathy  Respiratory: Good air entrance B/L, no wheezes, rales or rhonchi  Cardiovascular: S1, S2, RRR, no pericardial rub, no murmur  Abdomen: BS+, soft, no tenderness, no bruit, colostomy bag with stool soft  Pelvis: bladder nondistended  Extremities: No cyanosis or clubbing. No peripheral edema.     Vascular Access: right IJ with no discharge

## 2022-10-19 NOTE — PROGRESS NOTE ADULT - ASSESSMENT
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 S/p small bowel resection and Alfonso’s. POD 8    Assessment:   #Perf Diverticulitis with abscess s/p bowel resection and Alfonso’s  #ESRD  #HIV  #HTN  #GERD  #anemia, normocytic, stable  #gout      Plan:  #Perf Diverticulitis with abscess s/p bowel resection and Alfonso’s  -2/2 to fistulization from the sigmoid colon likely in the setting of chronic diverticular disease.  -s/p IR CT guided drainage 9/28  - Abscess CC + for proteus mirabilis, ecoli, klebsiella pneumoniae  - finish abx course Rocephin and flagyl   - repeat CT abd/ pelvis  shows  diverticular abscess is collapsed around the pigtail catheter. Mild residual inflammatory changes around the sigmoid colon and small bowel with suggestion of enterocolic fistula.  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 Young on board.    #ESRD  Hospital course complicated by malfunction AVF  R Permacath   Last HD 10/18  Continue with sodium bicarbonate, calcitriol    #HIV  Continue with home meds     #HTN  Continue with home meds  On amlodipine , Imdur , metoprolol and hydralazine   Monitor BP       #GERD  Continue with home med     #anemia, normocytic  Hgb dropped from 10 to 8.7> 7.7 today no s/s of active bleeding   Likely due to CKD  Trend daily   Transfuse if Hgb <7  On epo on HD days     #Gout  On allopurinol  Continue with home med     #HLD  Continue with home med   67F ESRD on HD, HIV on HAART, HTN originally p/w severe abdominal pain, found to have perfed diverticulitis with abscess and 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 S/p small bowel resection and Alfonso’s. POD 8    Assessment:   #Perf Diverticulitis with abscess s/p bowel resection and Alfonso’s  #ESRD  #HIV  #HTN  #GERD  #anemia, normocytic, stable  #gout      Plan:  #Perf Diverticulitis with abscess s/p bowel resection and Alfonso’s  -2/2 to fistulization from the sigmoid colon likely in the setting of chronic diverticular disease.  -s/p IR CT guided drainage 9/28  - Abscess CC + for proteus mirabilis, ecoli, klebsiella pneumoniae  - finish abx course Rocephin and flagyl   - repeat CT abd/ pelvis  shows  diverticular abscess is collapsed around the pigtail catheter. Mild residual inflammatory changes around the sigmoid colon and small bowel with suggestion of enterocolic fistula.  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 Young on board.    #ESRD  Hospital course complicated by malfunction AVF  R Permacath   Last HD 10/18  Continue with sodium bicarbonate, calcitriol    #HIV  Continue with home meds     #HTN  BP readings on softer side   On amlodipine , Imdur , metoprolol and hydralazine   Monitor BP   Consider decreasing hydralazine from 50mg TID to 25mg TID  if BP continue to be <110/70    #GERD  Continue with home med     #anemia, normocytic  Hgb dropped from 10 to 8.7> 7.7 today no s/s of active bleeding   Likely due to CKD  Trend daily   Transfuse if Hgb <7  On epo on HD days     #Gout  On allopurinol  Continue with home med     #HLD  Continue with home med

## 2022-10-19 NOTE — PROGRESS NOTE ADULT - ATTENDING COMMENTS
Patient seen and examined today at hemodialysis. Case discussed with housestaff. Patient reports shaking in her hands but this resolved after completion of hemodialysis. Rectal temperature obtained over concerns for possible rigors/sepsis, but was unremarkable at 97.8. Patient reports some bleeding from surgical staple with dressing saturation, but was addressed by surgical team. H/H downtrending. Continue to monitor H/H and resume EPO as per nephrology recommendations. DC planning as per surgery. Remaining care as above.

## 2022-10-19 NOTE — PROGRESS NOTE ADULT - SUBJECTIVE AND OBJECTIVE BOX
VERONICA GOVEA   67F ESRD on HD, HIV on HAART, HTN originally p/w severe abdominal pain, found to have perfed diverticulitis with abscess and 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 S/p small bowel resection and Alfonso’s. Hospital course also complicated by AVF malfunction . Tunneled HD catheter 10/14 right side. Pt examined at bedside no new complaints. She said that she had slight bleeding from one of her staple but surgery adjusted it.        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      ESRD on hemodialysis      History of ectopic pregnancy  Two      H/O: hysterectomy  Due to cervical cancer      S/P arteriovenous (AV) fistula creation  july 10 2020          REVIEW OF SYSTEMS:  CONSTITUTIONAL: No fever, weight loss, or fatigue  EYES: No eye pain, visual disturbances, or discharge  ENMT:  No difficulty hearing, tinnitus, vertigo; No sinus or throat pain  NECK: No pain or stiffness  BREASTS: No pain, masses, or nipple discharge  RESPIRATORY: No cough, wheezing, chills or hemoptysis; No shortness of breath  CARDIOVASCULAR: No chest pain, palpitations, dizziness, or leg swelling  GASTROINTESTINAL: No abdominal or epigastric pain. No nausea, vomiting, or hematemesis; No diarrhea or constipation. No melena or hematochezia.  GENITOURINARY: No dysuria, frequency, hematuria, or incontinence  NEUROLOGICAL: No headaches, memory loss, loss of strength, numbness, or tremors  SKIN: No itching, burning, rashes, or lesions   LYMPH NODES: No enlarged glands  ENDOCRINE: No heat or cold intolerance; No hair loss  MUSCULOSKELETAL: No joint pain or swelling; No muscle, back, or extremity pain    T(C): 36.7 (10-19-22 @ 05:38), Max: 37.1 (10-18-22 @ 20:38)  HR: 95 (10-19-22 @ 05:38) (85 - 95)  BP: 109/66 (10-19-22 @ 05:38) (94/53 - 122/60)  RR: 16 (10-19-22 @ 05:38) (16 - 17)  SpO2: 100% (10-19-22 @ 05:38) (96% - 100%)  Wt(kg): --Vital Signs Last 24 Hrs  T(C): 36.7 (19 Oct 2022 05:38), Max: 37.1 (18 Oct 2022 20:38)  T(F): 98 (19 Oct 2022 05:38), Max: 98.7 (18 Oct 2022 20:38)  HR: 95 (19 Oct 2022 05:38) (85 - 95)  BP: 109/66 (19 Oct 2022 05:38) (94/53 - 122/60)  BP(mean): --  RR: 16 (19 Oct 2022 05:38) (16 - 17)  SpO2: 100% (19 Oct 2022 05:38) (96% - 100%)    Parameters below as of 19 Oct 2022 05:38  Patient On (Oxygen Delivery Method): room air      PHYSICAL EXAM:  GENERAL: NAD  HEAD:  Atraumatic, Normocephalic  EYES: EOMI, PERRLA, conjunctiva and sclera clear  NECK: Supple, No JVD, Normal thyroid, R Permcath  NERVOUS SYSTEM:  Alert & Oriented , no focal neurologic deficit   CHEST/LUNG: Clear to percussion bilaterally; No rales, rhonchi, wheezing, or rubs  HEART: Regular rate and rhythm; No murmurs, rubs, or gallops  ABDOMEN: Soft, non tender on staples site, Nondistended; Bowel sounds present,   EXTREMITIES:  2+ Peripheral Pulses, No clubbing, cyanosis, or edema   SKIN: No rashes or lesions    Consultant(s) Notes Reviewed:  [x ] YES  [ ] NO  Care Discussed with Consultants/Other Providers [ x] YES  [ ] NO    LABS:  CBC   10-19-22 @ 06:23  Hematcorit 22.6  Hemoglobin 7.7  Mean Cell Hemoglobin 31.0  Platelet Count-Automated 234  RBC Count 2.48  Red Cell Distrib Width 17.6  Wbc Count 6.79      BMP  10-19-22 @ 06:23  Anion Gap. Serum 11  Blood Urea Nitrogen,Serm 35  Calcium, Total Serum 9.5  Carbon Dioxide, Serum 24  Chloride, Serum 102  Creatinine, Serum 7.33  eGFR in  --  eGFR in Non Afican American --  Gloucose, serum 105  Potassium, Serum 3.6  Sodium, Serum 137              10-18-22 @ 07:13  Anion Gap. Serum 8  Blood Urea Nitrogen,Serm 27  Calcium, Total Serum 9.2  Carbon Dioxide, Serum 27  Chloride, Serum 104  Creatinine, Serum 5.85  eGFR in  --  eGFR in Non Afican American --  Gloucose, serum 100  Potassium, Serum 3.8  Sodium, Serum 139              10-17-22 @ 10:25  Anion Gap. Serum 9  Blood Urea Nitrogen,Serm 50  Calcium, Total Serum 9.2  Carbon Dioxide, Serum 23  Chloride, Serum 102  Creatinine, Serum 8.74  eGFR in  --  eGFR in Non Afican American --  Gloucose, serum 96  Potassium, Serum 4.3  Sodium, Serum 134                  CMP  10-19-22 @ 06:23  Jelena Aminotransferase(ALT/SGPT)--  Albumin, Serum --  Alkaline Phosphatase, Serum --  Anion Gap, Serum 11  Aspartate Aminotransferase (AST/SGOT)--  Bilirubin Total, Serum --  Blood Urea Nitrogen, Serum 35  Calcium,Total Serum 9.5  Carbon Dioxide, Serum 24  Chloride, Serum 102  Creatinine, Serum 7.33  eGFR if  --  eGFR if Non African American --  Glucose, Serum 105  Potassium, Serum 3.6  Protein Total, Serum --  Sodium, Serum 137                      10-18-22 @ 07:13  Jelena Aminotransferase(ALT/SGPT)--  Albumin, Serum --  Alkaline Phosphatase, Serum --  Anion Gap, Serum 8  Aspartate Aminotransferase (AST/SGOT)--  Bilirubin Total, Serum --  Blood Urea Nitrogen, Serum 27  Calcium,Total Serum 9.2  Carbon Dioxide, Serum 27  Chloride, Serum 104  Creatinine, Serum 5.85  eGFR if  --  eGFR if Non African American --  Glucose, Serum 100  Potassium, Serum 3.8  Protein Total, Serum --  Sodium, Serum 139                      10-17-22 @ 10:25  Jelena Aminotransferase(ALT/SGPT)--  Albumin, Serum --  Alkaline Phosphatase, Serum --  Anion Gap, Serum 9  Aspartate Aminotransferase (AST/SGOT)--  Bilirubin Total, Serum --  Blood Urea Nitrogen, Serum 50  Calcium,Total Serum 9.2  Carbon Dioxide, Serum 23  Chloride, Serum 102  Creatinine, Serum 8.74  eGFR if  --  eGFR if Non African American --  Glucose, Serum 96  Potassium, Serum 4.3  Protein Total, Serum --  Sodium, Serum 134                          PT/INR      Amylase/Lipase            RADIOLOGY & ADDITIONAL TESTS:    Imaging Personally Reviewed:  [ ] YES  [ ] NO

## 2022-10-19 NOTE — PROGRESS NOTE ADULT - REASON FOR ADMISSION
Abdominal pain
Intraabdominal Abscess
Abdominal pain
diverticular abscess
Abdominal pain

## 2022-10-19 NOTE — PROGRESS NOTE ADULT - PROVIDER SPECIALTY LIST ADULT
Infectious Disease
Infectious Disease
Internal Medicine
Internal Medicine
Nephrology
Surgery
Infectious Disease
Internal Medicine
Nephrology
Surgery
Infectious Disease
Internal Medicine
Intervent Radiology
Nephrology
Surgery
Internal Medicine
Nephrology
Surgery
Internal Medicine
Internal Medicine
Pain Medicine
Internal Medicine
Pain Medicine
Pain Medicine
Internal Medicine

## 2022-10-19 NOTE — PROGRESS NOTE ADULT - ASSESSMENT
67F POD8 small bowel resection and Alfonso's for diverticulitis with enterocolic fistula  - pending dispo to rehab  - dialysis today  - f/u am labs  - nursing and case management for ostomy training and supplies, as well as establishing outpatient dialysis   1

## 2022-10-19 NOTE — PROGRESS NOTE ADULT - ASSESSMENT
ESRD , dialysis days are MWF.    Clotted AVG , post   placement of PC,  follow up with thrombectomy after discharge.   Chiils since am with no fever and no leukocytosis, follow BC in dialysis     Anemia restart BECKY , no need for Iron IV, sat 42%.  Hypophosphatemia po4 3.2.        Diverticulitis, fistula /  abscess placed on Ceftriaxone and Metronidazole. Post IR drain , 4 bacteria in abscess fluid.   Post surgery resection resection small intestine in area of  fistula between it and colon, post Jet's procedure and left colostomy      Follow up Echocardiogram - EF 55%

## 2022-10-19 NOTE — PROGRESS NOTE ADULT - SUBJECTIVE AND OBJECTIVE BOX
Patient seen and examined at bedside. Reported tremors in hands yesterday which is not unusual for her, electrolytes were within normal limits and FSG done at the time was 103. Tremors resolved on their own. Reports that the R abdominal incision saturated the dressing overnight requiring dressing change. PT continues to attempt dispo to rehab. Vitals stable, labs pending.    Vital Signs Last 24 Hrs  T(C): 36.7 (19 Oct 2022 05:38), Max: 37.1 (18 Oct 2022 20:38)  T(F): 98 (19 Oct 2022 05:38), Max: 98.7 (18 Oct 2022 20:38)  HR: 95 (19 Oct 2022 05:38) (85 - 95)  BP: 109/66 (19 Oct 2022 05:38) (94/53 - 122/60)  BP(mean): --  RR: 16 (19 Oct 2022 05:38) (16 - 17)  SpO2: 100% (19 Oct 2022 05:38) (96% - 100%)    Parameters below as of 19 Oct 2022 05:38  Patient On (Oxygen Delivery Method): room air      General: no acute distress, comfortable in bed  Resp: non-labored, normal respiratory effort  Abd: surgical sites clean, dry, intact with staples in place, ostomy healthy and functioning, abd soft, minimally tender, nondistended

## 2022-10-20 LAB — HBV CORE AB SER-ACNC: SIGNIFICANT CHANGE UP

## 2022-10-24 LAB
CULTURE RESULTS: SIGNIFICANT CHANGE UP
CULTURE RESULTS: SIGNIFICANT CHANGE UP
SPECIMEN SOURCE: SIGNIFICANT CHANGE UP
SPECIMEN SOURCE: SIGNIFICANT CHANGE UP

## 2022-11-11 PROBLEM — N18.6 END STAGE RENAL DISEASE: Chronic | Status: ACTIVE | Noted: 2022-09-27

## 2022-11-21 ENCOUNTER — APPOINTMENT (OUTPATIENT)
Dept: VASCULAR SURGERY | Facility: CLINIC | Age: 67
End: 2022-11-21

## 2022-11-21 PROCEDURE — 99214 OFFICE O/P EST MOD 30 MIN: CPT

## 2022-11-21 PROCEDURE — 93990 DOPPLER FLOW TESTING: CPT

## 2022-11-21 NOTE — ASSESSMENT
[FreeTextEntry1] : Patient with renal failure on dialysis.  Currently being dialyzed through a right-sided tunneled catheter.  Left arm AV graft is thrombosed.  Plan for thrombectomy of left arm AV graft.

## 2022-11-21 NOTE — PHYSICAL EXAM
[Redness surrounding] : no redness surrounding [Drainage] : no drainage [Thrill] : no thrill [Pulsatile Thrill] : no pulsatile thrill

## 2022-11-21 NOTE — HISTORY OF PRESENT ILLNESS
[FreeTextEntry1] : Patient with renal failure on dialysis.  Status post colon resection for colonic fistulas.  Currently with ileostomy.  Left upper arm AV graft is thrombosed.  Currently being dialyzed through a right-sided tunneled catheter. [] : in left upper arm

## 2022-11-23 ENCOUNTER — APPOINTMENT (OUTPATIENT)
Dept: SURGERY | Facility: CLINIC | Age: 67
End: 2022-11-23

## 2022-11-23 VITALS
TEMPERATURE: 97.1 F | HEART RATE: 55 BPM | BODY MASS INDEX: 23.37 KG/M2 | HEIGHT: 62 IN | OXYGEN SATURATION: 100 % | SYSTOLIC BLOOD PRESSURE: 135 MMHG | WEIGHT: 127 LBS | DIASTOLIC BLOOD PRESSURE: 67 MMHG

## 2022-11-23 DIAGNOSIS — Z87.19 PERSONAL HISTORY OF OTHER DISEASES OF THE DIGESTIVE SYSTEM: ICD-10-CM

## 2022-11-23 DIAGNOSIS — Z85.41 PERSONAL HISTORY OF MALIGNANT NEOPLASM OF CERVIX UTERI: ICD-10-CM

## 2022-11-23 DIAGNOSIS — F32.A ANXIETY DISORDER, UNSPECIFIED: ICD-10-CM

## 2022-11-23 DIAGNOSIS — Z56.0 UNEMPLOYMENT, UNSPECIFIED: ICD-10-CM

## 2022-11-23 DIAGNOSIS — Z87.448 PERSONAL HISTORY OF OTHER DISEASES OF URINARY SYSTEM: ICD-10-CM

## 2022-11-23 DIAGNOSIS — Z86.2 PERSONAL HISTORY OF DISEASES OF THE BLOOD AND BLOOD-FORMING ORGANS AND CERTAIN DISORDERS INVOLVING THE IMMUNE MECHANISM: ICD-10-CM

## 2022-11-23 DIAGNOSIS — Z78.9 OTHER SPECIFIED HEALTH STATUS: ICD-10-CM

## 2022-11-23 DIAGNOSIS — F41.9 ANXIETY DISORDER, UNSPECIFIED: ICD-10-CM

## 2022-11-23 DIAGNOSIS — Z86.79 PERSONAL HISTORY OF OTHER DISEASES OF THE CIRCULATORY SYSTEM: ICD-10-CM

## 2022-11-23 DIAGNOSIS — Z83.3 FAMILY HISTORY OF DIABETES MELLITUS: ICD-10-CM

## 2022-11-23 DIAGNOSIS — Z82.49 FAMILY HISTORY OF ISCHEMIC HEART DISEASE AND OTHER DISEASES OF THE CIRCULATORY SYSTEM: ICD-10-CM

## 2022-11-23 DIAGNOSIS — Z93.3 COLOSTOMY STATUS: ICD-10-CM

## 2022-11-23 DIAGNOSIS — K63.2 FISTULA OF INTESTINE: ICD-10-CM

## 2022-11-23 PROCEDURE — 99024 POSTOP FOLLOW-UP VISIT: CPT

## 2022-11-23 SDOH — ECONOMIC STABILITY - INCOME SECURITY: UNEMPLOYMENT, UNSPECIFIED: Z56.0

## 2022-11-29 ENCOUNTER — APPOINTMENT (OUTPATIENT)
Dept: ENDOVASCULAR SURGERY | Facility: CLINIC | Age: 67
End: 2022-11-29

## 2022-11-29 ENCOUNTER — RESULT REVIEW (OUTPATIENT)
Age: 67
End: 2022-11-29

## 2022-11-29 VITALS
RESPIRATION RATE: 16 BRPM | DIASTOLIC BLOOD PRESSURE: 65 MMHG | HEART RATE: 67 BPM | TEMPERATURE: 97.8 F | SYSTOLIC BLOOD PRESSURE: 116 MMHG | HEIGHT: 62 IN | WEIGHT: 127 LBS | OXYGEN SATURATION: 99 % | BODY MASS INDEX: 23.37 KG/M2

## 2022-11-29 PROCEDURE — 36907Z: CUSTOM | Mod: 59

## 2022-11-29 PROCEDURE — 36215Z: CUSTOM | Mod: 59

## 2022-11-29 PROCEDURE — 36906Z: CUSTOM

## 2022-11-29 RX ORDER — LABETALOL HYDROCHLORIDE 100 MG/1
100 TABLET, FILM COATED ORAL
Qty: 60 | Refills: 0 | Status: DISCONTINUED | COMMUNITY
Start: 2020-02-20 | End: 2022-11-29

## 2022-11-29 RX ORDER — ESCITALOPRAM OXALATE 10 MG/1
10 TABLET ORAL
Qty: 28 | Refills: 0 | Status: DISCONTINUED | COMMUNITY
Start: 2019-09-26 | End: 2022-11-29

## 2022-11-29 RX ORDER — ESCITALOPRAM OXALATE 20 MG/1
20 TABLET ORAL
Qty: 30 | Refills: 0 | Status: DISCONTINUED | COMMUNITY
Start: 2019-12-02 | End: 2022-11-29

## 2022-11-29 RX ORDER — DARUNAVIR ETHANOLATE AND COBICISTAT 800; 150 MG/1; MG/1
800-150 TABLET, FILM COATED ORAL
Refills: 0 | Status: ACTIVE | COMMUNITY

## 2022-11-29 RX ORDER — LOSARTAN POTASSIUM 50 MG/1
50 TABLET, FILM COATED ORAL
Refills: 0 | Status: DISCONTINUED | COMMUNITY
End: 2022-11-29

## 2022-11-29 RX ORDER — FAMOTIDINE 20 MG/1
20 TABLET, FILM COATED ORAL
Qty: 28 | Refills: 0 | Status: DISCONTINUED | COMMUNITY
Start: 2019-09-26 | End: 2022-11-29

## 2022-11-29 RX ORDER — TRAZODONE HYDROCHLORIDE 50 MG/1
50 TABLET ORAL
Refills: 0 | Status: DISCONTINUED | COMMUNITY
End: 2022-11-29

## 2022-11-29 RX ORDER — LAMOTRIGINE 100 MG/1
100 TABLET ORAL
Refills: 0 | Status: DISCONTINUED | COMMUNITY
End: 2022-11-29

## 2022-11-29 RX ORDER — CALCITRIOL 0.25 UG/1
0.25 CAPSULE, LIQUID FILLED ORAL
Qty: 28 | Refills: 0 | Status: DISCONTINUED | COMMUNITY
Start: 2019-09-26 | End: 2022-11-29

## 2022-11-29 RX ORDER — DOLUTEGRAVIR SODIUM 50 MG/1
50 TABLET, FILM COATED ORAL
Refills: 0 | Status: ACTIVE | COMMUNITY

## 2022-11-29 RX ORDER — FOLIC ACID 1 MG/1
1 TABLET ORAL
Qty: 28 | Refills: 0 | Status: DISCONTINUED | COMMUNITY
Start: 2019-09-26 | End: 2022-11-29

## 2022-11-29 RX ORDER — FEXOFENADINE HYDROCHLORIDE 180 MG/1
180 TABLET ORAL
Qty: 15 | Refills: 0 | Status: DISCONTINUED | COMMUNITY
Start: 2020-03-23 | End: 2022-11-29

## 2022-11-29 RX ORDER — FUROSEMIDE 20 MG/1
20 TABLET ORAL
Qty: 28 | Refills: 0 | Status: DISCONTINUED | COMMUNITY
Start: 2019-09-26 | End: 2022-11-29

## 2022-11-29 RX ORDER — PRAVASTATIN SODIUM 80 MG/1
80 TABLET ORAL
Qty: 28 | Refills: 0 | Status: DISCONTINUED | COMMUNITY
Start: 2019-09-26 | End: 2022-11-29

## 2022-11-29 RX ORDER — LABETALOL HYDROCHLORIDE 200 MG/1
200 TABLET, FILM COATED ORAL
Qty: 60 | Refills: 0 | Status: DISCONTINUED | COMMUNITY
Start: 2020-02-06 | End: 2022-11-29

## 2022-12-15 NOTE — PAST MEDICAL HISTORY
[Increasing age ( >40 years old)] : Increasing age ( >40 years old) [Major surgery] : Major surgery [No therapy indicated for cases scheduled for less than one hour] : No therapy indicated for cases scheduled for less than one hour. [FreeTextEntry1] : Malignant Hyperthermia Screening Tool and Risk of Bleeding Assessment\par \par Ms. VERONICA GOVEA denies family history of unexpected death following Anesthesia or Exercise.\par Denies Family history of Malignant Hyperthermia, Muscle or Neuromuscular disorder and High Temperature following exercise.\par \par Ms. VERONICA GOVEA denies history of Muscle Spasm, Dark or Chocolate - Colored urine and Unanticipated fever immediately following anesthesia or serious exercise. \par Ms. GOVEA also denies bleeding tendencies/ Risks of Bleeding.\par

## 2022-12-15 NOTE — PROCEDURE
[Resume diet] : resume diet [Site check for bleeding/hematoma/thrill/bruit] : Site check for bleeding/hematoma/thrill/bruit [Vital signs on admission the q 15 mins x2] : Vital signs on admission the q 15 mins x2 [FreeTextEntry1] : left arm AVG Trombectomy/done in OR

## 2022-12-15 NOTE — ASSESSMENT
[Other: _____] : [unfilled] [FreeTextEntry1] : 68 yo female with history of esrd on hd via left upper arm AVG. with clotted AVG. Plan for thrombectomy

## 2022-12-15 NOTE — HISTORY OF PRESENT ILLNESS
[] : in left upper arm [FreeTextEntry1] : 3/5/2020 Dr. Dickens (thrombosed) [FreeTextEntry3] : 8/6/2020 Dr. Dickens [FreeTextEntry4] : yesterday [FreeTextEntry5] : yesterday at 11pm [FreeTextEntry6] : Dr. Mcmahan

## 2022-12-20 ENCOUNTER — APPOINTMENT (OUTPATIENT)
Dept: ENDOVASCULAR SURGERY | Facility: CLINIC | Age: 67
End: 2022-12-20

## 2022-12-20 PROCEDURE — 36589 REMOVAL TUNNELED CV CATH: CPT

## 2022-12-27 ENCOUNTER — APPOINTMENT (OUTPATIENT)
Dept: SURGERY | Facility: CLINIC | Age: 67
End: 2022-12-27
Payer: MEDICAID

## 2022-12-27 VITALS
TEMPERATURE: 97.2 F | WEIGHT: 127 LBS | OXYGEN SATURATION: 96 % | HEIGHT: 62 IN | DIASTOLIC BLOOD PRESSURE: 65 MMHG | BODY MASS INDEX: 23.37 KG/M2 | HEART RATE: 68 BPM | SYSTOLIC BLOOD PRESSURE: 127 MMHG

## 2022-12-27 DIAGNOSIS — Z82.3 FAMILY HISTORY OF STROKE: ICD-10-CM

## 2022-12-27 DIAGNOSIS — Z80.9 FAMILY HISTORY OF MALIGNANT NEOPLASM, UNSPECIFIED: ICD-10-CM

## 2022-12-27 DIAGNOSIS — Z87.891 PERSONAL HISTORY OF NICOTINE DEPENDENCE: ICD-10-CM

## 2022-12-27 PROCEDURE — 99024 POSTOP FOLLOW-UP VISIT: CPT

## 2022-12-28 ENCOUNTER — NON-APPOINTMENT (OUTPATIENT)
Age: 67
End: 2022-12-28

## 2022-12-31 NOTE — ASSESSMENT
[FreeTextEntry1] : Ms. VERONICA GOVEA  is a 67y.o. F w/ multiple co-morbidities and a laparoscopic converted to open Alfonso's and SBR for complicated diverticulitis on 10/10/22 referred by Dr. Francisco for Alfonso's reversal.

## 2022-12-31 NOTE — PHYSICAL EXAM
[Exam Deferred] : exam was deferred [Normal Breath Sounds] : Normal breath sounds [Normal Heart Sounds] : normal heart sounds [Normal Rate and Rhythm] : normal rate and rhythm [Alert] : alert [Oriented to Person] : oriented to person [Oriented to Place] : oriented to place [Oriented to Time] : oriented to time [JVD] : no jugular venous distention  [Wheezing] : no wheezing was heard [de-identified] : soft, non-distended, non-tender to palpation. Ostomy appliance intact. Pfannenstiel and midline incisions well healed.  [de-identified] : awake, alert, NAD  [de-identified] : normocephalic, atraumatic, EOMI, normal conjunctiva  [de-identified] : b/l chest rise, EWOB on RA  [de-identified] : deferred [de-identified] : requires assistance [de-identified] : abdominal skin as above [de-identified] : normal mood and affect

## 2022-12-31 NOTE — HISTORY OF PRESENT ILLNESS
[FreeTextEntry1] : Ms. VERONICA GOVEA  is a 67 year F with a history of HIV, ESRD (M,W,F via LUE AVF) 2/2 HTN, HLD, anxiety, depression, anemia, ovarian cancer (s/p BSO + BLESSING) referred by Dr. Francisco for Burdick's reversal. She was admitted to  for perforated diverticulitis for which she had an IR drain placed. As her diet was advanced she had PO intolerance and increased drain output so she was taken to the OR by Dr. Francisco on 10/10/22. I assisted that day for a laparoscopic converted to open, takedown of enterocolic fistula, flexible sigmoidoscopy, small bowel resection and Alfonso's. She was discharged on POD 5 to rehab. Patient states she has had episodes of abdominal pain prior to all of this but didn't know that it was likely due to diverticulitis. Currently her ostomy is functioning (though she hates having it) and she is tolerating a diet. She has never had a colonoscopy.

## 2022-12-31 NOTE — REVIEW OF SYSTEMS
[Negative] : Neurological [As Noted in HPI] : as noted in HPI [Fever] : no fever [Chills] : no chills [Feeling Poorly] : not feeling poorly [Feeling Tired] : not feeling tired [Eye Pain] : no eye pain [Earache] : no earache [Chest Pain] : no chest pain [Palpitations] : no palpitations [Shortness Of Breath] : no shortness of breath [Abdominal Pain] : no abdominal pain [Vomiting] : no vomiting [Constipation] : no constipation [Diarrhea] : no diarrhea [FreeTextEntry8] : ESRD w/ R AVF [FreeTextEntry7] : ostomy intact, mild abdominal cramping when having BMs [FreeTextEntry9] : uses wheelchair

## 2023-01-09 ENCOUNTER — APPOINTMENT (OUTPATIENT)
Dept: VASCULAR SURGERY | Facility: CLINIC | Age: 68
End: 2023-01-09

## 2023-01-12 ENCOUNTER — APPOINTMENT (OUTPATIENT)
Dept: VASCULAR SURGERY | Facility: CLINIC | Age: 68
End: 2023-01-12

## 2023-01-13 ENCOUNTER — APPOINTMENT (OUTPATIENT)
Dept: GASTROENTEROLOGY | Facility: CLINIC | Age: 68
End: 2023-01-13
Payer: MEDICAID

## 2023-01-13 VITALS
HEIGHT: 62 IN | OXYGEN SATURATION: 98 % | HEART RATE: 67 BPM | SYSTOLIC BLOOD PRESSURE: 147 MMHG | DIASTOLIC BLOOD PRESSURE: 73 MMHG | TEMPERATURE: 97.8 F | BODY MASS INDEX: 24.84 KG/M2 | WEIGHT: 135 LBS

## 2023-01-13 DIAGNOSIS — R93.89 ABNORMAL FINDINGS ON DIAGNOSTIC IMAGING OF OTHER SPECIFIED BODY STRUCTURES: ICD-10-CM

## 2023-01-13 DIAGNOSIS — Z01.818 ENCOUNTER FOR OTHER PREPROCEDURAL EXAMINATION: ICD-10-CM

## 2023-01-13 DIAGNOSIS — K21.9 GASTRO-ESOPHAGEAL REFLUX DISEASE W/OUT ESOPHAGITIS: ICD-10-CM

## 2023-01-13 DIAGNOSIS — K57.20 DIVERTICULITIS OF LARGE INTESTINE WITH PERFORATION AND ABSCESS W/OUT BLEEDING: ICD-10-CM

## 2023-01-13 DIAGNOSIS — R10.13 EPIGASTRIC PAIN: ICD-10-CM

## 2023-01-13 DIAGNOSIS — B20 HUMAN IMMUNODEFICIENCY VIRUS [HIV] DISEASE: ICD-10-CM

## 2023-01-13 PROCEDURE — 99204 OFFICE O/P NEW MOD 45 MIN: CPT

## 2023-01-13 NOTE — ASSESSMENT
[FreeTextEntry1] : Dyspepsia: The patient complains of dyspeptic symptoms.  The patient was advised to abide by an anti-gas (low FOD-MAP) diet.  The patient was given a pamphlet for anti-gas (low FOD-MAP).  The patient and I reviewed the anti-gas (low FOD-MAP) diet at length. The patient is to start on a trial of Simethicone one tablet 4 times a day p.r.n. abdominal pain and gas.\par GERD: The patient was advised to avoid late-night meals and dietary indiscretions.  The patient was advised to avoid fried and fatty foods.  The patient was advised to abide by an anti-GERD diet. The patient was given a pamphlet for anti-GERD.  The patient and I reviewed the anti-GERD diet at length. I recommend a trial of Omeprazole 40 mg once a day x 3 months for the symptoms.\par Abnormal Imaging Study: The CAT scan of the abdomen and pelvis with oral contrast performed on September 27, 2022 revealed oral contrast advanced to the mid small bowel which is mildly prominent measuring up to 2.7 cm in diameter, the sigmoid colon is thickened and there are numerous diverticula.  At the proximal sigmoid colon there are inflammatory changes which extend to the adjacent small bowel with there is a 5.4 x 5.4 cm mass containing air and feculent material.  The evaluation was limited without IV contrast and oral contrast that has not reached this level.  The findings represent a contained perforation secondary to fistulization from sigmoid diverticular disease to the small bowel.  Also noted with suspicion of polycystic kidney disease.  A repeat CAT scan of the abdomen and pelvis with IV contrast performed on September 27, 2022 revealed an abscess within the left lower quadrant small bowel wall secondary to fistulization from the sigmoid colon likely on the basis of chronic diverticular disease, a 1.3 cm right lobe hypodensity which suggests peripheral early nodular enhancement that may represent a hemangioma, a 1.4 cm cyst in the uncinate process or a focally dilated duct and probable cystic kidneys.  The patient underwent a CT aspiration of the abscess on September 28, 2022 with a successful CT-guided percutaneous drainage of the abdominal abscess.  The bacterial specimens of the abscess revealed E. coli and Proteus mirabilis, Klebsiella pneumoniae.  \par Diverticular Abscess: The patient was hospitalized at [facility] on September 27, 2022 for abdominal pain secondary to diverticular abscess.  The CAT scan of the abdomen and pelvis with oral contrast performed on September 27, 2022 revealed oral contrast advanced to the mid small bowel which is mildly prominent measuring up to 2.7 cm in diameter, the sigmoid colon is thickened and there are numerous diverticula.  At the proximal sigmoid colon there are inflammatory changes which extend to the adjacent small bowel with there is a 5.4 x 5.4 cm mass containing air and feculent material.  The evaluation was limited without IV contrast and oral contrast that has not reached this level.  The findings represent a contained perforation secondary to fistulization from sigmoid diverticular disease to the small bowel.  Also noted with suspicion of polycystic kidney disease.  A repeat CAT scan of the abdomen and pelvis with IV contrast performed on September 27, 2022 revealed an abscess within the left lower quadrant small bowel wall secondary to fistulization from the sigmoid colon likely on the basis of chronic diverticular disease, a 1.3 cm right lobe hypodensity which suggests peripheral early nodular enhancement that may represent a hemangioma, a 1.4 cm cyst in the uncinate process or a focally dilated duct and probable cystic kidneys.  The patient underwent a CT aspiration of the abscess on September 28, 2022 with a successful CT-guided percutaneous drainage of the abdominal abscess.  The bacterial specimens of the abscess revealed E. coli and Proteus mirabilis, Klebsiella pneumoniae.  The chest x-ray performed on September 26, 2022 revealed no acute findings.  The patient was evaluated by surgery, infectious disease, nephrology during the hospitalization.  The patient was treated with IV antibiotics (ceftriaxone 2 g daily and IV Flagyl 500 mg every 8 hours for the diverticular abscess.  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 Faye Doan on October 10, 2022.  The pathology revealed small bowel wall with perforation and a fistulous tract with transmural hemorrhage and acute serositis with viable tissue at the resection margins (small bowel) and diverticular disease of the sigmoid colon complicated by colonic wall perforation, mucosal ulceration, ruptured diverticula with mural abscess formation, fibrosis and acute serositis (sigmoid colon).  The patient tolerated the surgery well.  The blood cultures performed on October 19, 2022 revealed no growth x2.\par Colonoscopy: I recommend a colonoscopy to assess the symptoms and for colonic polyps and colonic lesions prior to surgery.  The patient was told of the risks and benefits of the procedure.  The patient was told of the risks of perforation, emergency surgery, bleeding, infections and missed lesions.  The patient is to be on a clear liquid diet the entire day prior to the procedure. The patient is to complete the entire prescribed bowel prep the day prior to the procedure as directed. The patient is to have a COVID 19 PCR nasopharyngeal swab performed at the approved sites 3 days prior to the procedure.  The patient is told not to drive, drink alcohol, use recreational drugs, exercise, or work the day of the procedure.  The patient was told of the need for an escort to accompany the patient home after the procedure. The patient is aware that the procedure may be cancelled if they fail to follow the directions.  The patient agreed and will schedule for the procedure.  The patient is to be n.p.o. after midnight and bowel prep was given.  The patient is to return for the procedure.\par HIV: The patient has a history significant for HIV on Tivicay, Lamivudine and Prezcobix.  I recommend continue on present treatment and followup with infectious disease.\par Follow-up: The patient is to follow-up in the office in 4 weeks to reassess the symptoms. The patient was told to call the office if any further problems.\par \par

## 2023-01-13 NOTE — REVIEW OF SYSTEMS
[Feeling Tired] : feeling tired [Palpitations] : palpitations [Heartburn] : heartburn [Bloating (gassiness)] : bloating [Itching] : itching [Dizziness] : dizziness [Anxiety] : anxiety [Negative] : Heme/Lymph [FreeTextEntry3] : blurred [FreeTextEntry8] : oliguria, dysuria [de-identified] : kris

## 2023-01-13 NOTE — HISTORY OF PRESENT ILLNESS
[FreeTextEntry1] : The patient is a 67-year-old female with past medical history significant for hypertension, end-stage renal disease on hemodialysis (M-W-F) and HIV on Tivicay, Lamivudine and Prezcobix who was referred to my office by Dr. Peterson for a history of a diverticular abscess and fistula to the small bowel, s/p small bowel resection and sigmoid colon resection. The patient also admits to having dyspepsia and occasional gastroesophageal reflux disease.  I was asked to render an opinion for consultation for the above complaints.   The patient states that she is feeling fine.  The patient was hospitalized at [facility] on 2022 for abdominal pain.  The patient has a history of hypertension, end-stage renal disease on hemodialysis (M-W-F) and HIV on Tivicay, Lamivudine and Prezcobix) who presents to the emergency room complaining of a 3-day history of abdominal pain.  The abdominal pain was associated with constipation, nausea and vomiting.  The patient had blood work and imaging studies performed in the emergency room to assess the symptoms.  The blood work performed on 2022 revealed an elevated WBC count of 11.62 K/UL, anemia with a hemoglobin/hematocrit level of 9.0/26.8, a low sodium level of 134 mmol/L, a low chloride level of 95 mmol/L, and elevated BUN/creatinine level of 41/9.26 mg/dL, respectively, and a low GFR of 4 mL/min per 1.73 m², and elevated C-reactive protein of 236 mg/L, a low albumin level of 2.4 g/dL, normal liver enzymes with a total bilirubin of 0.4 mg/dL, a normal alkaline phosphatase/AST/ALT of 94/16/14/U/L, respectively.  The CAT scan of the abdomen and pelvis with oral contrast performed on 2022 revealed oral contrast advanced to the mid small bowel which is mildly prominent measuring up to 2.7 cm in diameter, the sigmoid colon is thickened and there are numerous diverticula.  At the proximal sigmoid colon there are inflammatory changes which extend to the adjacent small bowel with there is a 5.4 x 5.4 cm mass containing air and feculent material.  The evaluation was limited without IV contrast and oral contrast that has not reached this level.  The findings represent a contained perforation secondary to fistulization from sigmoid diverticular disease to the small bowel.  Also noted with suspicion of polycystic kidney disease.  A repeat CAT scan of the abdomen and pelvis with IV contrast performed on 2022 revealed an abscess within the left lower quadrant small bowel wall secondary to fistulization from the sigmoid colon likely on the basis of chronic diverticular disease, a 1.3 cm right lobe hypodensity which suggests peripheral early nodular enhancement that may represent a hemangioma, a 1.4 cm cyst in the uncinate process or a focally dilated duct and probable cystic kidneys.  The patient underwent a CT aspiration of the abscess on 2022 with a successful CT-guided percutaneous drainage of the abdominal abscess.  The bacterial specimens of the abscess revealed E. coli and Proteus mirabilis, Klebsiella pneumoniae.  The chest x-ray performed on 2022 revealed no acute findings.  The patient was evaluated by surgery, infectious disease, nephrology during the hospitalization.  The patient was treated with IV antibiotics (ceftriaxone 2 g daily and IV Flagyl 500 mg every 8 hours for the diverticular abscess.  The blood work revealed a normal CEA level of 3.6 ng/mL and a normal  of 10  U/ml.  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 Faye Doan on October 10, 2022.  The pathology revealed small bowel wall with perforation and a fistulous tract with transmural hemorrhage and acute serositis with viable tissue at the resection margins (small bowel) and diverticular disease of the sigmoid colon complicated by colonic wall perforation, mucosal ulceration, ruptured diverticula with mural abscess formation, fibrosis and acute serositis (sigmoid colon).  The patient tolerated the surgery well.  The blood cultures performed on 2022 revealed no growth x2.  The last blood work performed on 2022 revealed a normalized WBC count of 6.79 K/UL, anemia with a hemoglobin/hematocrit level of 7.7/22.6, respectively, and elevated BUN/creatinine level of 35/7.33 mg/dL, respectively, and elevated blood glucose of 105 mg/dL, a low GFR of 6 mL/min per 1.73 m².  The patient's symptoms improved and the diet was advanced and tolerated.  The patient was discharged on 2022 in stable condition.  The patient denies any abdominal pain.  The patient complains of abdominal gas and bloating.  The patient denies any nausea or vomiting.  The patient complains of occasional gastroesophageal reflux disease but denies any dysphagia. The gastroesophageal reflux disease is worse after meals and late at night.  The patient denies taking medications for the gastroesophageal reflux disease. The patient complains of occasional palpitations but denies any atypical chest pain or shortness of breath.  The patient admits 3 bowel movements a day. The diarrhea is described as soft in nature.   The patient complains of a change in bowel habits.  The patient complains of a change in caliber of stool.   The patient denies having mucus discharge with the bowel movements. The colostomy is functioning well.  The patient denies any bright red blood per rectum, melena or hematemesis.  The patient denies any rectal pain or rectal pruritus. The patient denies any weight loss or anorexia.  She denies any fevers or chills.  The patient denies any jaundice or pruritus.  The patient denies any back pain.  The patient admits to having a prior colonoscopy performed by another gastroenterologist over 10 years ago.  According to the patient, the colonoscopic findings revealed a normal study.   The patient's last menstrual period was 58. The patient is a .  The patient's first menstrual period was at age 17. The patient denies any significant family history of GI problems.  \par \par (+) prior smoking 1 PPW x 4 years stopped x 45 years, (-) ETOH, (-) IVDA\par  [de-identified] : The CT aspiration of the abscess performed on September 28, 2022 revealed a successful CT-guided percutaneous drainage of the abdominal abscess.  The bacterial specimens of the abscess revealed E. coli and Proteus mirabilis, Klebsiella pneumoniae.  \par The repeat CAT scan of the abdomen and pelvis with IV contrast performed on September 27, 2022 revealed an abscess within the left lower quadrant small bowel wall secondary to fistulization from the sigmoid colon likely on the basis of chronic diverticular disease, a 1.3 cm right lobe hypodensity which suggests peripheral early nodular enhancement that may represent a hemangioma, a 1.4 cm cyst in the uncinate process or a focally dilated duct and probable cystic kidneys.  \par The CAT scan of the abdomen and pelvis with oral contrast performed on September 27, 2022 revealed oral contrast advanced to the mid small bowel which is mildly prominent measuring up to 2.7 cm in diameter, the sigmoid colon is thickened and there are numerous diverticula.  At the proximal sigmoid colon there are inflammatory changes which extend to the adjacent small bowel with there is a 5.4 x 5.4 cm mass containing air and feculent material.  The evaluation was limited without IV contrast and oral contrast that has not reached this level.  The findings represent a contained perforation secondary to fistulization from sigmoid diverticular disease to the small bowel.  Also noted with suspicion of polycystic kidney disease.  \par

## 2023-01-16 LAB — SARS-COV-2 N GENE NPH QL NAA+PROBE: NOT DETECTED

## 2023-01-17 ENCOUNTER — TRANSCRIPTION ENCOUNTER (OUTPATIENT)
Age: 68
End: 2023-01-17

## 2023-01-17 ENCOUNTER — OUTPATIENT (OUTPATIENT)
Dept: OUTPATIENT SERVICES | Facility: HOSPITAL | Age: 68
LOS: 1 days | End: 2023-01-17
Payer: MEDICAID

## 2023-01-17 ENCOUNTER — APPOINTMENT (OUTPATIENT)
Dept: GASTROENTEROLOGY | Facility: HOSPITAL | Age: 68
End: 2023-01-17

## 2023-01-17 VITALS
SYSTOLIC BLOOD PRESSURE: 161 MMHG | HEART RATE: 64 BPM | RESPIRATION RATE: 14 BRPM | DIASTOLIC BLOOD PRESSURE: 72 MMHG | OXYGEN SATURATION: 96 %

## 2023-01-17 VITALS
HEART RATE: 57 BPM | WEIGHT: 130.07 LBS | SYSTOLIC BLOOD PRESSURE: 154 MMHG | HEIGHT: 63 IN | TEMPERATURE: 97 F | DIASTOLIC BLOOD PRESSURE: 53 MMHG | RESPIRATION RATE: 17 BRPM | OXYGEN SATURATION: 97 %

## 2023-01-17 DIAGNOSIS — Z98.890 OTHER SPECIFIED POSTPROCEDURAL STATES: Chronic | ICD-10-CM

## 2023-01-17 DIAGNOSIS — Z90.710 ACQUIRED ABSENCE OF BOTH CERVIX AND UTERUS: Chronic | ICD-10-CM

## 2023-01-17 DIAGNOSIS — Z87.59 PERSONAL HISTORY OF OTHER COMPLICATIONS OF PREGNANCY, CHILDBIRTH AND THE PUERPERIUM: Chronic | ICD-10-CM

## 2023-01-17 DIAGNOSIS — K57.20 DIVERTICULITIS OF LARGE INTESTINE WITH PERFORATION AND ABSCESS WITHOUT BLEEDING: ICD-10-CM

## 2023-01-17 PROCEDURE — 44388 COLONOSCOPY THRU STOMA SPX: CPT

## 2023-01-17 PROCEDURE — 45378 DIAGNOSTIC COLONOSCOPY: CPT

## 2023-01-17 RX ORDER — SODIUM CHLORIDE 9 MG/ML
500 INJECTION INTRAMUSCULAR; INTRAVENOUS; SUBCUTANEOUS
Refills: 0 | Status: DISCONTINUED | OUTPATIENT
Start: 2023-01-17 | End: 2023-01-31

## 2023-01-17 RX ORDER — SODIUM CHLORIDE 9 MG/ML
1000 INJECTION, SOLUTION INTRAVENOUS
Refills: 0 | Status: DISCONTINUED | OUTPATIENT
Start: 2023-01-17 | End: 2023-01-31

## 2023-01-17 NOTE — ASU PATIENT PROFILE, ADULT - FALL HARM RISK - UNIVERSAL INTERVENTIONS
Bed in lowest position, wheels locked, appropriate side rails in place/Call bell, personal items and telephone in reach/Instruct patient to call for assistance before getting out of bed or chair/Non-slip footwear when patient is out of bed/[place] to call system/Physically safe environment - no spills, clutter or unnecessary equipment/Purposeful Proactive Rounding/Room/bathroom lighting operational, light cord in reach

## 2023-01-17 NOTE — ASU PATIENT PROFILE, ADULT - NSICDXPASTMEDICALHX_GEN_ALL_CORE_FT
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

## 2023-01-19 ENCOUNTER — NON-APPOINTMENT (OUTPATIENT)
Age: 68
End: 2023-01-19

## 2023-01-24 ENCOUNTER — OUTPATIENT (OUTPATIENT)
Dept: OUTPATIENT SERVICES | Facility: HOSPITAL | Age: 68
LOS: 1 days | End: 2023-01-24
Payer: MEDICAID

## 2023-01-24 ENCOUNTER — TRANSCRIPTION ENCOUNTER (OUTPATIENT)
Age: 68
End: 2023-01-24

## 2023-01-24 VITALS
SYSTOLIC BLOOD PRESSURE: 170 MMHG | DIASTOLIC BLOOD PRESSURE: 82 MMHG | HEIGHT: 62 IN | OXYGEN SATURATION: 99 % | TEMPERATURE: 98 F | RESPIRATION RATE: 15 BRPM | HEART RATE: 63 BPM | WEIGHT: 128.09 LBS

## 2023-01-24 DIAGNOSIS — K57.20 DIVERTICULITIS OF LARGE INTESTINE WITH PERFORATION AND ABSCESS WITHOUT BLEEDING: ICD-10-CM

## 2023-01-24 DIAGNOSIS — Z90.710 ACQUIRED ABSENCE OF BOTH CERVIX AND UTERUS: Chronic | ICD-10-CM

## 2023-01-24 DIAGNOSIS — Z93.3 COLOSTOMY STATUS: ICD-10-CM

## 2023-01-24 DIAGNOSIS — Z87.59 PERSONAL HISTORY OF OTHER COMPLICATIONS OF PREGNANCY, CHILDBIRTH AND THE PUERPERIUM: Chronic | ICD-10-CM

## 2023-01-24 DIAGNOSIS — Z01.818 ENCOUNTER FOR OTHER PREPROCEDURAL EXAMINATION: ICD-10-CM

## 2023-01-24 DIAGNOSIS — Z98.890 OTHER SPECIFIED POSTPROCEDURAL STATES: Chronic | ICD-10-CM

## 2023-01-24 LAB
BLD GP AB SCN SERPL QL: SIGNIFICANT CHANGE UP
SARS-COV-2 RNA SPEC QL NAA+PROBE: SIGNIFICANT CHANGE UP

## 2023-01-24 PROCEDURE — G0463: CPT

## 2023-01-24 RX ORDER — ZINC SULFATE TAB 220 MG (50 MG ZINC EQUIVALENT) 220 (50 ZN) MG
1 TAB ORAL
Qty: 0 | Refills: 0 | DISCHARGE

## 2023-01-24 RX ORDER — AMLODIPINE BESYLATE 2.5 MG/1
1 TABLET ORAL
Qty: 0 | Refills: 0 | DISCHARGE

## 2023-01-24 RX ORDER — SODIUM BICARBONATE 1 MEQ/ML
1 SYRINGE (ML) INTRAVENOUS
Qty: 0 | Refills: 0 | DISCHARGE

## 2023-01-24 RX ORDER — DOLUTEGRAVIR SODIUM 25 MG/1
1 TABLET, FILM COATED ORAL
Qty: 0 | Refills: 0 | DISCHARGE

## 2023-01-24 RX ORDER — CALCIUM ACETATE 667 MG
3 TABLET ORAL
Qty: 0 | Refills: 0 | DISCHARGE

## 2023-01-24 RX ORDER — CALCITRIOL 0.5 UG/1
1 CAPSULE ORAL
Qty: 0 | Refills: 0 | DISCHARGE

## 2023-01-24 NOTE — H&P PST ADULT - NSICDXPROCEDURE_GEN_ALL_CORE_FT
PROCEDURES:  Flexible sigmoidoscopy for 31 minutes to 45 minutes 24-Jan-2023 09:32:47  Leena Lopez  Closure, colostomy, with resection and colorectal anastomosis 24-Jan-2023 09:33:28  Leena Lopez

## 2023-01-24 NOTE — H&P PST ADULT - HISTORY OF PRESENT ILLNESS
67 y.o female PMHx of HIV, ESRD on HD (M/W/F schedule) via  Left AVF 2020, HTN, Gout, Cervical CA s/p Hysterectomy, Colostomy.  Presents to Presurgical testing for schedule   Robotic Assisted Colostomy Reversal Flexible Sigmoidoscopy  with Dr Seals on 1/25/23     STOP BANG 3

## 2023-01-24 NOTE — H&P PST ADULT - PROBLEM SELECTOR PLAN 1
Patient is scheduled for Robotic Assisted Colostomy Reversal Flexible Sigmoidoscopy  with Dr Seals on 1/25/23   Medical Clearance in chart 1/16/23  Labs drawn in PCP- results in chart   pt was swabbed for covid 1/24/23 -will f/u with result   Pt was  instructed to Stop all Aspirin and over the counter medication including vitamins and herbal 7days before surgery   Instructions given to include using 4% chlorhexidine wash as directed starting 3days before day of surgery (inclusive of day of surgery)  Maintaining NPO status post midnight day before surgery  Patient is to expect a phone call day before surgery between the hours of 430- 630pm giving arrival time for surgery day.  Written and oral preoperative instructions given to patient with understanding verbalized.     Patient today with STOP bang score 3  low risk for SERAFIN.

## 2023-01-24 NOTE — H&P PST ADULT - ASSESSMENT
67 y.o female with  Colostomy, now for schedule   Robotic Assisted Colostomy Reversal Flexible Sigmoidoscopy  with Dr Seals on 1/25/23   STOP MARIA FERNANDA 3

## 2023-01-24 NOTE — H&P PST ADULT - NS ATTEND AMEND GEN_ALL_CORE FT
OR today for robotic (possible laparoscopic, possible open) colostomy closure, flex sig, possible ostomy  Lithotomy w/ both arms tucked  general w/ spinal duramorph  0.5% marcaine and exparel 1:1    Eric Seals MD  Attending physician

## 2023-01-25 ENCOUNTER — APPOINTMENT (OUTPATIENT)
Dept: SURGERY | Facility: HOSPITAL | Age: 68
End: 2023-01-25
Payer: MEDICAID

## 2023-01-25 ENCOUNTER — INPATIENT (INPATIENT)
Facility: HOSPITAL | Age: 68
LOS: 4 days | Discharge: HOME HEALTH | DRG: 329 | End: 2023-01-30
Attending: STUDENT IN AN ORGANIZED HEALTH CARE EDUCATION/TRAINING PROGRAM | Admitting: STUDENT IN AN ORGANIZED HEALTH CARE EDUCATION/TRAINING PROGRAM
Payer: MEDICAID

## 2023-01-25 ENCOUNTER — RESULT REVIEW (OUTPATIENT)
Age: 68
End: 2023-01-25

## 2023-01-25 VITALS
TEMPERATURE: 98 F | WEIGHT: 128.09 LBS | DIASTOLIC BLOOD PRESSURE: 77 MMHG | HEART RATE: 71 BPM | HEIGHT: 62 IN | OXYGEN SATURATION: 99 % | SYSTOLIC BLOOD PRESSURE: 143 MMHG | RESPIRATION RATE: 16 BRPM

## 2023-01-25 DIAGNOSIS — Z87.59 PERSONAL HISTORY OF OTHER COMPLICATIONS OF PREGNANCY, CHILDBIRTH AND THE PUERPERIUM: Chronic | ICD-10-CM

## 2023-01-25 DIAGNOSIS — Z93.3 COLOSTOMY STATUS: ICD-10-CM

## 2023-01-25 DIAGNOSIS — Z98.890 OTHER SPECIFIED POSTPROCEDURAL STATES: Chronic | ICD-10-CM

## 2023-01-25 DIAGNOSIS — Z90.710 ACQUIRED ABSENCE OF BOTH CERVIX AND UTERUS: Chronic | ICD-10-CM

## 2023-01-25 LAB
BLD GP AB SCN SERPL QL: SIGNIFICANT CHANGE UP
GLUCOSE BLDC GLUCOMTR-MCNC: 102 MG/DL — HIGH (ref 70–99)
POTASSIUM SERPL-MCNC: 3.9 MMOL/L — SIGNIFICANT CHANGE UP (ref 3.5–5.3)
POTASSIUM SERPL-MCNC: 7.1 MMOL/L — CRITICAL HIGH (ref 3.5–5.3)
POTASSIUM SERPL-SCNC: 3.9 MMOL/L — SIGNIFICANT CHANGE UP (ref 3.5–5.3)
POTASSIUM SERPL-SCNC: 7.1 MMOL/L — CRITICAL HIGH (ref 3.5–5.3)

## 2023-01-25 PROCEDURE — 88307 TISSUE EXAM BY PATHOLOGIST: CPT | Mod: 26

## 2023-01-25 PROCEDURE — 44970 LAPAROSCOPY APPENDECTOMY: CPT | Mod: XS

## 2023-01-25 PROCEDURE — 88304 TISSUE EXAM BY PATHOLOGIST: CPT | Mod: 26

## 2023-01-25 PROCEDURE — 45330 DIAGNOSTIC SIGMOIDOSCOPY: CPT

## 2023-01-25 PROCEDURE — S2900 ROBOTIC SURGICAL SYSTEM: CPT | Mod: NC

## 2023-01-25 PROCEDURE — 44227 LAP CLOSE ENTEROSTOMY: CPT

## 2023-01-25 PROCEDURE — 44187 LAP ILEO/JEJUNO-STOMY: CPT

## 2023-01-25 DEVICE — STAPLER COVIDIEN EEA CIRCULAR DST 28MM 3.5MM BLUE: Type: IMPLANTABLE DEVICE | Status: FUNCTIONAL

## 2023-01-25 DEVICE — TISSEEL 4ML: Type: IMPLANTABLE DEVICE | Status: FUNCTIONAL

## 2023-01-25 DEVICE — XI STAPLER SUREFORM RELOAD 60 BLUE: Type: IMPLANTABLE DEVICE | Status: FUNCTIONAL

## 2023-01-25 RX ORDER — FENTANYL CITRATE 50 UG/ML
25 INJECTION INTRAVENOUS
Refills: 0 | Status: DISCONTINUED | OUTPATIENT
Start: 2023-01-25 | End: 2023-01-25

## 2023-01-25 RX ORDER — ONDANSETRON 8 MG/1
4 TABLET, FILM COATED ORAL EVERY 6 HOURS
Refills: 0 | Status: DISCONTINUED | OUTPATIENT
Start: 2023-01-25 | End: 2023-01-30

## 2023-01-25 RX ORDER — SODIUM CHLORIDE 9 MG/ML
1000 INJECTION, SOLUTION INTRAVENOUS
Refills: 0 | Status: DISCONTINUED | OUTPATIENT
Start: 2023-01-25 | End: 2023-01-26

## 2023-01-25 RX ORDER — CHLORHEXIDINE GLUCONATE 213 G/1000ML
1 SOLUTION TOPICAL ONCE
Refills: 0 | Status: COMPLETED | OUTPATIENT
Start: 2023-01-25 | End: 2023-01-25

## 2023-01-25 RX ORDER — NALOXONE HYDROCHLORIDE 4 MG/.1ML
0.1 SPRAY NASAL
Refills: 0 | Status: DISCONTINUED | OUTPATIENT
Start: 2023-01-25 | End: 2023-01-30

## 2023-01-25 RX ORDER — ACETAMINOPHEN 500 MG
1000 TABLET ORAL EVERY 6 HOURS
Refills: 0 | Status: COMPLETED | OUTPATIENT
Start: 2023-01-25 | End: 2023-01-26

## 2023-01-25 RX ORDER — SODIUM CHLORIDE 9 MG/ML
3 INJECTION INTRAMUSCULAR; INTRAVENOUS; SUBCUTANEOUS EVERY 8 HOURS
Refills: 0 | Status: DISCONTINUED | OUTPATIENT
Start: 2023-01-25 | End: 2023-01-25

## 2023-01-25 RX ORDER — MORPHINE SULFATE 50 MG/1
0.2 CAPSULE, EXTENDED RELEASE ORAL ONCE
Refills: 0 | Status: DISCONTINUED | OUTPATIENT
Start: 2023-01-25 | End: 2023-01-30

## 2023-01-25 RX ORDER — ONDANSETRON 8 MG/1
4 TABLET, FILM COATED ORAL ONCE
Refills: 0 | Status: DISCONTINUED | OUTPATIENT
Start: 2023-01-25 | End: 2023-01-25

## 2023-01-25 RX ADMIN — CHLORHEXIDINE GLUCONATE 1 APPLICATION(S): 213 SOLUTION TOPICAL at 09:50

## 2023-01-25 NOTE — BRIEF OPERATIVE NOTE - NSICDXBRIEFPREOP_GEN_ALL_CORE_FT
PRE-OP DIAGNOSIS:  Status post Alfonso's procedure 25-Jan-2023 20:26:26 due to diverticulitis Cydney Deleon

## 2023-01-25 NOTE — PATIENT PROFILE ADULT - FALL HARM RISK - HARM RISK INTERVENTIONS

## 2023-01-25 NOTE — ASU PREOP CHECKLIST - SELECT TESTS ORDERED
potassium and T&S sent stat to the lab by KAIT Rey (as per NP order)/Type and Screen/Results in MD note/COVID-19

## 2023-01-25 NOTE — CHART NOTE - NSCHARTNOTEFT_GEN_A_CORE
Pt POD 0 s/p Robot-assisted reversal of Alfonso procedure, diverting ileostomy  resting comfortably  no n/v    Vital Signs Last 24 Hrs  T(C): 37 (25 Jan 2023 22:00), Max: 37 (25 Jan 2023 20:17)  T(F): 98.6 (25 Jan 2023 22:00), Max: 98.6 (25 Jan 2023 20:17)  HR: 67 (25 Jan 2023 22:00) (67 - 85)  BP: 127/60 (25 Jan 2023 22:00) (112/60 - 143/77)  BP(mean): 77 (25 Jan 2023 21:40) (72 - 82)  RR: 18 (25 Jan 2023 22:00) (14 - 21)  SpO2: 100% (25 Jan 2023 22:00) (96% - 100%)    Parameters below as of 25 Jan 2023 22:00  Patient On (Oxygen Delivery Method): nasal cannula  O2 Flow (L/min): 2    abd soft, inc CDI, ileostomy viable, min dark bloody fluid in bag, no gas or stool    stable post-op

## 2023-01-25 NOTE — BRIEF OPERATIVE NOTE - OPERATION/FINDINGS
Extensive Lysis of Adhesions preformed, appendectomy preformed to release rectal stump, EEA anastamosis preformed and sigmoidoscopy done.  Air leak test negative however due to extensive fibrotic tissue and donut specimen question, a diverting ileostomy was preformed

## 2023-01-26 DIAGNOSIS — N18.6 END STAGE RENAL DISEASE: ICD-10-CM

## 2023-01-26 DIAGNOSIS — G89.18 OTHER ACUTE POSTPROCEDURAL PAIN: ICD-10-CM

## 2023-01-26 DIAGNOSIS — K57.20 DIVERTICULITIS OF LARGE INTESTINE WITH PERFORATION AND ABSCESS WITHOUT BLEEDING: ICD-10-CM

## 2023-01-26 DIAGNOSIS — Z93.2 ILEOSTOMY STATUS: ICD-10-CM

## 2023-01-26 DIAGNOSIS — I10 ESSENTIAL (PRIMARY) HYPERTENSION: ICD-10-CM

## 2023-01-26 DIAGNOSIS — B20 HUMAN IMMUNODEFICIENCY VIRUS [HIV] DISEASE: ICD-10-CM

## 2023-01-26 LAB
ANION GAP SERPL CALC-SCNC: 10 MMOL/L — SIGNIFICANT CHANGE UP (ref 5–17)
BUN SERPL-MCNC: 29 MG/DL — HIGH (ref 7–18)
CALCIUM SERPL-MCNC: 8.8 MG/DL — SIGNIFICANT CHANGE UP (ref 8.4–10.5)
CHLORIDE SERPL-SCNC: 100 MMOL/L — SIGNIFICANT CHANGE UP (ref 96–108)
CO2 SERPL-SCNC: 27 MMOL/L — SIGNIFICANT CHANGE UP (ref 22–31)
CREAT SERPL-MCNC: 5.91 MG/DL — HIGH (ref 0.5–1.3)
EGFR: 7 ML/MIN/1.73M2 — LOW
GLUCOSE SERPL-MCNC: 104 MG/DL — HIGH (ref 70–99)
HCT VFR BLD CALC: 34.3 % — LOW (ref 34.5–45)
HGB BLD-MCNC: 10.9 G/DL — LOW (ref 11.5–15.5)
MAGNESIUM SERPL-MCNC: 2 MG/DL — SIGNIFICANT CHANGE UP (ref 1.6–2.6)
MCHC RBC-ENTMCNC: 31.8 GM/DL — LOW (ref 32–36)
MCHC RBC-ENTMCNC: 32.2 PG — SIGNIFICANT CHANGE UP (ref 27–34)
MCV RBC AUTO: 101.5 FL — HIGH (ref 80–100)
NRBC # BLD: 0 /100 WBCS — SIGNIFICANT CHANGE UP (ref 0–0)
PHOSPHATE SERPL-MCNC: 7.7 MG/DL — HIGH (ref 2.5–4.5)
PLATELET # BLD AUTO: 121 K/UL — LOW (ref 150–400)
POTASSIUM SERPL-MCNC: 4.9 MMOL/L — SIGNIFICANT CHANGE UP (ref 3.5–5.3)
POTASSIUM SERPL-SCNC: 4.9 MMOL/L — SIGNIFICANT CHANGE UP (ref 3.5–5.3)
RBC # BLD: 3.38 M/UL — LOW (ref 3.8–5.2)
RBC # FLD: 16.1 % — HIGH (ref 10.3–14.5)
SODIUM SERPL-SCNC: 137 MMOL/L — SIGNIFICANT CHANGE UP (ref 135–145)
WBC # BLD: 8.37 K/UL — SIGNIFICANT CHANGE UP (ref 3.8–10.5)
WBC # FLD AUTO: 8.37 K/UL — SIGNIFICANT CHANGE UP (ref 3.8–10.5)

## 2023-01-26 PROCEDURE — 99222 1ST HOSP IP/OBS MODERATE 55: CPT

## 2023-01-26 RX ORDER — LAMIVUDINE 150 MG
100 TABLET ORAL DAILY
Refills: 0 | Status: DISCONTINUED | OUTPATIENT
Start: 2023-01-26 | End: 2023-01-30

## 2023-01-26 RX ORDER — HYDRALAZINE HCL 50 MG
25 TABLET ORAL THREE TIMES A DAY
Refills: 0 | Status: DISCONTINUED | OUTPATIENT
Start: 2023-01-26 | End: 2023-01-26

## 2023-01-26 RX ORDER — IBUPROFEN 200 MG
600 TABLET ORAL EVERY 6 HOURS
Refills: 0 | Status: DISCONTINUED | OUTPATIENT
Start: 2023-01-26 | End: 2023-01-26

## 2023-01-26 RX ORDER — DOLUTEGRAVIR SODIUM 25 MG/1
50 TABLET, FILM COATED ORAL DAILY
Refills: 0 | Status: DISCONTINUED | OUTPATIENT
Start: 2023-01-26 | End: 2023-01-30

## 2023-01-26 RX ORDER — TRAMADOL HYDROCHLORIDE 50 MG/1
50 TABLET ORAL EVERY 6 HOURS
Refills: 0 | Status: DISCONTINUED | OUTPATIENT
Start: 2023-01-26 | End: 2023-01-26

## 2023-01-26 RX ORDER — MAGNESIUM OXIDE 400 MG ORAL TABLET 241.3 MG
400 TABLET ORAL DAILY
Refills: 0 | Status: DISCONTINUED | OUTPATIENT
Start: 2023-01-26 | End: 2023-01-27

## 2023-01-26 RX ORDER — DARUNAVIR ETHANOLATE AND COBICISTAT 800; 150 MG/1; MG/1
1 TABLET, FILM COATED ORAL DAILY
Refills: 0 | Status: DISCONTINUED | OUTPATIENT
Start: 2023-01-26 | End: 2023-01-30

## 2023-01-26 RX ORDER — TRAMADOL HYDROCHLORIDE 50 MG/1
50 TABLET ORAL EVERY 6 HOURS
Refills: 0 | Status: DISCONTINUED | OUTPATIENT
Start: 2023-01-26 | End: 2023-01-30

## 2023-01-26 RX ORDER — PANTOPRAZOLE SODIUM 20 MG/1
40 TABLET, DELAYED RELEASE ORAL ONCE
Refills: 0 | Status: COMPLETED | OUTPATIENT
Start: 2023-01-26 | End: 2023-01-26

## 2023-01-26 RX ORDER — GABAPENTIN 400 MG/1
100 CAPSULE ORAL AT BEDTIME
Refills: 0 | Status: DISCONTINUED | OUTPATIENT
Start: 2023-01-26 | End: 2023-01-30

## 2023-01-26 RX ORDER — PANTOPRAZOLE SODIUM 20 MG/1
40 TABLET, DELAYED RELEASE ORAL
Refills: 0 | Status: DISCONTINUED | OUTPATIENT
Start: 2023-01-26 | End: 2023-01-30

## 2023-01-26 RX ORDER — METOPROLOL TARTRATE 50 MG
100 TABLET ORAL
Refills: 0 | Status: DISCONTINUED | OUTPATIENT
Start: 2023-01-26 | End: 2023-01-30

## 2023-01-26 RX ORDER — ACETAMINOPHEN 500 MG
1000 TABLET ORAL EVERY 6 HOURS
Refills: 0 | Status: DISCONTINUED | OUTPATIENT
Start: 2023-01-26 | End: 2023-01-27

## 2023-01-26 RX ORDER — HEPARIN SODIUM 5000 [USP'U]/ML
5000 INJECTION INTRAVENOUS; SUBCUTANEOUS EVERY 8 HOURS
Refills: 0 | Status: DISCONTINUED | OUTPATIENT
Start: 2023-01-26 | End: 2023-01-30

## 2023-01-26 RX ORDER — ALLOPURINOL 300 MG
100 TABLET ORAL DAILY
Refills: 0 | Status: DISCONTINUED | OUTPATIENT
Start: 2023-01-26 | End: 2023-01-30

## 2023-01-26 RX ORDER — CITALOPRAM 10 MG/1
20 TABLET, FILM COATED ORAL DAILY
Refills: 0 | Status: DISCONTINUED | OUTPATIENT
Start: 2023-01-26 | End: 2023-01-30

## 2023-01-26 RX ADMIN — SODIUM CHLORIDE 20 MILLILITER(S): 9 INJECTION, SOLUTION INTRAVENOUS at 00:02

## 2023-01-26 RX ADMIN — Medication 100 MILLIGRAM(S): at 14:13

## 2023-01-26 RX ADMIN — Medication 100 MILLIGRAM(S): at 14:40

## 2023-01-26 RX ADMIN — Medication 400 MILLIGRAM(S): at 21:29

## 2023-01-26 RX ADMIN — Medication 1000 MILLIGRAM(S): at 00:20

## 2023-01-26 RX ADMIN — Medication 400 MILLIGRAM(S): at 00:01

## 2023-01-26 RX ADMIN — Medication 400 MILLIGRAM(S): at 14:41

## 2023-01-26 RX ADMIN — DARUNAVIR ETHANOLATE AND COBICISTAT 1 TABLET(S): 800; 150 TABLET, FILM COATED ORAL at 14:12

## 2023-01-26 RX ADMIN — MAGNESIUM OXIDE 400 MG ORAL TABLET 400 MILLIGRAM(S): 241.3 TABLET ORAL at 14:14

## 2023-01-26 RX ADMIN — Medication 1000 MILLIGRAM(S): at 21:35

## 2023-01-26 RX ADMIN — GABAPENTIN 100 MILLIGRAM(S): 400 CAPSULE ORAL at 21:29

## 2023-01-26 RX ADMIN — Medication 100 MILLIGRAM(S): at 17:50

## 2023-01-26 RX ADMIN — Medication 1000 MILLIGRAM(S): at 05:35

## 2023-01-26 RX ADMIN — DOLUTEGRAVIR SODIUM 50 MILLIGRAM(S): 25 TABLET, FILM COATED ORAL at 14:12

## 2023-01-26 RX ADMIN — CITALOPRAM 20 MILLIGRAM(S): 10 TABLET, FILM COATED ORAL at 14:12

## 2023-01-26 RX ADMIN — Medication 400 MILLIGRAM(S): at 05:17

## 2023-01-26 RX ADMIN — HEPARIN SODIUM 5000 UNIT(S): 5000 INJECTION INTRAVENOUS; SUBCUTANEOUS at 21:29

## 2023-01-26 RX ADMIN — HEPARIN SODIUM 5000 UNIT(S): 5000 INJECTION INTRAVENOUS; SUBCUTANEOUS at 14:12

## 2023-01-26 RX ADMIN — PANTOPRAZOLE SODIUM 40 MILLIGRAM(S): 20 TABLET, DELAYED RELEASE ORAL at 17:43

## 2023-01-26 NOTE — PHYSICAL THERAPY INITIAL EVALUATION ADULT - ADDITIONAL COMMENTS
As per patient and daughter, patient was independnet with ambulation using Rollator, towards the end at rehab.

## 2023-01-26 NOTE — CONSULT NOTE ADULT - PROBLEM SELECTOR RECOMMENDATION 9
Cont HD  monitor BMP  Renal eval
Pt with acute postop abdominal pain which is somatic and neuropathic in nature due to robot-assisted reversal of Alfonso procedure on 1/25, POD #1.   Opioid pain recommendations   - S/p duramorph 1/25.  Monitor continuos pulse ox x 24hr.   - Tramadol 50mg PO q 6 hours PRN severe pain. Monitor for sedation/ respiratory depression.   - Pt allergic to oxycodone- rash.   Non-opioid pain recommendations   - NSAIDs per primary team. Caution pt with ESRD.   - Continue Acetaminophen 1 gram IV q 6 hours for 24 hours only. Monitor LFTs  - Continue Gabapentin 100mg po at bedtime. Monitor renal function.   Bowel Regimen  - Per surgical team  Mild pain   - Non-pharmacological pain treatment recommendations  - Warm/ Cool packs PRN   - Repositioning extremity, elevation, imagery, relaxation, distraction.  - Physical therapy OOB if no contraindications   Recommendations discussed with primary team and RN  Pain team will sign off at this time. Please reconsult if needed.

## 2023-01-26 NOTE — CONSULT NOTE ADULT - SUBJECTIVE AND OBJECTIVE BOX
Chief complain/HPI  ESRD due to Hypertension, dialysis days are MWF. Last dialysis was Tuesday in perpetration for surgery.  She was admitted for closure of colostomy.    67 y.o female PMHx of HIV, ESRD on HD (M/W/F schedule) via Left AVF 2020, HTN, Gout, Cervical CA s/p Hysterectomy, Colostomy, who presented to the hospital yesterday for colostomy reversal. Patient was noted to have friable and fibrotic tissue intra-operatively, and while leak test was negative after reanastomosis, because of the quality of her tissue and questionable tissue donuts seen after EEA anastomosis performed, a diverting ileostomy was also performed.     Patient is POD1 today, doing well. Afebrile, hemodynamically stable. Patient has not yet ambulated. She denies nausea/vomiting. There is a small amount of liquid stool in the bag. Ostomy is pink and healthy appearing. Patient is complaining of some pain when she tries to move or roll over, which she states the pain medication helps with. Terry is in place, patient has made 20 cc of light yellow urine.         PAST MEDICAL & SURGICAL HISTORY:  HIV (human immunodeficiency virus infection)      HTN (hypertension)      Chronic kidney disease      Hyperlipidemia      Gout  istory of gastroesophageal reflux (GERD)  Depression  Cervical cancer  2010  DVT, lower extremity  Left leg after hysterectomy  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      ESRD on hemodialysis      Colostomy present      History of ectopic pregnancy      H/O: hysterectomy  Due to cervical cancer      S/P arteriovenous (AV) fistula creation  july 10 2020    Home Medications Reviewed    Hospital Medications:   MEDICATIONS  (STANDING):  acetaminophen   IVPB .. 1000 milliGRAM(s) IV Intermittent every 6 hours  allopurinol 100 milliGRAM(s) Oral daily  citalopram 20 milliGRAM(s) Oral daily  darunavir 800 mG/cobicstat 150 mG 1 Tablet(s) Oral daily  dolutegravir 50 milliGRAM(s) Oral daily  gabapentin 100 milliGRAM(s) Oral at bedtime  heparin   Injectable 5000 Unit(s) SubCutaneous every 8 hours  hydrALAZINE 25 milliGRAM(s) Oral three times a day  ibuprofen  Tablet. 600 milliGRAM(s) Oral every 6 hours  lamiVUDine- milliGRAM(s) Oral daily  magnesium oxide 400 milliGRAM(s) Oral daily  metoprolol tartrate 100 milliGRAM(s) Oral two times a day  morphine PF Spinal 0.2 milliGRAM(s) IntraThecal. once  pantoprazole    Tablet 40 milliGRAM(s) Oral before breakfast    MEDICATIONS  (PRN):  naloxone Injectable 0.1 milliGRAM(s) IV Push every 3 minutes PRN For ANY of the following changes in patient status:  A. RR LESS THAN 10 breaths per minute, B. Oxygen saturation LESS THAN 90%, C. Sedation score of 6  ondansetron Injectable 4 milliGRAM(s) IV Push every 6 hours PRN Nausea      Allergies    oxycodone (Rash)  penicillins (Urticaria; Rash)    Intolerances                              10.9   8.37  )-----------( 121      ( 26 Jan 2023 06:34 )             34.3     01-26    137  |  100  |  29<H>  ----------------------------<  104<H>  4.9   |  27  |  5.91<H>    Ca    8.8      26 Jan 2023 06:34  Phos  7.7     01-26  Mg     2.0     01-26                RADIOLOGY & ADDITIONAL STUDIES:    SOCIAL HISTORY: Denies ETOh,Smoking,     FAMILY HISTORY:  Family hx of hypertension    Family history of renal failure        REVIEW OF SYSTEMS:  CONSTITUTIONAL: No malaise, No fatigue, No fevers or chills, well developed, no diaphoresis  EYES/ENT: No visual changes;  No vertigo or throat pain   RESPIRATORY: No cough, wheezing, hemoptysis; No shortness of breath  CARDIOVASCULAR: No chest pain or palpitations. No edema  GASTROINTESTINAL: No abdominal or epigastric pain. No nausea, vomiting, or hematemesis; No diarrhea or constipation. No melena or hematochezia.  GENITOURINARY: No dysuria, frequency, foamy urine, urinary urgency, incontinence or hematuria  NEUROLOGICAL: No numbness or weakness, No tremor      VITALS:  Vital Signs Last 24 Hrs  T(C): 36.7 (26 Jan 2023 05:15), Max: 37 (25 Jan 2023 20:17)  T(F): 98 (26 Jan 2023 05:15), Max: 98.6 (25 Jan 2023 20:17)  HR: 83 (26 Jan 2023 05:15) (67 - 85)  BP: 101/60 (26 Jan 2023 05:15) (96/55 - 135/63)  BP(mean): 77 (25 Jan 2023 21:40) (72 - 82)  RR: 17 (26 Jan 2023 05:15) (14 - 21)  SpO2: 100% (26 Jan 2023 05:15) (96% - 100%)    Parameters below as of 26 Jan 2023 05:15  Patient On (Oxygen Delivery Method): nasal cannula  O2 Flow (L/min): 2      01-25 @ 07:01  -  01-26 @ 07:00  --------------------------------------------------------  IN: 400 mL / OUT: 0 mL / NET: 400 mL          PHYSICAL EXAM:  Constitutional: NAD  HEENT: anicteric sclera, oropharynx clear, MMM  Respiratory:  air entrance B/L, no wheezes, rales or rhonchi  Cardiovascular: S1, S2, RRR, no pericardial rub, no murmur  Gastrointestinal: BS+, soft, no tenderness, no distension, no bruit. Colostomy with fluid dark stool  Pelvis: bladder non-distended, no CVA tenderness  Extremities: No cyanosis or clubbing. No peripheral edema  Neurological: A/O x 3, no focal deficits  Psychiatric: Normal mood, normal affect  : No CVA tenderness. No terry.   Skin: No rashes                      
  Source of information: VERONICA GOVEA, Chart review  Patient language: English  : n/a    HPI:      Patient is a 67y old  Female with PMH HTN HIV ESRD on HD, DM2, HLD gout, GERD, depression who presents to the hospital for a scheduled robot-assisted reversal of Alfonso procedure on 1/25, POD #1. S/p duramorph 1/25. Pain consulted for postop pain. Pt seen and examined at bedside. Pt sitting in chair, reports pain score 8/10 SCALE USED: (1-10 VNRS). Pt describes pain as aching, non- radiating, alleviated by pain medication, exacerbated by movement. Pt tolerating PO diet. Denies lethargy, chest pain, SOB, nausea, vomiting, constipation. Reports last BM 1/26 from colostomy. Patient stated goal for pain control: to be able to take deep breaths, get out of bed to chair and ambulate with tolerable pain control. Pt denies taking medications for pain at home.     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      ESRD on hemodialysis      Colostomy present      History of ectopic pregnancy  Two      H/O: hysterectomy  Due to cervical cancer      S/P arteriovenous (AV) fistula creation  july 10 2020          FAMILY HISTORY:  Family hx of hypertension    Family history of renal failure        Social History:   [X ] Denies ETOH use, illicit drug use and smoking    Allergies    oxycodone (Rash)  penicillins (Urticaria; Rash)    MEDICATIONS  (STANDING):  acetaminophen   IVPB .. 1000 milliGRAM(s) IV Intermittent every 6 hours  allopurinol 100 milliGRAM(s) Oral daily  citalopram 20 milliGRAM(s) Oral daily  darunavir 800 mG/cobicstat 150 mG 1 Tablet(s) Oral daily  dolutegravir 50 milliGRAM(s) Oral daily  gabapentin 100 milliGRAM(s) Oral at bedtime  heparin   Injectable 5000 Unit(s) SubCutaneous every 8 hours  lamiVUDine- milliGRAM(s) Oral daily  magnesium oxide 400 milliGRAM(s) Oral daily  metoprolol tartrate 100 milliGRAM(s) Oral two times a day  morphine PF Spinal 0.2 milliGRAM(s) IntraThecal. once  pantoprazole    Tablet 40 milliGRAM(s) Oral before breakfast    MEDICATIONS  (PRN):  naloxone Injectable 0.1 milliGRAM(s) IV Push every 3 minutes PRN For ANY of the following changes in patient status:  A. RR LESS THAN 10 breaths per minute, B. Oxygen saturation LESS THAN 90%, C. Sedation score of 6  ondansetron Injectable 4 milliGRAM(s) IV Push every 6 hours PRN Nausea      Vital Signs Last 24 Hrs  T(C): 36.7 (26 Jan 2023 05:15), Max: 37 (25 Jan 2023 20:17)  T(F): 98 (26 Jan 2023 05:15), Max: 98.6 (25 Jan 2023 20:17)  HR: 83 (26 Jan 2023 05:15) (67 - 85)  BP: 101/60 (26 Jan 2023 05:15) (96/55 - 135/63)  BP(mean): 77 (25 Jan 2023 21:40) (72 - 82)  RR: 17 (26 Jan 2023 05:15) (14 - 21)  SpO2: 100% (26 Jan 2023 05:15) (96% - 100%)    Parameters below as of 26 Jan 2023 05:15  Patient On (Oxygen Delivery Method): nasal cannula  O2 Flow (L/min): 2      LABS: Reviewed.                          10.9   8.37  )-----------( 121      ( 26 Jan 2023 06:34 )             34.3     01-26    137  |  100  |  29<H>  ----------------------------<  104<H>  4.9   |  27  |  5.91<H>    Ca    8.8      26 Jan 2023 06:34  Phos  7.7     01-26  Mg     2.0     01-26    CAPILLARY BLOOD GLUCOSE    POCT Blood Glucose.: 102 mg/dL (25 Jan 2023 20:26)    COVID-19 PCR: NotDetec (24 Jan 2023 08:30)  COVID-19 PCR: NotDetec (17 Oct 2022 18:40)  COVID-19 PCR: NotDetec (12 Oct 2022 19:50)  COVID-19 PCR: NotDetec (10 Oct 2022 06:48)  COVID-19 PCR: NotDetec (05 Oct 2022 11:00)  COVID-19 PCR: NotDetec (03 Oct 2022 06:34)  COVID-19 PCR: David (27 Sep 2022 00:01)    ORT Score -   Family Hx of substance abuse	Female	      Male  Alcohol 	                                           1                     3  Illegal drugs	                                   2                     3  Rx drugs                                           4 	                  4  Personal Hx of substance abuse		  Alcohol 	                                          3	                  3  Illegal drugs                                     4	                  4  Rx drugs                                            5 	                  5  Age between 16- 45 years	           1                     1  hx preadolescent sexual abuse	   3 	                  0  Psychological disease		  ADD, OCD, bipolar, schizophrenia   2	          2  Depression                                           1 	          1  Total: 0    a score of 3 or lower indicates low risk for opioid abuse		  a score of 4-7 indicates moderate risk for opioid abuse		  a score of 8 or higher indicates high risk for opioid abuse    REVIEW OF SYSTEMS:  CONSTITUTIONAL: No fever or fatigue  HEENT:  No difficulty hearing, no change in vision  NECK: No pain or stiffness  RESPIRATORY: No cough, wheezing, chills or hemoptysis; No shortness of breath  CARDIOVASCULAR: No chest pain, palpitations, dizziness, or leg swelling  GASTROINTESTINAL: No loss of appetite, decreased PO intake. +  abdominal pain. No nausea, vomiting; No diarrhea or constipation. + colostomy   GENITOURINARY: No dysuria, frequency, hematuria, retention or incontinence  MUSCULOSKELETAL: No joint pain or swelling; No muscle, back, or extremity pain, no upper or lower motor strength weakness, no saddle anesthesia, bowel/bladder incontinence, no falls   NEURO: No headaches, No numbness/tingling b/l LE, No weakness    PHYSICAL EXAM:  GENERAL:  Alert & Oriented X4, cooperative, NAD, Good concentration. Speech is clear.   RESPIRATORY: Respirations even and unlabored. Clear to auscultation bilaterally; No rales, rhonchi, wheezing, or rubs  CARDIOVASCULAR: Normal S1/S2, regular rate and rhythm; No murmurs, rubs, or gallops. No JVD. + left upper arm AV fistula + bruit/ thrill   GASTROINTESTINAL:  Soft, + appropriately tender, Nondistended; Bowel sounds present + steripstrips c/d/i;+ left colostomy with small amount formed brown stool.   GENITOURINARY: + urinary catheter draining clear yellow urine   PERIPHERAL VASCULAR:  Extremities warm without edema. 2+ Peripheral Pulses, No cyanosis, No calf tenderness  MUSCULOSKELETAL: Motor Strength 5/5 B/L upper and lower extremities; moves all extremities equally against gravity; No tenderness on palpation of all joints.   SKIN: Warm, dry, intact. No rashes, lesions, scars or wounds.     Risk factors associated with adverse outcomes related to opioid treatment  [ ]  Concurrent benzodiazepine use  [ ]  History/ Active substance use or alcohol use disorder  [X ] Psychiatric co-morbidity  [ ] Sleep apnea  [ ] COPD  [ ] BMI> 35  [ ] Liver dysfunction  [X ] Renal dysfunction  [ ] CHF  [ ] Smoker  [X ]  Age > 60 years    [X ]  [place]  Reviewed and Copied to Chart. See below.    Plan of care and goal oriented pain management treatment options were discussed with patient and /or primary care giver; all questions and concerns were addressed and care was aligned with patient's wishes.    Educated patient on goal oriented pain management treatment options     
Patient is a 67y old  Female who presents with a chief complaint of Abdominal Pain  History of Present Illness:  Reason for Admission: Abdominal pain  History of Present Illness:   This is a 67 year old female, coming from home, with medical history of ESRD(M-W-F), HTN, HIV coming in with abdominal pain. Pt states she has been having abdominal pain for the last 3 days. She went for dialsysis yesterday and afterwards the pain became worse. The abdominal pain comes and goes, is a dull achy pain, rated 8/10. It does not radiate and is located in left lower quadrant and suprapubic region. Pt also has been constipated with last bowl movement being about 4 days prior. She also has been experiencing nausea and has had about 5 episodes of vomiting in the last 3 days, they were all watery. She denies any headaches, visual disturbances, dizziness, falls, chest pain, palpitations, lower extremity swelling, fevers, skin rash, recent travel, or sick contacts.      INTERVAL HPI/OVERNIGHT EVENTS:  T(C): 36.7 (01-26-23 @ 05:15), Max: 37 (01-25-23 @ 20:17)  HR: 83 (01-26-23 @ 05:15) (67 - 85)  BP: 101/60 (01-26-23 @ 05:15) (96/55 - 135/63)  RR: 17 (01-26-23 @ 05:15) (14 - 21)  SpO2: 100% (01-26-23 @ 05:15) (96% - 100%)  Wt(kg): --  I&O's Summary    25 Jan 2023 07:01  -  26 Jan 2023 07:00  --------------------------------------------------------  IN: 400 mL / OUT: 0 mL / NET: 400 mL        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      ESRD on hemodialysis      Colostomy present      History of ectopic pregnancy  Two      H/O: hysterectomy  Due to cervical cancer      S/P arteriovenous (AV) fistula creation  july 10 2020          SOCIAL HISTORY  Alcohol:  Tobacco:  Illicit substance use:      FAMILY HISTORY:      LABS:                        10.9   8.37  )-----------( 121      ( 26 Jan 2023 06:34 )             34.3     01-26    137  |  100  |  29<H>  ----------------------------<  104<H>  4.9   |  27  |  5.91<H>    Ca    8.8      26 Jan 2023 06:34  Phos  7.7     01-26  Mg     2.0     01-26          CAPILLARY BLOOD GLUCOSE      POCT Blood Glucose.: 102 mg/dL (25 Jan 2023 20:26)            MEDICATIONS  (STANDING):  acetaminophen   IVPB .. 1000 milliGRAM(s) IV Intermittent every 6 hours  allopurinol 100 milliGRAM(s) Oral daily  citalopram 20 milliGRAM(s) Oral daily  darunavir 800 mG/cobicstat 150 mG 1 Tablet(s) Oral daily  dolutegravir 50 milliGRAM(s) Oral daily  gabapentin 100 milliGRAM(s) Oral at bedtime  heparin   Injectable 5000 Unit(s) SubCutaneous every 8 hours  hydrALAZINE 25 milliGRAM(s) Oral three times a day  ibuprofen  Tablet. 600 milliGRAM(s) Oral every 6 hours  lamiVUDine- milliGRAM(s) Oral daily  magnesium oxide 400 milliGRAM(s) Oral daily  metoprolol tartrate 100 milliGRAM(s) Oral two times a day  morphine PF Spinal 0.2 milliGRAM(s) IntraThecal. once  pantoprazole    Tablet 40 milliGRAM(s) Oral before breakfast    MEDICATIONS  (PRN):  naloxone Injectable 0.1 milliGRAM(s) IV Push every 3 minutes PRN For ANY of the following changes in patient status:  A. RR LESS THAN 10 breaths per minute, B. Oxygen saturation LESS THAN 90%, C. Sedation score of 6  ondansetron Injectable 4 milliGRAM(s) IV Push every 6 hours PRN Nausea  traMADol 50 milliGRAM(s) Oral every 6 hours PRN Severe Pain (7 - 10)      REVIEW OF SYSTEMS:  CONSTITUTIONAL: No fever, weight loss, or fatigue  EYES: No eye pain, visual disturbances, or discharge  ENMT:  No difficulty hearing, tinnitus, vertigo; No sinus or throat pain  NECK: No pain or stiffness  RESPIRATORY: No cough, wheezing, chills or hemoptysis; No shortness of breath  CARDIOVASCULAR: No chest pain, palpitations, dizziness, or leg swelling  GASTROINTESTINAL: + abdominal or epigastric pain. No nausea, vomiting, or hematemesis; No diarrhea or constipation. No melena or hematochezia.  GENITOURINARY: No dysuria, frequency, hematuria, or incontinence  NEUROLOGICAL: No headaches, memory loss, loss of strength, numbness, or tremors  SKIN: No itching, burning, rashes, or lesions   LYMPH NODES: No enlarged glands  ENDOCRINE: No heat or cold intolerance; No hair loss  MUSCULOSKELETAL: No joint pain or swelling; No muscle, back, or extremity pain  PSYCHIATRIC: No depression, anxiety, mood swings, or difficulty sleeping  HEME/LYMPH: No easy bruising, or bleeding gums  ALLERY AND IMMUNOLOGIC: No hives or eczema    PHYSICAL EXAM:  GENERAL: NAD, well-groomed, well-developed  HEAD:  Atraumatic, Normocephalic  EYES: EOMI, PERRLA, conjunctiva and sclera clear  ENMT: No tonsillar erythema, exudates, or enlargement; Moist mucous membranes, Good dentition, No lesions  NECK: Supple, No JVD, Normal thyroid  NERVOUS SYSTEM:  Alert & Oriented X3, Good concentration; Motor Strength 5/5 B/L upper and lower extremities; DTRs 2+ intact and symmetric  CHEST/LUNG: Clear to percussion bilaterally; No rales, rhonchi, wheezing, or rubs  HEART: Regular rate and rhythm; No murmurs, rubs, or gallops  ABDOMEN: Soft, Incisional Tenderness, Nondistended; Bowel sounds present  EXTREMITIES:  2+ Peripheral Pulses, No clubbing, cyanosis, or edema  LYMPH: No lymphadenopathy noted  SKIN: No rashes or lesions    RADIOLOGY & ADDITIONAL TESTS:  < from: CT Abdomen and Pelvis w/ IV Cont (10.06.22 @ 13:26) >  IMPRESSION:    Previously seen diverticular abscess is collapsed around the pigtail   catheter. Mild residual inflammatory changes around the sigmoid colon and   small bowel with suggestion of enterocolic fistula.    Moderate sliding hiatus hernia with thickened distal esophagus. Correlate   with endoscopy to exclude esophagitis.    1.2 cm right lobe hepatic lesion likely hemangioma as above. Center   confirmation with MRI.    1.3 cm pancreatic head cyst is also be characterized with MRI.      < end of copied text >    Imaging Personally Reviewed:  [x ] YES  [ ] NO    Consultant(s) Notes Reviewed:  [x ] YES  [ ] NO        Care Discussed with Consultants/Other Providers [ x] YES  [ ] NO

## 2023-01-26 NOTE — PHYSICAL THERAPY INITIAL EVALUATION ADULT - PERTINENT HX OF CURRENT PROBLEM, REHAB EVAL
Patient had Alfonso's procedure done on 10/11/22. Patient was in rehab following discharge from the hospital.

## 2023-01-26 NOTE — PROGRESS NOTE ADULT - ASSESSMENT
67 F s/p colostomy reversal with EEA anastomosis and creation of diverting ileostomy, POD1. patient is doing well, on ERP protocol:   - Nephrology to initiate in hospital dialysis  - PT eval  - Pain management consult to help manage her medications as she is allergic to oxycodone   - Magnesium oxide   - d/c terry  - d/c fluids  - Restart home medications  - Advance to LRD   - OOBTC for at least 8 hours today     PENDING FULL DISCUSSION WITH ATTENDING  67 F s/p colostomy reversal with EEA anastomosis and creation of diverting ileostomy, POD1. patient is doing well, on ERP protocol:   - Nephrology to initiate in hospital dialysis  - PT eval  - Magnesium oxide   - d/c terry  - d/c fluids  - Restart home medications  - Advance to LRD   - OOBTC for at least 8 hours today

## 2023-01-26 NOTE — PHYSICAL THERAPY INITIAL EVALUATION ADULT - LEVEL OF INDEPENDENCE, REHAB EVAL
Procedure:  Level of Consciousness: [x]Alert [x]Oriented []Disoriented []Lethargic  Anxiety Level: [x]Calm []Anxious []Depressed []Other  Skin: []Warm [x]Dry []Cool []Moist []Intact []Other  Cardiovascular: [x]Palpitations: [x]Never []Occasionally []Frequently  Chest Pain: [x]No []Yes  Respiratory:  [x]Unlabored []Labored []Cough ([] Productive []Unproductive)  HCG Required: [x]No []Yes   Results: []Negative []Positive  Knowledge Level:        [x]Patient/Other verbalized understanding of pre-procedure instructions. [x]Assessment of post-op care needs (transportation, responsible caregiver)        [x]Able to discuss health care problems and how to deal with it.   Factors that Affect Teaching:        Language Barrier: [x]No []Yes - why:        Hearing Loss:        [x]No []Yes            Corrective Device:  []Yes []No        Vision Loss:           []No [x]Yes            Corrective Device:  [x]Yes []No        Memory Loss:       [x]No []Yes            []Short Term []Long Term  Motivational Level:  [x]Asks Questions                  []Extremely Anxious       [x]Seems Interested               []Seems Uninterested                  [x]Denies need for Education  Risk for Injury:  [x]Patient oriented to person, place and time  []History of frequent falls/loss of balance  Nutritional:  []Change in appetite   []Weight Gain   []Weight Loss  Functional:  []Requires assistance with ADL's independent

## 2023-01-26 NOTE — CONSULT NOTE ADULT - ASSESSMENT
ESRD dialysis days are MWF. Follow with dialysis tomorrow.  Post surgery as above , no reverse colostomy as above. Patient tolerate feeds.  Low BP , will DC Hydralazine.   Hyperphosphatemia, follow low PO4 diet , 2 gm K diet.  DC Ibuprofen .       
Confidential Drug Utilization Report  Search Terms: Mary Montoya, 1955Search Date: 01/26/2023 08:57:08 AM  The Drug Utilization Report below displays all of the controlled substance prescriptions, if any, that your patient has filled in the last twelve months. The information displayed on this report is compiled from pharmacy submissions to the Department, and accurately reflects the information as submitted by the pharmacies.    This report was requested by: Dayna Watkins | Reference #: [id number]    There are no results for the search terms that you entered.

## 2023-01-26 NOTE — CONSULT NOTE ADULT - PROBLEM SELECTOR RECOMMENDATION 3
patient came for colostomy reversal  Had diverting ileostomy  incentive spirometry  pain control  DVT PPX  surgical follow up  Replace lytes as needed

## 2023-01-26 NOTE — PROGRESS NOTE ADULT - SUBJECTIVE AND OBJECTIVE BOX
67 y.o female PMHx of HIV, ESRD on HD (M/W/F schedule) via Left AVF 2020, HTN, Gout, Cervical CA s/p Hysterectomy, Colostomy, who presented to the hospital yesterday for colostomy reversal. Patient was noted to have friable and fibrotic tissue intra-operatively, and while leak test was negative after reanastomosis, because of the quality of her tissue and questionable tissue donuts seen after EEA anastomosis performed, a diverting ileostomy was also performed.     Patient is POD1 today, doing well. Afebrile, hemodynamically stable. Patient has not yet ambulated. She denies nausea/vomiting. There is a small amount of liquid stool in the bag. Ostomy is pink and healthy appearing. Patient is complaining of some pain when she tries to move or roll over, which she states the pain medication helps with. Farias is in place, patient has made 20 cc of light yellow urine. She is on CLD and has tolerated. Patient states she last received dialysis yesterday morning before surgery.     Vital Signs Last 24 Hrs  T(C): 36.7 (26 Jan 2023 05:15), Max: 37 (25 Jan 2023 20:17)  T(F): 98 (26 Jan 2023 05:15), Max: 98.6 (25 Jan 2023 20:17)  HR: 83 (26 Jan 2023 05:15) (67 - 85)  BP: 101/60 (26 Jan 2023 05:15) (96/55 - 143/77)  BP(mean): 77 (25 Jan 2023 21:40) (72 - 82)  RR: 17 (26 Jan 2023 05:15) (14 - 21)  SpO2: 100% (26 Jan 2023 05:15) (96% - 100%)    Parameters below as of 26 Jan 2023 05:15  Patient On (Oxygen Delivery Method): nasal cannula  O2 Flow (L/min): 2    Physical exam:   Gen: NAD, AAOx3, resting in bed comfortably   Resp: normal respiratory effort, on NC, no accessory muscle use  Abd: soft, nondistended, appropriately TTP periumbilically/epigastrically, dressings are c/d/i, ostomy in place with small amount of dark liquid stool present, ostomy is pink and healthy appearing  MSK: Fistula in LUE with good thrill                           10.9   8.37  )-----------( 121      ( 26 Jan 2023 06:34 )             34.3   01-26    137  |  100  |  29<H>  ----------------------------<  104<H>  4.9   |  27  |  5.91<H>    Ca    8.8      26 Jan 2023 06:34  Phos  7.7     01-26  Mg     2.0     01-26

## 2023-01-27 ENCOUNTER — TRANSCRIPTION ENCOUNTER (OUTPATIENT)
Age: 68
End: 2023-01-27

## 2023-01-27 LAB
ANION GAP SERPL CALC-SCNC: 11 MMOL/L — SIGNIFICANT CHANGE UP (ref 5–17)
BUN SERPL-MCNC: 50 MG/DL — HIGH (ref 7–18)
CALCIUM SERPL-MCNC: 9.5 MG/DL — SIGNIFICANT CHANGE UP (ref 8.4–10.5)
CHLORIDE SERPL-SCNC: 98 MMOL/L — SIGNIFICANT CHANGE UP (ref 96–108)
CO2 SERPL-SCNC: 25 MMOL/L — SIGNIFICANT CHANGE UP (ref 22–31)
CREAT SERPL-MCNC: 8.3 MG/DL — HIGH (ref 0.5–1.3)
EGFR: 5 ML/MIN/1.73M2 — LOW
GLUCOSE SERPL-MCNC: 125 MG/DL — HIGH (ref 70–99)
HCT VFR BLD CALC: 32.4 % — LOW (ref 34.5–45)
HGB BLD-MCNC: 10.4 G/DL — LOW (ref 11.5–15.5)
MAGNESIUM SERPL-MCNC: 2.1 MG/DL — SIGNIFICANT CHANGE UP (ref 1.6–2.6)
MCHC RBC-ENTMCNC: 32.1 GM/DL — SIGNIFICANT CHANGE UP (ref 32–36)
MCHC RBC-ENTMCNC: 32.3 PG — SIGNIFICANT CHANGE UP (ref 27–34)
MCV RBC AUTO: 100.6 FL — HIGH (ref 80–100)
NRBC # BLD: 0 /100 WBCS — SIGNIFICANT CHANGE UP (ref 0–0)
PHOSPHATE SERPL-MCNC: 7.1 MG/DL — HIGH (ref 2.5–4.5)
PLATELET # BLD AUTO: 167 K/UL — SIGNIFICANT CHANGE UP (ref 150–400)
POTASSIUM SERPL-MCNC: 3.9 MMOL/L — SIGNIFICANT CHANGE UP (ref 3.5–5.3)
POTASSIUM SERPL-SCNC: 3.9 MMOL/L — SIGNIFICANT CHANGE UP (ref 3.5–5.3)
RBC # BLD: 3.22 M/UL — LOW (ref 3.8–5.2)
RBC # FLD: 16.2 % — HIGH (ref 10.3–14.5)
SODIUM SERPL-SCNC: 134 MMOL/L — LOW (ref 135–145)
WBC # BLD: 9.62 K/UL — SIGNIFICANT CHANGE UP (ref 3.8–10.5)
WBC # FLD AUTO: 9.62 K/UL — SIGNIFICANT CHANGE UP (ref 3.8–10.5)

## 2023-01-27 RX ORDER — ACETAMINOPHEN 500 MG
1000 TABLET ORAL ONCE
Refills: 0 | Status: DISCONTINUED | OUTPATIENT
Start: 2023-01-27 | End: 2023-01-27

## 2023-01-27 RX ORDER — ACETAMINOPHEN 500 MG
1000 TABLET ORAL EVERY 6 HOURS
Refills: 0 | Status: DISCONTINUED | OUTPATIENT
Start: 2023-01-27 | End: 2023-01-29

## 2023-01-27 RX ORDER — SEVELAMER CARBONATE 2400 MG/1
800 POWDER, FOR SUSPENSION ORAL
Refills: 0 | Status: DISCONTINUED | OUTPATIENT
Start: 2023-01-27 | End: 2023-01-30

## 2023-01-27 RX ADMIN — DOLUTEGRAVIR SODIUM 50 MILLIGRAM(S): 25 TABLET, FILM COATED ORAL at 18:00

## 2023-01-27 RX ADMIN — CITALOPRAM 20 MILLIGRAM(S): 10 TABLET, FILM COATED ORAL at 18:00

## 2023-01-27 RX ADMIN — PANTOPRAZOLE SODIUM 40 MILLIGRAM(S): 20 TABLET, DELAYED RELEASE ORAL at 06:21

## 2023-01-27 RX ADMIN — Medication 400 MILLIGRAM(S): at 19:35

## 2023-01-27 RX ADMIN — Medication 100 MILLIGRAM(S): at 18:00

## 2023-01-27 RX ADMIN — TRAMADOL HYDROCHLORIDE 50 MILLIGRAM(S): 50 TABLET ORAL at 15:54

## 2023-01-27 RX ADMIN — Medication 100 MILLIGRAM(S): at 17:59

## 2023-01-27 RX ADMIN — Medication 100 MILLIGRAM(S): at 05:27

## 2023-01-27 RX ADMIN — HEPARIN SODIUM 5000 UNIT(S): 5000 INJECTION INTRAVENOUS; SUBCUTANEOUS at 21:30

## 2023-01-27 RX ADMIN — HEPARIN SODIUM 5000 UNIT(S): 5000 INJECTION INTRAVENOUS; SUBCUTANEOUS at 05:27

## 2023-01-27 RX ADMIN — DARUNAVIR ETHANOLATE AND COBICISTAT 1 TABLET(S): 800; 150 TABLET, FILM COATED ORAL at 18:01

## 2023-01-27 RX ADMIN — Medication 1000 MILLIGRAM(S): at 20:20

## 2023-01-27 RX ADMIN — GABAPENTIN 100 MILLIGRAM(S): 400 CAPSULE ORAL at 21:30

## 2023-01-27 RX ADMIN — Medication 1000 MILLIGRAM(S): at 05:26

## 2023-01-27 RX ADMIN — TRAMADOL HYDROCHLORIDE 50 MILLIGRAM(S): 50 TABLET ORAL at 09:03

## 2023-01-27 RX ADMIN — Medication 1000 MILLIGRAM(S): at 06:20

## 2023-01-27 NOTE — DISCHARGE NOTE PROVIDER - NSDCFUADDINST_GEN_ALL_CORE_FT
Please follow the postsurgical instruction sheet you received.   In addition, you are being sent home with home PT, a home aide, and continuation of dialysis.   Due to your kidney disease, we recommend you limit the use of motrin for pain and consult with your nephrologist prior to use.  Ostomy care: prior to discharge you confirmed you were comfortable caring for your ostomy. Additional notes: your ostomy output may be more liquid and a higher amount than before. You may change the bag and appliance as you wish to keep yourself clean.  Please follow the postsurgical instruction sheet you received.   In addition, you are being sent home with home PT, a home aide, and continuation of dialysis.   Due to your kidney disease, we recommend you limit the use of motrin for pain and consult with your nephrologist prior to use. However you may take Tylenol 1g every 6 hours for pain as needed.  Ostomy care: prior to discharge you confirmed you were comfortable caring for your ostomy. You may change the bag and appliance as you wish to keep yourself clean. Take note of changes in the output and amount so you can inform your doctor at your follow up visit.

## 2023-01-27 NOTE — DISCHARGE NOTE PROVIDER - YES NO FOR MLM POSITIVE OR NEGATIVE COVID RESULT
Prep Survey      Responses   Gnosticism facility patient discharged from?  [facility]   Is LACE score < 7 ?  No   Eligibility  Not Eligible   What are the reasons patient is not eligible?  [facility]   Does the patient have one of the following disease processes/diagnoses(primary or secondary)?  Other   Prep survey completed?  Yes          Racheal Villavicencio RN         ,

## 2023-01-27 NOTE — DISCHARGE NOTE PROVIDER - NSDCFUSCHEDAPPT_GEN_ALL_CORE_FT
Eric Seals  [facility] Physician [facility]  [facility] [address] [facility]  Scheduled Appointment: 02/02/2023

## 2023-01-27 NOTE — PROGRESS NOTE ADULT - ASSESSMENT
67 F s/p colostomy reversal with EEA anastomosis and creation of diverting ileostomy, POD2. Patient remains tender on exam, complaining of pain, on ERP protocol:   - Dialysis today  - PT eval: home PT, continue working with PT  - Stop magnesium oxide   - Restart home medications  - Continue LRD   - OOBTC for at least 8 hours today   - F/u am labs  - Continue heparin for dvt prophylaxis

## 2023-01-27 NOTE — PROGRESS NOTE ADULT - SUBJECTIVE AND OBJECTIVE BOX
Problem List:  ESRD due to Hypertension  History of HIV  History of abdominal abscess, post surgery and colostomy, post surgery l with EEA anastomosis and creation of diverting ileostomy, POD2. Patient remains tender on exam, complaining of pain, on   Patient c/o abdominal pain.  Able to eat and BM via colostomy bag.           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      ESRD on hemodialysis      Colostomy present      History of ectopic pregnancy  Two      H/O: hysterectomy  Due to cervical cancer      S/P arteriovenous (AV) fistula creation  july 10 2020          oxycodone (Rash)  penicillins (Urticaria; Rash)      MEDICATIONS  (STANDING):  allopurinol 100 milliGRAM(s) Oral daily  citalopram 20 milliGRAM(s) Oral daily  darunavir 800 mG/cobicstat 150 mG 1 Tablet(s) Oral daily  dolutegravir 50 milliGRAM(s) Oral daily  gabapentin 100 milliGRAM(s) Oral at bedtime  heparin   Injectable 5000 Unit(s) SubCutaneous every 8 hours  lamiVUDine- milliGRAM(s) Oral daily  metoprolol tartrate 100 milliGRAM(s) Oral two times a day  morphine PF Spinal 0.2 milliGRAM(s) IntraThecal. once  pantoprazole    Tablet 40 milliGRAM(s) Oral before breakfast    MEDICATIONS  (PRN):  acetaminophen   IVPB .. 1000 milliGRAM(s) IV Intermittent every 6 hours PRN Moderate Pain (4 - 6)  naloxone Injectable 0.1 milliGRAM(s) IV Push every 3 minutes PRN For ANY of the following changes in patient status:  A. RR LESS THAN 10 breaths per minute, B. Oxygen saturation LESS THAN 90%, C. Sedation score of 6  ondansetron Injectable 4 milliGRAM(s) IV Push every 6 hours PRN Nausea  traMADol 50 milliGRAM(s) Oral every 6 hours PRN Severe Pain (7 - 10)                            10.9   8.37  )-----------( 121      ( 26 Jan 2023 06:34 )             34.3     01-26    137  |  100  |  29<H>  ----------------------------<  104<H>  4.9   |  27  |  5.91<H>    Ca    8.8      26 Jan 2023 06:34  Phos  7.7     01-26  Mg     2.0     01-26              REVIEW OF SYSTEMS:  General: no fever no chills, no weight loss.    RESPIRATORY: No cough, wheezing, hemoptysis; No shortness of breath  CARDIOVASCULAR: No chest pain or palpitations. No Edema  GASTROINTESTINAL: No abdominal or epigastric pain. No nausea, vomiting. No diarrhea or constipation. No melena.  GENITOURINARY: No dysuria, frequency, foamy urine, urinary urgency, incontinence or hematuria  NEUROLOGICAL: No numbness or weakness, no tremor , no dizziness.   Muscle skeletal : no joint pain and no swelling of joints and limbs.  SKIN: No itching, burning, rashes.        VITALS:  T(F): 99 (01-27-23 @ 05:30), Max: 99 (01-27-23 @ 05:30)  HR: 77 (01-27-23 @ 05:30)  BP: 142/66 (01-27-23 @ 05:30)  RR: 16 (01-27-23 @ 05:30)  SpO2: 100% (01-27-23 @ 05:30)  Wt(kg): --    01-26 @ 07:01  -  01-27 @ 07:00  --------------------------------------------------------  IN: 0 mL / OUT: 500 mL / NET: -500 mL        PHYSICAL EXAM:  Constitutional: well developed, no diaphoresis, no distress.  Neck: No JVD, no carotid bruit, supple, no adenopathy  Respiratory: Good air entrance B/L, no wheezes, rales or rhonchi  Cardiovascular: S1, S2, RRR, no pericardial rub, no murmur  Abdomen: BS+, soft, tender to palpation.   Pelvis: bladder nondistended  Extremities: No cyanosis or clubbing. No peripheral edema.     Neurological: A/O x 3, no focal deficits  Left upper arm AVF with bruti and thrill

## 2023-01-27 NOTE — PROGRESS NOTE ADULT - PROBLEM SELECTOR PLAN 3
patient came for colostomy reversal  Had diverting ileostomy  incentive spirometry  pain control  DVT PPX  surgical follow up  Replace lytes as needed. patient came for colostomy reversal  Had diverting ileostomy  incentive spirometry  pain control  DVT PPX  surgical follow up  Replace lytes as needed.  Diet as per surgery

## 2023-01-27 NOTE — PROGRESS NOTE ADULT - SUBJECTIVE AND OBJECTIVE BOX
pt seen in icu [  ], reg med floor [   ], bed [  ], chair at bedside [   ]  Awake, alert, comfortable in bed in NAD.    REVIEW OF SYSTEMS:    CONSTITUTIONAL: No weakness, fevers or chills  EYES/ENT: No visual changes;  No vertigo or throat pain   NECK: No pain or stiffness  RESPIRATORY: No cough, wheezing, hemoptysis; No shortness of breath  CARDIOVASCULAR: No chest pain or palpitations  GASTROINTESTINAL: No abdominal or epigastric pain. No nausea, vomiting, or hematemesis; No diarrhea or constipation. No melena or hematochezia.  GENITOURINARY: No dysuria, frequency or hematuria  NEUROLOGICAL: No numbness or weakness  SKIN: No itching, burning, rashes, or lesions   All other review of systems is negative unless indicated above.    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    ESRD on hemodialysis    Colostomy present    No significant past surgical history    History of ectopic pregnancy    Cervical cancer    H/O: hysterectomy    S/P arteriovenous (AV) fistula creation        Vitals  T(F): 98 (01-26-23 @ 21:56), Max: 98 (01-26-23 @ 21:56)  HR: 81 (01-26-23 @ 21:56) (80 - 96)  BP: 112/61 (01-26-23 @ 21:56) (93/53 - 112/61)  RR: 18 (01-26-23 @ 21:56) (18 - 18)  SpO2: 99% (01-26-23 @ 21:56) (95% - 99%)  Wt(kg): --  CAPILLARY BLOOD GLUCOSE      POCT Blood Glucose.: 102 mg/dL (25 Jan 2023 20:26)      Labs                          10.9   8.37  )-----------( 121      ( 26 Jan 2023 06:34 )             34.3       01-26    137  |  100  |  29<H>  ----------------------------<  104<H>  4.9   |  27  |  5.91<H>    Ca    8.8      26 Jan 2023 06:34  Phos  7.7     01-26  Mg     2.0     01-26              Radiology Results      Meds    MEDICATIONS  (STANDING):  allopurinol 100 milliGRAM(s) Oral daily  citalopram 20 milliGRAM(s) Oral daily  darunavir 800 mG/cobicstat 150 mG 1 Tablet(s) Oral daily  dolutegravir 50 milliGRAM(s) Oral daily  gabapentin 100 milliGRAM(s) Oral at bedtime  heparin   Injectable 5000 Unit(s) SubCutaneous every 8 hours  lamiVUDine- milliGRAM(s) Oral daily  magnesium oxide 400 milliGRAM(s) Oral daily  metoprolol tartrate 100 milliGRAM(s) Oral two times a day  morphine PF Spinal 0.2 milliGRAM(s) IntraThecal. once  pantoprazole    Tablet 40 milliGRAM(s) Oral before breakfast      MEDICATIONS  (PRN):  acetaminophen     Tablet .. 1000 milliGRAM(s) Oral every 6 hours PRN Temp greater or equal to 38C (100.4F), Mild Pain (1 - 3)  naloxone Injectable 0.1 milliGRAM(s) IV Push every 3 minutes PRN For ANY of the following changes in patient status:  A. RR LESS THAN 10 breaths per minute, B. Oxygen saturation LESS THAN 90%, C. Sedation score of 6  ondansetron Injectable 4 milliGRAM(s) IV Push every 6 hours PRN Nausea  traMADol 50 milliGRAM(s) Oral every 6 hours PRN Severe Pain (7 - 10)         pt seen in icu [  ], reg med floor [ x  ], bed [ x ], chair at bedside [   ]  Awake, alert, comfortable in bed in NAD.    REVIEW OF SYSTEMS:    CONSTITUTIONAL: No weakness, fevers or chills  EYES/ENT: No visual changes;  No vertigo or throat pain   NECK: No pain or stiffness  RESPIRATORY: No cough, wheezing, hemoptysis; No shortness of breath  CARDIOVASCULAR: No chest pain or palpitations  GASTROINTESTINAL: + abdominal or epigastric pain. No nausea, vomiting, or hematemesis; No diarrhea or constipation. No melena or hematochezia.  GENITOURINARY: No dysuria, frequency or hematuria  NEUROLOGICAL: No numbness or weakness  SKIN: No itching, burning, rashes, or lesions   All other review of systems is negative unless indicated above.    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    ESRD on hemodialysis    Colostomy present    No significant past surgical history    History of ectopic pregnancy    Cervical cancer    H/O: hysterectomy    S/P arteriovenous (AV) fistula creation        Vitals  T(F): 98 (01-26-23 @ 21:56), Max: 98 (01-26-23 @ 21:56)  HR: 81 (01-26-23 @ 21:56) (80 - 96)  BP: 112/61 (01-26-23 @ 21:56) (93/53 - 112/61)  RR: 18 (01-26-23 @ 21:56) (18 - 18)  SpO2: 99% (01-26-23 @ 21:56) (95% - 99%)  Wt(kg): --  CAPILLARY BLOOD GLUCOSE      POCT Blood Glucose.: 102 mg/dL (25 Jan 2023 20:26)      Labs                          10.9   8.37  )-----------( 121      ( 26 Jan 2023 06:34 )             34.3       01-26    137  |  100  |  29<H>  ----------------------------<  104<H>  4.9   |  27  |  5.91<H>    Ca    8.8      26 Jan 2023 06:34  Phos  7.7     01-26  Mg     2.0     01-26              Radiology Results      Meds    MEDICATIONS  (STANDING):  allopurinol 100 milliGRAM(s) Oral daily  citalopram 20 milliGRAM(s) Oral daily  darunavir 800 mG/cobicstat 150 mG 1 Tablet(s) Oral daily  dolutegravir 50 milliGRAM(s) Oral daily  gabapentin 100 milliGRAM(s) Oral at bedtime  heparin   Injectable 5000 Unit(s) SubCutaneous every 8 hours  lamiVUDine- milliGRAM(s) Oral daily  magnesium oxide 400 milliGRAM(s) Oral daily  metoprolol tartrate 100 milliGRAM(s) Oral two times a day  morphine PF Spinal 0.2 milliGRAM(s) IntraThecal. once  pantoprazole    Tablet 40 milliGRAM(s) Oral before breakfast      MEDICATIONS  (PRN):  acetaminophen     Tablet .. 1000 milliGRAM(s) Oral every 6 hours PRN Temp greater or equal to 38C (100.4F), Mild Pain (1 - 3)  naloxone Injectable 0.1 milliGRAM(s) IV Push every 3 minutes PRN For ANY of the following changes in patient status:  A. RR LESS THAN 10 breaths per minute, B. Oxygen saturation LESS THAN 90%, C. Sedation score of 6  ondansetron Injectable 4 milliGRAM(s) IV Push every 6 hours PRN Nausea  traMADol 50 milliGRAM(s) Oral every 6 hours PRN Severe Pain (7 - 10)

## 2023-01-27 NOTE — DISCHARGE NOTE PROVIDER - NSDCCPCAREPLAN_GEN_ALL_CORE_FT
PRINCIPAL DISCHARGE DIAGNOSIS  Diagnosis: History of creation of ostomy  Assessment and Plan of Treatment: You had an ostomy created for previous diverticulitis. This ostomy was reversed but you needed an ileostomy to help divert the flow away from the reconnected colon while it heals.

## 2023-01-27 NOTE — DISCHARGE NOTE PROVIDER - NSDCCPTREATMENT_GEN_ALL_CORE_FT
PRINCIPAL PROCEDURE  Procedure: Laparoscopic closure of ostomy  Findings and Treatment: Robotic assisted laparoscopic ostomy reversal with EEA, appendectomy, lysis of adhesions, and sigmoidoscopy.       PRINCIPAL PROCEDURE  Procedure: Laparoscopic closure of ostomy  Findings and Treatment: Robotic assisted laparoscopic ostomy reversal with EEA, appendectomy, lysis of adhesions, and sigmoidoscopy. DIet advanced, ostomy functional

## 2023-01-27 NOTE — PROGRESS NOTE ADULT - SUBJECTIVE AND OBJECTIVE BOX
Patient seen and examined at the bedside. She remains afebrile, hemodynamically stable, tolerating low residual diet. She denies nausea/vomiting. Ostomy is pink and healthy, with stool in the bag. Patient's main complaint is pain, which she states is a little better than yesterday, however patient is pretty tender on exam. She was OOBTC most of the day yesterday, and worked with PT, who is recommending home PT.     Vital Signs Last 24 Hrs  T(C): 37.2 (27 Jan 2023 05:30), Max: 37.2 (27 Jan 2023 05:30)  T(F): 99 (27 Jan 2023 05:30), Max: 99 (27 Jan 2023 05:30)  HR: 77 (27 Jan 2023 05:30) (77 - 96)  BP: 142/66 (27 Jan 2023 05:30) (93/53 - 142/66)  BP(mean): --  RR: 16 (27 Jan 2023 05:30) (16 - 18)  SpO2: 100% (27 Jan 2023 05:30) (95% - 100%)    Parameters below as of 27 Jan 2023 05:30  Patient On (Oxygen Delivery Method): room air    Physical exam:   Gen: NAD, AAOx3, resting in bed comfortably   Resp: normal respiratory effort, on NC, no accessory muscle use  Abd: soft, nondistended, tender to palpation with light palpation diffusely, dressings are c/d/i, ostomy in place with small amount of slightly formed stool present, ostomy is pink and healthy appearing  MSK: Fistula in LUE with good thrill                           10.9   8.37  )-----------( 121      ( 26 Jan 2023 06:34 )             34.3   01-26    137  |  100  |  29<H>  ----------------------------<  104<H>  4.9   |  27  |  5.91<H>    Ca    8.8      26 Jan 2023 06:34  Phos  7.7     01-26  Mg     2.0     01-26

## 2023-01-27 NOTE — DISCHARGE NOTE PROVIDER - HOSPITAL COURSE
HD, DM2, HLD gout, GERD, depression who presents to the hospital for a scheduled robot-assisted reversal of Alfonso procedure on 1/25. In OR, ostomy was reversed with lysis of adhesions, appendectomy, end to end anastomosis and sigmoidoscopy confirming no leak. However due to the nature of the tissue a diverting ileostomy was created. Postoperatively the patient was advanced on ERAS protocol, tolerating diet, producing gas and stool through the ostomy site and ambulating with PT. Pain management was consulted due to oxycodone allergy for pain control. Patient progressively hospital course uneventfully. HD, DM2, HLD gout, GERD, depression who presents to the hospital for a scheduled robot-assisted reversal of Alfonso procedure on 1/25. In OR, ostomy was reversed with lysis of adhesions, appendectomy, end to end anastomosis and sigmoidoscopy confirming no leak. However due to the nature of the tissue a diverting ileostomy was created. Postoperatively the patient was advanced on ERAS protocol, tolerating diet, producing gas and stool through the ostomy site and ambulating with PT. Pain management was consulted due to oxycodone allergy for pain control with pain improving on oral medications.

## 2023-01-27 NOTE — DISCHARGE NOTE PROVIDER - NSDCFUADDAPPT_GEN_ALL_CORE_FT
Please call Dr. Seals's office once you leave the hospital to schedule and confirm your follow up appointment.    Please continue your dialysis appointments.

## 2023-01-27 NOTE — PROGRESS NOTE ADULT - ASSESSMENT
ESRD , dialysis today, UF 1 KG , 3 K bath.  Anemia no need for BECKY at this time, follow iron and tibc in dialysis . Follow Hepatitis profile.   Hyperphosphatemia, follow repeat PO4 in dialysis today and start sevelamer 800 mg TID with meals.  Hypertension stable.  Surgery to follow.

## 2023-01-27 NOTE — DISCHARGE NOTE PROVIDER - CARE PROVIDER_API CALL
Eric Seals (MD)  ColonRectal Surgery; Surgery  [address] [facility], [address]  [place] [address]  Phone: [phone]  Fax: [phone]  Established Patient  Follow Up Time: 1 week   clear

## 2023-01-27 NOTE — DISCHARGE NOTE PROVIDER - NSDCMRMEDTOKEN_GEN_ALL_CORE_FT
allopurinol 100 mg oral tablet: 1 tab(s) orally once a day  citalopram 20 mg oral tablet: 1 tab(s) orally once a day  gabapentin 100 mg oral capsule: 1 cap(s) orally once a day (at bedtime)  hydrALAZINE 25 mg oral tablet: 1 tab(s) orally 3 times a day  isosorbide mononitrate 60 mg oral tablet, extended release: 1 tab(s) orally once a day (in the morning)  lamiVUDine HBV: 100 milligram(s) orally once a day  Metoprolol Tartrate 100 mg oral tablet: 1 tab(s) orally 2 times a day  Omega-3 1000 mg oral capsule: 1 cap(s) orally once a day  omeprazole 20 mg oral delayed release capsule: 1 cap(s) orally once a day  Prezcobix 800 mg-150 mg oral tablet: 1 tab(s) orally once a day  senna oral tablet: 2 tab(s) orally once a day (at bedtime)  Tivicay 50 mg oral tablet: 1 tab(s) orally once a day  Vistaril 25 mg oral capsule: 1 cap(s) orally 4 times a day   allopurinol 100 mg oral tablet: 1 tab(s) orally once a day  citalopram 20 mg oral tablet: 1 tab(s) orally once a day  gabapentin 100 mg oral capsule: 1 cap(s) orally once a day (at bedtime)  hydrALAZINE 25 mg oral tablet: 1 tab(s) orally 3 times a day  isosorbide mononitrate 60 mg oral tablet, extended release: 1 tab(s) orally once a day (in the morning)  lamiVUDine HBV: 100 milligram(s) orally once a day  Metoprolol Tartrate 100 mg oral tablet: 1 tab(s) orally 2 times a day  Omega-3 1000 mg oral capsule: 1 cap(s) orally once a day  omeprazole 20 mg oral delayed release capsule: 1 cap(s) orally once a day  Prezcobix 800 mg-150 mg oral tablet: 1 tab(s) orally once a day  senna oral tablet: 2 tab(s) orally once a day (at bedtime)  Tivicay 50 mg oral tablet: 1 tab(s) orally once a day  traMADol 50 mg oral tablet: 1 tab(s) orally every 6 hours MDD:4 tabs

## 2023-01-28 LAB
ANION GAP SERPL CALC-SCNC: 11 MMOL/L — SIGNIFICANT CHANGE UP (ref 5–17)
BUN SERPL-MCNC: 27 MG/DL — HIGH (ref 7–18)
CALCIUM SERPL-MCNC: 9.8 MG/DL — SIGNIFICANT CHANGE UP (ref 8.4–10.5)
CHLORIDE SERPL-SCNC: 99 MMOL/L — SIGNIFICANT CHANGE UP (ref 96–108)
CO2 SERPL-SCNC: 26 MMOL/L — SIGNIFICANT CHANGE UP (ref 22–31)
CREAT SERPL-MCNC: 5.42 MG/DL — HIGH (ref 0.5–1.3)
EGFR: 8 ML/MIN/1.73M2 — LOW
GLUCOSE SERPL-MCNC: 82 MG/DL — SIGNIFICANT CHANGE UP (ref 70–99)
HAV IGM SER-ACNC: SIGNIFICANT CHANGE UP
HBV CORE IGM SER-ACNC: SIGNIFICANT CHANGE UP
HBV SURFACE AG SER-ACNC: SIGNIFICANT CHANGE UP
HCT VFR BLD CALC: 30.4 % — LOW (ref 34.5–45)
HCV AB S/CO SERPL IA: 0.07 S/CO — SIGNIFICANT CHANGE UP (ref 0–0.99)
HCV AB SERPL-IMP: SIGNIFICANT CHANGE UP
HGB BLD-MCNC: 10 G/DL — LOW (ref 11.5–15.5)
MAGNESIUM SERPL-MCNC: 2 MG/DL — SIGNIFICANT CHANGE UP (ref 1.6–2.6)
MCHC RBC-ENTMCNC: 32.4 PG — SIGNIFICANT CHANGE UP (ref 27–34)
MCHC RBC-ENTMCNC: 32.9 GM/DL — SIGNIFICANT CHANGE UP (ref 32–36)
MCV RBC AUTO: 98.4 FL — SIGNIFICANT CHANGE UP (ref 80–100)
NRBC # BLD: 0 /100 WBCS — SIGNIFICANT CHANGE UP (ref 0–0)
PHOSPHATE SERPL-MCNC: 4.7 MG/DL — HIGH (ref 2.5–4.5)
PLATELET # BLD AUTO: 171 K/UL — SIGNIFICANT CHANGE UP (ref 150–400)
POTASSIUM SERPL-MCNC: 3.8 MMOL/L — SIGNIFICANT CHANGE UP (ref 3.5–5.3)
POTASSIUM SERPL-SCNC: 3.8 MMOL/L — SIGNIFICANT CHANGE UP (ref 3.5–5.3)
RBC # BLD: 3.09 M/UL — LOW (ref 3.8–5.2)
RBC # FLD: 16.1 % — HIGH (ref 10.3–14.5)
SODIUM SERPL-SCNC: 136 MMOL/L — SIGNIFICANT CHANGE UP (ref 135–145)
WBC # BLD: 8.85 K/UL — SIGNIFICANT CHANGE UP (ref 3.8–10.5)
WBC # FLD AUTO: 8.85 K/UL — SIGNIFICANT CHANGE UP (ref 3.8–10.5)

## 2023-01-28 RX ORDER — ERYTHROPOIETIN 10000 [IU]/ML
3000 INJECTION, SOLUTION INTRAVENOUS; SUBCUTANEOUS
Refills: 0 | Status: DISCONTINUED | OUTPATIENT
Start: 2023-01-30 | End: 2023-01-30

## 2023-01-28 RX ADMIN — HEPARIN SODIUM 5000 UNIT(S): 5000 INJECTION INTRAVENOUS; SUBCUTANEOUS at 14:42

## 2023-01-28 RX ADMIN — Medication 400 MILLIGRAM(S): at 19:01

## 2023-01-28 RX ADMIN — Medication 100 MILLIGRAM(S): at 14:40

## 2023-01-28 RX ADMIN — DARUNAVIR ETHANOLATE AND COBICISTAT 1 TABLET(S): 800; 150 TABLET, FILM COATED ORAL at 16:05

## 2023-01-28 RX ADMIN — Medication 1000 MILLIGRAM(S): at 20:00

## 2023-01-28 RX ADMIN — Medication 100 MILLIGRAM(S): at 05:21

## 2023-01-28 RX ADMIN — HEPARIN SODIUM 5000 UNIT(S): 5000 INJECTION INTRAVENOUS; SUBCUTANEOUS at 21:15

## 2023-01-28 RX ADMIN — SEVELAMER CARBONATE 800 MILLIGRAM(S): 2400 POWDER, FOR SUSPENSION ORAL at 17:31

## 2023-01-28 RX ADMIN — HEPARIN SODIUM 5000 UNIT(S): 5000 INJECTION INTRAVENOUS; SUBCUTANEOUS at 05:21

## 2023-01-28 RX ADMIN — PANTOPRAZOLE SODIUM 40 MILLIGRAM(S): 20 TABLET, DELAYED RELEASE ORAL at 05:22

## 2023-01-28 RX ADMIN — Medication 100 MILLIGRAM(S): at 18:51

## 2023-01-28 RX ADMIN — SEVELAMER CARBONATE 800 MILLIGRAM(S): 2400 POWDER, FOR SUSPENSION ORAL at 12:33

## 2023-01-28 RX ADMIN — DOLUTEGRAVIR SODIUM 50 MILLIGRAM(S): 25 TABLET, FILM COATED ORAL at 14:41

## 2023-01-28 RX ADMIN — CITALOPRAM 20 MILLIGRAM(S): 10 TABLET, FILM COATED ORAL at 14:41

## 2023-01-28 RX ADMIN — GABAPENTIN 100 MILLIGRAM(S): 400 CAPSULE ORAL at 21:16

## 2023-01-28 RX ADMIN — SEVELAMER CARBONATE 800 MILLIGRAM(S): 2400 POWDER, FOR SUSPENSION ORAL at 08:13

## 2023-01-28 RX ADMIN — Medication 100 MILLIGRAM(S): at 14:41

## 2023-01-28 NOTE — PROGRESS NOTE ADULT - SUBJECTIVE AND OBJECTIVE BOX
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 Patient was noted to have friable and fibrotic tissue intra-operatively, and while leak test was negative after reanastomosis, because of the quality of her tissue and questionable tissue donuts seen after EEA anastomosis performed, a diverting ileostomy was also performed.     PMH: as above, ectopic x2, GERD  PSH: as above AVF, Hysterectomy, Alfonso's  Medications: see med rec  All: oxycodone, penicillin (rash)    Patient is POD3 robotic assisted laparoscopic ostomy reversal with lysis of adhesions, appendectomy, EEA, sigmoidoscopy, creation of diverting ileostomy. Postop course with re-evaluated pain control in setting of allergies. States pain is improving, tolerated dialysis well yesterday. States she vomited - seen to have unchewed meat from dinner on hospital gown. States meat was tough to chew, spit it up. No other episodes. Tolerating now LRD, renal diet, easy to chew, passing gas and forming stool in new ostomy. Ambulating, IS to 1000, producing little urine per her baseline.    Vital Signs Last 24 Hrs  T(C): 36.9 (28 Jan 2023 05:13), Max: 36.9 (28 Jan 2023 05:13)  T(F): 98.5 (28 Jan 2023 05:13), Max: 98.5 (28 Jan 2023 05:13)  HR: 76 (28 Jan 2023 05:13) (74 - 90)  BP: 155/72 (28 Jan 2023 05:13) (134/69 - 155/72)  BP(mean): --  RR: 17 (28 Jan 2023 05:13) (16 - 17)  SpO2: 98% (28 Jan 2023 05:13) (97% - 100%)    Parameters below as of 28 Jan 2023 05:13  Patient On (Oxygen Delivery Method): room air    PE  Gen: NAD, AAOx3, resting in bed comfortably   Resp: normal respiratory effort, on NC, no accessory muscle use  Abd: soft, minimally distended, mildly tender to palpation with light palpation diffusely, dressings are c/d/i, ostomy in place with small amount of slightly formed stool present, ostomy is pink and healthy appearing, red rubber in place  MSK: Fistula in LUE with good thrill, ext soft, no appreciable peripheral edema, pulses 2+    MEDICATIONS  (STANDING):  allopurinol 100 milliGRAM(s) Oral daily  citalopram 20 milliGRAM(s) Oral daily  darunavir 800 mG/cobicstat 150 mG 1 Tablet(s) Oral daily  dolutegravir 50 milliGRAM(s) Oral daily  gabapentin 100 milliGRAM(s) Oral at bedtime  heparin   Injectable 5000 Unit(s) SubCutaneous every 8 hours  lamiVUDine- milliGRAM(s) Oral daily  metoprolol tartrate 100 milliGRAM(s) Oral two times a day  morphine PF Spinal 0.2 milliGRAM(s) IntraThecal. once  pantoprazole    Tablet 40 milliGRAM(s) Oral before breakfast  sevelamer carbonate 800 milliGRAM(s) Oral three times a day with meals    MEDICATIONS  (PRN):  acetaminophen   IVPB .. 1000 milliGRAM(s) IV Intermittent every 6 hours PRN Moderate Pain (4 - 6)  naloxone Injectable 0.1 milliGRAM(s) IV Push every 3 minutes PRN For ANY of the following changes in patient status:  A. RR LESS THAN 10 breaths per minute, B. Oxygen saturation LESS THAN 90%, C. Sedation score of 6  ondansetron Injectable 4 milliGRAM(s) IV Push every 6 hours PRN Nausea  traMADol 50 milliGRAM(s) Oral every 6 hours PRN Severe Pain (7 - 10)

## 2023-01-28 NOTE — PROGRESS NOTE ADULT - SUBJECTIVE AND OBJECTIVE BOX
pt seen in icu [  ], reg med floor [   ], bed [  ], chair at bedside [   ]  Awake, alert, comfortable in bed in NAD.    REVIEW OF SYSTEMS:    CONSTITUTIONAL: No weakness, fevers or chills  EYES/ENT: No visual changes;  No vertigo or throat pain   NECK: No pain or stiffness  RESPIRATORY: No cough, wheezing, hemoptysis; No shortness of breath  CARDIOVASCULAR: No chest pain or palpitations  GASTROINTESTINAL: No abdominal or epigastric pain. No nausea, vomiting, or hematemesis; No diarrhea or constipation. No melena or hematochezia.  GENITOURINARY: No dysuria, frequency or hematuria  NEUROLOGICAL: No numbness or weakness  SKIN: No itching, burning, rashes, or lesions   All other review of systems is negative unless indicated above.    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    ESRD on hemodialysis    Colostomy present    No significant past surgical history    History of ectopic pregnancy    Cervical cancer    H/O: hysterectomy    S/P arteriovenous (AV) fistula creation        Vitals  T(F): 98.5 (01-28-23 @ 05:13), Max: 99 (01-27-23 @ 05:30)  HR: 76 (01-28-23 @ 05:13) (74 - 90)  BP: 155/72 (01-28-23 @ 05:13) (134/69 - 155/72)  RR: 17 (01-28-23 @ 05:13) (16 - 17)  SpO2: 98% (01-28-23 @ 05:13) (97% - 100%)  Wt(kg): --  CAPILLARY BLOOD GLUCOSE          Labs                          10.4   9.62  )-----------( 167      ( 27 Jan 2023 11:48 )             32.4       01-27    134<L>  |  98  |  50<H>  ----------------------------<  125<H>  3.9   |  25  |  8.30<H>    Ca    9.5      27 Jan 2023 11:48  Phos  7.1     01-27  Mg     2.1     01-27              Radiology Results      Meds    MEDICATIONS  (STANDING):  allopurinol 100 milliGRAM(s) Oral daily  citalopram 20 milliGRAM(s) Oral daily  darunavir 800 mG/cobicstat 150 mG 1 Tablet(s) Oral daily  dolutegravir 50 milliGRAM(s) Oral daily  gabapentin 100 milliGRAM(s) Oral at bedtime  heparin   Injectable 5000 Unit(s) SubCutaneous every 8 hours  lamiVUDine- milliGRAM(s) Oral daily  metoprolol tartrate 100 milliGRAM(s) Oral two times a day  morphine PF Spinal 0.2 milliGRAM(s) IntraThecal. once  pantoprazole    Tablet 40 milliGRAM(s) Oral before breakfast  sevelamer carbonate 800 milliGRAM(s) Oral three times a day with meals      MEDICATIONS  (PRN):  acetaminophen   IVPB .. 1000 milliGRAM(s) IV Intermittent every 6 hours PRN Moderate Pain (4 - 6)  naloxone Injectable 0.1 milliGRAM(s) IV Push every 3 minutes PRN For ANY of the following changes in patient status:  A. RR LESS THAN 10 breaths per minute, B. Oxygen saturation LESS THAN 90%, C. Sedation score of 6  ondansetron Injectable 4 milliGRAM(s) IV Push every 6 hours PRN Nausea  traMADol 50 milliGRAM(s) Oral every 6 hours PRN Severe Pain (7 - 10)         pt seen in icu [  ], reg med floor [  x ], bed [ x ], chair at bedside [   ]  Awake, alert, comfortable in bed in NAD.    REVIEW OF SYSTEMS:    CONSTITUTIONAL: No weakness, fevers or chills  EYES/ENT: No visual changes;  No vertigo or throat pain   NECK: No pain or stiffness  RESPIRATORY: No cough, wheezing, hemoptysis; No shortness of breath  CARDIOVASCULAR: No chest pain or palpitations  GASTROINTESTINAL: + abdominal or epigastric pain. No nausea, vomiting, or hematemesis; No diarrhea or constipation. No melena or hematochezia.  GENITOURINARY: No dysuria, frequency or hematuria  NEUROLOGICAL: No numbness or weakness  SKIN: No itching, burning, rashes, or lesions   All other review of systems is negative unless indicated above.    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    ESRD on hemodialysis    Colostomy present    No significant past surgical history    History of ectopic pregnancy    Cervical cancer    H/O: hysterectomy    S/P arteriovenous (AV) fistula creation        Vitals  T(F): 98.5 (01-28-23 @ 05:13), Max: 99 (01-27-23 @ 05:30)  HR: 76 (01-28-23 @ 05:13) (74 - 90)  BP: 155/72 (01-28-23 @ 05:13) (134/69 - 155/72)  RR: 17 (01-28-23 @ 05:13) (16 - 17)  SpO2: 98% (01-28-23 @ 05:13) (97% - 100%)  Wt(kg): --  CAPILLARY BLOOD GLUCOSE          Labs                          10.4   9.62  )-----------( 167      ( 27 Jan 2023 11:48 )             32.4       01-27    134<L>  |  98  |  50<H>  ----------------------------<  125<H>  3.9   |  25  |  8.30<H>    Ca    9.5      27 Jan 2023 11:48  Phos  7.1     01-27  Mg     2.1     01-27              Radiology Results      Meds    MEDICATIONS  (STANDING):  allopurinol 100 milliGRAM(s) Oral daily  citalopram 20 milliGRAM(s) Oral daily  darunavir 800 mG/cobicstat 150 mG 1 Tablet(s) Oral daily  dolutegravir 50 milliGRAM(s) Oral daily  gabapentin 100 milliGRAM(s) Oral at bedtime  heparin   Injectable 5000 Unit(s) SubCutaneous every 8 hours  lamiVUDine- milliGRAM(s) Oral daily  metoprolol tartrate 100 milliGRAM(s) Oral two times a day  morphine PF Spinal 0.2 milliGRAM(s) IntraThecal. once  pantoprazole    Tablet 40 milliGRAM(s) Oral before breakfast  sevelamer carbonate 800 milliGRAM(s) Oral three times a day with meals      MEDICATIONS  (PRN):  acetaminophen   IVPB .. 1000 milliGRAM(s) IV Intermittent every 6 hours PRN Moderate Pain (4 - 6)  naloxone Injectable 0.1 milliGRAM(s) IV Push every 3 minutes PRN For ANY of the following changes in patient status:  A. RR LESS THAN 10 breaths per minute, B. Oxygen saturation LESS THAN 90%, C. Sedation score of 6  ondansetron Injectable 4 milliGRAM(s) IV Push every 6 hours PRN Nausea  traMADol 50 milliGRAM(s) Oral every 6 hours PRN Severe Pain (7 - 10)

## 2023-01-28 NOTE — PROGRESS NOTE ADULT - ASSESSMENT
67 F s/p colostomy reversal with EEA anastomosis and creation of diverting ileostomy, POD3. Patient with improving pain control on ERP protocol:   - PT eval: home PT, continue working with PT, ambulate as tolerated  - Continue home medications  - Continue LRD, easy to chew, renal  - OOBTC for at least 8 hours today   - F/u am labs  - Continue heparin for dvt prophylaxis   -transition to oral pain control

## 2023-01-28 NOTE — PROGRESS NOTE ADULT - ASSESSMENT
ESRD , dialysis today, UF 1 KG , 3 K bath.  Follow Hepatitis profile.   Anemia Hb 10 start BECKY in dialysis , no need for iron IV.  Hyperphosphatemia, follow repeat PO4 in dialysis today and start sevelamer 800 mg TID with meals.  Hypertension stable.  Surgery to follow.

## 2023-01-28 NOTE — PROGRESS NOTE ADULT - PROBLEM SELECTOR PLAN 3
patient came for colostomy reversal  Had diverting ileostomy  incentive spirometry  pain control  DVT PPX  surgical follow up  Replace lytes as needed.  Diet as per surgery Patient came for colostomy reversal  Had diverting ileostomy  incentive spirometry  pain control  DVT PPX  surgical follow up  Replace lytes as needed.  Renal diet

## 2023-01-28 NOTE — PHYSICAL EXAM
[de-identified] : appears well  [de-identified] : incisions healing margaritoley, palpable thrill over the left upper extremity avg no

## 2023-01-28 NOTE — PROGRESS NOTE ADULT - SUBJECTIVE AND OBJECTIVE BOX
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      ESRD on hemodialysis      Colostomy present      History of ectopic pregnancy  Two      H/O: hysterectomy  Due to cervical cancer      S/P arteriovenous (AV) fistula creation  july 10 2020          oxycodone (Rash)  penicillins (Urticaria; Rash)      MEDICATIONS  (STANDING):  allopurinol 100 milliGRAM(s) Oral daily  citalopram 20 milliGRAM(s) Oral daily  darunavir 800 mG/cobicstat 150 mG 1 Tablet(s) Oral daily  dolutegravir 50 milliGRAM(s) Oral daily  gabapentin 100 milliGRAM(s) Oral at bedtime  heparin   Injectable 5000 Unit(s) SubCutaneous every 8 hours  lamiVUDine- milliGRAM(s) Oral daily  metoprolol tartrate 100 milliGRAM(s) Oral two times a day  morphine PF Spinal 0.2 milliGRAM(s) IntraThecal. once  pantoprazole    Tablet 40 milliGRAM(s) Oral before breakfast  sevelamer carbonate 800 milliGRAM(s) Oral three times a day with meals    MEDICATIONS  (PRN):  acetaminophen   IVPB .. 1000 milliGRAM(s) IV Intermittent every 6 hours PRN Moderate Pain (4 - 6)  naloxone Injectable 0.1 milliGRAM(s) IV Push every 3 minutes PRN For ANY of the following changes in patient status:  A. RR LESS THAN 10 breaths per minute, B. Oxygen saturation LESS THAN 90%, C. Sedation score of 6  ondansetron Injectable 4 milliGRAM(s) IV Push every 6 hours PRN Nausea  traMADol 50 milliGRAM(s) Oral every 6 hours PRN Severe Pain (7 - 10)                            10.0   8.85  )-----------( 171      ( 28 Jan 2023 07:45 )             30.4     01-28    136  |  99  |  27<H>  ----------------------------<  82  3.8   |  26  |  5.42<H>    Ca    9.8      28 Jan 2023 06:45  Phos  4.7     01-28  Mg     2.0     01-28              REVIEW OF SYSTEMS:  General: no fever no chills, no weight loss.    RESPIRATORY: No cough, wheezing, hemoptysis; No shortness of breath  CARDIOVASCULAR: No chest pain or palpitations. No Edema  GASTROINTESTINAL: ABDOMINAL PAIN IN SURGICAL AREA.  GENITOURINARY: No dysuria, frequency, foamy urine, urinary urgency, incontinence or hematuria  NEUROLOGICAL: No numbness or weakness, no tremor , no dizziness.   Muscle skeletal : no joint pain and no swelling of joints and limbs.  SKIN: No itching, burning, rashes.        VITALS:  T(F): 97.6 (01-28-23 @ 13:01), Max: 98.5 (01-28-23 @ 05:13)  HR: 77 (01-28-23 @ 13:01)  BP: 138/74 (01-28-23 @ 13:01)  RR: 16 (01-28-23 @ 13:01)  SpO2: 97% (01-28-23 @ 13:01)  Wt(kg): --    01-28 @ 07:01  -  01-28 @ 17:55  --------------------------------------------------------  IN: 0 mL / OUT: 1 mL / NET: -1 mL        PHYSICAL EXAM:  Constitutional: well developed, no diaphoresis, no distress.  Neck: No JVD, no carotid bruit, supple, no adenopathy  Respiratory: Good air entrance B/L, no wheezes, rales or rhonchi  Cardiovascular: S1, S2, RRR, no pericardial rub, no murmur  Abdomen: BS+, soft, no tenderness, no bruit, left colostomy.   Pelvis: bladder nondistended  Extremities: No cyanosis or clubbing. No peripheral edema.     Vascular Access: left upper arm AVF with bruti and thrill

## 2023-01-29 LAB
ANION GAP SERPL CALC-SCNC: 7 MMOL/L — SIGNIFICANT CHANGE UP (ref 5–17)
BUN SERPL-MCNC: 37 MG/DL — HIGH (ref 7–18)
CALCIUM SERPL-MCNC: 9.4 MG/DL — SIGNIFICANT CHANGE UP (ref 8.4–10.5)
CHLORIDE SERPL-SCNC: 96 MMOL/L — SIGNIFICANT CHANGE UP (ref 96–108)
CO2 SERPL-SCNC: 27 MMOL/L — SIGNIFICANT CHANGE UP (ref 22–31)
CREAT SERPL-MCNC: 7.25 MG/DL — HIGH (ref 0.5–1.3)
EGFR: 6 ML/MIN/1.73M2 — LOW
GLUCOSE SERPL-MCNC: 92 MG/DL — SIGNIFICANT CHANGE UP (ref 70–99)
HCT VFR BLD CALC: 30.8 % — LOW (ref 34.5–45)
HGB BLD-MCNC: 10 G/DL — LOW (ref 11.5–15.5)
MAGNESIUM SERPL-MCNC: 2.1 MG/DL — SIGNIFICANT CHANGE UP (ref 1.6–2.6)
MCHC RBC-ENTMCNC: 31.8 PG — SIGNIFICANT CHANGE UP (ref 27–34)
MCHC RBC-ENTMCNC: 32.5 GM/DL — SIGNIFICANT CHANGE UP (ref 32–36)
MCV RBC AUTO: 98.1 FL — SIGNIFICANT CHANGE UP (ref 80–100)
NRBC # BLD: 0 /100 WBCS — SIGNIFICANT CHANGE UP (ref 0–0)
PHOSPHATE SERPL-MCNC: 5.3 MG/DL — HIGH (ref 2.5–4.5)
PLATELET # BLD AUTO: 183 K/UL — SIGNIFICANT CHANGE UP (ref 150–400)
POTASSIUM SERPL-MCNC: 4.3 MMOL/L — SIGNIFICANT CHANGE UP (ref 3.5–5.3)
POTASSIUM SERPL-SCNC: 4.3 MMOL/L — SIGNIFICANT CHANGE UP (ref 3.5–5.3)
RBC # BLD: 3.14 M/UL — LOW (ref 3.8–5.2)
RBC # FLD: 15.5 % — HIGH (ref 10.3–14.5)
SODIUM SERPL-SCNC: 130 MMOL/L — LOW (ref 135–145)
WBC # BLD: 6.8 K/UL — SIGNIFICANT CHANGE UP (ref 3.8–10.5)
WBC # FLD AUTO: 6.8 K/UL — SIGNIFICANT CHANGE UP (ref 3.8–10.5)

## 2023-01-29 RX ORDER — ISOSORBIDE MONONITRATE 60 MG/1
60 TABLET, EXTENDED RELEASE ORAL DAILY
Refills: 0 | Status: DISCONTINUED | OUTPATIENT
Start: 2023-01-29 | End: 2023-01-30

## 2023-01-29 RX ORDER — ACETAMINOPHEN 500 MG
1000 TABLET ORAL EVERY 6 HOURS
Refills: 0 | Status: DISCONTINUED | OUTPATIENT
Start: 2023-01-29 | End: 2023-01-30

## 2023-01-29 RX ORDER — HYDRALAZINE HCL 50 MG
25 TABLET ORAL THREE TIMES A DAY
Refills: 0 | Status: DISCONTINUED | OUTPATIENT
Start: 2023-01-29 | End: 2023-01-30

## 2023-01-29 RX ADMIN — CITALOPRAM 20 MILLIGRAM(S): 10 TABLET, FILM COATED ORAL at 11:46

## 2023-01-29 RX ADMIN — TRAMADOL HYDROCHLORIDE 50 MILLIGRAM(S): 50 TABLET ORAL at 12:44

## 2023-01-29 RX ADMIN — HEPARIN SODIUM 5000 UNIT(S): 5000 INJECTION INTRAVENOUS; SUBCUTANEOUS at 14:40

## 2023-01-29 RX ADMIN — SEVELAMER CARBONATE 800 MILLIGRAM(S): 2400 POWDER, FOR SUSPENSION ORAL at 07:39

## 2023-01-29 RX ADMIN — TRAMADOL HYDROCHLORIDE 50 MILLIGRAM(S): 50 TABLET ORAL at 11:46

## 2023-01-29 RX ADMIN — Medication 100 MILLIGRAM(S): at 11:47

## 2023-01-29 RX ADMIN — SEVELAMER CARBONATE 800 MILLIGRAM(S): 2400 POWDER, FOR SUSPENSION ORAL at 17:17

## 2023-01-29 RX ADMIN — DOLUTEGRAVIR SODIUM 50 MILLIGRAM(S): 25 TABLET, FILM COATED ORAL at 11:47

## 2023-01-29 RX ADMIN — GABAPENTIN 100 MILLIGRAM(S): 400 CAPSULE ORAL at 21:37

## 2023-01-29 RX ADMIN — ISOSORBIDE MONONITRATE 60 MILLIGRAM(S): 60 TABLET, EXTENDED RELEASE ORAL at 13:10

## 2023-01-29 RX ADMIN — PANTOPRAZOLE SODIUM 40 MILLIGRAM(S): 20 TABLET, DELAYED RELEASE ORAL at 05:21

## 2023-01-29 RX ADMIN — Medication 100 MILLIGRAM(S): at 17:17

## 2023-01-29 RX ADMIN — Medication 1000 MILLIGRAM(S): at 21:36

## 2023-01-29 RX ADMIN — HEPARIN SODIUM 5000 UNIT(S): 5000 INJECTION INTRAVENOUS; SUBCUTANEOUS at 05:22

## 2023-01-29 RX ADMIN — DARUNAVIR ETHANOLATE AND COBICISTAT 1 TABLET(S): 800; 150 TABLET, FILM COATED ORAL at 11:47

## 2023-01-29 RX ADMIN — SEVELAMER CARBONATE 800 MILLIGRAM(S): 2400 POWDER, FOR SUSPENSION ORAL at 11:46

## 2023-01-29 RX ADMIN — Medication 25 MILLIGRAM(S): at 21:36

## 2023-01-29 RX ADMIN — HEPARIN SODIUM 5000 UNIT(S): 5000 INJECTION INTRAVENOUS; SUBCUTANEOUS at 21:37

## 2023-01-29 RX ADMIN — Medication 25 MILLIGRAM(S): at 14:40

## 2023-01-29 RX ADMIN — Medication 1000 MILLIGRAM(S): at 22:08

## 2023-01-29 RX ADMIN — Medication 100 MILLIGRAM(S): at 05:22

## 2023-01-29 NOTE — PROGRESS NOTE ADULT - SUBJECTIVE AND OBJECTIVE BOX
pt seen in icu [  ], reg med floor [   ], bed [  ], chair at bedside [   ]  Awake, alert, comfortable in bed in NAD.    REVIEW OF SYSTEMS:    CONSTITUTIONAL: No weakness, fevers or chills  EYES/ENT: No visual changes;  No vertigo or throat pain   NECK: No pain or stiffness  RESPIRATORY: No cough, wheezing, hemoptysis; No shortness of breath  CARDIOVASCULAR: No chest pain or palpitations  GASTROINTESTINAL: No abdominal or epigastric pain. No nausea, vomiting, or hematemesis; No diarrhea or constipation. No melena or hematochezia.  GENITOURINARY: No dysuria, frequency or hematuria  NEUROLOGICAL: No numbness or weakness  SKIN: No itching, burning, rashes, or lesions   All other review of systems is negative unless indicated above.    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    ESRD on hemodialysis    Colostomy present    No significant past surgical history    History of ectopic pregnancy    Cervical cancer    H/O: hysterectomy    S/P arteriovenous (AV) fistula creation        Vitals  T(F): 98 (01-29-23 @ 05:16), Max: 98.7 (01-28-23 @ 20:45)  HR: 72 (01-29-23 @ 05:16) (72 - 77)  BP: 175/71 (01-29-23 @ 05:16) (138/74 - 175/71)  RR: 17 (01-29-23 @ 05:16) (16 - 18)  SpO2: 100% (01-29-23 @ 05:16) (96% - 100%)  Wt(kg): --  CAPILLARY BLOOD GLUCOSE          Labs                          10.0   8.85  )-----------( 171      ( 28 Jan 2023 07:45 )             30.4       01-28    136  |  99  |  27<H>  ----------------------------<  82  3.8   |  26  |  5.42<H>    Ca    9.8      28 Jan 2023 06:45  Phos  4.7     01-28  Mg     2.0     01-28              Radiology Results      Meds    MEDICATIONS  (STANDING):  allopurinol 100 milliGRAM(s) Oral daily  citalopram 20 milliGRAM(s) Oral daily  darunavir 800 mG/cobicstat 150 mG 1 Tablet(s) Oral daily  dolutegravir 50 milliGRAM(s) Oral daily  epoetin neyda-epbx (RETACRIT) Injectable 3000 Unit(s) IV Push <User Schedule>  gabapentin 100 milliGRAM(s) Oral at bedtime  heparin   Injectable 5000 Unit(s) SubCutaneous every 8 hours  lamiVUDine- milliGRAM(s) Oral daily  metoprolol tartrate 100 milliGRAM(s) Oral two times a day  morphine PF Spinal 0.2 milliGRAM(s) IntraThecal. once  pantoprazole    Tablet 40 milliGRAM(s) Oral before breakfast  sevelamer carbonate 800 milliGRAM(s) Oral three times a day with meals      MEDICATIONS  (PRN):  acetaminophen   IVPB .. 1000 milliGRAM(s) IV Intermittent every 6 hours PRN Moderate Pain (4 - 6)  naloxone Injectable 0.1 milliGRAM(s) IV Push every 3 minutes PRN For ANY of the following changes in patient status:  A. RR LESS THAN 10 breaths per minute, B. Oxygen saturation LESS THAN 90%, C. Sedation score of 6  ondansetron Injectable 4 milliGRAM(s) IV Push every 6 hours PRN Nausea  traMADol 50 milliGRAM(s) Oral every 6 hours PRN Severe Pain (7 - 10)         pt seen in icu [  ], reg med floor [  x ], bed [ x ], chair at bedside [   ]  Awake, alert, comfortable in bed in NAD.    REVIEW OF SYSTEMS:    CONSTITUTIONAL: No weakness, fevers or chills  EYES/ENT: No visual changes;  No vertigo or throat pain   NECK: No pain or stiffness  RESPIRATORY: No cough, wheezing, hemoptysis; No shortness of breath  CARDIOVASCULAR: No chest pain or palpitations  GASTROINTESTINAL: + abdominal or epigastric pain. No nausea, vomiting, or hematemesis; No diarrhea or constipation. No melena or hematochezia.  GENITOURINARY: No dysuria, frequency or hematuria  NEUROLOGICAL: No numbness or weakness  SKIN: No itching, burning, rashes, or lesions   All other review of systems is negative unless indicated above.    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    ESRD on hemodialysis    Colostomy present    No significant past surgical history    History of ectopic pregnancy    Cervical cancer    H/O: hysterectomy    S/P arteriovenous (AV) fistula creation        Vitals  T(F): 98 (01-29-23 @ 05:16), Max: 98.7 (01-28-23 @ 20:45)  HR: 72 (01-29-23 @ 05:16) (72 - 77)  BP: 175/71 (01-29-23 @ 05:16) (138/74 - 175/71)  RR: 17 (01-29-23 @ 05:16) (16 - 18)  SpO2: 100% (01-29-23 @ 05:16) (96% - 100%)  Wt(kg): --  CAPILLARY BLOOD GLUCOSE          Labs                          10.0   8.85  )-----------( 171      ( 28 Jan 2023 07:45 )             30.4       01-28    136  |  99  |  27<H>  ----------------------------<  82  3.8   |  26  |  5.42<H>    Ca    9.8      28 Jan 2023 06:45  Phos  4.7     01-28  Mg     2.0     01-28              Radiology Results      Meds    MEDICATIONS  (STANDING):  allopurinol 100 milliGRAM(s) Oral daily  citalopram 20 milliGRAM(s) Oral daily  darunavir 800 mG/cobicstat 150 mG 1 Tablet(s) Oral daily  dolutegravir 50 milliGRAM(s) Oral daily  epoetin neyda-epbx (RETACRIT) Injectable 3000 Unit(s) IV Push <User Schedule>  gabapentin 100 milliGRAM(s) Oral at bedtime  heparin   Injectable 5000 Unit(s) SubCutaneous every 8 hours  lamiVUDine- milliGRAM(s) Oral daily  metoprolol tartrate 100 milliGRAM(s) Oral two times a day  morphine PF Spinal 0.2 milliGRAM(s) IntraThecal. once  pantoprazole    Tablet 40 milliGRAM(s) Oral before breakfast  sevelamer carbonate 800 milliGRAM(s) Oral three times a day with meals      MEDICATIONS  (PRN):  acetaminophen   IVPB .. 1000 milliGRAM(s) IV Intermittent every 6 hours PRN Moderate Pain (4 - 6)  naloxone Injectable 0.1 milliGRAM(s) IV Push every 3 minutes PRN For ANY of the following changes in patient status:  A. RR LESS THAN 10 breaths per minute, B. Oxygen saturation LESS THAN 90%, C. Sedation score of 6  ondansetron Injectable 4 milliGRAM(s) IV Push every 6 hours PRN Nausea  traMADol 50 milliGRAM(s) Oral every 6 hours PRN Severe Pain (7 - 10)

## 2023-01-29 NOTE — PROGRESS NOTE ADULT - ASSESSMENT
67 F s/p colostomy reversal with EEA anastomosis and creation of diverting ileostomy, POD3. Patient with improving pain control on ERP protocol:   - PT eval: home PT, continue working with PT, ambulate as tolerated  - Continue home medications, restart anti HTN  - Continue LRD, easy to chew, renal  - OOBTC for at least 8 hours today   - F/u am labs  - Continue heparin for dvt prophylaxis   -transition to oral pain control

## 2023-01-29 NOTE — PROGRESS NOTE ADULT - ASSESSMENT
ESRD , continue with dialysis TIW, MWF.    Anemia Hb 10 start BECKY in dialysis , no need for iron IV.  Hyperphosphatemia, follow repeat PO4 in dialysis today and start sevelamer 800 mg TID with meals.  Hypertension stable.  Surgery to follow.

## 2023-01-29 NOTE — PROGRESS NOTE ADULT - ATTENDING COMMENTS
No events overnight  Tolerating diet and having ostomy function  Less nausea, pain control improving  Ambulated w/ PT    abd soft, non-distended, appropriately TTP  incisions c/d/i w/ ostomy and red rubber in place    - continue LFD  - MWF HD  - d/c red rubber tomorrow  - d/c tomorrow after HD    Eric Seals MD  Attending Physician
Reporting a little bit of nausea and still having pain though improving   Feels the tylenol helps her pain the best    - continue LRD  - monitor for PO tolerance  - MWF HD  - plan for home PT    Eric Seals MD  Attending Physician
Tolerated CLD  Having some expected pain    abd soft, non-distended, appropriately TTP  incisions c/d/i w/ dressings in place, ostomy w/ some stool in pouch    - advance to LRD  - encourage ambulation  - dialysis per nephrology  - d/c harrison Seals MD  Attending Physician

## 2023-01-29 NOTE — PROGRESS NOTE ADULT - SUBJECTIVE AND OBJECTIVE BOX
67 y.o female PMHx of HIV, ESRD on HD (M/W/F schedule) via Left AVF 2020, HTN, Gout, Cervical CA s/p Hysterectomy, Alfonso's ostomy 2/2 diverticulitis, who presented to the hospital on 1/25 for elective admission robotic assisted laparoscopic Burdick's reversal. Patient was noted to have friable and fibrotic tissue intra-operatively, and while leak test was negative after reanastomosis, because of the quality of her tissue and questionable tissue donuts seen after EEA anastomosis performed, a diverting ileostomy was also performed.     PMH: as above, ectopic x2, GERD  PSH: as above AVF, Hysterectomy, Alfonso's  Medications: see med rec  All: oxycodone, penicillin (rash)    Patient is POD4 robotic assisted laparoscopic ostomy reversal with lysis of adhesions, appendectomy, EEA, sigmoidoscopy, creation of diverting ileostomy. Postop course with re-evaluated pain control in setting of allergies. States pain is improving, tolerated dialysis well Friday. Tolerating now LRD, renal diet, easy to chew, without nausea, vomiting passing gas and forming stool in new ostomy. Ambulating, producing little urine per her baseline.  Patient states today that she has noticed vaginal clots since postop day 1, gyn eval 1 year PTA with no abn on exam or pap    Vital Signs Last 24 Hrs  T(C): 36.7 (29 Jan 2023 05:16), Max: 37.1 (28 Jan 2023 20:45)  T(F): 98 (29 Jan 2023 05:16), Max: 98.7 (28 Jan 2023 20:45)  HR: 72 (29 Jan 2023 05:16) (72 - 77)  BP: 170/71 (29 Jan 2023 07:12) (138/74 - 175/71)  BP(mean): --  RR: 17 (29 Jan 2023 05:16) (16 - 18)  SpO2: 100% (29 Jan 2023 05:16) (96% - 100%)    Parameters below as of 29 Jan 2023 05:16  Patient On (Oxygen Delivery Method): room air    PE  Gen: NAD, AAOx3, resting in bed comfortably   Resp: normal respiratory effort, on NC, no accessory muscle use  Abd: soft, minimally distended, mildly tender to palpation with light palpation diffusely, dressings are c/d/i, ostomy in place with small amount of slightly formed stool present, ostomy is pink and healthy appearing, red rubber in place  MSK: Fistula in LUE with good thrill, ext soft, no appreciable peripheral edema, pulses 2+    MEDICATIONS  (STANDING):  allopurinol 100 milliGRAM(s) Oral daily  citalopram 20 milliGRAM(s) Oral daily  darunavir 800 mG/cobicstat 150 mG 1 Tablet(s) Oral daily  dolutegravir 50 milliGRAM(s) Oral daily  epoetin neyda-epbx (RETACRIT) Injectable 3000 Unit(s) IV Push <User Schedule>  gabapentin 100 milliGRAM(s) Oral at bedtime  heparin   Injectable 5000 Unit(s) SubCutaneous every 8 hours  lamiVUDine- milliGRAM(s) Oral daily  metoprolol tartrate 100 milliGRAM(s) Oral two times a day  morphine PF Spinal 0.2 milliGRAM(s) IntraThecal. once  pantoprazole    Tablet 40 milliGRAM(s) Oral before breakfast  sevelamer carbonate 800 milliGRAM(s) Oral three times a day with meals    MEDICATIONS  (PRN):  acetaminophen     Tablet .. 1000 milliGRAM(s) Oral every 6 hours PRN Temp greater or equal to 38C (100.4F), Mild Pain (1 - 3), Moderate Pain (4 - 6)  naloxone Injectable 0.1 milliGRAM(s) IV Push every 3 minutes PRN For ANY of the following changes in patient status:  A. RR LESS THAN 10 breaths per minute, B. Oxygen saturation LESS THAN 90%, C. Sedation score of 6  ondansetron Injectable 4 milliGRAM(s) IV Push every 6 hours PRN Nausea  traMADol 50 milliGRAM(s) Oral every 6 hours PRN Severe Pain (7 - 10)

## 2023-01-29 NOTE — PROGRESS NOTE ADULT - SUBJECTIVE AND OBJECTIVE BOX
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      ESRD on hemodialysis      Colostomy present      History of ectopic pregnancy  Two      H/O: hysterectomy  Due to cervical cancer      S/P arteriovenous (AV) fistula creation  july 10 2020          oxycodone (Rash)  penicillins (Urticaria; Rash)      MEDICATIONS  (STANDING):  allopurinol 100 milliGRAM(s) Oral daily  citalopram 20 milliGRAM(s) Oral daily  darunavir 800 mG/cobicstat 150 mG 1 Tablet(s) Oral daily  dolutegravir 50 milliGRAM(s) Oral daily  epoetin neyda-epbx (RETACRIT) Injectable 3000 Unit(s) IV Push <User Schedule>  gabapentin 100 milliGRAM(s) Oral at bedtime  heparin   Injectable 5000 Unit(s) SubCutaneous every 8 hours  hydrALAZINE 25 milliGRAM(s) Oral three times a day  isosorbide   mononitrate ER Tablet (IMDUR) 60 milliGRAM(s) Oral daily  lamiVUDine- milliGRAM(s) Oral daily  metoprolol tartrate 100 milliGRAM(s) Oral two times a day  morphine PF Spinal 0.2 milliGRAM(s) IntraThecal. once  pantoprazole    Tablet 40 milliGRAM(s) Oral before breakfast  sevelamer carbonate 800 milliGRAM(s) Oral three times a day with meals    MEDICATIONS  (PRN):  acetaminophen     Tablet .. 1000 milliGRAM(s) Oral every 6 hours PRN Temp greater or equal to 38C (100.4F), Mild Pain (1 - 3), Moderate Pain (4 - 6)  naloxone Injectable 0.1 milliGRAM(s) IV Push every 3 minutes PRN For ANY of the following changes in patient status:  A. RR LESS THAN 10 breaths per minute, B. Oxygen saturation LESS THAN 90%, C. Sedation score of 6  ondansetron Injectable 4 milliGRAM(s) IV Push every 6 hours PRN Nausea  traMADol 50 milliGRAM(s) Oral every 6 hours PRN Severe Pain (7 - 10)                            10.0   6.80  )-----------( 183      ( 29 Jan 2023 07:05 )             30.8     01-29    130<L>  |  96  |  37<H>  ----------------------------<  92  4.3   |  27  |  7.25<H>    Ca    9.4      29 Jan 2023 07:05  Phos  5.3     01-29  Mg     2.1     01-29              REVIEW OF SYSTEMS:    RESPIRATORY: No cough, wheezing, hemoptysis; No shortness of breath  CARDIOVASCULAR: No chest pain or palpitations. No Edema  GASTROINTESTINAL: abdominal pain in surgery area, reduced appetite.  GENITOURINARY: No dysuria, frequency, foamy urine, urinary urgency, incontinence or hematuria  NEUROLOGICAL: No numbness or weakness, no tremor , no dizziness.   Muscle skeletal : no joint pain and no swelling of joints and limbs.  SKIN: No itching, burning, rashes.        VITALS:  T(F): 97.5 (01-29-23 @ 13:08), Max: 98.7 (01-28-23 @ 20:45)  HR: 73 (01-29-23 @ 13:08)  BP: 149/70 (01-29-23 @ 13:08)  RR: 17 (01-29-23 @ 13:08)  SpO2: 100% (01-29-23 @ 13:08)  Wt(kg): --    01-28 @ 07:01  -  01-29 @ 07:00  --------------------------------------------------------  IN: 0 mL / OUT: 701 mL / NET: -701 mL        PHYSICAL EXAM:  Constitutional: well developed, no diaphoresis, no distress.  Neck: No JVD, no carotid bruit, supple, no adenopathy  Respiratory: Good air entrance B/L, no wheezes, rales or rhonchi  Cardiovascular: S1, S2, RRR, no pericardial rub, no murmur  Abdomen: BS+, soft, no tenderness, no bruit, left colostomy semiliquid   Pelvis: bladder nondistended  Extremities: No cyanosis or clubbing. No peripheral edema.     Vascular Access: left upper arm AVF

## 2023-01-29 NOTE — PROGRESS NOTE ADULT - PROBLEM SELECTOR PLAN 3
Patient came for colostomy reversal  Had diverting ileostomy  incentive spirometry  pain control  DVT PPX  surgical follow up  Replace lytes as needed.  Renal diet

## 2023-01-30 ENCOUNTER — TRANSCRIPTION ENCOUNTER (OUTPATIENT)
Age: 68
End: 2023-01-30

## 2023-01-30 VITALS
HEART RATE: 76 BPM | DIASTOLIC BLOOD PRESSURE: 84 MMHG | OXYGEN SATURATION: 100 % | RESPIRATION RATE: 20 BRPM | TEMPERATURE: 98 F | SYSTOLIC BLOOD PRESSURE: 162 MMHG

## 2023-01-30 LAB
ANION GAP SERPL CALC-SCNC: 11 MMOL/L — SIGNIFICANT CHANGE UP (ref 5–17)
BUN SERPL-MCNC: 45 MG/DL — HIGH (ref 7–18)
CALCIUM SERPL-MCNC: 9.4 MG/DL — SIGNIFICANT CHANGE UP (ref 8.4–10.5)
CHLORIDE SERPL-SCNC: 99 MMOL/L — SIGNIFICANT CHANGE UP (ref 96–108)
CO2 SERPL-SCNC: 23 MMOL/L — SIGNIFICANT CHANGE UP (ref 22–31)
CREAT SERPL-MCNC: 9.03 MG/DL — HIGH (ref 0.5–1.3)
EGFR: 4 ML/MIN/1.73M2 — LOW
GLUCOSE SERPL-MCNC: 109 MG/DL — HIGH (ref 70–99)
HCT VFR BLD CALC: 31.3 % — LOW (ref 34.5–45)
HGB BLD-MCNC: 10.1 G/DL — LOW (ref 11.5–15.5)
MAGNESIUM SERPL-MCNC: 2.1 MG/DL — SIGNIFICANT CHANGE UP (ref 1.6–2.6)
MCHC RBC-ENTMCNC: 31.5 PG — SIGNIFICANT CHANGE UP (ref 27–34)
MCHC RBC-ENTMCNC: 32.3 GM/DL — SIGNIFICANT CHANGE UP (ref 32–36)
MCV RBC AUTO: 97.5 FL — SIGNIFICANT CHANGE UP (ref 80–100)
NRBC # BLD: 0 /100 WBCS — SIGNIFICANT CHANGE UP (ref 0–0)
PHOSPHATE SERPL-MCNC: 3.8 MG/DL — SIGNIFICANT CHANGE UP (ref 2.5–4.5)
PHOSPHATE SERPL-MCNC: 5.8 MG/DL — HIGH (ref 2.5–4.5)
PLATELET # BLD AUTO: 205 K/UL — SIGNIFICANT CHANGE UP (ref 150–400)
POTASSIUM SERPL-MCNC: 4.2 MMOL/L — SIGNIFICANT CHANGE UP (ref 3.5–5.3)
POTASSIUM SERPL-SCNC: 4.2 MMOL/L — SIGNIFICANT CHANGE UP (ref 3.5–5.3)
RBC # BLD: 3.21 M/UL — LOW (ref 3.8–5.2)
RBC # FLD: 15.1 % — HIGH (ref 10.3–14.5)
SODIUM SERPL-SCNC: 133 MMOL/L — LOW (ref 135–145)
WBC # BLD: 5.44 K/UL — SIGNIFICANT CHANGE UP (ref 3.8–10.5)
WBC # FLD AUTO: 5.44 K/UL — SIGNIFICANT CHANGE UP (ref 3.8–10.5)

## 2023-01-30 PROCEDURE — 84100 ASSAY OF PHOSPHORUS: CPT

## 2023-01-30 PROCEDURE — C1889: CPT

## 2023-01-30 PROCEDURE — 86900 BLOOD TYPING SEROLOGIC ABO: CPT

## 2023-01-30 PROCEDURE — 86901 BLOOD TYPING SEROLOGIC RH(D): CPT

## 2023-01-30 PROCEDURE — 84132 ASSAY OF SERUM POTASSIUM: CPT

## 2023-01-30 PROCEDURE — 83735 ASSAY OF MAGNESIUM: CPT

## 2023-01-30 PROCEDURE — 80048 BASIC METABOLIC PNL TOTAL CA: CPT

## 2023-01-30 PROCEDURE — 80074 ACUTE HEPATITIS PANEL: CPT

## 2023-01-30 PROCEDURE — 82962 GLUCOSE BLOOD TEST: CPT

## 2023-01-30 PROCEDURE — 88304 TISSUE EXAM BY PATHOLOGIST: CPT

## 2023-01-30 PROCEDURE — 85027 COMPLETE CBC AUTOMATED: CPT

## 2023-01-30 PROCEDURE — 99261: CPT

## 2023-01-30 PROCEDURE — 86850 RBC ANTIBODY SCREEN: CPT

## 2023-01-30 PROCEDURE — 88307 TISSUE EXAM BY PATHOLOGIST: CPT

## 2023-01-30 PROCEDURE — 36415 COLL VENOUS BLD VENIPUNCTURE: CPT

## 2023-01-30 PROCEDURE — S2900: CPT

## 2023-01-30 RX ORDER — TRAMADOL HYDROCHLORIDE 50 MG/1
1 TABLET ORAL
Qty: 15 | Refills: 0
Start: 2023-01-30

## 2023-01-30 RX ORDER — CHLORHEXIDINE GLUCONATE 213 G/1000ML
1 SOLUTION TOPICAL
Refills: 0 | Status: DISCONTINUED | OUTPATIENT
Start: 2023-01-30 | End: 2023-01-30

## 2023-01-30 RX ORDER — HYDROXYZINE HCL 10 MG
1 TABLET ORAL
Qty: 0 | Refills: 0 | DISCHARGE

## 2023-01-30 RX ADMIN — HEPARIN SODIUM 5000 UNIT(S): 5000 INJECTION INTRAVENOUS; SUBCUTANEOUS at 05:49

## 2023-01-30 RX ADMIN — Medication 25 MILLIGRAM(S): at 16:51

## 2023-01-30 RX ADMIN — Medication 100 MILLIGRAM(S): at 16:50

## 2023-01-30 RX ADMIN — HEPARIN SODIUM 5000 UNIT(S): 5000 INJECTION INTRAVENOUS; SUBCUTANEOUS at 16:33

## 2023-01-30 RX ADMIN — CITALOPRAM 20 MILLIGRAM(S): 10 TABLET, FILM COATED ORAL at 16:53

## 2023-01-30 RX ADMIN — Medication 25 MILLIGRAM(S): at 05:48

## 2023-01-30 RX ADMIN — SEVELAMER CARBONATE 800 MILLIGRAM(S): 2400 POWDER, FOR SUSPENSION ORAL at 16:54

## 2023-01-30 RX ADMIN — Medication 100 MILLIGRAM(S): at 05:49

## 2023-01-30 RX ADMIN — DOLUTEGRAVIR SODIUM 50 MILLIGRAM(S): 25 TABLET, FILM COATED ORAL at 16:51

## 2023-01-30 RX ADMIN — Medication 100 MILLIGRAM(S): at 18:50

## 2023-01-30 RX ADMIN — ERYTHROPOIETIN 3000 UNIT(S): 10000 INJECTION, SOLUTION INTRAVENOUS; SUBCUTANEOUS at 11:09

## 2023-01-30 RX ADMIN — TRAMADOL HYDROCHLORIDE 50 MILLIGRAM(S): 50 TABLET ORAL at 17:12

## 2023-01-30 RX ADMIN — Medication 100 MILLIGRAM(S): at 16:55

## 2023-01-30 RX ADMIN — PANTOPRAZOLE SODIUM 40 MILLIGRAM(S): 20 TABLET, DELAYED RELEASE ORAL at 08:17

## 2023-01-30 RX ADMIN — DARUNAVIR ETHANOLATE AND COBICISTAT 1 TABLET(S): 800; 150 TABLET, FILM COATED ORAL at 16:51

## 2023-01-30 RX ADMIN — TRAMADOL HYDROCHLORIDE 50 MILLIGRAM(S): 50 TABLET ORAL at 18:04

## 2023-01-30 NOTE — PROGRESS NOTE ADULT - SUBJECTIVE AND OBJECTIVE BOX
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      ESRD on hemodialysis      Colostomy present      History of ectopic pregnancy  Two      H/O: hysterectomy  Due to cervical cancer      S/P arteriovenous (AV) fistula creation  july 10 2020          oxycodone (Rash)  penicillins (Urticaria; Rash)      MEDICATIONS  (STANDING):  allopurinol 100 milliGRAM(s) Oral daily  citalopram 20 milliGRAM(s) Oral daily  darunavir 800 mG/cobicstat 150 mG 1 Tablet(s) Oral daily  dolutegravir 50 milliGRAM(s) Oral daily  epoetin neyda-epbx (RETACRIT) Injectable 3000 Unit(s) IV Push <User Schedule>  gabapentin 100 milliGRAM(s) Oral at bedtime  heparin   Injectable 5000 Unit(s) SubCutaneous every 8 hours  hydrALAZINE 25 milliGRAM(s) Oral three times a day  isosorbide   mononitrate ER Tablet (IMDUR) 60 milliGRAM(s) Oral daily  lamiVUDine- milliGRAM(s) Oral daily  metoprolol tartrate 100 milliGRAM(s) Oral two times a day  morphine PF Spinal 0.2 milliGRAM(s) IntraThecal. once  pantoprazole    Tablet 40 milliGRAM(s) Oral before breakfast  sevelamer carbonate 800 milliGRAM(s) Oral three times a day with meals    MEDICATIONS  (PRN):  acetaminophen     Tablet .. 1000 milliGRAM(s) Oral every 6 hours PRN Temp greater or equal to 38C (100.4F), Mild Pain (1 - 3), Moderate Pain (4 - 6)  naloxone Injectable 0.1 milliGRAM(s) IV Push every 3 minutes PRN For ANY of the following changes in patient status:  A. RR LESS THAN 10 breaths per minute, B. Oxygen saturation LESS THAN 90%, C. Sedation score of 6  ondansetron Injectable 4 milliGRAM(s) IV Push every 6 hours PRN Nausea  traMADol 50 milliGRAM(s) Oral every 6 hours PRN Severe Pain (7 - 10)                            10.0   6.80  )-----------( 183      ( 29 Jan 2023 07:05 )             30.8     01-29    130<L>  |  96  |  37<H>  ----------------------------<  92  4.3   |  27  |  7.25<H>    Ca    9.4      29 Jan 2023 07:05  Phos  5.3     01-29  Mg     2.1     01-29              REVIEW OF SYSTEMS:  General: no fever no chills, no weight loss.    RESPIRATORY: No cough, wheezing, hemoptysis; No shortness of breath  CARDIOVASCULAR: No chest pain or palpitations. No Edema  GASTROINTESTINAL: PAIN IN SURGERY AREA, appetite good today. no vomit and no nausea.    GENITOURINARY: No dysuria, frequency, foamy urine, urinary urgency, incontinence or hematuria  NEUROLOGICAL: No numbness or weakness, no tremor , no dizziness.   Muscle skeletal : no joint pain and no swelling of joints and limbs.  SKIN: No itching, burning, rashes.        VITALS:  T(F): 98.3 (01-30-23 @ 06:19), Max: 98.8 (01-29-23 @ 20:33)  HR: 73 (01-30-23 @ 06:19)  BP: 175/77 (01-30-23 @ 06:19)  RR: 16 (01-30-23 @ 06:19)  SpO2: 98% (01-30-23 @ 06:19)  Wt(kg): --      PHYSICAL EXAM:  Constitutional: well developed, no diaphoresis, no distress.  Neck: No JVD, no carotid bruit, supple, no adenopathy  Respiratory: Good air entrance B/L, no wheezes, rales or rhonchi  Cardiovascular: S1, S2, RRR, no pericardial rub, no murmur  Abdomen: BS+, soft, no tenderness, no bruit  Pelvis: bladder nondistended  Extremities: No cyanosis or clubbing. No peripheral edema.   Left upper arm AVF with bruti and thrill.

## 2023-01-30 NOTE — PROGRESS NOTE ADULT - PROBLEM SELECTOR PLAN 5
ostomy care  replace lytes  surgical follow up.

## 2023-01-30 NOTE — PROGRESS NOTE ADULT - ASSESSMENT
ESRD , continue with dialysis TIW, MWF.    Anemia Hb 10 start BECKY in dialysis , no need for iron IV in dialysis  Hyperphosphatemia, follow repeat PO4 in dialysis today and start sevelamer 800 mg TID with meals.  Hypertension, increased systolic BP , follow BP after dialysis and if need increased Hydralazine  Surgery to follow .

## 2023-01-30 NOTE — PROGRESS NOTE ADULT - PROVIDER SPECIALTY LIST ADULT
Colorectal Surgery
Nephrology
Colorectal Surgery
Surgery
Internal Medicine

## 2023-01-30 NOTE — PROGRESS NOTE ADULT - SUBJECTIVE AND OBJECTIVE BOX
INTERVAL HPI/OVERNIGHT EVENTS:  Patient seen and examined at bedside   Pt resting comfortably. No acute complaints.   Tolerating diet. Denies N/V, abdominal pain.   Patient states pain is improving.   Patient states that she tolerated dialysis today.   Patient denies chest pain, shortness of breath, fever, chills or any other constitutional complaints.       MEDICATIONS  (STANDING):  allopurinol 100 milliGRAM(s) Oral daily  chlorhexidine 2% Cloths 1 Application(s) Topical <User Schedule>  citalopram 20 milliGRAM(s) Oral daily  darunavir 800 mG/cobicstat 150 mG 1 Tablet(s) Oral daily  dolutegravir 50 milliGRAM(s) Oral daily  epoetin neyda-epbx (RETACRIT) Injectable 3000 Unit(s) IV Push <User Schedule>  gabapentin 100 milliGRAM(s) Oral at bedtime  heparin   Injectable 5000 Unit(s) SubCutaneous every 8 hours  hydrALAZINE 25 milliGRAM(s) Oral three times a day  isosorbide   mononitrate ER Tablet (IMDUR) 60 milliGRAM(s) Oral daily  lamiVUDine- milliGRAM(s) Oral daily  metoprolol tartrate 100 milliGRAM(s) Oral two times a day  morphine PF Spinal 0.2 milliGRAM(s) IntraThecal. once  pantoprazole    Tablet 40 milliGRAM(s) Oral before breakfast  sevelamer carbonate 800 milliGRAM(s) Oral three times a day with meals    MEDICATIONS  (PRN):  acetaminophen     Tablet .. 1000 milliGRAM(s) Oral every 6 hours PRN Temp greater or equal to 38C (100.4F), Mild Pain (1 - 3), Moderate Pain (4 - 6)  naloxone Injectable 0.1 milliGRAM(s) IV Push every 3 minutes PRN For ANY of the following changes in patient status:  A. RR LESS THAN 10 breaths per minute, B. Oxygen saturation LESS THAN 90%, C. Sedation score of 6  ondansetron Injectable 4 milliGRAM(s) IV Push every 6 hours PRN Nausea  traMADol 50 milliGRAM(s) Oral every 6 hours PRN Severe Pain (7 - 10)      Vital Signs Last 24 Hrs  T(C): 36.6 (30 Jan 2023 14:52), Max: 37.1 (29 Jan 2023 20:33)  T(F): 97.8 (30 Jan 2023 14:52), Max: 98.8 (29 Jan 2023 20:33)  HR: 68 (30 Jan 2023 14:52) (64 - 75)  BP: 161/76 (30 Jan 2023 14:52) (134/83 - 175/77)  BP(mean): --  RR: 18 (30 Jan 2023 14:52) (16 - 18)  SpO2: 99% (30 Jan 2023 14:52) (98% - 100%)    Parameters below as of 30 Jan 2023 14:52  Patient On (Oxygen Delivery Method): room air        Physical:  Gen: NAD, AAOx3, resting in bed comfortably   Resp: normal respiratory effort  Abd: softly distended, non tender to palpation,    dressings are c/d/i, ostomy in place with small amount of slightly formed stool present, ostomy is pink and healthy appearing, red rubber in place      I&O's Detail    30 Jan 2023 07:01  -  30 Jan 2023 15:17  --------------------------------------------------------  IN:    Other (mL): 600 mL  Total IN: 600 mL    OUT:    Other (mL): 1600 mL  Total OUT: 1600 mL    Total NET: -1000 mL          LABS:                        10.1   5.44  )-----------( 205      ( 30 Jan 2023 10:09 )             31.3             01-30    133<L>  |  99  |  45<H>  ----------------------------<  109<H>  4.2   |  23  |  9.03<H>    Ca    9.4      30 Jan 2023 10:09  Phos  5.8     01-30  Mg     2.1     01-30

## 2023-01-30 NOTE — PROGRESS NOTE ADULT - PROBLEM SELECTOR PLAN 2
cont meds  ID follow up as OP.

## 2023-01-30 NOTE — PROGRESS NOTE ADULT - SUBJECTIVE AND OBJECTIVE BOX
pt seen in icu [  ], reg med floor [  x ], bed [ x ], chair at bedside [   ]  Awake, alert, comfortable in bed in NAD  REVIEW OF SYSTEMS:    CONSTITUTIONAL: No weakness, fevers or chills  EYES/ENT: No visual changes;  No vertigo or throat pain   NECK: No pain or stiffness  RESPIRATORY: No cough, wheezing, hemoptysis; No shortness of breath  CARDIOVASCULAR: No chest pain or palpitations  GASTROINTESTINAL: No abdominal or epigastric pain. No nausea, vomiting, or hematemesis; No diarrhea or constipation. No melena or hematochezia.  GENITOURINARY: No dysuria, frequency or hematuria  NEUROLOGICAL: No numbness or weakness  SKIN: No itching, burning, rashes, or lesions   All other review of systems is negative unless indicated above.    Physical Exam    General: WN/WD NAD  Neurology: A&Ox3, nonfocal, CLARK x 4  Respiratory: CTA B/L  CV: RRR, S1S2, no murmurs, rubs or gallops  Abdominal: Soft, incisional tnederness, ND +BS, Last BM  Extremities: No edema, + peripheral pulses      Allergies  oxycodone (Rash)  penicillins (Urticaria; Rash)      Health Issues  Status post colostomy    HIV (human immunodeficiency virus infection)    HTN (hypertension)    Kidney disease    Chronic kidney disease    Hyperlipidemia    Gout    History of gastroesophageal reflux (GERD)    Depression    Cervical cancer    DVT, lower extremity    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    ESRD on hemodialysis    Colostomy present    No significant past surgical history    History of ectopic pregnancy    Cervical cancer    H/O: hysterectomy    S/P arteriovenous (AV) fistula creation        Vitals  T(F): 97.1 (01-30-23 @ 10:03), Max: 98.8 (01-29-23 @ 20:33)  HR: 65 (01-30-23 @ 10:03) (65 - 77)  BP: 139/79 (01-30-23 @ 10:03) (139/79 - 175/77)  RR: 17 (01-30-23 @ 10:03) (16 - 17)  SpO2: 100% (01-30-23 @ 10:03) (97% - 100%)  Wt(kg): --  CAPILLARY BLOOD GLUCOSE          Labs                          10.1   5.44  )-----------( 205      ( 30 Jan 2023 10:09 )             31.3       01-30    133<L>  |  99  |  45<H>  ----------------------------<  109<H>  4.2   |  23  |  9.03<H>    Ca    9.4      30 Jan 2023 10:09  Phos  5.8     01-30  Mg     2.1     01-30              Radiology Results      Meds    MEDICATIONS  (STANDING):  allopurinol 100 milliGRAM(s) Oral daily  chlorhexidine 2% Cloths 1 Application(s) Topical <User Schedule>  citalopram 20 milliGRAM(s) Oral daily  darunavir 800 mG/cobicstat 150 mG 1 Tablet(s) Oral daily  dolutegravir 50 milliGRAM(s) Oral daily  epoetin neyda-epbx (RETACRIT) Injectable 3000 Unit(s) IV Push <User Schedule>  gabapentin 100 milliGRAM(s) Oral at bedtime  heparin   Injectable 5000 Unit(s) SubCutaneous every 8 hours  hydrALAZINE 25 milliGRAM(s) Oral three times a day  isosorbide   mononitrate ER Tablet (IMDUR) 60 milliGRAM(s) Oral daily  lamiVUDine- milliGRAM(s) Oral daily  metoprolol tartrate 100 milliGRAM(s) Oral two times a day  morphine PF Spinal 0.2 milliGRAM(s) IntraThecal. once  pantoprazole    Tablet 40 milliGRAM(s) Oral before breakfast  sevelamer carbonate 800 milliGRAM(s) Oral three times a day with meals      MEDICATIONS  (PRN):  acetaminophen     Tablet .. 1000 milliGRAM(s) Oral every 6 hours PRN Temp greater or equal to 38C (100.4F), Mild Pain (1 - 3), Moderate Pain (4 - 6)  naloxone Injectable 0.1 milliGRAM(s) IV Push every 3 minutes PRN For ANY of the following changes in patient status:  A. RR LESS THAN 10 breaths per minute, B. Oxygen saturation LESS THAN 90%, C. Sedation score of 6  ondansetron Injectable 4 milliGRAM(s) IV Push every 6 hours PRN Nausea  traMADol 50 milliGRAM(s) Oral every 6 hours PRN Severe Pain (7 - 10)

## 2023-01-30 NOTE — DISCHARGE NOTE NURSING/CASE MANAGEMENT/SOCIAL WORK - PATIENT PORTAL LINK FT
You can access the FollowMyHealth Patient Portal offered by [facility] by registering at the following website: [email or web address]. By joining [facility]’s FollowMyHealth portal, you will also be able to view your health information using other applications (apps) compatible with our system.

## 2023-01-30 NOTE — PROGRESS NOTE ADULT - PROBLEM SELECTOR PROBLEM 3
Diverticular disease of large intestine with perforation and abscess

## 2023-01-30 NOTE — PROGRESS NOTE ADULT - ASSESSMENT
67 F s/p colostomy reversal with EEA anastomosis and creation of diverting ileostomy, POD4. Patient with improving pain control on ERP protocol:     - PT eval: home PT, continue working with PT, ambulate as tolerated  - Continue home medications, restart anti HTN  - Continue LRD, easy to chew, renal  - OOBTC for at least 8 hours today   - Continue heparin for dvt prophylaxis   - transition to oral pain control  - wound care consult re- ostomy   - d/c planning  - discussed and agreed with Dr. Seals

## 2023-01-31 ENCOUNTER — NON-APPOINTMENT (OUTPATIENT)
Age: 68
End: 2023-01-31

## 2023-01-31 LAB — SURGICAL PATHOLOGY STUDY: SIGNIFICANT CHANGE UP

## 2023-01-31 RX ORDER — OMEPRAZOLE 10 MG/1
1 CAPSULE, DELAYED RELEASE ORAL
Qty: 0 | Refills: 0 | DISCHARGE

## 2023-01-31 RX ORDER — METOPROLOL TARTRATE 50 MG
1 TABLET ORAL
Qty: 0 | Refills: 0 | DISCHARGE

## 2023-01-31 RX ORDER — OMEGA-3 ACID ETHYL ESTERS 1 G
1 CAPSULE ORAL
Qty: 0 | Refills: 0 | DISCHARGE

## 2023-01-31 RX ORDER — ALLOPURINOL 300 MG
1 TABLET ORAL
Qty: 0 | Refills: 0 | DISCHARGE

## 2023-01-31 RX ORDER — METRONIDAZOLE 250 MG/1
250 TABLET ORAL
Qty: 3 | Refills: 0 | Status: DISCONTINUED | COMMUNITY
Start: 2023-01-03 | End: 2023-01-31

## 2023-01-31 RX ORDER — CIPROFLOXACIN HYDROCHLORIDE 500 MG/1
500 TABLET, FILM COATED ORAL
Qty: 3 | Refills: 0 | Status: DISCONTINUED | COMMUNITY
Start: 2023-01-03 | End: 2023-01-31

## 2023-01-31 RX ORDER — ISOSORBIDE MONONITRATE 60 MG/1
1 TABLET, EXTENDED RELEASE ORAL
Qty: 30 | Refills: 0
Start: 2023-01-31 | End: 2023-03-01

## 2023-01-31 RX ORDER — ISOSORBIDE MONONITRATE 60 MG/1
1 TABLET, EXTENDED RELEASE ORAL
Qty: 0 | Refills: 0 | DISCHARGE

## 2023-01-31 RX ORDER — OMEPRAZOLE 10 MG/1
1 CAPSULE, DELAYED RELEASE ORAL
Qty: 30 | Refills: 0
Start: 2023-01-31 | End: 2023-03-01

## 2023-01-31 RX ORDER — METOPROLOL TARTRATE 50 MG
1 TABLET ORAL
Qty: 60 | Refills: 0
Start: 2023-01-31 | End: 2023-03-01

## 2023-01-31 RX ORDER — ENEMA 19; 7 G/133ML; G/133ML
7-19 ENEMA RECTAL
Qty: 8 | Refills: 0 | Status: DISCONTINUED | COMMUNITY
Start: 2023-01-13 | End: 2023-01-31

## 2023-01-31 RX ORDER — ALLOPURINOL 300 MG
1 TABLET ORAL
Qty: 30 | Refills: 0
Start: 2023-01-31 | End: 2023-03-01

## 2023-01-31 RX ORDER — OMEGA-3 ACID ETHYL ESTERS 1 G
1 CAPSULE ORAL
Qty: 30 | Refills: 0
Start: 2023-01-31 | End: 2023-03-01

## 2023-01-31 RX ORDER — HYDRALAZINE HCL 50 MG
1 TABLET ORAL
Qty: 90 | Refills: 0
Start: 2023-01-31 | End: 2023-03-01

## 2023-01-31 RX ORDER — POLYETHYLENE GLYCOL 3350 AND ELECTROLYTES WITH LEMON FLAVOR 236; 22.74; 6.74; 5.86; 2.97 G/4L; G/4L; G/4L; G/4L; G/4L
236 POWDER, FOR SOLUTION ORAL
Qty: 1 | Refills: 0 | Status: DISCONTINUED | COMMUNITY
Start: 2023-01-13 | End: 2023-01-31

## 2023-02-01 ENCOUNTER — NON-APPOINTMENT (OUTPATIENT)
Age: 68
End: 2023-02-01

## 2023-02-02 ENCOUNTER — EMERGENCY (EMERGENCY)
Facility: HOSPITAL | Age: 68
LOS: 1 days | Discharge: HOME | End: 2023-02-02
Attending: EMERGENCY MEDICINE
Payer: MEDICAID

## 2023-02-02 ENCOUNTER — NON-APPOINTMENT (OUTPATIENT)
Age: 68
End: 2023-02-02

## 2023-02-02 VITALS
OXYGEN SATURATION: 99 % | DIASTOLIC BLOOD PRESSURE: 71 MMHG | HEIGHT: 62 IN | SYSTOLIC BLOOD PRESSURE: 115 MMHG | HEART RATE: 103 BPM | RESPIRATION RATE: 18 BRPM | TEMPERATURE: 98 F | WEIGHT: 125 LBS

## 2023-02-02 DIAGNOSIS — Z90.710 ACQUIRED ABSENCE OF BOTH CERVIX AND UTERUS: Chronic | ICD-10-CM

## 2023-02-02 DIAGNOSIS — Z87.59 PERSONAL HISTORY OF OTHER COMPLICATIONS OF PREGNANCY, CHILDBIRTH AND THE PUERPERIUM: Chronic | ICD-10-CM

## 2023-02-02 DIAGNOSIS — Z98.890 OTHER SPECIFIED POSTPROCEDURAL STATES: Chronic | ICD-10-CM

## 2023-02-02 LAB
ACETONE SERPL-MCNC: NEGATIVE — SIGNIFICANT CHANGE UP
ALBUMIN SERPL ELPH-MCNC: 2.5 G/DL — LOW (ref 3.5–5)
ALP SERPL-CCNC: 125 U/L — HIGH (ref 40–120)
ALT FLD-CCNC: 10 U/L DA — SIGNIFICANT CHANGE UP (ref 10–60)
ANION GAP SERPL CALC-SCNC: 11 MMOL/L — SIGNIFICANT CHANGE UP (ref 5–17)
ANISOCYTOSIS BLD QL: SLIGHT — SIGNIFICANT CHANGE UP
APTT BLD: 35.3 SEC — SIGNIFICANT CHANGE UP (ref 27.5–35.5)
AST SERPL-CCNC: 10 U/L — SIGNIFICANT CHANGE UP (ref 10–40)
BASOPHILS # BLD AUTO: 0 K/UL — SIGNIFICANT CHANGE UP (ref 0–0.2)
BASOPHILS NFR BLD AUTO: 0 % — SIGNIFICANT CHANGE UP (ref 0–2)
BILIRUB SERPL-MCNC: 0.3 MG/DL — SIGNIFICANT CHANGE UP (ref 0.2–1.2)
BLD GP AB SCN SERPL QL: SIGNIFICANT CHANGE UP
BUN SERPL-MCNC: 31 MG/DL — HIGH (ref 7–18)
CALCIUM SERPL-MCNC: 10.3 MG/DL — SIGNIFICANT CHANGE UP (ref 8.4–10.5)
CHLORIDE SERPL-SCNC: 98 MMOL/L — SIGNIFICANT CHANGE UP (ref 96–108)
CO2 SERPL-SCNC: 30 MMOL/L — SIGNIFICANT CHANGE UP (ref 22–31)
CREAT SERPL-MCNC: 6.69 MG/DL — HIGH (ref 0.5–1.3)
EGFR: 6 ML/MIN/1.73M2 — LOW
EOSINOPHIL # BLD AUTO: 0.5 K/UL — SIGNIFICANT CHANGE UP (ref 0–0.5)
EOSINOPHIL NFR BLD AUTO: 6 % — SIGNIFICANT CHANGE UP (ref 0–6)
FLUAV AG NPH QL: SIGNIFICANT CHANGE UP
FLUBV AG NPH QL: SIGNIFICANT CHANGE UP
GLUCOSE SERPL-MCNC: 98 MG/DL — SIGNIFICANT CHANGE UP (ref 70–99)
HCT VFR BLD CALC: 35.2 % — SIGNIFICANT CHANGE UP (ref 34.5–45)
HGB BLD-MCNC: 11.2 G/DL — LOW (ref 11.5–15.5)
HIV 1 & 2 AB SERPL IA.RAPID: REACTIVE
HYPOCHROMIA BLD QL: SLIGHT — SIGNIFICANT CHANGE UP
INR BLD: 1.3 RATIO — HIGH (ref 0.88–1.16)
LACTATE SERPL-SCNC: 0.9 MMOL/L — SIGNIFICANT CHANGE UP (ref 0.7–2)
LIDOCAIN IGE QN: 365 U/L — SIGNIFICANT CHANGE UP (ref 73–393)
LYMPHOCYTES # BLD AUTO: 1.92 K/UL — SIGNIFICANT CHANGE UP (ref 1–3.3)
LYMPHOCYTES # BLD AUTO: 23 % — SIGNIFICANT CHANGE UP (ref 13–44)
MAGNESIUM SERPL-MCNC: 2.1 MG/DL — SIGNIFICANT CHANGE UP (ref 1.6–2.6)
MANUAL SMEAR VERIFICATION: SIGNIFICANT CHANGE UP
MCHC RBC-ENTMCNC: 31.8 GM/DL — LOW (ref 32–36)
MCHC RBC-ENTMCNC: 31.8 PG — SIGNIFICANT CHANGE UP (ref 27–34)
MCV RBC AUTO: 100 FL — SIGNIFICANT CHANGE UP (ref 80–100)
METAMYELOCYTES # FLD: 1 % — HIGH (ref 0–0)
MICROCYTES BLD QL: SLIGHT — SIGNIFICANT CHANGE UP
MONOCYTES # BLD AUTO: 0.84 K/UL — SIGNIFICANT CHANGE UP (ref 0–0.9)
MONOCYTES NFR BLD AUTO: 10 % — SIGNIFICANT CHANGE UP (ref 2–14)
MYELOCYTES NFR BLD: 1 % — HIGH (ref 0–0)
NEUTROPHILS # BLD AUTO: 4.84 K/UL — SIGNIFICANT CHANGE UP (ref 1.8–7.4)
NEUTROPHILS NFR BLD AUTO: 58 % — SIGNIFICANT CHANGE UP (ref 43–77)
NRBC # BLD: 0 /100 — SIGNIFICANT CHANGE UP (ref 0–0)
PLAT MORPH BLD: NORMAL — SIGNIFICANT CHANGE UP
PLATELET # BLD AUTO: 328 K/UL — SIGNIFICANT CHANGE UP (ref 150–400)
POIKILOCYTOSIS BLD QL AUTO: SLIGHT — SIGNIFICANT CHANGE UP
POLYCHROMASIA BLD QL SMEAR: SLIGHT — SIGNIFICANT CHANGE UP
POTASSIUM SERPL-MCNC: 4.1 MMOL/L — SIGNIFICANT CHANGE UP (ref 3.5–5.3)
POTASSIUM SERPL-SCNC: 4.1 MMOL/L — SIGNIFICANT CHANGE UP (ref 3.5–5.3)
PROT SERPL-MCNC: 8.1 G/DL — SIGNIFICANT CHANGE UP (ref 6–8.3)
PROTHROM AB SERPL-ACNC: 15.5 SEC — HIGH (ref 10.5–13.4)
RBC # BLD: 3.52 M/UL — LOW (ref 3.8–5.2)
RBC # FLD: 15.7 % — HIGH (ref 10.3–14.5)
RBC BLD AUTO: ABNORMAL
SARS-COV-2 RNA SPEC QL NAA+PROBE: SIGNIFICANT CHANGE UP
SODIUM SERPL-SCNC: 139 MMOL/L — SIGNIFICANT CHANGE UP (ref 135–145)
SPHEROCYTES BLD QL SMEAR: SLIGHT — SIGNIFICANT CHANGE UP
TROPONIN I, HIGH SENSITIVITY RESULT: 23 NG/L — SIGNIFICANT CHANGE UP
VARIANT LYMPHS # BLD: 1 % — SIGNIFICANT CHANGE UP (ref 0–6)
WBC # BLD: 8.35 K/UL — SIGNIFICANT CHANGE UP (ref 3.8–10.5)
WBC # FLD AUTO: 8.35 K/UL — SIGNIFICANT CHANGE UP (ref 3.8–10.5)

## 2023-02-02 PROCEDURE — 71045 X-RAY EXAM CHEST 1 VIEW: CPT

## 2023-02-02 PROCEDURE — 71045 X-RAY EXAM CHEST 1 VIEW: CPT | Mod: 26

## 2023-02-02 PROCEDURE — 87389 HIV-1 AG W/HIV-1&-2 AB AG IA: CPT

## 2023-02-02 PROCEDURE — 85730 THROMBOPLASTIN TIME PARTIAL: CPT

## 2023-02-02 PROCEDURE — 83735 ASSAY OF MAGNESIUM: CPT

## 2023-02-02 PROCEDURE — 86703 HIV-1/HIV-2 1 RESULT ANTBDY: CPT

## 2023-02-02 PROCEDURE — 86900 BLOOD TYPING SEROLOGIC ABO: CPT

## 2023-02-02 PROCEDURE — 80053 COMPREHEN METABOLIC PANEL: CPT

## 2023-02-02 PROCEDURE — 85025 COMPLETE CBC W/AUTO DIFF WBC: CPT

## 2023-02-02 PROCEDURE — 86901 BLOOD TYPING SEROLOGIC RH(D): CPT

## 2023-02-02 PROCEDURE — 36415 COLL VENOUS BLD VENIPUNCTURE: CPT

## 2023-02-02 PROCEDURE — 87040 BLOOD CULTURE FOR BACTERIA: CPT

## 2023-02-02 PROCEDURE — 86702 HIV-2 ANTIBODY: CPT

## 2023-02-02 PROCEDURE — 83605 ASSAY OF LACTIC ACID: CPT

## 2023-02-02 PROCEDURE — 99285 EMERGENCY DEPT VISIT HI MDM: CPT

## 2023-02-02 PROCEDURE — 85610 PROTHROMBIN TIME: CPT

## 2023-02-02 PROCEDURE — 82009 KETONE BODYS QUAL: CPT

## 2023-02-02 PROCEDURE — 86850 RBC ANTIBODY SCREEN: CPT

## 2023-02-02 PROCEDURE — 83690 ASSAY OF LIPASE: CPT

## 2023-02-02 PROCEDURE — 87637 SARSCOV2&INF A&B&RSV AMP PRB: CPT

## 2023-02-02 PROCEDURE — 99284 EMERGENCY DEPT VISIT MOD MDM: CPT | Mod: 25

## 2023-02-02 PROCEDURE — 84484 ASSAY OF TROPONIN QUANT: CPT

## 2023-02-02 RX ORDER — SODIUM CHLORIDE 9 MG/ML
1000 INJECTION INTRAMUSCULAR; INTRAVENOUS; SUBCUTANEOUS
Refills: 0 | Status: DISCONTINUED | OUTPATIENT
Start: 2023-02-02 | End: 2023-02-06

## 2023-02-02 RX ORDER — SODIUM CHLORIDE 9 MG/ML
500 INJECTION INTRAMUSCULAR; INTRAVENOUS; SUBCUTANEOUS ONCE
Refills: 0 | Status: COMPLETED | OUTPATIENT
Start: 2023-02-02 | End: 2023-02-02

## 2023-02-02 RX ADMIN — SODIUM CHLORIDE 500 MILLILITER(S): 9 INJECTION INTRAMUSCULAR; INTRAVENOUS; SUBCUTANEOUS at 21:13

## 2023-02-02 NOTE — ED PROVIDER NOTE - PATIENT PORTAL LINK FT
You can access the FollowMyHealth Patient Portal offered by [facility] by registering at the following website: [email or web address]. By joining [facility]’s FollowMyHealth portal, you will also be able to view your health information using other applications (apps) compatible with our system.

## 2023-02-02 NOTE — ED ADULT NURSE NOTE - ED STAT RN HANDOFF DETAILS
Pt in bed, awake alert orented x 3. no acute distress noted. MD evaluation on going. Endorses to [facility] for continuation of care.

## 2023-02-02 NOTE — ED PROVIDER NOTE - PROGRESS NOTE DETAILS
Labs/CXR explained to pt & daughter, lactate-neg, no leukocytosis.  Pt with with infectious process.  No hypotension.  Seen by surgery, explained to pt & daughter that it's NL to have blood per rectum after Reversal of Alfonso's ostomy.  Pt's H/H stable.  Pt's feeling fine, will d/c home with daughter.  Blood c/s done, explained if abn. blood cultures, will call pt back for admission.

## 2023-02-02 NOTE — ED PROVIDER NOTE - NS ED MD DISPO DISCHARGE
Discussed pre-procedure instructions. Pt will have COVID19 test at [facility] 09/06/20. Pt denies fever, chills, muscle aches, SOB, sore throat, diarrhea, new loss taste or smell, recent exposure to COVID19 pts. Pt verbalizes understanding of instructions. Pt requests 10am procedure time, will arrive at 8:00am. Lives alone, sister will be bringing her.
Home

## 2023-02-02 NOTE — ED ADULT NURSE NOTE - OBJECTIVE STATEMENT
as per patient c/o rectal bleeding and abdominal pain since 2/2/2023, after robotic assisted reversal of Alfonso surgery on 1/25/2023. Pt has ESRD, with dialysis on Monday, Wednesday, Friday. Dialysis fistula noted on left upper arm, palpable bruit. Colostomy noted on Left lower abdomen with black tarry stool present.

## 2023-02-02 NOTE — CONSULT NOTE ADULT - SUBJECTIVE AND OBJECTIVE BOX
66 y/o female with h/o HIV, HD, DM2, HLD gout, GERD, depression who is s/p robot-assisted reversal of Alfonso procedure on 1/25. In OR, ostomy was reversed with lysis of adhesions, appendectomy, end to end anastomosis and sigmoidoscopy confirming no leak. However due to the nature of the tissue a diverting ileostomy was created.  comes to ED per daughter after passing some clot per rectum at home this morning with described tachycardia  Here in ED no further blood per rectum  Hr 92, BP stable    Vital Signs Last 24 Hrs  T(C): 36.7 (02 Feb 2023 20:53), Max: 36.8 (02 Feb 2023 17:13)  T(F): 98.1 (02 Feb 2023 20:53), Max: 98.2 (02 Feb 2023 17:13)  HR: 92 (02 Feb 2023 20:53) (92 - 103)  BP: 113/63 (02 Feb 2023 20:53) (113/63 - 115/71)  BP(mean): --  RR: 18 (02 Feb 2023 20:53) (18 - 18)  SpO2: 98% (02 Feb 2023 20:53) (98% - 99%)    Parameters below as of 02 Feb 2023 17:13  Patient On (Oxygen Delivery Method): room air                          11.2   8.35  )-----------( 328      ( 02 Feb 2023 17:49 )             35.2   H&H improved as compared to discharge    General: WN/WD NAD  Neurology: A&Ox3, nonfocal, CLARK x 4  Respiratory: CTA B/L  CV: RRR, S1S2  Abdominal: Soft, no guarding or peritoneal signs, steris CDI, ostomy viable with large amt stool, gas, no blood  Extremities: No edema, + peripheral pulses    case discussed with Dr Seals  post-operative passing of some blood or clot per rectum not unexpected  HD stable here without further bleeding  H&H stable  no acute surgical interventions at this time  discussed with daughter about monitoring for any further bleeding or BRBPR, tachycardia or dizziness any need to return to ER  med/cardio causes of tachycardia r/o per ED; rec EKG

## 2023-02-02 NOTE — ED ADULT TRIAGE NOTE - CHIEF COMPLAINT QUOTE
c/o abdominal pain , s/p bowel resection, colonoscopy, s/p appendectomy, recently dc fro the hospital, rectal bleed

## 2023-02-02 NOTE — ED PROVIDER NOTE - NSFOLLOWUPINSTRUCTIONS_ED_ALL_ED_FT
Colostomy Reversal Surgery, Care After      The following information offers guidance on how to care for yourself after your procedure. Your health care provider may also give you more specific instructions. If you have problems or questions, contact your health care provider.      What can I expect after the procedure?    After the procedure, it is common to have:  •Pain and discomfort in your abdomen, especially near your incision.      •Decreased appetite.      You may have temporary bowel changes, such as:  •Passing gas (flatulence).      •An urgent need to have a bowel movement.      •More frequent bowel movements.    •Some leakage of stool or the inability to control when you have bowel movements (incontinence).  •This may cause skin soreness around your rectum due to exposure to stool and wiping of skin. This usually gets better within a couple weeks.        •Loose stools or diarrhea.      •Constipation.        Follow these instructions at home:      Activity   A sign showing that a person should not lift anything heavy.   •You may have to avoid lifting. Ask your health care provider how much you can safely lift.      •Return to your normal activities as told by your health care provider. Ask your health care provider what activities are safe for you.      •Avoid sitting for a long time without moving. Get up to take short walks every 1–2 hours. This is important to improve blood flow and breathing. Ask for help if you feel weak or unsteady.      •Avoid activities that take a lot of effort, contact sports, and abdominal exercises for 4 weeks or as long as told by your health care provider.        Incision care   Two stitched incisions. One is normal. The other is red with pus and infected. •Follow instructions from your health care provider about how to take care of your incision. Make sure you:  •Wash your hands with soap and water for at least 20 seconds before and after you change your bandage (dressing). If soap and water are not available, use hand .      •Change your dressing as told by your health care provider.      •Leave stitches (sutures), skin glue, or adhesive strips in place. These skin closures may need to stay in place for 2 weeks or longer. If adhesive strip edges start to loosen and curl up, you may trim the loose edges. Do not remove adhesive strips completely unless your health care provider tells you to do that.        •Keep the incision area clean and dry.    •Check your incision area every day for signs of infection. Check for:  •More redness, swelling, or pain.      •More fluid or blood.      •Warmth.      •Pus or a bad smell.        •To protect the incision, hold a folded blanket or small pillow against your abdomen when coughing, sneezing, or bending.      Bathing     • Do not take baths, swim, or use a hot tub until your health care provider approves. Ask your health care provider if you may take showers. You may only be allowed to take sponge baths.      •If your health care provider approves bathing and showering, cover the dressing with a watertight covering to protect it from water. Do not let the dressing get wet.      •Keep the dressing dry until your health care provider says it can be removed.      Driving     •If you were given a sedative during the procedure, it can affect you for several hours. Do not drive or operate machinery until your health care provider says that it is safe.       •Ask your health care provider if the medicine prescribed to you requires you to avoid driving or using machinery.      •Follow other driving restrictions as told by your health care provider.      Eating and drinking   •Follow instructions from your health care provider about eating or drinking restrictions. This may include:  •What to eat and drink. You may be told to start eating a bland diet. Over time, you may slowly return to a more normal, healthy diet.      •How much to eat and drink. You should eat small meals often and stop eating when you feel full.        •Take nutrition supplements as told by your health care provider or dietitian.      General instructions     •Take over-the-counter and prescription medicines only as told by your health care provider.    •Take steps to treat diarrhea or constipation as told by your health care provider. Your health care provider may recommend that you:  •Drink enough fluid to keep your urine pale yellow. Avoid fluids that contain a lot of sugar or caffeine, such as energy drinks, sports drinks, and soda.      •Eat bland, easy-to-digest foods in small amounts as you are able. These foods include bananas, applesauce, rice, lean meats, toast, and crackers.      •Take over-the-counter or prescription medicines.      •Limit foods that are high in fat and processed sugars, such as fried or sweet foods.        •If you have skin soreness around your rectum, apply a skin barrier ointment or paste. This can help prevent irritation from occurring or getting worse.      • Do not use any products that contain nicotine or tobacco. These products include cigarettes, chewing tobacco, and vaping devices, such as e-cigarettes. These can delay incision healing after surgery. If you need help quitting, ask your health care provider.      •Keep all follow-up visits. This is important.        Contact a health care provider if:  •You have any of these signs of infection:  •More redness, swelling, or pain at the site of your incision.      •More fluid or blood coming from your incision.      •Warmth coming from your incision.      •Pus or a bad smell coming from your incision.      •A fever.        •Your incision breaks open.      •You feel nauseous.      •You are constipated, or not able to have a bowel movement.      •Your diarrhea gets worse.      •You have pain that is not controlled with medicine.        Get help right away if:    •You have abdominal pain that does not go away or becomes severe.      •You have frequent vomiting and you are not able to eat or drink.      •You have difficulty breathing.        Summary    •After colostomy reversal surgery, it is common to have abdominal pain, decreased appetite, diarrhea, or constipation.      •Follow instructions from your health care provider about how to take care of your incision. Do not let the dressing get wet.      •Take over-the-counter and prescription medicines only as told by your health care provider.      •Contact your health care provider if you are constipated, or not able to have a bowel movement.      •Keep all follow-up visits. This is important.      This information is not intended to replace advice given to you by your health care provider. Make sure you discuss any questions you have with your health care provider.          Weakness      Weakness is a lack of strength. You may feel weak all over your body (generalized), or you may feel weak in one specific part of your body (focal). Common causes of weakness include:  •Infection and immune system disorders.      •Physical exhaustion.      •Internal bleeding or other blood loss that results in a lack of red blood cells (anemia).      •Dehydration.      •An imbalance in mineral (electrolyte) levels, such as potassium.      •Heart disease, circulation problems, or stroke.      Other causes include:  •Some medicines or cancer treatment.      •Stress, anxiety, or depression.      •Nervous system disorders.      •Thyroid disorders.      •Loss of muscle strength because of age or inactivity.      •Poor sleep quality or sleep disorders.      The cause of your weakness may not be known. Some causes of weakness can be serious, so it is important to see your health care provider.      Follow these instructions at home:    Activity     •Rest as needed.      •Try to get enough sleep. Most adults need 7–8 hours of quality sleep each night. Talk to your health care provider about how much sleep you need each night.      •Do exercises, such as arm curls and leg raises, for 30 minutes at least 2 days a week or as told by your health care provider. This helps build muscle strength.      •Consider working with a physical therapist or  who can develop an exercise plan to help you gain muscle strength.        General instructions      •Take over-the-counter and prescription medicines only as told by your health care provider.    •Eat a healthy, well-balanced diet. This includes:  •Proteins to build muscles, such as lean meats and fish.      •Fresh fruits and vegetables.      •Carbohydrates to boost energy, such as whole grains.        •Drink enough fluid to keep your urine pale yellow.      •Keep all follow-up visits as told by your health care provider. This is important.        Contact a health care provider if your weakness:    •Does not improve or gets worse.      •Affects your ability to think clearly.      •Affects your ability to do your normal daily activities.        Get help right away if you:    •Develop sudden weakness, especially on one side of your face or body.      •Have chest pain.      •Have trouble breathing or shortness of breath.      •Have problems with your vision.      •Have trouble talking or swallowing.      •Have trouble standing or walking.      •Are light-headed or lose consciousness.        Summary    •Weakness is a lack of strength. You may feel weak all over your body or just in one specific part of your body.      •Weakness can be caused by a variety of things. In some cases, the cause may be unknown.      •Rest as needed, and try to get enough sleep. Most adults need 7–8 hours of quality sleep each night.      •Eat a healthy, well-balanced diet.      This information is not intended to replace advice given to you by your health care provider. Make sure you discuss any questions you have with your health care provider.      if your blood culture is abnormal, will call you back for treatment

## 2023-02-02 NOTE — ED PROVIDER NOTE - ABDOMINAL EXAM
Rt lower abd-hematoma palp., tenderness to palp., colostomy bag-Lt abd with feces/soft/tender.../nondistended

## 2023-02-02 NOTE — PHARMACOTHERAPY INTERVENTION NOTE - COMMENTS
Medication reconciliation done with list provided by patient's family. At her most recent doctor's visit, gabapentin and tramadol were stopped. Blood pressure medications modified were: hold hydralazine 25 mg three times a day and isosorbide mononitrate ER 60 mg once daily; reduce metoprolol tartrate to 25 mg two times a day.

## 2023-02-02 NOTE — CONSULT NOTE ADULT - NS ATTEND AMEND GEN_ALL_CORE FT
Some concern for hypotension, tachycardia and passing blood clot  Labs unremarkable and here nl BP and HR okay  Pt w/ rectal stricture noted in the OR w/ bleeding on dilation, some blood per rectum expected  Having ostomy function and tolerating diet  PO challenge and d/c  Maintain f/u in office    Eric Seals MD  Attending Physician

## 2023-02-02 NOTE — ED PROVIDER NOTE - CLINICAL SUMMARY MEDICAL DECISION MAKING FREE TEXT BOX
Patient with reversal of Alfonso's ostomy on 1/25/23, now with blood per rectum.  Patient also complaining of low blood pressure, sweaty for past 2 nights.  Concern for infectious process, will get labs, will give 500 cc IV fluid since patient does urinate, will also call surgery to see patient

## 2023-02-02 NOTE — ED PROVIDER NOTE - OBJECTIVE STATEMENT
67-year-old female with history of HIV, last viral load questionable undetectable.  Patient also had perforated diverticulitis which s/p colostomy October/22..   Patient s/p reversal of Alfonso's ostomy on 1/25/23, but Tylenol with brief relief of pain not complete until a few months later.  As per daughter, patient with blood noted on toilet paper x2 today also saw a blood clot in the toilet bowl.  Patient C/L sharp pain around incisional site on right sided abdomen for more than 1 week.  However for past 2 nights patient with chills, sweats, low B/P a few episodes for few minutes.  Patient went to see her PMD and sent to ED to see her surgeon.  Patient states she took Tylenol with brief relief of pain

## 2023-02-03 VITALS
DIASTOLIC BLOOD PRESSURE: 78 MMHG | TEMPERATURE: 98 F | OXYGEN SATURATION: 100 % | HEART RATE: 96 BPM | SYSTOLIC BLOOD PRESSURE: 124 MMHG | RESPIRATION RATE: 18 BRPM

## 2023-02-04 LAB
HIV 1+2 AB+HIV1 P24 AG SERPL QL IA: REACTIVE
HIV1+2 AB SPEC QL: ABNORMAL
HIV1+2 AB SPEC QL: SIGNIFICANT CHANGE UP

## 2023-02-07 ENCOUNTER — APPOINTMENT (OUTPATIENT)
Dept: SURGERY | Facility: CLINIC | Age: 68
End: 2023-02-07
Payer: MEDICAID

## 2023-02-07 VITALS
WEIGHT: 129 LBS | HEART RATE: 105 BPM | SYSTOLIC BLOOD PRESSURE: 118 MMHG | DIASTOLIC BLOOD PRESSURE: 79 MMHG | TEMPERATURE: 94.4 F | HEIGHT: 62 IN | BODY MASS INDEX: 23.74 KG/M2

## 2023-02-07 DIAGNOSIS — Z93.2 ILEOSTOMY STATUS: ICD-10-CM

## 2023-02-07 PROCEDURE — 99024 POSTOP FOLLOW-UP VISIT: CPT

## 2023-02-07 NOTE — REVIEW OF SYSTEMS
[Feeling Tired] : feeling tired [As Noted in HPI] : as noted in HPI [Negative] : Neurological [Eye Pain] : no eye pain [Earache] : no earache [Chest Pain] : no chest pain [Palpitations] : no palpitations [Shortness Of Breath] : no shortness of breath [Vomiting] : no vomiting [Constipation] : no constipation [Diarrhea] : no diarrhea [Dysuria] : no dysuria [FreeTextEntry7] : ostomy bag  [FreeTextEntry9] : uses walker

## 2023-02-07 NOTE — HISTORY OF PRESENT ILLNESS
[FreeTextEntry1] : Ms. VERONICA GOVEA  is a 67 year F with a history of HIV, ESRD (M,W,F via LUE AVF) 2/2 HTN, HLD, anxiety, depression, anemia, ovarian cancer (s/p BSO + BLESSING) referred by Dr. Francisco for Burdick's reversal here for a postop visit.  She was originally admitted to  for perforated diverticulitis for which she had an IR drain placed. As her diet was advanced she had PO intolerance and increased drain output so she was taken to the OR by Dr. Francisco on 10/10/22. I assisted that day with a laparoscopic converted to open, takedown of enterocolic fistula, flexible sigmoidoscopy, small bowel resection and Alfonso's. She was discharged on POD 5 to rehab. Patient states she has had episodes of abdominal pain prior to all of this but didn't know that it was likely due to diverticulitis. She had never had a colonoscopy and so pre-op she underwent a colonoscopy with Dr. Johnson which was unremarkable. On 1/25/2023 I took her for a robotic Alfonso's reversal, splenic flexure mobilization, extensive JANAE, appendectomy, flex sig and diverting loop ileostomy. She had an uneventful post-op course and was discharged home on POD 5 having ostomy function and tolerating a diet. However on 2/2/23 the daughter noted that the pt was hypotensive and tachycardic and then later passed a large amount of blood per rectum. We had her go to the ER where she was evaluated and her lab work was unremarkable and vitals improved. Currently the bleeding has improved and is only noted as a smear on her pampers. Patient is complaining of ostomy leakage which causes her anxiety. As per daughter, next visit from wound nurse this Wednesday or Thursday. She is tolerating diet and denies fevers/chills.

## 2023-02-07 NOTE — PHYSICAL EXAM
[Exam Deferred] : exam was deferred [Normal Rate and Rhythm] : normal rate and rhythm [Alert] : alert [Oriented to Person] : oriented to person [Oriented to Place] : oriented to place [Oriented to Time] : oriented to time [JVD] : no jugular venous distention  [de-identified] : soft, non-distended, non-tender to palpation. Ostomy appliance intact. Pfannenstiel well healed. Steristrips in place at trocar sites  [de-identified] : awake, alert, NAD  [de-identified] : normocephalic, atraumatic, EOMI, normal conjunctiva  [de-identified] : b/l chest rise, EWOB on RA  [de-identified] : deferred [de-identified] : requires some assistance [de-identified] : abdominal skin as above [de-identified] : normal mood and affect

## 2023-02-07 NOTE — ASSESSMENT
[FreeTextEntry1] : Ms. VERONICA GOVEA  is a 67y.o. F w/ multiple co-morbidities and a laparoscopic converted to open Alfonso's and SBR for complicated diverticulitis on 10/10/22 referred by Dr. Francisco for Alfonso's reversal. On 1/25/2023, she underwent robotic Alfonso's reversal, splenic flexure mobilization, appendectomy, and DLI here for a postop visit.

## 2023-02-09 NOTE — PLAN
[FreeTextEntry1] : Please follow up at the office with  Colorectal Surgeon Dr Seals for reversal of Alfonso procedure and as needed for any questions or concerns

## 2023-02-09 NOTE — CONSULT LETTER
[Dear  ___] : Dear  [unfilled], [Courtesy Letter:] : I had the pleasure of seeing your patient, [unfilled], in my office today. [Consult Closing:] : Thank you very much for allowing me to participate in the care of this patient.  If you have any questions, please do not hesitate to contact me. [Sincerely,] : Sincerely, [FreeTextEntry3] : Faye Francisco MD FACS

## 2023-02-09 NOTE — REASON FOR VISIT
[Post Op: _________] : a [unfilled] post op visit [FreeTextEntry1] : Diagnostic laparoscopy, takedown of enterocolic fistula, sigmoidoscopy, small bowel resection  and Alfonso procedure on 10/10/2022

## 2023-02-09 NOTE — ASSESSMENT
[FreeTextEntry1] : VERONICA GOVEA is a 67 year old female who underwent a Diagnostic laparoscopy, takedown of enterocolic fistula, sigmoidoscopy, small bowel resection  and Alfonso procedure on 10/10/2022\par \par \par Patient is doing well. All surgical incisions are healing well and as expected. There is no evidence of infection or complication, and patient is progressing as expected. Post-operative wound care, activity, restrictions and precautions reinforced.  Pathology results were discussed in detail. Patient's questions and concerns addressed to patient's satisfaction. \par \par Patient will follow up Colorectal Surgeon Dr Seals for reversal of Alfonso procedure\par

## 2023-02-09 NOTE — PHYSICAL EXAM
[Alert] : alert [Oriented to Person] : oriented to person [Oriented to Place] : oriented to place [Oriented to Time] : oriented to time [Calm] : calm [de-identified] : The patient is alert, well-groomed  [de-identified] : Breath sounds equal and bilateral, no wheezing no rales or rhonchi  [de-identified] :  good S1, S2, no murmurs, rubs, gallops bilateral  [de-identified] : ostomy viable and productive of stool, Incision sites healed well  [de-identified] : weakness [de-identified] : Incision sites healed well

## 2023-02-09 NOTE — REVIEW OF SYSTEMS
[Joint Stiffness] : joint stiffness [Negative] : Heme/Lymph [Arthralgias] : no arthralgias [Joint Swelling] : no joint swelling [Limb Pain] : no limb pain [Limb Swelling] : no limb swelling [FreeTextEntry9] : weakness

## 2023-02-09 NOTE — HISTORY OF PRESENT ILLNESS
[de-identified] : VERONICA GOVEA is a 67 year old female with PMHx of ESRD dialysis(M,W,F), HTN, HLD, HIV positive, Anxiety and depression, anemia, cervical cancer. who presents in the office for postop visit. Patient was admitted to [facility] with Perforated diverticulitis with enterocolic fistula. She underwent a Diagnostic laparoscopy, takedown of enterocolic fistula, sigmoidoscopy, small bowel resection  and Alfonso procedure on 10/10/2022. Today patient is doing well, c/o weakness and knee weakness. She is walking with assistance. Ms. GOVEA is in Rehab facility at present time . Patient denies any fevers, chills, nausea, vomiting. Patient able to tolerate regular diet stated that she doesn't like the food in rehab. ostomy viable and productive of stool; Surgical incisions are healing well. No sign of inflammation or exudate.

## 2023-02-24 ENCOUNTER — INPATIENT (INPATIENT)
Facility: HOSPITAL | Age: 68
LOS: 3 days | Discharge: HOME | DRG: 314 | End: 2023-02-28
Attending: STUDENT IN AN ORGANIZED HEALTH CARE EDUCATION/TRAINING PROGRAM | Admitting: STUDENT IN AN ORGANIZED HEALTH CARE EDUCATION/TRAINING PROGRAM
Payer: MEDICAID

## 2023-02-24 VITALS
OXYGEN SATURATION: 99 % | HEART RATE: 104 BPM | HEIGHT: 62 IN | TEMPERATURE: 98 F | RESPIRATION RATE: 18 BRPM | WEIGHT: 128.97 LBS | SYSTOLIC BLOOD PRESSURE: 90 MMHG | DIASTOLIC BLOOD PRESSURE: 65 MMHG

## 2023-02-24 DIAGNOSIS — Z98.890 OTHER SPECIFIED POSTPROCEDURAL STATES: Chronic | ICD-10-CM

## 2023-02-24 DIAGNOSIS — Z87.59 PERSONAL HISTORY OF OTHER COMPLICATIONS OF PREGNANCY, CHILDBIRTH AND THE PUERPERIUM: Chronic | ICD-10-CM

## 2023-02-24 DIAGNOSIS — Z90.710 ACQUIRED ABSENCE OF BOTH CERVIX AND UTERUS: Chronic | ICD-10-CM

## 2023-02-24 LAB
ALBUMIN SERPL ELPH-MCNC: 3.2 G/DL — LOW (ref 3.5–5)
ALP SERPL-CCNC: 222 U/L — HIGH (ref 40–120)
ALT FLD-CCNC: 85 U/L DA — HIGH (ref 10–60)
ANION GAP SERPL CALC-SCNC: 7 MMOL/L — SIGNIFICANT CHANGE UP (ref 5–17)
APTT BLD: 40.3 SEC — HIGH (ref 27.5–35.5)
AST SERPL-CCNC: 201 U/L — HIGH (ref 10–40)
BILIRUB SERPL-MCNC: 0.2 MG/DL — SIGNIFICANT CHANGE UP (ref 0.2–1.2)
BUN SERPL-MCNC: 64 MG/DL — HIGH (ref 7–18)
CALCIUM SERPL-MCNC: 10.1 MG/DL — SIGNIFICANT CHANGE UP (ref 8.4–10.5)
CHLORIDE SERPL-SCNC: 94 MMOL/L — LOW (ref 96–108)
CO2 SERPL-SCNC: 21 MMOL/L — LOW (ref 22–31)
CREAT SERPL-MCNC: 12.1 MG/DL — HIGH (ref 0.5–1.3)
EGFR: 3 ML/MIN/1.73M2 — LOW
GLUCOSE SERPL-MCNC: 99 MG/DL — SIGNIFICANT CHANGE UP (ref 70–99)
HCT VFR BLD CALC: 48.6 % — HIGH (ref 34.5–45)
HGB BLD-MCNC: 15.2 G/DL — SIGNIFICANT CHANGE UP (ref 11.5–15.5)
INR BLD: 1.14 RATIO — SIGNIFICANT CHANGE UP (ref 0.88–1.16)
MCHC RBC-ENTMCNC: 31.3 GM/DL — LOW (ref 32–36)
MCHC RBC-ENTMCNC: 33.3 PG — SIGNIFICANT CHANGE UP (ref 27–34)
MCV RBC AUTO: 106.3 FL — HIGH (ref 80–100)
PLATELET # BLD AUTO: 197 K/UL — SIGNIFICANT CHANGE UP (ref 150–400)
POTASSIUM SERPL-MCNC: SIGNIFICANT CHANGE UP MMOL/L (ref 3.5–5.3)
POTASSIUM SERPL-SCNC: SIGNIFICANT CHANGE UP MMOL/L (ref 3.5–5.3)
PROT SERPL-MCNC: 10.1 G/DL — HIGH (ref 6–8.3)
PROTHROM AB SERPL-ACNC: 13.6 SEC — HIGH (ref 10.5–13.4)
RBC # BLD: 4.57 M/UL — SIGNIFICANT CHANGE UP (ref 3.8–5.2)
RBC # FLD: 17.2 % — HIGH (ref 10.3–14.5)
SODIUM SERPL-SCNC: 122 MMOL/L — LOW (ref 135–145)
WBC # BLD: 7.65 K/UL — SIGNIFICANT CHANGE UP (ref 3.8–10.5)
WBC # FLD AUTO: 7.65 K/UL — SIGNIFICANT CHANGE UP (ref 3.8–10.5)

## 2023-02-24 PROCEDURE — 71045 X-RAY EXAM CHEST 1 VIEW: CPT | Mod: 26

## 2023-02-24 PROCEDURE — 99285 EMERGENCY DEPT VISIT HI MDM: CPT

## 2023-02-24 PROCEDURE — 93010 ELECTROCARDIOGRAM REPORT: CPT

## 2023-02-24 RX ORDER — SODIUM CHLORIDE 9 MG/ML
250 INJECTION INTRAMUSCULAR; INTRAVENOUS; SUBCUTANEOUS ONCE
Refills: 0 | Status: COMPLETED | OUTPATIENT
Start: 2023-02-24 | End: 2023-02-24

## 2023-02-24 RX ADMIN — SODIUM CHLORIDE 250 MILLILITER(S): 9 INJECTION INTRAMUSCULAR; INTRAVENOUS; SUBCUTANEOUS at 23:56

## 2023-02-24 NOTE — ED PROVIDER NOTE - NS_EDPROVIDERDISPOUSERTYPE_ED_A_ED
Refill request received for     Disp Refills Start End    omeprazole (PrilOSEC) 20 MG capsule 30 capsule 0 10/17/2022     Sig - Route: Take 1 capsule by mouth daily. - Oral    Sent to pharmacy as: Omeprazole 20 MG Oral Capsule Delayed Release (PrilOSEC)          The pt was last seen: 10/14/22  There is a future appointment scheduled for: none  Refill by protocol: yes     Attending Attestation (For Attendings USE Only)...

## 2023-02-24 NOTE — ED PROVIDER NOTE - OBJECTIVE STATEMENT
67 y.o presenting clotting of left arm fistula. patient gets dialyzed m, w, f. was unable to be dialyzed today. patient denies cp, sob, n, v, abd pain, fever, chills

## 2023-02-24 NOTE — ED PROVIDER NOTE - CLINICAL SUMMARY MEDICAL DECISION MAKING FREE TEXT BOX
PAtient presenting with failure of left arm fistula. unable to be dialyzed. will obtain lab, assess electrolyte. assess fluid status. ed obs and reassess PAtient presenting with failure of left arm fistula. unable to be dialyzed. will obtain lab, assess electrolyte. assess fluid status. ed obs and reassess. patient noting hpyotension, will give small volume of fluid and reassess

## 2023-02-24 NOTE — ED PROVIDER NOTE - PROGRESS NOTE DETAILS
house staff officer called for vascular consult, nephrology called. patient bp improved. admitted for dialysis assess and dialysis house staff officer called for vascular consult, nephrology called. patient bp improved. admitted for dialysis assess and dialysis. patient bp improved, 111/70. clinically stable.

## 2023-02-25 DIAGNOSIS — E78.5 HYPERLIPIDEMIA, UNSPECIFIED: ICD-10-CM

## 2023-02-25 DIAGNOSIS — I10 ESSENTIAL (PRIMARY) HYPERTENSION: ICD-10-CM

## 2023-02-25 DIAGNOSIS — B20 HUMAN IMMUNODEFICIENCY VIRUS [HIV] DISEASE: ICD-10-CM

## 2023-02-25 DIAGNOSIS — N18.6 END STAGE RENAL DISEASE: ICD-10-CM

## 2023-02-25 DIAGNOSIS — T82.868A THROMBOSIS DUE TO VASCULAR PROSTHETIC DEVICES, IMPLANTS AND GRAFTS, INITIAL ENCOUNTER: ICD-10-CM

## 2023-02-25 DIAGNOSIS — I95.9 HYPOTENSION, UNSPECIFIED: ICD-10-CM

## 2023-02-25 DIAGNOSIS — U07.1 COVID-19: ICD-10-CM

## 2023-02-25 DIAGNOSIS — K21.9 GASTRO-ESOPHAGEAL REFLUX DISEASE WITHOUT ESOPHAGITIS: ICD-10-CM

## 2023-02-25 DIAGNOSIS — Z29.9 ENCOUNTER FOR PROPHYLACTIC MEASURES, UNSPECIFIED: ICD-10-CM

## 2023-02-25 LAB
ALBUMIN SERPL ELPH-MCNC: 3.3 G/DL — LOW (ref 3.5–5)
ALBUMIN SERPL ELPH-MCNC: 3.5 G/DL — SIGNIFICANT CHANGE UP (ref 3.5–5)
ALP SERPL-CCNC: 206 U/L — HIGH (ref 40–120)
ALP SERPL-CCNC: 216 U/L — HIGH (ref 40–120)
ALT FLD-CCNC: 53 U/L DA — SIGNIFICANT CHANGE UP (ref 10–60)
ALT FLD-CCNC: 61 U/L DA — HIGH (ref 10–60)
ANION GAP SERPL CALC-SCNC: 12 MMOL/L — SIGNIFICANT CHANGE UP (ref 5–17)
ANION GAP SERPL CALC-SCNC: 16 MMOL/L — SIGNIFICANT CHANGE UP (ref 5–17)
ANION GAP SERPL CALC-SCNC: 18 MMOL/L — HIGH (ref 5–17)
AST SERPL-CCNC: 20 U/L — SIGNIFICANT CHANGE UP (ref 10–40)
AST SERPL-CCNC: 53 U/L — HIGH (ref 10–40)
BASOPHILS # BLD AUTO: 0.03 K/UL — SIGNIFICANT CHANGE UP (ref 0–0.2)
BASOPHILS # BLD AUTO: 0.04 K/UL — SIGNIFICANT CHANGE UP (ref 0–0.2)
BASOPHILS NFR BLD AUTO: 0.4 % — SIGNIFICANT CHANGE UP (ref 0–2)
BASOPHILS NFR BLD AUTO: 0.6 % — SIGNIFICANT CHANGE UP (ref 0–2)
BILIRUB SERPL-MCNC: 0.3 MG/DL — SIGNIFICANT CHANGE UP (ref 0.2–1.2)
BILIRUB SERPL-MCNC: 0.4 MG/DL — SIGNIFICANT CHANGE UP (ref 0.2–1.2)
BLD GP AB SCN SERPL QL: SIGNIFICANT CHANGE UP
BUN SERPL-MCNC: 64 MG/DL — HIGH (ref 7–18)
BUN SERPL-MCNC: 69 MG/DL — HIGH (ref 7–18)
BUN SERPL-MCNC: 69 MG/DL — HIGH (ref 7–18)
CALCIUM SERPL-MCNC: 10 MG/DL — SIGNIFICANT CHANGE UP (ref 8.4–10.5)
CALCIUM SERPL-MCNC: 10.2 MG/DL — SIGNIFICANT CHANGE UP (ref 8.4–10.5)
CALCIUM SERPL-MCNC: 10.3 MG/DL — SIGNIFICANT CHANGE UP (ref 8.4–10.5)
CALCIUM SERPL-MCNC: 10.8 MG/DL — HIGH (ref 8.4–10.5)
CHLORIDE SERPL-SCNC: 100 MMOL/L — SIGNIFICANT CHANGE UP (ref 96–108)
CHLORIDE SERPL-SCNC: 100 MMOL/L — SIGNIFICANT CHANGE UP (ref 96–108)
CHLORIDE SERPL-SCNC: 95 MMOL/L — LOW (ref 96–108)
CO2 SERPL-SCNC: 16 MMOL/L — LOW (ref 22–31)
CO2 SERPL-SCNC: 18 MMOL/L — LOW (ref 22–31)
CO2 SERPL-SCNC: 27 MMOL/L — SIGNIFICANT CHANGE UP (ref 22–31)
CREAT SERPL-MCNC: 12.2 MG/DL — HIGH (ref 0.5–1.3)
CREAT SERPL-MCNC: 12.4 MG/DL — HIGH (ref 0.5–1.3)
CREAT SERPL-MCNC: 12.8 MG/DL — HIGH (ref 0.5–1.3)
EGFR: 3 ML/MIN/1.73M2 — LOW
EOSINOPHIL # BLD AUTO: 0.19 K/UL — SIGNIFICANT CHANGE UP (ref 0–0.5)
EOSINOPHIL # BLD AUTO: 0.27 K/UL — SIGNIFICANT CHANGE UP (ref 0–0.5)
EOSINOPHIL NFR BLD AUTO: 2.5 % — SIGNIFICANT CHANGE UP (ref 0–6)
EOSINOPHIL NFR BLD AUTO: 4 % — SIGNIFICANT CHANGE UP (ref 0–6)
FLUAV AG NPH QL: SIGNIFICANT CHANGE UP
FLUBV AG NPH QL: SIGNIFICANT CHANGE UP
GLUCOSE SERPL-MCNC: 102 MG/DL — HIGH (ref 70–99)
GLUCOSE SERPL-MCNC: 106 MG/DL — HIGH (ref 70–99)
GLUCOSE SERPL-MCNC: 123 MG/DL — HIGH (ref 70–99)
HCT VFR BLD CALC: 45.7 % — HIGH (ref 34.5–45)
HGB BLD-MCNC: 15 G/DL — SIGNIFICANT CHANGE UP (ref 11.5–15.5)
IMM GRANULOCYTES NFR BLD AUTO: 0.4 % — SIGNIFICANT CHANGE UP (ref 0–0.9)
IMM GRANULOCYTES NFR BLD AUTO: 0.6 % — SIGNIFICANT CHANGE UP (ref 0–0.9)
LYMPHOCYTES # BLD AUTO: 2.72 K/UL — SIGNIFICANT CHANGE UP (ref 1–3.3)
LYMPHOCYTES # BLD AUTO: 2.93 K/UL — SIGNIFICANT CHANGE UP (ref 1–3.3)
LYMPHOCYTES # BLD AUTO: 38.3 % — SIGNIFICANT CHANGE UP (ref 13–44)
LYMPHOCYTES # BLD AUTO: 40.1 % — SIGNIFICANT CHANGE UP (ref 13–44)
MAGNESIUM SERPL-MCNC: 2.8 MG/DL — HIGH (ref 1.6–2.6)
MCHC RBC-ENTMCNC: 32.8 GM/DL — SIGNIFICANT CHANGE UP (ref 32–36)
MCHC RBC-ENTMCNC: 32.8 PG — SIGNIFICANT CHANGE UP (ref 27–34)
MCV RBC AUTO: 100 FL — SIGNIFICANT CHANGE UP (ref 80–100)
MONOCYTES # BLD AUTO: 0.67 K/UL — SIGNIFICANT CHANGE UP (ref 0–0.9)
MONOCYTES # BLD AUTO: 0.68 K/UL — SIGNIFICANT CHANGE UP (ref 0–0.9)
MONOCYTES NFR BLD AUTO: 8.9 % — SIGNIFICANT CHANGE UP (ref 2–14)
MONOCYTES NFR BLD AUTO: 9.9 % — SIGNIFICANT CHANGE UP (ref 2–14)
NEUTROPHILS # BLD AUTO: 3.04 K/UL — SIGNIFICANT CHANGE UP (ref 1.8–7.4)
NEUTROPHILS # BLD AUTO: 3.79 K/UL — SIGNIFICANT CHANGE UP (ref 1.8–7.4)
NEUTROPHILS NFR BLD AUTO: 44.8 % — SIGNIFICANT CHANGE UP (ref 43–77)
NEUTROPHILS NFR BLD AUTO: 49.5 % — SIGNIFICANT CHANGE UP (ref 43–77)
NRBC # BLD: 0 /100 WBCS — SIGNIFICANT CHANGE UP (ref 0–0)
NRBC # BLD: 0 /100 WBCS — SIGNIFICANT CHANGE UP (ref 0–0)
PHOSPHATE SERPL-MCNC: 4.6 MG/DL — HIGH (ref 2.5–4.5)
PLATELET # BLD AUTO: 205 K/UL — SIGNIFICANT CHANGE UP (ref 150–400)
POTASSIUM SERPL-MCNC: 4.6 MMOL/L — SIGNIFICANT CHANGE UP (ref 3.5–5.3)
POTASSIUM SERPL-MCNC: 6 MMOL/L — HIGH (ref 3.5–5.3)
POTASSIUM SERPL-MCNC: 6 MMOL/L — HIGH (ref 3.5–5.3)
POTASSIUM SERPL-SCNC: 4.6 MMOL/L — SIGNIFICANT CHANGE UP (ref 3.5–5.3)
POTASSIUM SERPL-SCNC: 6 MMOL/L — HIGH (ref 3.5–5.3)
POTASSIUM SERPL-SCNC: 6 MMOL/L — HIGH (ref 3.5–5.3)
PROT SERPL-MCNC: 8.6 G/DL — HIGH (ref 6–8.3)
PROT SERPL-MCNC: 9 G/DL — HIGH (ref 6–8.3)
PTH-INTACT FLD-MCNC: 347 PG/ML — HIGH (ref 15–65)
RBC # BLD: 4.57 M/UL — SIGNIFICANT CHANGE UP (ref 3.8–5.2)
RBC # FLD: 17.2 % — HIGH (ref 10.3–14.5)
SARS-COV-2 RNA SPEC QL NAA+PROBE: DETECTED
SODIUM SERPL-SCNC: 134 MMOL/L — LOW (ref 135–145)
TROPONIN I, HIGH SENSITIVITY RESULT: 29.9 NG/L — SIGNIFICANT CHANGE UP
WBC # BLD: 6.78 K/UL — SIGNIFICANT CHANGE UP (ref 3.8–10.5)
WBC # FLD AUTO: 6.78 K/UL — SIGNIFICANT CHANGE UP (ref 3.8–10.5)

## 2023-02-25 PROCEDURE — 99223 1ST HOSP IP/OBS HIGH 75: CPT | Mod: GC

## 2023-02-25 PROCEDURE — 99221 1ST HOSP IP/OBS SF/LOW 40: CPT

## 2023-02-25 PROCEDURE — 74177 CT ABD & PELVIS W/CONTRAST: CPT | Mod: 26

## 2023-02-25 PROCEDURE — 93010 ELECTROCARDIOGRAM REPORT: CPT

## 2023-02-25 PROCEDURE — 36556 INSERT NON-TUNNEL CV CATH: CPT | Mod: 1L

## 2023-02-25 RX ORDER — HYDROMORPHONE HYDROCHLORIDE 2 MG/ML
0.5 INJECTION INTRAMUSCULAR; INTRAVENOUS; SUBCUTANEOUS ONCE
Refills: 0 | Status: DISCONTINUED | OUTPATIENT
Start: 2023-02-25 | End: 2023-02-25

## 2023-02-25 RX ORDER — ONDANSETRON 8 MG/1
4 TABLET, FILM COATED ORAL EVERY 8 HOURS
Refills: 0 | Status: DISCONTINUED | OUTPATIENT
Start: 2023-02-25 | End: 2023-02-28

## 2023-02-25 RX ORDER — CINACALCET 30 MG/1
30 TABLET, FILM COATED ORAL DAILY
Refills: 0 | Status: DISCONTINUED | OUTPATIENT
Start: 2023-02-25 | End: 2023-02-27

## 2023-02-25 RX ORDER — ACETAMINOPHEN 500 MG
1000 TABLET ORAL ONCE
Refills: 0 | Status: COMPLETED | OUTPATIENT
Start: 2023-02-25 | End: 2023-02-25

## 2023-02-25 RX ORDER — HEPARIN SODIUM 5000 [USP'U]/ML
5000 INJECTION INTRAVENOUS; SUBCUTANEOUS EVERY 8 HOURS
Refills: 0 | Status: DISCONTINUED | OUTPATIENT
Start: 2023-02-25 | End: 2023-02-28

## 2023-02-25 RX ORDER — KETOROLAC TROMETHAMINE 30 MG/ML
15 SYRINGE (ML) INJECTION ONCE
Refills: 0 | Status: DISCONTINUED | OUTPATIENT
Start: 2023-02-25 | End: 2023-02-25

## 2023-02-25 RX ORDER — SODIUM CHLORIDE 9 MG/ML
1000 INJECTION INTRAMUSCULAR; INTRAVENOUS; SUBCUTANEOUS
Refills: 0 | Status: DISCONTINUED | OUTPATIENT
Start: 2023-02-25 | End: 2023-02-28

## 2023-02-25 RX ORDER — ACETAMINOPHEN 500 MG
650 TABLET ORAL EVERY 6 HOURS
Refills: 0 | Status: DISCONTINUED | OUTPATIENT
Start: 2023-02-25 | End: 2023-02-28

## 2023-02-25 RX ADMIN — SODIUM CHLORIDE 50 MILLILITER(S): 9 INJECTION INTRAMUSCULAR; INTRAVENOUS; SUBCUTANEOUS at 10:54

## 2023-02-25 RX ADMIN — Medication 15 MILLIGRAM(S): at 16:37

## 2023-02-25 RX ADMIN — Medication 400 MILLIGRAM(S): at 09:57

## 2023-02-25 RX ADMIN — Medication 650 MILLIGRAM(S): at 07:25

## 2023-02-25 RX ADMIN — Medication 30 MILLILITER(S): at 03:38

## 2023-02-25 RX ADMIN — CINACALCET 30 MILLIGRAM(S): 30 TABLET, FILM COATED ORAL at 11:47

## 2023-02-25 RX ADMIN — ONDANSETRON 4 MILLIGRAM(S): 8 TABLET, FILM COATED ORAL at 03:38

## 2023-02-25 RX ADMIN — Medication 1000 MILLIGRAM(S): at 11:00

## 2023-02-25 RX ADMIN — HYDROMORPHONE HYDROCHLORIDE 0.5 MILLIGRAM(S): 2 INJECTION INTRAMUSCULAR; INTRAVENOUS; SUBCUTANEOUS at 16:37

## 2023-02-25 RX ADMIN — HYDROMORPHONE HYDROCHLORIDE 0.5 MILLIGRAM(S): 2 INJECTION INTRAMUSCULAR; INTRAVENOUS; SUBCUTANEOUS at 14:09

## 2023-02-25 RX ADMIN — Medication 1 TABLET(S): at 11:47

## 2023-02-25 RX ADMIN — HEPARIN SODIUM 5000 UNIT(S): 5000 INJECTION INTRAVENOUS; SUBCUTANEOUS at 21:42

## 2023-02-25 RX ADMIN — Medication 650 MILLIGRAM(S): at 10:59

## 2023-02-25 RX ADMIN — Medication 15 MILLIGRAM(S): at 14:09

## 2023-02-25 NOTE — PATIENT PROFILE ADULT - FALL HARM RISK - HARM RISK INTERVENTIONS

## 2023-02-25 NOTE — CONSULT NOTE ADULT - ATTENDING COMMENTS
pt with complex pmh as above and malfunctioning left AVF   s/p multiple fistologram and declot procedures  left AVF duplex  temporary grain catheter for dialysis  COVID and medical management  pt might require vein mapping and new fistula creation

## 2023-02-25 NOTE — PROCEDURE NOTE - ADDITIONAL PROCEDURE DETAILS
Dr. Catalan nephrology, for dialysis today.     Guidewire recovered and witnessed by CARMEN Rao and student Juliet

## 2023-02-25 NOTE — PROGRESS NOTE ADULT - ATTENDING COMMENTS
pt with complex pmh and malfunctioning left AVF  s/p multiple fistulogram and declot procedures  medical management  upper extremity vein mapping   pt might require new AVF creation as  out pt after medically optimized

## 2023-02-25 NOTE — H&P ADULT - PROBLEM SELECTOR PLAN 2
p/w 90/65, not at baseline   did not receive HD today   given 250cc NS with good response  will hold BP meds for now

## 2023-02-25 NOTE — H&P ADULT - NSHPPHYSICALEXAM_GEN_ALL_CORE
Vital Signs Last 24 Hrs  T(C): 36.6 (25 Feb 2023 00:00), Max: 36.7 (24 Feb 2023 19:57)  T(F): 97.9 (25 Feb 2023 00:00), Max: 98 (24 Feb 2023 19:57)  HR: 82 (25 Feb 2023 00:35) (82 - 104)  BP: 111/72 (25 Feb 2023 00:35) (90/65 - 121/75)  BP(mean): 84 (25 Feb 2023 00:35) (74 - 84)  RR: 18 (25 Feb 2023 00:35) (18 - 18)  SpO2: 100% (25 Feb 2023 00:35) (99% - 100%)    Parameters below as of 25 Feb 2023 00:35  Patient On (Oxygen Delivery Method): room air    GENERAL: NAD, lying in bed comfortably  HEAD:  Atraumatic, Normocephalic  EYES: EOMI, PERRLA, conjunctiva and sclera clear  ENT: Moist mucous membranes  NECK: Supple, No JVD  CHEST/LUNG: Clear to auscultation bilaterally; No rales, rhonchi, wheezing, or rubs. Unlabored respirations  HEART: Regular rate and rhythm; No murmurs, rubs, or gallops  ABDOMEN: Bowel sounds present; Soft, Nontender, Nondistended. No hepatomegally (+) Left- sided colostomy   EXTREMITIES:  2+ Peripheral Pulses, brisk capillary refill. No clubbing, cyanosis. (+) b/l LE edema  NERVOUS SYSTEM:  Alert & Oriented X3, speech clear. No deficits   MSK: FROM all 4 extremities, full and equal strength  SKIN: No rashes or lesions

## 2023-02-25 NOTE — H&P ADULT - NSICDXPASTMEDICALHX_GEN_ALL_CORE_FT
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

## 2023-02-25 NOTE — PATIENT PROFILE ADULT - FUNCTIONAL SCREEN CURRENT LEVEL: COMMUNICATION, MLM
PAST MEDICAL HISTORY:  Arthritis     Degeneration macular     Dementia     [place] (hard of hearing)     HTN (hypertension)     Hypothyroidism     Osteoporosis     Poor historian       
0 = understands/communicates without difficulty

## 2023-02-25 NOTE — H&P ADULT - ATTENDING COMMENTS
67F from home, ambulates independently, PMHx HIV, HTN, DM, GERD, DVT and ESRD on HD (MWF), sent from HD center, [place] for clotted left AVF access unable to dialyze today. She reports that this is not the first time that her access has clotted, but she is unable to recall when and what interventions were done to resolve issue. She states that she has been feeling weak with dizziness but attributed to her COVID infection, tested positive 2/13/23 but denies respiratory symptoms. Endorses medication compliance. Patient denies HA, chest pain, sob, n, v, abd pain, and changes in urination or BM.     This is a 68 y/o female with complex PMHx sent in from dialysis center for AVF, reportedly due to thrombus. Patient noted to be relatively hypotensive in ED, which she says has been happening for the past few days. She reports noticing more watery output from her colostomy bag. Denies abdominal pain. Denies fever or chills. Endorses dizziness. Otherwise no complaints. Patient did recently have COVID infection about 2 weeks ago. Will admit to medicine for AVF thrombus and further care regarding need for dialysis access.    Vital Signs Last 24 Hrs  T(C): 36.3 (25 Feb 2023 06:35), Max: 36.8 (25 Feb 2023 04:00)  T(F): 97.4 (25 Feb 2023 06:35), Max: 98.2 (25 Feb 2023 04:00)  HR: 86 (25 Feb 2023 06:35) (82 - 104)  BP: 118/86 (25 Feb 2023 06:35) (90/65 - 121/75)  BP(mean): 84 (25 Feb 2023 00:35) (74 - 84)  RR: 18 (25 Feb 2023 06:35) (18 - 18)  SpO2: 99% (25 Feb 2023 06:35) (99% - 100%)    Parameters below as of 25 Feb 2023 06:35  Patient On (Oxygen Delivery Method): room air    PEx  as above    #AVF thrombus  #ESRD on HD  #Hyperkalemia  #DM2  #HTN  #HIV  #Prophylactic measure    - admit to medicine  - hold off on anticoagulation at this time as patient may require temporary dialysis access  - vascular surgery following - appreciate recs  - hyperkalemia noted with hemolyzed specimen; f/u repeat K+  - patient not clinically volume overloaded; does not require urgent HD at this moment  - BP improved after 250cc bolus; monitor vitals  - send blood cultures  - nephrology consulted by ED

## 2023-02-25 NOTE — H&P ADULT - PROBLEM SELECTOR PLAN 1
sent from [place] [facility] for clotted LEFT AVF, unable to get dialyzed today   h/o DVT in the LE and reported prior clot in the AVF  last HD session was Wednesday 2/22/22  no thrill in the LEFT AVF   follows Dr. Reese for Nephrology  Nephrology consulted: Dr. Naidu   Vascular consulted  may need simón for continued HD if vascular unsuccessful sent from [place] [facility] for clotted LEFT AVF, unable to get dialyzed today   h/o DVT in the LE and reported prior clot in the AVF  last HD session was Wednesday 2/22/22  no thrill in the LEFT AVF   follows Dr. Reese for Nephrology  Nephrology consulted: Dr. Willard   Vascular consulted, follows with Dr. Martinez   may [facility] for continued HD if vascular unsuccessful

## 2023-02-25 NOTE — CONSULT NOTE ADULT - SUBJECTIVE AND OBJECTIVE BOX
VASCULAR CONSULT NOTE    chief complaint of Lt arm thrombosed AV fistula    HPI:  67F from home, ambulates independently, PMHx HIV (unknown CD4, VL unknown),  HTN, DM, GERD, DVT depression s/p robot-assisted reversal of Alfonso procedure on 1/25 with JANAE, appendectomy, end to end anastomosis and sigmoidoscopy with diverting ileostomy created and ESRD on HD (MWF), sent from HD center, [place] for clotted left AVF access unable to dialyze today. She reports that this is not the first time that her access has clotted, with a possible Lt arm fistulogram done to resolve it. Admits feeling weak with dizziness but attributed to her COVID infection, tested positive 2/13/23 but denies respiratory symptoms. Endorses medication compliance. Patient denies HA, chest pain, sob, n, v, abd pain, and changes in urination or BM.       S/P arteriovenous (AV) fistula creation  july 10 2020        MEDICATIONS  (STANDING):    MEDICATIONS  (PRN):  acetaminophen     Tablet .. 650 milliGRAM(s) Oral every 6 hours PRN Temp greater or equal to 38C (100.4F), Mild Pain (1 - 3)  aluminum hydroxide/magnesium hydroxide/simethicone Suspension 30 milliLiter(s) Oral every 4 hours PRN Dyspepsia  ondansetron Injectable 4 milliGRAM(s) IV Push every 8 hours PRN Nausea and/or Vomiting      Allergies    oxycodone (Rash)  penicillins (Urticaria; Rash)    Intolerances        Social History:      FAMILY HISTORY:  Family hx of hypertension    Family history of renal failure          Physical Exam:  Vital Signs Last 24 Hrs  T(C): 36.6 (25 Feb 2023 00:00), Max: 36.7 (24 Feb 2023 19:57)  T(F): 97.9 (25 Feb 2023 00:00), Max: 98 (24 Feb 2023 19:57)  HR: 82 (25 Feb 2023 00:35) (82 - 104)  BP: 111/72 (25 Feb 2023 00:35) (90/65 - 121/75)  BP(mean): 84 (25 Feb 2023 00:35) (74 - 84)  RR: 18 (25 Feb 2023 00:35) (18 - 18)  SpO2: 100% (25 Feb 2023 00:35) (99% - 100%)    Parameters below as of 25 Feb 2023 00:35  Patient On (Oxygen Delivery Method): room air        General:  A&Ox3,Appears stated age, No acute distress,  Head: NC/AT  EENT: PERRLA. EOMI. Conjunctiva and sclera clear. Pharynx clear.  Neck: Supple. No JVD  Lungs: CTA B/l. Nonlabored Respirations  CV: +S1S2, RRR  Abdomen: Soft, Nondistended,  Nontender, no guarding, no rebound  Extremities: Lt arm thrombosed AV fistula , no palpable thrill, no bruit, Other extremities are warm and well perfused. 2+ peripheral pulses b/l. Calf soft, nontender b/l. No pedal edema.            LABS:                        15.2   7.65  )-----------( 197      ( 24 Feb 2023 22:02 )             48.6     02-24    134<L>  |  100  |  64<H>  ----------------------------<  106<H>  6.0<H>   |  18<L>  |  12.20<H>    Ca    10.2      24 Feb 2023 23:30    TPro  8.6<H>  /  Alb  3.3<L>  /  TBili  0.4  /  DBili  x   /  AST  53<H>  /  ALT  61<H>  /  AlkPhos  206<H>  02-24    LIVER FUNCTIONS - ( 24 Feb 2023 23:30 )  Alb: 3.3 g/dL / Pro: 8.6 g/dL / ALK PHOS: 206 U/L / ALT: 61 U/L DA / AST: 53 U/L / GGT: x           PT/INR - ( 24 Feb 2023 22:02 )   PT: 13.6 sec;   INR: 1.14 ratio         PTT - ( 24 Feb 2023 22:02 )  PTT:40.3 sec      RADIOLOGY & ADDITIONAL STUDIES:  pending

## 2023-02-25 NOTE — H&P ADULT - PROBLEM SELECTOR PLAN 3
tested (+) COVID 2/13/23  already completed 10-d  isolation   not in respiratory distress, not hypoxic   No longer needing isolation precautions

## 2023-02-25 NOTE — CONSULT NOTE ADULT - SUBJECTIVE AND OBJECTIVE BOX
Chief complain/HPI:  ESRD due to hypertension was sent for clotted AVF .  History SARS-CoV-2 Result: Detected. Off isolation at present.        PAST MEDICAL & SURGICAL HISTORY:  HIV (human immunodeficiency virus infection)      HTN (hypertension)      Hyperlipidemia      Gout      History of gastroesophageal reflux (GERD)      Depression      Cervical cancer  2010      DVT, lower extremity  Left leg after hysterectomy      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      ESRD on hemodialysis      Colostomy present      History of ectopic pregnancy  Two      H/O: hysterectomy  Due to cervical cancer      S/P arteriovenous (AV) fistula creation  july 10 2020          Home Medications Reviewed    Hospital Medications:   MEDICATIONS  (STANDING):    MEDICATIONS  (PRN):  acetaminophen     Tablet .. 650 milliGRAM(s) Oral every 6 hours PRN Temp greater or equal to 38C (100.4F), Mild Pain (1 - 3)  aluminum hydroxide/magnesium hydroxide/simethicone Suspension 30 milliLiter(s) Oral every 4 hours PRN Dyspepsia  ondansetron Injectable 4 milliGRAM(s) IV Push every 8 hours PRN Nausea and/or Vomiting      Allergies    oxycodone (Rash)  penicillins (Urticaria; Rash)    Intolerances                              15.2   7.65  )-----------( 197      ( 24 Feb 2023 22:02 )             48.6     02-25    x   |  x   |  x   ----------------------------<  x   x    |  x   |  12.80<H>    Ca    10.2      24 Feb 2023 23:30  Phos  4.6     02-25  Mg     2.8     02-25    TPro  9.0<H>  /  Alb  x   /  TBili  0.3  /  DBili  x   /  AST  20  /  ALT  53  /  AlkPhos  216<H>  02-25    PT/INR - ( 24 Feb 2023 22:02 )   PT: 13.6 sec;   INR: 1.14 ratio         PTT - ( 24 Feb 2023 22:02 )  PTT:40.3 sec          RADIOLOGY & ADDITIONAL STUDIES:    SOCIAL HISTORY: Denies ETOh,Smoking,     FAMILY HISTORY:  Family hx of hypertension    Family history of renal failure        REVIEW OF SYSTEMS:  CONSTITUTIONAL: No malaise, No fatigue, No fevers or chills, well developed, no diaphoresis  EYES/ENT: No visual changes;  No vertigo or throat pain   NECK: No pain or stiffness  RESPIRATORY: No cough, wheezing, hemoptysis; No shortness of breath  CARDIOVASCULAR: No chest pain or palpitations. No edema  GASTROINTESTINAL: No abdominal or epigastric pain. No nausea, vomiting, or hematemesis; No diarrhea or constipation. No melena or hematochezia.  GENITOURINARY: No dysuria, frequency, foamy urine, urinary urgency, incontinence or hematuria  NEUROLOGICAL: No numbness or weakness, No tremor  SKIN: No itching, burning, rashes, or lesions   VASCULAR: No claudication  Musculoskeletal: no arthralgia, no myalgia  All other review of systems is negative unless indicated above.    VITALS:  Vital Signs Last 24 Hrs  T(C): 36.3 (25 Feb 2023 06:35), Max: 36.8 (25 Feb 2023 04:00)  T(F): 97.4 (25 Feb 2023 06:35), Max: 98.2 (25 Feb 2023 04:00)  HR: 86 (25 Feb 2023 06:35) (82 - 104)  BP: 118/86 (25 Feb 2023 06:35) (90/65 - 121/75)  BP(mean): 84 (25 Feb 2023 00:35) (74 - 84)  RR: 18 (25 Feb 2023 06:35) (18 - 18)  SpO2: 99% (25 Feb 2023 06:35) (99% - 100%)    Parameters below as of 25 Feb 2023 06:35  Patient On (Oxygen Delivery Method): room air        Height (cm): 157.5 (02-24 @ 19:57)  Weight (kg): 58.5 (02-24 @ 19:57)  BMI (kg/m2): 23.6 (02-24 @ 19:57)  BSA (m2): 1.59 (02-24 @ 19:57)    PHYSICAL EXAM:  Constitutional: NAD  HEENT: anicteric sclera, oropharynx clear, MMM  Neck: No JVD  Respiratory: good air entrance B/L, no wheezes, rales or rhonchi  Cardiovascular: S1, S2, RRR, no pericardial rub, no murmur  Gastrointestinal: BS+, soft, no tenderness, no distension, no bruit  Pelvis: bladder non-distended, no CVA tenderness  Extremities: No cyanosis or clubbing. No peripheral edema  Neurological: A/O x 3, no focal deficits  Psychiatric: Normal mood, normal affect  : No CVA tenderness. No terry.   Skin: No rashes  Vascular: all pulses present  Access:                     Chief complain/HPI:  ESRD due to hypertension was sent for clotted AVF . Vascular surgeon is Dr Martinez.  History SARS-CoV-2 Result: Detected. Off isolation at present.    PAST MEDICAL & SURGICAL HISTORY:  HIV (human immunodeficiency virus infection)  HTN (hypertension)  Hyperlipidemia  Gout  History of gastroesophageal reflux (GERD)  Depression  Cervical [id number]      DVT, lower extremity  Left leg after hysterectomy  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  ESRD on hemodialysis  Colostomy present  History of ectopic pregnancywo  H/O: hysterectomy  Due to cervical cancer      S/P arteriovenous (AV) fistula creation  july 10 2020          Home Medications Reviewed    Hospital Medications:   MEDICATIONS  (STANDING):    MEDICATIONS  (PRN):  acetaminophen     Tablet .. 650 milliGRAM(s) Oral every 6 hours PRN Temp greater or equal to 38C (100.4F), Mild Pain (1 - 3)  aluminum hydroxide/magnesium hydroxide/simethicone Suspension 30 milliLiter(s) Oral every 4 hours PRN Dyspepsia  ondansetron Injectable 4 milliGRAM(s) IV Push every 8 hours PRN Nausea and/or Vomiting      Allergies    oxycodone (Rash)  penicillins (Urticaria; Rash)    Intolerances                              15.2   7.65  )-----------( 197      ( 24 Feb 2023 22:02 )             48.6     02-25    134   | 18   |  106  ----------------------------<  x   4.6   |  x   |  12.80<H>    Ca    10.2      24 Feb 2023 23:30  Phos  4.6     02-25  Mg     2.8     02-25    TPro  9.0<H>  /  Alb  x   /  TBili  0.3  /  DBili  x   /  AST  20  /  ALT  53  /  AlkPhos  216<H>  02-25    PT/INR - ( 24 Feb 2023 22:02 )   PT: 13.6 sec;   INR: 1.14 ratio         PTT - ( 24 Feb 2023 22:02 )  PTT:40.3 sec          RADIOLOGY & ADDITIONAL STUDIES:    SOCIAL HISTORY: Denies ETOh,Smoking,     FAMILY HISTORY:  Family hx of hypertension    Family history of renal failure        REVIEW OF SYSTEMS:  CONSTITUTIONAL: No malaise, No fatigue, No fevers or chills, well developed, no diaphoresis  EYES/ENT: No visual changes;  No vertigo or throat pain   NECK: No pain or stiffness  RESPIRATORY: No cough, wheezing, hemoptysis; No shortness of breath  CARDIOVASCULAR: No chest pain or palpitations. No edema  GASTROINTESTINAL: Vomit one time in am ; Left colostomy . Appetite is good at home  GENITOURINARY: No dysuria, frequency, foamy urine, urinary urgency, incontinence or hematuria  NEUROLOGICAL: No numbness or weakness, No tremor    Musculoskeletal: no arthralgia, no myalgia  All other review of systems is negative unless indicated above.    VITALS:  Vital Signs Last 24 Hrs  T(C): 36.3 (25 Feb 2023 06:35), Max: 36.8 (25 Feb 2023 04:00)  T(F): 97.4 (25 Feb 2023 06:35), Max: 98.2 (25 Feb 2023 04:00)  HR: 86 (25 Feb 2023 06:35) (82 - 104)  BP: 118/86 (25 Feb 2023 06:35) (90/65 - 121/75)  BP(mean): 84 (25 Feb 2023 00:35) (74 - 84)  RR: 18 (25 Feb 2023 06:35) (18 - 18)  SpO2: 99% (25 Feb 2023 06:35) (99% - 100%)    Parameters below as of 25 Feb 2023 06:35  Patient On (Oxygen Delivery Method): room air        Height (cm): 157.5 (02-24 @ 19:57)  Weight (kg): 58.5 (02-24 @ 19:57)  BMI (kg/m2): 23.6 (02-24 @ 19:57)  BSA (m2): 1.59 (02-24 @ 19:57)    PHYSICAL EXAM:  Constitutional: NAD  HEENT: anicteric sclera, oropharynx clear, MMM  Neck: No JVD  Respiratory: good air entrance B/L, no wheezes, rales or rhonchi  Cardiovascular: S1, S2, RRR, no pericardial rub, no murmur  Gastrointestinal: BS+, soft, no tenderness, no distension, no bruit  Pelvis: bladder non-distended, no CVA tenderness  Extremities: No cyanosis or clubbing. No peripheral edema  Neurological: A/O x 3, no focal deficits                       Chief complain/HPI:  ESRD due to hypertension was sent for clotted AVF . Vascular surgeon is Dr Martinez.  History SARS-CoV-2 Result: Detected. Off isolation at present.  Hypercalcemia with SHPTH last  02/01/2023    PAST MEDICAL & SURGICAL HISTORY:  HIV (human immunodeficiency virus infection)  HTN (hypertension)  Hyperlipidemia  Gout  History of gastroesophageal reflux (GERD)  Depression  Cervical [id number]      DVT, lower extremity  Left leg after hysterectomy  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  ESRD on hemodialysis  Colostomy present  History of ectopic pregnancywo  H/O: hysterectomy  Due to cervical cancer      S/P arteriovenous (AV) fistula creation  july 10 2020          Home Medications Reviewed    Hospital Medications:   MEDICATIONS  (STANDING):    MEDICATIONS  (PRN):  acetaminophen     Tablet .. 650 milliGRAM(s) Oral every 6 hours PRN Temp greater or equal to 38C (100.4F), Mild Pain (1 - 3)  aluminum hydroxide/magnesium hydroxide/simethicone Suspension 30 milliLiter(s) Oral every 4 hours PRN Dyspepsia  ondansetron Injectable 4 milliGRAM(s) IV Push every 8 hours PRN Nausea and/or Vomiting      Allergies    oxycodone (Rash)  penicillins (Urticaria; Rash)    Intolerances                              15.2   7.65  )-----------( 197      ( 24 Feb 2023 22:02 )             48.6     02-25    134   | 18   |  106  ----------------------------<  x   4.6   |  x   |  12.80<H>    Ca    10.2      24 Feb 2023 23:30  Phos  4.6     02-25  Mg     2.8     02-25    TPro  9.0<H>  /  Alb  x   /  TBili  0.3  /  DBili  x   /  AST  20  /  ALT  53  /  AlkPhos  216<H>  02-25    PT/INR - ( 24 Feb 2023 22:02 )   PT: 13.6 sec;   INR: 1.14 ratio         PTT - ( 24 Feb 2023 22:02 )  PTT:40.3 sec          RADIOLOGY & ADDITIONAL STUDIES:    SOCIAL HISTORY: Denies ETOh,Smoking,     FAMILY HISTORY:  Family hx of hypertension    Family history of renal failure        REVIEW OF SYSTEMS:  CONSTITUTIONAL: No malaise, No fatigue, No fevers or chills, well developed, no diaphoresis  EYES/ENT: No visual changes;  No vertigo or throat pain   NECK: No pain or stiffness  RESPIRATORY: No cough, wheezing, hemoptysis; No shortness of breath  CARDIOVASCULAR: No chest pain or palpitations. No edema  GASTROINTESTINAL: Vomit one time in am ; Left colostomy . Appetite is good at home  GENITOURINARY: No dysuria, frequency, foamy urine, urinary urgency, incontinence or hematuria  NEUROLOGICAL: No numbness or weakness, No tremor    Musculoskeletal: no arthralgia, no myalgia  All other review of systems is negative unless indicated above.    VITALS:  Vital Signs Last 24 Hrs  T(C): 36.3 (25 Feb 2023 06:35), Max: 36.8 (25 Feb 2023 04:00)  T(F): 97.4 (25 Feb 2023 06:35), Max: 98.2 (25 Feb 2023 04:00)  HR: 86 (25 Feb 2023 06:35) (82 - 104)  BP: 118/86 (25 Feb 2023 06:35) (90/65 - 121/75)  BP(mean): 84 (25 Feb 2023 00:35) (74 - 84)  RR: 18 (25 Feb 2023 06:35) (18 - 18)  SpO2: 99% (25 Feb 2023 06:35) (99% - 100%)    Parameters below as of 25 Feb 2023 06:35  Patient On (Oxygen Delivery Method): room air        Height (cm): 157.5 (02-24 @ 19:57)  Weight (kg): 58.5 (02-24 @ 19:57)  BMI (kg/m2): 23.6 (02-24 @ 19:57)  BSA (m2): 1.59 (02-24 @ 19:57)    PHYSICAL EXAM:  Constitutional: NAD  HEENT: anicteric sclera, oropharynx clear, MMM  Neck: No JVD  Respiratory: good air entrance B/L, no wheezes, rales or rhonchi  Cardiovascular: S1, S2, RRR, no pericardial rub, no murmur  Gastrointestinal: BS+, soft, no tenderness, no distension, no bruit  Pelvis: bladder non-distended, no CVA tenderness  Extremities: No cyanosis or clubbing. No peripheral edema  Neurological: A/O x 3, no focal deficits

## 2023-02-25 NOTE — H&P ADULT - PROBLEM SELECTOR PLAN 4
h/o ESRD on HD (MWF) at [place] follows Dr. Reese   unable to dialyze o/p d/t clotted AVF, last HD session Wednesday 2/22/23  Vascular consulted for possible thrombectomy vs simón for continued HD while inpatient   HD as per Nephrology   c/w home meds   Nephrology consulted: Dr. Naidu h/o ESRD on HD (MWF) at [place] follows Dr. Reese   unable to dialyze o/p d/t clotted AVF, last HD session Wednesday 2/22/23  Vascular consulted for possible thrombectomy vs simón for continued HD while inpatient   HD as per Nephrology   c/w home meds   Nephrology consulted: Dr. Willard

## 2023-02-25 NOTE — H&P ADULT - ASSESSMENT
67F from home, ambulates independently, PMHx HIV, HTN, DM, GERD, DVT and ESRD on HD (MWF), sent from HD center, [place] for clotted left AVF access unable to dialyze. Admitted for AVF thrombus.

## 2023-02-25 NOTE — PROGRESS NOTE ADULT - SUBJECTIVE AND OBJECTIVE BOX
Called to see patient for severe LLQ abdominal pain at the ostomy site.   Patient examined - VSS, ostomy appliance fitting well, no skin excoriation, bilious / liquid output, bowel is above skin level, viable. No pain with palpation over the site, rest of abdomen - soft, non-tender.     Plan:  CT with IV contrast r/o incarcerated parastomal hernia  Pain control  d/w Dr Seals

## 2023-02-25 NOTE — H&P ADULT - HISTORY OF PRESENT ILLNESS
67F from home, ambulates independently, PMHx HIV, HTN, DM, GERD, DVT and ESRD on HD (MWF), sent from HD center, [place] for clotted left AVF access unable to dialyze today. She reports that this is not the first time that her access has clotted, but she is unable to recall when and what interventions were done to resolve issue. She states that she has been feeling weak with dizziness but attributed to her COVID infection, tested positive 2/13/23 but denies respiratory symptoms. Endorses medication compliance. Patient denies HA, chest pain, sob, n, v, abd pain, and changes in urination or BM.

## 2023-02-25 NOTE — PROGRESS NOTE ADULT - SUBJECTIVE AND OBJECTIVE BOX
INTERVAL HPI/OVERNIGHT EVENTS:  Pt seen and examined at bedside.   Consent obtained, all questions answered. Right femoral shiley catheter placed at bedside, ultrasound guided. For dialysis today, Dr. Catalan nephro     MEDICATIONS  (STANDING):  acetaminophen   IVPB .. 1000 milliGRAM(s) IV Intermittent once    MEDICATIONS  (PRN):  acetaminophen     Tablet .. 650 milliGRAM(s) Oral every 6 hours PRN Temp greater or equal to 38C (100.4F), Mild Pain (1 - 3)  aluminum hydroxide/magnesium hydroxide/simethicone Suspension 30 milliLiter(s) Oral every 4 hours PRN Dyspepsia  ondansetron Injectable 4 milliGRAM(s) IV Push every 8 hours PRN Nausea and/or Vomiting      Vital Signs Last 24 Hrs  T(C): 36.3 (25 Feb 2023 06:35), Max: 36.8 (25 Feb 2023 04:00)  T(F): 97.4 (25 Feb 2023 06:35), Max: 98.2 (25 Feb 2023 04:00)  HR: 86 (25 Feb 2023 06:35) (82 - 104)  BP: 118/86 (25 Feb 2023 06:35) (90/65 - 121/75)  BP(mean): 84 (25 Feb 2023 00:35) (74 - 84)  RR: 18 (25 Feb 2023 06:35) (18 - 18)  SpO2: 99% (25 Feb 2023 06:35) (99% - 100%)    Parameters below as of 25 Feb 2023 06:35  Patient On (Oxygen Delivery Method): room air        Physical:  General: A&Ox3. NAD.  Respirations: Unlabored   LUE: Left AVF without thrill, no signs of infection  Vascular: 2+femoral pulses, right femoral vessels visualized on ultrasound    I&O's Detail      LABS:                        15.0   6.78  )-----------( 205      ( 25 Feb 2023 05:05 )             45.7             02-25    134<L>  |  95<L>  |  69<H>  ----------------------------<  123<H>  4.6   |  27  |  12.80<H>    Ca    10.8<H>      25 Feb 2023 05:05  Phos  4.6     02-25  Mg     2.8     02-25    TPro  9.0<H>  /  Alb  3.5  /  TBili  0.3  /  DBili  x   /  AST  20  /  ALT  53  /  AlkPhos  216<H>  02-25

## 2023-02-25 NOTE — PROGRESS NOTE ADULT - ASSESSMENT
67F from home, ambulates independently, PMHx HIV (unknown CD4, VL unknown),  HTN, DM, GERD, DVT depression s/p robot-assisted reversal of Alfonso procedure on 1/25 with JANAE, appendectomy, end to end anastomosis and sigmoidoscopy with diverting ileostomy created and ESRD on HD (MWF), sent from HD center, [place] for clotted left AVF access unable to dialyze today. She reports that this is not the first time that her access has clotted, with a possible Lt arm fistulogram done to resolve it. Admits feeling weak with dizziness but attributed to her COVID infection, tested positive 2/13/23 but denies respiratory symptoms.     s/p right fem shiley placement 2/25    -Groin check tonight  -HD as per nephro    -Planning of management of AVF, consider IR consult for PC placement   -Discussed with attending

## 2023-02-25 NOTE — CONSULT NOTE ADULT - ASSESSMENT
67F from home, ambulates independently, PMHx HIV (unknown CD4, VL unknown),  HTN, DM, GERD, DVT depression s/p robot-assisted reversal of Alfonso procedure on 1/25 with JANAE, appendectomy, end to end anastomosis and sigmoidoscopy with diverting ileostomy created and ESRD on HD (MWF), sent from HD center, [place] for clotted left AVF access unable to dialyze today. She reports that this is not the first time that her access has clotted, with a possible Lt arm fistulogram done to resolve it. Admits feeling weak with dizziness but attributed to her COVID infection, tested positive 2/13/23 but denies respiratory symptoms.     Replete Electrolytes   Nephrology consult   ID consult   Other care/ mgmt per primary team   Vascular will follow/update

## 2023-02-25 NOTE — CONSULT NOTE ADULT - ASSESSMENT
ESRD due to Hypertension  Admitted for clotted AVF .  Hyperkalemia, increased BUN/Creatinine in need for dialysis today.    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        Plan;  Follow UP with Vascular surgery was called await placement of femoral catheter for dialysis.         ESRD due to Hypertension  Admitted for clotted AVF .  Hyperkalemia, increased BUN/Creatinine in need for dialysis today.    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  Hypercalcemia last vitamin D was 54, follow 1,25 oh vitamin d and PTH  Increased H/H possible dehydration  Hypotension, her BP is around 120 110 systolic , admission BP 96.              Plan;  Follow UP with Vascular surgery was called await placement of femoral catheter for dialysis.  No fluid removal in dialysis  Start IV fluids , NS 50 cc per hour for 16 hours  Follow lab in am    Dialysis via temporary catheter.  Possible discharge on Monday to follow with DR Farooq for AVF angioplasty.         ESRD due to Hypertension  Admitted for clotted AVF .  Hyperkalemia, increased BUN/Creatinine in need for dialysis today.    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  Hypercalcemia - SHPTH , 02/01 calcium was 9.6, l vitamin D was 54, follow 1,25 oh vitamin.  Increased H/H possible dehydration  Hypotension, her BP is around 120 110 systolic , admission BP 96.                  Plan;  Follow UP with Vascular surgery was called await placement of femoral catheter for dialysis.  No fluid removal in dialysis  Start IV fluids , NS 50 cc per hour for 16 hours  Follow lab in am.    Dialysis via temporary catheter.  Possible discharge on Monday to follow with DR Farooq for AVF angioplasty.         ESRD due to Hypertension  Admitted for clotted AVF .  Hyperkalemia, increased BUN/Creatinine in need for dialysis today.    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  Hypercalcemia - SHPTH , 02/01 calcium was 9.6, l vitamin D was 54, follow 1,25 oh vitamin. Start Cinacalcet 30 mg daily.   Increased H/H possible dehydration  Hypotension, her BP is around 120 110 systolic , admission BP 96.                  Plan;  Follow UP with Vascular surgery was called await placement of femoral catheter for dialysis.  No fluid removal in dialysis  Start IV fluids , NS 50 cc per hour for 16 hours  Follow lab in am.    Dialysis via temporary catheter.  Possible discharge on Monday to follow with DR Farooq for AVF angioplasty.

## 2023-02-26 LAB
ALBUMIN SERPL ELPH-MCNC: 2.8 G/DL — LOW (ref 3.5–5)
ALP SERPL-CCNC: 186 U/L — HIGH (ref 40–120)
ALT FLD-CCNC: 38 U/L DA — SIGNIFICANT CHANGE UP (ref 10–60)
ANION GAP SERPL CALC-SCNC: 6 MMOL/L — SIGNIFICANT CHANGE UP (ref 5–17)
AST SERPL-CCNC: 26 U/L — SIGNIFICANT CHANGE UP (ref 10–40)
BASOPHILS # BLD AUTO: 0.04 K/UL — SIGNIFICANT CHANGE UP (ref 0–0.2)
BASOPHILS NFR BLD AUTO: 0.7 % — SIGNIFICANT CHANGE UP (ref 0–2)
BILIRUB SERPL-MCNC: 0.3 MG/DL — SIGNIFICANT CHANGE UP (ref 0.2–1.2)
BUN SERPL-MCNC: 36 MG/DL — HIGH (ref 7–18)
CALCIUM SERPL-MCNC: 9.5 MG/DL — SIGNIFICANT CHANGE UP (ref 8.4–10.5)
CHLORIDE SERPL-SCNC: 97 MMOL/L — SIGNIFICANT CHANGE UP (ref 96–108)
CO2 SERPL-SCNC: 30 MMOL/L — SIGNIFICANT CHANGE UP (ref 22–31)
CREAT SERPL-MCNC: 8.59 MG/DL — HIGH (ref 0.5–1.3)
EGFR: 5 ML/MIN/1.73M2 — LOW
EOSINOPHIL # BLD AUTO: 0.28 K/UL — SIGNIFICANT CHANGE UP (ref 0–0.5)
EOSINOPHIL NFR BLD AUTO: 5 % — SIGNIFICANT CHANGE UP (ref 0–6)
GLUCOSE SERPL-MCNC: 89 MG/DL — SIGNIFICANT CHANGE UP (ref 70–99)
HBV CORE AB SER-ACNC: SIGNIFICANT CHANGE UP
HBV SURFACE AB SER-ACNC: SIGNIFICANT CHANGE UP
HBV SURFACE AG SER-ACNC: SIGNIFICANT CHANGE UP
HCT VFR BLD CALC: 38.8 % — SIGNIFICANT CHANGE UP (ref 34.5–45)
HCV AB S/CO SERPL IA: 0.07 S/CO — SIGNIFICANT CHANGE UP (ref 0–0.99)
HCV AB SERPL-IMP: SIGNIFICANT CHANGE UP
HGB BLD-MCNC: 12.7 G/DL — SIGNIFICANT CHANGE UP (ref 11.5–15.5)
IMM GRANULOCYTES NFR BLD AUTO: 0.4 % — SIGNIFICANT CHANGE UP (ref 0–0.9)
LYMPHOCYTES # BLD AUTO: 1.95 K/UL — SIGNIFICANT CHANGE UP (ref 1–3.3)
LYMPHOCYTES # BLD AUTO: 34.8 % — SIGNIFICANT CHANGE UP (ref 13–44)
MAGNESIUM SERPL-MCNC: 2.3 MG/DL — SIGNIFICANT CHANGE UP (ref 1.6–2.6)
MCHC RBC-ENTMCNC: 32.5 PG — SIGNIFICANT CHANGE UP (ref 27–34)
MCHC RBC-ENTMCNC: 32.7 GM/DL — SIGNIFICANT CHANGE UP (ref 32–36)
MCV RBC AUTO: 99.2 FL — SIGNIFICANT CHANGE UP (ref 80–100)
MONOCYTES # BLD AUTO: 0.64 K/UL — SIGNIFICANT CHANGE UP (ref 0–0.9)
MONOCYTES NFR BLD AUTO: 11.4 % — SIGNIFICANT CHANGE UP (ref 2–14)
NEUTROPHILS # BLD AUTO: 2.68 K/UL — SIGNIFICANT CHANGE UP (ref 1.8–7.4)
NEUTROPHILS NFR BLD AUTO: 47.7 % — SIGNIFICANT CHANGE UP (ref 43–77)
NRBC # BLD: 0 /100 WBCS — SIGNIFICANT CHANGE UP (ref 0–0)
PHOSPHATE SERPL-MCNC: 5.5 MG/DL — HIGH (ref 2.5–4.5)
PLATELET # BLD AUTO: 158 K/UL — SIGNIFICANT CHANGE UP (ref 150–400)
POTASSIUM SERPL-MCNC: 4.7 MMOL/L — SIGNIFICANT CHANGE UP (ref 3.5–5.3)
POTASSIUM SERPL-SCNC: 4.7 MMOL/L — SIGNIFICANT CHANGE UP (ref 3.5–5.3)
PROT SERPL-MCNC: 7.4 G/DL — SIGNIFICANT CHANGE UP (ref 6–8.3)
RBC # BLD: 3.91 M/UL — SIGNIFICANT CHANGE UP (ref 3.8–5.2)
RBC # FLD: 17.4 % — HIGH (ref 10.3–14.5)
SODIUM SERPL-SCNC: 133 MMOL/L — LOW (ref 135–145)
VIT D25+D1,25 OH+D1,25 PNL SERPL-MCNC: 30.1 PG/ML — SIGNIFICANT CHANGE UP (ref 19.9–79.3)
WBC # BLD: 5.61 K/UL — SIGNIFICANT CHANGE UP (ref 3.8–10.5)
WBC # FLD AUTO: 5.61 K/UL — SIGNIFICANT CHANGE UP (ref 3.8–10.5)

## 2023-02-26 PROCEDURE — 99233 SBSQ HOSP IP/OBS HIGH 50: CPT | Mod: GC

## 2023-02-26 RX ORDER — LAMIVUDINE 150 MG
0 TABLET ORAL
Qty: 0 | Refills: 0 | DISCHARGE

## 2023-02-26 RX ORDER — OMEPRAZOLE 10 MG/1
0 CAPSULE, DELAYED RELEASE ORAL
Qty: 0 | Refills: 0 | DISCHARGE

## 2023-02-26 RX ORDER — DOLUTEGRAVIR SODIUM 25 MG/1
50 TABLET, FILM COATED ORAL DAILY
Refills: 0 | Status: DISCONTINUED | OUTPATIENT
Start: 2023-02-26 | End: 2023-02-28

## 2023-02-26 RX ORDER — ACETAMINOPHEN 500 MG
1000 TABLET ORAL ONCE
Refills: 0 | Status: COMPLETED | OUTPATIENT
Start: 2023-02-26 | End: 2023-02-26

## 2023-02-26 RX ORDER — LANOLIN ALCOHOL/MO/W.PET/CERES
3 CREAM (GRAM) TOPICAL ONCE
Refills: 0 | Status: COMPLETED | OUTPATIENT
Start: 2023-02-26 | End: 2023-02-26

## 2023-02-26 RX ORDER — DOLUTEGRAVIR SODIUM 25 MG/1
0 TABLET, FILM COATED ORAL
Qty: 0 | Refills: 0 | DISCHARGE

## 2023-02-26 RX ORDER — METOPROLOL TARTRATE 50 MG
1 TABLET ORAL
Qty: 0 | Refills: 0 | DISCHARGE

## 2023-02-26 RX ORDER — LAMIVUDINE 150 MG
100 TABLET ORAL DAILY
Refills: 0 | Status: DISCONTINUED | OUTPATIENT
Start: 2023-02-26 | End: 2023-02-28

## 2023-02-26 RX ORDER — HYDROXYZINE HCL 10 MG
0 TABLET ORAL
Qty: 0 | Refills: 0 | DISCHARGE

## 2023-02-26 RX ORDER — HYDRALAZINE HCL 50 MG
0 TABLET ORAL
Qty: 0 | Refills: 0 | DISCHARGE

## 2023-02-26 RX ORDER — GABAPENTIN 400 MG/1
0 CAPSULE ORAL
Qty: 0 | Refills: 0 | DISCHARGE

## 2023-02-26 RX ORDER — METOPROLOL TARTRATE 50 MG
0 TABLET ORAL
Qty: 0 | Refills: 0 | DISCHARGE

## 2023-02-26 RX ORDER — DARUNAVIR ETHANOLATE AND COBICISTAT 800; 150 MG/1; MG/1
0 TABLET, FILM COATED ORAL
Qty: 0 | Refills: 0 | DISCHARGE

## 2023-02-26 RX ORDER — ISOSORBIDE MONONITRATE 60 MG/1
0 TABLET, EXTENDED RELEASE ORAL
Qty: 0 | Refills: 0 | DISCHARGE

## 2023-02-26 RX ORDER — DARUNAVIR ETHANOLATE AND COBICISTAT 800; 150 MG/1; MG/1
1 TABLET, FILM COATED ORAL DAILY
Refills: 0 | Status: DISCONTINUED | OUTPATIENT
Start: 2023-02-26 | End: 2023-02-28

## 2023-02-26 RX ORDER — CITALOPRAM 10 MG/1
0 TABLET, FILM COATED ORAL
Qty: 0 | Refills: 0 | DISCHARGE

## 2023-02-26 RX ORDER — TRAMADOL HYDROCHLORIDE 50 MG/1
0 TABLET ORAL
Qty: 0 | Refills: 0 | DISCHARGE

## 2023-02-26 RX ORDER — CITALOPRAM 10 MG/1
20 TABLET, FILM COATED ORAL DAILY
Refills: 0 | Status: DISCONTINUED | OUTPATIENT
Start: 2023-02-26 | End: 2023-02-28

## 2023-02-26 RX ADMIN — Medication 400 MILLIGRAM(S): at 14:51

## 2023-02-26 RX ADMIN — DARUNAVIR ETHANOLATE AND COBICISTAT 1 TABLET(S): 800; 150 TABLET, FILM COATED ORAL at 14:55

## 2023-02-26 RX ADMIN — Medication 3 MILLIGRAM(S): at 01:06

## 2023-02-26 RX ADMIN — DOLUTEGRAVIR SODIUM 50 MILLIGRAM(S): 25 TABLET, FILM COATED ORAL at 14:55

## 2023-02-26 RX ADMIN — HEPARIN SODIUM 5000 UNIT(S): 5000 INJECTION INTRAVENOUS; SUBCUTANEOUS at 06:49

## 2023-02-26 RX ADMIN — HEPARIN SODIUM 5000 UNIT(S): 5000 INJECTION INTRAVENOUS; SUBCUTANEOUS at 21:48

## 2023-02-26 RX ADMIN — CINACALCET 30 MILLIGRAM(S): 30 TABLET, FILM COATED ORAL at 12:41

## 2023-02-26 RX ADMIN — Medication 100 MILLIGRAM(S): at 14:56

## 2023-02-26 RX ADMIN — Medication 1 TABLET(S): at 12:40

## 2023-02-26 RX ADMIN — HEPARIN SODIUM 5000 UNIT(S): 5000 INJECTION INTRAVENOUS; SUBCUTANEOUS at 14:56

## 2023-02-26 RX ADMIN — CITALOPRAM 20 MILLIGRAM(S): 10 TABLET, FILM COATED ORAL at 14:56

## 2023-02-26 RX ADMIN — Medication 1000 MILLIGRAM(S): at 15:48

## 2023-02-26 NOTE — PROGRESS NOTE ADULT - PROBLEM SELECTOR PLAN 1
sent from [facility] [place] [facility] for clotted LEFT AVF, unable to get dialyzed today   h/o DVT in the LE and reported prior clot in the AVF  last HD session was Wednesday 2/22/22  no thrill in the LEFT AVF   follows Dr. Reese for Nephrology  Nephrology consulted: Dr. Willard   Vascular consulted, follows with Dr. Martinez   [facility] for continued HD if vascular unsuccessful sent from [facility] [facility] for clotted LEFT AVF, unable to get dialyzed today   h/o DVT in the LE and reported prior clot in the AVF  last HD session was Wednesday 2/22/22  no thrill in the LEFT AVF   follows Dr. Reese for Nephrology  Nephrology consulted: Dr. Willard   Vascular consulted, follows with Dr. Juan rivera f/u recs  S/p R fem Shiley placement (2/26), and underwent HD 2/26

## 2023-02-26 NOTE — PROGRESS NOTE ADULT - SUBJECTIVE AND OBJECTIVE BOX
PGY-1 Progress Note discussed with attending    PLEASE CONTACT ON CALL TEAM:  - On Call Team (Please refer to Danny) FROM 5:00 PM - 8:30PM  - Nightfloat Team FROM 8:30 -7:30 AM    CHIEF COMPLAINT & BRIEF HOSPITAL COURSE: 67F from home, ambulates independently, PMHx HIV, HTN, DM, GERD, DVT and ESRD on HD (MWF), sent from HD center, [place] for clotted left AVF access unable to dialyze. Admitted for AVF thrombus. S/p R fem Shiley placement (2/26), and underwent HD 2/26.     INTERVAL HPI/OVERNIGHT EVENTS: No acute events overnight.   MEDICATIONS  (STANDING):  cinacalcet 30 milliGRAM(s) Oral daily  heparin   Injectable 5000 Unit(s) SubCutaneous every 8 hours  Nephro-alex 1 Tablet(s) Oral daily  sodium chloride 0.9%. 1000 milliLiter(s) (50 mL/Hr) IV Continuous <Continuous>    MEDICATIONS  (PRN):  acetaminophen     Tablet .. 650 milliGRAM(s) Oral every 6 hours PRN Temp greater or equal to 38C (100.4F), Mild Pain (1 - 3)  ondansetron Injectable 4 milliGRAM(s) IV Push every 8 hours PRN Nausea and/or Vomiting      REVIEW OF SYSTEMS:  CONSTITUTIONAL: No fever, weight loss, or fatigue  RESPIRATORY: No cough, wheezing, chills or hemoptysis; No shortness of breath  CARDIOVASCULAR: No chest pain, palpitations, dizziness, or leg swelling  GASTROINTESTINAL: No abdominal pain. No nausea, vomiting, or hematemesis; No diarrhea or constipation. No melena or hematochezia.  GENITOURINARY: No dysuria or hematuria, urinary frequency  NEUROLOGICAL: No headaches, memory loss, loss of strength, numbness, or tremors  SKIN: No itching, burning, rashes, or lesions     Vital Signs Last 24 Hrs  T(C): 36.9 (26 Feb 2023 04:59), Max: 36.9 (26 Feb 2023 04:59)  T(F): 98.5 (26 Feb 2023 04:59), Max: 98.5 (26 Feb 2023 04:59)  HR: 84 (26 Feb 2023 04:59) (84 - 100)  BP: 116/76 (26 Feb 2023 04:59) (100/68 - 121/82)  BP(mean): --  RR: 18 (26 Feb 2023 04:59) (16 - 18)  SpO2: 100% (26 Feb 2023 04:59) (98% - 100%)    Parameters below as of 26 Feb 2023 04:59  Patient On (Oxygen Delivery Method): room air        PHYSICAL EXAMINATION:  GENERAL: NAD, well built  HEAD:  Atraumatic, Normocephalic  EYES:  conjunctiva and sclera clear  NECK: Supple, No JVD, Normal thyroid  CHEST/LUNG: Clear to auscultation. Clear to percussion bilaterally; No rales, rhonchi, wheezing, or rubs  HEART: Regular rate and rhythm; No murmurs, rubs, or gallops  ABDOMEN: Soft, Nontender, Nondistended; Bowel sounds present  NERVOUS SYSTEM:  Alert & Oriented X3,    EXTREMITIES:  2+ Peripheral Pulses, No clubbing, cyanosis, or edema  SKIN: warm dry                          12.7   5.61  )-----------( 158      ( 26 Feb 2023 05:47 )             38.8     02-26    133<L>  |  97  |  36<H>  ----------------------------<  89  4.7   |  30  |  8.59<H>    Ca    9.5      26 Feb 2023 05:47  Phos  5.5     02-26  Mg     2.3     02-26    TPro  7.4  /  Alb  2.8<L>  /  TBili  0.3  /  DBili  x   /  AST  26  /  ALT  38  /  AlkPhos  186<H>  02-26    LIVER FUNCTIONS - ( 26 Feb 2023 05:47 )  Alb: 2.8 g/dL / Pro: 7.4 g/dL / ALK PHOS: 186 U/L / ALT: 38 U/L DA / AST: 26 U/L / GGT: x               PT/INR - ( 24 Feb 2023 22:02 )   PT: 13.6 sec;   INR: 1.14 ratio         PTT - ( 24 Feb 2023 22:02 )  PTT:40.3 sec    CAPILLARY BLOOD GLUCOSE      RADIOLOGY & ADDITIONAL TESTS:                   PGY-1 Progress Note discussed with attending    PLEASE CONTACT ON CALL TEAM:  - On Call Team (Please refer to Danny) FROM 5:00 PM - 8:30PM  - Nightfloat Team FROM 8:30 -7:30 AM    CHIEF COMPLAINT & BRIEF HOSPITAL COURSE: 67F from home, ambulates independently, PMHx HIV, HTN, DM, GERD, DVT and ESRD on HD (MWF), sent from HD center, [place] for clotted left AVF access unable to dialyze. Admitted for AVF thrombus. S/p R fem Shiley placement (2/26), and underwent HD 2/26.     INTERVAL HPI/OVERNIGHT EVENTS: No acute events overnight. Pt no longer complaining of abdominal pain. Went for HD yesterday, after R fem shiley placement. Ostomy bag in place.   MEDICATIONS  (STANDING):  cinacalcet 30 milliGRAM(s) Oral daily  heparin   Injectable 5000 Unit(s) SubCutaneous every 8 hours  Nephro-alex 1 Tablet(s) Oral daily  sodium chloride 0.9%. 1000 milliLiter(s) (50 mL/Hr) IV Continuous <Continuous>    MEDICATIONS  (PRN):  acetaminophen     Tablet .. 650 milliGRAM(s) Oral every 6 hours PRN Temp greater or equal to 38C (100.4F), Mild Pain (1 - 3)  ondansetron Injectable 4 milliGRAM(s) IV Push every 8 hours PRN Nausea and/or Vomiting      REVIEW OF SYSTEMS:  CONSTITUTIONAL: No fever, weight loss, or fatigue  RESPIRATORY: No cough, wheezing, chills or hemoptysis; No shortness of breath  CARDIOVASCULAR: No chest pain, palpitations, dizziness, or leg swelling  GASTROINTESTINAL: No abdominal pain. No nausea, vomiting, or hematemesis; No diarrhea or constipation. No melena or hematochezia.  GENITOURINARY: No dysuria or hematuria, urinary frequency  NEUROLOGICAL: No headaches, memory loss, loss of strength, numbness, or tremors  SKIN: No itching, burning, rashes, or lesions     Vital Signs Last 24 Hrs  T(C): 36.9 (26 Feb 2023 04:59), Max: 36.9 (26 Feb 2023 04:59)  T(F): 98.5 (26 Feb 2023 04:59), Max: 98.5 (26 Feb 2023 04:59)  HR: 84 (26 Feb 2023 04:59) (84 - 100)  BP: 116/76 (26 Feb 2023 04:59) (100/68 - 121/82)  BP(mean): --  RR: 18 (26 Feb 2023 04:59) (16 - 18)  SpO2: 100% (26 Feb 2023 04:59) (98% - 100%)    Parameters below as of 26 Feb 2023 04:59  Patient On (Oxygen Delivery Method): room air        PHYSICAL EXAMINATION:  GENERAL: NAD, well built  HEAD:  Atraumatic, Normocephalic  EYES:  conjunctiva and sclera clear  NECK: Supple, No JVD, Normal thyroid  CHEST/LUNG: Clear to auscultation. Clear to percussion bilaterally; No rales, rhonchi, wheezing, or rubs  HEART: Regular rate and rhythm; No murmurs, rubs, or gallops  ABDOMEN: Soft, Nontender, Nondistended; Bowel sounds present. ostomy bag in place   NERVOUS SYSTEM:  Alert & Oriented X3,  no focal neuro deficits   EXTREMITIES:  2+ Peripheral Pulses, No clubbing, cyanosis, or edema  SKIN: warm dry                          12.7   5.61  )-----------( 158      ( 26 Feb 2023 05:47 )             38.8     02-26    133<L>  |  97  |  36<H>  ----------------------------<  89  4.7   |  30  |  8.59<H>    Ca    9.5      26 Feb 2023 05:47  Phos  5.5     02-26  Mg     2.3     02-26    TPro  7.4  /  Alb  2.8<L>  /  TBili  0.3  /  DBili  x   /  AST  26  /  ALT  38  /  AlkPhos  186<H>  02-26    LIVER FUNCTIONS - ( 26 Feb 2023 05:47 )  Alb: 2.8 g/dL / Pro: 7.4 g/dL / ALK PHOS: 186 U/L / ALT: 38 U/L DA / AST: 26 U/L / GGT: x               PT/INR - ( 24 Feb 2023 22:02 )   PT: 13.6 sec;   INR: 1.14 ratio         PTT - ( 24 Feb 2023 22:02 )  PTT:40.3 sec    CAPILLARY BLOOD GLUCOSE      RADIOLOGY & ADDITIONAL TESTS:

## 2023-02-26 NOTE — PROGRESS NOTE ADULT - ATTENDING COMMENTS
#AV fistula thrombus s/p femoral shiley   #ESRD on HD (MWF)  #Hyperkalemia  #Increased ostomy output  #Hypotension  #HIV  #DM  #GERD  #DVT  #Hx of COVID (tested positive 02/13/22)  #DVT ppx    67yF with PMH of HIV, ESRD on HD (MWF), type 2 DM, GERD, DVT, cervical cancer s/p hysterectomy, s/p robot-assisted reversal of Alfonso procedure on 01/25/23 with lysis of adhesions, appendectomy, end to end anastomosis and sigmoidoscopy with creation of diverting ileostomy, follows with Dr. Willard, presented with AVF thrombus and unable to get HD.     -s/p Right femoral Shiley placement 02/25, will discuss plans for AVF with vascular surgery  -s/p HD 02/25  -Abdominal pain at ostomy site, surgery following, CT A/P negative for incarcerated hernia  -Completed quarantine period for COVID prior to admission, isolation dc'd  -On treatment for HIV, viral load/CD4 count pending  -Hold home antihypertensives for now, SBP ranging 90-100s  -Vascular and nephro following, patient sees Dr. Dickens and Dr. Reese outpatient  -DVT ppx: SCDs #AV fistula thrombus s/p femoral shiley   #ESRD on HD (MWF)  #Hyperkalemia  #Increased ostomy output  #Hypotension  #HIV  #DM  #GERD  #DVT  #Hx of COVID (tested positive 02/13/22)  #DVT ppx    67yF with PMH of HIV, ESRD on HD (MWF), type 2 DM, GERD, DVT, cervical cancer s/p hysterectomy, s/p robot-assisted reversal of Alfonso procedure on 01/25/23 with lysis of adhesions, appendectomy, end to end anastomosis and sigmoidoscopy with creation of diverting ileostomy, follows with Dr. Willard, presented with AVF thrombus and unable to get HD.     -s/p Right femoral Shiley placement 02/25, will discuss plans for AVF with vascular surgery  -s/p HD 02/25  -Abdominal pain at ostomy site, surgery following, CT A/P negative for incarcerated hernia  -Completed quarantine period for COVID prior to admission, isolation dc'd  -On treatment for HIV, viral load/CD4 count pending  -Hold home antihypertensives for now, SBP ranging 90-100s  -Continue home meds for chronic conditions  -Vascular and nephro following, patient sees Dr. Dickens and Dr. Reese outpatient  -DVT ppx: SCDs #AV fistula thrombus s/p femoral shiley   #ESRD on HD (MWF)  #Hyperkalemia  #Increased ostomy output  #Hypotension  #HIV  #DM  #GERD  #DVT  #Hx of COVID (tested positive 02/13/22)  #DVT ppx    67yF with PMH of HIV, ESRD on HD (MWF), type 2 DM, GERD, DVT, cervical cancer s/p hysterectomy, s/p robot-assisted reversal of Alfonso procedure on 01/25/23 with lysis of adhesions, appendectomy, end to end anastomosis and sigmoidoscopy with creation of diverting ileostomy, follows with Dr. Willard, presented with AVF thrombus and unable to get HD.   Patient seen at bedside today, denies any recurrent abdominal pain. Shiley in place, insertion site intact.     -s/p Right femoral Shiley placement 02/25, will discuss plans for AVF with vascular surgery  -s/p HD 02/25  -Abdominal pain at ostomy site, surgery following, CT A/P negative for incarcerated hernia  -Completed quarantine period for COVID prior to admission, isolation dc'd  -On treatment for HIV, viral load/CD4 count pending  -Hold home antihypertensives for now, SBP ranging 90-100s  -Continue home meds for chronic conditions  -Vascular and nephro following, patient sees Dr. Dickens and Dr. Reese outpatient  -DVT ppx: SCDs #AV fistula thrombus   #ESRD on HD (MWF)  #Hyperkalemia  #Increased ostomy output  #Hypotension  #HIV  #DM  #GERD  #DVT  #Hx of COVID (tested positive 02/13/22)  #DVT ppx    67yF with PMH of HIV, ESRD on HD (MWF), type 2 DM, GERD, DVT, cervical cancer s/p hysterectomy, s/p robot-assisted reversal of Alfonso procedure on 01/25/23 with lysis of adhesions, appendectomy, end to end anastomosis and sigmoidoscopy with creation of diverting ileostomy, follows with Dr. Catalan, presented with AVF thrombus and unable to get HD.   Patient seen at bedside today, denies any recurrent abdominal pain. Shiley in place, insertion site intact.     -s/p Right femoral Shiley placement 02/25, will discuss plans for AVF with vascular surgery  -s/p HD 02/25  -Abdominal pain at ostomy site, surgery following, CT A/P negative for incarcerated hernia; responded well to Dilaudid 0.5mg   -Completed quarantine period for COVID prior to admission, isolation dc'd  -On treatment for HIV, viral load/CD4 count pending  -Hold home antihypertensives for now, SBP ranging 90-100s  -Continue home meds for chronic conditions  -Vascular and nephro following, patient sees Dr. Dickens and Dr. Reese outpatient  -DVT ppx: heparin

## 2023-02-26 NOTE — PROGRESS NOTE ADULT - PROBLEM SELECTOR PLAN 2
p/w 90/65, not at baseline   did not receive HD today   given 250cc NS with good response  will hold BP meds for now p/w 90/65, responded well with fluids   will hold BP meds for now

## 2023-02-26 NOTE — PROGRESS NOTE ADULT - ASSESSMENT
67F from home, ambulates independently, PMHx HIV, HTN, DM, GERD, DVT and ESRD on HD (MWF), sent from HD center, [place] for clotted left AVF access unable to dialyze. Admitted for AVF thrombus.  67F from home, ambulates independently, PMHx HIV, HTN, DM, GERD, DVT and ESRD on HD (MWF), sent from HD center, [place] for clotted left AVF access unable to dialyze. Admitted for AVF thrombus. S/p R fem Shiley placement (2/26), and underwent HD 2/26.

## 2023-02-27 LAB
ALBUMIN SERPL ELPH-MCNC: 2.8 G/DL — LOW (ref 3.5–5)
ALP SERPL-CCNC: 188 U/L — HIGH (ref 40–120)
ALT FLD-CCNC: 56 U/L DA — SIGNIFICANT CHANGE UP (ref 10–60)
ANION GAP SERPL CALC-SCNC: 9 MMOL/L — SIGNIFICANT CHANGE UP (ref 5–17)
AST SERPL-CCNC: 27 U/L — SIGNIFICANT CHANGE UP (ref 10–40)
BASOPHILS # BLD AUTO: 0.03 K/UL — SIGNIFICANT CHANGE UP (ref 0–0.2)
BASOPHILS NFR BLD AUTO: 0.7 % — SIGNIFICANT CHANGE UP (ref 0–2)
BILIRUB SERPL-MCNC: 0.4 MG/DL — SIGNIFICANT CHANGE UP (ref 0.2–1.2)
BUN SERPL-MCNC: 41 MG/DL — HIGH (ref 7–18)
CALCIUM SERPL-MCNC: 9.1 MG/DL — SIGNIFICANT CHANGE UP (ref 8.4–10.5)
CHLORIDE SERPL-SCNC: 96 MMOL/L — SIGNIFICANT CHANGE UP (ref 96–108)
CO2 SERPL-SCNC: 25 MMOL/L — SIGNIFICANT CHANGE UP (ref 22–31)
CREAT SERPL-MCNC: 9.1 MG/DL — HIGH (ref 0.5–1.3)
EGFR: 4 ML/MIN/1.73M2 — LOW
EOSINOPHIL # BLD AUTO: 0.18 K/UL — SIGNIFICANT CHANGE UP (ref 0–0.5)
EOSINOPHIL NFR BLD AUTO: 4.3 % — SIGNIFICANT CHANGE UP (ref 0–6)
GLUCOSE SERPL-MCNC: 88 MG/DL — SIGNIFICANT CHANGE UP (ref 70–99)
HCT VFR BLD CALC: 36.7 % — SIGNIFICANT CHANGE UP (ref 34.5–45)
HGB BLD-MCNC: 12.5 G/DL — SIGNIFICANT CHANGE UP (ref 11.5–15.5)
IMM GRANULOCYTES NFR BLD AUTO: 0.5 % — SIGNIFICANT CHANGE UP (ref 0–0.9)
LYMPHOCYTES # BLD AUTO: 1.57 K/UL — SIGNIFICANT CHANGE UP (ref 1–3.3)
LYMPHOCYTES # BLD AUTO: 37.3 % — SIGNIFICANT CHANGE UP (ref 13–44)
MAGNESIUM SERPL-MCNC: 2.2 MG/DL — SIGNIFICANT CHANGE UP (ref 1.6–2.6)
MCHC RBC-ENTMCNC: 33.4 PG — SIGNIFICANT CHANGE UP (ref 27–34)
MCHC RBC-ENTMCNC: 34.1 GM/DL — SIGNIFICANT CHANGE UP (ref 32–36)
MCV RBC AUTO: 98.1 FL — SIGNIFICANT CHANGE UP (ref 80–100)
MONOCYTES # BLD AUTO: 0.47 K/UL — SIGNIFICANT CHANGE UP (ref 0–0.9)
MONOCYTES NFR BLD AUTO: 11.2 % — SIGNIFICANT CHANGE UP (ref 2–14)
NEUTROPHILS # BLD AUTO: 1.94 K/UL — SIGNIFICANT CHANGE UP (ref 1.8–7.4)
NEUTROPHILS NFR BLD AUTO: 46 % — SIGNIFICANT CHANGE UP (ref 43–77)
NRBC # BLD: 0 /100 WBCS — SIGNIFICANT CHANGE UP (ref 0–0)
PHOSPHATE SERPL-MCNC: 5.2 MG/DL — HIGH (ref 2.5–4.5)
PLATELET # BLD AUTO: 140 K/UL — LOW (ref 150–400)
POTASSIUM SERPL-MCNC: 4.6 MMOL/L — SIGNIFICANT CHANGE UP (ref 3.5–5.3)
POTASSIUM SERPL-SCNC: 4.6 MMOL/L — SIGNIFICANT CHANGE UP (ref 3.5–5.3)
PROT SERPL-MCNC: 7.3 G/DL — SIGNIFICANT CHANGE UP (ref 6–8.3)
RBC # BLD: 3.74 M/UL — LOW (ref 3.8–5.2)
RBC # FLD: 17.2 % — HIGH (ref 10.3–14.5)
SODIUM SERPL-SCNC: 130 MMOL/L — LOW (ref 135–145)
WBC # BLD: 4.21 K/UL — SIGNIFICANT CHANGE UP (ref 3.8–10.5)
WBC # FLD AUTO: 4.21 K/UL — SIGNIFICANT CHANGE UP (ref 3.8–10.5)

## 2023-02-27 PROCEDURE — 99233 SBSQ HOSP IP/OBS HIGH 50: CPT | Mod: GC

## 2023-02-27 RX ORDER — CHLORHEXIDINE GLUCONATE 213 G/1000ML
1 SOLUTION TOPICAL DAILY
Refills: 0 | Status: DISCONTINUED | OUTPATIENT
Start: 2023-02-27 | End: 2023-02-28

## 2023-02-27 RX ORDER — LIDOCAINE 4 G/100G
1 CREAM TOPICAL DAILY
Refills: 0 | Status: DISCONTINUED | OUTPATIENT
Start: 2023-02-27 | End: 2023-02-28

## 2023-02-27 RX ORDER — LANOLIN ALCOHOL/MO/W.PET/CERES
3 CREAM (GRAM) TOPICAL AT BEDTIME
Refills: 0 | Status: DISCONTINUED | OUTPATIENT
Start: 2023-02-27 | End: 2023-02-28

## 2023-02-27 RX ORDER — FAMOTIDINE 10 MG/ML
20 INJECTION INTRAVENOUS ONCE
Refills: 0 | Status: COMPLETED | OUTPATIENT
Start: 2023-02-27 | End: 2023-02-27

## 2023-02-27 RX ADMIN — Medication 3 MILLIGRAM(S): at 01:01

## 2023-02-27 RX ADMIN — DARUNAVIR ETHANOLATE AND COBICISTAT 1 TABLET(S): 800; 150 TABLET, FILM COATED ORAL at 11:22

## 2023-02-27 RX ADMIN — CHLORHEXIDINE GLUCONATE 1 APPLICATION(S): 213 SOLUTION TOPICAL at 11:23

## 2023-02-27 RX ADMIN — HEPARIN SODIUM 5000 UNIT(S): 5000 INJECTION INTRAVENOUS; SUBCUTANEOUS at 22:11

## 2023-02-27 RX ADMIN — LIDOCAINE 1 PATCH: 4 CREAM TOPICAL at 12:39

## 2023-02-27 RX ADMIN — LIDOCAINE 1 PATCH: 4 CREAM TOPICAL at 19:30

## 2023-02-27 RX ADMIN — DOLUTEGRAVIR SODIUM 50 MILLIGRAM(S): 25 TABLET, FILM COATED ORAL at 11:22

## 2023-02-27 RX ADMIN — FAMOTIDINE 20 MILLIGRAM(S): 10 INJECTION INTRAVENOUS at 22:37

## 2023-02-27 RX ADMIN — Medication 650 MILLIGRAM(S): at 11:51

## 2023-02-27 RX ADMIN — HEPARIN SODIUM 5000 UNIT(S): 5000 INJECTION INTRAVENOUS; SUBCUTANEOUS at 11:23

## 2023-02-27 RX ADMIN — ONDANSETRON 4 MILLIGRAM(S): 8 TABLET, FILM COATED ORAL at 05:50

## 2023-02-27 RX ADMIN — CITALOPRAM 20 MILLIGRAM(S): 10 TABLET, FILM COATED ORAL at 11:23

## 2023-02-27 RX ADMIN — Medication 100 MILLIGRAM(S): at 11:22

## 2023-02-27 RX ADMIN — Medication 3 MILLIGRAM(S): at 22:11

## 2023-02-27 RX ADMIN — Medication 650 MILLIGRAM(S): at 12:46

## 2023-02-27 RX ADMIN — Medication 1 TABLET(S): at 11:22

## 2023-02-27 RX ADMIN — HEPARIN SODIUM 5000 UNIT(S): 5000 INJECTION INTRAVENOUS; SUBCUTANEOUS at 05:49

## 2023-02-27 NOTE — PROGRESS NOTE ADULT - ASSESSMENT
67F from home, ambulates independently, PMHx HIV, HTN, DM, GERD, DVT and ESRD on HD (MWF), sent from HD center, [place] for clotted left AVF access unable to dialyze. Admitted for AVF thrombus. S/p R fem Shiley placement (2/26), and underwent HD 2/26.

## 2023-02-27 NOTE — PROGRESS NOTE ADULT - SUBJECTIVE AND OBJECTIVE BOX
Problem List:    PAST MEDICAL & SURGICAL HISTORY:  HIV (human immunodeficiency virus infection)      HTN (hypertension)      Hyperlipidemia      Gout      History of gastroesophageal reflux (GERD)      Depression      Cervical cancer  2010      DVT, lower extremity  Left leg after hysterectomy      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      ESRD on hemodialysis      Colostomy present      History of ectopic pregnancy  Two      H/O: hysterectomy  Due to cervical cancer      S/P arteriovenous (AV) fistula creation  july 10 2020          oxycodone (Rash)  penicillins (Urticaria; Rash)      MEDICATIONS  (STANDING):  cinacalcet 30 milliGRAM(s) Oral daily  citalopram 20 milliGRAM(s) Oral daily  darunavir 800 mG/cobicstat 150 mG 1 Tablet(s) Oral daily  dolutegravir 50 milliGRAM(s) Oral daily  heparin   Injectable 5000 Unit(s) SubCutaneous every 8 hours  lamiVUDine- milliGRAM(s) Oral daily  melatonin 3 milliGRAM(s) Oral at bedtime  Nephro-alex 1 Tablet(s) Oral daily  sodium chloride 0.9%. 1000 milliLiter(s) (50 mL/Hr) IV Continuous <Continuous>    MEDICATIONS  (PRN):  acetaminophen     Tablet .. 650 milliGRAM(s) Oral every 6 hours PRN Temp greater or equal to 38C (100.4F), Mild Pain (1 - 3)  ondansetron Injectable 4 milliGRAM(s) IV Push every 8 hours PRN Nausea and/or Vomiting                            12.7   5.61  )-----------( 158      ( 26 Feb 2023 05:47 )             38.8     02-26    133<L>  |  97  |  36<H>  ----------------------------<  89  4.7   |  30  |  8.59<H>    Ca    9.5      26 Feb 2023 05:47  Phos  5.5     02-26  Mg     2.3     02-26    TPro  7.4  /  Alb  2.8<L>  /  TBili  0.3  /  DBili  x   /  AST  26  /  ALT  38  /  AlkPhos  186<H>  02-26            REVIEW OF SYSTEMS:  General: no fever no chills, no weight loss.  RESPIRATORY: No cough, wheezing, hemoptysis; No shortness of breath  CARDIOVASCULAR: No chest pain or palpitations. No Edema  GASTROINTESTINAL: No abdominal or epigastric pain. c/o vomit. . semi liquid stoma  in stoma bag.   GENITOURINARY: No dysuria, frequency, foamy urine, urinary urgency, incontinence or hematuria  NEUROLOGICAL: No numbness or weakness, no tremor , no dizziness.   Muscle skeletal : upper thoracic spinal pain , for several weeks 8/10.   SKIN: No itching, burning, rashes.        VITALS:  T(F): 98.5 (02-27-23 @ 04:57), Max: 99.8 (02-27-23 @ 02:08)  HR: 92 (02-27-23 @ 04:57)  BP: 113/77 (02-27-23 @ 04:57)  RR: 18 (02-27-23 @ 04:57)  SpO2: 98% (02-27-23 @ 04:57)  Wt(kg): --      PHYSICAL EXAM:  Constitutional: well developed, no diaphoresis, no distress.  Neck: No JVD, no carotid bruit, supple, no adenopathy  Respiratory: Good air entrance B/L, no wheezes, rales or rhonchi  Cardiovascular: S1, S2, RRR, no pericardial rub, no murmur  Abdomen: BS+, soft, no tenderness, no bruit  Pelvis: bladder nondistended  Extremities: No cyanosis or clubbing. No peripheral edema.   Pulses: All present  Neurological: A/O x 3, no focal deficits  Psychiatric: Normal mood, normal affect  Skin: No rashes  Vascular Access:

## 2023-02-27 NOTE — PROGRESS NOTE ADULT - SUBJECTIVE AND OBJECTIVE BOX
PGY-1 Progress Note discussed with attending    PAGER #: [[phone]] TILL 5:00 PM  PLEASE CONTACT ON CALL TEAM:  - On Call Team (Please refer to Danny) FROM 5:00 PM - 8:30PM  - Nightfloat Team FROM 8:30 -7:30 AM    CHIEF COMPLAINT & BRIEF HOSPITAL COURSE:    INTERVAL HPI/OVERNIGHT EVENTS:   MEDICATIONS  (STANDING):  citalopram 20 milliGRAM(s) Oral daily  darunavir 800 mG/cobicstat 150 mG 1 Tablet(s) Oral daily  dolutegravir 50 milliGRAM(s) Oral daily  heparin   Injectable 5000 Unit(s) SubCutaneous every 8 hours  lamiVUDine- milliGRAM(s) Oral daily  melatonin 3 milliGRAM(s) Oral at bedtime  Nephro-alex 1 Tablet(s) Oral daily  sodium chloride 0.9%. 1000 milliLiter(s) (50 mL/Hr) IV Continuous <Continuous>    MEDICATIONS  (PRN):  acetaminophen     Tablet .. 650 milliGRAM(s) Oral every 6 hours PRN Temp greater or equal to 38C (100.4F), Mild Pain (1 - 3)  ondansetron Injectable 4 milliGRAM(s) IV Push every 8 hours PRN Nausea and/or Vomiting      REVIEW OF SYSTEMS:  CONSTITUTIONAL: No fever, weight loss, or fatigue  RESPIRATORY: No cough, wheezing, chills or hemoptysis; No shortness of breath  CARDIOVASCULAR: No chest pain, palpitations, dizziness, or leg swelling  GASTROINTESTINAL: No abdominal pain. No nausea, vomiting, or hematemesis; No diarrhea or constipation. No melena or hematochezia.  GENITOURINARY: No dysuria or hematuria, urinary frequency  NEUROLOGICAL: No headaches, memory loss, loss of strength, numbness, or tremors  SKIN: No itching, burning, rashes, or lesions     Vital Signs Last 24 Hrs  T(C): 36.9 (27 Feb 2023 04:57), Max: 37.7 (27 Feb 2023 02:08)  T(F): 98.5 (27 Feb 2023 04:57), Max: 99.8 (27 Feb 2023 02:08)  HR: 92 (27 Feb 2023 04:57) (89 - 97)  BP: 113/77 (27 Feb 2023 04:57) (92/56 - 116/77)  BP(mean): --  RR: 18 (27 Feb 2023 04:57) (17 - 18)  SpO2: 98% (27 Feb 2023 04:57) (97% - 100%)    Parameters below as of 27 Feb 2023 04:57  Patient On (Oxygen Delivery Method): room air        PHYSICAL EXAMINATION:  GENERAL: NAD, well built  HEAD:  Atraumatic, Normocephalic  EYES:  conjunctiva and sclera clear  NECK: Supple, No JVD, Normal thyroid  CHEST/LUNG: Clear to auscultation. Clear to percussion bilaterally; No rales, rhonchi, wheezing, or rubs  HEART: Regular rate and rhythm; No murmurs, rubs, or gallops  ABDOMEN: Soft, Nontender, Nondistended; Bowel sounds present  NERVOUS SYSTEM:  Alert & Oriented X3,    EXTREMITIES:  2+ Peripheral Pulses, No clubbing, cyanosis, or edema  SKIN: warm dry                          12.7   5.61  )-----------( 158      ( 26 Feb 2023 05:47 )             38.8     02-26    133<L>  |  97  |  36<H>  ----------------------------<  89  4.7   |  30  |  8.59<H>    Ca    9.5      26 Feb 2023 05:47  Phos  5.5     02-26  Mg     2.3     02-26    TPro  7.4  /  Alb  2.8<L>  /  TBili  0.3  /  DBili  x   /  AST  26  /  ALT  38  /  AlkPhos  186<H>  02-26    LIVER FUNCTIONS - ( 26 Feb 2023 05:47 )  Alb: 2.8 g/dL / Pro: 7.4 g/dL / ALK PHOS: 186 U/L / ALT: 38 U/L DA / AST: 26 U/L / GGT: x                   CAPILLARY BLOOD GLUCOSE      RADIOLOGY & ADDITIONAL TESTS:                   PGY-1 Progress Note discussed with attending    PLEASE CONTACT ON CALL TEAM:  - On Call Team (Please refer to Danny) FROM 5:00 PM - 8:30PM  - Nightfloat Team FROM 8:30 -7:30 AM    INTERVAL HPI/OVERNIGHT EVENTS: No acute events overnight. Denies any complaints beside low back pain that is chronic. HD today, and poss DC for new access outpatient.     MEDICATIONS  (STANDING):  citalopram 20 milliGRAM(s) Oral daily  darunavir 800 mG/cobicstat 150 mG 1 Tablet(s) Oral daily  dolutegravir 50 milliGRAM(s) Oral daily  heparin   Injectable 5000 Unit(s) SubCutaneous every 8 hours  lamiVUDine- milliGRAM(s) Oral daily  melatonin 3 milliGRAM(s) Oral at bedtime  Nephro-alex 1 Tablet(s) Oral daily  sodium chloride 0.9%. 1000 milliLiter(s) (50 mL/Hr) IV Continuous <Continuous>    MEDICATIONS  (PRN):  acetaminophen     Tablet .. 650 milliGRAM(s) Oral every 6 hours PRN Temp greater or equal to 38C (100.4F), Mild Pain (1 - 3)  ondansetron Injectable 4 milliGRAM(s) IV Push every 8 hours PRN Nausea and/or Vomiting      REVIEW OF SYSTEMS:  CONSTITUTIONAL: No fever, weight loss, or fatigue  RESPIRATORY: No cough, wheezing, chills or hemoptysis; No shortness of breath  CARDIOVASCULAR: No chest pain, palpitations, dizziness, or leg swelling  GASTROINTESTINAL: No abdominal pain. No nausea, vomiting, or hematemesis; No diarrhea or constipation. No melena or hematochezia.  GENITOURINARY: No dysuria or hematuria, urinary frequency  NEUROLOGICAL: No headaches, memory loss, loss of strength, numbness, or tremors  SKIN: No itching, burning, rashes, or lesions     Vital Signs Last 24 Hrs  T(C): 36.9 (27 Feb 2023 04:57), Max: 37.7 (27 Feb 2023 02:08)  T(F): 98.5 (27 Feb 2023 04:57), Max: 99.8 (27 Feb 2023 02:08)  HR: 92 (27 Feb 2023 04:57) (89 - 97)  BP: 113/77 (27 Feb 2023 04:57) (92/56 - 116/77)  BP(mean): --  RR: 18 (27 Feb 2023 04:57) (17 - 18)  SpO2: 98% (27 Feb 2023 04:57) (97% - 100%)    Parameters below as of 27 Feb 2023 04:57  Patient On (Oxygen Delivery Method): room air        PHYSICAL EXAMINATION:  GENERAL: NAD, well built  HEAD:  Atraumatic, Normocephalic  EYES:  conjunctiva and sclera clear  NECK: Supple, No JVD, Normal thyroid  CHEST/LUNG: Clear to auscultation. Clear to percussion bilaterally; No rales, rhonchi, wheezing, or rubs  HEART: Regular rate and rhythm; No murmurs, rubs, or gallops  ABDOMEN: Soft, Nontender, Nondistended; Bowel sounds present  NERVOUS SYSTEM:  Alert & Oriented X3,  no focal neuro deficits   EXTREMITIES:  2+ Peripheral Pulses, No clubbing, cyanosis, or edema, L arm AVF with not thrill appreciated   SKIN: warm dry                          12.7   5.61  )-----------( 158      ( 26 Feb 2023 05:47 )             38.8     02-26    133<L>  |  97  |  36<H>  ----------------------------<  89  4.7   |  30  |  8.59<H>    Ca    9.5      26 Feb 2023 05:47  Phos  5.5     02-26  Mg     2.3     02-26    TPro  7.4  /  Alb  2.8<L>  /  TBili  0.3  /  DBili  x   /  AST  26  /  ALT  38  /  AlkPhos  186<H>  02-26    LIVER FUNCTIONS - ( 26 Feb 2023 05:47 )  Alb: 2.8 g/dL / Pro: 7.4 g/dL / ALK PHOS: 186 U/L / ALT: 38 U/L DA / AST: 26 U/L / GGT: x                   CAPILLARY BLOOD GLUCOSE      RADIOLOGY & ADDITIONAL TESTS:

## 2023-02-27 NOTE — PROGRESS NOTE ADULT - PROBLEM SELECTOR PLAN 1
sent from [place] [facility] for clotted LEFT AVF, unable to get dialyzed today   h/o DVT in the LE and reported prior clot in the AVF  last HD session was Wednesday 2/22/22  no thrill in the LEFT AVF   follows Dr. Reese for Nephrology  Nephrology consulted: Dr. Willard   Vascular consulted, follows with Dr. Juan rivera f/u recs  S/p R fem Shiley placement (2/26), and underwent HD 2/26 sent from [place] [facility] for clotted LEFT AVF, unable to get dialyzed today   h/o DVT in the LE and reported prior clot in the AVF  last HD session was Wednesday 2/22/22  no thrill in the LEFT AVF   follows Dr. Reese for Nephrology  Nephrology consulted: Dr. Willard   Vascular consulted, follows with Dr. Martinez - nicole f/u recs  S/p R fem Shiley placement (2/26), and underwent HD 2/26  Will go for HD today, and poss D/C for outpatient vascular work with Dr. Dickens   ?PC placement, removal of shiley after HD

## 2023-02-27 NOTE — PROGRESS NOTE ADULT - ATTENDING COMMENTS
#AV fistula thrombus   #ESRD on HD (MWF)  #Hyperkalemia  #Increased ostomy output  #Hypotension  #HIV  #DM  #GERD  #DVT  #Hx of COVID (tested positive 02/13/22)  #DVT ppx    67yF with PMH of HIV, ESRD on HD (MWF), type 2 DM, GERD, DVT, cervical cancer s/p hysterectomy, s/p robot-assisted reversal of Alfonso procedure on 01/25/23 with lysis of adhesions, appendectomy, end to end anastomosis and sigmoidoscopy with creation of diverting ileostomy, follows with Dr. Catalan, presented with AVF thrombus and unable to get HD.   Patient seen at bedside today, denies any recurrent abdominal pain. States she feels unsafe at home, as she lives alone and daughter is in [place].    -s/p Right femoral Shiley placement 02/25, will dc after HD, and patient scheduled for outpatient follow up on 02/23  -s/p HD 02/27  -Abdominal pain resolved, no further episodes   -Completed quarantine period for COVID prior to admission, isolation dc'd  -On treatment for HIV, viral load/CD4 count pending  -Hold home antihypertensives for now, SBP ranging 90-100s  -Continue home meds for chronic conditions  -Vascular and nephro following, patient sees Dr. Dickens and Dr. Reese outpatient  -PT eval pending, patient reports weakness and reported falls at home; last eval in 01/23 recommended home PT  -DVT ppx: heparin  -Dispo: anticipated dc 24h (pending PT recs)

## 2023-02-27 NOTE — PROGRESS NOTE ADULT - ASSESSMENT
ESRD admitted foe clotted left AVF. Patient had dialysis on Saturday 2.5 hours. Hyperkalemia improved.  Discussed case with Dr. Farooq Vascular surgery, AVF angioplasty can be done on ly in the out patient Endovascular IR. i If agree with the medical team she will get another in house dialysis treatment via femoral  catheter , after that femoral catheter will be removed and will follow with Dr Farooq at the  outpatient Endovascular IR. ([phone]).  In case of further in house work up or surgery will need a placement of PC  Hypercalcemia and SHPTH , repeat  , vitamin D 30.1.  Hold Cinacalcet.  Diverted ileostomy , reduced appetite, dehydration , monitor PO and fluid intake.

## 2023-02-28 ENCOUNTER — TRANSCRIPTION ENCOUNTER (OUTPATIENT)
Age: 68
End: 2023-02-28

## 2023-02-28 VITALS
SYSTOLIC BLOOD PRESSURE: 100 MMHG | TEMPERATURE: 98 F | OXYGEN SATURATION: 99 % | RESPIRATION RATE: 18 BRPM | DIASTOLIC BLOOD PRESSURE: 64 MMHG | HEART RATE: 90 BPM

## 2023-02-28 LAB
4/8 RATIO: 0.83 RATIO — LOW (ref 0.9–3.6)
ABS CD8: 917 CELLS/UL — HIGH (ref 142–740)
ALBUMIN SERPL ELPH-MCNC: 2.9 G/DL — LOW (ref 3.5–5)
ALP SERPL-CCNC: 179 U/L — HIGH (ref 40–120)
ALT FLD-CCNC: 49 U/L DA — SIGNIFICANT CHANGE UP (ref 10–60)
ANION GAP SERPL CALC-SCNC: 9 MMOL/L — SIGNIFICANT CHANGE UP (ref 5–17)
AST SERPL-CCNC: 21 U/L — SIGNIFICANT CHANGE UP (ref 10–40)
BASOPHILS # BLD AUTO: 0.03 K/UL — SIGNIFICANT CHANGE UP (ref 0–0.2)
BASOPHILS NFR BLD AUTO: 0.7 % — SIGNIFICANT CHANGE UP (ref 0–2)
BILIRUB SERPL-MCNC: 0.3 MG/DL — SIGNIFICANT CHANGE UP (ref 0.2–1.2)
BUN SERPL-MCNC: 21 MG/DL — HIGH (ref 7–18)
CALCIUM SERPL-MCNC: 9.6 MG/DL — SIGNIFICANT CHANGE UP (ref 8.4–10.5)
CD3 BLASTS SPEC-ACNC: 1679 CELLS/UL — SIGNIFICANT CHANGE UP (ref 672–1870)
CD3 BLASTS SPEC-ACNC: 84 % — HIGH (ref 59–83)
CD4 %: 38 % — SIGNIFICANT CHANGE UP (ref 30–62)
CD8 %: 46 % — HIGH (ref 12–36)
CHLORIDE SERPL-SCNC: 99 MMOL/L — SIGNIFICANT CHANGE UP (ref 96–108)
CO2 SERPL-SCNC: 28 MMOL/L — SIGNIFICANT CHANGE UP (ref 22–31)
CREAT SERPL-MCNC: 6.87 MG/DL — HIGH (ref 0.5–1.3)
EGFR: 6 ML/MIN/1.73M2 — LOW
EOSINOPHIL # BLD AUTO: 0.23 K/UL — SIGNIFICANT CHANGE UP (ref 0–0.5)
EOSINOPHIL NFR BLD AUTO: 5 % — SIGNIFICANT CHANGE UP (ref 0–6)
GLUCOSE SERPL-MCNC: 85 MG/DL — SIGNIFICANT CHANGE UP (ref 70–99)
HCT VFR BLD CALC: 37.8 % — SIGNIFICANT CHANGE UP (ref 34.5–45)
HGB BLD-MCNC: 12.5 G/DL — SIGNIFICANT CHANGE UP (ref 11.5–15.5)
HIV-1 VIRAL LOAD RESULT: ABNORMAL
HIV1 RNA # SERPL NAA+PROBE: 43 — SIGNIFICANT CHANGE UP
HIV1 RNA SER-IMP: SIGNIFICANT CHANGE UP
HIV1 RNA SERPL NAA+PROBE-ACNC: ABNORMAL
HIV1 RNA SERPL NAA+PROBE-LOG#: 1.63 — SIGNIFICANT CHANGE UP
IMM GRANULOCYTES NFR BLD AUTO: 0.2 % — SIGNIFICANT CHANGE UP (ref 0–0.9)
INTERPRETATION SERPL IFE-IMP: SIGNIFICANT CHANGE UP
LYMPHOCYTES # BLD AUTO: 1.92 K/UL — SIGNIFICANT CHANGE UP (ref 1–3.3)
LYMPHOCYTES # BLD AUTO: 41.6 % — SIGNIFICANT CHANGE UP (ref 13–44)
MAGNESIUM SERPL-MCNC: 2.1 MG/DL — SIGNIFICANT CHANGE UP (ref 1.6–2.6)
MCHC RBC-ENTMCNC: 33 PG — SIGNIFICANT CHANGE UP (ref 27–34)
MCHC RBC-ENTMCNC: 33.1 GM/DL — SIGNIFICANT CHANGE UP (ref 32–36)
MCV RBC AUTO: 99.7 FL — SIGNIFICANT CHANGE UP (ref 80–100)
MONOCYTES # BLD AUTO: 0.63 K/UL — SIGNIFICANT CHANGE UP (ref 0–0.9)
MONOCYTES NFR BLD AUTO: 13.7 % — SIGNIFICANT CHANGE UP (ref 2–14)
NEUTROPHILS # BLD AUTO: 1.79 K/UL — LOW (ref 1.8–7.4)
NEUTROPHILS NFR BLD AUTO: 38.8 % — LOW (ref 43–77)
NRBC # BLD: 0 /100 WBCS — SIGNIFICANT CHANGE UP (ref 0–0)
PHOSPHATE SERPL-MCNC: 6 MG/DL — HIGH (ref 2.5–4.5)
PLATELET # BLD AUTO: 139 K/UL — LOW (ref 150–400)
POTASSIUM SERPL-MCNC: 4.9 MMOL/L — SIGNIFICANT CHANGE UP (ref 3.5–5.3)
POTASSIUM SERPL-SCNC: 4.9 MMOL/L — SIGNIFICANT CHANGE UP (ref 3.5–5.3)
PROT SERPL-MCNC: 7 G/DL — SIGNIFICANT CHANGE UP (ref 6–8.3)
RBC # BLD: 3.79 M/UL — LOW (ref 3.8–5.2)
RBC # FLD: 17.4 % — HIGH (ref 10.3–14.5)
SODIUM SERPL-SCNC: 136 MMOL/L — SIGNIFICANT CHANGE UP (ref 135–145)
T-CELL CD4 SUBSET PNL BLD: 757 CELLS/UL — SIGNIFICANT CHANGE UP (ref 489–1457)
WBC # BLD: 4.61 K/UL — SIGNIFICANT CHANGE UP (ref 3.8–10.5)
WBC # FLD AUTO: 4.61 K/UL — SIGNIFICANT CHANGE UP (ref 3.8–10.5)

## 2023-02-28 PROCEDURE — 80048 BASIC METABOLIC PNL TOTAL CA: CPT

## 2023-02-28 PROCEDURE — 86359 T CELLS TOTAL COUNT: CPT

## 2023-02-28 PROCEDURE — 87340 HEPATITIS B SURFACE AG IA: CPT

## 2023-02-28 PROCEDURE — 87536 HIV-1 QUANT&REVRSE TRNSCRPJ: CPT

## 2023-02-28 PROCEDURE — 85610 PROTHROMBIN TIME: CPT

## 2023-02-28 PROCEDURE — 86360 T CELL ABSOLUTE COUNT/RATIO: CPT

## 2023-02-28 PROCEDURE — 85730 THROMBOPLASTIN TIME PARTIAL: CPT

## 2023-02-28 PROCEDURE — 84100 ASSAY OF PHOSPHORUS: CPT

## 2023-02-28 PROCEDURE — 99261: CPT

## 2023-02-28 PROCEDURE — 86900 BLOOD TYPING SEROLOGIC ABO: CPT

## 2023-02-28 PROCEDURE — 87637 SARSCOV2&INF A&B&RSV AMP PRB: CPT

## 2023-02-28 PROCEDURE — 84484 ASSAY OF TROPONIN QUANT: CPT

## 2023-02-28 PROCEDURE — 85025 COMPLETE CBC W/AUTO DIFF WBC: CPT

## 2023-02-28 PROCEDURE — 71045 X-RAY EXAM CHEST 1 VIEW: CPT

## 2023-02-28 PROCEDURE — 93005 ELECTROCARDIOGRAM TRACING: CPT

## 2023-02-28 PROCEDURE — 82310 ASSAY OF CALCIUM: CPT

## 2023-02-28 PROCEDURE — 83735 ASSAY OF MAGNESIUM: CPT

## 2023-02-28 PROCEDURE — 86803 HEPATITIS C AB TEST: CPT

## 2023-02-28 PROCEDURE — 82652 VIT D 1 25-DIHYDROXY: CPT

## 2023-02-28 PROCEDURE — 83970 ASSAY OF PARATHORMONE: CPT

## 2023-02-28 PROCEDURE — 99239 HOSP IP/OBS DSCHRG MGMT >30: CPT | Mod: GC

## 2023-02-28 PROCEDURE — 74177 CT ABD & PELVIS W/CONTRAST: CPT

## 2023-02-28 PROCEDURE — 99285 EMERGENCY DEPT VISIT HI MDM: CPT | Mod: 25

## 2023-02-28 PROCEDURE — 80053 COMPREHEN METABOLIC PANEL: CPT

## 2023-02-28 PROCEDURE — 86901 BLOOD TYPING SEROLOGIC RH(D): CPT

## 2023-02-28 PROCEDURE — 86706 HEP B SURFACE ANTIBODY: CPT

## 2023-02-28 PROCEDURE — 86850 RBC ANTIBODY SCREEN: CPT

## 2023-02-28 PROCEDURE — 86334 IMMUNOFIX E-PHORESIS SERUM: CPT

## 2023-02-28 PROCEDURE — 36415 COLL VENOUS BLD VENIPUNCTURE: CPT

## 2023-02-28 PROCEDURE — 86704 HEP B CORE ANTIBODY TOTAL: CPT

## 2023-02-28 PROCEDURE — 97161 PT EVAL LOW COMPLEX 20 MIN: CPT

## 2023-02-28 RX ORDER — DIPHENHYDRAMINE HCL 50 MG
25 CAPSULE ORAL ONCE
Refills: 0 | Status: COMPLETED | OUTPATIENT
Start: 2023-02-28 | End: 2023-02-28

## 2023-02-28 RX ORDER — ONDANSETRON 8 MG/1
0 TABLET, FILM COATED ORAL
Qty: 0 | Refills: 0 | DISCHARGE

## 2023-02-28 RX ADMIN — DOLUTEGRAVIR SODIUM 50 MILLIGRAM(S): 25 TABLET, FILM COATED ORAL at 12:00

## 2023-02-28 RX ADMIN — LIDOCAINE 1 PATCH: 4 CREAM TOPICAL at 12:01

## 2023-02-28 RX ADMIN — CITALOPRAM 20 MILLIGRAM(S): 10 TABLET, FILM COATED ORAL at 12:00

## 2023-02-28 RX ADMIN — LIDOCAINE 1 PATCH: 4 CREAM TOPICAL at 00:00

## 2023-02-28 RX ADMIN — Medication 1 TABLET(S): at 12:00

## 2023-02-28 RX ADMIN — CHLORHEXIDINE GLUCONATE 1 APPLICATION(S): 213 SOLUTION TOPICAL at 12:01

## 2023-02-28 RX ADMIN — Medication 100 MILLIGRAM(S): at 12:00

## 2023-02-28 RX ADMIN — HEPARIN SODIUM 5000 UNIT(S): 5000 INJECTION INTRAVENOUS; SUBCUTANEOUS at 12:01

## 2023-02-28 RX ADMIN — DARUNAVIR ETHANOLATE AND COBICISTAT 1 TABLET(S): 800; 150 TABLET, FILM COATED ORAL at 12:00

## 2023-02-28 RX ADMIN — Medication 25 MILLIGRAM(S): at 02:53

## 2023-02-28 RX ADMIN — HEPARIN SODIUM 5000 UNIT(S): 5000 INJECTION INTRAVENOUS; SUBCUTANEOUS at 06:24

## 2023-02-28 NOTE — DIETITIAN INITIAL EVALUATION ADULT - PROBLEM/PLAN-7
11/22/20 2000   Patient Assessment/Suction   Level of Consciousness (AVPU) alert   PRE-TX-O2   O2 Device (Oxygen Therapy) room air   SpO2 97 %   Pulse Oximetry Type Intermittent      DISPLAY PLAN FREE TEXT

## 2023-02-28 NOTE — PHYSICAL THERAPY INITIAL EVALUATION ADULT - GENERAL OBSERVATIONS, REHAB EVAL
outpt hd center pt, refer for PT, generalized weakness noted, ambulatory with baseline walker maintained, fairly safe with homecare discharge

## 2023-02-28 NOTE — DISCHARGE NOTE PROVIDER - HOSPITAL COURSE
67F from home, ambulates independently, PMHx HIV, HTN, DM, GERD, DVT and ESRD on HD (MWF), sent from HD center, [place] for clotted left AVF access unable to dialyze. Admitted for AVF thrombus. S/p R fem Shiley placement (2/26), and underwent HD 2/26 and 2/27.        Problem/Plan - 1:  ·  Problem: Thrombosis of arteriovenous fistula.   ·  Plan: sent from [place] [facility] for clotted LEFT AVF, unable to get dialyzed today   h/o DVT in the LE and reported prior clot in the AVF  last HD session was Wednesday 2/22/22  no thrill in the LEFT AVF   follows Dr. Reese for Nephrology  Nephrology consulted: Dr. Willard   Vascular consulted, follows with Dr. Martinez - nicole f/u recs  S/p R fem Shiley placement (2/26), and underwent HD 2/26  Will go for HD today, and poss D/C for outpatient vascular work with Dr. Dickens   ?PC placement, removal of shiley after HD.     Problem/Plan - 2:  ·  Problem: Hypotension.   ·  Plan: p/w 90/65, responded well with fluids   will hold BP meds for now.     Problem/Plan - 3:  ·  Problem: 2019 novel coronavirus disease (COVID-19).   ·  Plan: tested (+) COVID 2/13/23  already completed 10-d  isolation   not in respiratory distress, not hypoxic   No longer needing isolation precautions.     Problem/Plan - 4:  ·  Problem: ESRD on hemodialysis.   ·  Plan: h/o ESRD on HD (MWF) at [place] follows Dr. Reese   unable to dialyze o/p d/t clotted AVF, last HD session Wednesday 2/22/23  Vascular consulted for possible thrombectomy vs shiley for continued HD while inpatient   HD as per Nephrology   c/w home meds   Nephrology consulted: Dr. Willard.     Problem/Plan - 5:  ·  Problem: HIV disease.   ·  Plan: c/w home meds.     Problem/Plan - 6:  ·  Problem: HTN (hypertension).   ·  Plan: will hold BP meds for now in the setting of hypotension   may resume home meds as indicated.     Problem/Plan - 7:  ·  Problem: GERD (gastroesophageal reflux disease).   ·  Plan: c/w home meds.     Problem/Plan - 8:  ·  Problem: HLD (hyperlipidemia).   ·  Plan: c/w home meds.     Problem/Plan - 9:  ·  Problem: Prophylactic measure.   ·  Plan: SCD for now as may need urgent procedure for access for urgent HD.         67F from home, ambulates independently, PMHx HIV, HTN, DM, GERD, DVT and ESRD on HD (MWF), sent from HD center, [place] for clotted left AVF access unable to dialyze (last HD before admission 2/22/23). Admitted for AVF thrombus. No thrill in L AVF. S/p R fem Shiley placement (2/26), and underwent HD 2/26 and 2/27, responded well to HD. Nephrology Dr. Willard. Dr. Funes advised outpatient follow up for correction of AVF access on Wednesday 2/29/2023. Pt. was continued on her HIV medications, statin, and PPI. PT recommended home w/ home PT. Patient is stable for discharge per attending and is advised to follow up with PCP as outpatient  Please refer to patient's complete medical chart with documents for a full hospital course, for this is only a brief summary.       67F from home, ambulates independently, PMHx HIV, HTN, DM, GERD, DVT and ESRD on HD (MWF), sent from HD center, [place] for clotted left AVF access unable to dialyze (last HD before admission 2/22/23). Admitted for AVF thrombus. No thrill in L AVF. S/p R fem Shiley placement (2/26), and underwent HD 2/26 and 2/27, responded well to HD. Nephrology Dr. Willard. Dr. Funes advised outpatient follow up for correction of AVF access on Wednesday 2/29/2023. Pt. was continued on her HIV medications, statin, and PPI. PT recommended home w/ home PT. Patient is stable for discharge per attending and is advised to follow up with PCP as outpatient.   Please refer to patient's complete medical chart with documents for a full hospital course, for this is only a brief summary.       67F from home, ambulates independently, PMHx HIV, HTN, DM, GERD, DVT and ESRD on HD (MWF), sent from HD center, [place] for clotted left AVF access unable to dialyze (last HD before admission 2/22/23). Admitted for AVF thrombus. No thrill in L AVF. S/p R fem Shiley placement (2/26), and underwent HD 2/26 and 2/27, responded well to HD. Nephrology Dr. Willard. Dr. Funes advised outpatient follow up for correction of AVF access on Wednesday 2/29/2023. Right femoral shiley removed on 02/28/23. Pt. was continued on her HIV medications, statin, and PPI. PT recommended home w/ home PT. Patient is stable for discharge per attending and is advised to follow up with PCP as outpatient.   Please refer to patient's complete medical chart with documents for a full hospital course, for this is only a brief summary.

## 2023-02-28 NOTE — DISCHARGE NOTE NURSING/CASE MANAGEMENT/SOCIAL WORK - PATIENT PORTAL LINK FT
You can access the FollowMyHealth Patient Portal offered by [facility] by registering at the following website: [email or web address]. By joining [employer]’s FollowMyHealth portal, you will also be able to view your health information using other applications (apps) compatible with our system.

## 2023-02-28 NOTE — DIETITIAN INITIAL EVALUATION ADULT - NSFNSGIIOFT_GEN_A_CORE
LPF=928 lb   Wt date in EMR reviewed: 128.9 lb 9/28/22; 136.4 lb 10/19/22; 128 lb 1/24/23; 129 lb 2/25/23; 124.3 lb 2/2723  some fluctuation in-between, changes may partly due to fluid shift from HD and/or scale variance

## 2023-02-28 NOTE — PROGRESS NOTE ADULT - PROBLEM SELECTOR PLAN 6
will hold BP meds for now in the setting of hypotension   may resume home meds as indicated

## 2023-02-28 NOTE — PROGRESS NOTE ADULT - ASSESSMENT
ESRD admitted foe clotted left AVF. Patient had dialysis on Saturday 2.5 hours. Hyperkalemia improved.  Discussed case with Dr. Farooq Vascular surgery, AVF angioplasty can be done on ly in the out patient Endovascular IR. i If agree with the medical team she will get another in house dialysis treatment via femoral  catheter , after that femoral catheter will be removed and will follow with Dr Farooq at the  outpatient Endovascular IR. ([phone]).  In case of further in house work up or surgery will need a placement of PC  Hypercalcemia and SHPTH , repeat  , vitamin D 30.1.  Hold Cinacalcet.  Diverted ileostomy , reduced appetite, dehydration , monitor PO and fluid intake.

## 2023-02-28 NOTE — CHART NOTE - NSCHARTNOTESELECT_GEN_ALL_CORE
Event Note
surgery/Event Note
Event Note
Event Note
Vascular surgery/Event Note
Benefits, risks, and possible complications of procedure explained to patient/caregiver who verbalized understanding and gave verbal consent.

## 2023-02-28 NOTE — DIETITIAN INITIAL EVALUATION ADULT - NAME AND PHONE
pt to be followed by dietitian at out-pt dialysis center when medically ready to be discharged  Pt to be followed by dietitian at out-pt dialysis center when medically ready to be discharged

## 2023-02-28 NOTE — PROGRESS NOTE ADULT - ASSESSMENT
67F from home, ambulates independently, PMHx HIV, HTN, DM, GERD, DVT and ESRD on HD (MWF), sent from HD center, [place] for clotted left AVF access unable to dialyze. Admitted for AVF thrombus. S/p R fem Shiley placement (2/26), and underwent HD 2/26.

## 2023-02-28 NOTE — PROGRESS NOTE ADULT - SUBJECTIVE AND OBJECTIVE BOX
Problem List:  ESRD, clotted AVF     PAST MEDICAL & SURGICAL HISTORY:  HIV (human immunodeficiency virus infection)      HTN (hypertension)      Hyperlipidemia      Gout      History of gastroesophageal reflux (GERD)      Depression      Cervical cancer  2010      DVT, lower extremity  Left leg after hysterectomy      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      ESRD on hemodialysis      Colostomy present      History of ectopic pregnancy  Two      H/O: hysterectomy  Due to cervical cancer      S/P arteriovenous (AV) fistula creation  july 10 2020          oxycodone (Rash)  penicillins (Urticaria; Rash)      MEDICATIONS  (STANDING):  chlorhexidine 2% Cloths 1 Application(s) Topical daily  citalopram 20 milliGRAM(s) Oral daily  darunavir 800 mG/cobicstat 150 mG 1 Tablet(s) Oral daily  dolutegravir 50 milliGRAM(s) Oral daily  heparin   Injectable 5000 Unit(s) SubCutaneous every 8 hours  lamiVUDine- milliGRAM(s) Oral daily  lidocaine   4% Patch 1 Patch Transdermal daily  melatonin 3 milliGRAM(s) Oral at bedtime  Nephro-alex 1 Tablet(s) Oral daily  sodium chloride 0.9%. 1000 milliLiter(s) (50 mL/Hr) IV Continuous <Continuous>    MEDICATIONS  (PRN):  acetaminophen     Tablet .. 650 milliGRAM(s) Oral every 6 hours PRN Temp greater or equal to 38C (100.4F), Mild Pain (1 - 3)  ondansetron Injectable 4 milliGRAM(s) IV Push every 8 hours PRN Nausea and/or Vomiting                            12.5   4.61  )-----------( 139      ( 28 Feb 2023 05:30 )             37.8     02-28    136  |  99  |  21<H>  ----------------------------<  85  4.9   |  28  |  6.87<H>    Ca    9.6      28 Feb 2023 05:30  Phos  6.0     02-28  Mg     2.1     02-28    TPro  7.0  /  Alb  2.9<L>  /  TBili  0.3  /  DBili  x   /  AST  21  /  ALT  49  /  AlkPhos  179<H>  02-28            REVIEW OF SYSTEMS:  General: no fever no chills, no weight loss.    RESPIRATORY: No cough, wheezing, hemoptysis; No shortness of breath  CARDIOVASCULAR: No chest pain or palpitations. No Edema  GASTROINTESTINAL: No abdominal or epigastric pain. No nausea, vomiting. No diarrhea or constipation. No melena.  GENITOURINARY: No dysuria, frequency, foamy urine, urinary urgency, incontinence or hematuria  NEUROLOGICAL: No numbness or weakness, no tremor , no dizziness.   Muscle skeletal : no joint pain and no swelling of joints and limbs.  SKIN: No itching, burning, rashes.        VITALS:  T(F): 98.2 (02-28-23 @ 05:20), Max: 99.1 (02-27-23 @ 20:35)  HR: 97 (02-28-23 @ 05:20)  BP: 119/82 (02-28-23 @ 05:20)  RR: 18 (02-28-23 @ 05:20)  SpO2: 100% (02-28-23 @ 05:20)  Wt(kg): --    02-27 @ 07:01  -  02-28 @ 07:00  --------------------------------------------------------  IN: 600 mL / OUT: 600 mL / NET: 0 mL        PHYSICAL EXAM:  Constitutional: well developed, no diaphoresis, no distress.  Neck: No JVD, no carotid bruit, supple, no adenopathy  Respiratory: Good air entrance B/L, no wheezes, rales or rhonchi  Cardiovascular: S1, S2, RRR, no pericardial rub, no murmur  Abdomen: BS+, soft, no tenderness, no bruit, mid line stoma  Pelvis: bladder nondistended  Extremities: No cyanosis or clubbing. No peripheral edema.     Vascular Access: clotted AVF  Right femoral catheter with no discharge

## 2023-02-28 NOTE — CHART NOTE - NSCHARTNOTEFT_GEN_A_CORE
-on Metformin at home and has great control (A1c was 5.6 on 2/19/19)  -patient has not required any SSI in last 48hrs  -BG remains in goal range    Plan  - Low dose insulin sliding scale   - bedside glucose monitoring      
Patient evaluated at bedside. Successfully received HD after R femoral Shiley placement. Reports improvement in abdominal pain, ostomy continues to function. CTAP done with no acute findings.    Physical Exam  General: no acute distress  Abdomen: soft, nontender, nondistended; ostomy pink, some liquid with gas in bag  R groin Shiley in place, no hematoma    Will continue to follow
Shiley removed successfully without complications. Sutures removed bilaterally. Gauze applied and held pressure for 10 minutes until bleeding ceased. Gauze with Tegaderm dressing applied and intact. Pt to remain flat for 2-3 hours. Reassess for bleeding prior to discharge. Resident doctor updated.
s/w attending regarding plan for AVF repair. Pt having HD this afternoon. Recommend shiley removal and d/c. Outpt f/u with Dr. Dickens in office Wed 2/29/23 for fistula management.
#AV fistula thrombus s/p femoral shiley   #ESRD on HD (MWF)  #Hyperkalemia  #Increased ostomy output  #Hypotension  #HIV  #DM  #GERD  #DVT  #Hx of COVID (tested positive 02/13/22)  #DVT ppx    67yF with PMH of HIV, ESRD on HD (MWF), type 2 DM, GERD, DVT, cervical cancer s/p hysterectomy, s/p robot-assisted reversal of Alfonso procedure on 01/25/23 with lysis of adhesions, appendectomy, end to end anastomosis and sigmoidoscopy with creation of diverting ileostomy, follows with Dr. Willard, presented with AVF thrombus and unable to get HD.     -s/p Right femoral Shiley placement today, will get HD   -Abdominal pain at ostomy site, will consult surgery; pain control with Dilaudid x1, no improvement with IV Tylenol  -Completed quarantine period for COVID prior to admission, isolation dc'd  -On treatment for HIV, will obtain viral load/CD4 count  -Hypotensive on admission, possibly due to increased ostomy output; resolved with NS bolus, hold home antihypertensives for now  -Started on IVF by nephro  -Vascular and nephro following, patient sees Dr. Dickens and Dr. Reese outpatient  -IR consult   -DVT ppx: SCDs
SUBJECTIVE/OVERNIGHT EVENTS:   Pt with episode of severe LLQ abdominal pain at the ostomy site yesterday, currently improved, not complaining of any pain.     Vital Signs Last 24 Hrs  T(C): 36.8 (26 Feb 2023 10:18), Max: 36.9 (26 Feb 2023 04:59)  T(F): 98.2 (26 Feb 2023 10:18), Max: 98.5 (26 Feb 2023 04:59)  HR: 97 (26 Feb 2023 10:18) (84 - 100)  BP: 92/56 (26 Feb 2023 10:18) (92/56 - 116/76)  BP(mean): --  RR: 17 (26 Feb 2023 10:18) (16 - 18)  SpO2: 100% (26 Feb 2023 10:18) (98% - 100%)    Parameters below as of 26 Feb 2023 10:18  Patient On (Oxygen Delivery Method): room air    Exam:   General: Awake, alert, NAD  Resp: nonlabored  Abdomen: ostomy functioning, wafer appropriate; abdomen soft, NT, ND  Extremities: right groin shiley catheter in place; dressing dry    A/P  CT scan reviewed by attending  No acute surgical intervention warranted at this time  Discussed with Dr. Us

## 2023-02-28 NOTE — PROGRESS NOTE ADULT - PROBLEM SELECTOR PLAN 4
h/o ESRD on HD (MWF) at [place] follows Dr. Reese   unable to dialyze o/p d/t clotted AVF, last HD session Wednesday 2/22/23  Vascular consulted for possible thrombectomy vs simón for continued HD while inpatient   HD as per Nephrology   c/w home meds   Nephrology consulted: Dr. Willard
h/o ESRD on HD (MWF) at [place] follows Dr. Reese   unable to dialyze o/p d/t clotted AVF, last HD session Wednesday 2/22/23  Vascular consulted for possible thrombectomy vs simón for continued HD while inpatient   HD as per Nephrology   c/w home meds   Nephrology consulted: Dr. Willard
h/o ESRD on HD (MWF) at [place] follows Dr. Reese   unable to dialyze o/p d/t clotted AVF, last HD session Wednesday 2/22/23  Vascular consulted for possible thrombectomy vs simón for continued HD while inpatient   HD as per Nephrology   c/w home meds   Nephrology consulted: Dr. Willard

## 2023-02-28 NOTE — DISCHARGE NOTE PROVIDER - CARE PROVIDER_API CALL
Mello Dickens)  Vascular Surgery  [address] [facility], [address]  [place] [address]  Phone: [phone]  Fax: [phone]  Follow Up Time:

## 2023-02-28 NOTE — PROGRESS NOTE ADULT - REASON FOR ADMISSION
AVF Thrombus

## 2023-02-28 NOTE — DISCHARGE NOTE PROVIDER - NSDCMRMEDTOKEN_GEN_ALL_CORE_FT
allopurinol 100 mg tablet: TAKE ONE TABLET BY MOUTH ONCE A DAY  AURYXIA 210MG TAB:   CALCITRIOL 0.25MCG CAP:   CALCIUM ACET 667MG TAB:   citalopram 20 mg oral tablet: 1 tab(s) orally once a day  lamiVUDine HBV: 100 milligram(s) orally once a day  MULTIVITAMIN TAB:   Omega-3 1000 mg oral capsule: 1 cap(s) orally once a day  omega-3 acid ethyl esters 1 gram capsule: TAKE ONE CAPSULE BY MOUTH ONCE A DAY  omeprazole 20 mg oral delayed release capsule: 1 cap(s) orally once a day  ONDANSETRON 4MG TAB:   PEG-3350 ELECTROL SOL:   PRAVASTATIN 40MG TAB:   Prezcobix 800 mg-150 mg oral tablet: 1 tab(s) orally once a day  senna oral tablet: 2 tab(s) orally once a day (at bedtime)  SEVELAM CARB 800MG RV TAB:   SOD BICARB 650MG TAB:   Tivicay 50 mg oral tablet: 1 tab(s) orally once a day  Vitamin D3 25 mcg (1,000 unit) capsule: TAKE ONE CAPSULE BY MOUTH ONCE A DAY   allopurinol 100 mg tablet: TAKE ONE TABLET BY MOUTH ONCE A DAY  AURYXIA 210MG TAB:   CALCITRIOL 0.25MCG CAP:   CALCIUM ACET 667MG TAB:   citalopram 20 mg oral tablet: 1 tab(s) orally once a day  lamiVUDine HBV: 100 milligram(s) orally once a day  MULTIVITAMIN TAB: 1 tab(s) orally once a day  Omega-3 1000 mg oral capsule: 1 cap(s) orally once a day  omega-3 acid ethyl esters 1 gram capsule: TAKE ONE CAPSULE BY MOUTH ONCE A DAY  omeprazole 20 mg oral delayed release capsule: 1 cap(s) orally once a day  ONDANSETRON 4MG TAB: 1 tab(s) orally once a day  PEG-3350 ELECTROL SOL:   PRAVASTATIN 40MG TAB:   Prezcobix 800 mg-150 mg oral tablet: 1 tab(s) orally once a day  senna oral tablet: 2 tab(s) orally once a day (at bedtime)  SEVELAM CARB 800MG RV TAB:   SOD BICARB 650MG TAB:   Tivicay 50 mg oral tablet: 1 tab(s) orally once a day  Vitamin D3 25 mcg (1,000 unit) capsule: TAKE ONE CAPSULE BY MOUTH ONCE A DAY

## 2023-02-28 NOTE — DIETITIAN INITIAL EVALUATION ADULT - NS FNS DIET ORDER
Diet, Regular:   DASH/TLC {Sodium & Cholesterol Restricted}  For patients receiving Renal Replacement - No Protein Restr, No Conc K, No Conc Phos, Low Sodium (RENAL)  Supplement Feeding Modality:  Oral  Nepro Cans or Servings Per Day:  1       Frequency:  Two Times a day (02-27-23 @ 12:37)

## 2023-02-28 NOTE — DIETITIAN INITIAL EVALUATION ADULT - OTHER INFO
Pt lives home PTA, alert, oriented, well-communicated; decreased intake on/off x 2wks, wt loss of 140 lb-->129 lb x 3m ? per pt (but unable to confirm, see wt data below), denied GI distress, chewing or swallowing problem at present, no specific food choices reported, allergy to Spinach, wants to have Nepro oral nutritional supplement; on HD x 3y, followed by dietitian at out-pt dialysis center

## 2023-02-28 NOTE — DIETITIAN INITIAL EVALUATION ADULT - PERTINENT LABORATORY DATA
02-28    136  |  99  |  21<H>  ----------------------------<  85  4.9   |  28  |  6.87<H>    Ca    9.6      28 Feb 2023 05:30  Phos  6.0     02-28  Mg     2.1     02-28    TPro  7.0  /  Alb  2.9<L>  /  TBili  0.3  /  DBili  x   /  AST  21  /  ALT  49  /  AlkPhos  179<H>  02-28  A1C with Estimated Average Glucose Result: See Note (09-29-22 @ 06:30)

## 2023-02-28 NOTE — PROGRESS NOTE ADULT - PROBLEM SELECTOR PROBLEM 1
Thrombosis of arteriovenous fistula

## 2023-02-28 NOTE — DIETITIAN INITIAL EVALUATION ADULT - FACTORS AFF FOOD INTAKE
acute on chronic comorbidities including ESRD on HD, recent Covid19 infection, HIV,/persistent lack of appetite/Jehovah's witness/ethnic/cultural/personal food preferences

## 2023-02-28 NOTE — DIETITIAN INITIAL EVALUATION ADULT - ETIOLOGY
chronic comorbidities including ESRD on HD, recent Covid19 infection, HIV; cultural/ethnical/individual food preferences

## 2023-02-28 NOTE — PROGRESS NOTE ADULT - PROBLEM SELECTOR PLAN 9
SCD for now as may need urgent procedure for access for urgent HD

## 2023-02-28 NOTE — PROGRESS NOTE ADULT - SUBJECTIVE AND OBJECTIVE BOX
PGY-1 Progress Note discussed with attending    PAGER #: [[phone]] TILL 5:00 PM  PLEASE CONTACT ON CALL TEAM:  - On Call Team (Please refer to Danny) FROM 5:00 PM - 8:30PM  - Nightfloat Team FROM 8:30 -7:30 AM    CHIEF COMPLAINT & BRIEF HOSPITAL COURSE:    INTERVAL HPI/OVERNIGHT EVENTS:   MEDICATIONS  (STANDING):  chlorhexidine 2% Cloths 1 Application(s) Topical daily  citalopram 20 milliGRAM(s) Oral daily  darunavir 800 mG/cobicstat 150 mG 1 Tablet(s) Oral daily  dolutegravir 50 milliGRAM(s) Oral daily  heparin   Injectable 5000 Unit(s) SubCutaneous every 8 hours  lamiVUDine- milliGRAM(s) Oral daily  lidocaine   4% Patch 1 Patch Transdermal daily  melatonin 3 milliGRAM(s) Oral at bedtime  Nephro-alex 1 Tablet(s) Oral daily  sodium chloride 0.9%. 1000 milliLiter(s) (50 mL/Hr) IV Continuous <Continuous>    MEDICATIONS  (PRN):  acetaminophen     Tablet .. 650 milliGRAM(s) Oral every 6 hours PRN Temp greater or equal to 38C (100.4F), Mild Pain (1 - 3)  ondansetron Injectable 4 milliGRAM(s) IV Push every 8 hours PRN Nausea and/or Vomiting      REVIEW OF SYSTEMS:  CONSTITUTIONAL: No fever, weight loss, or fatigue  RESPIRATORY: No cough, wheezing, chills or hemoptysis; No shortness of breath  CARDIOVASCULAR: No chest pain, palpitations, dizziness, or leg swelling  GASTROINTESTINAL: No abdominal pain. No nausea, vomiting, or hematemesis; No diarrhea or constipation. No melena or hematochezia.  GENITOURINARY: No dysuria or hematuria, urinary frequency  NEUROLOGICAL: No headaches, memory loss, loss of strength, numbness, or tremors  SKIN: No itching, burning, rashes, or lesions     Vital Signs Last 24 Hrs  T(C): 36.8 (28 Feb 2023 05:20), Max: 37.3 (27 Feb 2023 20:35)  T(F): 98.2 (28 Feb 2023 05:20), Max: 99.1 (27 Feb 2023 20:35)  HR: 97 (28 Feb 2023 05:20) (87 - 107)  BP: 119/82 (28 Feb 2023 05:20) (98/54 - 119/82)  BP(mean): --  RR: 18 (28 Feb 2023 05:20) (16 - 18)  SpO2: 100% (28 Feb 2023 05:20) (99% - 100%)    Parameters below as of 28 Feb 2023 05:20  Patient On (Oxygen Delivery Method): room air        PHYSICAL EXAMINATION:  GENERAL: NAD, well built  HEAD:  Atraumatic, Normocephalic  EYES:  conjunctiva and sclera clear  NECK: Supple, No JVD, Normal thyroid  CHEST/LUNG: Clear to auscultation. Clear to percussion bilaterally; No rales, rhonchi, wheezing, or rubs  HEART: Regular rate and rhythm; No murmurs, rubs, or gallops  ABDOMEN: Soft, Nontender, Nondistended; Bowel sounds present  NERVOUS SYSTEM:  Alert & Oriented X3,    EXTREMITIES:  2+ Peripheral Pulses, No clubbing, cyanosis, or edema  SKIN: warm dry                          12.5   4.61  )-----------( 139      ( 28 Feb 2023 05:30 )             37.8     02-28    136  |  99  |  21<H>  ----------------------------<  85  4.9   |  28  |  6.87<H>    Ca    9.6      28 Feb 2023 05:30  Phos  6.0     02-28  Mg     2.1     02-28    TPro  7.0  /  Alb  2.9<L>  /  TBili  0.3  /  DBili  x   /  AST  21  /  ALT  49  /  AlkPhos  179<H>  02-28    LIVER FUNCTIONS - ( 28 Feb 2023 05:30 )  Alb: 2.9 g/dL / Pro: 7.0 g/dL / ALK PHOS: 179 U/L / ALT: 49 U/L DA / AST: 21 U/L / GGT: x                   CAPILLARY BLOOD GLUCOSE      RADIOLOGY & ADDITIONAL TESTS:

## 2023-02-28 NOTE — DISCHARGE NOTE PROVIDER - ATTENDING DISCHARGE PHYSICAL EXAMINATION:
Constitutional/General: Thin, vitals reviewed  EYE: Symmetrical pupils, conjunctiva clear   ENT: Good dentition, oropharynx clear  NECK: No visual masses, no JVD  CHEST: No respiratory distress, bilateral symmetrical chest rise  ABDOMEN: Nondistended, no visual masses  MSK: Left AVF with no palpable thrill  SKIN: No rash, warm, dry, right groin site bandaged  NEURO: A+Ox3, Cranial nerves grossly intact, moves all extremities, follows commands  PSYCH: Normal mood, normal affect

## 2023-02-28 NOTE — PROGRESS NOTE ADULT - PROBLEM SELECTOR PLAN 1
sent from [facility] for clotted LEFT AVF, unable to get dialyzed today   h/o DVT in the LE and reported prior clot in the AVF  last HD session was Wednesday 2/22/22  no thrill in the LEFT AVF   follows Dr. Reese for Nephrology  Nephrology consulted: Dr. Willard   Vascular consulted, follows with Dr. Martinez - nicole f/u recs  S/p R fem Shiley placement (2/26), and underwent HD 2/26  Will go for HD today, and poss D/C for outpatient vascular work with Dr. Dickens   ?PC placement, removal of shiley after HD

## 2023-02-28 NOTE — DISCHARGE NOTE NURSING/CASE MANAGEMENT/SOCIAL WORK - NSDCPEFALRISK_GEN_ALL_CORE
For information on Fall & Injury Prevention, visit: [email or web address] OR  [email or web address] OR  https://www.cdc.gov/steadi/patient.html

## 2023-02-28 NOTE — DISCHARGE NOTE PROVIDER - NSDCFUSCHEDAPPT_GEN_ALL_CORE_FT
Eric Seals  [facility]  [facility] [phone]   Scheduled Appointment: 04/24/2023     Mello Dickens  [facility]  ENDOVASCULAR [address]   Scheduled Appointment: 03/01/2023    Eric Seals  [facility]  [address]ACK[phone]   Scheduled Appointment: 04/24/2023

## 2023-02-28 NOTE — PROGRESS NOTE ADULT - PROVIDER SPECIALTY LIST ADULT
Nephrology
Surgery
Vascular Surgery
Nephrology
Nephrology
Internal Medicine

## 2023-02-28 NOTE — DISCHARGE NOTE PROVIDER - NSDCCPCAREPLAN_GEN_ALL_CORE_FT
PRINCIPAL DISCHARGE DIAGNOSIS  Diagnosis: AV fistula thrombosis  Assessment and Plan of Treatment: You was a history of End Stage Renal Disease on hemodialysis Monday, Wednesday, and Friday. You were found to have a clot in your AV fistula so you were admitted to undergo hemodialysis with a temporary catheter in your R leg vein. That catheter was placed on 2/26/23 and removed by vascular surgery 2/28. You also underwent Hemodialysis on 2/26 and 2/27. You are planned to visit Dr. Funes outpatient tomorrow 2/29 in order to correct your AVF  access.      SECONDARY DISCHARGE DIAGNOSES  Diagnosis: HIV disease  Assessment and Plan of Treatment:     Diagnosis: HLD (hyperlipidemia)  Assessment and Plan of Treatment: You have history of Hyperlipidemia.   Please take your medication as prescribed. Maintain healthy lifestyle, low fat diet, exercise regularly and check your lipid levels routinely.   Please follow up with your PCP in 1 week from discharge.    Diagnosis: GERD (gastroesophageal reflux disease)  Assessment and Plan of Treatment: You have history of GERD- gastroesophageal reflux which is a chronic disease that occurs when stomach acid or bile flows into the food pipe and irritates the lining. You are taking OMEPRAZOLE PANTOPRAZOLE FAMOTIDINE AT HOME. Please continue to take your HOME medications and follow up with your PCP / Gastroenterologist in a week from discharge.     PRINCIPAL DISCHARGE DIAGNOSIS  Diagnosis: AV fistula thrombosis  Assessment and Plan of Treatment: You was a history of End Stage Renal Disease on hemodialysis Monday, Wednesday, and Friday. You were found to have a clot in your AV fistula so you were admitted to undergo hemodialysis with a temporary catheter in your R leg vein. That catheter was placed on 2/26/23 and removed by vascular surgery 2/28. You also underwent Hemodialysis on 2/26 and 2/27. You are planned to visit Dr. Funes outpatient tomorrow 2/29 in order to correct your AV fistula access.      SECONDARY DISCHARGE DIAGNOSES  Diagnosis: HIV disease  Assessment and Plan of Treatment: You have a history of HIV, on DARUNAVIR, DOLUTEGRAVIR, and LAMIVUDINE. Your HIV viral load and CD4 count are within normal limits. You were continued on your home medications. Please follow up with your PCP in 1 week from discharge.    Diagnosis: HLD (hyperlipidemia)  Assessment and Plan of Treatment: You have history of Hyperlipidemia.   Please take your medication as prescribed. Maintain healthy lifestyle, low fat diet, exercise regularly and check your lipid levels routinely.   Please follow up with your PCP in 1 week from discharge.    Diagnosis: GERD (gastroesophageal reflux disease)  Assessment and Plan of Treatment: You have history of GERD- gastroesophageal reflux which is a chronic disease that occurs when stomach acid or bile flows into the food pipe and irritates the lining. You are taking OMEPRAZOLE PANTOPRAZOLE FAMOTIDINE AT HOME. Please continue to take your HOME medications and follow up with your PCP / Gastroenterologist in a week from discharge.     PRINCIPAL DISCHARGE DIAGNOSIS  Diagnosis: AV fistula thrombosis  Assessment and Plan of Treatment: You was a history of End Stage Renal Disease on hemodialysis Monday, Wednesday, and Friday. You were found to have a clot in your AV fistula so you were admitted to undergo hemodialysis with a temporary catheter in your R leg vein. That catheter was placed on 2/26/23 and removed by vascular surgery 2/28. You also underwent Hemodialysis on 2/26 and 2/27. You are planned to visit Dr. Funes outpatient tomorrow 2/29 in order to correct your AV fistula access.      SECONDARY DISCHARGE DIAGNOSES  Diagnosis: HIV disease  Assessment and Plan of Treatment: You have a history of HIV, on DARUNAVIR, DOLUTEGRAVIR, and LAMIVUDINE. Your HIV viral load and CD4 count are within normal limits. You were continued on your home medications. Please follow up with your PCP in 1 week from discharge.    Diagnosis: HLD (hyperlipidemia)  Assessment and Plan of Treatment: You have history of Hyperlipidemia.   Please take your medication as prescribed. Maintain healthy lifestyle, low fat diet, exercise regularly and check your lipid levels routinely.   Please follow up with your PCP in 1 week from discharge.    Diagnosis: GERD (gastroesophageal reflux disease)  Assessment and Plan of Treatment: You have history of GERD- gastroesophageal reflux which is a chronic disease that occurs when stomach acid or bile flows into the food pipe and irritates the lining. You are taking OMEPRAZOLE AT HOME. Please continue to take your HOME medications and follow up with your PCP / Gastroenterologist in a week from discharge.     PRINCIPAL DISCHARGE DIAGNOSIS  Diagnosis: AV fistula thrombosis  Assessment and Plan of Treatment: You was a history of End Stage Renal Disease on hemodialysis Monday, Wednesday, and Friday. You were found to have a clot in your AV fistula so you were admitted to undergo hemodialysis with a temporary catheter in your R leg vein. That catheter was placed on 2/26/23 and removed by vascular surgery 2/28. You also underwent Hemodialysis on 2/26 and 2/27. You are planned to visit Dr. Funes outpatient tomorrow 2/29 in order to correct your AV fistula access.      SECONDARY DISCHARGE DIAGNOSES  Diagnosis: HIV disease  Assessment and Plan of Treatment: You have a history of HIV, on DARUNAVIR, DOLUTEGRAVIR, and LAMIVUDINE. Your HIV viral load and CD4 count are within normal limits. You were continued on your home medications. Please follow up with your PCP in 1 week from discharge.    Diagnosis: GERD (gastroesophageal reflux disease)  Assessment and Plan of Treatment: You have history of GERD- gastroesophageal reflux which is a chronic disease that occurs when stomach acid or bile flows into the food pipe and irritates the lining. You are taking OMEPRAZOLE AT HOME. Please continue to take your HOME medications and follow up with your PCP / Gastroenterologist in a week from discharge.    Diagnosis: HLD (hyperlipidemia)  Assessment and Plan of Treatment: You have history of Hyperlipidemia.   Please take your medication as prescribed. Maintain healthy lifestyle, low fat diet, exercise regularly and check your lipid levels routinely.   Please follow up with your PCP in 1 week from discharge.

## 2023-03-01 ENCOUNTER — APPOINTMENT (OUTPATIENT)
Dept: ENDOVASCULAR SURGERY | Facility: CLINIC | Age: 68
End: 2023-03-01
Payer: MEDICAID

## 2023-03-01 ENCOUNTER — RESULT REVIEW (OUTPATIENT)
Age: 68
End: 2023-03-01

## 2023-03-01 VITALS
HEIGHT: 62 IN | DIASTOLIC BLOOD PRESSURE: 64 MMHG | TEMPERATURE: 97.3 F | BODY MASS INDEX: 23.74 KG/M2 | HEART RATE: 90 BPM | OXYGEN SATURATION: 100 % | SYSTOLIC BLOOD PRESSURE: 96 MMHG | WEIGHT: 129 LBS | RESPIRATION RATE: 16 BRPM

## 2023-03-01 DIAGNOSIS — T82.868A THROMBOSIS DUE VASCULAR PROSTHETIC DEVICES, IMPLANTS AND GRAFTS, INITIAL ENCOUNTER: ICD-10-CM

## 2023-03-01 PROCEDURE — 36907Z: CUSTOM | Mod: 59

## 2023-03-01 PROCEDURE — 36215Z: CUSTOM | Mod: 59

## 2023-03-01 PROCEDURE — 36905Z: CUSTOM

## 2023-03-01 PROCEDURE — 99213 OFFICE O/P EST LOW 20 MIN: CPT | Mod: 25

## 2023-03-01 NOTE — HISTORY OF PRESENT ILLNESS
[] : in left upper arm [FreeTextEntry1] : 3/5/2020 Dr. Dickens (thrombosed) [FreeTextEntry3] : 8/6/2020 Dr. Dickens [FreeTextEntry4] : yesterday [FreeTextEntry5] : yesterday at 10pm [FreeTextEntry6] : Dr. Herr

## 2023-03-01 NOTE — PROCEDURE
[D/C IV on discharge] : D/C IV on discharge [Resume diet] : resume diet [Site check for bleeding/hematoma/thrill/bruit] : Site check for bleeding/hematoma/thrill/bruit [Vital signs on admission the q 15 mins x2] : Vital signs on admission the q 15 mins x2 [FreeTextEntry1] : left arm AVG Thrombectomy [FreeTextEntry3] : 2mg tpa in 3 ml sterile water instilled into left arm AVG via 15cm speedlyser by Dr. Dickens at 740

## 2023-03-07 NOTE — PROGRESS NOTE ADULT - PROBLEM SELECTOR PROBLEM 5
0 (no pain/absence of nonverbal indicators of pain)
HIV (human immunodeficiency virus infection)
Gout
Gout
HIV (human immunodeficiency virus infection)
Gout
HIV (human immunodeficiency virus infection)
Gout

## 2023-03-17 ENCOUNTER — NON-APPOINTMENT (OUTPATIENT)
Age: 68
End: 2023-03-17

## 2023-03-22 LAB — SARS-COV-2 N GENE NPH QL NAA+PROBE: NOT DETECTED

## 2023-03-24 ENCOUNTER — TRANSCRIPTION ENCOUNTER (OUTPATIENT)
Age: 68
End: 2023-03-24

## 2023-03-24 ENCOUNTER — APPOINTMENT (OUTPATIENT)
Dept: SURGERY | Facility: HOSPITAL | Age: 68
End: 2023-03-24
Payer: MEDICAID

## 2023-03-24 ENCOUNTER — OUTPATIENT (OUTPATIENT)
Dept: OUTPATIENT SERVICES | Facility: HOSPITAL | Age: 68
LOS: 1 days | End: 2023-03-24
Payer: MEDICAID

## 2023-03-24 VITALS
SYSTOLIC BLOOD PRESSURE: 114 MMHG | OXYGEN SATURATION: 100 % | DIASTOLIC BLOOD PRESSURE: 84 MMHG | RESPIRATION RATE: 18 BRPM | HEART RATE: 82 BPM

## 2023-03-24 VITALS
TEMPERATURE: 98 F | SYSTOLIC BLOOD PRESSURE: 103 MMHG | RESPIRATION RATE: 16 BRPM | HEART RATE: 81 BPM | DIASTOLIC BLOOD PRESSURE: 56 MMHG | HEIGHT: 62 IN | OXYGEN SATURATION: 100 % | WEIGHT: 128.97 LBS

## 2023-03-24 DIAGNOSIS — Z87.59 PERSONAL HISTORY OF OTHER COMPLICATIONS OF PREGNANCY, CHILDBIRTH AND THE PUERPERIUM: Chronic | ICD-10-CM

## 2023-03-24 DIAGNOSIS — Z90.710 ACQUIRED ABSENCE OF BOTH CERVIX AND UTERUS: Chronic | ICD-10-CM

## 2023-03-24 DIAGNOSIS — Z98.890 OTHER SPECIFIED POSTPROCEDURAL STATES: Chronic | ICD-10-CM

## 2023-03-24 DIAGNOSIS — Z93.2 ILEOSTOMY STATUS: ICD-10-CM

## 2023-03-24 LAB
GLUCOSE BLDC GLUCOMTR-MCNC: 80 MG/DL — SIGNIFICANT CHANGE UP (ref 70–99)
POTASSIUM SERPL-MCNC: 5 MMOL/L — SIGNIFICANT CHANGE UP (ref 3.5–5.3)
POTASSIUM SERPL-SCNC: 5 MMOL/L — SIGNIFICANT CHANGE UP (ref 3.5–5.3)

## 2023-03-24 PROCEDURE — 45330 DIAGNOSTIC SIGMOIDOSCOPY: CPT | Mod: 79

## 2023-03-24 PROCEDURE — 45330 DIAGNOSTIC SIGMOIDOSCOPY: CPT

## 2023-03-24 PROCEDURE — 82962 GLUCOSE BLOOD TEST: CPT

## 2023-03-24 PROCEDURE — 84132 ASSAY OF SERUM POTASSIUM: CPT

## 2023-03-24 PROCEDURE — 36415 COLL VENOUS BLD VENIPUNCTURE: CPT

## 2023-03-24 NOTE — ASU PATIENT PROFILE, ADULT - FALL HARM RISK - UNIVERSAL INTERVENTIONS
Bed in lowest position, wheels locked, appropriate side rails in place/Call bell, personal items and telephone in reach/Instruct patient to call for assistance before getting out of bed or chair/Non-slip footwear when patient is out of bed/[place] to call system/Physically safe environment - no spills, clutter or unnecessary equipment/Purposeful Proactive Rounding/Room/bathroom lighting operational, light cord in reach

## 2023-03-28 ENCOUNTER — RX RENEWAL (OUTPATIENT)
Age: 68
End: 2023-03-28

## 2023-03-28 ENCOUNTER — APPOINTMENT (OUTPATIENT)
Dept: SURGERY | Facility: CLINIC | Age: 68
End: 2023-03-28
Payer: MEDICAID

## 2023-03-28 VITALS
BODY MASS INDEX: 23.74 KG/M2 | HEIGHT: 62 IN | DIASTOLIC BLOOD PRESSURE: 65 MMHG | HEART RATE: 99 BPM | WEIGHT: 129 LBS | SYSTOLIC BLOOD PRESSURE: 99 MMHG

## 2023-03-28 PROCEDURE — 99213 OFFICE O/P EST LOW 20 MIN: CPT | Mod: 24

## 2023-03-28 RX ORDER — OSTOMY LUBRICATING DEODORANT
LIQUID (ML) MISCELLANEOUS
Qty: 1 | Refills: 0 | Status: ACTIVE | COMMUNITY
Start: 2023-03-28 | End: 1900-01-01

## 2023-03-30 NOTE — PHYSICAL EXAM
[Exam Deferred] : exam was deferred [Normal Rate and Rhythm] : normal rate and rhythm [Alert] : alert [Oriented to Person] : oriented to person [Oriented to Place] : oriented to place [Oriented to Time] : oriented to time [JVD] : no jugular venous distention  [de-identified] : soft, non-distended, non-tender to palpation. Ostomy appliance intact. Pfannenstiel well healed. [de-identified] : awake, alert, NAD  [de-identified] : normocephalic, atraumatic, EOMI, normal conjunctiva  [de-identified] : b/l chest rise, EWOB on RA  [de-identified] : deferred [de-identified] : requires some assistance [de-identified] : abdominal skin as above. Left nipple swelling w/ possible small abscess [de-identified] : normal mood and affect

## 2023-03-30 NOTE — ASSESSMENT
[FreeTextEntry1] : Ms. VERONICA GOVEA  is a 67y.o. F w/ multiple co-morbidities and a laparoscopic converted to open Alfonso's and SBR for complicated diverticulitis on 10/10/22 referred by Dr. Francisco for Alfonso's reversal. On 1/25/2023, she underwent robotic Alfonso's reversal, splenic flexure mobilization, appendectomy, and DLI. Recent flex sig demonstrating viable anastomosis. Today she is here to discuss surgical management.

## 2023-03-30 NOTE — HISTORY OF PRESENT ILLNESS
[FreeTextEntry1] : Ms. VERONICA GOVEA  is a 67 year F with a history of HIV, ESRD (M,W,F via LUE AVF) 2/2 HTN, HLD, anxiety, depression, anemia, ovarian cancer (s/p BSO + BLESSING) referred by Dr. Francisco for Burdick's reversal here for a follow up visit.  She was originally admitted to  for perforated diverticulitis for which she had an IR drain placed. As her diet was advanced she had PO intolerance and increased drain output so she was taken to the OR by Dr. Francisco on 10/10/22. I assisted that day with a laparoscopic converted to open, takedown of enterocolic fistula, flexible sigmoidoscopy, small bowel resection and Alfonso's. She was discharged on POD 5 to rehab. Patient states she has had episodes of abdominal pain prior to all of this but didn't know that it was likely due to diverticulitis. She had never had a colonoscopy and so pre-op she underwent a colonoscopy with Dr. Johnson which was unremarkable. On 1/25/2023 I took her for a robotic Alfonso's reversal, splenic flexure mobilization, extensive JANAE, appendectomy, flex sig and diverting loop ileostomy. She had an uneventful post-op course and was discharged home on POD 5 having ostomy function and tolerating a diet. Patient underwent a flex sig on 3/24/2023 which demonstrated a viable looking anastomosis without any obvious defect. She is scheduled for Hypaque enema to evaluate for a leak on 4/20/2023. Today she is here to discuss surgical planning. Patient is complaining of ostomy leakage which causes her anxiety. I recommended and prescribed imodium the day of the flex sig but she says she never got it. She is also complaining of new left nipple swelling and pain. 
yes

## 2023-04-18 ENCOUNTER — RESULT REVIEW (OUTPATIENT)
Age: 68
End: 2023-04-18

## 2023-04-18 ENCOUNTER — APPOINTMENT (OUTPATIENT)
Dept: ENDOVASCULAR SURGERY | Facility: CLINIC | Age: 68
End: 2023-04-18
Payer: MEDICAID

## 2023-04-18 VITALS
WEIGHT: 129 LBS | TEMPERATURE: 97 F | HEIGHT: 62 IN | DIASTOLIC BLOOD PRESSURE: 61 MMHG | SYSTOLIC BLOOD PRESSURE: 96 MMHG | BODY MASS INDEX: 23.74 KG/M2 | OXYGEN SATURATION: 100 % | RESPIRATION RATE: 17 BRPM | HEART RATE: 82 BPM

## 2023-04-18 PROCEDURE — 36902Z: CUSTOM

## 2023-04-18 PROCEDURE — 36907Z: CUSTOM | Mod: 59

## 2023-04-18 NOTE — ASSESSMENT
[Other: _____] : [unfilled] [FreeTextEntry1] : 66 yo female with history of esrd on hd via left upper arm AVG. with difficult cannulation-plan for fistulogram and possible intervention

## 2023-04-18 NOTE — PROCEDURE
[Resume diet] : resume diet [Site check for bleeding/hematoma/thrill/bruit] : Site check for bleeding/hematoma/thrill/bruit [Vital signs on admission the q 15 mins x2] : Vital signs on admission the q 15 mins x2 [FreeTextEntry1] : left arm AVG fistulogram/amgioplasty

## 2023-04-18 NOTE — HISTORY OF PRESENT ILLNESS
[] : in left upper arm [FreeTextEntry1] : 3/5/2020 Dr. Dickens (thrombosed) [FreeTextEntry3] : 8/6/2020 Dr. Dickens [FreeTextEntry4] : yesterday [FreeTextEntry5] : yesterday at 11pm [FreeTextEntry6] : Dr Leal

## 2023-04-20 ENCOUNTER — OUTPATIENT (OUTPATIENT)
Dept: OUTPATIENT SERVICES | Facility: HOSPITAL | Age: 68
LOS: 1 days | End: 2023-04-20
Payer: MEDICAID

## 2023-04-20 ENCOUNTER — APPOINTMENT (OUTPATIENT)
Dept: RADIOLOGY | Facility: HOSPITAL | Age: 68
End: 2023-04-20
Payer: MEDICAID

## 2023-04-20 VITALS
OXYGEN SATURATION: 98 % | DIASTOLIC BLOOD PRESSURE: 56 MMHG | SYSTOLIC BLOOD PRESSURE: 96 MMHG | HEART RATE: 95 BPM | TEMPERATURE: 98 F | WEIGHT: 138.89 LBS | RESPIRATION RATE: 16 BRPM | HEIGHT: 62 IN

## 2023-04-20 DIAGNOSIS — Z98.890 OTHER SPECIFIED POSTPROCEDURAL STATES: Chronic | ICD-10-CM

## 2023-04-20 DIAGNOSIS — Z93.2 ILEOSTOMY STATUS: ICD-10-CM

## 2023-04-20 DIAGNOSIS — B20 HUMAN IMMUNODEFICIENCY VIRUS [HIV] DISEASE: ICD-10-CM

## 2023-04-20 DIAGNOSIS — I10 ESSENTIAL (PRIMARY) HYPERTENSION: ICD-10-CM

## 2023-04-20 DIAGNOSIS — Z01.818 ENCOUNTER FOR OTHER PREPROCEDURAL EXAMINATION: ICD-10-CM

## 2023-04-20 DIAGNOSIS — M10.9 GOUT, UNSPECIFIED: ICD-10-CM

## 2023-04-20 DIAGNOSIS — E78.5 HYPERLIPIDEMIA, UNSPECIFIED: ICD-10-CM

## 2023-04-20 DIAGNOSIS — F41.9 ANXIETY DISORDER, UNSPECIFIED: ICD-10-CM

## 2023-04-20 DIAGNOSIS — N18.6 END STAGE RENAL DISEASE: ICD-10-CM

## 2023-04-20 DIAGNOSIS — Z87.59 PERSONAL HISTORY OF OTHER COMPLICATIONS OF PREGNANCY, CHILDBIRTH AND THE PUERPERIUM: Chronic | ICD-10-CM

## 2023-04-20 DIAGNOSIS — Z98.41 CATARACT EXTRACTION STATUS, RIGHT EYE: Chronic | ICD-10-CM

## 2023-04-20 DIAGNOSIS — Z90.710 ACQUIRED ABSENCE OF BOTH CERVIX AND UTERUS: Chronic | ICD-10-CM

## 2023-04-20 DIAGNOSIS — K21.9 GASTRO-ESOPHAGEAL REFLUX DISEASE WITHOUT ESOPHAGITIS: ICD-10-CM

## 2023-04-20 LAB
ALBUMIN SERPL ELPH-MCNC: 3.2 G/DL — LOW (ref 3.5–5)
ALP SERPL-CCNC: 210 U/L — HIGH (ref 40–120)
ALT FLD-CCNC: 25 U/L DA — SIGNIFICANT CHANGE UP (ref 10–60)
ANION GAP SERPL CALC-SCNC: 9 MMOL/L — SIGNIFICANT CHANGE UP (ref 5–17)
APTT BLD: 33.4 SEC — SIGNIFICANT CHANGE UP (ref 27.5–35.5)
AST SERPL-CCNC: 15 U/L — SIGNIFICANT CHANGE UP (ref 10–40)
BILIRUB SERPL-MCNC: 0.4 MG/DL — SIGNIFICANT CHANGE UP (ref 0.2–1.2)
BLD GP AB SCN SERPL QL: SIGNIFICANT CHANGE UP
BUN SERPL-MCNC: 23 MG/DL — HIGH (ref 7–18)
CALCIUM SERPL-MCNC: 10.6 MG/DL — HIGH (ref 8.4–10.5)
CHLORIDE SERPL-SCNC: 98 MMOL/L — SIGNIFICANT CHANGE UP (ref 96–108)
CO2 SERPL-SCNC: 30 MMOL/L — SIGNIFICANT CHANGE UP (ref 22–31)
CREAT SERPL-MCNC: 7.3 MG/DL — HIGH (ref 0.5–1.3)
EGFR: 6 ML/MIN/1.73M2 — LOW
GLUCOSE SERPL-MCNC: 90 MG/DL — SIGNIFICANT CHANGE UP (ref 70–99)
HCT VFR BLD CALC: 43.7 % — SIGNIFICANT CHANGE UP (ref 34.5–45)
HGB BLD-MCNC: 14.3 G/DL — SIGNIFICANT CHANGE UP (ref 11.5–15.5)
INR BLD: 1.12 RATIO — SIGNIFICANT CHANGE UP (ref 0.88–1.16)
MCHC RBC-ENTMCNC: 32.7 GM/DL — SIGNIFICANT CHANGE UP (ref 32–36)
MCHC RBC-ENTMCNC: 34.4 PG — HIGH (ref 27–34)
MCV RBC AUTO: 105 FL — HIGH (ref 80–100)
NRBC # BLD: 0 /100 WBCS — SIGNIFICANT CHANGE UP (ref 0–0)
PLATELET # BLD AUTO: 183 K/UL — SIGNIFICANT CHANGE UP (ref 150–400)
POTASSIUM SERPL-MCNC: 5.2 MMOL/L — SIGNIFICANT CHANGE UP (ref 3.5–5.3)
POTASSIUM SERPL-SCNC: 5.2 MMOL/L — SIGNIFICANT CHANGE UP (ref 3.5–5.3)
PROT SERPL-MCNC: 8.3 G/DL — SIGNIFICANT CHANGE UP (ref 6–8.3)
PROTHROM AB SERPL-ACNC: 13.3 SEC — SIGNIFICANT CHANGE UP (ref 10.5–13.4)
RBC # BLD: 4.16 M/UL — SIGNIFICANT CHANGE UP (ref 3.8–5.2)
RBC # FLD: 17.5 % — HIGH (ref 10.3–14.5)
SODIUM SERPL-SCNC: 137 MMOL/L — SIGNIFICANT CHANGE UP (ref 135–145)
WBC # BLD: 6.18 K/UL — SIGNIFICANT CHANGE UP (ref 3.8–10.5)
WBC # FLD AUTO: 6.18 K/UL — SIGNIFICANT CHANGE UP (ref 3.8–10.5)

## 2023-04-20 PROCEDURE — G0463: CPT

## 2023-04-20 PROCEDURE — 74270 X-RAY XM COLON 1CNTRST STD: CPT

## 2023-04-20 PROCEDURE — 71046 X-RAY EXAM CHEST 2 VIEWS: CPT | Mod: 26

## 2023-04-20 PROCEDURE — 71046 X-RAY EXAM CHEST 2 VIEWS: CPT

## 2023-04-20 PROCEDURE — 74270 X-RAY XM COLON 1CNTRST STD: CPT | Mod: 26

## 2023-04-20 RX ORDER — SODIUM CHLORIDE 9 MG/ML
3 INJECTION INTRAMUSCULAR; INTRAVENOUS; SUBCUTANEOUS EVERY 8 HOURS
Refills: 0 | Status: DISCONTINUED | OUTPATIENT
Start: 2023-04-24 | End: 2023-04-27

## 2023-04-20 RX ORDER — ONDANSETRON 8 MG/1
1 TABLET, FILM COATED ORAL
Qty: 0 | Refills: 0 | DISCHARGE

## 2023-04-20 RX ORDER — OMEGA-3 ACID ETHYL ESTERS 1 G
0 CAPSULE ORAL
Qty: 0 | Refills: 0 | DISCHARGE

## 2023-04-20 RX ORDER — SOD SULF/SODIUM/NAHCO3/KCL/PEG
0 SOLUTION, RECONSTITUTED, ORAL ORAL
Qty: 0 | Refills: 0 | DISCHARGE

## 2023-04-20 NOTE — H&P PST ADULT - ASSESSMENT
67 year old female with history of HIV, GERD DVT ESRD on HD (M, W, F) via left upper arm AV fistula patent for bruit and thrill, cervical cancer 2010. Pt stated had colon obstruction which resulted in an ileostomy. Ostomy is functioning with formed stool, stoma pink and positive for flatus in bag. Pt had all blood work, chest X-ray done in PST. Pt has clearance appt with Dr. Lowry [phone] on 4/21/2023. Pt scheduled for Loop Ileostomy reversal possible exploratory laparoscopy on 4/24/2023.     67 year old female with history of HIV, GERD DVT lower extremity  ESRD on HD (M, W, F) via left upper arm AV fistula patent for bruit and thrill, [facility] [phone] Dr Reese, cervical cancer 2010. Pt stated had colon obstruction which resulted in an ileostomy. Ostomy is functioning with formed stool, stoma pink and positive for flatus in bag. Pt had all blood work, chest X-ray done in PST. Pt has clearance appt with Dr. Lowry [phone] on 4/21/2023. Pt scheduled for Loop Ileostomy reversal possible exploratory laparoscopy on 4/24/2023.

## 2023-04-20 NOTE — H&P PST ADULT - NSANTHTOTALSCORECAL_ENT_A_CORE
Virtual Visit - [phone]    Identified pt with two pt identifiers(name and ). Reviewed record in preparation for visit and have obtained necessary documentation. All patient medications has been reviewed. Chief Complaint   Patient presents with    Medication Refill     Tramadol; having continued pain in stomach and left abd w/ little to no improvement       Health Maintenance Due   Topic    Medicare Yearly Exam     Shingrix Vaccine Age 50> (2 of 2)       There were no vitals filed for this visit. 1.Have you traveled outside of the [address] [place] [address] in the last month No    2. Have you been in close contact with someone who is suspected to have COVID-19 or has tested positive. No    3. Do you have any signs or symptoms. ? No    4. Have you been to the ER, urgent care clinic since your last visit? Hospitalized since your last visit? No    5. Have you seen or consulted any other health care providers outside of the [address] [place] [address] since your last visit? Include any pap smears or colon screening. saw GI specialist in [place] on     Patient is accompanied by self I have received verbal consent from [address] [place] [address] to discuss any/all medical information while they are present in the room. 1

## 2023-04-20 NOTE — H&P PST ADULT - NSICDXPASTMEDICALHX_GEN_ALL_CORE_FT
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

## 2023-04-20 NOTE — H&P PST ADULT - NSICDXPASTSURGICALHX_GEN_ALL_CORE_FT
PAST SURGICAL HISTORY:  H/O: hysterectomy Due to cervical cancer    History of colon surgery     History of ectopic pregnancy Two    S/P arteriovenous (AV) fistula creation july 10 2020    S/P cataract surgery, right

## 2023-04-20 NOTE — H&P PST ADULT - NS ATTEND AMEND GEN_ALL_CORE FT
OR today for loop ileostomy closure, possible ex lap  Supine w/ arms out  terry  general and spinal duramorph  0.5% marcaine and exparel 1:1    Eric Seals MD  Attending physician

## 2023-04-20 NOTE — H&P PST ADULT - NSCAFFEINETYPE_GEN_ALL_CORE_SD
12.0   8.2   )-----------( 221      ( 21 Mar 2018 07:15 )             38.2   03-21    140  |  105  |  31<H>  ----------------------------<  84  4.6   |  26  |  0.81    Ca    9.2      21 Mar 2018 07:15    TPro  7.7  /  Alb  4.1  /  TBili  0.7  /  DBili  x   /  AST  22  /  ALT  18  /  AlkPhos  77  03-21 juice and water/tea

## 2023-04-20 NOTE — H&P PST ADULT - HISTORY OF PRESENT ILLNESS
67 year old female with history of HIV, GERD DVT ESRD on HD (M, W, F) via left upper arm AV fistula patent for bruit and thrill, cervical cancer 2010. Pt stated had colon obstruction which resulted in an ileostomy. Ostomy is functioning with formed stool, stoma pink and positive for flatus in bag. Pt had all blood work, chest X-ray done in PST. Pt has clearance appt with Dr. Lowry [phone] on 4/21/2023. Pt scheduled for Loop Ileostomy reversal possible exploratory laparoscopy on 4/24/2023. 67 year old female with history of HIV, GERD DVT lower extremity  ESRD on HD (M, W, F) via left upper arm AV fistula patent for bruit and thrill, [place] [phone] Dr Reese, cervical cancer 2010. Pt stated had colon obstruction which resulted in an ileostomy. Ostomy is functioning with formed stool, stoma pink and positive for flatus in bag. Pt had all blood work, chest X-ray done in PST. Pt has clearance appt with Dr. Lowry [phone] on 4/21/2023. Pt scheduled for Loop Ileostomy reversal possible exploratory laparoscopy on 4/24/2023.

## 2023-04-20 NOTE — H&P PST ADULT - RESPIRATORY
Conjuntivae and eyelids appear normal,  Sclerae : White without injection normal/no wheezes/no rales/no rhonchi normal/clear to auscultation bilaterally/no wheezes/no rales/no rhonchi

## 2023-04-20 NOTE — H&P PST ADULT - PROBLEM SELECTOR PROBLEM 7
Impression: [facility] encephalitis, worsening even during this admission.  Acute worsening along with the increased titers indicate the need for acute and rapidly acting immunotherapy, and IV solumedrol and lorazepam/vimpat addition have seemed to stop seizure activity and improve cognitive clarity.  Will have to watch for diabetes and blood sugars.  The rituximab will await more direct assessment of the Hep B viral load, for Tuesday.  Standing PO Lorazepam will stop after today. Impression: [facility] encephalitis, worsening even during this admission.  Acute worsening along with the increased titers indicate the need for acute and rapidly acting immunotherapy, such as IV solumedrol, but will have to watch for diabetes and blood sugars.  The rituximab will await more direct assessment of the Hep B viral load, for Tuesday.  Addition of vimpat, non-enzyme-inducing anti-seizure medication, in addition to lorazepam 0.5mg bid x 2 days. 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 Anxiety and depression

## 2023-04-20 NOTE — H&P PST ADULT - PROBLEM SELECTOR PLAN 5
scheduled for Loop ileostomy reversal possible exploratory laparotomy.      Pt instructed to follow bowel prep as directed by surgeon and remain NPO the night before surgery and am of surgery. Provided with chlorhexidene 4% solution to wash for 3 days including the day of surgery. Written instructions given and reviewed with pt.     Stop Bang score=1 Pt low risk for SERAFIN

## 2023-04-21 LAB
A1C WITH ESTIMATED AVERAGE GLUCOSE RESULT: 5 % — SIGNIFICANT CHANGE UP (ref 4–5.6)
ESTIMATED AVERAGE GLUCOSE: 97 MG/DL — SIGNIFICANT CHANGE UP (ref 68–114)

## 2023-04-23 ENCOUNTER — TRANSCRIPTION ENCOUNTER (OUTPATIENT)
Age: 68
End: 2023-04-23

## 2023-04-24 ENCOUNTER — INPATIENT (INPATIENT)
Facility: HOSPITAL | Age: 68
LOS: 2 days | Discharge: HOME HEALTH | DRG: 329 | End: 2023-04-27
Attending: STUDENT IN AN ORGANIZED HEALTH CARE EDUCATION/TRAINING PROGRAM | Admitting: STUDENT IN AN ORGANIZED HEALTH CARE EDUCATION/TRAINING PROGRAM
Payer: MEDICAID

## 2023-04-24 ENCOUNTER — APPOINTMENT (OUTPATIENT)
Dept: SURGERY | Facility: HOSPITAL | Age: 68
End: 2023-04-24
Payer: MEDICAID

## 2023-04-24 ENCOUNTER — TRANSCRIPTION ENCOUNTER (OUTPATIENT)
Age: 68
End: 2023-04-24

## 2023-04-24 VITALS
HEART RATE: 86 BPM | RESPIRATION RATE: 16 BRPM | DIASTOLIC BLOOD PRESSURE: 63 MMHG | WEIGHT: 138.89 LBS | TEMPERATURE: 98 F | OXYGEN SATURATION: 98 % | HEIGHT: 62 IN | SYSTOLIC BLOOD PRESSURE: 91 MMHG

## 2023-04-24 DIAGNOSIS — Z87.59 PERSONAL HISTORY OF OTHER COMPLICATIONS OF PREGNANCY, CHILDBIRTH AND THE PUERPERIUM: Chronic | ICD-10-CM

## 2023-04-24 DIAGNOSIS — Z90.710 ACQUIRED ABSENCE OF BOTH CERVIX AND UTERUS: Chronic | ICD-10-CM

## 2023-04-24 DIAGNOSIS — Z98.890 OTHER SPECIFIED POSTPROCEDURAL STATES: Chronic | ICD-10-CM

## 2023-04-24 DIAGNOSIS — Z98.41 CATARACT EXTRACTION STATUS, RIGHT EYE: Chronic | ICD-10-CM

## 2023-04-24 DIAGNOSIS — Z93.2 ILEOSTOMY STATUS: ICD-10-CM

## 2023-04-24 LAB
ALBUMIN SERPL ELPH-MCNC: 3 G/DL — LOW (ref 3.5–5)
ALP SERPL-CCNC: 197 U/L — HIGH (ref 40–120)
ALT FLD-CCNC: 17 U/L DA — SIGNIFICANT CHANGE UP (ref 10–60)
ANION GAP SERPL CALC-SCNC: 4 MMOL/L — LOW (ref 5–17)
ANION GAP SERPL CALC-SCNC: 8 MMOL/L — SIGNIFICANT CHANGE UP (ref 5–17)
AST SERPL-CCNC: 12 U/L — SIGNIFICANT CHANGE UP (ref 10–40)
BASOPHILS # BLD AUTO: 0.02 K/UL — SIGNIFICANT CHANGE UP (ref 0–0.2)
BASOPHILS NFR BLD AUTO: 0.5 % — SIGNIFICANT CHANGE UP (ref 0–2)
BILIRUB SERPL-MCNC: 0.4 MG/DL — SIGNIFICANT CHANGE UP (ref 0.2–1.2)
BLD GP AB SCN SERPL QL: SIGNIFICANT CHANGE UP
BUN SERPL-MCNC: 17 MG/DL — SIGNIFICANT CHANGE UP (ref 7–18)
BUN SERPL-MCNC: 51 MG/DL — HIGH (ref 7–18)
CALCIUM SERPL-MCNC: 9.6 MG/DL — SIGNIFICANT CHANGE UP (ref 8.4–10.5)
CALCIUM SERPL-MCNC: 9.8 MG/DL — SIGNIFICANT CHANGE UP (ref 8.4–10.5)
CHLORIDE SERPL-SCNC: 104 MMOL/L — SIGNIFICANT CHANGE UP (ref 96–108)
CHLORIDE SERPL-SCNC: 97 MMOL/L — SIGNIFICANT CHANGE UP (ref 96–108)
CO2 SERPL-SCNC: 24 MMOL/L — SIGNIFICANT CHANGE UP (ref 22–31)
CO2 SERPL-SCNC: 33 MMOL/L — HIGH (ref 22–31)
CREAT SERPL-MCNC: 12.8 MG/DL — HIGH (ref 0.5–1.3)
CREAT SERPL-MCNC: 6.19 MG/DL — HIGH (ref 0.5–1.3)
EGFR: 3 ML/MIN/1.73M2 — LOW
EGFR: 7 ML/MIN/1.73M2 — LOW
EOSINOPHIL # BLD AUTO: 0.07 K/UL — SIGNIFICANT CHANGE UP (ref 0–0.5)
EOSINOPHIL NFR BLD AUTO: 1.6 % — SIGNIFICANT CHANGE UP (ref 0–6)
GLUCOSE BLDC GLUCOMTR-MCNC: 77 MG/DL — SIGNIFICANT CHANGE UP (ref 70–99)
GLUCOSE BLDC GLUCOMTR-MCNC: 88 MG/DL — SIGNIFICANT CHANGE UP (ref 70–99)
GLUCOSE SERPL-MCNC: 82 MG/DL — SIGNIFICANT CHANGE UP (ref 70–99)
GLUCOSE SERPL-MCNC: 95 MG/DL — SIGNIFICANT CHANGE UP (ref 70–99)
HCT VFR BLD CALC: 39.4 % — SIGNIFICANT CHANGE UP (ref 34.5–45)
HCT VFR BLD CALC: 42.2 % — SIGNIFICANT CHANGE UP (ref 34.5–45)
HGB BLD-MCNC: 13.3 G/DL — SIGNIFICANT CHANGE UP (ref 11.5–15.5)
HGB BLD-MCNC: 14 G/DL — SIGNIFICANT CHANGE UP (ref 11.5–15.5)
IMM GRANULOCYTES NFR BLD AUTO: 0.2 % — SIGNIFICANT CHANGE UP (ref 0–0.9)
LYMPHOCYTES # BLD AUTO: 2.14 K/UL — SIGNIFICANT CHANGE UP (ref 1–3.3)
LYMPHOCYTES # BLD AUTO: 50.1 % — HIGH (ref 13–44)
MAGNESIUM SERPL-MCNC: 2.4 MG/DL — SIGNIFICANT CHANGE UP (ref 1.6–2.6)
MAGNESIUM SERPL-MCNC: 3.2 MG/DL — HIGH (ref 1.6–2.6)
MCHC RBC-ENTMCNC: 33.2 GM/DL — SIGNIFICANT CHANGE UP (ref 32–36)
MCHC RBC-ENTMCNC: 33.8 GM/DL — SIGNIFICANT CHANGE UP (ref 32–36)
MCHC RBC-ENTMCNC: 34.5 PG — HIGH (ref 27–34)
MCHC RBC-ENTMCNC: 34.6 PG — HIGH (ref 27–34)
MCV RBC AUTO: 102.6 FL — HIGH (ref 80–100)
MCV RBC AUTO: 103.9 FL — HIGH (ref 80–100)
MONOCYTES # BLD AUTO: 0.41 K/UL — SIGNIFICANT CHANGE UP (ref 0–0.9)
MONOCYTES NFR BLD AUTO: 9.6 % — SIGNIFICANT CHANGE UP (ref 2–14)
NEUTROPHILS # BLD AUTO: 1.62 K/UL — LOW (ref 1.8–7.4)
NEUTROPHILS NFR BLD AUTO: 38 % — LOW (ref 43–77)
NRBC # BLD: 0 /100 WBCS — SIGNIFICANT CHANGE UP (ref 0–0)
NRBC # BLD: 0 /100 WBCS — SIGNIFICANT CHANGE UP (ref 0–0)
PHOSPHATE SERPL-MCNC: 2.8 MG/DL — SIGNIFICANT CHANGE UP (ref 2.5–4.5)
PHOSPHATE SERPL-MCNC: 4 MG/DL — SIGNIFICANT CHANGE UP (ref 2.5–4.5)
PLATELET # BLD AUTO: 182 K/UL — SIGNIFICANT CHANGE UP (ref 150–400)
PLATELET # BLD AUTO: 208 K/UL — SIGNIFICANT CHANGE UP (ref 150–400)
POTASSIUM SERPL-MCNC: 4.1 MMOL/L — SIGNIFICANT CHANGE UP (ref 3.5–5.3)
POTASSIUM SERPL-MCNC: 6.4 MMOL/L — CRITICAL HIGH (ref 3.5–5.3)
POTASSIUM SERPL-MCNC: 7 MMOL/L — CRITICAL HIGH (ref 3.5–5.3)
POTASSIUM SERPL-MCNC: 8.6 MMOL/L — CRITICAL HIGH (ref 3.5–5.3)
POTASSIUM SERPL-SCNC: 4.1 MMOL/L — SIGNIFICANT CHANGE UP (ref 3.5–5.3)
POTASSIUM SERPL-SCNC: 6.4 MMOL/L — CRITICAL HIGH (ref 3.5–5.3)
POTASSIUM SERPL-SCNC: 7 MMOL/L — CRITICAL HIGH (ref 3.5–5.3)
POTASSIUM SERPL-SCNC: 8.6 MMOL/L — CRITICAL HIGH (ref 3.5–5.3)
PROT SERPL-MCNC: 7 G/DL — SIGNIFICANT CHANGE UP (ref 6–8.3)
RBC # BLD: 3.84 M/UL — SIGNIFICANT CHANGE UP (ref 3.8–5.2)
RBC # BLD: 4.06 M/UL — SIGNIFICANT CHANGE UP (ref 3.8–5.2)
RBC # FLD: 17.2 % — HIGH (ref 10.3–14.5)
RBC # FLD: 17.3 % — HIGH (ref 10.3–14.5)
SODIUM SERPL-SCNC: 134 MMOL/L — LOW (ref 135–145)
SODIUM SERPL-SCNC: 136 MMOL/L — SIGNIFICANT CHANGE UP (ref 135–145)
WBC # BLD: 4.27 K/UL — SIGNIFICANT CHANGE UP (ref 3.8–10.5)
WBC # BLD: 5.84 K/UL — SIGNIFICANT CHANGE UP (ref 3.8–10.5)
WBC # FLD AUTO: 4.27 K/UL — SIGNIFICANT CHANGE UP (ref 3.8–10.5)
WBC # FLD AUTO: 5.84 K/UL — SIGNIFICANT CHANGE UP (ref 3.8–10.5)

## 2023-04-24 PROCEDURE — 44625 REPAIR BOWEL OPENING: CPT | Mod: 58

## 2023-04-24 PROCEDURE — 44625 REPAIR BOWEL OPENING: CPT | Mod: AS

## 2023-04-24 DEVICE — STAPLER COVIDIEN ENDO GIA 80-3.8MM BLUE: Type: IMPLANTABLE DEVICE | Site: LOOP ILEOSTOMY REVERSAL, EXPLORATORY LAPAROTOMY | Status: FUNCTIONAL

## 2023-04-24 RX ORDER — HEPARIN SODIUM 5000 [USP'U]/ML
5000 INJECTION INTRAVENOUS; SUBCUTANEOUS EVERY 12 HOURS
Refills: 0 | Status: DISCONTINUED | OUTPATIENT
Start: 2023-04-25 | End: 2023-04-27

## 2023-04-24 RX ORDER — KETOROLAC TROMETHAMINE 30 MG/ML
15 SYRINGE (ML) INJECTION ONCE
Refills: 0 | Status: DISCONTINUED | OUTPATIENT
Start: 2023-04-24 | End: 2023-04-24

## 2023-04-24 RX ORDER — DOLUTEGRAVIR SODIUM 25 MG/1
1 TABLET, FILM COATED ORAL
Qty: 0 | Refills: 0 | DISCHARGE

## 2023-04-24 RX ORDER — ALLOPURINOL 300 MG
100 TABLET ORAL DAILY
Refills: 0 | Status: DISCONTINUED | OUTPATIENT
Start: 2023-04-24 | End: 2023-04-27

## 2023-04-24 RX ORDER — ACETAMINOPHEN 500 MG
1000 TABLET ORAL ONCE
Refills: 0 | Status: DISCONTINUED | OUTPATIENT
Start: 2023-04-24 | End: 2023-04-25

## 2023-04-24 RX ORDER — CHOLECALCIFEROL (VITAMIN D3) 125 MCG
0 CAPSULE ORAL
Qty: 0 | Refills: 0 | DISCHARGE

## 2023-04-24 RX ORDER — CALCITRIOL 0.5 UG/1
0.25 CAPSULE ORAL DAILY
Refills: 0 | Status: DISCONTINUED | OUTPATIENT
Start: 2023-04-24 | End: 2023-04-27

## 2023-04-24 RX ORDER — LAMIVUDINE 150 MG
100 TABLET ORAL DAILY
Refills: 0 | Status: DISCONTINUED | OUTPATIENT
Start: 2023-04-24 | End: 2023-04-27

## 2023-04-24 RX ORDER — LAMIVUDINE 150 MG
100 TABLET ORAL DAILY
Refills: 0 | Status: DISCONTINUED | OUTPATIENT
Start: 2023-04-24 | End: 2023-04-24

## 2023-04-24 RX ORDER — SODIUM BICARBONATE 1 MEQ/ML
0 SYRINGE (ML) INTRAVENOUS
Qty: 0 | Refills: 0 | DISCHARGE

## 2023-04-24 RX ORDER — DARUNAVIR ETHANOLATE AND COBICISTAT 800; 150 MG/1; MG/1
1 TABLET, FILM COATED ORAL DAILY
Refills: 0 | Status: DISCONTINUED | OUTPATIENT
Start: 2023-04-24 | End: 2023-04-27

## 2023-04-24 RX ORDER — DOLUTEGRAVIR SODIUM 25 MG/1
50 TABLET, FILM COATED ORAL DAILY
Refills: 0 | Status: DISCONTINUED | OUTPATIENT
Start: 2023-04-24 | End: 2023-04-27

## 2023-04-24 RX ORDER — DARUNAVIR ETHANOLATE AND COBICISTAT 800; 150 MG/1; MG/1
1 TABLET, FILM COATED ORAL
Qty: 0 | Refills: 0 | DISCHARGE

## 2023-04-24 RX ORDER — CALCIUM ACETATE 667 MG
667 TABLET ORAL
Refills: 0 | Status: DISCONTINUED | OUTPATIENT
Start: 2023-04-24 | End: 2023-04-27

## 2023-04-24 RX ORDER — LAMIVUDINE 150 MG
100 TABLET ORAL
Qty: 0 | Refills: 0 | DISCHARGE

## 2023-04-24 RX ORDER — SODIUM CHLORIDE 9 MG/ML
1000 INJECTION, SOLUTION INTRAVENOUS
Refills: 0 | Status: DISCONTINUED | OUTPATIENT
Start: 2023-04-24 | End: 2023-04-25

## 2023-04-24 RX ORDER — CHLORHEXIDINE GLUCONATE 213 G/1000ML
1 SOLUTION TOPICAL ONCE
Refills: 0 | Status: COMPLETED | OUTPATIENT
Start: 2023-04-24 | End: 2023-04-24

## 2023-04-24 RX ORDER — CITALOPRAM 10 MG/1
20 TABLET, FILM COATED ORAL DAILY
Refills: 0 | Status: DISCONTINUED | OUTPATIENT
Start: 2023-04-24 | End: 2023-04-27

## 2023-04-24 RX ORDER — SEVELAMER CARBONATE 2400 MG/1
0 POWDER, FOR SUSPENSION ORAL
Qty: 0 | Refills: 0 | DISCHARGE

## 2023-04-24 RX ORDER — FERRIC CITRATE 210 MG/1
0 TABLET, COATED ORAL
Qty: 0 | Refills: 0 | DISCHARGE

## 2023-04-24 RX ORDER — CITALOPRAM 10 MG/1
1 TABLET, FILM COATED ORAL
Qty: 0 | Refills: 0 | DISCHARGE

## 2023-04-24 RX ORDER — PANTOPRAZOLE SODIUM 20 MG/1
40 TABLET, DELAYED RELEASE ORAL
Refills: 0 | Status: DISCONTINUED | OUTPATIENT
Start: 2023-04-24 | End: 2023-04-27

## 2023-04-24 RX ORDER — OMEGA-3 ACID ETHYL ESTERS 1 G
1 CAPSULE ORAL
Refills: 0 | Status: DISCONTINUED | OUTPATIENT
Start: 2023-04-24 | End: 2023-04-27

## 2023-04-24 RX ADMIN — SODIUM CHLORIDE 40 MILLILITER(S): 9 INJECTION, SOLUTION INTRAVENOUS at 20:48

## 2023-04-24 RX ADMIN — SODIUM CHLORIDE 3 MILLILITER(S): 9 INJECTION INTRAMUSCULAR; INTRAVENOUS; SUBCUTANEOUS at 21:27

## 2023-04-24 RX ADMIN — SODIUM CHLORIDE 3 MILLILITER(S): 9 INJECTION INTRAMUSCULAR; INTRAVENOUS; SUBCUTANEOUS at 14:05

## 2023-04-24 RX ADMIN — CHLORHEXIDINE GLUCONATE 1 APPLICATION(S): 213 SOLUTION TOPICAL at 09:12

## 2023-04-24 NOTE — PATIENT PROFILE ADULT - FUNCTIONAL ASSESSMENT - BASIC MOBILITY SECTION LABEL
Please let the patient know that blood work results showed    Electrolytes showed mild abnormalities including lower sodium which appears improved when compared to most recent labs and a slightly decreased chloride    Sugar was mildly elevated    Blood counts show anemia, low red blood cell count, which is stable when compared to most recent labs    Iron levels were low and recommend iron supplement as taking    Vitamin E05 and folic acid levels were normal    Thyroid labs were normal and would continue present dose of medications    Cholesterol levels were fair. Triglycerides were elevated. HDL or good cholesterol was lower than recommended.   Would continue present treatment    Platelet count was slightly elevated nonspecific    Other white blood cells were normal    Other electrolytes, liver, and kidney function values were normal    Please send a copy of the reports to the patient    Thanks .

## 2023-04-24 NOTE — CONSULT NOTE ADULT - SUBJECTIVE AND OBJECTIVE BOX
Chief complain.  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      ESRD on hemodialysis      Colostomy present      Ileostomy status      History of ectopic pregnancy  Two      H/O: hysterectomy  Due to cervical cancer      S/P arteriovenous (AV) fistula creation  july 10 2020      History of colon surgery      S/P cataract surgery, right          Home Medications Reviewed    Hospital Medications:   MEDICATIONS  (STANDING):  allopurinol 100 milliGRAM(s) Oral daily  calcitriol   Capsule 0.25 MICROGram(s) Oral daily  calcium acetate 667 milliGRAM(s) Oral four times a day with meals  citalopram 20 milliGRAM(s) Oral daily  darunavir 800 mG/cobicstat 150 mG 1 Tablet(s) Oral daily  dolutegravir 50 milliGRAM(s) Oral daily  lamiVUDine- milliGRAM(s) Oral daily  multivitamin 1 Tablet(s) Oral daily  omega-3-Acid Ethyl Esters 1 Gram(s) Oral two times a day  pantoprazole    Tablet 40 milliGRAM(s) Oral before breakfast  sodium chloride 0.9% lock flush 3 milliLiter(s) IV Push every 8 hours    MEDICATIONS  (PRN):      Allergies    penicillins (Urticaria; Rash)  Spinach (Rash)  oxycodone (Rash)    Intolerances          04-24    x   |  x   |  x   ----------------------------<  x   6.4<HH>   |  x   |  x                   RADIOLOGY & ADDITIONAL STUDIES:    SOCIAL HISTORY: Denies ETOh,Smoking,     FAMILY HISTORY:  Family hx of hypertension    Family history of renal failure        REVIEW OF SYSTEMS:  CONSTITUTIONAL: No malaise, No fatigue, No fevers or chills, well developed, no diaphoresis  NECK: No pain or stiffness  RESPIRATORY: No cough, wheezing, hemoptysis; No shortness of breath  CARDIOVASCULAR: No chest pain or palpitations. No edema  GASTROINTESTINAL: No abdominal or epigastric pain. No nausea, vomiting. Left colostomy bag.   GENITOURINARY: No dysuria.  NEUROLOGICAL: No numbness or weakness, No tremor  SKIN: No itching, burning, rashes, or lesions       VITALS:  Vital Signs Last 24 Hrs  T(C): 36.9 (24 Apr 2023 11:45), Max: 36.9 (24 Apr 2023 11:45)  T(F): 98.4 (24 Apr 2023 11:45), Max: 98.4 (24 Apr 2023 11:45)  HR: 84 (24 Apr 2023 11:45) (84 - 86)  BP: 101/53 (24 Apr 2023 11:45) (91/63 - 101/53)  BP(mean): --  RR: 16 (24 Apr 2023 11:45) (16 - 16)  SpO2: 100% (24 Apr 2023 11:45) (98% - 100%)    Parameters below as of 24 Apr 2023 11:45  Patient On (Oxygen Delivery Method): room air        Height (cm): 157.5 (04-24 @ 09:17)  Weight (kg): 63 (04-24 @ 09:17)  BMI (kg/m2): 25.4 (04-24 @ 09:17)  BSA (m2): 1.64 (04-24 @ 09:17)    PHYSICAL EXAM:  Constitutional: NAD  HEENT: anicteric sclera.  Neck: No JVD  Respiratory:  air entrance B/L, no wheezes, rales or rhonchi  Cardiovascular: S1, S2, RRR, no pericardial rub, no murmur  Gastrointestinal: BS+, soft, no tenderness, no distension, no bruit, left colostomy.  Pelvis: bladder non-distended, no CVA tenderness  Extremities: No cyanosis or clubbing. No peripheral edema  Neurological: A/O x 3, no focal deficits    Access: left upper arm AVF with bruit and thrill

## 2023-04-24 NOTE — PATIENT PROFILE ADULT - FALL HARM RISK - RISK INTERVENTIONS
Assistance OOB with selected safe patient handling equipment/Assistance with ambulation/Communicate Fall Risk and Risk Factors to all staff, patient, and family/Discuss with provider need for PT consult/Monitor gait and stability/Provide patient with walking aids - walker, cane, crutches/Reinforce activity limits and safety measures with patient and family/Review medications for side effects contributing to fall risk/Sit up slowly, dangle for a short time, stand at bedside before walking/Toileting schedule using arm’s reach rule for commode and bathroom/Visual Cue: Yellow wristband/Bed in lowest position, wheels locked, appropriate side rails in place/Call bell, personal items and telephone in reach/Instruct patient to call for assistance before getting out of bed or chair/Non-slip footwear when patient is out of bed/[place] to call system/Physically safe environment - no spills, clutter or unnecessary equipment/Purposeful Proactive Rounding/Room/bathroom lighting operational, light cord in reach

## 2023-04-24 NOTE — CONSULT NOTE ADULT - ASSESSMENT
ESRD , dialysis days are MWF  Hyperkalemia    Follow up with dialysis today.  Follow up lab pre dialysis and in am  CBC pre dialysis.  Monitor BP, start IV fluids 1/2 NS 40 ml per hour.

## 2023-04-25 ENCOUNTER — RESULT REVIEW (OUTPATIENT)
Age: 68
End: 2023-04-25

## 2023-04-25 LAB
ANION GAP SERPL CALC-SCNC: 10 MMOL/L — SIGNIFICANT CHANGE UP (ref 5–17)
ANION GAP SERPL CALC-SCNC: 8 MMOL/L — SIGNIFICANT CHANGE UP (ref 5–17)
ANISOCYTOSIS BLD QL: SIGNIFICANT CHANGE UP
BASOPHILS # BLD AUTO: 0.01 K/UL — SIGNIFICANT CHANGE UP (ref 0–0.2)
BASOPHILS # BLD AUTO: 0.03 K/UL — SIGNIFICANT CHANGE UP (ref 0–0.2)
BASOPHILS NFR BLD AUTO: 0.2 % — SIGNIFICANT CHANGE UP (ref 0–2)
BASOPHILS NFR BLD AUTO: 0.4 % — SIGNIFICANT CHANGE UP (ref 0–2)
BUN SERPL-MCNC: 26 MG/DL — HIGH (ref 7–18)
BUN SERPL-MCNC: 32 MG/DL — HIGH (ref 7–18)
CALCIUM SERPL-MCNC: 9.1 MG/DL — SIGNIFICANT CHANGE UP (ref 8.4–10.5)
CALCIUM SERPL-MCNC: 9.3 MG/DL — SIGNIFICANT CHANGE UP (ref 8.4–10.5)
CHLORIDE SERPL-SCNC: 93 MMOL/L — LOW (ref 96–108)
CHLORIDE SERPL-SCNC: 97 MMOL/L — SIGNIFICANT CHANGE UP (ref 96–108)
CO2 SERPL-SCNC: 25 MMOL/L — SIGNIFICANT CHANGE UP (ref 22–31)
CO2 SERPL-SCNC: 28 MMOL/L — SIGNIFICANT CHANGE UP (ref 22–31)
CREAT SERPL-MCNC: 8.15 MG/DL — HIGH (ref 0.5–1.3)
CREAT SERPL-MCNC: 8.83 MG/DL — HIGH (ref 0.5–1.3)
EGFR: 5 ML/MIN/1.73M2 — LOW
EGFR: 5 ML/MIN/1.73M2 — LOW
EOSINOPHIL # BLD AUTO: 0.03 K/UL — SIGNIFICANT CHANGE UP (ref 0–0.5)
EOSINOPHIL # BLD AUTO: 0.15 K/UL — SIGNIFICANT CHANGE UP (ref 0–0.5)
EOSINOPHIL NFR BLD AUTO: 0.4 % — SIGNIFICANT CHANGE UP (ref 0–6)
EOSINOPHIL NFR BLD AUTO: 2.4 % — SIGNIFICANT CHANGE UP (ref 0–6)
GLUCOSE BLDC GLUCOMTR-MCNC: 103 MG/DL — HIGH (ref 70–99)
GLUCOSE SERPL-MCNC: 83 MG/DL — SIGNIFICANT CHANGE UP (ref 70–99)
GLUCOSE SERPL-MCNC: 98 MG/DL — SIGNIFICANT CHANGE UP (ref 70–99)
HBV CORE AB SER-ACNC: SIGNIFICANT CHANGE UP
HBV SURFACE AB SER-ACNC: SIGNIFICANT CHANGE UP
HBV SURFACE AG SER-ACNC: SIGNIFICANT CHANGE UP
HCT VFR BLD CALC: 41.4 % — SIGNIFICANT CHANGE UP (ref 34.5–45)
HCT VFR BLD CALC: 42.4 % — SIGNIFICANT CHANGE UP (ref 34.5–45)
HCV AB S/CO SERPL IA: 0.09 S/CO — SIGNIFICANT CHANGE UP (ref 0–0.99)
HCV AB SERPL-IMP: SIGNIFICANT CHANGE UP
HGB BLD-MCNC: 13.7 G/DL — SIGNIFICANT CHANGE UP (ref 11.5–15.5)
HGB BLD-MCNC: 14.1 G/DL — SIGNIFICANT CHANGE UP (ref 11.5–15.5)
HYPOCHROMIA BLD QL: SLIGHT — SIGNIFICANT CHANGE UP
IMM GRANULOCYTES NFR BLD AUTO: 0.5 % — SIGNIFICANT CHANGE UP (ref 0–0.9)
IMM GRANULOCYTES NFR BLD AUTO: 0.6 % — SIGNIFICANT CHANGE UP (ref 0–0.9)
LYMPHOCYTES # BLD AUTO: 1.09 K/UL — SIGNIFICANT CHANGE UP (ref 1–3.3)
LYMPHOCYTES # BLD AUTO: 1.36 K/UL — SIGNIFICANT CHANGE UP (ref 1–3.3)
LYMPHOCYTES # BLD AUTO: 16 % — SIGNIFICANT CHANGE UP (ref 13–44)
LYMPHOCYTES # BLD AUTO: 17.8 % — SIGNIFICANT CHANGE UP (ref 13–44)
MACROCYTES BLD QL: SLIGHT — SIGNIFICANT CHANGE UP
MAGNESIUM SERPL-MCNC: 2.2 MG/DL — SIGNIFICANT CHANGE UP (ref 1.6–2.6)
MAGNESIUM SERPL-MCNC: 2.4 MG/DL — SIGNIFICANT CHANGE UP (ref 1.6–2.6)
MANUAL SMEAR VERIFICATION: SIGNIFICANT CHANGE UP
MCHC RBC-ENTMCNC: 33.1 GM/DL — SIGNIFICANT CHANGE UP (ref 32–36)
MCHC RBC-ENTMCNC: 33.3 GM/DL — SIGNIFICANT CHANGE UP (ref 32–36)
MCHC RBC-ENTMCNC: 34.8 PG — HIGH (ref 27–34)
MCHC RBC-ENTMCNC: 35.1 PG — HIGH (ref 27–34)
MCV RBC AUTO: 104.7 FL — HIGH (ref 80–100)
MCV RBC AUTO: 106.2 FL — HIGH (ref 80–100)
MONOCYTES # BLD AUTO: 0.36 K/UL — SIGNIFICANT CHANGE UP (ref 0–0.9)
MONOCYTES # BLD AUTO: 0.46 K/UL — SIGNIFICANT CHANGE UP (ref 0–0.9)
MONOCYTES NFR BLD AUTO: 5.4 % — SIGNIFICANT CHANGE UP (ref 2–14)
MONOCYTES NFR BLD AUTO: 5.9 % — SIGNIFICANT CHANGE UP (ref 2–14)
NEUTROPHILS # BLD AUTO: 4.49 K/UL — SIGNIFICANT CHANGE UP (ref 1.8–7.4)
NEUTROPHILS # BLD AUTO: 6.58 K/UL — SIGNIFICANT CHANGE UP (ref 1.8–7.4)
NEUTROPHILS NFR BLD AUTO: 73.2 % — SIGNIFICANT CHANGE UP (ref 43–77)
NEUTROPHILS NFR BLD AUTO: 77.2 % — HIGH (ref 43–77)
NRBC # BLD: 0 /100 WBCS — SIGNIFICANT CHANGE UP (ref 0–0)
NRBC # BLD: 0 /100 WBCS — SIGNIFICANT CHANGE UP (ref 0–0)
PHOSPHATE SERPL-MCNC: 5.8 MG/DL — HIGH (ref 2.5–4.5)
PHOSPHATE SERPL-MCNC: 5.8 MG/DL — HIGH (ref 2.5–4.5)
PLAT MORPH BLD: NORMAL — SIGNIFICANT CHANGE UP
PLATELET # BLD AUTO: 147 K/UL — LOW (ref 150–400)
PLATELET # BLD AUTO: 160 K/UL — SIGNIFICANT CHANGE UP (ref 150–400)
PLATELET COUNT - ESTIMATE: NORMAL — SIGNIFICANT CHANGE UP
POTASSIUM SERPL-MCNC: 5.8 MMOL/L — HIGH (ref 3.5–5.3)
POTASSIUM SERPL-MCNC: 6.6 MMOL/L — CRITICAL HIGH (ref 3.5–5.3)
POTASSIUM SERPL-SCNC: 5.8 MMOL/L — HIGH (ref 3.5–5.3)
POTASSIUM SERPL-SCNC: 6.6 MMOL/L — CRITICAL HIGH (ref 3.5–5.3)
RBC # BLD: 3.9 M/UL — SIGNIFICANT CHANGE UP (ref 3.8–5.2)
RBC # BLD: 4.05 M/UL — SIGNIFICANT CHANGE UP (ref 3.8–5.2)
RBC # FLD: 17.1 % — HIGH (ref 10.3–14.5)
RBC # FLD: 17.7 % — HIGH (ref 10.3–14.5)
RBC BLD AUTO: ABNORMAL
SODIUM SERPL-SCNC: 128 MMOL/L — LOW (ref 135–145)
SODIUM SERPL-SCNC: 133 MMOL/L — LOW (ref 135–145)
WBC # BLD: 6.13 K/UL — SIGNIFICANT CHANGE UP (ref 3.8–10.5)
WBC # BLD: 8.51 K/UL — SIGNIFICANT CHANGE UP (ref 3.8–10.5)
WBC # FLD AUTO: 6.13 K/UL — SIGNIFICANT CHANGE UP (ref 3.8–10.5)
WBC # FLD AUTO: 8.51 K/UL — SIGNIFICANT CHANGE UP (ref 3.8–10.5)

## 2023-04-25 PROCEDURE — 88304 TISSUE EXAM BY PATHOLOGIST: CPT | Mod: 26

## 2023-04-25 RX ORDER — ACETAMINOPHEN 500 MG
1000 TABLET ORAL EVERY 6 HOURS
Refills: 0 | Status: COMPLETED | OUTPATIENT
Start: 2023-04-25 | End: 2023-04-25

## 2023-04-25 RX ORDER — FENTANYL CITRATE 50 UG/ML
50 INJECTION INTRAVENOUS
Refills: 0 | Status: DISCONTINUED | OUTPATIENT
Start: 2023-04-25 | End: 2023-04-25

## 2023-04-25 RX ORDER — SODIUM ZIRCONIUM CYCLOSILICATE 10 G/10G
5 POWDER, FOR SUSPENSION ORAL
Refills: 0 | Status: DISCONTINUED | OUTPATIENT
Start: 2023-04-25 | End: 2023-04-27

## 2023-04-25 RX ORDER — SODIUM CHLORIDE 9 MG/ML
1000 INJECTION, SOLUTION INTRAVENOUS
Refills: 0 | Status: DISCONTINUED | OUTPATIENT
Start: 2023-04-25 | End: 2023-04-26

## 2023-04-25 RX ORDER — FENTANYL CITRATE 50 UG/ML
25 INJECTION INTRAVENOUS
Refills: 0 | Status: DISCONTINUED | OUTPATIENT
Start: 2023-04-25 | End: 2023-04-25

## 2023-04-25 RX ORDER — SODIUM ZIRCONIUM CYCLOSILICATE 10 G/10G
5 POWDER, FOR SUSPENSION ORAL ONCE
Refills: 0 | Status: COMPLETED | OUTPATIENT
Start: 2023-04-25 | End: 2023-04-25

## 2023-04-25 RX ORDER — INSULIN HUMAN 100 [IU]/ML
10 INJECTION, SOLUTION SUBCUTANEOUS ONCE
Refills: 0 | Status: COMPLETED | OUTPATIENT
Start: 2023-04-25 | End: 2023-04-25

## 2023-04-25 RX ORDER — SODIUM ZIRCONIUM CYCLOSILICATE 10 G/10G
10 POWDER, FOR SUSPENSION ORAL ONCE
Refills: 0 | Status: COMPLETED | OUTPATIENT
Start: 2023-04-25 | End: 2023-04-25

## 2023-04-25 RX ORDER — METOCLOPRAMIDE HCL 10 MG
10 TABLET ORAL ONCE
Refills: 0 | Status: DISCONTINUED | OUTPATIENT
Start: 2023-04-25 | End: 2023-04-25

## 2023-04-25 RX ORDER — ACETAMINOPHEN 500 MG
1000 TABLET ORAL ONCE
Refills: 0 | Status: DISCONTINUED | OUTPATIENT
Start: 2023-04-25 | End: 2023-04-25

## 2023-04-25 RX ORDER — CHLORHEXIDINE GLUCONATE 213 G/1000ML
1 SOLUTION TOPICAL
Refills: 0 | Status: DISCONTINUED | OUTPATIENT
Start: 2023-04-25 | End: 2023-04-27

## 2023-04-25 RX ORDER — DEXTROSE 50 % IN WATER 50 %
50 SYRINGE (ML) INTRAVENOUS ONCE
Refills: 0 | Status: COMPLETED | OUTPATIENT
Start: 2023-04-25 | End: 2023-04-25

## 2023-04-25 RX ORDER — HYDROMORPHONE HYDROCHLORIDE 2 MG/ML
0.25 INJECTION INTRAMUSCULAR; INTRAVENOUS; SUBCUTANEOUS
Refills: 0 | Status: DISCONTINUED | OUTPATIENT
Start: 2023-04-25 | End: 2023-04-27

## 2023-04-25 RX ORDER — TRAMADOL HYDROCHLORIDE 50 MG/1
50 TABLET ORAL EVERY 6 HOURS
Refills: 0 | Status: DISCONTINUED | OUTPATIENT
Start: 2023-04-25 | End: 2023-04-27

## 2023-04-25 RX ADMIN — Medication 400 MILLIGRAM(S): at 06:14

## 2023-04-25 RX ADMIN — CHLORHEXIDINE GLUCONATE 1 APPLICATION(S): 213 SOLUTION TOPICAL at 08:43

## 2023-04-25 RX ADMIN — HYDROMORPHONE HYDROCHLORIDE 0.25 MILLIGRAM(S): 2 INJECTION INTRAMUSCULAR; INTRAVENOUS; SUBCUTANEOUS at 01:01

## 2023-04-25 RX ADMIN — SODIUM ZIRCONIUM CYCLOSILICATE 5 GRAM(S): 10 POWDER, FOR SUSPENSION ORAL at 19:49

## 2023-04-25 RX ADMIN — Medication 1000 MILLIGRAM(S): at 21:51

## 2023-04-25 RX ADMIN — SODIUM ZIRCONIUM CYCLOSILICATE 5 GRAM(S): 10 POWDER, FOR SUSPENSION ORAL at 15:18

## 2023-04-25 RX ADMIN — HYDROMORPHONE HYDROCHLORIDE 0.25 MILLIGRAM(S): 2 INJECTION INTRAMUSCULAR; INTRAVENOUS; SUBCUTANEOUS at 04:17

## 2023-04-25 RX ADMIN — Medication 400 MILLIGRAM(S): at 01:23

## 2023-04-25 RX ADMIN — HEPARIN SODIUM 5000 UNIT(S): 5000 INJECTION INTRAVENOUS; SUBCUTANEOUS at 05:26

## 2023-04-25 RX ADMIN — TRAMADOL HYDROCHLORIDE 50 MILLIGRAM(S): 50 TABLET ORAL at 12:04

## 2023-04-25 RX ADMIN — SODIUM CHLORIDE 60 MILLILITER(S): 9 INJECTION, SOLUTION INTRAVENOUS at 22:55

## 2023-04-25 RX ADMIN — Medication 1 TABLET(S): at 12:06

## 2023-04-25 RX ADMIN — Medication 1 GRAM(S): at 05:26

## 2023-04-25 RX ADMIN — Medication 1000 MILLIGRAM(S): at 16:30

## 2023-04-25 RX ADMIN — Medication 100 MILLIGRAM(S): at 12:07

## 2023-04-25 RX ADMIN — Medication 667 MILLIGRAM(S): at 08:41

## 2023-04-25 RX ADMIN — SODIUM ZIRCONIUM CYCLOSILICATE 10 GRAM(S): 10 POWDER, FOR SUSPENSION ORAL at 22:55

## 2023-04-25 RX ADMIN — SODIUM CHLORIDE 3 MILLILITER(S): 9 INJECTION INTRAMUSCULAR; INTRAVENOUS; SUBCUTANEOUS at 21:25

## 2023-04-25 RX ADMIN — Medication 1 GRAM(S): at 17:52

## 2023-04-25 RX ADMIN — Medication 100 MILLIGRAM(S): at 12:06

## 2023-04-25 RX ADMIN — Medication 667 MILLIGRAM(S): at 21:30

## 2023-04-25 RX ADMIN — CALCITRIOL 0.25 MICROGRAM(S): 0.5 CAPSULE ORAL at 12:06

## 2023-04-25 RX ADMIN — Medication 50 MILLILITER(S): at 22:54

## 2023-04-25 RX ADMIN — SODIUM CHLORIDE 3 MILLILITER(S): 9 INJECTION INTRAMUSCULAR; INTRAVENOUS; SUBCUTANEOUS at 14:57

## 2023-04-25 RX ADMIN — HYDROMORPHONE HYDROCHLORIDE 0.25 MILLIGRAM(S): 2 INJECTION INTRAMUSCULAR; INTRAVENOUS; SUBCUTANEOUS at 04:59

## 2023-04-25 RX ADMIN — HYDROMORPHONE HYDROCHLORIDE 0.25 MILLIGRAM(S): 2 INJECTION INTRAMUSCULAR; INTRAVENOUS; SUBCUTANEOUS at 09:12

## 2023-04-25 RX ADMIN — Medication 667 MILLIGRAM(S): at 12:05

## 2023-04-25 RX ADMIN — Medication 400 MILLIGRAM(S): at 21:30

## 2023-04-25 RX ADMIN — DOLUTEGRAVIR SODIUM 50 MILLIGRAM(S): 25 TABLET, FILM COATED ORAL at 12:07

## 2023-04-25 RX ADMIN — HYDROMORPHONE HYDROCHLORIDE 0.25 MILLIGRAM(S): 2 INJECTION INTRAMUSCULAR; INTRAVENOUS; SUBCUTANEOUS at 01:17

## 2023-04-25 RX ADMIN — TRAMADOL HYDROCHLORIDE 50 MILLIGRAM(S): 50 TABLET ORAL at 05:26

## 2023-04-25 RX ADMIN — DARUNAVIR ETHANOLATE AND COBICISTAT 1 TABLET(S): 800; 150 TABLET, FILM COATED ORAL at 12:07

## 2023-04-25 RX ADMIN — Medication 1000 MILLIGRAM(S): at 01:38

## 2023-04-25 RX ADMIN — Medication 1000 MILLIGRAM(S): at 06:29

## 2023-04-25 RX ADMIN — TRAMADOL HYDROCHLORIDE 50 MILLIGRAM(S): 50 TABLET ORAL at 06:00

## 2023-04-25 RX ADMIN — PANTOPRAZOLE SODIUM 40 MILLIGRAM(S): 20 TABLET, DELAYED RELEASE ORAL at 05:26

## 2023-04-25 RX ADMIN — SODIUM CHLORIDE 3 MILLILITER(S): 9 INJECTION INTRAMUSCULAR; INTRAVENOUS; SUBCUTANEOUS at 05:24

## 2023-04-25 RX ADMIN — CITALOPRAM 20 MILLIGRAM(S): 10 TABLET, FILM COATED ORAL at 12:08

## 2023-04-25 RX ADMIN — INSULIN HUMAN 10 UNIT(S): 100 INJECTION, SOLUTION SUBCUTANEOUS at 22:54

## 2023-04-25 RX ADMIN — HEPARIN SODIUM 5000 UNIT(S): 5000 INJECTION INTRAVENOUS; SUBCUTANEOUS at 17:56

## 2023-04-25 RX ADMIN — HYDROMORPHONE HYDROCHLORIDE 0.25 MILLIGRAM(S): 2 INJECTION INTRAMUSCULAR; INTRAVENOUS; SUBCUTANEOUS at 08:41

## 2023-04-25 RX ADMIN — HYDROMORPHONE HYDROCHLORIDE 0.25 MILLIGRAM(S): 2 INJECTION INTRAMUSCULAR; INTRAVENOUS; SUBCUTANEOUS at 15:16

## 2023-04-25 RX ADMIN — Medication 400 MILLIGRAM(S): at 15:38

## 2023-04-25 NOTE — PROGRESS NOTE ADULT - ASSESSMENT
ESRD  Hyperkalemia improved after dialysis .  Present K still elevated to 5.8 , will add Lokelma and 2 gm K diet.   Follow K in PM , add Lokelma 5 gm in PM .    Continue IV fluids 40 ml per hours.

## 2023-04-25 NOTE — PROGRESS NOTE ADULT - SUBJECTIVE AND OBJECTIVE BOX
POST-OPERATIVE NOTE    Subjective:   67y Female s/p loop ileostomy closure POD #0 .. Pain is well controlled. Denies any other complaints. .    Vital Signs Last 24 Hrs  T(C): 36.4 (25 Apr 2023 06:14), Max: 37.3 (24 Apr 2023 13:11)  T(F): 97.6 (25 Apr 2023 06:14), Max: 99.2 (24 Apr 2023 13:11)  HR: 79 (25 Apr 2023 06:14) (79 - 96)  BP: 102/53 (25 Apr 2023 06:14) (90/60 - 132/67)  BP(mean): 65 (25 Apr 2023 06:14) (63 - 82)  RR: 18 (25 Apr 2023 06:14) (11 - 18)  SpO2: 100% (25 Apr 2023 06:14) (94% - 100%)    Parameters below as of 25 Apr 2023 06:14  Patient On (Oxygen Delivery Method): room air      I&O's Detail    24 Apr 2023 07:01  -  25 Apr 2023 06:25  --------------------------------------------------------  IN:    Other (mL): 600 mL  Total IN: 600 mL    OUT:    Other (mL): 900 mL  Total OUT: 900 mL    Total NET: -300 mL          Physical Exam:  General: NAD, comfortable  Pulmonary: Nonlabored breathing, no respiratory distress  Cardiovascular: NSR, S1, S2  Abdominal: soft, NT/ND, dressing c/d/i  Extremities: no edema, no calf tenderness, distal pulses are palpable     LABS:                        14.0   4.27  )-----------( 182      ( 24 Apr 2023 21:23 )             42.2     04-24    134<L>  |  97  |  17  ----------------------------<  82  4.1   |  33<H>  |  6.19<H>    Ca    9.6      24 Apr 2023 21:23  Phos  2.8     04-24  Mg     2.4     04-24    TPro  7.0  /  Alb  3.0<L>  /  TBili  0.4  /  DBili  x   /  AST  12  /  ALT  17  /  AlkPhos  197<H>  04-24    LIVER FUNCTIONS - ( 24 Apr 2023 16:40 )  Alb: 3.0 g/dL / Pro: 7.0 g/dL / ALK PHOS: 197 U/L / ALT: 17 U/L DA / AST: 12 U/L / GGT: x             MEDICATIONS  (STANDING):  acetaminophen   IVPB .. 1000 milliGRAM(s) IV Intermittent every 6 hours  allopurinol 100 milliGRAM(s) Oral daily  calcitriol   Capsule 0.25 MICROGram(s) Oral daily  calcium acetate 667 milliGRAM(s) Oral four times a day with meals  citalopram 20 milliGRAM(s) Oral daily  darunavir 800 mG/cobicstat 150 mG 1 Tablet(s) Oral daily  dolutegravir 50 milliGRAM(s) Oral daily  heparin   Injectable 5000 Unit(s) SubCutaneous every 12 hours  lamiVUDine- milliGRAM(s) Oral daily  multivitamin 1 Tablet(s) Oral daily  omega-3-Acid Ethyl Esters 1 Gram(s) Oral two times a day  pantoprazole    Tablet 40 milliGRAM(s) Oral before breakfast  sodium chloride 0.45%. 1000 milliLiter(s) (40 mL/Hr) IV Continuous <Continuous>  sodium chloride 0.9% lock flush 3 milliLiter(s) IV Push every 8 hours    MEDICATIONS  (PRN):  HYDROmorphone  Injectable 0.25 milliGRAM(s) IV Push every 3 hours PRN breakthrough pain  traMADol 50 milliGRAM(s) Oral every 6 hours PRN Severe Pain (7 - 10)      Assessment:  The patient is a 67y Female who is s/p loop ileostomy closure POD #0 ... Stable    Plan:  - Pain control as needed  -Resume diet, Adv diet as tolerated  -Dressing change prn  -Incentive spirometer  - OOB and ambulating as tolerated  - F/u AM labs  - DVT ppx

## 2023-04-25 NOTE — PROGRESS NOTE ADULT - ASSESSMENT
67 year old s/p loop ileostomy closure POD #1  afebrile, vitals wnl     - clear diet  - monitor for bowel function   - am labs pending   - dvt ppx, oob, iss, ambulate as tolerated   - pain meds, antiemetic, antipyretic PRN   - dressing changes prn   - discussed and agreed with Dr. Seals

## 2023-04-25 NOTE — PROGRESS NOTE ADULT - SUBJECTIVE AND OBJECTIVE BOX
Problem List:  ESRD   S/P reversal of ileostomy.        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 1000 milliGRAM(s) IV Intermittent every 6 hours  allopurinol 100 milliGRAM(s) Oral daily  calcitriol   Capsule 0.25 MICROGram(s) Oral daily  calcium acetate 667 milliGRAM(s) Oral four times a day with meals  chlorhexidine 2% Cloths 1 Application(s) Topical <User Schedule>  citalopram 20 milliGRAM(s) Oral daily  darunavir 800 mG/cobicstat 150 mG 1 Tablet(s) Oral daily  dolutegravir 50 milliGRAM(s) Oral daily  heparin   Injectable 5000 Unit(s) SubCutaneous every 12 hours  lamiVUDine- milliGRAM(s) Oral daily  multivitamin 1 Tablet(s) Oral daily  omega-3-Acid Ethyl Esters 1 Gram(s) Oral two times a day  pantoprazole    Tablet 40 milliGRAM(s) Oral before breakfast  sodium chloride 0.45%. 1000 milliLiter(s) (40 mL/Hr) IV Continuous <Continuous>  sodium chloride 0.9% lock flush 3 milliLiter(s) IV Push every 8 hours  sodium zirconium cyclosilicate 5 Gram(s) Oral once    MEDICATIONS  (PRN):  HYDROmorphone  Injectable 0.25 milliGRAM(s) IV Push every 3 hours PRN breakthrough pain  traMADol 50 milliGRAM(s) Oral every 6 hours PRN Severe Pain (7 - 10)                            13.7   8.51  )-----------( 160      ( 25 Apr 2023 08:39 )             41.4     04-25    133<L>  |  97  |  26<H>  ----------------------------<  98  5.8<H>   |  28  |  8.15<H>    Ca    9.1      25 Apr 2023 08:39  Phos  5.8     04-25  Mg     2.4     04-25    TPro  7.0  /  Alb  3.0<L>  /  TBili  0.4  /  DBili  x   /  AST  12  /  ALT  17  /  AlkPhos  197<H>  04-24            REVIEW OF SYSTEMS:  General: no fever no chills, no weight loss.    RESPIRATORY: No cough, wheezing, hemoptysis; No shortness of breath  CARDIOVASCULAR: No chest pain or palpitations. No Edema  GASTROINTESTINAL: c/o pain in surgery area. vomited once after drinking tea.  GENITOURINARY: No dysuria, frequency, foamy urine, urinary urgency, incontinence or hematuria  NEUROLOGICAL: No numbness or weakness, no tremor , no dizziness.   Muscle skeletal : no joint pain and no swelling of joints and limbs.  SKIN: No itching, burning, rashes.        VITALS:  T(F): 98 (04-25-23 @ 13:41), Max: 98.8 (04-24-23 @ 19:37)  HR: 85 (04-25-23 @ 13:41)  BP: 104/65 (04-25-23 @ 13:41)  RR: 18 (04-25-23 @ 13:41)  SpO2: 99% (04-25-23 @ 13:41)  Wt(kg): --    04-24 @ 07:01  -  04-25 @ 07:00  --------------------------------------------------------  IN: 600 mL / OUT: 900 mL / NET: -300 mL        PHYSICAL EXAM:  Constitutional: well developed, no diaphoresis, no distress.  Neck: No JVD, no carotid bruit, supple, no adenopathy  Respiratory: Good air entrance B/L, no wheezes, rales or rhonchi  Cardiovascular: S1, S2, RRR, no pericardial rub, no murmur  Abdomen: BS+, soft, tenderness in surgery area, BS present, no rebound and no guaridng  Pelvis: bladder nondistended  Extremities: No cyanosis or clubbing. No peripheral edema.   Left AVF upper asrm

## 2023-04-25 NOTE — PROGRESS NOTE ADULT - SUBJECTIVE AND OBJECTIVE BOX
INTERVAL HPI/OVERNIGHT EVENTS:  Patient seen and examined at bedside   Pt resting comfortably. No acute complaints.   Denies Flatus/BM. Denies N/V  Incentive spirometer by bedside and discussed use and reasoning with the patient.     MEDICATIONS  (STANDING):  acetaminophen   IVPB .. 1000 milliGRAM(s) IV Intermittent every 6 hours  allopurinol 100 milliGRAM(s) Oral daily  calcitriol   Capsule 0.25 MICROGram(s) Oral daily  calcium acetate 667 milliGRAM(s) Oral four times a day with meals  chlorhexidine 2% Cloths 1 Application(s) Topical <User Schedule>  citalopram 20 milliGRAM(s) Oral daily  darunavir 800 mG/cobicstat 150 mG 1 Tablet(s) Oral daily  dolutegravir 50 milliGRAM(s) Oral daily  heparin   Injectable 5000 Unit(s) SubCutaneous every 12 hours  lamiVUDine- milliGRAM(s) Oral daily  multivitamin 1 Tablet(s) Oral daily  omega-3-Acid Ethyl Esters 1 Gram(s) Oral two times a day  pantoprazole    Tablet 40 milliGRAM(s) Oral before breakfast  sodium chloride 0.45%. 1000 milliLiter(s) (40 mL/Hr) IV Continuous <Continuous>  sodium chloride 0.9% lock flush 3 milliLiter(s) IV Push every 8 hours    MEDICATIONS  (PRN):  HYDROmorphone  Injectable 0.25 milliGRAM(s) IV Push every 3 hours PRN breakthrough pain  traMADol 50 milliGRAM(s) Oral every 6 hours PRN Severe Pain (7 - 10)      Vital Signs Last 24 Hrs  T(C): 36.4 (25 Apr 2023 06:14), Max: 37.3 (24 Apr 2023 13:11)  T(F): 97.6 (25 Apr 2023 06:14), Max: 99.2 (24 Apr 2023 13:11)  HR: 79 (25 Apr 2023 06:14) (79 - 96)  BP: 102/53 (25 Apr 2023 06:14) (90/60 - 132/67)  BP(mean): 65 (25 Apr 2023 06:14) (63 - 82)  RR: 18 (25 Apr 2023 06:14) (11 - 18)  SpO2: 100% (25 Apr 2023 06:14) (94% - 100%)    Parameters below as of 25 Apr 2023 06:14  Patient On (Oxygen Delivery Method): room air        Physical:  General: A&Ox3. NAD.  Respiratory: non labored  Skin: warm and dry   Abdomen: Soft, nondistended, nontender  dressing clean, dry and intact    I&O's Detail    24 Apr 2023 07:01  -  25 Apr 2023 07:00  --------------------------------------------------------  IN:    Other (mL): 600 mL  Total IN: 600 mL    OUT:    Other (mL): 900 mL  Total OUT: 900 mL    Total NET: -300 mL          LABS:                        13.7   8.51  )-----------( 160      ( 25 Apr 2023 08:39 )             41.4             04-24    134<L>  |  97  |  17  ----------------------------<  82  4.1   |  33<H>  |  6.19<H>    Ca    9.6      24 Apr 2023 21:23  Phos  2.8     04-24  Mg     2.4     04-24    TPro  7.0  /  Alb  3.0<L>  /  TBili  0.4  /  DBili  x   /  AST  12  /  ALT  17  /  AlkPhos  197<H>  04-24

## 2023-04-26 LAB
ANION GAP SERPL CALC-SCNC: 11 MMOL/L — SIGNIFICANT CHANGE UP (ref 5–17)
ANION GAP SERPL CALC-SCNC: 12 MMOL/L — SIGNIFICANT CHANGE UP (ref 5–17)
BUN SERPL-MCNC: 33 MG/DL — HIGH (ref 7–18)
BUN SERPL-MCNC: 39 MG/DL — HIGH (ref 7–18)
CALCIUM SERPL-MCNC: 8.6 MG/DL — SIGNIFICANT CHANGE UP (ref 8.4–10.5)
CALCIUM SERPL-MCNC: 9 MG/DL — SIGNIFICANT CHANGE UP (ref 8.4–10.5)
CHLORIDE SERPL-SCNC: 92 MMOL/L — LOW (ref 96–108)
CHLORIDE SERPL-SCNC: 93 MMOL/L — LOW (ref 96–108)
CO2 SERPL-SCNC: 23 MMOL/L — SIGNIFICANT CHANGE UP (ref 22–31)
CO2 SERPL-SCNC: 23 MMOL/L — SIGNIFICANT CHANGE UP (ref 22–31)
CREAT SERPL-MCNC: 8.78 MG/DL — HIGH (ref 0.5–1.3)
CREAT SERPL-MCNC: 9.73 MG/DL — HIGH (ref 0.5–1.3)
EGFR: 4 ML/MIN/1.73M2 — LOW
EGFR: 5 ML/MIN/1.73M2 — LOW
GLUCOSE SERPL-MCNC: 102 MG/DL — HIGH (ref 70–99)
GLUCOSE SERPL-MCNC: 121 MG/DL — HIGH (ref 70–99)
HCT VFR BLD CALC: 35.1 % — SIGNIFICANT CHANGE UP (ref 34.5–45)
HGB BLD-MCNC: 12.1 G/DL — SIGNIFICANT CHANGE UP (ref 11.5–15.5)
MAGNESIUM SERPL-MCNC: 2.1 MG/DL — SIGNIFICANT CHANGE UP (ref 1.6–2.6)
MAGNESIUM SERPL-MCNC: 2.2 MG/DL — SIGNIFICANT CHANGE UP (ref 1.6–2.6)
MCHC RBC-ENTMCNC: 34.5 GM/DL — SIGNIFICANT CHANGE UP (ref 32–36)
MCHC RBC-ENTMCNC: 35.5 PG — HIGH (ref 27–34)
MCV RBC AUTO: 102.9 FL — HIGH (ref 80–100)
NRBC # BLD: 0 /100 WBCS — SIGNIFICANT CHANGE UP (ref 0–0)
PHOSPHATE SERPL-MCNC: 5.1 MG/DL — HIGH (ref 2.5–4.5)
PHOSPHATE SERPL-MCNC: 6.1 MG/DL — HIGH (ref 2.5–4.5)
PLATELET # BLD AUTO: 156 K/UL — SIGNIFICANT CHANGE UP (ref 150–400)
POTASSIUM SERPL-MCNC: 5.1 MMOL/L — SIGNIFICANT CHANGE UP (ref 3.5–5.3)
POTASSIUM SERPL-MCNC: 5.2 MMOL/L — SIGNIFICANT CHANGE UP (ref 3.5–5.3)
POTASSIUM SERPL-SCNC: 5.1 MMOL/L — SIGNIFICANT CHANGE UP (ref 3.5–5.3)
POTASSIUM SERPL-SCNC: 5.2 MMOL/L — SIGNIFICANT CHANGE UP (ref 3.5–5.3)
RBC # BLD: 3.41 M/UL — LOW (ref 3.8–5.2)
RBC # FLD: 16.6 % — HIGH (ref 10.3–14.5)
SODIUM SERPL-SCNC: 126 MMOL/L — LOW (ref 135–145)
SODIUM SERPL-SCNC: 128 MMOL/L — LOW (ref 135–145)
WBC # BLD: 7.86 K/UL — SIGNIFICANT CHANGE UP (ref 3.8–10.5)
WBC # FLD AUTO: 7.86 K/UL — SIGNIFICANT CHANGE UP (ref 3.8–10.5)

## 2023-04-26 RX ORDER — SODIUM CHLORIDE 9 MG/ML
1000 INJECTION INTRAMUSCULAR; INTRAVENOUS; SUBCUTANEOUS
Refills: 0 | Status: DISCONTINUED | OUTPATIENT
Start: 2023-04-26 | End: 2023-04-27

## 2023-04-26 RX ORDER — ACETAMINOPHEN 500 MG
650 TABLET ORAL EVERY 6 HOURS
Refills: 0 | Status: DISCONTINUED | OUTPATIENT
Start: 2023-04-26 | End: 2023-04-27

## 2023-04-26 RX ADMIN — Medication 667 MILLIGRAM(S): at 16:00

## 2023-04-26 RX ADMIN — CHLORHEXIDINE GLUCONATE 1 APPLICATION(S): 213 SOLUTION TOPICAL at 05:18

## 2023-04-26 RX ADMIN — HEPARIN SODIUM 5000 UNIT(S): 5000 INJECTION INTRAVENOUS; SUBCUTANEOUS at 17:33

## 2023-04-26 RX ADMIN — HYDROMORPHONE HYDROCHLORIDE 0.25 MILLIGRAM(S): 2 INJECTION INTRAMUSCULAR; INTRAVENOUS; SUBCUTANEOUS at 08:10

## 2023-04-26 RX ADMIN — CITALOPRAM 20 MILLIGRAM(S): 10 TABLET, FILM COATED ORAL at 16:00

## 2023-04-26 RX ADMIN — TRAMADOL HYDROCHLORIDE 50 MILLIGRAM(S): 50 TABLET ORAL at 20:35

## 2023-04-26 RX ADMIN — HYDROMORPHONE HYDROCHLORIDE 0.25 MILLIGRAM(S): 2 INJECTION INTRAMUSCULAR; INTRAVENOUS; SUBCUTANEOUS at 16:47

## 2023-04-26 RX ADMIN — HYDROMORPHONE HYDROCHLORIDE 0.25 MILLIGRAM(S): 2 INJECTION INTRAMUSCULAR; INTRAVENOUS; SUBCUTANEOUS at 15:57

## 2023-04-26 RX ADMIN — Medication 100 MILLIGRAM(S): at 15:59

## 2023-04-26 RX ADMIN — DARUNAVIR ETHANOLATE AND COBICISTAT 1 TABLET(S): 800; 150 TABLET, FILM COATED ORAL at 15:59

## 2023-04-26 RX ADMIN — TRAMADOL HYDROCHLORIDE 50 MILLIGRAM(S): 50 TABLET ORAL at 19:40

## 2023-04-26 RX ADMIN — Medication 667 MILLIGRAM(S): at 23:01

## 2023-04-26 RX ADMIN — HYDROMORPHONE HYDROCHLORIDE 0.25 MILLIGRAM(S): 2 INJECTION INTRAMUSCULAR; INTRAVENOUS; SUBCUTANEOUS at 07:47

## 2023-04-26 RX ADMIN — Medication 1 GRAM(S): at 17:33

## 2023-04-26 RX ADMIN — Medication 1 GRAM(S): at 05:19

## 2023-04-26 RX ADMIN — SODIUM CHLORIDE 3 MILLILITER(S): 9 INJECTION INTRAMUSCULAR; INTRAVENOUS; SUBCUTANEOUS at 16:46

## 2023-04-26 RX ADMIN — Medication 667 MILLIGRAM(S): at 17:33

## 2023-04-26 RX ADMIN — HEPARIN SODIUM 5000 UNIT(S): 5000 INJECTION INTRAVENOUS; SUBCUTANEOUS at 06:44

## 2023-04-26 RX ADMIN — Medication 1 TABLET(S): at 16:00

## 2023-04-26 RX ADMIN — SODIUM ZIRCONIUM CYCLOSILICATE 5 GRAM(S): 10 POWDER, FOR SUSPENSION ORAL at 19:39

## 2023-04-26 RX ADMIN — PANTOPRAZOLE SODIUM 40 MILLIGRAM(S): 20 TABLET, DELAYED RELEASE ORAL at 05:18

## 2023-04-26 RX ADMIN — CALCITRIOL 0.25 MICROGRAM(S): 0.5 CAPSULE ORAL at 15:59

## 2023-04-26 RX ADMIN — SODIUM CHLORIDE 3 MILLILITER(S): 9 INJECTION INTRAMUSCULAR; INTRAVENOUS; SUBCUTANEOUS at 05:16

## 2023-04-26 RX ADMIN — DOLUTEGRAVIR SODIUM 50 MILLIGRAM(S): 25 TABLET, FILM COATED ORAL at 15:59

## 2023-04-26 RX ADMIN — Medication 667 MILLIGRAM(S): at 07:55

## 2023-04-26 RX ADMIN — SODIUM CHLORIDE 3 MILLILITER(S): 9 INJECTION INTRAMUSCULAR; INTRAVENOUS; SUBCUTANEOUS at 21:04

## 2023-04-26 NOTE — PROGRESS NOTE ADULT - ASSESSMENT
VERONICA GOVEA is a 67y female with HIV, ESRD, perforated diverticulitis s/p SBR, Alfonso's, s/p colostomy reversal and ileostomy creation Jan 2023, now POD #1 s/p ileostomy reversal.     VSS, WBC wnl, awaiting return of bowel function     PLAN   - Continue CLD   - IVF   - Pain control  - Antiemetic PRN  - GI ppx  - DVT ppx   - OOB/ambulate  - Incentive spirometer  - Continue home HIV medications    #ESRD on HD (MWF)  - Plan for dialysis today  - Appreciate nephrology recommendations   VERONICA GOVEA is a 67y female with HIV, ESRD, perforated diverticulitis s/p SBR, Alfonso's, s/p colostomy reversal and ileostomy creation Jan 2023, now POD #1 s/p ileostomy reversal.     VSS, WBC wnl, passing flatus    PLAN   - CLD, advance as tolerated  - IVF   - Pain control  - Antiemetic PRN  - GI ppx  - DVT ppx   - OOB/ambulate  - Incentive spirometer  - Continue home HIV medications    #ESRD on HD (MWF)  - Plan for dialysis today  - Appreciate nephrology recommendations

## 2023-04-26 NOTE — PROGRESS NOTE ADULT - SUBJECTIVE AND OBJECTIVE BOX
INTERVAL HPI/OVERNIGHT EVENTS:   Hyperkalemic yesterday, imrpoved to 5.1 this AM following lokelma administration. Complaints of abdominal pain this morning and nausea yesterday. Denies nausea, vomiting, fevers, chills. Denies flatus/BM. Encouraged ambulation.     Vital Signs Last 24 Hrs  T(C): 36.8 (26 Apr 2023 05:10), Max: 37.2 (25 Apr 2023 20:40)  T(F): 98.3 (26 Apr 2023 05:10), Max: 99 (25 Apr 2023 20:40)  HR: 103 (26 Apr 2023 05:10) (83 - 103)  BP: 120/63 (26 Apr 2023 05:10) (94/59 - 120/63)  BP(mean): 67 (25 Apr 2023 20:40) (67 - 67)  RR: 18 (26 Apr 2023 05:10) (18 - 18)  SpO2: 100% (26 Apr 2023 05:10) (99% - 100%)    Parameters below as of 26 Apr 2023 05:10  Patient On (Oxygen Delivery Method): room air      I&O's Detail    darunavir 800 mG/cobicstat 150 mG 1 Tablet(s) Oral daily  dolutegravir 50 milliGRAM(s) Oral daily  lamiVUDine- milliGRAM(s) Oral daily  omega-3-Acid Ethyl Esters 1 Gram(s) Oral two times a day  pantoprazole    Tablet 40 milliGRAM(s) Oral before breakfast  sodium zirconium cyclosilicate 5 Gram(s) Oral <User Schedule>      Physical Exam:  General: AAOx3, appears uncomfortable due to pain  Skin: No jaundice, no icterus  Respiratory: Nonlabored breathing, equal chest rise b/l  Abdomen: soft, nondistended. + incisional tenderness to palpation. Ileostomy closure wound c/d/i, packing and dressing changed by surgery team.   Extremities: Venodynes in place and functioning, no calf pain bilaterally    Drains/Tubes:       Labs:                        14.1   6.13  )-----------( 147      ( 25 Apr 2023 20:16 )             42.4     04-26    126<L>  |  92<L>  |  33<H>  ----------------------------<  102<H>  5.1   |  23  |  8.78<H>    Ca    8.6      26 Apr 2023 00:32  Phos  5.1     04-26  Mg     2.1     04-26    TPro  7.0  /  Alb  3.0<L>  /  TBili  0.4  /  DBili  x   /  AST  12  /  ALT  17  /  AlkPhos  197<H>  04-24        RADIOLOGY & ADDITIONAL STUDIES:   INTERVAL HPI/OVERNIGHT EVENTS:   Hyperkalemic yesterday, imrpoved to 5.1 this AM following lokelma administration. Complaints of abdominal pain this morning and nausea yesterday. Denies nausea, vomiting, fevers, chills. Admits to flatus, no BM. Encouraged ambulation.     Vital Signs Last 24 Hrs  T(C): 36.8 (26 Apr 2023 05:10), Max: 37.2 (25 Apr 2023 20:40)  T(F): 98.3 (26 Apr 2023 05:10), Max: 99 (25 Apr 2023 20:40)  HR: 103 (26 Apr 2023 05:10) (83 - 103)  BP: 120/63 (26 Apr 2023 05:10) (94/59 - 120/63)  BP(mean): 67 (25 Apr 2023 20:40) (67 - 67)  RR: 18 (26 Apr 2023 05:10) (18 - 18)  SpO2: 100% (26 Apr 2023 05:10) (99% - 100%)    Parameters below as of 26 Apr 2023 05:10  Patient On (Oxygen Delivery Method): room air      I&O's Detail    darunavir 800 mG/cobicstat 150 mG 1 Tablet(s) Oral daily  dolutegravir 50 milliGRAM(s) Oral daily  lamiVUDine- milliGRAM(s) Oral daily  omega-3-Acid Ethyl Esters 1 Gram(s) Oral two times a day  pantoprazole    Tablet 40 milliGRAM(s) Oral before breakfast  sodium zirconium cyclosilicate 5 Gram(s) Oral <User Schedule>      Physical Exam:  General: AAOx3, appears uncomfortable due to pain  Skin: No jaundice, no icterus  Respiratory: Nonlabored breathing, equal chest rise b/l  Abdomen: soft, nondistended. + incisional tenderness to palpation. Ileostomy closure wound c/d/i, packing and dressing changed by surgery team.   Extremities: Venodynes in place and functioning, no calf pain bilaterally    Drains/Tubes:       Labs:                        14.1   6.13  )-----------( 147      ( 25 Apr 2023 20:16 )             42.4     04-26    126<L>  |  92<L>  |  33<H>  ----------------------------<  102<H>  5.1   |  23  |  8.78<H>    Ca    8.6      26 Apr 2023 00:32  Phos  5.1     04-26  Mg     2.1     04-26    TPro  7.0  /  Alb  3.0<L>  /  TBili  0.4  /  DBili  x   /  AST  12  /  ALT  17  /  AlkPhos  197<H>  04-24        RADIOLOGY & ADDITIONAL STUDIES:

## 2023-04-26 NOTE — PROGRESS NOTE ADULT - ASSESSMENT
ESRD  Hyperkalemia improved after dialysis .      No need for BP meds , BP is in low 100 - chronic.  No need for BECKY  Will start PO4 binders when she starts regular diet

## 2023-04-26 NOTE — PROGRESS NOTE ADULT - SUBJECTIVE AND OBJECTIVE BOX
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      ESRD on hemodialysis      Colostomy present      Ileostomy status      History of ectopic pregnancy  Two      H/O: hysterectomy  Due to cervical cancer      S/P arteriovenous (AV) fistula creation  july 10 2020      History of colon surgery      S/P cataract surgery, right          penicillins (Urticaria; Rash)  Spinach (Rash)  oxycodone (Rash)      MEDICATIONS  (STANDING):  allopurinol 100 milliGRAM(s) Oral daily  calcitriol   Capsule 0.25 MICROGram(s) Oral daily  calcium acetate 667 milliGRAM(s) Oral four times a day with meals  chlorhexidine 2% Cloths 1 Application(s) Topical <User Schedule>  citalopram 20 milliGRAM(s) Oral daily  darunavir 800 mG/cobicstat 150 mG 1 Tablet(s) Oral daily  dolutegravir 50 milliGRAM(s) Oral daily  heparin   Injectable 5000 Unit(s) SubCutaneous every 12 hours  lamiVUDine- milliGRAM(s) Oral daily  multivitamin 1 Tablet(s) Oral daily  omega-3-Acid Ethyl Esters 1 Gram(s) Oral two times a day  pantoprazole    Tablet 40 milliGRAM(s) Oral before breakfast  sodium chloride 0.9% lock flush 3 milliLiter(s) IV Push every 8 hours  sodium chloride 0.9%. 1000 milliLiter(s) (60 mL/Hr) IV Continuous <Continuous>  sodium zirconium cyclosilicate 5 Gram(s) Oral <User Schedule>    MEDICATIONS  (PRN):  acetaminophen     Tablet .. 650 milliGRAM(s) Oral every 6 hours PRN Moderate Pain (4 - 6)  HYDROmorphone  Injectable 0.25 milliGRAM(s) IV Push every 3 hours PRN breakthrough pain  traMADol 50 milliGRAM(s) Oral every 6 hours PRN Severe Pain (7 - 10)                            12.1   7.86  )-----------( 156      ( 26 Apr 2023 10:50 )             35.1     04-26    128<L>  |  93<L>  |  39<H>  ----------------------------<  121<H>  5.2   |  23  |  9.73<H>    Ca    9.0      26 Apr 2023 10:50  Phos  6.1     04-26  Mg     2.2     04-26              REVIEW OF SYSTEMS:  General: no fever no chills, no weight loss.    RESPIRATORY: No cough, wheezing, hemoptysis; No shortness of breath  CARDIOVASCULAR: No chest pain or palpitations. No Edema  GASTROINTESTINAL: reduced  abdominal pain. Tolerate clear liquids  GENITOURINARY: No dysuria, frequency, foamy urine, urinary urgency, incontinence or hematuria  NEUROLOGICAL: No numbness or weakness, no tremor , no dizziness.   Muscle skeletal : no joint pain and no swelling of joints and limbs.  SKIN: No itching, burning, rashes.        VITALS:  T(F): 97.8 (04-26-23 @ 14:47), Max: 99 (04-25-23 @ 20:40)  HR: 108 (04-26-23 @ 14:47)  BP: 103/65 (04-26-23 @ 14:47)  RR: 18 (04-26-23 @ 14:47)  SpO2: 100% (04-26-23 @ 14:47)  Wt(kg): --      PHYSICAL EXAM:  Constitutional:  no diaphoresis, no distress.  Neck: No JVD, no carotid bruit, supple.  Respiratory: air entrance B/L, no wheezes, rales or rhonchi  Cardiovascular: S1, S2, RRR, no pericardial rub, no murmur  Abdomen: BS+, soft, no tenderness, no bruit  Pelvis: bladder nondistended  Extremities: No cyanosis or clubbing. No peripheral edema.   Pulses: All present  Neurological: A/O x 3, no focal deficits  Vascular Access: left AVF with bruit and thrill

## 2023-04-27 ENCOUNTER — TRANSCRIPTION ENCOUNTER (OUTPATIENT)
Age: 68
End: 2023-04-27

## 2023-04-27 VITALS
HEART RATE: 102 BPM | RESPIRATION RATE: 16 BRPM | SYSTOLIC BLOOD PRESSURE: 95 MMHG | OXYGEN SATURATION: 100 % | DIASTOLIC BLOOD PRESSURE: 51 MMHG | TEMPERATURE: 99 F

## 2023-04-27 LAB
ANION GAP SERPL CALC-SCNC: 8 MMOL/L — SIGNIFICANT CHANGE UP (ref 5–17)
BUN SERPL-MCNC: 25 MG/DL — HIGH (ref 7–18)
CALCIUM SERPL-MCNC: 9.5 MG/DL — SIGNIFICANT CHANGE UP (ref 8.4–10.5)
CHLORIDE SERPL-SCNC: 98 MMOL/L — SIGNIFICANT CHANGE UP (ref 96–108)
CO2 SERPL-SCNC: 31 MMOL/L — SIGNIFICANT CHANGE UP (ref 22–31)
CREAT SERPL-MCNC: 6.88 MG/DL — HIGH (ref 0.5–1.3)
EGFR: 6 ML/MIN/1.73M2 — LOW
GLUCOSE SERPL-MCNC: 87 MG/DL — SIGNIFICANT CHANGE UP (ref 70–99)
HCT VFR BLD CALC: 33.8 % — LOW (ref 34.5–45)
HGB BLD-MCNC: 11.5 G/DL — SIGNIFICANT CHANGE UP (ref 11.5–15.5)
MAGNESIUM SERPL-MCNC: 2.2 MG/DL — SIGNIFICANT CHANGE UP (ref 1.6–2.6)
MCHC RBC-ENTMCNC: 34 GM/DL — SIGNIFICANT CHANGE UP (ref 32–36)
MCHC RBC-ENTMCNC: 34.4 PG — HIGH (ref 27–34)
MCV RBC AUTO: 101.2 FL — HIGH (ref 80–100)
NRBC # BLD: 0 /100 WBCS — SIGNIFICANT CHANGE UP (ref 0–0)
PHOSPHATE SERPL-MCNC: 4.9 MG/DL — HIGH (ref 2.5–4.5)
PLATELET # BLD AUTO: 140 K/UL — LOW (ref 150–400)
POTASSIUM SERPL-MCNC: 4 MMOL/L — SIGNIFICANT CHANGE UP (ref 3.5–5.3)
POTASSIUM SERPL-SCNC: 4 MMOL/L — SIGNIFICANT CHANGE UP (ref 3.5–5.3)
RBC # BLD: 3.34 M/UL — LOW (ref 3.8–5.2)
RBC # FLD: 16.4 % — HIGH (ref 10.3–14.5)
SODIUM SERPL-SCNC: 137 MMOL/L — SIGNIFICANT CHANGE UP (ref 135–145)
WBC # BLD: 6.6 K/UL — SIGNIFICANT CHANGE UP (ref 3.8–10.5)
WBC # FLD AUTO: 6.6 K/UL — SIGNIFICANT CHANGE UP (ref 3.8–10.5)

## 2023-04-27 PROCEDURE — 86706 HEP B SURFACE ANTIBODY: CPT

## 2023-04-27 PROCEDURE — 86900 BLOOD TYPING SEROLOGIC ABO: CPT

## 2023-04-27 PROCEDURE — 86803 HEPATITIS C AB TEST: CPT

## 2023-04-27 PROCEDURE — 86704 HEP B CORE ANTIBODY TOTAL: CPT

## 2023-04-27 PROCEDURE — 99261: CPT

## 2023-04-27 PROCEDURE — 85025 COMPLETE CBC W/AUTO DIFF WBC: CPT

## 2023-04-27 PROCEDURE — 87340 HEPATITIS B SURFACE AG IA: CPT

## 2023-04-27 PROCEDURE — 80048 BASIC METABOLIC PNL TOTAL CA: CPT

## 2023-04-27 PROCEDURE — 85027 COMPLETE CBC AUTOMATED: CPT

## 2023-04-27 PROCEDURE — 82962 GLUCOSE BLOOD TEST: CPT

## 2023-04-27 PROCEDURE — 86850 RBC ANTIBODY SCREEN: CPT

## 2023-04-27 PROCEDURE — C1889: CPT

## 2023-04-27 PROCEDURE — 88304 TISSUE EXAM BY PATHOLOGIST: CPT

## 2023-04-27 PROCEDURE — 80053 COMPREHEN METABOLIC PANEL: CPT

## 2023-04-27 PROCEDURE — 97161 PT EVAL LOW COMPLEX 20 MIN: CPT

## 2023-04-27 PROCEDURE — 84132 ASSAY OF SERUM POTASSIUM: CPT

## 2023-04-27 PROCEDURE — 84100 ASSAY OF PHOSPHORUS: CPT

## 2023-04-27 PROCEDURE — 86901 BLOOD TYPING SEROLOGIC RH(D): CPT

## 2023-04-27 PROCEDURE — 36415 COLL VENOUS BLD VENIPUNCTURE: CPT

## 2023-04-27 PROCEDURE — 83735 ASSAY OF MAGNESIUM: CPT

## 2023-04-27 RX ORDER — ALLOPURINOL 300 MG
1 TABLET ORAL
Qty: 0 | Refills: 0 | DISCHARGE

## 2023-04-27 RX ORDER — TRAMADOL HYDROCHLORIDE 50 MG/1
1 TABLET ORAL
Qty: 20 | Refills: 0
Start: 2023-04-27 | End: 2023-05-01

## 2023-04-27 RX ORDER — CALCITRIOL 0.5 UG/1
1 CAPSULE ORAL
Qty: 0 | Refills: 0 | DISCHARGE

## 2023-04-27 RX ORDER — CALCIUM ACETATE 667 MG
0 TABLET ORAL
Qty: 0 | Refills: 0 | DISCHARGE

## 2023-04-27 RX ADMIN — DARUNAVIR ETHANOLATE AND COBICISTAT 1 TABLET(S): 800; 150 TABLET, FILM COATED ORAL at 11:57

## 2023-04-27 RX ADMIN — PANTOPRAZOLE SODIUM 40 MILLIGRAM(S): 20 TABLET, DELAYED RELEASE ORAL at 05:42

## 2023-04-27 RX ADMIN — Medication 667 MILLIGRAM(S): at 05:42

## 2023-04-27 RX ADMIN — CHLORHEXIDINE GLUCONATE 1 APPLICATION(S): 213 SOLUTION TOPICAL at 05:45

## 2023-04-27 RX ADMIN — TRAMADOL HYDROCHLORIDE 50 MILLIGRAM(S): 50 TABLET ORAL at 17:59

## 2023-04-27 RX ADMIN — TRAMADOL HYDROCHLORIDE 50 MILLIGRAM(S): 50 TABLET ORAL at 17:26

## 2023-04-27 RX ADMIN — DOLUTEGRAVIR SODIUM 50 MILLIGRAM(S): 25 TABLET, FILM COATED ORAL at 11:57

## 2023-04-27 RX ADMIN — CITALOPRAM 20 MILLIGRAM(S): 10 TABLET, FILM COATED ORAL at 11:58

## 2023-04-27 RX ADMIN — TRAMADOL HYDROCHLORIDE 50 MILLIGRAM(S): 50 TABLET ORAL at 06:26

## 2023-04-27 RX ADMIN — TRAMADOL HYDROCHLORIDE 50 MILLIGRAM(S): 50 TABLET ORAL at 05:42

## 2023-04-27 RX ADMIN — SODIUM CHLORIDE 3 MILLILITER(S): 9 INJECTION INTRAMUSCULAR; INTRAVENOUS; SUBCUTANEOUS at 13:01

## 2023-04-27 RX ADMIN — Medication 100 MILLIGRAM(S): at 11:57

## 2023-04-27 RX ADMIN — SODIUM CHLORIDE 3 MILLILITER(S): 9 INJECTION INTRAMUSCULAR; INTRAVENOUS; SUBCUTANEOUS at 05:11

## 2023-04-27 RX ADMIN — HEPARIN SODIUM 5000 UNIT(S): 5000 INJECTION INTRAVENOUS; SUBCUTANEOUS at 05:41

## 2023-04-27 RX ADMIN — Medication 1 TABLET(S): at 11:58

## 2023-04-27 RX ADMIN — CALCITRIOL 0.25 MICROGRAM(S): 0.5 CAPSULE ORAL at 11:57

## 2023-04-27 RX ADMIN — Medication 1 GRAM(S): at 05:42

## 2023-04-27 RX ADMIN — Medication 667 MILLIGRAM(S): at 17:23

## 2023-04-27 RX ADMIN — Medication 1 GRAM(S): at 17:22

## 2023-04-27 RX ADMIN — Medication 100 MILLIGRAM(S): at 11:58

## 2023-04-27 RX ADMIN — Medication 667 MILLIGRAM(S): at 11:57

## 2023-04-27 NOTE — DISCHARGE NOTE PROVIDER - HOSPITAL COURSE
67 year old Female with PMHx of HIV, GERD, DVT lower extremity, ESRD on HD (M, W, F) via left upper arm AV fistula, hx cervical cancer 2010, PSHx of Burdick's Procedure 2022, s/p Robot-assisted reversal of Alfonso procedure with diverting loop ileostomy 2023, presented for elective loop ileostomy reversal on 4/24/2023.   Pt admitted to surgical services, underwent HD session prior to OR as pt was found to have elevated potassium levels. Pt underwent Loop Ileostomy Reversal on 4/24/2023. Post operative period uncomplicated. Nephrology was following pt while in house. Diet was advanced as tolerated, pt currently tolerating low fiber diet well. Pt is stable for discharge home. Pt to follow up with Dr. Seals in office.

## 2023-04-27 NOTE — PROGRESS NOTE ADULT - ASSESSMENT
VERONICA GOVEA is a 67y female with HIV, ESRD, perforated diverticulitis s/p SBR, Alfonso's, s/p colostomy reversal and ileostomy creation Jan 2023, now POD #2 s/p ileostomy reversal.   WBC wnl, passing flatus and BM, tolerating diet.    PLAN   - d/c planning  - Low fiber diet  - Pain control  - Antiemetic PRN  - GI ppx  - DVT ppx   - OOB/ambulate  - Incentive spirometer  - Continue home HIV medications    #ESRD on HD (MWF)  - Last HD 4/26  - Appreciate nephrology recommendations VERONICA GOVEA is a 67y female with HIV, ESRD, perforated diverticulitis s/p SBR, Alfonso's, s/p colostomy reversal and ileostomy creation Jan 2023, now POD #2 s/p ileostomy reversal.   WBC wnl, passing flatus and BM, tolerating diet.    PLAN   - d/c planning  - f/u PT recommendations  - Low fiber diet  - Pain control  - Antiemetic PRN  - GI ppx  - DVT ppx   - OOB/ambulate  - Incentive spirometer  - Continue home HIV medications    #ESRD on HD (MWF)  - Last HD 4/26  - Appreciate nephrology recommendations

## 2023-04-27 NOTE — PROGRESS NOTE ADULT - ASSESSMENT
ESRD  Hyperkalemia improved after dialysis  AM k 4.0, continue with 2 gm K diet.   Known Low BP, asymptomatic  Post reversal of Ileostomy , tolerate food intake. Follow up Diarrhea issues after surger, possible side effect of SAMEER Nickerson  HIV         MEDICATIONS  (STANDING):  allopurinol 100 milliGRAM(s) Oral daily  calcitriol   Capsule 0.25 MICROGram(s) Oral daily  calcium acetate 667 milliGRAM(s) Oral four times a day with meals  citalopram 20 milliGRAM(s) Oral daily  darunavir 800 mG/cobicstat 150 mG 1 Tablet(s) Oral daily  dolutegravir 50 milliGRAM(s) Oral daily  heparin   Injectable 5000 Unit(s) SubCutaneous every 12 hours  lamiVUDine- milliGRAM(s) Oral daily  multivitamin 1 Tablet(s) Oral daily  omega-3-Acid Ethyl Esters 1 Gram(s) Oral two times a day  pantoprazole    Tablet 40 milliGRAM(s) Oral before breakfast  sodium chloride 0.9% lock flush 3 milliLiter(s) IV Push every 8 hours

## 2023-04-27 NOTE — PHYSICAL THERAPY INITIAL EVALUATION ADULT - NSPTDISCHREC_GEN_A_CORE
Pt. present with impairments but at functional baseline mobility. Skilled therapeutic intervention not indicated at this time. As pt is cleared for higher stenuous activities involving surgical site area, if deficits remain she can discuss outpatient PT her primary care team/No skilled PT needs

## 2023-04-27 NOTE — DISCHARGE NOTE PROVIDER - NSDCFUSCHEDAPPT_GEN_ALL_CORE_FT
[facility]  VASCULAR [address]  Scheduled Appointment: 07/17/2023    Mello Dickens  [facility]  VASCULAR [address]  Scheduled Appointment: 07/17/2023

## 2023-04-27 NOTE — DISCHARGE NOTE PROVIDER - NSDCFUADDINST_GEN_ALL_CORE_FT
Please remove packing with first dressing change, cover wound with dry gauze, secure with paper tape

## 2023-04-27 NOTE — PROGRESS NOTE ADULT - SUBJECTIVE AND OBJECTIVE BOX
INTERVAL HPI/OVERNIGHT EVENTS:   Patient seen and examined at bedside. Afebrile. Tachy to 102 this AM. Last HD yesterday. Reports that pain is improved today. Denies nausea, vomiting, fevers, chills. Tolerating low fiber diet. Passing flatus and BM    Vital Signs Last 24 Hrs  T(C): 37.4 (27 Apr 2023 05:36), Max: 37.5 (26 Apr 2023 20:50)  T(F): 99.4 (27 Apr 2023 05:36), Max: 99.5 (26 Apr 2023 20:50)  HR: 102 (27 Apr 2023 05:36) (92 - 108)  BP: 98/63 (27 Apr 2023 05:36) (85/48 - 117/68)  BP(mean): 74 (27 Apr 2023 05:36) (57 - 74)  RR: 16 (27 Apr 2023 05:36) (16 - 19)  SpO2: 100% (27 Apr 2023 05:36) (98% - 100%)    Parameters below as of 27 Apr 2023 05:36  Patient On (Oxygen Delivery Method): room air      I&O's Detail    darunavir 800 mG/cobicstat 150 mG 1 Tablet(s) Oral daily  dolutegravir 50 milliGRAM(s) Oral daily  lamiVUDine- milliGRAM(s) Oral daily  omega-3-Acid Ethyl Esters 1 Gram(s) Oral two times a day  pantoprazole    Tablet 40 milliGRAM(s) Oral before breakfast  sodium zirconium cyclosilicate 5 Gram(s) Oral <User Schedule>      Physical Exam:  General: AAOx3, No acute distress  HEENT: NC/AT. No scleral icterus  Skin: No jaundice, no rashes noted on abdomen  Respiratory: Nonlabored breathing. Equal chest rise b/l.   Abdomen: soft, nondistended, +incisional tenderness to palpation. Dressing and packing changed at bedside.   Extremities: non edematous, no calf pain bilaterally    Drains/Tubes:       Labs:                        11.5   6.60  )-----------( 140      ( 27 Apr 2023 07:00 )             33.8     04-27    x   |  x   |  x   ----------------------------<  x   x    |  x   |  6.88<H>    Ca    9.0      26 Apr 2023 10:50  Phos  4.9     04-27  Mg     2.2     04-27          RADIOLOGY & ADDITIONAL STUDIES:

## 2023-04-27 NOTE — DISCHARGE NOTE PROVIDER - NSDCCAREPROVSEEN_GEN_ALL_CORE_FT

## 2023-04-27 NOTE — DISCHARGE NOTE NURSING/CASE MANAGEMENT/SOCIAL WORK - PATIENT PORTAL LINK FT
You can access the FollowMyHealth Patient Portal offered by [facility] by registering at the following website: [email or web address]. By joining [employer]’s FollowMyHealth portal, you will also be able to view your health information using other applications (apps) compatible with our system.

## 2023-04-27 NOTE — PHYSICAL THERAPY INITIAL EVALUATION ADULT - MANUAL MUSCLE TESTING RESULTS, REHAB EVAL
BUE 4+/5, B/L hips 4/5, knees 4+/5, Ankles at least 3+/5 functionally. Trunk flx 2+/5, pain limited. Pt educated of precautios to reduce stress to area.

## 2023-04-27 NOTE — PROGRESS NOTE ADULT - SUBJECTIVE AND OBJECTIVE BOX
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      ESRD on hemodialysis      Colostomy present      Ileostomy status      History of ectopic pregnancy  Two      H/O: hysterectomy  Due to cervical cancer      S/P arteriovenous (AV) fistula creation  july 10 2020      History of colon surgery      S/P cataract surgery, right          penicillins (Urticaria; Rash)  Spinach (Rash)  oxycodone (Rash)      MEDICATIONS  (STANDING):  allopurinol 100 milliGRAM(s) Oral daily  calcitriol   Capsule 0.25 MICROGram(s) Oral daily  calcium acetate 667 milliGRAM(s) Oral four times a day with meals  citalopram 20 milliGRAM(s) Oral daily  darunavir 800 mG/cobicstat 150 mG 1 Tablet(s) Oral daily  dolutegravir 50 milliGRAM(s) Oral daily  heparin   Injectable 5000 Unit(s) SubCutaneous every 12 hours  lamiVUDine- milliGRAM(s) Oral daily  multivitamin 1 Tablet(s) Oral daily  omega-3-Acid Ethyl Esters 1 Gram(s) Oral two times a day  pantoprazole    Tablet 40 milliGRAM(s) Oral before breakfast  sodium chloride 0.9% lock flush 3 milliLiter(s) IV Push every 8 hours  sodium chloride 0.9%. 1000 milliLiter(s) (60 mL/Hr) IV Continuous <Continuous>  sodium zirconium cyclosilicate 5 Gram(s) Oral <User Schedule>    MEDICATIONS  (PRN):  acetaminophen     Tablet .. 650 milliGRAM(s) Oral every 6 hours PRN Moderate Pain (4 - 6)  traMADol 50 milliGRAM(s) Oral every 6 hours PRN Severe Pain (7 - 10)                            11.5   6.60  )-----------( 140      ( 27 Apr 2023 07:00 )             33.8     04-27    137  |  98  |  25<H>  ----------------------------<  87  4.0   |  31  |  6.88<H>    Ca    9.5      27 Apr 2023 07:00  Phos  4.9     04-27  Mg     2.2     04-27              REVIEW OF SYSTEMS:  General: no fever no chills, no weight loss.    RESPIRATORY: No cough, wheezing, hemoptysis; No shortness of breath  CARDIOVASCULAR: No chest pain or palpitations. No Edema  GASTROINTESTINAL: No abdominal or epigastric pain. No nausea, vomiting. 3 episodes of diarrhea from AM.  GENITOURINARY: No dysuria, frequency, foamy urine, urinary urgency, incontinence or hematuria  NEUROLOGICAL: No numbness or weakness, no tremor , no dizziness.   Muscle skeletal : no joint pain and no swelling of joints and limbs.  SKIN: No itching, burning, rashes.        VITALS:  T(F): 99.4 (04-27-23 @ 05:36), Max: 99.5 (04-26-23 @ 20:50)  HR: 109 (04-27-23 @ 11:05)  BP: 97/54 (04-27-23 @ 11:05)  RR: 16 (04-27-23 @ 05:36)  SpO2: 100% (04-27-23 @ 11:05)  Wt(kg): --      PHYSICAL EXAM:  Constitutional: well developed, no diaphoresis, no distress.  Neck: No JVD, no carotid bruit, supple,   Respiratory: air entrance B/L, no wheezes, rales or rhonchi  Cardiovascular: S1, S2, RRR, no pericardial rub, no murmur  Abdomen: BS+, soft, no tenderness, no bruit  Pelvis: bladder nondistended  Extremities: No cyanosis or clubbing. No peripheral edema.     Vascular Access: left AVF

## 2023-04-27 NOTE — PHYSICAL THERAPY INITIAL EVALUATION ADULT - ADDITIONAL COMMENTS
-- owns rollator and shower chair  -- reports 2 falls in past 6 months, but only when looking for tv remote control and NOT using her assistive device. Pt. was educated on importance of utilizing rollator to reduce risk of fall and prevent injury. Pt. verbalized understanding and agreement

## 2023-04-27 NOTE — PHYSICAL THERAPY INITIAL EVALUATION ADULT - DIAGNOSIS, PT EVAL
(ICF Model) Pt. present w/mild deficits in Body Structures/Function (Impairments), incl: Strength, Balance, along with post-op pain leading to deficits in performing the below noted Activities (Limitations). However she reports at baseline level of functional mobility

## 2023-04-27 NOTE — DISCHARGE NOTE PROVIDER - NSDCMRMEDTOKEN_GEN_ALL_CORE_FT
allopurinol 100 mg tablet: TAKE ONE TABLET BY MOUTH ONCE A DAY  AURYXIA 210MG TAB:   CALCITRIOL 0.25MCG CAP:   citalopram 20 mg oral tablet: 1 tab(s) orally once a day  lamiVUDine HBV: 100 milligram(s) orally once a day  MULTIVITAMIN TAB: 1 tab(s) orally once a day  Omega-3 1000 mg oral capsule: 1 cap(s) orally once a day  omeprazole 20 mg oral delayed release capsule: 1 cap(s) orally once a day  PRAVASTATIN 40MG TAB:   Prezcobix 800 mg-150 mg oral tablet: 1 tab(s) orally once a day  SOD BICARB 650MG TAB:   Tivicay 50 mg oral tablet: 1 tab(s) orally once a day  traMADol 50 mg oral tablet: 1 tab(s) orally every 6 hours as needed for Severe Pain (7 - 10) MDD: 4

## 2023-04-27 NOTE — DISCHARGE NOTE NURSING/CASE MANAGEMENT/SOCIAL WORK - NSDCPEFALRISK_GEN_ALL_CORE
For information on Fall & Injury Prevention, visit: [email or web address] OR  [email or web address] OR  https://www.cdc.gov/steadi/patient.html

## 2023-04-27 NOTE — DISCHARGE NOTE PROVIDER - NSDCHHHOMEBOUNDOTHER_GEN_ALL_CORE_FT
left lower abdominal wound w/ intermittent staples in place; please remove packing with first dressing change, cover wound with dry gauze, secure with paper tape. Daily dressing changes.

## 2023-04-27 NOTE — DISCHARGE NOTE PROVIDER - CARE PROVIDER_API CALL
Eric Seals (MD)  ColonRectal Surgery; Surgery  [address], [address]  [place] [address]  Phone: [phone]  Fax: [phone]  Follow Up Time:

## 2023-05-01 PROBLEM — Z93.2 ILEOSTOMY STATUS: Chronic | Status: ACTIVE | Noted: 2023-04-20

## 2023-05-01 LAB — SURGICAL PATHOLOGY STUDY: SIGNIFICANT CHANGE UP

## 2023-05-09 ENCOUNTER — APPOINTMENT (OUTPATIENT)
Dept: SURGERY | Facility: CLINIC | Age: 68
End: 2023-05-09
Payer: MEDICAID

## 2023-05-09 VITALS
HEIGHT: 62 IN | WEIGHT: 123 LBS | DIASTOLIC BLOOD PRESSURE: 74 MMHG | SYSTOLIC BLOOD PRESSURE: 138 MMHG | HEART RATE: 99 BPM | BODY MASS INDEX: 22.63 KG/M2

## 2023-05-09 PROCEDURE — 99024 POSTOP FOLLOW-UP VISIT: CPT

## 2023-05-12 NOTE — PHYSICAL EXAM
[Exam Deferred] : exam was deferred [Normal Rate and Rhythm] : normal rate and rhythm [Alert] : alert [Oriented to Person] : oriented to person [Oriented to Place] : oriented to place [Oriented to Time] : oriented to time [JVD] : no jugular venous distention  [de-identified] : soft, non-distended, some TTP. Incisions c/d/i w/o erythema or fluctuance and well healing, staples in place (removed). Pfannenstiel well healed. [de-identified] : awake, alert, NAD  [de-identified] : normocephalic, atraumatic, EOMI, normal conjunctiva  [de-identified] : b/l chest rise, EWOB on RA  [de-identified] : deferred [de-identified] : requires some assistance [de-identified] : abdominal skin as above [de-identified] : normal mood and affect

## 2023-05-12 NOTE — HISTORY OF PRESENT ILLNESS
[FreeTextEntry1] : Ms. VERONICA GOVEA  is a 67 year F with a history of HIV, ESRD (M,W,F via LUE AVF) 2/2 HTN, HLD, anxiety, depression, anemia, ovarian cancer (s/p BSO + BLESSING) referred by Dr. Francisco for Burdick's reversal s/p elective loop ileostomy reversal on 4/25/2023 here for a postop visit.  She was originally admitted to  for perforated diverticulitis for which she had an IR drain placed. As her diet was advanced she had PO intolerance and increased drain output so she was taken to the OR by Dr. Francisco on 10/10/22. I assisted that day with a laparoscopic converted to open, takedown of enterocolic fistula, flexible sigmoidoscopy, small bowel resection and Alfonso's. She was discharged on POD 5 to rehab. Patient states she has had episodes of abdominal pain prior to all of this but didn't know that it was likely due to diverticulitis. She had never had a colonoscopy and so pre-op she underwent a colonoscopy with Dr. Johnson which was unremarkable. On 1/25/2023 I took her for a robotic Alfonso's reversal, splenic flexure mobilization, extensive JANAE, appendectomy, flex sig and diverting loop ileostomy. She had an uneventful post-op course and was discharged home on POD 5 having ostomy function and tolerating a diet. Patient underwent a flex sig on 3/24/2023 which demonstrated a viable looking anastomosis without any obvious defect. She had a normal Hypaque enema w/o a leak on 4/20/2023. On 4/24/2023 in pre-op her K was elevated so she underwent HD session prior to OR for loop ileostomy reversal with SBR. She had an uneventful post-op course and was discharged home on POD 3 having BMs and tolerating a diet. Today patient is complaining of weakness and expected LLQ abdominal pain around her prior ostomy site. Patient still having some loose stools but is improving. Denies nausea, vomiting, fevers/chills. No drainage from her prior ostomy site. Reports that the hypotension during dialysis has improved now that she is reversed;

## 2023-05-12 NOTE — ASSESSMENT
[FreeTextEntry1] : Ms. VERONICA GOVEA  is a 67y.o. F w/ multiple co-morbidities and a laparoscopic converted to open Alfonso's and SBR for complicated diverticulitis on 10/10/22 referred by Dr. Francisco for Alfonso's reversal. On 1/25/2023, she underwent robotic Alfonso's reversal, splenic flexure mobilization, appendectomy, and DLI. Recent flex sig demonstrating viable anastomosis. On 4/20/2023, Xray hypaque enema showed no leak. She underwent elective loop ileostomy reversal w/ SBR on 4/25/2023 here for a postop visit

## 2023-07-17 ENCOUNTER — APPOINTMENT (OUTPATIENT)
Dept: VASCULAR SURGERY | Facility: CLINIC | Age: 68
End: 2023-07-17

## 2023-07-24 NOTE — PROGRESS NOTE ADULT - PROBLEM SELECTOR PLAN 8
Patient is calling stating the MD has prescribed muscle relaxer for her sciatica.  Patient is asking for a prescription.  Pharamcy was set up and verified.  Please call.    - possible OR Monday for resection  - repeat CT abd suggests enterocolic fistula  - Pt. has one  daughter who is an RN and lives in [place].  - patient from HOME

## 2023-08-07 ENCOUNTER — APPOINTMENT (OUTPATIENT)
Dept: VASCULAR SURGERY | Facility: CLINIC | Age: 68
End: 2023-08-07

## 2023-08-09 ENCOUNTER — APPOINTMENT (OUTPATIENT)
Dept: VASCULAR SURGERY | Facility: CLINIC | Age: 68
End: 2023-08-09
Payer: MEDICAID

## 2023-08-09 DIAGNOSIS — I77.0 ARTERIOVENOUS FISTULA, ACQUIRED: ICD-10-CM

## 2023-08-09 DIAGNOSIS — T82.898A OTHER SPECIFIED COMPLICATION OF VASCULAR PROSTHETIC DEVICES, IMPLANTS AND GRAFTS, INITIAL ENCOUNTER: ICD-10-CM

## 2023-08-09 PROCEDURE — 99212 OFFICE O/P EST SF 10 MIN: CPT

## 2023-08-09 PROCEDURE — 93990 DOPPLER FLOW TESTING: CPT

## 2023-08-09 NOTE — REASON FOR VISIT
[Follow-Up Visit] : a follow-up visit for [Family Member] : family member [FreeTextEntry2] : left brachioaxillary  AV graft

## 2023-08-09 NOTE — DISCUSSION/SUMMARY
[FreeTextEntry1] : 68 year old female with ESRD currently on hemodialysis via left brachioaxillary AV graft   Duplex demonstrates a well-functioning AV graft with no significant stenosis.  Given the patient is without any issues, we will continue to monitor.  Patient to follow-up in 3-4 months with repeat duplex.

## 2023-08-09 NOTE — PHYSICAL EXAM
[Thrill] : thrill [Pulsatile Thrill] : no pulsatile thrill [Aneurysm] : no aneurysm [Bleeding] : no bleeding [2+] : left 2+ [Normal] : coordination grossly intact, no focal deficits [de-identified] : Intact

## 2023-08-09 NOTE — HISTORY OF PRESENT ILLNESS
[FreeTextEntry1] : Patient is a 68 year old female with past medical history significant for diabetes type 2, s/p colon resection for colonic fistulas currently with ileostomy and ESRD currently on hemodialysis via left upper extremity AV graft presents to the office today for routine surveillance. Patient denies any issues with cannulation or prolonged bleeding at this time. [] : in left upper arm

## 2023-09-07 NOTE — H&P PST ADULT - PROBLEM SELECTOR PLAN 3
5 years ago due to syncope- found to be vasovagal due to low BP- No Tx or follow up needed- Normal per patient Pt dialysis on M W F to resume scheduled post procedure via left upper arm av fistula

## 2023-10-15 NOTE — DIETITIAN INITIAL EVALUATION ADULT - FACTORS AFF FOOD INTAKE
abdominal pain, abdominal wall abscess, chronic diverticular disease, HIV+, ESRD on HD/difficulty with food procurement/preparation/persistent nausea/vomiting/other (specify)
Patient requests all Lab, Cardiology, and Radiology Results on their Discharge Instructions

## 2023-11-11 NOTE — PROGRESS NOTE ADULT - SUBJECTIVE AND OBJECTIVE BOX
Problem List:  ESRD  COVID 19 , still c/o diarrhea .  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    DVT, lower extremity  Left leg after hysterectomy    Cervical cancer  2010    Depression    History of gastroesophageal reflux (GERD)    Gout    Hyperlipidemia    Chronic kidney disease    HTN (hypertension)    HIV (human immunodeficiency virus infection)    S/P arteriovenous (AV) fistula creation  july 10 2020    H/O: hysterectomy  Due to cervical cancer    History of ectopic pregnancy  Two        oxycodone (Rash)  penicillins (Urticaria; Rash)      MEDICATIONS  (STANDING):  allopurinol 100 milliGRAM(s) Oral daily  amLODIPine   Tablet 10 milliGRAM(s) Oral daily  ascorbic acid 500 milliGRAM(s) Oral daily  aspirin enteric coated 81 milliGRAM(s) Oral daily  atorvastatin 20 milliGRAM(s) Oral at bedtime  calcitriol   Capsule 0.25 MICROGram(s) Oral daily  cholecalciferol 2000 Unit(s) Oral daily  darunavir 800 mG/cobicstat 150 mG 1 Tablet(s) Oral daily  heparin   Injectable 5000 Unit(s) SubCutaneous every 8 hours  influenza   Vaccine 0.5 milliLiter(s) IntraMuscular once  lamiVUDine- milliGRAM(s) Oral daily  melatonin 5 milliGRAM(s) Oral at bedtime  metoprolol tartrate 100 milliGRAM(s) Oral two times a day  Nephro-alex 1 Tablet(s) Oral daily  senna 2 Tablet(s) Oral at bedtime  zinc sulfate 220 milliGRAM(s) Oral daily    MEDICATIONS  (PRN):  acetaminophen   Tablet .. 650 milliGRAM(s) Oral every 6 hours PRN Temp greater or equal to 38C (100.4F)  gabapentin 100 milliGRAM(s) Oral daily PRN pain  guaiFENesin   Syrup  (Sugar-Free) 100 milliGRAM(s) Oral every 6 hours PRN Cough                            12.3   8.53  )-----------( 208      ( 25 Jan 2021 07:29 )             35.9     01-24    133<L>  |  97  |  26<H>  ----------------------------<  89  3.7   |  25  |  6.16<H>    Ca    9.4      24 Jan 2021 07:18  Phos  2.2     01-24  Mg     2.1     01-24              REVIEW OF SYSTEMS:  General: no fever no chills, in am  c/o fatigue and malaise  RESPIRATORY: No cough, wheezing, hemoptysis; No shortness of breath  CARDIOVASCULAR: No chest pain or palpitations. No Edema  GASTROINTESTINAL: No abdominal pain , c/o diarrhea after meal  GENITOURINARY: No dysuria, frequency, foamy urine, urinary urgency, incontinence or hematuri      VITALS:  T(F): 97.9 (01-25-21 @ 07:55), Max: 101.1 (01-24-21 @ 11:17)  HR: 77 (01-25-21 @ 07:55)  BP: 109/61 (01-25-21 @ 07:55)  RR: 18 (01-25-21 @ 07:55)  SpO2: 95% (01-25-21 @ 07:55)  Wt(kg): --      PHYSICAL EXAM:  Constitutional: well developed, no diaphoresis, no distress.  Neck: No JVD, no carotid bruit, supple, no adenopathy  Respiratory: Good air entrance B/L, no wheezes, rales or rhonchi  Cardiovascular: S1, S2, RRR, no pericardial rub, no murmur  Abdomen: BS+, soft, no tenderness, no bruit  Pelvis: bladder nondistended  Extremities: No edema  Lefy UE AV access with bruit and thrill     Them/They

## 2023-12-07 NOTE — PATIENT PROFILE ADULT - NSPROPTRIGHTREPNAME_GEN_A__NUR
Please take Lantus 18 units at bedtime.  Please adjust based on the instructions below.    Fasting blood sugar greater than 140: Increase by 2 units every 2 days  Fasting blood sugar 110-140: Stay on the same dose  Fasting blood sugar less than 110: Decrease by 2 units every 2 days    Please take 8 units with each meal.  Please also take additional insulin based on the scale below.    Premeal blood sugar less than 150: Take 0 extra units  Premeal blood sugar 150-200: Take 2 extra units  Premeal blood sugar 200-250: Take 4 extra units  Premeal blood sugar 250-300: Take 6 extra units  Premeal blood sugar 300-350: Take 8 extra units  Premeal blood sugar 350-400: Take 12 extra units  Premeal blood sugar greater than 400: Take extra 14 units and contact physician's office to notify us.    Please make sure to take your mealtime insulin 10 minutes before you eat if your blood sugar is in the 100s, 20 minutes before you eat if your blood sugar is in the 200s, and 30 minutes before you eat if your blood sugars are higher than 300 or above.    I will see back in the office in 6 months with repeat labs prior to the visit.    Please contact our office with any questions or concerns.    Dr. Lori Guo DO Endocrinology  [facility]   [facility]   [address] [place] [id number]  Office: [phone]  Fax: [phone]    
Pau

## 2023-12-11 ENCOUNTER — APPOINTMENT (OUTPATIENT)
Dept: VASCULAR SURGERY | Facility: CLINIC | Age: 68
End: 2023-12-11

## 2024-03-25 PROBLEM — N18.6 END STAGE KIDNEY DISEASE: Status: ACTIVE | Noted: 2022-08-23

## 2024-03-25 RX ORDER — HYDROCORTISONE 1 %
12 CREAM (GRAM) TOPICAL
Qty: 225 | Refills: 0 | Status: DISCONTINUED | COMMUNITY
Start: 2019-11-27 | End: 2024-03-25

## 2024-03-25 RX ORDER — METHYLPREDNISOLONE 4 MG/1
4 TABLET ORAL
Qty: 1 | Refills: 0 | Status: DISCONTINUED | COMMUNITY
Start: 2020-03-23 | End: 2024-03-25

## 2024-03-25 NOTE — PROCEDURE
[Resume diet] : resume diet [FreeTextEntry1] : left arm AVG fistulogram/angioplasty [Site check for bleeding/hematoma/thrill/bruit] : Site check for bleeding/hematoma/thrill/bruit [Vital signs on admission the q 15 mins x2] : Vital signs on admission the q 15 mins x2

## 2024-03-25 NOTE — ASSESSMENT
[FreeTextEntry1] : 68 yo female with history of esrd on hd via left upper arm AVG. Referral from dialysis for difficult cannulation and prolonged bleeding. plan for left AVG fistulogram and possible intervention  [Other: _____] : [unfilled]

## 2024-03-25 NOTE — PAST MEDICAL HISTORY
[Major surgery] : Major surgery [Increasing age ( >40 years old)] : Increasing age ( >40 years old) [FreeTextEntry1] : Malignant Hyperthermia Screening Tool and Risk of Bleeding Assessment\par  \par  Ms. VERONICA GOVEA denies family history of unexpected death following Anesthesia or Exercise.\par  Denies Family history of Malignant Hyperthermia, Muscle or Neuromuscular disorder and High Temperature following exercise.\par  \par  Ms. VERONICA GOVEA denies history of Muscle Spasm, Dark or Chocolate - Colored urine and Unanticipated fever immediately following anesthesia or serious exercise. \par  Ms. GOVEA also denies bleeding tendencies/ Risks of Bleeding.\par   [No therapy indicated for cases scheduled for less than one hour] : No therapy indicated for cases scheduled for less than one hour.

## 2024-03-25 NOTE — HISTORY OF PRESENT ILLNESS
[] : in left upper arm [FreeTextEntry3] : 8/6/2020 Dr. Dickens [FreeTextEntry1] : 3/5/2020 Dr. Dickens (thrombosed) [FreeTextEntry4] : yesterday [FreeTextEntry5] : yesterday at  [FreeTextEntry6] : Dr Mcmahan

## 2024-03-25 NOTE — REASON FOR VISIT
[Other ___] : a [unfilled] visit for [Difficult Cannulation] : difficult cannulation [Prolonged Bleeding] : prolonged bleeding [FreeTextEntry2] : referral from dialysis

## 2024-03-26 ENCOUNTER — APPOINTMENT (OUTPATIENT)
Dept: ENDOVASCULAR SURGERY | Facility: CLINIC | Age: 69
End: 2024-03-26
Payer: MEDICAID

## 2024-03-26 DIAGNOSIS — N18.6 END STAGE RENAL DISEASE: ICD-10-CM

## 2024-04-02 ENCOUNTER — APPOINTMENT (OUTPATIENT)
Dept: ENDOVASCULAR SURGERY | Facility: CLINIC | Age: 69
End: 2024-04-02
Payer: MEDICAID

## 2024-04-02 ENCOUNTER — RESULT REVIEW (OUTPATIENT)
Age: 69
End: 2024-04-02

## 2024-04-02 VITALS
RESPIRATION RATE: 20 BRPM | DIASTOLIC BLOOD PRESSURE: 80 MMHG | HEART RATE: 79 BPM | SYSTOLIC BLOOD PRESSURE: 142 MMHG | TEMPERATURE: 98.2 F | OXYGEN SATURATION: 100 % | HEIGHT: 62 IN | BODY MASS INDEX: 22.08 KG/M2 | WEIGHT: 120 LBS

## 2024-04-02 PROCEDURE — G0506: CPT

## 2024-04-02 PROCEDURE — 36907Z: CUSTOM | Mod: 59

## 2024-04-02 PROCEDURE — 36902Z: CUSTOM

## 2024-04-02 PROCEDURE — 99213 OFFICE O/P EST LOW 20 MIN: CPT | Mod: 25

## 2024-04-02 RX ORDER — LAMIVUDINE 100 MG/1
100 TABLET, FILM COATED ORAL
Refills: 0 | Status: DISCONTINUED | COMMUNITY
End: 2024-04-02

## 2024-04-02 RX ORDER — HYDRALAZINE HYDROCHLORIDE 50 MG/1
50 TABLET ORAL
Qty: 84 | Refills: 0 | Status: DISCONTINUED | COMMUNITY
Start: 2019-09-26 | End: 2024-04-02

## 2024-04-02 RX ORDER — OSTOMY SUPPLY 4" X 4"
EACH MISCELLANEOUS
Qty: 1 | Refills: 0 | Status: DISCONTINUED | COMMUNITY
Start: 2023-03-28 | End: 2024-04-02

## 2024-04-02 RX ORDER — FECAL COLL W-CHARCOAL/CATH/SYR
MISCELLANEOUS RECTAL
Qty: 4 | Refills: 0 | Status: DISCONTINUED | COMMUNITY
Start: 2023-03-28 | End: 2024-04-02

## 2024-04-02 RX ORDER — CALCIUM ACETATE 667 MG/1
667 CAPSULE ORAL
Qty: 84 | Refills: 0 | Status: DISCONTINUED | COMMUNITY
Start: 2019-09-26 | End: 2024-04-02

## 2024-04-02 RX ORDER — OSTOMY SUPPLY
MISCELLANEOUS MISCELLANEOUS
Qty: 4 | Refills: 0 | Status: DISCONTINUED | COMMUNITY
Start: 2023-03-28 | End: 2024-04-02

## 2024-04-02 RX ORDER — CELECOXIB 200 MG/1
200 CAPSULE ORAL
Refills: 0 | Status: DISCONTINUED | COMMUNITY
End: 2024-04-02

## 2024-04-02 RX ORDER — NYSTATIN 100000 [USP'U]/G
100000 CREAM TOPICAL
Refills: 0 | Status: DISCONTINUED | COMMUNITY
End: 2024-04-02

## 2024-04-02 RX ORDER — OMEGA-3-ACID ETHYL ESTERS CAPSULES 1 G/1
1 CAPSULE, LIQUID FILLED ORAL
Qty: 120 | Refills: 0 | Status: DISCONTINUED | COMMUNITY
Start: 2020-01-17 | End: 2024-04-02

## 2024-04-02 RX ORDER — OMEPRAZOLE 20 MG/1
20 CAPSULE, DELAYED RELEASE ORAL
Refills: 0 | Status: DISCONTINUED | COMMUNITY
End: 2024-04-02

## 2024-04-02 RX ORDER — B-COMPLEX WITH VITAMIN C
TABLET ORAL
Refills: 0 | Status: DISCONTINUED | COMMUNITY
End: 2024-04-02

## 2024-04-02 RX ORDER — CITALOPRAM HYDROBROMIDE 20 MG/1
20 TABLET, FILM COATED ORAL
Refills: 0 | Status: DISCONTINUED | COMMUNITY
End: 2024-04-02

## 2024-04-02 RX ORDER — ASPIRIN 81 MG/1
81 TABLET, COATED ORAL
Qty: 30 | Refills: 0 | Status: DISCONTINUED | COMMUNITY
Start: 2020-02-29 | End: 2024-04-02

## 2024-04-02 RX ORDER — ASCORBIC ACID, FOLIC ACID, NIACIN, THIAMINE, RIBOFLAVIN, PYRIDOXINE, CYANOCOBALAMIN, PANTOTHENIC ACID, BIOTIN 100; 150; 6; 1; 20; 5; 10; 1.7; 1.5 MG/1; UG/1; UG/1; MG/1; MG/1; MG/1; MG/1; MG/1; MG/1
CAPSULE, LIQUID FILLED ORAL
Refills: 0 | Status: DISCONTINUED | COMMUNITY
End: 2024-04-02

## 2024-04-02 RX ORDER — ISOSORBIDE MONONITRATE 60 MG/1
60 TABLET, EXTENDED RELEASE ORAL
Refills: 0 | Status: DISCONTINUED | COMMUNITY
End: 2024-04-02

## 2024-04-02 RX ORDER — GABAPENTIN 100 MG/1
100 CAPSULE ORAL
Qty: 28 | Refills: 0 | Status: DISCONTINUED | COMMUNITY
Start: 2019-09-26 | End: 2024-04-02

## 2024-04-02 RX ORDER — ASPIRIN 81 MG
81 TABLET, DELAYED RELEASE (ENTERIC COATED) ORAL
Refills: 0 | Status: ACTIVE | COMMUNITY

## 2024-04-02 RX ORDER — METHYLPREDNISOLONE 4 MG/1
4 TABLET ORAL
Qty: 1 | Refills: 0 | Status: DISCONTINUED | COMMUNITY
Start: 2020-03-23 | End: 2024-04-02

## 2024-04-02 RX ORDER — CINACALCET HYDROCHLORIDE 60 MG/1
60 TABLET, COATED ORAL
Refills: 0 | Status: ACTIVE | COMMUNITY

## 2024-04-02 RX ORDER — METOPROLOL TARTRATE 75 MG/1
TABLET, FILM COATED ORAL
Refills: 0 | Status: DISCONTINUED | COMMUNITY
End: 2024-04-02

## 2024-04-02 RX ORDER — PRAVASTATIN SODIUM 80 MG/1
80 TABLET ORAL
Refills: 0 | Status: ACTIVE | COMMUNITY

## 2024-04-02 RX ORDER — LOPERAMIDE HYDROCHLORIDE 2 MG/1
2 CAPSULE ORAL
Qty: 90 | Refills: 1 | Status: DISCONTINUED | COMMUNITY
Start: 2023-03-24 | End: 2024-04-02

## 2024-04-02 RX ORDER — PROTECTIVES, O.U.
SWAB, MEDICATED TOPICAL
Qty: 10 | Refills: 0 | Status: DISCONTINUED | COMMUNITY
Start: 2023-03-28 | End: 2024-04-02

## 2024-04-02 RX ORDER — ONDANSETRON HYDROCHLORIDE 4 MG/1
4 TABLET, FILM COATED ORAL
Refills: 0 | Status: DISCONTINUED | COMMUNITY
End: 2024-04-02

## 2024-04-03 NOTE — ASSESSMENT
[Other: _____] : [unfilled] [FreeTextEntry1] : 66 yo female with history of esrd on hd via left upper arm AVG. Referral from dialysis for difficult cannulation and prolonged bleeding. plan for left AVG fistulogram and possible intervention

## 2024-04-03 NOTE — PAST MEDICAL HISTORY
[Increasing age ( >40 years old)] : Increasing age ( >40 years old) [Major surgery] : Major surgery [No therapy indicated for cases scheduled for less than one hour] : No therapy indicated for cases scheduled for less than one hour. [FreeTextEntry1] : Malignant Hyperthermia Screening Tool and Risk of Bleeding Assessment\par  \par  Ms. VERONICA GOVEA denies family history of unexpected death following Anesthesia or Exercise.\par  Denies Family history of Malignant Hyperthermia, Muscle or Neuromuscular disorder and High Temperature following exercise.\par  \par  Ms. VERONICA GOEVA denies history of Muscle Spasm, Dark or Chocolate - Colored urine and Unanticipated fever immediately following anesthesia or serious exercise. \par  Ms. GOVEA also denies bleeding tendencies/ Risks of Bleeding.\par

## 2024-04-03 NOTE — HISTORY OF PRESENT ILLNESS
[] : in left upper arm [FreeTextEntry3] : 8/6/2020 Dr. Dickens [FreeTextEntry1] : 3/5/2020 Dr. Dickens (thrombosed) [FreeTextEntry4] : yesterday [FreeTextEntry5] : yesterday at 11:30 [FreeTextEntry6] : Dr Williamson

## 2024-04-05 ENCOUNTER — EMERGENCY (EMERGENCY)
Facility: HOSPITAL | Age: 69
LOS: 1 days | Discharge: HOME | End: 2024-04-05
Attending: STUDENT IN AN ORGANIZED HEALTH CARE EDUCATION/TRAINING PROGRAM
Payer: MEDICAID

## 2024-04-05 VITALS
OXYGEN SATURATION: 99 % | SYSTOLIC BLOOD PRESSURE: 180 MMHG | DIASTOLIC BLOOD PRESSURE: 72 MMHG | RESPIRATION RATE: 18 BRPM | WEIGHT: 119.93 LBS | HEART RATE: 68 BPM | TEMPERATURE: 98 F | HEIGHT: 62 IN

## 2024-04-05 DIAGNOSIS — Z87.59 PERSONAL HISTORY OF OTHER COMPLICATIONS OF PREGNANCY, CHILDBIRTH AND THE PUERPERIUM: Chronic | ICD-10-CM

## 2024-04-05 DIAGNOSIS — Z98.890 OTHER SPECIFIED POSTPROCEDURAL STATES: Chronic | ICD-10-CM

## 2024-04-05 DIAGNOSIS — Z98.41 CATARACT EXTRACTION STATUS, RIGHT EYE: Chronic | ICD-10-CM

## 2024-04-05 DIAGNOSIS — Z90.710 ACQUIRED ABSENCE OF BOTH CERVIX AND UTERUS: Chronic | ICD-10-CM

## 2024-04-05 LAB
ALBUMIN SERPL ELPH-MCNC: 3.1 G/DL — LOW (ref 3.5–5)
ALP SERPL-CCNC: 125 U/L — HIGH (ref 40–120)
ALT FLD-CCNC: 12 U/L DA — SIGNIFICANT CHANGE UP (ref 10–60)
ANION GAP SERPL CALC-SCNC: 8 MMOL/L — SIGNIFICANT CHANGE UP (ref 5–17)
AST SERPL-CCNC: 6 U/L — LOW (ref 10–40)
BASE EXCESS BLDV CALC-SCNC: 6.9 MMOL/L — SIGNIFICANT CHANGE UP
BASOPHILS # BLD AUTO: 0.02 K/UL — SIGNIFICANT CHANGE UP (ref 0–0.2)
BASOPHILS NFR BLD AUTO: 0.4 % — SIGNIFICANT CHANGE UP (ref 0–2)
BILIRUB DIRECT SERPL-MCNC: 0.2 MG/DL — SIGNIFICANT CHANGE UP (ref 0–0.3)
BILIRUB INDIRECT FLD-MCNC: 0.2 MG/DL — SIGNIFICANT CHANGE UP (ref 0.2–1)
BILIRUB SERPL-MCNC: 0.4 MG/DL — SIGNIFICANT CHANGE UP (ref 0.2–1.2)
BLOOD GAS COMMENTS, VENOUS: SIGNIFICANT CHANGE UP
BUN SERPL-MCNC: 53 MG/DL — HIGH (ref 7–18)
CALCIUM SERPL-MCNC: 10.4 MG/DL — SIGNIFICANT CHANGE UP (ref 8.4–10.5)
CHLORIDE SERPL-SCNC: 96 MMOL/L — SIGNIFICANT CHANGE UP (ref 96–108)
CO2 SERPL-SCNC: 29 MMOL/L — SIGNIFICANT CHANGE UP (ref 22–31)
CREAT SERPL-MCNC: 13.7 MG/DL — HIGH (ref 0.5–1.3)
EGFR: 3 ML/MIN/1.73M2 — LOW
EOSINOPHIL # BLD AUTO: 0.09 K/UL — SIGNIFICANT CHANGE UP (ref 0–0.5)
EOSINOPHIL NFR BLD AUTO: 1.9 % — SIGNIFICANT CHANGE UP (ref 0–6)
GLUCOSE SERPL-MCNC: 117 MG/DL — HIGH (ref 70–99)
HCO3 BLDV-SCNC: 32 MMOL/L — HIGH (ref 22–29)
HCT VFR BLD CALC: 29.1 % — LOW (ref 34.5–45)
HGB BLD-MCNC: 9.8 G/DL — LOW (ref 11.5–15.5)
HOROWITZ INDEX BLDV+IHG-RTO: 21 — SIGNIFICANT CHANGE UP
IMM GRANULOCYTES NFR BLD AUTO: 0.4 % — SIGNIFICANT CHANGE UP (ref 0–0.9)
LACTATE SERPL-SCNC: 1.8 MMOL/L — SIGNIFICANT CHANGE UP (ref 0.7–2)
LIDOCAIN IGE QN: 61 U/L — SIGNIFICANT CHANGE UP (ref 13–75)
LYMPHOCYTES # BLD AUTO: 1.64 K/UL — SIGNIFICANT CHANGE UP (ref 1–3.3)
LYMPHOCYTES # BLD AUTO: 33.8 % — SIGNIFICANT CHANGE UP (ref 13–44)
MCHC RBC-ENTMCNC: 30.5 PG — SIGNIFICANT CHANGE UP (ref 27–34)
MCHC RBC-ENTMCNC: 33.7 GM/DL — SIGNIFICANT CHANGE UP (ref 32–36)
MCV RBC AUTO: 90.7 FL — SIGNIFICANT CHANGE UP (ref 80–100)
MONOCYTES # BLD AUTO: 0.47 K/UL — SIGNIFICANT CHANGE UP (ref 0–0.9)
MONOCYTES NFR BLD AUTO: 9.7 % — SIGNIFICANT CHANGE UP (ref 2–14)
NEUTROPHILS # BLD AUTO: 2.61 K/UL — SIGNIFICANT CHANGE UP (ref 1.8–7.4)
NEUTROPHILS NFR BLD AUTO: 53.8 % — SIGNIFICANT CHANGE UP (ref 43–77)
NRBC # BLD: 0 /100 WBCS — SIGNIFICANT CHANGE UP (ref 0–0)
PCO2 BLDV: 49 MMHG — HIGH (ref 39–42)
PH BLDV: 7.43 — SIGNIFICANT CHANGE UP (ref 7.32–7.43)
PLATELET # BLD AUTO: 208 K/UL — SIGNIFICANT CHANGE UP (ref 150–400)
PO2 BLDV: 26 MMHG — SIGNIFICANT CHANGE UP
POTASSIUM SERPL-MCNC: 5.3 MMOL/L — SIGNIFICANT CHANGE UP (ref 3.5–5.3)
POTASSIUM SERPL-SCNC: 5.3 MMOL/L — SIGNIFICANT CHANGE UP (ref 3.5–5.3)
PROT SERPL-MCNC: 7 G/DL — SIGNIFICANT CHANGE UP (ref 6–8.3)
RBC # BLD: 3.21 M/UL — LOW (ref 3.8–5.2)
RBC # FLD: 16.3 % — HIGH (ref 10.3–14.5)
SAO2 % BLDV: 31.5 % — SIGNIFICANT CHANGE UP
SODIUM SERPL-SCNC: 133 MMOL/L — LOW (ref 135–145)
WBC # BLD: 4.85 K/UL — SIGNIFICANT CHANGE UP (ref 3.8–10.5)
WBC # FLD AUTO: 4.85 K/UL — SIGNIFICANT CHANGE UP (ref 3.8–10.5)

## 2024-04-05 PROCEDURE — 99285 EMERGENCY DEPT VISIT HI MDM: CPT

## 2024-04-05 RX ORDER — MORPHINE SULFATE 50 MG/1
2 CAPSULE, EXTENDED RELEASE ORAL ONCE
Refills: 0 | Status: DISCONTINUED | OUTPATIENT
Start: 2024-04-05 | End: 2024-04-06

## 2024-04-05 NOTE — ED PROVIDER NOTE - NSFOLLOWUPINSTRUCTIONS_ED_ALL_ED_FT
You have been evaluated in the Emergency Department today for back pain     Please follow up with your primary care physician within two days.    Thank you for choosing us for your care. Although you have been discharged from the emergency department, this does not mean that you have a "clean bill of health".     If your symptoms persist or get worse or if any new symptoms develop, please return to the immediately for re-evaluation.     It is also very important that you see a primary care doctor within the next few days to follow-up.

## 2024-04-05 NOTE — ED PROVIDER NOTE - CLINICAL SUMMARY MEDICAL DECISION MAKING FREE TEXT BOX
After introducing myself to the patient and/or family I have performed a medical history, review of systems, and physical examination. I have formulated a differential diagnosis and plan of care for this visit and discussed this with the patient and/or family. I have also addressed the risks and benefits of diagnostic and treatment modalities planned for this visit.  Vital Signs: Reviewed the patient's vital signs  Nursing Notes: Reviewed and utilized the nursing notes  Old Medical Records: The patient's available past medical records and past encounters were reviewed  Laboratory Studies: Ordered and independently interpreted laboratory test, see above  Imaging Studies: Imaging studies ordered. Radiologist interpretation reviewed. I have independently reviewed imaging.    This patient presents with back pain most consistent with lower back pain. Differential diagnoses includes lumbago versus musculoskeletal spasm / strain versus sciatica. Less likely sciatica as straight leg raise test was negative. No back pain red flags on history or physical. Presentation not consistent with malignancy (lack of history of malignancy, lack of B symptoms), fracture (no trauma, no bony tenderness to palpation), cauda equina (no bowel or urinary incontinence/retention, no saddle anesthesia, no distal weakness), AAA, viscus perforation, osteomyelitis or epidural abscess (no IVDU, vertebral tenderness), renal colic, pyelonephritis (afebrile, no CVAT, no urinary symptoms). Given the clinical picture, no indication for imaging at this time.

## 2024-04-05 NOTE — ED ADULT TRIAGE NOTE - CHIEF COMPLAINT QUOTE
Patient presents to ED as a notification for hypoglycemia. As per EMS, accucheck was 96 and repeat was 82. Patient did not eat all day. Patient received glucose 31g liquid. Patient is on dialysis, m/w/f, did not go today because she was not feeling well. Left AV fistula noted.

## 2024-04-05 NOTE — ED PROVIDER NOTE - OBJECTIVE STATEMENT
68 F presents to ED as a notification for hypoglycemia. As per EMS, accucheck was 96 and repeat was 82. Patient did not eat all day. Patient received glucose 31g liquid. Patient is on dialysis, m/w/f, did not go today because she was not feeling well. Left AV fistula noted. 68 F presents to ED as a notification for hypoglycemia. Patient reports she went to urgent care and 1 complaining of pain throughout her entire body including chest and back was referred to the ED.  Patient reports lower back pain and stiffness states Tylenol she has been taking has not been working.  EMS Pree notified for hypoglycemia with a fingerstick glucose of 80 and route on arrival sugar well within normal limits.  Patient states that she feels fine and has no complaints other than lower back pain.    GENERAL APPEARANCE:  AxOx4, generally well-appearing, no acute distress.  HEENT:  NC, AT. MMM. EOMI, clear conjunctiva, oropharynx clear.  NECK:  Supple without lymphadenopathy.  No stiffness or restricted ROM.  HEART:  Normal rate and regular rhythm, normal S1/S1, no m/r/g  LUNGS:  CTAB, moving air well. No crackles or wheezes are heard.  ABDOMEN:  Soft, nontender, non-distended.  BACK: No CVAT, no obvious deformity.  EXTREMITIES:  Without cyanosis, clubbing or edema.  NEUROLOGICAL:  Grossly nonfocal. Alert and oriented, moving all 4 extremities. CN II-XII grossly intact. Observed to ambulate with normal gait.  Skin:  Warm and dry without any rash.

## 2024-04-06 VITALS
TEMPERATURE: 99 F | RESPIRATION RATE: 18 BRPM | SYSTOLIC BLOOD PRESSURE: 178 MMHG | HEART RATE: 73 BPM | OXYGEN SATURATION: 98 % | DIASTOLIC BLOOD PRESSURE: 81 MMHG

## 2024-04-06 PROCEDURE — 93005 ELECTROCARDIOGRAM TRACING: CPT

## 2024-04-06 PROCEDURE — 80048 BASIC METABOLIC PNL TOTAL CA: CPT

## 2024-04-06 PROCEDURE — 85025 COMPLETE CBC W/AUTO DIFF WBC: CPT

## 2024-04-06 PROCEDURE — 36415 COLL VENOUS BLD VENIPUNCTURE: CPT

## 2024-04-06 PROCEDURE — 83605 ASSAY OF LACTIC ACID: CPT

## 2024-04-06 PROCEDURE — 96376 TX/PRO/DX INJ SAME DRUG ADON: CPT

## 2024-04-06 PROCEDURE — 80076 HEPATIC FUNCTION PANEL: CPT

## 2024-04-06 PROCEDURE — 82962 GLUCOSE BLOOD TEST: CPT

## 2024-04-06 PROCEDURE — 82803 BLOOD GASES ANY COMBINATION: CPT

## 2024-04-06 PROCEDURE — 83690 ASSAY OF LIPASE: CPT

## 2024-04-06 PROCEDURE — 96374 THER/PROPH/DIAG INJ IV PUSH: CPT

## 2024-04-06 PROCEDURE — 96375 TX/PRO/DX INJ NEW DRUG ADDON: CPT

## 2024-04-06 PROCEDURE — 99284 EMERGENCY DEPT VISIT MOD MDM: CPT | Mod: 25

## 2024-04-06 RX ORDER — ACETAMINOPHEN 500 MG
975 TABLET ORAL ONCE
Refills: 0 | Status: DISCONTINUED | OUTPATIENT
Start: 2024-04-06 | End: 2024-04-06

## 2024-04-06 RX ORDER — KETOROLAC TROMETHAMINE 30 MG/ML
15 SYRINGE (ML) INJECTION ONCE
Refills: 0 | Status: DISCONTINUED | OUTPATIENT
Start: 2024-04-06 | End: 2024-04-06

## 2024-04-06 RX ORDER — ACETAMINOPHEN 500 MG
1000 TABLET ORAL ONCE
Refills: 0 | Status: COMPLETED | OUTPATIENT
Start: 2024-04-06 | End: 2024-04-06

## 2024-04-06 RX ADMIN — Medication 15 MILLIGRAM(S): at 03:27

## 2024-04-06 RX ADMIN — Medication 15 MILLIGRAM(S): at 08:11

## 2024-04-06 RX ADMIN — Medication 15 MILLIGRAM(S): at 03:29

## 2024-04-06 RX ADMIN — Medication 400 MILLIGRAM(S): at 03:29

## 2024-04-06 RX ADMIN — Medication 1000 MILLIGRAM(S): at 03:29

## 2024-04-06 NOTE — ED ADULT NURSE NOTE - CAS EDP DISCH TYPE
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 Patient was scooting down the stairs on his bottom and slipped, falling down 10 stairs, bumping head. no LOC no other injuries. AAOX4 after fall and in ED.   INR was found to be elevated above goal 3-4 range by parents. Home

## 2024-04-06 NOTE — ED ADULT NURSE NOTE - NS_SISCREENINGSR_GEN_ALL_ED
Negative sent from urgent care for diarrhea x 1 month w/ Rt abd discomfort , fever and bodyaches from yesterday

## 2024-04-06 NOTE — ED ADULT NURSE NOTE - NSFALLUNIVINTERV_ED_ALL_ED
Bed/Stretcher in lowest position, wheels locked, appropriate side rails in place/Call bell, personal items and telephone in reach/Instruct patient to call for assistance before getting out of bed/chair/stretcher/Non-slip footwear applied when patient is off stretcher/[place] to call system/Physically safe environment - no spills, clutter or unnecessary equipment/Purposeful proactive rounding/Room/bathroom lighting operational, light cord in reach

## 2024-05-23 NOTE — ED PROVIDER NOTE - NS_EDPROVIDERDISPOUSERTYPE_ED_A_ED
Addended by: DAYNA AKHTAR on: 5/23/2024 08:24 AM     Modules accepted: Orders     Scribe Attestation (For Scribes USE Only)... Attending Attestation (For Attendings USE Only).../Scribe Attestation (For Scribes USE Only)...

## 2024-05-30 NOTE — H&P PST ADULT - NEGATIVE CARDIOVASCULAR SYMPTOMS
S/p hospital discharge. Pt's wife confirmed correct warfarin dose. She denies any changes to diet, health or medication     no chest pain/no palpitations/no dyspnea on exertion/no orthopnea

## 2024-06-24 NOTE — ED ADULT TRIAGE NOTE - NSWEIGHTCALCTOOLDRUG_GEN_A_CORE
[facility]   ORTHOPAEDIC SURGERY AND SPORTS MEDICINE  DATE OF VISIT: 06/24/24  Follow Up Knee Visit     CHIEF COMPLAINT:   Chief Complaint   Patient presents with    Knee Pain     Pt is here following up for his left knee pain. Pt states his knee has become more bothersome lately and would like to request a cortisone injection through workers comp.        HPI:    Jones Michel is a 64 y.o. year old male who presented to the office today for follow up of left knee pain, previously evaluated on 1/10/2024. Previous treatment has included left knee corticosteroid injection on 11/10/2023. He reports symptoms improved.  He reports pain began to return about 1 to 2 months ago.. The patient has responded to the treatment.      REVIEW OF SYSTEMS:     General: Negative Fever, chills, fatigue   Cardiovascular: Negative chest pain, palpitations  Respiratory: Equal chest expansion, negative shortness of breath   Skin: Negative rash, open wounds  Psych: Normal affect, mood stable  Neurologic: sensation grossly intact in extremities   Musculoskeletal: See HPI      Physical Exam:     Height: 1.753 m (5' 9\"), Weight - Scale: 106.6 kg (235 lb)    General: Alert and oriented x3, no acute distress  Cardiovascular/pulmonary: No labored breathing, peripheral perfusion intact  Musculoskeletal:    Left Knee:    negative swelling/deformity/effusion  Range of motion is full  Patella is stable tracking midline, negative patellofemoral crepitus  There is no laxity with varus/valgus stress at 0 and 30 degrees of flexion.    There is tenderness to palpation along the medial joint line.    There is no tenderness to palpation along the lateral joint line        Controlled Substances Monitoring:      Imaging:  Previous imaging reviewed showing degenerative changes osteophyte formation, posterior medial meniscus root tear with meniscal extrusion, probable chronic ACL tear      Assessment: Left knee medial meniscus root tear with presence of 
 used

## 2024-07-02 ENCOUNTER — APPOINTMENT (OUTPATIENT)
Dept: VASCULAR SURGERY | Facility: CLINIC | Age: 69
End: 2024-07-02
Payer: MEDICAID

## 2024-07-02 PROCEDURE — ZZZZZ: CPT

## 2024-07-02 PROCEDURE — G2211 COMPLEX E/M VISIT ADD ON: CPT | Mod: NC,1L

## 2024-09-16 NOTE — PHYSICAL THERAPY INITIAL EVALUATION ADULT - LIVES WITH, PROFILE
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     Estimated Blood Loss: None    Assistant Tasks: Handing instruments.     Implants:   Implant Name Type Inv. Item Serial No.  Lot No. LRB No. Used Action   LENS IOL TECNIS PROTEC +25.5 D MOD C BCNVX 1 PC ANT ASPH UV - [id number] Lens LENS IOL TECNIS PROTEC +25.5 D MOD C BCNVX 1 PC ANT ASPH UV [id number] Owen \T\ [employer]  Right 1 Implanted       I was present for the key portions of the procedure and was immediately available for the non-key portions      DESCRIPTION OF PROCEDURE  The patient was brought to the operating room and placed in the supine position. Topical 2% Xylocaine drops were instilled in the eye 30 minutes prior to the surgery. The eye was prepped and draped for surgery. A paracentesis wound was created with a side port blade. After the anterior chamber was entered, viscoelastic was injected into the anterior chamber to deepen it. A 3.0 mm keratome was used to make a clear corneal incision at the temporal limbus just inside the vascular arcade. A bent 26 gauge needle was used to form the anterior capsulotomy followed by using a capsulorhexis forceps to make a standard continuos curvilinear 5.5 mm  capsulorhexis. Balanced salt solution was used to hydrodissect the nucleus, and the nucleus was then phacoemulsified using a stop and chop technique. The cortex was removed completely with the irrigation and aspiration settings. The implant, listed above was then placed in the posterior chamber and delivered into the bag and centered. All remaining viscoelastic was removed with the irrigation and aspiration settings. The chamber was then re-filled with balanced salt solution. The wound was found to be watertight at the conclusion of the procedure. 0.1mL of vigamox 0.5% was administered Intracameral and 0.4mL of subconjunctival Triamcinolone Acetonide (10mg/mL) was administered 5mm inferior to the inferior limbus.   An eye shield was applied.    ORIMPLANTWACT@    The patient was awake and alert at the conclusion of the procedure and will be followed-up tomorrow at the [facility].     in an apartment building/alone

## 2024-10-15 ENCOUNTER — APPOINTMENT (OUTPATIENT)
Dept: VASCULAR SURGERY | Facility: CLINIC | Age: 69
End: 2024-10-15

## 2024-11-11 NOTE — PRE-ANESTHESIA EVALUATION ADULT - NSANTHDISPORD_GEN_ALL_CORE
PACU
If you are a smoker, it is important for your health to stop smoking. Please be aware that second hand smoke is also harmful.

## 2024-12-26 NOTE — H&P PST ADULT - BSA (M2)
Patient was in to see PCP on 12/18/24 for productive cough. Patient has completed the medication and still have a productive cough. Patient coughing up yellow phlegm. Please call Abdullahi on home number.   1.58

## 2025-01-10 NOTE — ASU PREOP CHECKLIST - LAST TOOK
Pt reports feeds at breast going well, she has no concerns at this time. She reports baby is cluster feeding. Reviewed feeding on demand, how to know is baby is getting enough and deep latch. Encouraged her to reach out to lactation as needed.  
clears

## 2025-02-28 NOTE — PROGRESS NOTE ADULT - ASSESSMENT
Discussed with patient diet exercise.  It was normal in the past.  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

## 2025-03-18 NOTE — ASU PATIENT PROFILE, ADULT - TEACHING/LEARNING RELIGIOUS CONSIDERATIONS
PROCEDURE NOTE  Date: 3/18/2025   Name: Rayna dHez  YOB: 1951    Procedures      OPERATIVE REPORT      DATE OF PROCEDURE: 3/18/2025    PREOPERATIVE DIAGNOSIS:   lumbar radiculopathy    POSTOPERATIVE DIAGNOSIS:   Same.     OPERATIVE PROCEDURE:  Caudal epidural steroid injection with fluoroscopy and epidurogram.    PROCEDURE:  The patient taken to the special procedures department and placed in a prone position.   A timeout was performed immediately prior to the start of the caudal block procedure and included the correct patient (two identifiers), correct procedure and correct site(s).  Procedure consent and allergies were also verified.      The sacral hiatus was identified and marked and the sacral region was then prepped and draped in the usual fashion.  Local anesthetic of 0.5% Xylocaine was injected to form a skin wheal and then was injected to the depth of the sacrococcygeal membrane.  At this point a C-arm  film was taken to verify for correct approach of the needle.  The needle was then withdrawn.  A #22 gauge 1-1/2 inch needle was inserted through the coccysacrogeal membrane into the epidural space using loss of resistance technique with air.  After negative aspiration was confirmed, approximately 3ml of Isovue M 200 was used to verify needle tip location in the caudal epidural space.  Distribution of the contrast medium was observed and a normal appearing epidurogram was noted.  The air syringe was then removed.  A mixture of 40mg of Depo-Medrol and 0.5% Lidocaine preservative free totaling 12cc was injected into the epidural space.  The needle was aspirated prior to and during this injection several times to verify again that the needle was outside the intravascular or subdural regions.  The needle was then withdrawn.    The patient tolerated the procedure well and was taken to the post-anesthesia care unit in stable condition.  The patient was instructed to call our office the 
none

## 2025-04-11 ENCOUNTER — APPOINTMENT (OUTPATIENT)
Dept: GASTROENTEROLOGY | Facility: CLINIC | Age: 70
End: 2025-04-11

## 2025-04-13 NOTE — PRE-ANESTHESIA EVALUATION ADULT - NSANTHSNORERD_ENT_A_CORE
Yes
You can access the FollowMyHealth Patient Portal offered by [facility] by registering at the following website: [email or web address]. By joining [employer]s FollowMyHealth portal, you will also be able to view your health information using other applications (apps) compatible with our system.

## 2025-04-16 NOTE — ED ADULT NURSE NOTE - NS ED NURSE DISCH DISPOSITION
Will develop more adaptive coping strategies to manage stress Will develop more adaptive coping strategies to manage stress Will develop more adaptive coping strategies to manage stress Will develop more adaptive coping strategies to manage stress Will develop more adaptive coping strategies to manage stress Will develop more adaptive coping strategies to manage stress Will develop more adaptive coping strategies to manage stress Will develop more adaptive coping strategies to manage stress Will develop more adaptive coping strategies to manage stress Will develop more adaptive coping strategies to manage stress Will develop more adaptive coping strategies to manage stress Will develop more adaptive coping strategies to manage stress Discharged Will develop more adaptive coping strategies to manage stress Will develop more adaptive coping strategies to manage stress Will develop more adaptive coping strategies to manage stress Will develop more adaptive coping strategies to manage stress Will develop more adaptive coping strategies to manage stress Will develop more adaptive coping strategies to manage stress Will develop more adaptive coping strategies to manage stress Will develop more adaptive coping strategies to manage stress Will develop more adaptive coping strategies to manage stress Will develop more adaptive coping strategies to manage stress

## 2025-04-29 ENCOUNTER — APPOINTMENT (OUTPATIENT)
Dept: SURGERY | Facility: CLINIC | Age: 70
End: 2025-04-29

## 2025-05-12 NOTE — PACU DISCHARGE NOTE - AIRWAY PATENCY:
Bypass 12/16/2019 01/16/25 SAAD Wang     Consideration of or use of Pharmacotherapy with FDA approved medication if appropriate:  she was on phentermine in the past ordered by Sandra Real NP   Would avoid phentermine with current history of hyperthyroidism   Currently on naltrexone for pain   We elected to start bupropion 75 mg in am for 1 week than week 2 add bupropion between 3-5 pm. The patient  was counseled on the side effects and taper of this medication.   Post Bariatric Surgery Labs:  Next labs to be obtained yearly   Labs done on 1/7/25 were  reviewed with patient today.     Return in about 6 months (around 7/16/2025) for [facility] CP in 6 months, Yearly follow up with CP and RD .    Scheduled 07/17/25 SAAD Wang    Satisfactory

## 2025-06-03 ENCOUNTER — APPOINTMENT (OUTPATIENT)
Dept: SURGERY | Facility: CLINIC | Age: 70
End: 2025-06-03

## 2025-07-01 ENCOUNTER — APPOINTMENT (OUTPATIENT)
Dept: SURGERY | Facility: CLINIC | Age: 70
End: 2025-07-01

## 2025-07-01 VITALS
TEMPERATURE: 97.9 F | HEIGHT: 62 IN | BODY MASS INDEX: 20.51 KG/M2 | WEIGHT: 111.44 LBS | HEART RATE: 64 BPM | DIASTOLIC BLOOD PRESSURE: 61 MMHG | SYSTOLIC BLOOD PRESSURE: 129 MMHG | OXYGEN SATURATION: 98 %

## 2025-07-01 PROBLEM — N25.81 SECONDARY HYPERPARATHYROIDISM OF RENAL ORIGIN: Status: ACTIVE | Noted: 2025-07-01

## 2025-07-01 PROBLEM — E83.52 HYPERCALCEMIA: Status: ACTIVE | Noted: 2025-07-01

## 2025-07-01 PROCEDURE — 99214 OFFICE O/P EST MOD 30 MIN: CPT

## 2025-07-01 RX ORDER — HYDRALAZINE HYDROCHLORIDE 50 MG/1
50 TABLET ORAL
Refills: 0 | Status: ACTIVE | COMMUNITY

## 2025-07-01 RX ORDER — ISOSORBIDE MONONITRATE 60 MG/1
60 TABLET, EXTENDED RELEASE ORAL DAILY
Refills: 0 | Status: ACTIVE | COMMUNITY

## 2025-07-01 RX ORDER — SEVELAMER CARBONATE 800 MG/1
800 TABLET, FILM COATED ORAL 3 TIMES DAILY
Refills: 0 | Status: ACTIVE | COMMUNITY

## 2025-07-01 RX ORDER — SENNOSIDES 8.6 MG/1
8.6 TABLET ORAL
Refills: 0 | Status: ACTIVE | COMMUNITY

## 2025-07-01 RX ORDER — ESCITALOPRAM OXALATE 20 MG/1
20 TABLET ORAL DAILY
Refills: 0 | Status: ACTIVE | COMMUNITY

## 2025-07-01 RX ORDER — CINACALCET 30 MG/1
30 TABLET ORAL DAILY
Refills: 0 | Status: ACTIVE | COMMUNITY

## 2025-07-01 RX ORDER — ASPIRIN 81 MG/1
81 TABLET, CHEWABLE ORAL DAILY
Refills: 0 | Status: ACTIVE | COMMUNITY

## 2025-07-01 RX ORDER — GABAPENTIN 100 MG/1
100 CAPSULE ORAL
Refills: 0 | Status: ACTIVE | COMMUNITY

## 2025-07-01 RX ORDER — FOLIC ACID/VIT B COMPLEX AND C 0.8 MG
0.8 TABLET ORAL AT BEDTIME
Refills: 0 | Status: ACTIVE | COMMUNITY

## 2025-07-01 RX ORDER — ERGOCALCIFEROL 1.25 MG/1
1.25 MG CAPSULE, LIQUID FILLED ORAL
Refills: 0 | Status: ACTIVE | COMMUNITY

## 2025-07-01 RX ORDER — LAMIVUDINE 150 MG/1
150 TABLET, FILM COATED ORAL DAILY
Refills: 0 | Status: ACTIVE | COMMUNITY

## 2025-07-01 RX ORDER — TRAZODONE HYDROCHLORIDE 50 MG/1
50 TABLET ORAL
Refills: 0 | Status: ACTIVE | COMMUNITY

## 2025-07-01 RX ORDER — METOPROLOL TARTRATE 100 MG/1
100 TABLET ORAL DAILY
Refills: 0 | Status: ACTIVE | COMMUNITY

## 2025-07-01 RX ORDER — TRAMADOL HYDROCHLORIDE 50 MG/1
50 TABLET, COATED ORAL TWICE DAILY
Refills: 0 | Status: ACTIVE | COMMUNITY

## 2025-07-01 RX ORDER — FAMOTIDINE 20 MG/1
20 TABLET, FILM COATED ORAL
Refills: 0 | Status: ACTIVE | COMMUNITY

## 2025-08-12 ENCOUNTER — APPOINTMENT (OUTPATIENT)
Dept: SURGERY | Facility: CLINIC | Age: 70
End: 2025-08-12

## (undated) DEVICE — GLV 7.5 PROTEXIS (WHITE)

## (undated) DEVICE — TUBING IV SET GRAVITY 3Y 100" MACRO

## (undated) DEVICE — ENDOCATCH 10MM SPECIMEN POUCH

## (undated) DEVICE — GLV 8 PROTEXIS (WHITE)

## (undated) DEVICE — SENSOR O2 FINGER ADULT

## (undated) DEVICE — DRSG 2X2

## (undated) DEVICE — SUT POLYSORB 0 30" GU-46

## (undated) DEVICE — MEDICATION LABELS W MARKER

## (undated) DEVICE — PACK MINOR NO DRAPE

## (undated) DEVICE — KIT ENDO PROCEDURE CUST W/VLV

## (undated) DEVICE — POOLE SUCTION TIP

## (undated) DEVICE — CATH ELCTR GLIDE PRB 7FR

## (undated) DEVICE — ELCTR BOVIE BLADE 3/4" EXTENDED LENGTH 6"

## (undated) DEVICE — TROCAR ETHICON ENDOPATH XCEL BLADELESS 12MM X 100MM STABILITY

## (undated) DEVICE — ELCTR GROUNDING PAD ADULT COVIDIEN

## (undated) DEVICE — SUT POLYSORB 3-0 30" V-20 UNDYED

## (undated) DEVICE — Device

## (undated) DEVICE — SOL IRR POUR H2O 250ML

## (undated) DEVICE — SUT CHROMIC 0 30" V-20

## (undated) DEVICE — FOLEY TRAY 16FR 5CC LF UMETER CLOSED

## (undated) DEVICE — DRSG TEGADERM 6"X8"

## (undated) DEVICE — CANISTER DISPOSABLE THIN WALL 3000CC

## (undated) DEVICE — POSITIONER FOAM MATTRESS PAD CONVOLUTED (PINK)

## (undated) DEVICE — DRSG CURITY GAUZE SPONGE 4 X 4" 12-PLY

## (undated) DEVICE — LAP PAD W RING 18 X 18"

## (undated) DEVICE — TRAP SPECIMEN SPUTUM 40CC

## (undated) DEVICE — PREP BETADINE SPONGE STICKS

## (undated) DEVICE — TUBING STRYKEFLOW II SUCTION / IRRIGATOR

## (undated) DEVICE — SUT PDS PLUS 1 36" CT-1

## (undated) DEVICE — SYR ASEPTO

## (undated) DEVICE — DRSG TAPE UMBILICAL COTTON 2" X 30 X 1/8"

## (undated) DEVICE — DRSG TAPE TRANSPORE 3"

## (undated) DEVICE — STERIS DEFENDO 3-PIECE KIT (AIR/WATER, SUCTION & BIOPSY VALVES)

## (undated) DEVICE — STAPLER COVIDIEN ENDO GIA STANDARD HANDLE

## (undated) DEVICE — PACK BASIN SPECIAL PROCEDURE

## (undated) DEVICE — TUBING MEDI-VAC W MAXIGRIP CONNECTORS 1/4"X6'

## (undated) DEVICE — POSITIONER PINK PAD PIGAZZI SYSTEM

## (undated) DEVICE — BLADE SCALPEL SAFETYLOCK #10

## (undated) DEVICE — DRAPE 3/4 SHEET 52X76"

## (undated) DEVICE — TROCAR COVIDIEN VERSAPORT BLADELESS OPTICAL 5MM STANDARD

## (undated) DEVICE — FOLEY TRAY 16FR 5CC LTX UMETER CLOSED

## (undated) DEVICE — DRAPE TOWEL BLUE 17" X 24"

## (undated) DEVICE — VENODYNE/SCD SLEEVE CALF MEDIUM

## (undated) DEVICE — TROCAR COVIDIEN VERSAPORT BLADELESS OPTICAL 12MM STANDARD

## (undated) DEVICE — NDL HYPO SAFE 22G X 1.5" (BLACK)

## (undated) DEVICE — TUBING STRYKER PNEUMOSURE HI FLOW INSUFFLATOR

## (undated) DEVICE — DRAPE MAYO STAND 30"

## (undated) DEVICE — DRAPE BACK TABLE COVER HEAVY DUTY 5'X2'

## (undated) DEVICE — DRAPE FLUID WARMER 44 X 44"

## (undated) DEVICE — GOWN TRIMAX LG

## (undated) DEVICE — LABELS BLANK W PEN

## (undated) DEVICE — POSITIONER PURPLE ARM ONE STEP (LARGE)

## (undated) DEVICE — PACK MAJOR ABDOMINAL WITH LAP

## (undated) DEVICE — BLADE SCALPEL SAFETYLOCK #15

## (undated) DEVICE — XI ARM NEEDLE DRIVER SUTURECUT MEGA 8MM

## (undated) DEVICE — DRAPE IOBAN 23" X 23"

## (undated) DEVICE — SNARE LOOP POLY DISP 30MM LOOP

## (undated) DEVICE — STAPLER SKIN VISI-STAT 35 WIDE

## (undated) DEVICE — DRAPE LIGHT HANDLE COVER (BLUE)

## (undated) DEVICE — ADAPTER ENDO CHNL SINGLE USE

## (undated) DEVICE — TROCAR APPLIED MEDICAL KII BALLOON BLUNT TIP 12MM X 100MM

## (undated) DEVICE — SUT BIOSYN 4-0 18" P-12

## (undated) DEVICE — SYR LUER LOK 50CC

## (undated) DEVICE — INSUFFLATION NDL COVIDIEN STEP 14G FOR STEP/VERSASTEP

## (undated) DEVICE — SPONGE ENDO PEANUT 5MM

## (undated) DEVICE — XI ENDOWRIST STAPLER SHEATH

## (undated) DEVICE — SUT MONOCRYL 4-0 27" PS-2 UNDYED

## (undated) DEVICE — MARKING PEN W RULER

## (undated) DEVICE — SOL IRR POUR NS 0.9% 500ML

## (undated) DEVICE — PACK ENDO PROCEDURE CUSTOM NO VLV

## (undated) DEVICE — TUBING SUCTION NONCONDUCTIVE 6MM X 12FT

## (undated) DEVICE — SHEARS COVIDIEN ENDO SHEAR 5MM X 31CM W UNIPOLAR CAUTERY

## (undated) DEVICE — DRSG STERISTRIPS 0.5 X 4"

## (undated) DEVICE — VENODYNE/SCD SLEEVE CALF LARGE

## (undated) DEVICE — OSTOMY KIT 2-PIECE 4" NS (YELLOW)

## (undated) DEVICE — ELCTR BOVIE TIP BLADE INSULATED 2.75" EDGE

## (undated) DEVICE — PACK IV START WITH CHG

## (undated) DEVICE — PACK GENERAL LAPAROSCOPY

## (undated) DEVICE — STAPLER COVIDIEN ENDO GIA SHORT HANDLE

## (undated) DEVICE — FORCEP BIOPSY 2.5MM DISP

## (undated) DEVICE — GLV 8.5 PROTEXIS (WHITE)

## (undated) DEVICE — SUCTION YANKAUER NO CONTROL VENT

## (undated) DEVICE — SUT VICRYL 2-0 18" TIES UNDYED

## (undated) DEVICE — DRAPE LAPAROTOMY W VELCRO CORD TABS

## (undated) DEVICE — DRSG CURITY GAUZE SPONGE 4 X 4" 12-PLY NON-STERILE

## (undated) DEVICE — PACKING GAUZE IODOFORM 0.25"

## (undated) DEVICE — SOL IRR POUR NS 0.9% 1500ML

## (undated) DEVICE — DRSG MASTISOL

## (undated) DEVICE — PREP BETADINE KIT

## (undated) DEVICE — CATH IV SAFE BC 22G X 1" (BLUE)

## (undated) DEVICE — DRAIN PENROSE .5" X 18" LATEX

## (undated) DEVICE — NDL INJ SCLERO INTERJECT 23G

## (undated) DEVICE — SPECIMEN CONTAINER 100ML

## (undated) DEVICE — SYR LUER LOK 10CC

## (undated) DEVICE — XI OBTURATOR OPTICAL BLADELESS 8MM

## (undated) DEVICE — DRAPE 1/2 SHEET 40X57"

## (undated) DEVICE — GLV 7 PROTEXIS (WHITE)

## (undated) DEVICE — SUT VICRYL 3-0 27" SH UNDYED

## (undated) DEVICE — SUT POLYSORB 2-0 36" GS-21 UNDYED

## (undated) DEVICE — DRAPE 3/4 SHEET W REINFORCEMENT 56X77"

## (undated) DEVICE — TROCAR COVIDIEN BLUNT TIP HASSAN 12MM

## (undated) DEVICE — SUT VICRYL 3-0 18" SH UNDYED (POP-OFF)

## (undated) DEVICE — RETRIEVER ROTH NET PLATINUM-UNIVERSAL

## (undated) DEVICE — WARMING BLANKET FULL ADULT

## (undated) DEVICE — TROCAR APPLIED MEDICAL KII FIOS FIRST ENTRY 5MM X 100MM Z-THREAD

## (undated) DEVICE — BIOPSY FORCEP RADIAL JAW 4 STANDARD WITH NEEDLE

## (undated) DEVICE — DRSG GAUZE VASELINE PETROLEUM 3 X 18"

## (undated) DEVICE — NDL COUNTER FOAM AND MAGNET 40-70

## (undated) DEVICE — PREP CHLORAPREP HI-LITE ORANGE 26ML

## (undated) DEVICE — D HELP - CLEARVIEW CLEARIFY SYSTEM

## (undated) DEVICE — TUBING CANNULA SALTER LABS NASAL ADULT 7FT

## (undated) DEVICE — DRAPE GENERAL ENDOSCOPY

## (undated) DEVICE — CONTAINER FORMALIN 80ML YELLOW

## (undated) DEVICE — LIGASURE IMPACT

## (undated) DEVICE — CLAMP BX HOT RAD JAW 3

## (undated) DEVICE — DRSG BANDAID 0.75X3"

## (undated) DEVICE — TUBING SUCTION 20FT

## (undated) DEVICE — DRAPE INSTRUMENT POUCH 6.75" X 11"

## (undated) DEVICE — SUT MAXON 1 96" T-60

## (undated) DEVICE — XI STAPLER SUREFORM 60

## (undated) DEVICE — TROCAR SURGIQUEST AIRSEAL 12MMX100MM

## (undated) DEVICE — SUT MAXON 1 36" GS-24

## (undated) DEVICE — WARMING BLANKET UPPER ADULT

## (undated) DEVICE — TROCAR ETHICON ENDOPATH XCEL BLADELESS 5MM X 100MM STABILITY

## (undated) DEVICE — NDL HYPO REGULAR BEVEL 25G X 1.5" (BLUE)

## (undated) DEVICE — LIGASURE MARYLAND 37CM

## (undated) DEVICE — XI DRAPE COLUMN

## (undated) DEVICE — VISITEC 4X4

## (undated) DEVICE — GLV 6.5 PROTEXIS (WHITE)

## (undated) DEVICE — URETERAL CATH RED RUBBER 14FR (GREEN)

## (undated) DEVICE — LUBRICATING JELLY ONESHOT 1.25OZ

## (undated) DEVICE — STAPLER SKIN MULTI DIRECTION W35

## (undated) DEVICE — GELPORT LAPAROSCOPIC SYSTEM

## (undated) DEVICE — TROCAR SURGIQUEST AIRSEAL 5MM X 100MM

## (undated) DEVICE — DRSG OPSITE 13.75 X 4"

## (undated) DEVICE — XI VESSEL SEALER

## (undated) DEVICE — FOR-ESU VALLEYLAB T7E15009DX: Type: DURABLE MEDICAL EQUIPMENT

## (undated) DEVICE — SUT MONOSOF 2-0 30" C-15

## (undated) DEVICE — SPECIMEN CONTAINER 90ML

## (undated) DEVICE — SOL INJ NS 0.9% 500ML 1-PORT

## (undated) DEVICE — FOR-ESU VALLEYLAB T7E14830DX: Type: DURABLE MEDICAL EQUIPMENT

## (undated) DEVICE — SUT SURGIPRO 2-0 30" GS-22

## (undated) DEVICE — XI SEAL UNIV 5- 8 MM

## (undated) DEVICE — ANESTHESIA CIRCUIT ADULT HMEF

## (undated) DEVICE — LINE BREATHE SAMPLNG

## (undated) DEVICE — SUT SOFSILK 3-0 30" V-20